# Patient Record
Sex: MALE | Race: WHITE | Employment: OTHER | URBAN - METROPOLITAN AREA
[De-identification: names, ages, dates, MRNs, and addresses within clinical notes are randomized per-mention and may not be internally consistent; named-entity substitution may affect disease eponyms.]

---

## 2017-03-07 ENCOUNTER — HOSPITAL ENCOUNTER (EMERGENCY)
Age: 72
Discharge: HOME | End: 2017-03-07
Attending: STUDENT IN AN ORGANIZED HEALTH CARE EDUCATION/TRAINING PROGRAM
Payer: MEDICARE

## 2017-03-07 VITALS
WEIGHT: 197 LBS | HEART RATE: 60 BPM | DIASTOLIC BLOOD PRESSURE: 75 MMHG | SYSTOLIC BLOOD PRESSURE: 134 MMHG | TEMPERATURE: 97.9 F | RESPIRATION RATE: 14 BRPM | OXYGEN SATURATION: 94 % | BODY MASS INDEX: 28.2 KG/M2 | HEIGHT: 70 IN

## 2017-03-07 DIAGNOSIS — K57.32 DIVERTICULITIS OF LARGE INTESTINE WITHOUT PERFORATION OR ABSCESS WITHOUT BLEEDING: Primary | ICD-10-CM

## 2017-03-07 LAB
ALBUMIN SERPL BCP-MCNC: 3.5 G/DL (ref 3.5–5)
ALBUMIN/GLOB SERPL: 0.9 {RATIO} (ref 1.1–2.2)
ALP SERPL-CCNC: 77 U/L (ref 45–117)
ALT SERPL-CCNC: 25 U/L (ref 12–78)
ANION GAP BLD CALC-SCNC: 10 MMOL/L (ref 5–15)
AST SERPL W P-5'-P-CCNC: 17 U/L (ref 15–37)
BASOPHILS # BLD AUTO: 0 K/UL (ref 0–0.1)
BASOPHILS # BLD: 0 % (ref 0–1)
BILIRUB SERPL-MCNC: 0.3 MG/DL (ref 0.2–1)
BUN SERPL-MCNC: 16 MG/DL (ref 6–20)
BUN/CREAT SERPL: 14 (ref 12–20)
CALCIUM SERPL-MCNC: 8.6 MG/DL (ref 8.5–10.1)
CHLORIDE SERPL-SCNC: 102 MMOL/L (ref 97–108)
CO2 SERPL-SCNC: 28 MMOL/L (ref 21–32)
CREAT SERPL-MCNC: 1.12 MG/DL (ref 0.7–1.3)
EOSINOPHIL # BLD: 0.2 K/UL (ref 0–0.4)
EOSINOPHIL NFR BLD: 2 % (ref 0–7)
ERYTHROCYTE [DISTWIDTH] IN BLOOD BY AUTOMATED COUNT: 13.5 % (ref 11.5–14.5)
GLOBULIN SER CALC-MCNC: 4.1 G/DL (ref 2–4)
GLUCOSE SERPL-MCNC: 99 MG/DL (ref 65–100)
HCT VFR BLD AUTO: 44.5 % (ref 36.6–50.3)
HGB BLD-MCNC: 15 G/DL (ref 12.1–17)
LYMPHOCYTES # BLD AUTO: 21 % (ref 12–49)
LYMPHOCYTES # BLD: 1.9 K/UL (ref 0.8–3.5)
MCH RBC QN AUTO: 29.9 PG (ref 26–34)
MCHC RBC AUTO-ENTMCNC: 33.7 G/DL (ref 30–36.5)
MCV RBC AUTO: 88.8 FL (ref 80–99)
MONOCYTES # BLD: 0.9 K/UL (ref 0–1)
MONOCYTES NFR BLD AUTO: 10 % (ref 5–13)
NEUTS SEG # BLD: 6 K/UL (ref 1.8–8)
NEUTS SEG NFR BLD AUTO: 67 % (ref 32–75)
PLATELET # BLD AUTO: 262 K/UL (ref 150–400)
POTASSIUM SERPL-SCNC: 4.4 MMOL/L (ref 3.5–5.1)
PROT SERPL-MCNC: 7.6 G/DL (ref 6.4–8.2)
RBC # BLD AUTO: 5.01 M/UL (ref 4.1–5.7)
SODIUM SERPL-SCNC: 140 MMOL/L (ref 136–145)
WBC # BLD AUTO: 9.1 K/UL (ref 4.1–11.1)

## 2017-03-07 PROCEDURE — 85025 COMPLETE CBC W/AUTO DIFF WBC: CPT | Performed by: STUDENT IN AN ORGANIZED HEALTH CARE EDUCATION/TRAINING PROGRAM

## 2017-03-07 PROCEDURE — 36415 COLL VENOUS BLD VENIPUNCTURE: CPT | Performed by: STUDENT IN AN ORGANIZED HEALTH CARE EDUCATION/TRAINING PROGRAM

## 2017-03-07 PROCEDURE — 99282 EMERGENCY DEPT VISIT SF MDM: CPT

## 2017-03-07 PROCEDURE — 80053 COMPREHEN METABOLIC PANEL: CPT | Performed by: STUDENT IN AN ORGANIZED HEALTH CARE EDUCATION/TRAINING PROGRAM

## 2017-03-07 RX ORDER — METRONIDAZOLE 500 MG/1
500 TABLET ORAL 3 TIMES DAILY
Qty: 21 TAB | Refills: 0 | Status: SHIPPED | OUTPATIENT
Start: 2017-03-07 | End: 2017-03-14

## 2017-03-07 RX ORDER — CIPROFLOXACIN 500 MG/1
500 TABLET ORAL 2 TIMES DAILY
Qty: 20 TAB | Refills: 0 | Status: SHIPPED | OUTPATIENT
Start: 2017-03-07 | End: 2017-03-17

## 2017-03-07 RX ORDER — GABAPENTIN 600 MG/1
600 TABLET ORAL 3 TIMES DAILY
COMMUNITY
End: 2017-08-05

## 2017-03-07 NOTE — ED TRIAGE NOTES
Pt states he started with RLQ and LLQ pain since nasima Friday. States last bowel movement was Saturday. States having nausea. Hx of diverticulitis.

## 2017-03-07 NOTE — DISCHARGE INSTRUCTIONS
We hope that we have addressed all of your medical concerns. The examination and treatment you received in the Emergency Department were for an emergent problem and were not intended as complete care. It is important that you follow up with your healthcare provider(s) for ongoing care. If your symptoms worsen or do not improve as expected, and you are unable to reach your usual health care provider(s), you should return to the Emergency Department. Today's healthcare is undergoing tremendous change, and patient satisfaction surveys are one of the many tools to assess the quality of medical care. You may receive a survey from the [facility] regarding your experience in the Emergency Department. I hope that your experience has been completely positive, particularly the medical care that I provided. As such, please participate in the survey; anything less than excellent does not meet my expectations or intentions. [facility] and 8 [facility] participate in nationally recognized quality of care measures. If your blood pressure is greater than 120/80, as reported below, we urge that you seek medical care to address the potential of high blood pressure, commonly known as hypertension. Hypertension can be hereditary or can be caused by certain medical conditions, pain, stress, or \"white coat syndrome. \"       Please make an appointment with your health care provider(s) for follow up of your Emergency Department visit. VITALS:   Patient Vitals for the past 8 hrs:   Temp Pulse Resp BP SpO2   03/07/17 1100 - - - 134/75 -   03/07/17 1059 - - - - 94 %   03/07/17 1031 - - - - 95 %   03/07/17 1030 - - - 126/63 -   03/07/17 1008 - - - - 96 %   03/07/17 1006 - - - 127/64 -   03/07/17 0900 97.9 °F (36.6 °C) 60 14 115/59 96 %          Thank you for allowing us to provide you with medical care today.   We realize that you have many choices for your emergency care needs. Please choose us in the future for any continued health care needs. [facility], [address] [place] [address] [address]   Office: [phone]            Recent Results (from the past 24 hour(s))   CBC WITH AUTOMATED DIFF    Collection Time: 03/07/17 10:21 AM   Result Value Ref Range    WBC 9.1 4.1 - 11.1 K/uL    RBC 5.01 4.10 - 5.70 M/uL    HGB 15.0 12.1 - 17.0 g/dL    HCT 44.5 36.6 - 50.3 %    MCV 88.8 80.0 - 99.0 FL    MCH 29.9 26.0 - 34.0 PG    MCHC 33.7 30.0 - 36.5 g/dL    RDW 13.5 11.5 - 14.5 %    PLATELET 480 402 - 665 K/uL    NEUTROPHILS 67 32 - 75 %    LYMPHOCYTES 21 12 - 49 %    MONOCYTES 10 5 - 13 %    EOSINOPHILS 2 0 - 7 %    BASOPHILS 0 0 - 1 %    ABS. NEUTROPHILS 6.0 1.8 - 8.0 K/UL    ABS. LYMPHOCYTES 1.9 0.8 - 3.5 K/UL    ABS. MONOCYTES 0.9 0.0 - 1.0 K/UL    ABS. EOSINOPHILS 0.2 0.0 - 0.4 K/UL    ABS. BASOPHILS 0.0 0.0 - 0.1 K/UL   METABOLIC PANEL, COMPREHENSIVE    Collection Time: 03/07/17 10:21 AM   Result Value Ref Range    Sodium 140 136 - 145 mmol/L    Potassium 4.4 3.5 - 5.1 mmol/L    Chloride 102 97 - 108 mmol/L    CO2 28 21 - 32 mmol/L    Anion gap 10 5 - 15 mmol/L    Glucose 99 65 - 100 mg/dL    BUN 16 6 - 20 MG/DL    Creatinine 1.12 0.70 - 1.30 MG/DL    BUN/Creatinine ratio 14 12 - 20      GFR est AA >60 >60 ml/min/1.73m2    GFR est non-AA >60 >60 ml/min/1.73m2    Calcium 8.6 8.5 - 10.1 MG/DL    Bilirubin, total 0.3 0.2 - 1.0 MG/DL    ALT (SGPT) 25 12 - 78 U/L    AST (SGOT) 17 15 - 37 U/L    Alk. phosphatase 77 45 - 117 U/L    Protein, total 7.6 6.4 - 8.2 g/dL    Albumin 3.5 3.5 - 5.0 g/dL    Globulin 4.1 (H) 2.0 - 4.0 g/dL    A-G Ratio 0.9 (L) 1.1 - 2.2         No results found. Diverticulitis: Care Instructions  Your Care Instructions    Diverticulitis occurs when pouches form in the wall of the colon and become inflamed or infected. It can be very painful. Doctors aren't sure what causes diverticulitis.  There is no proof that foods such as nuts, seeds, or berries cause it or make it worse. A low-fiber diet may cause the colon to work harder to push stool forward. Pouches may form because of this extra work. It may be hard to think about healthy eating while you're in pain. But as you recover, you might think about how you can use healthy eating for overall better health. Healthy eating may help you avoid future attacks. Follow-up care is a key part of your treatment and safety. Be sure to make and go to all appointments, and call your doctor if you are having problems. It's also a good idea to know your test results and keep a list of the medicines you take. How can you care for yourself at home? · Drink plenty of fluids, enough so that your urine is light yellow or clear like water. If you have kidney, heart, or liver disease and have to limit fluids, talk with your doctor before you increase the amount of fluids you drink. · Stick to liquids or a bland diet (plain rice, bananas, dry toast or crackers, applesauce) until you are feeling better. Then you can return to regular foods and gradually increase the amount of fiber in your diet. · Use a heating pad set on low on your belly to relieve mild cramps and pain. · Get extra rest until you are feeling better. · Be safe with medicines. Read and follow all instructions on the label. ¨ If the doctor gave you a prescription medicine for pain, take it as prescribed. ¨ If you are not taking a prescription pain medicine, ask your doctor if you can take an over-the-counter medicine. · If your doctor prescribed antibiotics, take them as directed. Do not stop taking them just because you feel better. You need to take the full course of antibiotics. To prevent future attacks of diverticulitis  · Avoid constipation:  ¨ Include fruits, vegetables, beans, and whole grains in your diet each day. These foods are high in fiber.   ¨ Drink plenty of fluids, enough so that your urine is light yellow or clear like water. If you have kidney, heart, or liver disease and have to limit fluids, talk with your doctor before you increase the amount of fluids you drink. ¨ Get some exercise every day. Build up slowly to 30 to 60 minutes a day on 5 or more days of the week. ¨ Take a fiber supplement, such as Citrucel or Metamucil, every day if needed. Read and follow all instructions on the label. ¨ Schedule time each day for a bowel movement. Having a daily routine may help. Take your time and do not strain when having a bowel movement. When should you call for help? Call 911 anytime you think you may need emergency care. For example, call if:  · You passed out (lost consciousness). · You vomit blood or what looks like coffee grounds. · You pass maroon or very bloody stools. Call your doctor now or seek immediate medical care if:  · You have severe pain or swelling in your belly. · You have a new or higher fever. · You cannot keep down fluids or medicines. · You have new pain that gets worse when you move or cough. Watch closely for changes in your health, and be sure to contact your doctor if:  · The symptoms you had when you first started feeling sick come back. · You do not get better as expected. Where can you learn more? Go to http://monica-barbie.info/. Enter H901 in the search box to learn more about \"Diverticulitis: Care Instructions. \"  Current as of: August 9, 2016  Content Version: 11.1  © 6103-6488 [employer]. Care instructions adapted under license by [employer] (which disclaims liability or warranty for this information). If you have questions about a medical condition or this instruction, always ask your healthcare professional. [place] [address] any warranty or liability for your use of this information.

## 2017-03-07 NOTE — ED PROVIDER NOTES
HPI Comments: 66 y.o. male with past medical history significant for diverticulitis and spinal stenosis who presents ambulatory from home with chief complaint of abdominal pain. Pt states lower abdominal pain onset 4 days ago. Pt states symptoms are the same as past diverticulitis flares. Pt has been taking laxatives with no relief. Pt has also been eating bland foods, such as toast, soup and eggs. Pt's last BM was \"half of the normal amount\" 3 days ago. Pt denies ever having a colonoscopy. Pt denies nausea, vomiting, fever, hematochezia and melena. There are no other acute medical concerns at this time. Social hx: current some day tobacco smoker; uses EtOH; denies illicit drug use  PCP: None    Note written by Bridget Ortega, as dictated by Ruth Ricci MD 11:06 AM      The history is provided by the patient. Past Medical History:   Diagnosis Date    Diverticulitis     Spinal stenosis        History reviewed. No pertinent surgical history. History reviewed. No pertinent family history. Social History     Social History    Marital status:      Spouse name: N/A    Number of children: N/A    Years of education: N/A     Occupational History    Not on file. Social History Main Topics    Smoking status: Current Some Day Smoker    Smokeless tobacco: Never Used    Alcohol use 0.5 oz/week     1 Cans of beer per week    Drug use: No    Sexual activity: Not on file     Other Topics Concern    Not on file     Social History Narrative         ALLERGIES: Review of patient's allergies indicates no known allergies. Review of Systems   Constitutional: Negative for chills, diaphoresis, fatigue and fever. HENT: Negative for congestion, rhinorrhea, sinus pressure, sore throat, trouble swallowing and voice change. Eyes: Negative for photophobia and visual disturbance. Respiratory: Negative for cough, chest tightness and shortness of breath.     Cardiovascular: Negative for chest pain, palpitations and leg swelling. Gastrointestinal: Positive for abdominal pain. Negative for blood in stool, constipation, diarrhea, nausea and vomiting. Musculoskeletal: Negative for arthralgias, myalgias and neck pain. Neurological: Negative for dizziness, weakness, light-headedness, numbness and headaches. All other systems reviewed and are negative. Vitals:    03/07/17 0900 03/07/17 1006 03/07/17 1008   BP: 115/59 127/64    Pulse: 60     Resp: 14     Temp: 97.9 °F (36.6 °C)     SpO2: 96%  96%   Weight: 89.4 kg (197 lb)     Height: 5' 10\" (1.778 m)              Physical Exam   Constitutional: He is oriented to person, place, and time. He appears well-developed and well-nourished. No distress. HENT:   Head: Normocephalic and atraumatic. Nose: Nose normal.   Mouth/Throat: Oropharynx is clear and moist. No oropharyngeal exudate. Eyes: Conjunctivae and EOM are normal. Right eye exhibits no discharge. Left eye exhibits no discharge. No scleral icterus. Neck: Normal range of motion. Neck supple. No JVD present. No tracheal deviation present. No thyromegaly present. Cardiovascular: Normal rate, regular rhythm, normal heart sounds and intact distal pulses. Exam reveals no gallop and no friction rub. No murmur heard. Pulmonary/Chest: Effort normal and breath sounds normal. No stridor. No respiratory distress. He has no wheezes. He has no rales. He exhibits no tenderness. Abdominal: Bowel sounds are normal. He exhibits no distension and no mass. There is tenderness (mild) in the periumbilical area. There is no rebound. Musculoskeletal: Normal range of motion. He exhibits no edema or tenderness. Lymphadenopathy:     He has no cervical adenopathy. Neurological: He is alert and oriented to person, place, and time. No cranial nerve deficit. Coordination normal.   Skin: Skin is warm and dry. No rash noted. He is not diaphoretic. No erythema. No pallor.    Psychiatric: He has a normal mood and affect. His behavior is normal. Judgment and thought content normal.   Nursing note and vitals reviewed. Note written by Bridget Thompson, as dictated by Gloria Guerra MD 11:06 AM    MDM  Number of Diagnoses or Management Options  Diverticulitis of large intestine without perforation or abscess without bleeding:   Diagnosis management comments: abd pain, diverticulitis, gastroentertitis. 67 y/o male with hx of diverticulitis. Pt. States his symptoms \"are exactly like when I have diverticulitis. \"  Discussed with pt. That we will treat symptoms and not obtain imaging now as pt is not having symptoms of GIB, severe pain, fevers. Pt agrees and understands plan. Will obtain cbc, cmp.        Amount and/or Complexity of Data Reviewed  Clinical lab tests: ordered and reviewed  Review and summarize past medical records: yes    Risk of Complications, Morbidity, and/or Mortality  Presenting problems: moderate  Diagnostic procedures: moderate  Management options: moderate    Patient Progress  Patient progress: stable    ED Course       Procedures

## 2017-07-03 ENCOUNTER — HOSPITAL ENCOUNTER (EMERGENCY)
Age: 72
Discharge: HOME | End: 2017-07-03
Attending: EMERGENCY MEDICINE
Payer: MEDICARE

## 2017-07-03 ENCOUNTER — APPOINTMENT (OUTPATIENT)
Dept: CT IMAGING | Age: 72
End: 2017-07-03
Attending: EMERGENCY MEDICINE
Payer: MEDICARE

## 2017-07-03 VITALS
RESPIRATION RATE: 18 BRPM | OXYGEN SATURATION: 95 % | TEMPERATURE: 98.4 F | HEIGHT: 69 IN | BODY MASS INDEX: 28.51 KG/M2 | WEIGHT: 192.46 LBS | SYSTOLIC BLOOD PRESSURE: 131 MMHG | DIASTOLIC BLOOD PRESSURE: 56 MMHG | HEART RATE: 69 BPM

## 2017-07-03 DIAGNOSIS — R10.32 ABDOMINAL PAIN, LLQ (LEFT LOWER QUADRANT): Primary | ICD-10-CM

## 2017-07-03 DIAGNOSIS — K52.9 NONINFECTIOUS GASTROENTERITIS, UNSPECIFIED TYPE: ICD-10-CM

## 2017-07-03 LAB
ALBUMIN SERPL BCP-MCNC: 3.5 G/DL (ref 3.5–5)
ALBUMIN/GLOB SERPL: 0.8 {RATIO} (ref 1.1–2.2)
ALP SERPL-CCNC: 69 U/L (ref 45–117)
ALT SERPL-CCNC: 21 U/L (ref 12–78)
ANION GAP BLD CALC-SCNC: 12 MMOL/L (ref 5–15)
APPEARANCE UR: CLEAR
AST SERPL W P-5'-P-CCNC: 18 U/L (ref 15–37)
BACTERIA URNS QL MICRO: NEGATIVE /HPF
BASOPHILS # BLD AUTO: 0 K/UL (ref 0–0.1)
BASOPHILS # BLD: 0 % (ref 0–1)
BILIRUB SERPL-MCNC: 0.4 MG/DL (ref 0.2–1)
BILIRUB UR QL: NEGATIVE
BUN SERPL-MCNC: 19 MG/DL (ref 6–20)
BUN/CREAT SERPL: 18 (ref 12–20)
CALCIUM SERPL-MCNC: 8.8 MG/DL (ref 8.5–10.1)
CHLORIDE SERPL-SCNC: 100 MMOL/L (ref 97–108)
CO2 SERPL-SCNC: 24 MMOL/L (ref 21–32)
COLOR UR: ABNORMAL
CREAT SERPL-MCNC: 1.08 MG/DL (ref 0.7–1.3)
EOSINOPHIL # BLD: 0.1 K/UL (ref 0–0.4)
EOSINOPHIL NFR BLD: 0 % (ref 0–7)
EPITH CASTS URNS QL MICRO: ABNORMAL /LPF
ERYTHROCYTE [DISTWIDTH] IN BLOOD BY AUTOMATED COUNT: 13.4 % (ref 11.5–14.5)
GLOBULIN SER CALC-MCNC: 4.2 G/DL (ref 2–4)
GLUCOSE SERPL-MCNC: 100 MG/DL (ref 65–100)
GLUCOSE UR STRIP.AUTO-MCNC: NEGATIVE MG/DL
HCT VFR BLD AUTO: 44.2 % (ref 36.6–50.3)
HGB BLD-MCNC: 15.6 G/DL (ref 12.1–17)
HGB UR QL STRIP: NEGATIVE
HYALINE CASTS URNS QL MICRO: ABNORMAL /LPF (ref 0–5)
KETONES UR QL STRIP.AUTO: 40 MG/DL
LACTATE SERPL-SCNC: 0.9 MMOL/L (ref 0.4–2)
LEUKOCYTE ESTERASE UR QL STRIP.AUTO: NEGATIVE
LIPASE SERPL-CCNC: 85 U/L (ref 73–393)
LYMPHOCYTES # BLD AUTO: 11 % (ref 12–49)
LYMPHOCYTES # BLD: 1.3 K/UL (ref 0.8–3.5)
MCH RBC QN AUTO: 31.2 PG (ref 26–34)
MCHC RBC AUTO-ENTMCNC: 35.3 G/DL (ref 30–36.5)
MCV RBC AUTO: 88.4 FL (ref 80–99)
MONOCYTES # BLD: 1.2 K/UL (ref 0–1)
MONOCYTES NFR BLD AUTO: 10 % (ref 5–13)
NEUTS SEG # BLD: 9.2 K/UL (ref 1.8–8)
NEUTS SEG NFR BLD AUTO: 79 % (ref 32–75)
NITRITE UR QL STRIP.AUTO: NEGATIVE
PH UR STRIP: 6 [PH] (ref 5–8)
PLATELET # BLD AUTO: 211 K/UL (ref 150–400)
POTASSIUM SERPL-SCNC: 3.9 MMOL/L (ref 3.5–5.1)
PROT SERPL-MCNC: 7.7 G/DL (ref 6.4–8.2)
PROT UR STRIP-MCNC: NEGATIVE MG/DL
RBC # BLD AUTO: 5 M/UL (ref 4.1–5.7)
RBC #/AREA URNS HPF: ABNORMAL /HPF (ref 0–5)
SODIUM SERPL-SCNC: 136 MMOL/L (ref 136–145)
SP GR UR REFRACTOMETRY: 1.01 (ref 1–1.03)
UROBILINOGEN UR QL STRIP.AUTO: 0.2 EU/DL (ref 0.2–1)
WBC # BLD AUTO: 11.7 K/UL (ref 4.1–11.1)
WBC URNS QL MICRO: ABNORMAL /HPF (ref 0–4)

## 2017-07-03 PROCEDURE — 74011250636 HC RX REV CODE- 250/636: Performed by: EMERGENCY MEDICINE

## 2017-07-03 PROCEDURE — 96375 TX/PRO/DX INJ NEW DRUG ADDON: CPT

## 2017-07-03 PROCEDURE — 83690 ASSAY OF LIPASE: CPT | Performed by: EMERGENCY MEDICINE

## 2017-07-03 PROCEDURE — 96374 THER/PROPH/DIAG INJ IV PUSH: CPT

## 2017-07-03 PROCEDURE — 96361 HYDRATE IV INFUSION ADD-ON: CPT

## 2017-07-03 PROCEDURE — 85025 COMPLETE CBC W/AUTO DIFF WBC: CPT | Performed by: EMERGENCY MEDICINE

## 2017-07-03 PROCEDURE — 80053 COMPREHEN METABOLIC PANEL: CPT | Performed by: EMERGENCY MEDICINE

## 2017-07-03 PROCEDURE — 74011636320 HC RX REV CODE- 636/320: Performed by: EMERGENCY MEDICINE

## 2017-07-03 PROCEDURE — 81001 URINALYSIS AUTO W/SCOPE: CPT | Performed by: EMERGENCY MEDICINE

## 2017-07-03 PROCEDURE — 99283 EMERGENCY DEPT VISIT LOW MDM: CPT

## 2017-07-03 PROCEDURE — 83605 ASSAY OF LACTIC ACID: CPT | Performed by: EMERGENCY MEDICINE

## 2017-07-03 PROCEDURE — 74177 CT ABD & PELVIS W/CONTRAST: CPT

## 2017-07-03 PROCEDURE — 36415 COLL VENOUS BLD VENIPUNCTURE: CPT | Performed by: EMERGENCY MEDICINE

## 2017-07-03 RX ORDER — METRONIDAZOLE 500 MG/1
500 TABLET ORAL 3 TIMES DAILY
COMMUNITY
End: 2017-08-04

## 2017-07-03 RX ORDER — KETOROLAC TROMETHAMINE 30 MG/ML
15 INJECTION, SOLUTION INTRAMUSCULAR; INTRAVENOUS
Status: COMPLETED | OUTPATIENT
Start: 2017-07-03 | End: 2017-07-03

## 2017-07-03 RX ORDER — CIPROFLOXACIN 500 MG/1
500 TABLET ORAL 2 TIMES DAILY
COMMUNITY
End: 2017-07-18

## 2017-07-03 RX ORDER — FENTANYL CITRATE 50 UG/ML
50 INJECTION, SOLUTION INTRAMUSCULAR; INTRAVENOUS
Status: COMPLETED | OUTPATIENT
Start: 2017-07-03 | End: 2017-07-03

## 2017-07-03 RX ORDER — HYDROCODONE BITARTRATE AND ACETAMINOPHEN 5; 325 MG/1; MG/1
1 TABLET ORAL
Qty: 7 TAB | Refills: 0 | Status: SHIPPED | OUTPATIENT
Start: 2017-07-03 | End: 2017-07-18

## 2017-07-03 RX ORDER — ONDANSETRON 2 MG/ML
4 INJECTION INTRAMUSCULAR; INTRAVENOUS
Status: COMPLETED | OUTPATIENT
Start: 2017-07-03 | End: 2017-07-03

## 2017-07-03 RX ADMIN — SODIUM CHLORIDE 1000 ML: 900 INJECTION, SOLUTION INTRAVENOUS at 11:11

## 2017-07-03 RX ADMIN — IOPAMIDOL 100 ML: 755 INJECTION, SOLUTION INTRAVENOUS at 12:08

## 2017-07-03 RX ADMIN — ONDANSETRON 4 MG: 2 INJECTION INTRAMUSCULAR; INTRAVENOUS at 11:10

## 2017-07-03 RX ADMIN — FENTANYL CITRATE 50 MCG: 50 INJECTION, SOLUTION INTRAMUSCULAR; INTRAVENOUS at 11:11

## 2017-07-03 RX ADMIN — KETOROLAC TROMETHAMINE 15 MG: 30 INJECTION, SOLUTION INTRAMUSCULAR at 11:10

## 2017-07-03 NOTE — DISCHARGE INSTRUCTIONS
Colitis: Care Instructions  Your Care Instructions  Colitis is the medical term for swelling (inflammation) of the intestine. It can be caused by different things, such as an infection or loss of blood flow in the intestine. Other causes are problems like Crohn's disease or ulcerative colitis. Symptoms may include fever, diarrhea that may be bloody, or belly pain. Sometimes symptoms go away without treatment. But you may need treatment or more tests, such as blood tests or a stool test. Or you may need imaging tests like a CT scan or a colonoscopy. In some cases, the doctor may want to test a sample of tissue from the intestine. This test is called a biopsy. The doctor has checked you carefully, but problems can develop later. If you notice any problems or new symptoms, get medical treatment right away. Follow-up care is a key part of your treatment and safety. Be sure to make and go to all appointments, and call your doctor if you are having problems. It's also a good idea to know your test results and keep a list of the medicines you take. How can you care for yourself at home? · Rest until you feel better. · Your doctor may recommend that you eat bland foods. These include rice, dry toast or crackers, bananas, and applesauce. · To prevent dehydration, drink plenty of fluids. Choose water and other caffeine-free clear liquids until you feel better. If you have kidney, heart, or liver disease and have to limit fluids, talk with your doctor before you increase the amount of fluids you drink. · Be safe with medicines. Take your medicines exactly as prescribed. Call your doctor if you think you are having a problem with your medicine. You will get more details on the specific medicines your doctor prescribes. When should you call for help? Call 911 anytime you think you may need emergency care. For example, call if:  · You passed out (lost consciousness).   · You vomit blood or what looks like coffee grounds. · Your stools are maroon or very bloody. Call your doctor now or seek immediate medical care if:  · You have new or worse pain. · You have a new or higher fever. · You have new or worse symptoms. · You cannot keep fluids or medicines down. Watch closely for changes in your health, and be sure to contact your doctor if:  · You do not get better as expected. Where can you learn more? Go to http://monica-barbie.info/. Marizol Jung in the search box to learn more about \"Colitis: Care Instructions. \"  Current as of: August 9, 2016  Content Version: 11.3  © 9743-0776 [employer]. Care instructions adapted under license by [employer] (which disclaims liability or warranty for this information). If you have questions about a medical condition or this instruction, always ask your healthcare professional. [place] [address] any warranty or liability for your use of this information. We hope that we have addressed all of your medical concerns. The examination and treatment you received in the Emergency Department were for an emergent problem and were not intended as complete care. It is important that you follow up with your healthcare provider(s) for ongoing care. If your symptoms worsen or do not improve as expected, and you are unable to reach your usual health care provider(s), you should return to the Emergency Department. Today's healthcare is undergoing tremendous change, and patient satisfaction surveys are one of the many tools to assess the quality of medical care. You may receive a survey from the [email or web address] organization regarding your experience in the Emergency Department. I hope that your experience has been completely positive, particularly the medical care that I provided. As such, please participate in the survey; anything less than excellent does not meet my expectations or intentions.         [facility] and [facility] participate in nationally recognized quality of care measures. If your blood pressure is greater than 120/80, as reported below, we urge that you seek medical care to address the potential of high blood pressure, commonly known as hypertension. Hypertension can be hereditary or can be caused by certain medical conditions, pain, stress, or \"white coat syndrome. \"       Please make an appointment with your health care provider(s) for follow up of your Emergency Department visit. VITALS:   Patient Vitals for the past 8 hrs:   Temp Pulse Resp BP SpO2   07/03/17 1039 98.4 °F (36.9 °C) 64 18 137/70 96 %          Thank you for allowing us to provide you with medical care today. We realize that you have many choices for your emergency care needs. Please choose us in the future for any continued health care needs. Em Houser, [address] [place] [address].   Office: [phone]            Recent Results (from the past 24 hour(s))   LIPASE    Collection Time: 07/03/17 11:09 AM   Result Value Ref Range    Lipase 85 73 - 393 U/L   LACTIC ACID    Collection Time: 07/03/17 11:09 AM   Result Value Ref Range    Lactic acid 0.9 0.4 - 2.0 MMOL/L   METABOLIC PANEL, COMPREHENSIVE    Collection Time: 07/03/17 11:09 AM   Result Value Ref Range    Sodium 136 136 - 145 mmol/L    Potassium 3.9 3.5 - 5.1 mmol/L    Chloride 100 97 - 108 mmol/L    CO2 24 21 - 32 mmol/L    Anion gap 12 5 - 15 mmol/L    Glucose 100 65 - 100 mg/dL    BUN 19 6 - 20 MG/DL    Creatinine 1.08 0.70 - 1.30 MG/DL    BUN/Creatinine ratio 18 12 - 20      GFR est AA >60 >60 ml/min/1.73m2    GFR est non-AA >60 >60 ml/min/1.73m2    Calcium 8.8 8.5 - 10.1 MG/DL    Bilirubin, total 0.4 0.2 - 1.0 MG/DL    ALT (SGPT) 21 12 - 78 U/L    AST (SGOT) 18 15 - 37 U/L    Alk.  phosphatase 69 45 - 117 U/L    Protein, total 7.7 6.4 - 8.2 g/dL    Albumin 3.5 3.5 - 5.0 g/dL    Globulin 4.2 (H) 2.0 - 4.0 g/dL    A-G Ratio 0.8 (L) 1.1 - 2.2     CBC WITH AUTOMATED DIFF    Collection Time: 07/03/17 11:09 AM   Result Value Ref Range    WBC 11.7 (H) 4.1 - 11.1 K/uL    RBC 5.00 4. 10 - 5.70 M/uL    HGB 15.6 12.1 - 17.0 g/dL    HCT 44.2 36.6 - 50.3 %    MCV 88.4 80.0 - 99.0 FL    MCH 31.2 26.0 - 34.0 PG    MCHC 35.3 30.0 - 36.5 g/dL    RDW 13.4 11.5 - 14.5 %    PLATELET 821 356 - 030 K/uL    NEUTROPHILS 79 (H) 32 - 75 %    LYMPHOCYTES 11 (L) 12 - 49 %    MONOCYTES 10 5 - 13 %    EOSINOPHILS 0 0 - 7 %    BASOPHILS 0 0 - 1 %    ABS. NEUTROPHILS 9.2 (H) 1.8 - 8.0 K/UL    ABS. LYMPHOCYTES 1.3 0.8 - 3.5 K/UL    ABS. MONOCYTES 1.2 (H) 0.0 - 1.0 K/UL    ABS. EOSINOPHILS 0.1 0.0 - 0.4 K/UL    ABS. BASOPHILS 0.0 0.0 - 0.1 K/UL       Ct Abd Pelv W Cont    Result Date: 7/3/2017  CT ABDOMEN AND PELVIS WITH CONTRAST. 7/3/2017 12:13 PM INDICATION: Left lower quadrant abdominal pain. COMPARISON: 8/29/2013, 11/20/2010. TECHNIQUE: CT of the abdomen and pelvis was performed after the administration of 100 cc IV Isovue-[id number]. CT dose reduction was achieved through use of a standardized protocol tailored for this examination and automatic exposure control for dose modulation. FINDINGS: Abdomen: Incidental note is made of renal cysts. The lung bases are clear. The heart size is normal. The distal esophagus, stomach, duodenum, liver, gallbladder, pancreas, spleen, and adrenals are normal. Pelvis: The wall of the sigmoid colon is thickened, and sigmoid mesocolonic edema is associated. The small bowel, ileocecal junction, appendix, and proximal colon are normal. Descending diverticulosis is mild. No free air and no abdominopelvic lymphadenopathy. 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 IMPRESSION: Sigmoid colitis.

## 2017-07-03 NOTE — ED NOTES
Pt discharged by provider. Pt ambulatory off the unit with a steady gait with his spouse and in NAD. Pt declined using a w/c.

## 2017-07-03 NOTE — ED TRIAGE NOTES
Pt reports going to [facility] last week for diverticulitis. Prescribed meds (meclizine, metronidazole, and Ciprofloxacin) and rocephin injections. States not any better. Had labs done twice, last checked yesterday. States scant improvement when compared. (+)nausea, but no vomiting. Last BM was last Wednesday or Thursday.

## 2017-07-03 NOTE — ED PROVIDER NOTES
HPI Comments: 74 y.o. male with past medical history significant for diverticulitis, spinal stenosis who presents accompanied by spouse with chief complaint of abdominal pain. Patient states he had onset of lower abdominal pain ~4-5 days ago that has been intermittent. He reports associated nausea and mild dysuria. He reports hx of similar pain associated with diverticulitis \"flare\" and decided to get \"checked out\". Patient was seen for this at [facility] ~4 days ago where he had blood work and UA done. He notes he was given dose of Rocephin IM as well as prescription for Rocephin and was discharged home. Patient reports taking this medication with no relief in abdominal pain and explains that \"I cannot sleep for more than 1 hour at a time because of the pain\". Patient explains that he returned to [facility] yesterday where he had additional dose of Rocephin IM with no relief and decided to visit ED for evaluation. Patient notes last BM was ~3-4 days ago and explains that this is unusual for him because \"I am usually regular\". Patient denies any hx of kidney stone. She denies any blood in stool, vomiting, fever, chills, cough, SOB, dizziness. There are no other acute medical concerns at this time. Social hx: +smoker. +ETOH use; 1 can of beer per week. PCP: None    Note written by Zoey Wooten. Varun Flood, as dictated by Kim Duran MD 10:55 AM      The history is provided by the patient and the spouse. No  was used. Past Medical History:   Diagnosis Date    Diverticulitis     Spinal stenosis        No past surgical history on file. No family history on file. Social History     Social History    Marital status:      Spouse name: N/A    Number of children: N/A    Years of education: N/A     Occupational History    Not on file.      Social History Main Topics    Smoking status: Current Some Day Smoker    Smokeless tobacco: Never Used    Alcohol use 0.5 oz/week     1 Cans of beer per week    Drug use: No    Sexual activity: Not on file     Other Topics Concern    Not on file     Social History Narrative         ALLERGIES: Review of patient's allergies indicates no known allergies. Review of Systems   Constitutional: Negative for chills, fatigue and fever. HENT: Negative for congestion, rhinorrhea and sore throat. Eyes: Negative for visual disturbance. Respiratory: Negative for cough and shortness of breath. Cardiovascular: Negative for chest pain. Gastrointestinal: Positive for abdominal pain (lower) and constipation. Negative for blood in stool, diarrhea, nausea and vomiting. Genitourinary: Positive for dysuria. Negative for difficulty urinating and hematuria. Musculoskeletal: Negative for back pain and neck pain. Skin: Negative for rash and wound. Neurological: Negative for dizziness and headaches. Psychiatric/Behavioral: Positive for sleep disturbance. All other systems reviewed and are negative. Vitals:    07/03/17 1039   BP: 137/70   Pulse: 64   Resp: 18   Temp: 98.4 °F (36.9 °C)   SpO2: 96%   Weight: 87.3 kg (192 lb 7.4 oz)   Height: 5' 9\" (1.753 m)            Physical Exam   Constitutional: He is oriented to person, place, and time. He appears well-developed and well-nourished. No distress. NAD, AxOx4, speaking in complete sentences     HENT:   Head: Normocephalic and atraumatic. Mouth/Throat: Oropharynx is clear and moist. No oropharyngeal exudate. Eyes: Conjunctivae and EOM are normal. Pupils are equal, round, and reactive to light. Right eye exhibits no discharge. Left eye exhibits no discharge. Neck: Normal range of motion. Neck supple. Cardiovascular: Normal rate, regular rhythm, normal heart sounds and intact distal pulses. Exam reveals no gallop and no friction rub. No murmur heard. Pulmonary/Chest: Effort normal and breath sounds normal. No respiratory distress. He has no wheezes. He has no rales.  He exhibits no tenderness. Abdominal: Soft. Bowel sounds are normal. He exhibits no distension and no mass. There is no tenderness. There is no rebound and no guarding. Min ttp    Neg peritoneal signs; Genitourinary:   Genitourinary Comments: Pt denies urinary/ Testicular/ scrotal or penile  complaints   Musculoskeletal: Normal range of motion. He exhibits no edema. Lymphadenopathy:     He has no cervical adenopathy. Neurological: He is alert and oriented to person, place, and time. He has normal reflexes. No cranial nerve deficit. Coordination normal.   Skin: Skin is warm and dry. No rash noted. No erythema. Psychiatric: He has a normal mood and affect. Nursing note and vitals reviewed. MDM  ED Course       Procedures    Chief Complaint   Patient presents with    Abdominal Pain       10:44 AM  The patients presenting problems have been discussed, and they are in agreement with the care plan formulated and outlined with them. I have encouraged them to ask questions as they arise throughout their visit. MEDICATIONS GIVEN:  Medications - No data to display    LABS REVIEWED:  Labs Reviewed - No data to display    RADIOLOGY RESULTS:  The following have been ordered and reviewed:  _____________________________________________________________________  _____________________________________________________________________        PROCEDURES:        CONSULTATIONS:       PROGRESS NOTES:      DIAGNOSIS:    1. Abdominal pain, LLQ (left lower quadrant)    2. Noninfectious gastroenteritis, unspecified type        PLAN:  1- LLQ abd pain/ colitis - small course norco/ OTC constipation meds/ GI follow-up/ Pt/ Wife agrees w/ plans;       ED COURSE: The patients hospital course has been uncomplicated. 12:51 PM  [place]  results have been reviewed with him. He has been counseled regarding his diagnosis.   He verbally conveys understanding and agreement of the signs, symptoms, diagnosis, treatment and prognosis and additionally agrees to Call/ Arrange follow up as recommended with Dr. Jay Waggoner MD in 24 - 48 hours. He also agrees with the care-plan and conveys that all of his questions have been answered. I have also put together some discharge instructions for him that include: 1) educational information regarding their diagnosis, 2) how to care for their diagnosis at home, as well a 3) list of reasons why they would want to return to the ED prior to their follow-up appointment, should their condition change or for concerns.

## 2017-07-17 ENCOUNTER — HOSPITAL ENCOUNTER (EMERGENCY)
Age: 72
Discharge: HOME | End: 2017-07-17
Attending: EMERGENCY MEDICINE
Payer: MEDICARE

## 2017-07-17 VITALS
OXYGEN SATURATION: 94 % | SYSTOLIC BLOOD PRESSURE: 145 MMHG | DIASTOLIC BLOOD PRESSURE: 69 MMHG | WEIGHT: 192 LBS | HEART RATE: 67 BPM | RESPIRATION RATE: 18 BRPM | TEMPERATURE: 98 F | HEIGHT: 70 IN | BODY MASS INDEX: 27.49 KG/M2

## 2017-07-17 DIAGNOSIS — K59.00 CONSTIPATION, UNSPECIFIED CONSTIPATION TYPE: Primary | ICD-10-CM

## 2017-07-17 PROCEDURE — 99281 EMR DPT VST MAYX REQ PHY/QHP: CPT

## 2017-07-17 RX ORDER — MINERAL OIL
30 OIL (ML) ORAL
Qty: 1 BOTTLE | Refills: 0 | Status: SHIPPED | OUTPATIENT
Start: 2017-07-17 | End: 2017-08-04

## 2017-07-17 RX ORDER — MAGNESIUM CITRATE
296 SOLUTION, ORAL ORAL
Qty: 1 BOTTLE | Refills: 0 | Status: SHIPPED | OUTPATIENT
Start: 2017-07-17 | End: 2017-07-17

## 2017-07-17 RX ORDER — POLYETHYLENE GLYCOL 3350 17 G/17G
17 POWDER, FOR SOLUTION ORAL 2 TIMES DAILY
Qty: 30 PACKET | Refills: 0 | Status: ON HOLD | OUTPATIENT
Start: 2017-07-17 | End: 2017-08-04

## 2017-07-17 NOTE — DISCHARGE INSTRUCTIONS
Constipation: Care Instructions  Your Care Instructions  Constipation means that you have a hard time passing stools (bowel movements). People pass stools from 3 times a day to once every 3 days. What is normal for you may be different. Constipation may occur with pain in the rectum and cramping. The pain may get worse when you try to pass stools. Sometimes there are small amounts of bright red blood on toilet paper or the surface of stools. This is because of enlarged veins near the rectum (hemorrhoids). A few changes in your diet and lifestyle may help you avoid ongoing constipation. Your doctor may also prescribe medicine to help loosen your stool. Some medicines can cause constipation. These include pain medicines and antidepressants. Tell your doctor about all the medicines you take. Your doctor may want to make a medicine change to ease your symptoms. Follow-up care is a key part of your treatment and safety. Be sure to make and go to all appointments, and call your doctor if you are having problems. It's also a good idea to know your test results and keep a list of the medicines you take. How can you care for yourself at home? · Drink plenty of fluids, enough so that your urine is light yellow or clear like water. If you have kidney, heart, or liver disease and have to limit fluids, talk with your doctor before you increase the amount of fluids you drink. · Include high-fiber foods in your diet each day. These include fruits, vegetables, beans, and whole grains. · Get at least 30 minutes of exercise on most days of the week. Walking is a good choice. You also may want to do other activities, such as running, swimming, cycling, or playing tennis or team sports. · Take a fiber supplement, such as Citrucel or Metamucil, every day. Read and follow all instructions on the label. · Schedule time each day for a bowel movement. A daily routine may help.  Take your time having your bowel movement. · Support your feet with a small step stool when you sit on the toilet. This helps flex your hips and places your pelvis in a squatting position. · Your doctor may recommend an over-the-counter laxative to relieve your constipation. Examples are Milk of Magnesia and MiraLax. Read and follow all instructions on the label. Do not use laxatives on a long-term basis. When should you call for help? Call your doctor now or seek immediate medical care if:  · You have new or worse belly pain. · You have new or worse nausea or vomiting. · You have blood in your stools. Watch closely for changes in your health, and be sure to contact your doctor if:  · Your constipation is getting worse. · You do not get better as expected. Where can you learn more? Go to http://monica-barbie.info/. Enter [phone] in the search box to learn more about \"Constipation: Care Instructions. \"  Current as of: March 20, 2017  Content Version: 11.3  © 5858-5895 [employer]. Care instructions adapted under license by [employer] (which disclaims liability or warranty for this information). If you have questions about a medical condition or this instruction, always ask your healthcare professional. [place] [address] any warranty or liability for your use of this information.

## 2017-07-17 NOTE — ED TRIAGE NOTES
Pt c/o 2 weeks of constipation. Having some small movements here and there. Abdominal pain bilateral in lower abd, sometimes umbilical. Pt eating and drinking but lightly.

## 2017-07-17 NOTE — ED PROVIDER NOTES
HPI Comments: 74 yr old male w/ PMH of Diverticulitis and spinal stenosis who presents the ED with a complaint of constipation. Pt. Reports 3 wks of constipation and decreased stools. He states  His last full bowel movement was about 2 weeks ago. Since then, he reports intermittent pellet-like feces. He endorses pain w/ defecation but no melena or hematochezia. Pt endorses an aching intermittent, non- radiating, 6/10 abdominal pain that is worsened w/ movement. Pt denies any fevers, chills, nausea, vomiting. Of note, he was recently diagnosed w/ diverticulitis on 07/03/17 aafter a bout of abdominal pain. He was discharged w/ abx and miralax but reports no significant improvement since them. Patient is a 74 y.o. male presenting with constipation. The history is provided by the patient. Constipation    This is a new problem. The current episode started more than 1 week ago. The stool is described as pellet like. Associated symptoms include abdominal pain and constipation. Pertinent negatives include no flatus, no dysuria, no abdominal distention, no chills, no fever, no nausea, no back pain, no vomiting and no diarrhea. He has tried oral laxatives for the symptoms. The treatment provided no relief. Past medical history comments: Diverticulitis. Past Medical History:   Diagnosis Date    Diverticulitis     Spinal stenosis        History reviewed. No pertinent surgical history. History reviewed. No pertinent family history. Social History     Social History    Marital status:      Spouse name: N/A    Number of children: N/A    Years of education: N/A     Occupational History    Not on file.      Social History Main Topics    Smoking status: Current Some Day Smoker    Smokeless tobacco: Never Used    Alcohol use 0.5 oz/week     1 Cans of beer per week    Drug use: No    Sexual activity: Not on file     Other Topics Concern    Not on file     Social History Narrative         ALLERGIES: Review of patient's allergies indicates no known allergies. Review of Systems   Constitutional: Negative for chills and fever. Cardiovascular: Negative for chest pain and palpitations. Gastrointestinal: Positive for abdominal pain and constipation. Negative for abdominal distention, diarrhea, flatus, nausea and vomiting. Genitourinary: Negative for dysuria and flank pain. Musculoskeletal: Negative for arthralgias, back pain and myalgias. Skin: Negative for color change. Vitals:    07/17/17 1625   BP: 145/69   Pulse: 67   Resp: 18   Temp: 98 °F (36.7 °C)   SpO2: 94%   Weight: 87.1 kg (192 lb)   Height: 5' 10\" (1.778 m)            Physical Exam   Constitutional: He appears well-developed. No distress. HENT:   Head: Normocephalic. Mouth/Throat: No oropharyngeal exudate. Eyes: Conjunctivae are normal. Pupils are equal, round, and reactive to light. No scleral icterus. Neck: Normal range of motion. Neck supple. No JVD present. No thyromegaly present. Cardiovascular: Normal rate, regular rhythm, normal heart sounds and intact distal pulses. Exam reveals no gallop and no friction rub. No murmur heard. Pulmonary/Chest: Effort normal and breath sounds normal. No respiratory distress. He has no wheezes. He has no rales. He exhibits no tenderness. Abdominal: Soft. Bowel sounds are normal. He exhibits no distension and no mass. There is tenderness. There is no rebound and no guarding. Ventral Hernia     Musculoskeletal: Normal range of motion. He exhibits no edema, tenderness or deformity. Lymphadenopathy:     He has no cervical adenopathy. Neurological: He is alert. No cranial nerve deficit. Skin: Skin is warm and dry. Psychiatric: He has a normal mood and affect.         MDM  Number of Diagnoses or Management Options  established and improving  Diagnosis management comments: - Most likely 2/2 diverticulitis  - No concern for an acute diverticulitis flare as pt recently completed a regimen of cipro and flagyl  - Pt declined enema in ED and opted for home treatment. - Pt discharged with prescription for 1 dose of magnesium citrate and Mineral oil  - Avised to increase Miralax use to TWICE daily  - Pt. Scheduled for GI f/u on June 28th    Dispo: Stable Condition. Plan discussed w/ Dr. Chan Judge who is in agreement.        Amount and/or Complexity of Data Reviewed  Clinical lab tests: ordered and reviewed  Tests in the medicine section of CPT®: ordered and reviewed    Risk of Complications, Morbidity, and/or Mortality  Presenting problems: moderate  Diagnostic procedures: moderate  Management options: moderate    Patient Progress  Patient progress: stable    ED Course       Procedures

## 2017-07-18 ENCOUNTER — HOSPITAL ENCOUNTER (EMERGENCY)
Age: 72
Discharge: HOME | DRG: 329 | End: 2017-07-18
Attending: EMERGENCY MEDICINE
Payer: MEDICARE

## 2017-07-18 ENCOUNTER — APPOINTMENT (OUTPATIENT)
Dept: CT IMAGING | Age: 72
DRG: 329 | End: 2017-07-18
Attending: NURSE PRACTITIONER
Payer: MEDICARE

## 2017-07-18 VITALS
TEMPERATURE: 98.1 F | WEIGHT: 192 LBS | SYSTOLIC BLOOD PRESSURE: 117 MMHG | OXYGEN SATURATION: 95 % | HEART RATE: 81 BPM | HEIGHT: 70 IN | BODY MASS INDEX: 27.49 KG/M2 | DIASTOLIC BLOOD PRESSURE: 73 MMHG | RESPIRATION RATE: 17 BRPM

## 2017-07-18 DIAGNOSIS — K52.9 NONINFECTIOUS GASTROENTERITIS, UNSPECIFIED TYPE: Primary | ICD-10-CM

## 2017-07-18 LAB
ALBUMIN SERPL BCP-MCNC: 3.3 G/DL (ref 3.5–5)
ALBUMIN/GLOB SERPL: 0.7 {RATIO} (ref 1.1–2.2)
ALP SERPL-CCNC: 71 U/L (ref 45–117)
ALT SERPL-CCNC: 26 U/L (ref 12–78)
ANION GAP BLD CALC-SCNC: 9 MMOL/L (ref 5–15)
APPEARANCE UR: CLEAR
AST SERPL W P-5'-P-CCNC: 20 U/L (ref 15–37)
BACTERIA URNS QL MICRO: NEGATIVE /HPF
BASOPHILS # BLD AUTO: 0 K/UL (ref 0–0.1)
BASOPHILS # BLD: 0 % (ref 0–1)
BILIRUB SERPL-MCNC: 0.5 MG/DL (ref 0.2–1)
BILIRUB UR QL: NEGATIVE
BUN SERPL-MCNC: 17 MG/DL (ref 6–20)
BUN/CREAT SERPL: 15 (ref 12–20)
CALCIUM SERPL-MCNC: 9 MG/DL (ref 8.5–10.1)
CHLORIDE SERPL-SCNC: 99 MMOL/L (ref 97–108)
CO2 SERPL-SCNC: 28 MMOL/L (ref 21–32)
COLOR UR: ABNORMAL
CREAT SERPL-MCNC: 1.11 MG/DL (ref 0.7–1.3)
EOSINOPHIL # BLD: 0.1 K/UL (ref 0–0.4)
EOSINOPHIL NFR BLD: 1 % (ref 0–7)
EPITH CASTS URNS QL MICRO: ABNORMAL /LPF
ERYTHROCYTE [DISTWIDTH] IN BLOOD BY AUTOMATED COUNT: 13 % (ref 11.5–14.5)
GLOBULIN SER CALC-MCNC: 4.5 G/DL (ref 2–4)
GLUCOSE SERPL-MCNC: 99 MG/DL (ref 65–100)
GLUCOSE UR STRIP.AUTO-MCNC: NEGATIVE MG/DL
HCT VFR BLD AUTO: 44.1 % (ref 36.6–50.3)
HGB BLD-MCNC: 15.3 G/DL (ref 12.1–17)
HGB UR QL STRIP: NEGATIVE
HYALINE CASTS URNS QL MICRO: ABNORMAL /LPF (ref 0–5)
KETONES UR QL STRIP.AUTO: ABNORMAL MG/DL
LEUKOCYTE ESTERASE UR QL STRIP.AUTO: NEGATIVE
LIPASE SERPL-CCNC: 97 U/L (ref 73–393)
LYMPHOCYTES # BLD AUTO: 14 % (ref 12–49)
LYMPHOCYTES # BLD: 1.3 K/UL (ref 0.8–3.5)
MCH RBC QN AUTO: 31 PG (ref 26–34)
MCHC RBC AUTO-ENTMCNC: 34.7 G/DL (ref 30–36.5)
MCV RBC AUTO: 89.3 FL (ref 80–99)
MONOCYTES # BLD: 1 K/UL (ref 0–1)
MONOCYTES NFR BLD AUTO: 10 % (ref 5–13)
NEUTS SEG # BLD: 7.2 K/UL (ref 1.8–8)
NEUTS SEG NFR BLD AUTO: 75 % (ref 32–75)
NITRITE UR QL STRIP.AUTO: NEGATIVE
PH UR STRIP: 6.5 [PH] (ref 5–8)
PLATELET # BLD AUTO: 299 K/UL (ref 150–400)
POTASSIUM SERPL-SCNC: 4.2 MMOL/L (ref 3.5–5.1)
PROT SERPL-MCNC: 7.8 G/DL (ref 6.4–8.2)
PROT UR STRIP-MCNC: NEGATIVE MG/DL
RBC # BLD AUTO: 4.94 M/UL (ref 4.1–5.7)
RBC #/AREA URNS HPF: ABNORMAL /HPF (ref 0–5)
SODIUM SERPL-SCNC: 136 MMOL/L (ref 136–145)
SP GR UR REFRACTOMETRY: 1 (ref 1–1.03)
UROBILINOGEN UR QL STRIP.AUTO: 0.2 EU/DL (ref 0.2–1)
WBC # BLD AUTO: 9.5 K/UL (ref 4.1–11.1)
WBC URNS QL MICRO: ABNORMAL /HPF (ref 0–4)

## 2017-07-18 RX ORDER — MOXIFLOXACIN HYDROCHLORIDE 400 MG/1
400 TABLET ORAL DAILY
Qty: 10 TAB | Refills: 0 | Status: SHIPPED | OUTPATIENT
Start: 2017-07-18 | End: 2017-08-04

## 2017-07-18 RX ORDER — HYDROCODONE BITARTRATE AND ACETAMINOPHEN 5; 325 MG/1; MG/1
1 TABLET ORAL
Status: COMPLETED | OUTPATIENT
Start: 2017-07-18 | End: 2017-07-18

## 2017-07-18 RX ORDER — SODIUM CHLORIDE 9 MG/ML
100 INJECTION, SOLUTION INTRAVENOUS CONTINUOUS
Status: DISCONTINUED | OUTPATIENT
Start: 2017-07-18 | End: 2017-07-18 | Stop reason: HOSPADM

## 2017-07-18 RX ORDER — HYDROCODONE BITARTRATE AND ACETAMINOPHEN 5; 325 MG/1; MG/1
1 TABLET ORAL
Qty: 20 TAB | Refills: 0 | Status: SHIPPED | OUTPATIENT
Start: 2017-07-18 | End: 2017-08-04

## 2017-07-18 RX ADMIN — SODIUM CHLORIDE 100 ML/HR: 900 INJECTION, SOLUTION INTRAVENOUS at 10:28

## 2017-07-18 RX ADMIN — HYDROCODONE BITARTRATE AND ACETAMINOPHEN 1 TABLET: 5; 325 TABLET ORAL at 13:14

## 2017-07-18 RX ADMIN — IOPAMIDOL 94 ML: 755 INJECTION, SOLUTION INTRAVENOUS at 11:13

## 2017-07-18 NOTE — DISCHARGE INSTRUCTIONS
Colitis: Care Instructions  Your Care Instructions  Colitis is the medical term for swelling (inflammation) of the intestine. It can be caused by different things, such as an infection or loss of blood flow in the intestine. Other causes are problems like Crohn's disease or ulcerative colitis. Symptoms may include fever, diarrhea that may be bloody, or belly pain. Sometimes symptoms go away without treatment. But you may need treatment or more tests, such as blood tests or a stool test. Or you may need imaging tests like a CT scan or a colonoscopy. In some cases, the doctor may want to test a sample of tissue from the intestine. This test is called a biopsy. The doctor has checked you carefully, but problems can develop later. If you notice any problems or new symptoms, get medical treatment right away. Follow-up care is a key part of your treatment and safety. Be sure to make and go to all appointments, and call your doctor if you are having problems. It's also a good idea to know your test results and keep a list of the medicines you take. How can you care for yourself at home? · Rest until you feel better. · Your doctor may recommend that you eat bland foods. These include rice, dry toast or crackers, bananas, and applesauce. · To prevent dehydration, drink plenty of fluids. Choose water and other caffeine-free clear liquids until you feel better. If you have kidney, heart, or liver disease and have to limit fluids, talk with your doctor before you increase the amount of fluids you drink. · Be safe with medicines. Take your medicines exactly as prescribed. Call your doctor if you think you are having a problem with your medicine. You will get more details on the specific medicines your doctor prescribes. When should you call for help? Call 911 anytime you think you may need emergency care. For example, call if:  · You passed out (lost consciousness).   · You vomit blood or what looks like coffee grounds. · Your stools are maroon or very bloody. Call your doctor now or seek immediate medical care if:  · You have new or worse pain. · You have a new or higher fever. · You have new or worse symptoms. · You cannot keep fluids or medicines down. Watch closely for changes in your health, and be sure to contact your doctor if:  · You do not get better as expected. Where can you learn more? Go to http://monica-barbie.info/. Kaela Mason in the search box to learn more about \"Colitis: Care Instructions. \"  Current as of: August 9, 2016  Content Version: 11.3  © 7420-3367 [employer]. Care instructions adapted under license by [employer] (which disclaims liability or warranty for this information). If you have questions about a medical condition or this instruction, always ask your healthcare professional. [place] [address] any warranty or liability for your use of this information. We hope that we have addressed all of your medical concerns. The examination and treatment you received in the Emergency Department were for an emergent problem and were not intended as complete care. It is important that you follow up with your healthcare provider(s) for ongoing care. If your symptoms worsen or do not improve as expected, and you are unable to reach your usual health care provider(s), you should return to the Emergency Department. Today's healthcare is undergoing tremendous change, and patient satisfaction surveys are one of the many tools to assess the quality of medical care. You may receive a survey from the [employer] organization regarding your experience in the Emergency Department. I hope that your experience has been completely positive, particularly the medical care that I provided. As such, please participate in the survey; anything less than excellent does not meet my expectations or intentions.         [facility] and [employer] participate in nationally recognized quality of care measures. If your blood pressure is greater than 120/80, as reported below, we urge that you seek medical care to address the potential of high blood pressure, commonly known as hypertension. Hypertension can be hereditary or can be caused by certain medical conditions, pain, stress, or \"white coat syndrome. \"       Please make an appointment with your health care provider(s) for follow up of your Emergency Department visit. VITALS:   Patient Vitals for the past 8 hrs:   Temp Pulse Resp BP SpO2   07/18/17 1030 - - - - 98 %   07/18/17 1028 - - - - (!) 9 %   07/18/17 0923 98.1 °F (36.7 °C) (!) 58 16 141/70 97 %          Thank you for allowing us to provide you with medical care today. We realize that you have many choices for your emergency care needs. Please choose us in the future for any continued health care needs. Waymond Severin Page, NP [facility]: [phone]            Recent Results (from the past 24 hour(s))   METABOLIC PANEL, COMPREHENSIVE    Collection Time: 07/18/17 10:14 AM   Result Value Ref Range    Sodium 136 136 - 145 mmol/L    Potassium 4.2 3.5 - 5.1 mmol/L    Chloride 99 97 - 108 mmol/L    CO2 28 21 - 32 mmol/L    Anion gap 9 5 - 15 mmol/L    Glucose 99 65 - 100 mg/dL    BUN 17 6 - 20 MG/DL    Creatinine 1.11 0.70 - 1.30 MG/DL    BUN/Creatinine ratio 15 12 - 20      GFR est AA >60 >60 ml/min/1.73m2    GFR est non-AA >60 >60 ml/min/1.73m2    Calcium 9.0 8.5 - 10.1 MG/DL    Bilirubin, total 0.5 0.2 - 1.0 MG/DL    ALT (SGPT) 26 12 - 78 U/L    AST (SGOT) 20 15 - 37 U/L    Alk.  phosphatase 71 45 - 117 U/L    Protein, total 7.8 6.4 - 8.2 g/dL    Albumin 3.3 (L) 3.5 - 5.0 g/dL    Globulin 4.5 (H) 2.0 - 4.0 g/dL    A-G Ratio 0.7 (L) 1.1 - 2.2     LIPASE    Collection Time: 07/18/17 10:14 AM   Result Value Ref Range    Lipase 97 73 - 393 U/L CBC WITH AUTOMATED DIFF    Collection Time: 07/18/17 10:14 AM   Result Value Ref Range    WBC 9.5 4.1 - 11.1 K/uL    RBC 4.94 4.10 - 5.70 M/uL    HGB 15.3 12.1 - 17.0 g/dL    HCT 44.1 36.6 - 50.3 %    MCV 89.3 80.0 - 99.0 FL    MCH 31.0 26.0 - 34.0 PG    MCHC 34.7 30.0 - 36.5 g/dL    RDW 13.0 11.5 - 14.5 %    PLATELET 075 698 - 933 K/uL    NEUTROPHILS 75 32 - 75 %    LYMPHOCYTES 14 12 - 49 %    MONOCYTES 10 5 - 13 %    EOSINOPHILS 1 0 - 7 %    BASOPHILS 0 0 - 1 %    ABS. NEUTROPHILS 7.2 1.8 - 8.0 K/UL    ABS. LYMPHOCYTES 1.3 0.8 - 3.5 K/UL    ABS. MONOCYTES 1.0 0.0 - 1.0 K/UL    ABS. EOSINOPHILS 0.1 0.0 - 0.4 K/UL    ABS. BASOPHILS 0.0 0.0 - 0.1 K/UL   URINALYSIS W/MICROSCOPIC    Collection Time: 07/18/17 11:44 AM   Result Value Ref Range    Color YELLOW/STRAW      Appearance CLEAR CLEAR      Specific gravity 1.005 1.003 - 1.030      pH (UA) 6.5 5.0 - 8.0      Protein NEGATIVE  NEG mg/dL    Glucose NEGATIVE  NEG mg/dL    Ketone TRACE (A) NEG mg/dL    Bilirubin NEGATIVE  NEG      Blood NEGATIVE  NEG      Urobilinogen 0.2 0.2 - 1.0 EU/dL    Nitrites NEGATIVE  NEG      Leukocyte Esterase NEGATIVE  NEG      WBC 0-4 0 - 4 /hpf    RBC 0-5 0 - 5 /hpf    Epithelial cells FEW FEW /lpf    Bacteria NEGATIVE  NEG /hpf    Hyaline cast 0-2 0 - 5 /lpf       Ct Abd Pelv W Cont    Result Date: 7/18/2017  EXAM:  CT ABD PELV W CONT INDICATION: Abdominal pain; Obstruction - intestinal COMPARISON: July 3, 2017 CONTRAST:  94 mL of Isovue-370. TECHNIQUE: Following the uneventful intravenous administration of contrast, thin axial images were obtained through the abdomen and pelvis. Coronal and sagittal reconstructions were generated. Oral contrast was not administered. CT dose reduction was achieved through use of a standardized protocol tailored for this examination and automatic exposure control for dose modulation. FINDINGS: LUNG BASES: Bilateral dependent atelectasis. INCIDENTALLY IMAGED HEART AND MEDIASTINUM: Unremarkable.  LIVER: No mass or biliary dilatation. GALLBLADDER: Unremarkable. SPLEEN: No mass. PANCREAS: No mass or ductal dilatation. ADRENALS: Unremarkable. KIDNEYS: No calculus or hydronephrosis. Small bilateral renal cysts are unchanged. STOMACH: Unremarkable. SMALL BOWEL: No dilatation or wall thickening. COLON: Diffuse sigmoid colon wall thickening and pericolonic inflammatory changes are again seen, similar to the prior study. There is no associated abscess. APPENDIX: Normal. PERITONEUM: No ascites or pneumoperitoneum. RETROPERITONEUM: No lymphadenopathy or aortic aneurysm. REPRODUCTIVE ORGANS: Normal sized prostate gland. URINARY BLADDER: No mass or calculus. BONES: No destructive bone lesion. Degenerative changes in the lumbar spine. ADDITIONAL COMMENTS: Small fat-containing left inguinal hernia is unchanged. IMPRESSION: Persistent diffuse sigmoid colitis, with no evidence of abscess or perforation.

## 2017-07-18 NOTE — ED NOTES
Verbal shift change report given to HANNAH Beckett (oncoming nurse) by Germaine Hoyt (offgoing nurse). Report included the following information SBAR, Kardex and ED Summary.

## 2017-07-18 NOTE — ED TRIAGE NOTES
Pt seen at [facility] one week ago Monday and dx with diverticulitis. Seen yesterday by Dr. Jazmine Ball due to inability to have BM in 2 weeks. Was given Mineral Oil and Mag Citrate without result. And has been doing Miralax as well.

## 2017-07-19 ENCOUNTER — HOSPITAL ENCOUNTER (EMERGENCY)
Age: 72
Discharge: HOME | DRG: 329 | End: 2017-07-19
Attending: EMERGENCY MEDICINE
Payer: MEDICARE

## 2017-07-19 VITALS
HEIGHT: 70 IN | BODY MASS INDEX: 27.49 KG/M2 | HEART RATE: 66 BPM | SYSTOLIC BLOOD PRESSURE: 128 MMHG | OXYGEN SATURATION: 96 % | DIASTOLIC BLOOD PRESSURE: 82 MMHG | RESPIRATION RATE: 18 BRPM | WEIGHT: 192 LBS | TEMPERATURE: 97.8 F

## 2017-07-19 DIAGNOSIS — K52.9 NONINFECTIOUS GASTROENTERITIS, UNSPECIFIED TYPE: ICD-10-CM

## 2017-07-19 DIAGNOSIS — K59.00 CONSTIPATION, UNSPECIFIED CONSTIPATION TYPE: Primary | ICD-10-CM

## 2017-07-19 RX ORDER — DICYCLOMINE HYDROCHLORIDE 20 MG/1
20 TABLET ORAL EVERY 6 HOURS
Qty: 20 TAB | Refills: 0 | Status: SHIPPED | OUTPATIENT
Start: 2017-07-19 | End: 2017-07-24

## 2017-07-19 RX ORDER — OXYCODONE AND ACETAMINOPHEN 5; 325 MG/1; MG/1
1 TABLET ORAL
Qty: 6 TAB | Refills: 0 | Status: SHIPPED | OUTPATIENT
Start: 2017-07-19 | End: 2017-08-04

## 2017-07-19 RX ORDER — ONDANSETRON 4 MG/1
8 TABLET, ORALLY DISINTEGRATING ORAL
Status: COMPLETED | OUTPATIENT
Start: 2017-07-19 | End: 2017-07-19

## 2017-07-19 RX ORDER — DICYCLOMINE HYDROCHLORIDE 10 MG/1
10 CAPSULE ORAL
Status: COMPLETED | OUTPATIENT
Start: 2017-07-19 | End: 2017-07-19

## 2017-07-19 RX ORDER — MAGNESIUM CITRATE
296 SOLUTION, ORAL ORAL
Status: COMPLETED | OUTPATIENT
Start: 2017-07-19 | End: 2017-07-19

## 2017-07-19 RX ADMIN — DICYCLOMINE HYDROCHLORIDE 10 MG: 10 CAPSULE ORAL at 16:08

## 2017-07-19 RX ADMIN — MAGESIUM CITRATE 296 ML: 1.75 LIQUID ORAL at 16:08

## 2017-07-19 RX ADMIN — ONDANSETRON 8 MG: 4 TABLET, ORALLY DISINTEGRATING ORAL at 16:13

## 2017-07-19 NOTE — DISCHARGE INSTRUCTIONS
Colitis: Care Instructions  Your Care Instructions  Colitis is the medical term for swelling (inflammation) of the intestine. It can be caused by different things, such as an infection or loss of blood flow in the intestine. Other causes are problems like Crohn's disease or ulcerative colitis. Symptoms may include fever, diarrhea that may be bloody, or belly pain. Sometimes symptoms go away without treatment. But you may need treatment or more tests, such as blood tests or a stool test. Or you may need imaging tests like a CT scan or a colonoscopy. In some cases, the doctor may want to test a sample of tissue from the intestine. This test is called a biopsy. The doctor has checked you carefully, but problems can develop later. If you notice any problems or new symptoms, get medical treatment right away. Follow-up care is a key part of your treatment and safety. Be sure to make and go to all appointments, and call your doctor if you are having problems. It's also a good idea to know your test results and keep a list of the medicines you take. How can you care for yourself at home? · Rest until you feel better. · Your doctor may recommend that you eat bland foods. These include rice, dry toast or crackers, bananas, and applesauce. · To prevent dehydration, drink plenty of fluids. Choose water and other caffeine-free clear liquids until you feel better. If you have kidney, heart, or liver disease and have to limit fluids, talk with your doctor before you increase the amount of fluids you drink. · Be safe with medicines. Take your medicines exactly as prescribed. Call your doctor if you think you are having a problem with your medicine. You will get more details on the specific medicines your doctor prescribes. When should you call for help? Call 911 anytime you think you may need emergency care. For example, call if:  · You passed out (lost consciousness).   · You vomit blood or what looks like coffee grounds. · Your stools are maroon or very bloody. Call your doctor now or seek immediate medical care if:  · You have new or worse pain. · You have a new or higher fever. · You have new or worse symptoms. · You cannot keep fluids or medicines down. Watch closely for changes in your health, and be sure to contact your doctor if:  · You do not get better as expected. Where can you learn more? Go to http://monica-barbie.info/. Martinez Jackybhumika in the search box to learn more about \"Colitis: Care Instructions. \"  Current as of: August 9, 2016  Content Version: 11.3  © 4133-8128 [employer]. Care instructions adapted under license by [employer] (which disclaims liability or warranty for this information). If you have questions about a medical condition or this instruction, always ask your healthcare professional. [place] [address] any warranty or liability for your use of this information. Constipation: Care Instructions  Your Care Instructions  Constipation means that you have a hard time passing stools (bowel movements). People pass stools from 3 times a day to once every 3 days. What is normal for you may be different. Constipation may occur with pain in the rectum and cramping. The pain may get worse when you try to pass stools. Sometimes there are small amounts of bright red blood on toilet paper or the surface of stools. This is because of enlarged veins near the rectum (hemorrhoids). A few changes in your diet and lifestyle may help you avoid ongoing constipation. Your doctor may also prescribe medicine to help loosen your stool. Some medicines can cause constipation. These include pain medicines and antidepressants. Tell your doctor about all the medicines you take. Your doctor may want to make a medicine change to ease your symptoms. Follow-up care is a key part of your treatment and safety.  Be sure to make and go to all appointments, and call your doctor if you are having problems. It's also a good idea to know your test results and keep a list of the medicines you take. How can you care for yourself at home? · Drink plenty of fluids, enough so that your urine is light yellow or clear like water. If you have kidney, heart, or liver disease and have to limit fluids, talk with your doctor before you increase the amount of fluids you drink. · Include high-fiber foods in your diet each day. These include fruits, vegetables, beans, and whole grains. · Get at least 30 minutes of exercise on most days of the week. Walking is a good choice. You also may want to do other activities, such as running, swimming, cycling, or playing tennis or team sports. · Take a fiber supplement, such as Citrucel or Metamucil, every day. Read and follow all instructions on the label. · Schedule time each day for a bowel movement. A daily routine may help. Take your time having your bowel movement. · Support your feet with a small step stool when you sit on the toilet. This helps flex your hips and places your pelvis in a squatting position. · Your doctor may recommend an over-the-counter laxative to relieve your constipation. Examples are Milk of Magnesia and MiraLax. Read and follow all instructions on the label. Do not use laxatives on a long-term basis. When should you call for help? Call your doctor now or seek immediate medical care if:  · You have new or worse belly pain. · You have new or worse nausea or vomiting. · You have blood in your stools. Watch closely for changes in your health, and be sure to contact your doctor if:  · Your constipation is getting worse. · You do not get better as expected. Where can you learn more? Go to http://monica-barbie.info/. Enter 21  in the search box to learn more about \"Constipation: Care Instructions. \"  Current as of: March 20, 2017  Content Version: 11.3  © 1664-4365 [employer]. Care instructions adapted under license by [employer] (which disclaims liability or warranty for this information). If you have questions about a medical condition or this instruction, always ask your healthcare professional. [address] any warranty or liability for your use of this information. We hope that we have addressed all of your medical concerns. The examination and treatment you received in the Emergency Department were for an emergent problem and were not intended as complete care. It is important that you follow up with your healthcare provider(s) for ongoing care. If your symptoms worsen or do not improve as expected, and you are unable to reach your usual health care provider(s), you should return to the Emergency Department. Today's healthcare is undergoing tremendous change, and patient satisfaction surveys are one of the many tools to assess the quality of medical care. You may receive a survey from the [employer] regarding your experience in the Emergency Department. I hope that your experience has been completely positive, particularly the medical care that I provided. As such, please participate in the survey; anything less than excellent does not meet my expectations or intentions. [address] [place] [address] and [employer] participate in nationally recognized quality of care measures. If your blood pressure is greater than 120/80, as reported below, we urge that you seek medical care to address the potential of high blood pressure, commonly known as hypertension. Hypertension can be hereditary or can be caused by certain medical conditions, pain, stress, or \"white coat syndrome. \"       Please make an appointment with your health care provider(s) for follow up of your Emergency Department visit.        VITALS:   Patient Vitals for the past 8 hrs:   Temp Pulse Resp BP SpO2   07/19/17 1538 97.8 °F (36.6 °C) 66 18 122/73 96 %          Thank you for allowing us to provide you with medical care today. We realize that you have many choices for your emergency care needs. Please choose us in the future for any continued health care needs. Liborio Kate, [address] [facility] [address]: [phone]            No results found for this or any previous visit (from the past 24 hour(s)). No results found.

## 2017-07-19 NOTE — ED NOTES
Patient \"i just really need a pain pill\". Patient took the pain medication, norco, that was given to him yesterday and took 2 of them this morning which did not help with the pain. Also c/o not eating x2 days.

## 2017-07-19 NOTE — ED PROVIDER NOTES
HPI Comments: 74 y.o. male with past medical history significant for diverticulitis and spinal stenosis who presents from home with chief complaint of abdominal pain. Pt states for the past three weeks he has had persistent sharp, lower abdominal pain with accompanying constipation. Pt has been seen at [facility] ED three times over the past three weeks, most recently yesterday where he was diagnosed with gastroenteritis and discharged with Moxifloxicin and Norco.  Pt states he took his first dose of the Moxifloxicin this morning. Pt describes his abdominal pain as intermittent \"sharp spasms\" and notes that it is slightly worse today. Pt denies having relief with the Norco and states he was only able to sleep 3 hours last night because of his pain. Pt states he has continued to be constipated and has had minimal relief with Miralax or the enema he received yesterday. Pt reports receiving Mag Citrate and mineral oil from his previous ED visit and this also provided no relief. Pt reports having an appointment scheduled with GI tomorrow. Pt denies having fever, chills, vomiting, or urinary symptoms. There are no other acute medical concerns at this time. 3:44 PM  Christin Lynch. Frederick Siddiqi MD reviewed electronic medical record system for any past medical records that were available that may contribute to the patients current condition. Pt was seen at [facility] ED yesterday for constipation and abdominal pain. Pt had a CT abdomen and pelvis which showed persistent diffuse sigmoid colitis, with no evidence of abscess or perforation. Pt had normal labs. Pt was discharged with a 10 day course of Moxifloxicin and Norco for pain. Pt was referred to GI for follow up. Social hx:     PCP: John Roth MD    Note written by Arabella Ewing, as dictated by Christin Lynch. Frederick Siddiqi MD 3:50 PM    The history is provided by the patient.         Past Medical History:   Diagnosis Date    Diverticulitis     Spinal stenosis        No past surgical history on file. No family history on file. Social History     Social History    Marital status:      Spouse name: N/A    Number of children: N/A    Years of education: N/A     Occupational History    Not on file. Social History Main Topics    Smoking status: Current Some Day Smoker    Smokeless tobacco: Never Used    Alcohol use 0.5 oz/week     1 Cans of beer per week    Drug use: No    Sexual activity: Not on file     Other Topics Concern    Not on file     Social History Narrative         ALLERGIES: Review of patient's allergies indicates no known allergies. Review of Systems   Constitutional: Negative for chills and fever. HENT: Negative for ear pain and sore throat. Eyes: Negative for pain. Respiratory: Negative for chest tightness and shortness of breath. Cardiovascular: Negative for chest pain and leg swelling. Gastrointestinal: Positive for abdominal pain and constipation. Negative for nausea and vomiting. Genitourinary: Negative for difficulty urinating, dysuria, flank pain and hematuria. Musculoskeletal: Negative for back pain. Skin: Negative for rash. Neurological: Negative for headaches. All other systems reviewed and are negative. Vitals:    07/19/17 1538   BP: 122/73   Pulse: 66   Resp: 18   Temp: 97.8 °F (36.6 °C)   SpO2: 96%   Weight: 87.1 kg (192 lb)   Height: 5' 10\" (1.778 m)            Physical Exam   Constitutional: He appears well-developed and well-nourished. No distress. HENT:   Head: Normocephalic and atraumatic. Eyes: Pupils are equal, round, and reactive to light. No scleral icterus. Neck: Normal range of motion. Neck supple. No tracheal deviation present. Cardiovascular: Normal rate, regular rhythm, normal heart sounds and intact distal pulses. Exam reveals no gallop and no friction rub. No murmur heard. Pulmonary/Chest: Effort normal and breath sounds normal. No respiratory distress. He has no wheezes. He has no rales. Abdominal: Soft. He exhibits no distension. There is tenderness (mild lower). There is no rebound and no guarding. Musculoskeletal: He exhibits no edema. Neurological: He is alert. Skin: Skin is warm and dry. Psychiatric: He has a normal mood and affect. Nursing note and vitals reviewed. Note written by Cristobal Salguero. Artice Soja, as dictated by Noman Caal. Robel White MD 3:50 PM       MDM  Number of Diagnoses or Management Options  Constipation, unspecified constipation type:   Noninfectious gastroenteritis, unspecified type:   Diagnosis management comments: 74year old male diagnosed with sigmoid colitis yesterday presents with abdominal pain that has not been relieved. Taking hydrocodone. Has not had normal Bowel movement in 3 weeks. Appears constipation on CT from yesterday per my interpretation. Will treat with magnesium citrate and Bentyl.  4:52 PM no relief from therapy  Patient wants pain medication so he can sleep. Refused morphine here. ED Course       Procedures      4:50 PM  The patient has been reevaluated. The patient is ready for discharge. The patient's signs, symptoms, diagnosis, and discharge instructions have been discussed and the patient/ family has conveyed their understanding. The patient is to follow up as recommended or return to the ED should their symptoms worsen. Plan has been discussed and the patient is in agreement. LABORATORY TESTS:  No results found for this or any previous visit (from the past 12 hour(s)). IMAGING RESULTS:  No orders to display     No results found. MEDICATIONS GIVEN:  Medications   dicyclomine (BENTYL) capsule 10 mg (10 mg Oral Given 7/19/17 1608)   magnesium citrate solution 296 mL (296 mL Oral Given 7/19/17 1608)   ondansetron (ZOFRAN ODT) tablet 8 mg (8 mg Oral Given 7/19/17 1613)       IMPRESSION:  1. Constipation, unspecified constipation type    2.  Noninfectious gastroenteritis, unspecified type        PLAN:  1. Current Discharge Medication List      START taking these medications    Details   dicyclomine (BENTYL) 20 mg tablet Take 1 Tab by mouth every six (6) hours for 20 doses. Qty: 20 Tab, Refills: 0      oxyCODONE-acetaminophen (PERCOCET) 5-325 mg per tablet Take 1 Tab by mouth every four (4) hours as needed for Pain. Max Daily Amount: 6 Tabs. Qty: 6 Tab, Refills: 0           2. Follow-up Information     Follow up With Details Comments Contact Info    Daniel Beltrán MD Go in 1 day  [address] [place] [address]  [phone]      [facility] EMERGENCY DEPT  If symptoms worsen [address] [facility]  [phone]            Return to ED for new or worsening symptoms       Kirsten Min.  Thomas Osorio MD

## 2017-07-19 NOTE — ED TRIAGE NOTES
Pt reports being seen here yesterday for mid abdominal pain x3 weeks. Pt has follow up scheduled with GI tomorrow. Pt states he needs something for pain.

## 2017-07-21 ENCOUNTER — APPOINTMENT (OUTPATIENT)
Dept: CT IMAGING | Age: 72
DRG: 329 | End: 2017-07-21
Attending: EMERGENCY MEDICINE
Payer: MEDICARE

## 2017-07-21 ENCOUNTER — APPOINTMENT (OUTPATIENT)
Dept: GENERAL RADIOLOGY | Age: 72
DRG: 329 | End: 2017-07-21
Attending: EMERGENCY MEDICINE
Payer: MEDICARE

## 2017-07-21 ENCOUNTER — HOSPITAL ENCOUNTER (INPATIENT)
Age: 72
LOS: 14 days | Discharge: HOME | DRG: 329 | End: 2017-08-04
Attending: EMERGENCY MEDICINE | Admitting: SURGERY
Payer: MEDICARE

## 2017-07-21 DIAGNOSIS — K56.60 INTESTINAL OBSTRUCTION, UNSPECIFIED TYPE: Primary | ICD-10-CM

## 2017-07-21 DIAGNOSIS — K57.32 DIVERTICULITIS OF LARGE INTESTINE WITHOUT PERFORATION OR ABSCESS WITHOUT BLEEDING: ICD-10-CM

## 2017-07-21 PROBLEM — K56.609 COLONIC OBSTRUCTION (HCC): Status: ACTIVE | Noted: 2017-07-21

## 2017-07-21 PROBLEM — K52.9 COLITIS: Status: ACTIVE | Noted: 2017-07-21

## 2017-07-21 LAB
ANION GAP BLD CALC-SCNC: 9 MMOL/L (ref 5–15)
BASOPHILS # BLD AUTO: 0 K/UL (ref 0–0.1)
BASOPHILS # BLD: 0 % (ref 0–1)
BUN SERPL-MCNC: 26 MG/DL (ref 6–20)
BUN/CREAT SERPL: 15 (ref 12–20)
CALCIUM SERPL-MCNC: 9.8 MG/DL (ref 8.5–10.1)
CHLORIDE SERPL-SCNC: 92 MMOL/L (ref 97–108)
CO2 SERPL-SCNC: 35 MMOL/L (ref 21–32)
CREAT SERPL-MCNC: 1.69 MG/DL (ref 0.7–1.3)
EOSINOPHIL # BLD: 0 K/UL (ref 0–0.4)
EOSINOPHIL NFR BLD: 0 % (ref 0–7)
ERYTHROCYTE [DISTWIDTH] IN BLOOD BY AUTOMATED COUNT: 13.1 % (ref 11.5–14.5)
GLUCOSE SERPL-MCNC: 142 MG/DL (ref 65–100)
HCT VFR BLD AUTO: 49.9 % (ref 36.6–50.3)
HGB BLD-MCNC: 17.4 G/DL (ref 12.1–17)
LYMPHOCYTES # BLD AUTO: 7 % (ref 12–49)
LYMPHOCYTES # BLD: 1.1 K/UL (ref 0.8–3.5)
MCH RBC QN AUTO: 31 PG (ref 26–34)
MCHC RBC AUTO-ENTMCNC: 34.9 G/DL (ref 30–36.5)
MCV RBC AUTO: 88.8 FL (ref 80–99)
MONOCYTES # BLD: 1.3 K/UL (ref 0–1)
MONOCYTES NFR BLD AUTO: 8 % (ref 5–13)
NEUTS SEG # BLD: 14.2 K/UL (ref 1.8–8)
NEUTS SEG NFR BLD AUTO: 85 % (ref 32–75)
PLATELET # BLD AUTO: 330 K/UL (ref 150–400)
POTASSIUM SERPL-SCNC: 4.1 MMOL/L (ref 3.5–5.1)
RBC # BLD AUTO: 5.62 M/UL (ref 4.1–5.7)
SODIUM SERPL-SCNC: 136 MMOL/L (ref 136–145)
WBC # BLD AUTO: 16.6 K/UL (ref 4.1–11.1)

## 2017-07-21 PROCEDURE — 96375 TX/PRO/DX INJ NEW DRUG ADDON: CPT

## 2017-07-21 PROCEDURE — 77030018836 HC SOL IRR NACL ICUM -A

## 2017-07-21 PROCEDURE — 96365 THER/PROPH/DIAG IV INF INIT: CPT

## 2017-07-21 PROCEDURE — 96361 HYDRATE IV INFUSION ADD-ON: CPT

## 2017-07-21 PROCEDURE — 74011000258 HC RX REV CODE- 258: Performed by: EMERGENCY MEDICINE

## 2017-07-21 PROCEDURE — 74011250636 HC RX REV CODE- 250/636: Performed by: PHYSICIAN ASSISTANT

## 2017-07-21 PROCEDURE — 74011000258 HC RX REV CODE- 258: Performed by: SURGERY

## 2017-07-21 PROCEDURE — 36415 COLL VENOUS BLD VENIPUNCTURE: CPT | Performed by: EMERGENCY MEDICINE

## 2017-07-21 PROCEDURE — 85025 COMPLETE CBC W/AUTO DIFF WBC: CPT | Performed by: EMERGENCY MEDICINE

## 2017-07-21 PROCEDURE — 80048 BASIC METABOLIC PNL TOTAL CA: CPT | Performed by: EMERGENCY MEDICINE

## 2017-07-21 PROCEDURE — 74020 XR ABD FLAT/ ERECT: CPT

## 2017-07-21 PROCEDURE — 77030019563 HC DEV ATTCH FEED HOLL -A

## 2017-07-21 PROCEDURE — 74011250636 HC RX REV CODE- 250/636: Performed by: SURGERY

## 2017-07-21 PROCEDURE — 65270000029 HC RM PRIVATE

## 2017-07-21 PROCEDURE — 99284 EMERGENCY DEPT VISIT MOD MDM: CPT

## 2017-07-21 PROCEDURE — 77030008771 HC TU NG SALEM SUMP -A

## 2017-07-21 PROCEDURE — 74011250636 HC RX REV CODE- 250/636: Performed by: EMERGENCY MEDICINE

## 2017-07-21 PROCEDURE — 74176 CT ABD & PELVIS W/O CONTRAST: CPT

## 2017-07-21 PROCEDURE — 96376 TX/PRO/DX INJ SAME DRUG ADON: CPT

## 2017-07-21 RX ORDER — MECLIZINE HYDROCHLORIDE 25 MG/1
25 TABLET ORAL
COMMUNITY
End: 2017-08-05

## 2017-07-21 RX ORDER — HEPARIN SODIUM 5000 [USP'U]/ML
5000 INJECTION, SOLUTION INTRAVENOUS; SUBCUTANEOUS EVERY 8 HOURS
Status: DISCONTINUED | OUTPATIENT
Start: 2017-07-21 | End: 2017-08-04 | Stop reason: HOSPADM

## 2017-07-21 RX ORDER — SODIUM CHLORIDE 0.9 % (FLUSH) 0.9 %
5-10 SYRINGE (ML) INJECTION AS NEEDED
Status: DISCONTINUED | OUTPATIENT
Start: 2017-07-21 | End: 2017-07-22

## 2017-07-21 RX ORDER — ONDANSETRON 2 MG/ML
4 INJECTION INTRAMUSCULAR; INTRAVENOUS
Status: DISCONTINUED | OUTPATIENT
Start: 2017-07-21 | End: 2017-08-04 | Stop reason: HOSPADM

## 2017-07-21 RX ORDER — ONDANSETRON 4 MG/1
4 TABLET, ORALLY DISINTEGRATING ORAL
Status: DISCONTINUED | OUTPATIENT
Start: 2017-07-21 | End: 2017-07-21

## 2017-07-21 RX ORDER — SODIUM CHLORIDE 0.9 % (FLUSH) 0.9 %
5-10 SYRINGE (ML) INJECTION AS NEEDED
Status: DISCONTINUED | OUTPATIENT
Start: 2017-07-21 | End: 2017-07-25

## 2017-07-21 RX ORDER — SODIUM CHLORIDE 0.9 % (FLUSH) 0.9 %
5-10 SYRINGE (ML) INJECTION EVERY 8 HOURS
Status: DISCONTINUED | OUTPATIENT
Start: 2017-07-21 | End: 2017-07-22

## 2017-07-21 RX ORDER — NALOXONE HYDROCHLORIDE 0.4 MG/ML
0.4 INJECTION, SOLUTION INTRAMUSCULAR; INTRAVENOUS; SUBCUTANEOUS AS NEEDED
Status: DISCONTINUED | OUTPATIENT
Start: 2017-07-21 | End: 2017-08-04 | Stop reason: HOSPADM

## 2017-07-21 RX ORDER — ONDANSETRON 2 MG/ML
4 INJECTION INTRAMUSCULAR; INTRAVENOUS
Status: COMPLETED | OUTPATIENT
Start: 2017-07-21 | End: 2017-07-21

## 2017-07-21 RX ORDER — HYDROMORPHONE HYDROCHLORIDE 1 MG/ML
1 INJECTION, SOLUTION INTRAMUSCULAR; INTRAVENOUS; SUBCUTANEOUS
Status: DISCONTINUED | OUTPATIENT
Start: 2017-07-21 | End: 2017-07-31

## 2017-07-21 RX ORDER — MORPHINE SULFATE 2 MG/ML
4 INJECTION, SOLUTION INTRAMUSCULAR; INTRAVENOUS
Status: COMPLETED | OUTPATIENT
Start: 2017-07-21 | End: 2017-07-21

## 2017-07-21 RX ORDER — DEXTROSE, SODIUM CHLORIDE, AND POTASSIUM CHLORIDE 5; .45; .15 G/100ML; G/100ML; G/100ML
125 INJECTION INTRAVENOUS CONTINUOUS
Status: DISCONTINUED | OUTPATIENT
Start: 2017-07-21 | End: 2017-07-22

## 2017-07-21 RX ORDER — SODIUM CHLORIDE 0.9 % (FLUSH) 0.9 %
5-10 SYRINGE (ML) INJECTION EVERY 8 HOURS
Status: DISCONTINUED | OUTPATIENT
Start: 2017-07-21 | End: 2017-07-25

## 2017-07-21 RX ORDER — MAGNESIUM CITRATE
296 SOLUTION, ORAL ORAL AS NEEDED
COMMUNITY
End: 2017-08-04

## 2017-07-21 RX ORDER — DIPHENHYDRAMINE HYDROCHLORIDE 50 MG/ML
12.5 INJECTION, SOLUTION INTRAMUSCULAR; INTRAVENOUS
Status: DISCONTINUED | OUTPATIENT
Start: 2017-07-21 | End: 2017-08-04 | Stop reason: HOSPADM

## 2017-07-21 RX ADMIN — Medication 10 ML: at 17:12

## 2017-07-21 RX ADMIN — ONDANSETRON 4 MG: 2 INJECTION INTRAMUSCULAR; INTRAVENOUS at 09:38

## 2017-07-21 RX ADMIN — PIPERACILLIN SODIUM,TAZOBACTAM SODIUM 3.38 G: 3; .375 INJECTION, POWDER, FOR SOLUTION INTRAVENOUS at 09:51

## 2017-07-21 RX ADMIN — Medication 10 ML: at 21:39

## 2017-07-21 RX ADMIN — ONDANSETRON 4 MG: 2 INJECTION INTRAMUSCULAR; INTRAVENOUS at 07:50

## 2017-07-21 RX ADMIN — DEXTROSE MONOHYDRATE, SODIUM CHLORIDE, AND POTASSIUM CHLORIDE 125 ML/HR: 50; 4.5; 1.49 INJECTION, SOLUTION INTRAVENOUS at 15:37

## 2017-07-21 RX ADMIN — HYDROMORPHONE HYDROCHLORIDE 1 MG: 1 INJECTION, SOLUTION INTRAMUSCULAR; INTRAVENOUS; SUBCUTANEOUS at 15:39

## 2017-07-21 RX ADMIN — Medication 4 MG: at 09:38

## 2017-07-21 RX ADMIN — ONDANSETRON 4 MG: 2 INJECTION INTRAMUSCULAR; INTRAVENOUS at 21:35

## 2017-07-21 RX ADMIN — SODIUM CHLORIDE 1000 ML: 900 INJECTION, SOLUTION INTRAVENOUS at 07:50

## 2017-07-21 RX ADMIN — HEPARIN SODIUM 5000 UNITS: 5000 INJECTION, SOLUTION INTRAVENOUS; SUBCUTANEOUS at 16:04

## 2017-07-21 RX ADMIN — HYDROMORPHONE HYDROCHLORIDE 1 MG: 1 INJECTION, SOLUTION INTRAMUSCULAR; INTRAVENOUS; SUBCUTANEOUS at 21:34

## 2017-07-21 RX ADMIN — ONDANSETRON 4 MG: 2 INJECTION INTRAMUSCULAR; INTRAVENOUS at 15:39

## 2017-07-21 RX ADMIN — PIPERACILLIN SODIUM,TAZOBACTAM SODIUM 3.38 G: 3; .375 INJECTION, POWDER, FOR SOLUTION INTRAVENOUS at 15:39

## 2017-07-21 RX ADMIN — PIPERACILLIN SODIUM,TAZOBACTAM SODIUM 3.38 G: 3; .375 INJECTION, POWDER, FOR SOLUTION INTRAVENOUS at 23:56

## 2017-07-21 RX ADMIN — DEXTROSE MONOHYDRATE, SODIUM CHLORIDE, AND POTASSIUM CHLORIDE 125 ML/HR: 50; 4.5; 1.49 INJECTION, SOLUTION INTRAVENOUS at 23:56

## 2017-07-21 RX ADMIN — HEPARIN SODIUM 5000 UNITS: 5000 INJECTION, SOLUTION INTRAVENOUS; SUBCUTANEOUS at 21:34

## 2017-07-21 RX ADMIN — Medication 10 ML: at 07:50

## 2017-07-21 RX ADMIN — Medication 10 ML: at 21:40

## 2017-07-21 NOTE — ED TRIAGE NOTES
Pt c/o recent dx of diverticulitis here and dc'd from er. Was instructed to see GI, has 930 appointment this am with Dr. Diogo Espinal ([facility]) GI. Pt c/o abd pain and limited BM for three weeks. Pain got worse overnight and couldn't take it anymore.

## 2017-07-21 NOTE — ED NOTES
Bedside and Verbal shift change report given to Clarice Lacy (oncoming nurse) by Sumner Klinefelter (offgoing nurse). Report included the following information SBAR and ED Summary.

## 2017-07-21 NOTE — PROGRESS NOTES
Piperacillin/tazobactam dosing changed to extended infusion protocol (3.375 gm q8h, 4 hour infusion) for enhanced bactericidal effect. This is a [facility] approved protocol    Thank you,    Anika Sal.  Moris Singh

## 2017-07-21 NOTE — IP AVS SNAPSHOT
[address] 
[phone] Patient: Peyman Milligan MRN: [id number] QCZ:[id number] You are allergic to the following No active allergies Recent Documentation Height Weight BMI Smoking Status 1.778 m 87.1 kg 27.55 kg/m2 Current Some Day Smoker Emergency Contacts Name Discharge Info Relation Home Work [facility] [address] DISCHARGE CAREGIVER [3] Spouse [3] [phone] About your hospitalization You were admitted on:  July 21, 2017 You last received care in the:  [facility] 4M POST SURG ORT 1 You were discharged on:  August 4, 2017 Unit phone number:  [phone] Why you were hospitalized Your primary diagnosis was:  Not on File Your diagnoses also included:  Colitis, Colonic Obstruction (Hcc) Providers Seen During Your Hospitalizations Provider Role Specialty Primary office phone Myranda Gutierrez MD Attending Provider Emergency Medicine [phone] Antoinette Zimmer MD Attending Provider General Surgery [phone] Your Primary Care Physician (PCP) Primary Care Physician Office Phone Office Fax Tolu Rodríguez [phone] Follow-up Information Follow up With Details Comments Contact Info Antoinette Zimmer MD In 2 weeks  [address] [facility] [address] 
[phone] Flynn Mccurdy MD   [address] [facility] [place] [address] 
[phone] Current Discharge Medication List  
  
START taking these medications Dose & Instructions Dispensing Information Comments Morning Noon Evening Bedtime  
 diazePAM 5 mg tablet Commonly known as:  VALIUM Your last dose was: Your next dose is:    
   
   
 Dose:  5 mg Take 1 Tab by mouth every six (6) hours as needed (abdominal spasms).  Max Daily Amount: 20 mg.  
 Quantity:  20 Tab Refills:  0  
     
   
   
   
  
 oxyCODONE IR 5 mg immediate release tablet Commonly known as:  Major Danni Your last dose was: Your next dose is:    
   
   
 Dose:  5-10 mg Take 1-2 Tabs by mouth every four (4) hours as needed. Max Daily Amount: 60 mg.  
 Quantity:  40 Tab Refills:  0 CONTINUE these medications which have CHANGED Dose & Instructions Dispensing Information Comments Morning Noon Evening Bedtime  
 polyethylene glycol 17 gram packet Commonly known as:  Beulah Armida What changed:   
- when to take this 
- reasons to take this Your last dose was: Your next dose is:    
   
   
 Dose:  17 g Take 1 Packet by mouth daily as needed. Quantity:  30 Packet Refills:  0 CONTINUE these medications which have NOT CHANGED Dose & Instructions Dispensing Information Comments Morning Noon Evening Bedtime  
 gabapentin 600 mg tablet Commonly known as:  NEURONTIN Your last dose was: Your next dose is:    
   
   
 Dose:  600 mg Take 600 mg by mouth three (3) times daily. Refills:  0  
     
   
   
   
  
 meclizine 25 mg tablet Commonly known as:  ANTIVERT Your last dose was: Your next dose is:    
   
   
 Dose:  25 mg Take 25 mg by mouth three (3) times daily as needed. Refills:  0 METAMUCIL PO Your last dose was: Your next dose is:    
   
   
 Dose:  15 mL Take 15 mL by mouth two (2) times daily as needed. Refills:  0 STOP taking these medications FLAGYL 500 mg tablet Generic drug:  metroNIDAZOLE HYDROcodone-acetaminophen 5-325 mg per tablet Commonly known as:  NORCO  
   
  
 magnesium citrate solution MINERAL OIL EXTRA HEAVY liquid Generic drug:  mineral oil  
   
  
 moxifloxacin 400 mg tablet Commonly known as:  AVELOX  
   
  
 oxyCODONE-acetaminophen 5-325 mg per tablet Commonly known as:  PERCOCET ASK your doctor about these medications Dose & Instructions Dispensing Information Comments Morning Noon Evening Bedtime  
 dicyclomine 20 mg tablet Commonly known as:  BENTYL Ask about: Should I take this medication? Your last dose was: Your next dose is:    
   
   
 Dose:  20 mg Take 1 Tab by mouth every six (6) hours for 20 doses. Quantity:  20 Tab Refills:  0 Where to Get Your Medications Information on where to get these meds will be given to you by the nurse or doctor. ! Ask your nurse or doctor about these medications  
  diazePAM 5 mg tablet  
 oxyCODONE IR 5 mg immediate release tablet  
 polyethylene glycol 17 gram packet Discharge Instructions Nutrition Recommendations for Discharge: 
 
Continue Oral Nutrition Supplements at discharge: Ensure Enlive or Ensure Plus 1-2 times per day 
for 30 days unless otherwise directed by your Primary Care Physician. This product can be purchased at your local grocery store or drug store and online. Amanda Sierra, RD 
 _ Patient Discharge Instructions Moranel File / [id number] : 1945 Admitted 2017 Discharged: 2017 Take Home Medications · It is important that you take the medication exactly as they are prescribed. · Keep your medication in the bottles provided by the pharmacist and keep a list of the medication names, dosages, and times to be taken in your wallet. · Do not take other medications without consulting your doctor. · Do not drive, drink alcohol, or operate machinery when taking sedating pain medication. · Take colace daily while on pain medication to help prevent constipation. You may take over the counter laxatives such as Dulcolax or Miralax as needed for constipation What to do at [facility] Recommended diet: Regular Diet Recommended activity: No heavy lifting or strenuous activity for 6 weeks. You may shower starting tomorrow. You may drive once pain has improved and you no longer require pain medication. Follow up with Dr. Mya Ram in 2 weeks. Call [facility] to make an appointment. Call Dr. Mya Ram or go to the ER if you develop worsening pain, fever, vomiting, or any other symptoms that concern you. Discharge Orders None [employer] Announcement We are excited to announce that we are making your provider's discharge notes available to you in [employer]. You will see these notes when they are completed and signed by the physician that discharged you from your recent hospital stay. If you have any questions or concerns about any information you see in [employer], please call the Health Information Department where you were seen or reach out to your Primary Care Provider for more information about your plan of care. Introducing [facility]! [facility] introduces [employer] Online patient portal. Now you can access parts of your medical record, email your doctor's office, and request medication refills online. 1. In your internet browser, go to [email or web address] 2. Click on the First Time User? Click Here link in the Sign In box. You will see the New Member Sign Up page. 3. Enter your [employer] Access Code exactly as it appears below. You will not need to use this code after youve completed the sign-up process. If you do not sign up before the expiration date, you must request a new code. · [employer] Access Code: [email or web address] Expires: 10/1/2017 10:56 AM 
 
4. Enter the last four digits of your Social Security Number (xxxx) and Date of Birth (mm/dd/yyyy) as indicated and click Submit. You will be taken to the next sign-up page. 5. Create a [employer] ID.  This will be your [employer] login ID and cannot be changed, so think of one that is secure and easy to remember. 6. Create a [employer] password. You can change your password at any time. 7. Enter your Password Reset Question and Answer. This can be used at a later time if you forget your password. 8. Enter your e-mail address. You will receive e-mail notification when new information is available in [address] 9. Click Sign Up. You can now view and download portions of your medical record. 10. Click the Download Summary menu link to download a portable copy of your medical information. If you have questions, please visit the Frequently Asked Questions section of the [employer] website. Remember, [employer] is NOT to be used for urgent needs. For medical emergencies, dial 911. Now available from your iPhone and Android! General Information Please provide this summary of care documentation to your next provider. Patient Signature:  ____________________________________________________________ Date:  ____________________________________________________________  
  
Nya Seth Provider Signature:  ____________________________________________________________ Date:  ____________________________________________________________

## 2017-07-21 NOTE — H&P
Surgery History and Physical    Subjective: Alvina Yu is a 74 y.o. male who presented to the ED with c/o abdominal pain. Patient reports developing lower abdominal pain the 1st of this month. Patient states he has had recurrent episodes of diverticulitis over the last decade. When pain began, he went to urgent care and was started on PO ABX. Pain persisted and he came here 7/3 and had a CT which showed sigmoid colitis. He returned 7/17 with ongoing pain and constipation and was started on Avalox. He returned the following day with the same complain and had repeat CT showing persistent sigmoid colitis and was treated with Bentyl and oxycodone. He was scheduled to see a gastroenterologist today but returned to the ED d/t severe pain and N/V. He has noted increased distention and nausea over the last few days. He has not had a BM in 3 weeks and is only passing scant amount of gas. CT today shows ongoing sigmoid colitis now with evidence of colonic obstruction and concomitate SBO. Pt states he has had numerous episodes of diverticulitis in the past, none of which requires hospitalization or invasive procedure. He has never had a colonoscopy. Past Medical History:   Diagnosis Date    Diverticulitis     Gastrointestinal disorder     Spinal stenosis      History reviewed. No pertinent surgical history. History reviewed. No pertinent family history.   Social History     Social History    Marital status:      Spouse name: N/A    Number of children: N/A    Years of education: N/A     Social History Main Topics    Smoking status: Current Some Day Smoker    Smokeless tobacco: Never Used    Alcohol use 0.5 oz/week     1 Cans of beer per week    Drug use: No    Sexual activity: Not Asked     Other Topics Concern    None     Social History Narrative      Current Facility-Administered Medications   Medication Dose Route Frequency    sodium chloride (NS) flush 5-10 mL  5-10 mL IntraVENous Q8H    sodium chloride (NS) flush 5-10 mL  5-10 mL IntraVENous PRN    sodium chloride (NS) flush 5-10 mL  5-10 mL IntraVENous Q8H    sodium chloride (NS) flush 5-10 mL  5-10 mL IntraVENous PRN    HYDROmorphone (PF) (DILAUDID) injection 1 mg  1 mg IntraVENous Q3H PRN    naloxone (NARCAN) injection 0.4 mg  0.4 mg IntraVENous PRN    ondansetron (ZOFRAN) injection 4 mg  4 mg IntraVENous Q4H PRN    diphenhydrAMINE (BENADRYL) injection 12.5 mg  12.5 mg IntraVENous Q4H PRN    heparin (porcine) injection 5,000 Units  5,000 Units SubCUTAneous Q8H    dextrose 5% - 0.45% NaCl with KCl 20 mEq/L infusion  125 mL/hr IntraVENous CONTINUOUS    piperacillin-tazobactam (ZOSYN) 3.375 g in 0.9% sodium chloride (MBP/ADV) 100 mL  3.375 g IntraVENous Q8H     Current Outpatient Prescriptions   Medication Sig    magnesium citrate solution Take 296 mL by mouth as needed.  dicyclomine (BENTYL) 20 mg tablet Take 1 Tab by mouth every six (6) hours for 20 doses.  oxyCODONE-acetaminophen (PERCOCET) 5-325 mg per tablet Take 1 Tab by mouth every four (4) hours as needed for Pain. Max Daily Amount: 6 Tabs.  moxifloxacin (AVELOX) 400 mg tablet Take 1 Tab by mouth daily for 10 days.  HYDROcodone-acetaminophen (NORCO) 5-325 mg per tablet Take 1 Tab by mouth every four (4) hours as needed for Pain. Max Daily Amount: 6 Tabs.  MINERAL OIL EXTRA HEAVY liquid Take 30 mL by mouth once as needed for Constipation for up to 1 dose. Indications: constipation    polyethylene glycol (MIRALAX) 17 gram packet Take 1 Packet by mouth two (2) times a day.  metroNIDAZOLE (FLAGYL) 500 mg tablet Take 500 mg by mouth three (3) times daily.  gabapentin (NEURONTIN) 600 mg tablet Take 600 mg by mouth three (3) times daily.  PSYLLIUM SEED, WITH SUGAR, (METAMUCIL PO) Take 15 mL by mouth two (2) times daily as needed.  meclizine (ANTIVERT) 25 mg tablet Take 25 mg by mouth three (3) times daily as needed.       No Known Allergies    Review of Systems:     []     Unable to obtain  ROS due to  []    mental status change  []    sedated   []    intubated   []    Total of 12 systems reviewed as follows:  Constitutional: neg for fevers, chills  Eyes: negative for blurry vision  Ears, nose, mouth, throat, and face: negative for sore throat  Respiratory: negative for SOB  Cardiovascular: negative for CP  Gastrointestinal: positive for nausea, vomiting and abdominal pain  Genitourinary: negative for dysuria  Integument/breast: neg for skin rash  Hematologic/lymphatic: neg for bruising  Musculoskeletal: negative for muscle aches  Neurological: no dizziness or h/a      Objective:      Patient Vitals for the past 8 hrs:   BP Temp Pulse Resp SpO2 Height Weight   17 1145 - - - - 93 % - -   17 1130 121/77 - - - - - -   17 0651 108/69 97.6 °F (36.4 °C) 94 23 94 % 5' 10\" (1.778 m) 86.3 kg (190 lb 4.1 oz)       Temp (24hrs), Av.6 °F (36.4 °C), Min:97.6 °F (36.4 °C), Max:97.6 °F (36.4 °C)      Physical Exam:  General:  Alert, cooperative, no distress, appears stated age. Eyes:  Conjunctivae/corneas clear. Nose: Nares normal. Septum midline. Mouth/Throat: Lips, mucosa, and tongue normal.    Neck: Supple, symmetrical, trachea midline   Lungs:   Clear to auscultation bilaterally. Heart:  Regular rate and rhythm   Abdomen:   Soft, distended, hypoactive bowel sounds, lower abdominal tenderness w/o peritonitis. Extremities: Extremities normal, atraumatic, no cyanosis or edema. Skin: Skin color, texture, turgor normal. No rashes or lesions   Neuro: Alert, oriented, speech clear     Labs: Recent Labs      17   0748   WBC  16.6*   HGB  17.4*   HCT  49.9   PLT  330     Recent Labs      17   0748   NA  136   K  4.1   CL  92*   CO2  35*   GLU  142*   BUN  26*   CREA  1.69*   CA  9.8     No results for input(s): INR in the last 72 hours.     No lab exists for component: INREXT    CT images reviewed    Assessment and Plan:     Sigmoid colitis/diverticulitis w/ bowel obstruction    Admit for bowel rest, IV ABX, NG decompression. CT today notes focal inflammatory changes of the dome of the bladder but no air within it, will check UA. If obstruction and pain improve with IV therapy, will attempt to treat through this episode, have him get colonoscopy in 6 weeks, and then discuss elective sigmoid resection. If condition worsens, fails to improve, or he proves to have significant stricture, then he will require surgery and likely ostomy this admission. Discussed with patient and he understands. Thank you for the consultation. Attending evaluation to follow.        Signed By: Bette Goldberg, PA     July 21, 2017

## 2017-07-21 NOTE — PROGRESS NOTES
[facility]: MED RECONCILIATION    Comments/Recommendations:  Patient's wife stated that patient has been evaluated a few times for the same complaint. He was supposed to have a regimen of mag citrate and mineral oil 7/20/17 prior to a GI procedure, but was only able to take half before becoming nauseous. Medications added:     · Mag citrate    Medications removed:    · None    Medications adjusted:    · None    Information obtained from: Patient's wife    Significant PMH/Disease States:   Past Medical History:   Diagnosis Date    Diverticulitis     Gastrointestinal disorder     Spinal stenosis      Chief Complaint for this Admission:   Chief Complaint   Patient presents with    Constipation    Diverticulitis     Allergies: Review of patient's allergies indicates no known allergies. Prior to Admission Medications:   Prior to Admission Medications   Prescriptions Last Dose Informant Patient Reported? Taking? HYDROcodone-acetaminophen (NORCO) 5-325 mg per tablet 7/20/2017 at AM Significant Other No Yes   Sig: Take 1 Tab by mouth every four (4) hours as needed for Pain. Max Daily Amount: 6 Tabs. MINERAL OIL EXTRA HEAVY liquid 7/20/2017 at 1430 Significant Other No Yes   Sig: Take 30 mL by mouth once as needed for Constipation for up to 1 dose. Indications: constipation   PSYLLIUM SEED, WITH SUGAR, (METAMUCIL PO) 7/20/2017 at Unknown time Significant Other Yes Yes   Sig: Take 15 mL by mouth two (2) times daily as needed. dicyclomine (BENTYL) 20 mg tablet 7/20/2017 at AM Significant Other No Yes   Sig: Take 1 Tab by mouth every six (6) hours for 20 doses. gabapentin (NEURONTIN) 600 mg tablet 7/20/2017 at AM Significant Other Yes Yes   Sig: Take 600 mg by mouth three (3) times daily. magnesium citrate solution 7/20/2017 at 1430 Significant Other Yes Yes   Sig: Take 296 mL by mouth as needed.    meclizine (ANTIVERT) 25 mg tablet Unknown at Unknown time Significant Other Yes No   Sig: Take 25 mg by mouth three (3) times daily as needed. metroNIDAZOLE (FLAGYL) 500 mg tablet 7/20/2017 at AM Significant Other Yes Yes   Sig: Take 500 mg by mouth three (3) times daily. moxifloxacin (AVELOX) 400 mg tablet 7/20/2017 at AM Significant Other No Yes   Sig: Take 1 Tab by mouth daily for 10 days. oxyCODONE-acetaminophen (PERCOCET) 5-325 mg per tablet 7/20/2017 at Unknown time Significant Other No Yes   Sig: Take 1 Tab by mouth every four (4) hours as needed for Pain. Max Daily Amount: 6 Tabs. polyethylene glycol (MIRALAX) 17 gram packet 7/19/2017 at Unknown time Significant Other No Yes   Sig: Take 1 Packet by mouth two (2) times a day.       Facility-Administered Medications: None     Ashwini Huff, PharmD, BCPS  Contact:

## 2017-07-21 NOTE — ED PROVIDER NOTES
HPI Comments: 74 y.o. male with past medical history significant for diverticulitis and spinal stenosis who presents from home with chief complaint of constipation. Pt reports he has been constipated for 3 weeks and was dx w/ diverticulitis 2 weeks ago accompanied by nausea, postprandial vomiting, and abdominal pain which worsened to 10/10 last night. He states he is scheduled to see GI for a \"test\" in 2-3 hours. There are no other acute medical concerns at this time. PCP: Casie Leija MD    Note written by Bridget Pepe, as dictated by Minna Alcantar MD 7:07 AM    The history is provided by the patient. Past Medical History:   Diagnosis Date    Diverticulitis     Gastrointestinal disorder     Spinal stenosis        History reviewed. No pertinent surgical history. History reviewed. No pertinent family history. Social History     Social History    Marital status:      Spouse name: N/A    Number of children: N/A    Years of education: N/A     Occupational History    Not on file. Social History Main Topics    Smoking status: Current Some Day Smoker    Smokeless tobacco: Never Used    Alcohol use 0.5 oz/week     1 Cans of beer per week    Drug use: No    Sexual activity: Not on file     Other Topics Concern    Not on file     Social History Narrative         ALLERGIES: Review of patient's allergies indicates no known allergies. Review of Systems   Constitutional: Negative for chills and fever. HENT: Negative for ear pain and sore throat. Eyes: Negative for photophobia and pain. Respiratory: Negative for chest tightness and shortness of breath. Cardiovascular: Negative for chest pain and leg swelling. Gastrointestinal: Positive for abdominal pain, constipation, nausea and vomiting. Genitourinary: Negative for dysuria and flank pain. Musculoskeletal: Negative for back pain and neck pain. Skin: Negative for rash and wound.    Neurological: Negative for dizziness, light-headedness and headaches. All other systems reviewed and are negative. Vitals:    07/21/17 0651   BP: 108/69   Pulse: 94   Resp: 23   Temp: 97.6 °F (36.4 °C)   SpO2: 94%   Weight: 86.3 kg (190 lb 4.1 oz)   Height: 5' 10\" (1.778 m)            Physical Exam   Constitutional: He is oriented to person, place, and time. He appears well-developed and well-nourished. He appears distressed. HENT:   Head: Normocephalic and atraumatic. Eyes: Conjunctivae and EOM are normal.   Neck: Normal range of motion. Cardiovascular: Normal rate, regular rhythm, normal heart sounds and intact distal pulses. No murmur heard. Pulmonary/Chest: Effort normal and breath sounds normal. No stridor. No respiratory distress. He has no wheezes. He has no rales. Abdominal: Soft. Bowel sounds are normal. He exhibits distension. There is tenderness. There is no rebound and no guarding. Musculoskeletal: Normal range of motion. He exhibits no edema, tenderness or deformity. Neurological: He is alert and oriented to person, place, and time. No cranial nerve deficit. Skin: Skin is warm and dry. He is not diaphoretic. Psychiatric: He has a normal mood and affect. Nursing note and vitals reviewed. MDM  Number of Diagnoses or Management Options  Diverticulitis of large intestine without perforation or abscess without bleeding:   Intestinal obstruction, unspecified type [facility]):   Diagnosis management comments: Patient with continued abdominal pain and now with some distention, +nausea  +constipation - will check x-ray and possibly give an enema. Patient due for Barium enema today 0949.    1268 - x-ray concerning for SBO, repeat CT scan with IV contrast only, check labs, consider NG tube if patient begins to vomit, admission is likely for continued care.     5460 - patient to be admitted by surgery, +large and small bowel obstruction  NG tube ordered, IV zosyn at request of surgery       Amount and/or Complexity of Data Reviewed  Clinical lab tests: ordered and reviewed  Tests in the radiology section of CPT®: ordered and reviewed  Discuss the patient with other providers: yes  Independent visualization of images, tracings, or specimens: yes    Risk of Complications, Morbidity, and/or Mortality  Presenting problems: high  Diagnostic procedures: high  Management options: high      ED Course       Procedures    CONSULT NOTE:  9:23 AM Yogi Pinon MD spoke with Dr. Sha Ramos, Consult for Surgery. Discussed available diagnostic tests and clinical findings. She is in agreement with care plans as outlined. Dr. Sha Ramos will send someone to evaluate Pt. EXAM:  CT ABD PELV WO CONT     INDICATION: Increasing acute on chronic abdominal pain. Chronic sigmoid  diverticulitis and colitis. Leukocytosis is new since 3 days ago. Estimated GFR  was greater than 60 earlier this month and earlier this year but is 50 today.     COMPARISON: CT abdomen/pelvis on 7/18/2017, 7/3/2017, and 8/29/2013.     CONTRAST:  None.     TECHNIQUE:   Thin axial images were obtained through the abdomen and pelvis. Coronal and  sagittal reconstructions were generated. Oral contrast was not administered. CT  dose reduction was achieved through use of a standardized protocol tailored for  this examination and automatic exposure control for dose modulation. No IV contrast secondary to acute renal insufficiency. The absence of intravenous contrast material reduces the sensitivity for  evaluation of the solid parenchymal organs of the abdomen.      FINDINGS:   LUNG BASES: Bibasilar dependent atelectasis. No pneumonia. INCIDENTALLY IMAGED HEART AND MEDIASTINUM: Normal cardiac size. Coronary artery  calcific atherosclerosis is partially imaged. No pericardial effusion. LIVER: No evidence of mass. Normal size. GALLBLADDER: Partially distended. Probable sludge. No cholecystitis. CBD is not  dilated. SPLEEN: Normal size.   PANCREAS: Unchanged atrophy. No inflammation. ADRENALS: Unremarkable. KIDNEYS/URETERS: No nephrolithiasis or hydronephrosis. No change given  difference in technique. STOMACH: Mildly distended with fluid. SMALL BOWEL: Diffuse small bowel distention with fluid. Maximum diameter is  approximately 3.7 cm. No mural thickening. COLON: Severe mural thickening of the sigmoid colon is unchanged since earlier  this month and increased since 2013. Edema around the sigmoid colon is decreased  since earlier this month. No drainable abscess. Subtle loss of fat plane between  the mural thickened sigmoid colon in the urinary bladder. Proximal sigmoid  colon, the entire colon is dilated and filled with fluid and gas. Maximum  colonic distention measures 8 cm. No pneumatosis. APPENDIX: Unremarkable. PERITONEUM: No ascites or pneumoperitoneum. RETROPERITONEUM: No lymphadenopathy or aortic aneurysm. REPRODUCTIVE ORGANS: Prostate gland is normal in size. URINARY BLADDER: Partial distention. Focal mural thickening of the dome of the  urinary bladder. Focal loss of fat plane between the abnormal sigmoid colon in  the dome of the urinary bladder. No gas in the urinary bladder. BONES: Grade 1 retrolisthesis of L2-3. Grade 1 anterolisthesis of L5-S1  secondary to bilateral spondylolysis. ADDITIONAL COMMENTS: Left inguinal hernia contains fat. No bowel containing  hernia.     IMPRESSION  IMPRESSION:     1. Colonic obstruction has transition point at the chronic sigmoid colitis. Decreased colitis since earlier this month. No abscess. Patient is at risk for  developing a fistula between the sigmoid colon and the dome of the urinary  bladder. 2. Diffuse small bowel obstruction is secondary to colonic obstruction. Consider  enteric tube placement. 3. No pneumoperitoneum.         EXAMINATION: Abdomen 2 views.     INDICATION: abdominal pain      Supine and upright views of the abdomen show dilated large and small bowel loops  with air-fluid levels which could represent ileus or distal colonic obstruction. There is no pneumoperitoneum.     IMPRESSION  IMPRESSION: Abnormal bowel gas pattern as discussed.

## 2017-07-21 NOTE — CONSULTS
General Surgery    Evaluated patient in ED. Three weeks of abdominal pain and evidence of sigmoid colitis on multiple CTs., now has colonic obstruction. Abdomen distended, no peritonitis. No indication for emergent surgery. Admit to Dr. Iris Sweet. Admission H&P to follow.

## 2017-07-21 NOTE — ROUTINE PROCESS
TRANSFER - OUT REPORT:    Verbal report given to Mabel Latif RN(name) on Celestino Butts  being transferred to 4th floor M/S(unit) for routine progression of care       Report consisted of patients Situation, Background, Assessment and   Recommendations(SBAR). Information from the following report(s) SBAR, MAR and Recent Results was reviewed with the receiving nurse. Lines:   Peripheral IV 07/21/17 Right Antecubital (Active)   Site Assessment Clean, dry, & intact 7/21/2017  7:49 AM   Phlebitis Assessment 0 7/21/2017  7:49 AM   Infiltration Assessment 0 7/21/2017  7:49 AM   Dressing Status Clean, dry, & intact 7/21/2017  7:49 AM   Dressing Type Transparent 7/21/2017  7:49 AM   Hub Color/Line Status Pink;Patent; Flushed 7/21/2017  7:49 AM        Opportunity for questions and clarification was provided.       Patient transported with:   O2 @ 3 liters  Tech

## 2017-07-22 LAB
ANION GAP BLD CALC-SCNC: 3 MMOL/L (ref 5–15)
BASOPHILS # BLD AUTO: 0 K/UL (ref 0–0.1)
BASOPHILS # BLD: 0 % (ref 0–1)
BUN SERPL-MCNC: 28 MG/DL (ref 6–20)
BUN/CREAT SERPL: 19 (ref 12–20)
CALCIUM SERPL-MCNC: 8.9 MG/DL (ref 8.5–10.1)
CHLORIDE SERPL-SCNC: 97 MMOL/L (ref 97–108)
CO2 SERPL-SCNC: 37 MMOL/L (ref 21–32)
CREAT SERPL-MCNC: 1.46 MG/DL (ref 0.7–1.3)
EOSINOPHIL # BLD: 0 K/UL (ref 0–0.4)
EOSINOPHIL NFR BLD: 0 % (ref 0–7)
ERYTHROCYTE [DISTWIDTH] IN BLOOD BY AUTOMATED COUNT: 13.3 % (ref 11.5–14.5)
GLUCOSE SERPL-MCNC: 138 MG/DL (ref 65–100)
HCT VFR BLD AUTO: 48.5 % (ref 36.6–50.3)
HGB BLD-MCNC: 15.6 G/DL (ref 12.1–17)
LYMPHOCYTES # BLD AUTO: 10 % (ref 12–49)
LYMPHOCYTES # BLD: 1.2 K/UL (ref 0.8–3.5)
MAGNESIUM SERPL-MCNC: 3.2 MG/DL (ref 1.6–2.4)
MCH RBC QN AUTO: 29.8 PG (ref 26–34)
MCHC RBC AUTO-ENTMCNC: 32.2 G/DL (ref 30–36.5)
MCV RBC AUTO: 92.6 FL (ref 80–99)
MONOCYTES # BLD: 1.5 K/UL (ref 0–1)
MONOCYTES NFR BLD AUTO: 13 % (ref 5–13)
NEUTS SEG # BLD: 8.8 K/UL (ref 1.8–8)
NEUTS SEG NFR BLD AUTO: 77 % (ref 32–75)
PLATELET # BLD AUTO: 312 K/UL (ref 150–400)
POTASSIUM SERPL-SCNC: 4.8 MMOL/L (ref 3.5–5.1)
RBC # BLD AUTO: 5.24 M/UL (ref 4.1–5.7)
SODIUM SERPL-SCNC: 137 MMOL/L (ref 136–145)
WBC # BLD AUTO: 11.5 K/UL (ref 4.1–11.1)

## 2017-07-22 PROCEDURE — 85025 COMPLETE CBC W/AUTO DIFF WBC: CPT | Performed by: PHYSICIAN ASSISTANT

## 2017-07-22 PROCEDURE — 83735 ASSAY OF MAGNESIUM: CPT | Performed by: PHYSICIAN ASSISTANT

## 2017-07-22 PROCEDURE — 36415 COLL VENOUS BLD VENIPUNCTURE: CPT | Performed by: PHYSICIAN ASSISTANT

## 2017-07-22 PROCEDURE — 80048 BASIC METABOLIC PNL TOTAL CA: CPT | Performed by: PHYSICIAN ASSISTANT

## 2017-07-22 PROCEDURE — 74011000258 HC RX REV CODE- 258: Performed by: SURGERY

## 2017-07-22 PROCEDURE — 74011250636 HC RX REV CODE- 250/636: Performed by: SURGERY

## 2017-07-22 PROCEDURE — 74011250636 HC RX REV CODE- 250/636: Performed by: PHYSICIAN ASSISTANT

## 2017-07-22 PROCEDURE — 77030032490 HC SLV COMPR SCD KNE COVD -B

## 2017-07-22 PROCEDURE — 65270000029 HC RM PRIVATE

## 2017-07-22 RX ORDER — DEXTROSE MONOHYDRATE AND SODIUM CHLORIDE 5; .45 G/100ML; G/100ML
50 INJECTION, SOLUTION INTRAVENOUS CONTINUOUS
Status: DISCONTINUED | OUTPATIENT
Start: 2017-07-22 | End: 2017-07-28

## 2017-07-22 RX ADMIN — PIPERACILLIN SODIUM,TAZOBACTAM SODIUM 3.38 G: 3; .375 INJECTION, POWDER, FOR SOLUTION INTRAVENOUS at 07:07

## 2017-07-22 RX ADMIN — Medication 10 ML: at 06:26

## 2017-07-22 RX ADMIN — HEPARIN SODIUM 5000 UNITS: 5000 INJECTION, SOLUTION INTRAVENOUS; SUBCUTANEOUS at 22:41

## 2017-07-22 RX ADMIN — PIPERACILLIN SODIUM,TAZOBACTAM SODIUM 3.38 G: 3; .375 INJECTION, POWDER, FOR SOLUTION INTRAVENOUS at 16:00

## 2017-07-22 RX ADMIN — PIPERACILLIN SODIUM,TAZOBACTAM SODIUM 3.38 G: 3; .375 INJECTION, POWDER, FOR SOLUTION INTRAVENOUS at 23:22

## 2017-07-22 RX ADMIN — HEPARIN SODIUM 5000 UNITS: 5000 INJECTION, SOLUTION INTRAVENOUS; SUBCUTANEOUS at 06:25

## 2017-07-22 RX ADMIN — DEXTROSE MONOHYDRATE AND SODIUM CHLORIDE 150 ML/HR: 5; .45 INJECTION, SOLUTION INTRAVENOUS at 13:28

## 2017-07-22 RX ADMIN — Medication 10 ML: at 13:28

## 2017-07-22 RX ADMIN — DEXTROSE MONOHYDRATE, SODIUM CHLORIDE, AND POTASSIUM CHLORIDE 125 ML/HR: 50; 4.5; 1.49 INJECTION, SOLUTION INTRAVENOUS at 07:08

## 2017-07-22 RX ADMIN — Medication 10 ML: at 22:43

## 2017-07-22 RX ADMIN — HYDROMORPHONE HYDROCHLORIDE 1 MG: 1 INJECTION, SOLUTION INTRAMUSCULAR; INTRAVENOUS; SUBCUTANEOUS at 19:14

## 2017-07-22 RX ADMIN — DEXTROSE MONOHYDRATE AND SODIUM CHLORIDE 150 ML/HR: 5; .45 INJECTION, SOLUTION INTRAVENOUS at 23:24

## 2017-07-22 RX ADMIN — HEPARIN SODIUM 5000 UNITS: 5000 INJECTION, SOLUTION INTRAVENOUS; SUBCUTANEOUS at 13:27

## 2017-07-22 RX ADMIN — HYDROMORPHONE HYDROCHLORIDE 1 MG: 1 INJECTION, SOLUTION INTRAMUSCULAR; INTRAVENOUS; SUBCUTANEOUS at 11:20

## 2017-07-22 NOTE — PROGRESS NOTES
Bedside shift change report given to Gypsy (oncoming nurse) by Nyla Gauthier (offgoing nurse). Report included the following information SBAR, Kardex, Procedure Summary, MAR and Recent Results.

## 2017-07-22 NOTE — PROGRESS NOTES
Bedside shift change report given to Gypsy (oncoming nurse) by María Kirby (offgoing nurse). Report included the following information SBAR, Kardex, Procedure Summary, MAR and Recent Results.

## 2017-07-22 NOTE — PROGRESS NOTES
Primary Nurse Aolnso Garcia RN and HANNAH Betancur performed a dual skin assessment on this patient No impairment noted  Adrien score is 20

## 2017-07-22 NOTE — PROGRESS NOTES
Pain controlled   Avss  Ng draining large amount  Urine output not recorded  Alert no distress  Breathing unlabored  Cor: rrr  abd soft but distended, mild lower abd tenderness no guarding or rebound    Wbc  Down from 16 to 11  Creat down from 1. 7 to 1.5, lytes ok    Acute diverticulitis with sbo  Cont. Antibiotics, npo, ngt, iv fluids  Follow clinical course. Does not need urgent operation at this point.

## 2017-07-23 ENCOUNTER — APPOINTMENT (OUTPATIENT)
Dept: GENERAL RADIOLOGY | Age: 72
DRG: 329 | End: 2017-07-23
Attending: SURGERY
Payer: MEDICARE

## 2017-07-23 LAB
ANION GAP BLD CALC-SCNC: 3 MMOL/L (ref 5–15)
BASOPHILS # BLD AUTO: 0 K/UL (ref 0–0.1)
BASOPHILS # BLD: 0 % (ref 0–1)
BUN SERPL-MCNC: 28 MG/DL (ref 6–20)
BUN/CREAT SERPL: 21 (ref 12–20)
CALCIUM SERPL-MCNC: 8 MG/DL (ref 8.5–10.1)
CHLORIDE SERPL-SCNC: 99 MMOL/L (ref 97–108)
CO2 SERPL-SCNC: 36 MMOL/L (ref 21–32)
CREAT SERPL-MCNC: 1.33 MG/DL (ref 0.7–1.3)
EOSINOPHIL # BLD: 0.1 K/UL (ref 0–0.4)
EOSINOPHIL NFR BLD: 1 % (ref 0–7)
ERYTHROCYTE [DISTWIDTH] IN BLOOD BY AUTOMATED COUNT: 13.2 % (ref 11.5–14.5)
GLUCOSE SERPL-MCNC: 111 MG/DL (ref 65–100)
HCT VFR BLD AUTO: 42.9 % (ref 36.6–50.3)
HGB BLD-MCNC: 14.2 G/DL (ref 12.1–17)
LYMPHOCYTES # BLD AUTO: 14 % (ref 12–49)
LYMPHOCYTES # BLD: 1.3 K/UL (ref 0.8–3.5)
MAGNESIUM SERPL-MCNC: 2.7 MG/DL (ref 1.6–2.4)
MCH RBC QN AUTO: 30.2 PG (ref 26–34)
MCHC RBC AUTO-ENTMCNC: 33.1 G/DL (ref 30–36.5)
MCV RBC AUTO: 91.3 FL (ref 80–99)
MONOCYTES # BLD: 1.2 K/UL (ref 0–1)
MONOCYTES NFR BLD AUTO: 13 % (ref 5–13)
NEUTS SEG # BLD: 6.7 K/UL (ref 1.8–8)
NEUTS SEG NFR BLD AUTO: 72 % (ref 32–75)
PLATELET # BLD AUTO: 292 K/UL (ref 150–400)
POTASSIUM SERPL-SCNC: 4.5 MMOL/L (ref 3.5–5.1)
RBC # BLD AUTO: 4.7 M/UL (ref 4.1–5.7)
SODIUM SERPL-SCNC: 138 MMOL/L (ref 136–145)
WBC # BLD AUTO: 9.3 K/UL (ref 4.1–11.1)

## 2017-07-23 PROCEDURE — 36415 COLL VENOUS BLD VENIPUNCTURE: CPT | Performed by: PHYSICIAN ASSISTANT

## 2017-07-23 PROCEDURE — 85025 COMPLETE CBC W/AUTO DIFF WBC: CPT | Performed by: PHYSICIAN ASSISTANT

## 2017-07-23 PROCEDURE — 74011000258 HC RX REV CODE- 258: Performed by: SURGERY

## 2017-07-23 PROCEDURE — 83735 ASSAY OF MAGNESIUM: CPT | Performed by: PHYSICIAN ASSISTANT

## 2017-07-23 PROCEDURE — 80048 BASIC METABOLIC PNL TOTAL CA: CPT | Performed by: PHYSICIAN ASSISTANT

## 2017-07-23 PROCEDURE — 74000 XR ABD PORT  1 V: CPT

## 2017-07-23 PROCEDURE — 77010033678 HC OXYGEN DAILY

## 2017-07-23 PROCEDURE — 74011250636 HC RX REV CODE- 250/636: Performed by: SURGERY

## 2017-07-23 PROCEDURE — 65270000029 HC RM PRIVATE

## 2017-07-23 PROCEDURE — 74011250637 HC RX REV CODE- 250/637: Performed by: SURGERY

## 2017-07-23 PROCEDURE — 74011250636 HC RX REV CODE- 250/636: Performed by: PHYSICIAN ASSISTANT

## 2017-07-23 RX ORDER — IBUPROFEN 200 MG
1 TABLET ORAL DAILY
Status: DISCONTINUED | OUTPATIENT
Start: 2017-07-23 | End: 2017-08-04 | Stop reason: HOSPADM

## 2017-07-23 RX ORDER — FACIAL-BODY WIPES
10 EACH TOPICAL DAILY PRN
Status: DISCONTINUED | OUTPATIENT
Start: 2017-07-23 | End: 2017-07-31

## 2017-07-23 RX ADMIN — Medication 10 ML: at 22:29

## 2017-07-23 RX ADMIN — HEPARIN SODIUM 5000 UNITS: 5000 INJECTION, SOLUTION INTRAVENOUS; SUBCUTANEOUS at 22:29

## 2017-07-23 RX ADMIN — ONDANSETRON 4 MG: 2 INJECTION INTRAMUSCULAR; INTRAVENOUS at 09:15

## 2017-07-23 RX ADMIN — Medication 10 ML: at 06:41

## 2017-07-23 RX ADMIN — HYDROMORPHONE HYDROCHLORIDE 1 MG: 1 INJECTION, SOLUTION INTRAMUSCULAR; INTRAVENOUS; SUBCUTANEOUS at 08:55

## 2017-07-23 RX ADMIN — HEPARIN SODIUM 5000 UNITS: 5000 INJECTION, SOLUTION INTRAVENOUS; SUBCUTANEOUS at 15:41

## 2017-07-23 RX ADMIN — PIPERACILLIN SODIUM,TAZOBACTAM SODIUM 3.38 G: 3; .375 INJECTION, POWDER, FOR SOLUTION INTRAVENOUS at 15:42

## 2017-07-23 RX ADMIN — HEPARIN SODIUM 5000 UNITS: 5000 INJECTION, SOLUTION INTRAVENOUS; SUBCUTANEOUS at 06:40

## 2017-07-23 RX ADMIN — DEXTROSE MONOHYDRATE AND SODIUM CHLORIDE 150 ML/HR: 5; .45 INJECTION, SOLUTION INTRAVENOUS at 22:32

## 2017-07-23 RX ADMIN — Medication 10 ML: at 15:41

## 2017-07-23 RX ADMIN — PIPERACILLIN SODIUM,TAZOBACTAM SODIUM 3.38 G: 3; .375 INJECTION, POWDER, FOR SOLUTION INTRAVENOUS at 08:55

## 2017-07-23 NOTE — PROGRESS NOTES
Bedside shift change report given to Gypsy (oncoming nurse) by Lelia Mina (offgoing nurse). Report included the following information SBAR, Kardex, Procedure Summary, MAR and Recent Results.

## 2017-07-23 NOTE — PROGRESS NOTES
PCT went into room and patient handed her his NG tube that he removed himself and told her that he didn't want it in anymore. When PCT explained that the nurse would have to put another in, patient said he did not want one. I told Dr. Iris Sweet of the patient's decision.

## 2017-07-23 NOTE — PROGRESS NOTES
Surgery    Pt reports initially that his belly pain is a little less and that the distension has improved. Denies any stool passage. NG in place when I saw him this am but he pulled it out shortly thereafter.     AF, VSS  Abdomen soft, LLQ with tenderness to deep palpation  WBC 9.3  Bowel sounds present  Plan: continue abx  Would reinsert NG to suction if nauseated  Trying to avoid urgent surgery since ostomy would be necessary  Up and walking

## 2017-07-24 ENCOUNTER — APPOINTMENT (OUTPATIENT)
Dept: GENERAL RADIOLOGY | Age: 72
DRG: 329 | End: 2017-07-24
Attending: SURGERY
Payer: MEDICARE

## 2017-07-24 LAB
ANION GAP BLD CALC-SCNC: 8 MMOL/L (ref 5–15)
BASOPHILS # BLD AUTO: 0 K/UL (ref 0–0.1)
BASOPHILS # BLD: 0 % (ref 0–1)
BUN SERPL-MCNC: 22 MG/DL (ref 6–20)
BUN/CREAT SERPL: 20 (ref 12–20)
CALCIUM SERPL-MCNC: 8.1 MG/DL (ref 8.5–10.1)
CHLORIDE SERPL-SCNC: 98 MMOL/L (ref 97–108)
CO2 SERPL-SCNC: 32 MMOL/L (ref 21–32)
CREAT SERPL-MCNC: 1.12 MG/DL (ref 0.7–1.3)
EOSINOPHIL # BLD: 0 K/UL (ref 0–0.4)
EOSINOPHIL NFR BLD: 1 % (ref 0–7)
ERYTHROCYTE [DISTWIDTH] IN BLOOD BY AUTOMATED COUNT: 13.1 % (ref 11.5–14.5)
GLUCOSE SERPL-MCNC: 109 MG/DL (ref 65–100)
HCT VFR BLD AUTO: 43.2 % (ref 36.6–50.3)
HGB BLD-MCNC: 14.4 G/DL (ref 12.1–17)
LYMPHOCYTES # BLD AUTO: 13 % (ref 12–49)
LYMPHOCYTES # BLD: 0.9 K/UL (ref 0.8–3.5)
MAGNESIUM SERPL-MCNC: 2.4 MG/DL (ref 1.6–2.4)
MCH RBC QN AUTO: 29.9 PG (ref 26–34)
MCHC RBC AUTO-ENTMCNC: 33.3 G/DL (ref 30–36.5)
MCV RBC AUTO: 89.8 FL (ref 80–99)
MONOCYTES # BLD: 1.2 K/UL (ref 0–1)
MONOCYTES NFR BLD AUTO: 17 % (ref 5–13)
NEUTS SEG # BLD: 5.2 K/UL (ref 1.8–8)
NEUTS SEG NFR BLD AUTO: 69 % (ref 32–75)
PLATELET # BLD AUTO: 276 K/UL (ref 150–400)
POTASSIUM SERPL-SCNC: 4 MMOL/L (ref 3.5–5.1)
RBC # BLD AUTO: 4.81 M/UL (ref 4.1–5.7)
SODIUM SERPL-SCNC: 138 MMOL/L (ref 136–145)
WBC # BLD AUTO: 7.4 K/UL (ref 4.1–11.1)

## 2017-07-24 PROCEDURE — 74011000258 HC RX REV CODE- 258: Performed by: SURGERY

## 2017-07-24 PROCEDURE — 36569 INSJ PICC 5 YR+ W/O IMAGING: CPT | Performed by: SURGERY

## 2017-07-24 PROCEDURE — 77030015696

## 2017-07-24 PROCEDURE — 74011250636 HC RX REV CODE- 250/636: Performed by: PHYSICIAN ASSISTANT

## 2017-07-24 PROCEDURE — 36415 COLL VENOUS BLD VENIPUNCTURE: CPT | Performed by: PHYSICIAN ASSISTANT

## 2017-07-24 PROCEDURE — 85025 COMPLETE CBC W/AUTO DIFF WBC: CPT | Performed by: PHYSICIAN ASSISTANT

## 2017-07-24 PROCEDURE — 77030019563 HC DEV ATTCH FEED HOLL -A

## 2017-07-24 PROCEDURE — 77010033678 HC OXYGEN DAILY

## 2017-07-24 PROCEDURE — 02HV33Z INSERTION OF INFUSION DEVICE INTO SUPERIOR VENA CAVA, PERCUTANEOUS APPROACH: ICD-10-PCS | Performed by: SURGERY

## 2017-07-24 PROCEDURE — 80048 BASIC METABOLIC PNL TOTAL CA: CPT | Performed by: PHYSICIAN ASSISTANT

## 2017-07-24 PROCEDURE — 74011000250 HC RX REV CODE- 250: Performed by: SURGERY

## 2017-07-24 PROCEDURE — 74011250637 HC RX REV CODE- 250/637: Performed by: SURGERY

## 2017-07-24 PROCEDURE — 3E0436Z INTRODUCTION OF NUTRITIONAL SUBSTANCE INTO CENTRAL VEIN, PERCUTANEOUS APPROACH: ICD-10-PCS | Performed by: SURGERY

## 2017-07-24 PROCEDURE — C1751 CATH, INF, PER/CENT/MIDLINE: HCPCS

## 2017-07-24 PROCEDURE — 71010 XR CHEST PORT: CPT

## 2017-07-24 PROCEDURE — 65270000029 HC RM PRIVATE

## 2017-07-24 PROCEDURE — 77030027138 HC INCENT SPIROMETER -A

## 2017-07-24 PROCEDURE — 83735 ASSAY OF MAGNESIUM: CPT | Performed by: PHYSICIAN ASSISTANT

## 2017-07-24 PROCEDURE — 77030018719 HC DRSG PTCH ANTIMIC J&J -A

## 2017-07-24 PROCEDURE — 77030018786 HC NDL GD F/USND BARD -B

## 2017-07-24 PROCEDURE — 76937 US GUIDE VASCULAR ACCESS: CPT

## 2017-07-24 PROCEDURE — 0D9670Z DRAINAGE OF STOMACH WITH DRAINAGE DEVICE, VIA NATURAL OR ARTIFICIAL OPENING: ICD-10-PCS | Performed by: SURGERY

## 2017-07-24 PROCEDURE — 74011250636 HC RX REV CODE- 250/636: Performed by: SURGERY

## 2017-07-24 RX ORDER — SODIUM CHLORIDE 0.9 % (FLUSH) 0.9 %
10-40 SYRINGE (ML) INJECTION EVERY 8 HOURS
Status: DISCONTINUED | OUTPATIENT
Start: 2017-07-24 | End: 2017-08-04 | Stop reason: HOSPADM

## 2017-07-24 RX ORDER — SODIUM CHLORIDE 0.9 % (FLUSH) 0.9 %
10-30 SYRINGE (ML) INJECTION AS NEEDED
Status: DISCONTINUED | OUTPATIENT
Start: 2017-07-24 | End: 2017-08-04 | Stop reason: HOSPADM

## 2017-07-24 RX ORDER — SODIUM CHLORIDE 0.9 % (FLUSH) 0.9 %
10 SYRINGE (ML) INJECTION AS NEEDED
Status: DISCONTINUED | OUTPATIENT
Start: 2017-07-24 | End: 2017-07-25

## 2017-07-24 RX ORDER — SODIUM CHLORIDE 0.9 % (FLUSH) 0.9 %
20 SYRINGE (ML) INJECTION EVERY 24 HOURS
Status: DISCONTINUED | OUTPATIENT
Start: 2017-07-24 | End: 2017-07-25

## 2017-07-24 RX ORDER — SODIUM CHLORIDE 0.9 % (FLUSH) 0.9 %
10 SYRINGE (ML) INJECTION EVERY 24 HOURS
Status: DISCONTINUED | OUTPATIENT
Start: 2017-07-24 | End: 2017-08-04 | Stop reason: HOSPADM

## 2017-07-24 RX ADMIN — Medication 10 ML: at 12:49

## 2017-07-24 RX ADMIN — ONDANSETRON 4 MG: 2 INJECTION INTRAMUSCULAR; INTRAVENOUS at 15:30

## 2017-07-24 RX ADMIN — Medication 20 ML: at 12:48

## 2017-07-24 RX ADMIN — PIPERACILLIN SODIUM,TAZOBACTAM SODIUM 3.38 G: 3; .375 INJECTION, POWDER, FOR SOLUTION INTRAVENOUS at 00:34

## 2017-07-24 RX ADMIN — HYDROMORPHONE HYDROCHLORIDE 1 MG: 1 INJECTION, SOLUTION INTRAMUSCULAR; INTRAVENOUS; SUBCUTANEOUS at 13:21

## 2017-07-24 RX ADMIN — RETINOL, ERGOCALCIFEROL, .ALPHA.-TOCOPHEROL ACETATE, DL-, PHYTONADIONE, ASCORBIC ACID, NIACINAMIDE, RIBOFLAVIN 5-PHOSPHATE SODIUM, THIAMINE HYDROCHLORIDE, PYRIDOXINE HYDROCHLORIDE, DEXPANTHENOL, BIOTIN, FOLIC ACID, AND CYANOCOBALAMIN: KIT at 17:55

## 2017-07-24 RX ADMIN — DEXTROSE MONOHYDRATE AND SODIUM CHLORIDE 150 ML/HR: 5; .45 INJECTION, SOLUTION INTRAVENOUS at 12:44

## 2017-07-24 RX ADMIN — DEXTROSE MONOHYDRATE AND SODIUM CHLORIDE 150 ML/HR: 5; .45 INJECTION, SOLUTION INTRAVENOUS at 18:24

## 2017-07-24 RX ADMIN — HYDROMORPHONE HYDROCHLORIDE 1 MG: 1 INJECTION, SOLUTION INTRAMUSCULAR; INTRAVENOUS; SUBCUTANEOUS at 21:16

## 2017-07-24 RX ADMIN — PIPERACILLIN SODIUM,TAZOBACTAM SODIUM 3.38 G: 3; .375 INJECTION, POWDER, FOR SOLUTION INTRAVENOUS at 15:56

## 2017-07-24 RX ADMIN — PIPERACILLIN SODIUM,TAZOBACTAM SODIUM 3.38 G: 3; .375 INJECTION, POWDER, FOR SOLUTION INTRAVENOUS at 08:30

## 2017-07-24 RX ADMIN — HYDROMORPHONE HYDROCHLORIDE 1 MG: 1 INJECTION, SOLUTION INTRAMUSCULAR; INTRAVENOUS; SUBCUTANEOUS at 01:35

## 2017-07-24 RX ADMIN — HEPARIN SODIUM 5000 UNITS: 5000 INJECTION, SOLUTION INTRAVENOUS; SUBCUTANEOUS at 13:21

## 2017-07-24 RX ADMIN — ONDANSETRON 4 MG: 2 INJECTION INTRAMUSCULAR; INTRAVENOUS at 21:16

## 2017-07-24 RX ADMIN — DEXTROSE MONOHYDRATE AND SODIUM CHLORIDE 150 ML/HR: 5; .45 INJECTION, SOLUTION INTRAVENOUS at 05:42

## 2017-07-24 RX ADMIN — Medication 10 ML: at 05:28

## 2017-07-24 RX ADMIN — HEPARIN SODIUM 5000 UNITS: 5000 INJECTION, SOLUTION INTRAVENOUS; SUBCUTANEOUS at 05:27

## 2017-07-24 RX ADMIN — HEPARIN SODIUM 5000 UNITS: 5000 INJECTION, SOLUTION INTRAVENOUS; SUBCUTANEOUS at 21:16

## 2017-07-24 RX ADMIN — HYDROMORPHONE HYDROCHLORIDE 1 MG: 1 INJECTION, SOLUTION INTRAMUSCULAR; INTRAVENOUS; SUBCUTANEOUS at 16:52

## 2017-07-24 NOTE — PROGRESS NOTES
Bedside and Verbal shift change report given to Irina Martinez, [facility] (oncoming nurse) by Cirilo Dan RN (offgoing nurse). Report included the following information SBAR, Kardex, Intake/Output and Recent Results.

## 2017-07-24 NOTE — CDMP QUERY
Please clarify if this patient is being treated/managed for:    =>SIRS due to SBO/Diverticulitis treated with IVF, IV Zosyn  =>Other Explanation of clinical findings  =>Unable to Determine (no explanation of clinical findings)    The medical record reflects the following clinical findings, treatment, and risk factors:    Risk Factors: 74 yr old with hx. of significant diverticulitis  Clinical Indicators: CT + sigmoid colitis with evidence of colonic obstruction & SBO---WBC 16--HR @ 96, Resp. 23--  Treatment: NPO, NGT for decompression, IVF NS x1 1000cc bolus then D5%-45% NS @ 150cc/hr, IV Zosyn    Please clarify and document your clinical opinion in the progress notes and discharge summary including the definitive and/or presumptive diagnosis, (suspected or probable), related to the above clinical findings. Please include clinical findings supporting your diagnosis.     Carlos Kevin [facility], [facility]  [email or web address]

## 2017-07-24 NOTE — PROGRESS NOTES
7/24/2017 11:34 AM Met with pt. Charted address and phone numbers confirmed. Pt lives with his wife and son in [place]. Pt has no history of home health and does not own any DME. Pt was independent with adls and driving prior to admission. Pt has rx coverage and fills his scripts at "[email or web address]. Pt's wife will transport pt home. No discharge needs identified.  will follow. MINDA Cota   Care Management Interventions  PCP Verified by KIRK:  Yes Brionna Singh )  Current Support Network: Lives with Spouse, Own Home

## 2017-07-24 NOTE — PROGRESS NOTES
Nutrition Assessment:    RECOMMENDATIONS/INTERVENTION(S):   While unable to advance PO, initiate TPN:    Day 1: D20/5%AA @ 42 ml/hr  Day 2: D20/5%AA @ the goal rate of 75 ml/hr  Add Lipids 20% (500 ml) @ 42 ml/hr x 12 hr 3x/wk  (TPN @ goal + Lipids provides 2016 kcals, 90 g protein, 1800 ml fluid)    Adjust IVF accordingly    Monitor plan of care, BG, TG (check weekly while on TPN) , lytes  ASSESSMENT:   7/24:  Pt seen for LOS & CLD/NPO x 5.  74 yom admitted for colonic obstruction. Pmhx includes diverticulitis. S/p CT showing sigmoid colitis w/ colonic obstruction and concomitant SBO. Visited pt this afternoon. Daughter in room, pt in restroom. Reports eating well and maintaining wt PTA. Avoids certain foods while w/ diverticulitis flare ups - dx 10 yrs ago. Normal BMI per age. Labs/meds reviewed. Lytes WDL. BUN elevated but trending down, Cr WDL. . No PI or edema noted. D5 @ 150 ml/hr infusing. TPN started by Surgery today - D15/5%AA @ 42 ml/hr; no Lipids ordered. TPN recs above. SUBJECTIVE/OBJECTIVE:     Diet Order: NPO  % Eaten:  No data found. Pertinent Medications: [x] Reviewed - zofran    Chemistries:  Lab Results   Component Value Date/Time    Sodium 138 07/24/2017 01:53 AM    Potassium 4.0 07/24/2017 01:53 AM    Chloride 98 07/24/2017 01:53 AM    CO2 32 07/24/2017 01:53 AM    Anion gap 8 07/24/2017 01:53 AM    Glucose 109 07/24/2017 01:53 AM    BUN 22 07/24/2017 01:53 AM    Creatinine 1.12 07/24/2017 01:53 AM    BUN/Creatinine ratio 20 07/24/2017 01:53 AM    GFR est AA >60 07/24/2017 01:53 AM    GFR est non-AA >60 07/24/2017 01:53 AM    Calcium 8.1 07/24/2017 01:53 AM    AST (SGOT) 20 07/18/2017 10:14 AM    Alk.  phosphatase 71 07/18/2017 10:14 AM    Protein, total 7.8 07/18/2017 10:14 AM    Albumin 3.3 07/18/2017 10:14 AM    Globulin 4.5 07/18/2017 10:14 AM    A-G Ratio 0.7 07/18/2017 10:14 AM    ALT (SGPT) 26 07/18/2017 10:14 AM      Anthropometrics: Height: 5' 10\" (177.8 cm) Weight: 86.3 kg (190 lb 4.1 oz)    IBW (%IBW): 83 kg (182 lb 15.7 oz) (103.98 %) UBW (%UBW):   (  %)    BMI: Body mass index is 27.3 kg/(m^2). This BMI is indicative of:   [] Underweight    [x] Normal - per advanced age   [] Overweight    []  Obesity    []  Extreme Obesity (BMI>40)  Estimated Nutrition Needs (Based on): 2019 Kcals/day (RMR (1609) x 1.3 AF) , 69 g (-86 (0.8-1.0 g/kg x actual body wt)) Protein  Carbohydrate: At Least 130 g/day  Fluids: 2000 mL/day    Last BM: 7/24, distended abd   []Active     []Hyperactive  [x]Hypoactive       [] Absent   BS  Skin:    [x] Intact   [] Incision  [] Breakdown   [] DTI   [] Tears/Excoriation/Abrasion  []Edema [] Other:    Wt Readings from Last 30 Encounters:   07/24/17 86.3 kg (190 lb 4.1 oz)   07/19/17 87.1 kg (192 lb)   07/18/17 87.1 kg (192 lb)   07/17/17 87.1 kg (192 lb)   07/03/17 87.3 kg (192 lb 7.4 oz)   03/07/17 89.4 kg (197 lb)   02/23/16 87.5 kg (193 lb)   07/05/15 88.9 kg (196 lb)   01/12/15 81.6 kg (180 lb)   08/29/13 81.6 kg (180 lb)      NUTRITION DIAGNOSES:   Problem:  Inadequate oral food/beverage intake     Etiology: related to alteration in GI tract structure and/or function     Signs/Symptoms: as evidenced by diverticulitis, NPO x 5 days      NUTRITION INTERVENTIONS:  Meals/Snacks: General/healthful diet- once PO initiated Enteral/Parenteral Nutrition: Initiate parenteral nutrition               GOAL:   Initiate TPN w/ stable BG & lytes in the next 1-2 days    Cultural, Episcopal, or Ethnic Dietary Needs: None     LEARNING NEEDS (Diet, Food/Nutrient-Drug Interaction):    [x] None Identified   [] Identified and Education Provided/Documented   [] Identified and Pt declined/was not appropriate      [x] Interdisciplinary Care Plan Reviewed/Documented    [x] Discharge Needs:    TBD   [] No Nutrition Related Discharge Needs    NUTRITION RISK:   Pt Is At Nutrition Risk  [x]     No Nutrition Risk Identified  []       PT SEEN FOR:    []  MD Consult: []Calorie Count      []Diabetic Diet Education        []Diet Education     []Electrolyte Management     []General Nutrition Management and Supplements     []Management of Tube Feeding     []TPN Recommendations    []  RN Referral:  []MST score >=2     []Enteral/Parenteral Nutrition PTA     []Pregnant: Gestational DM or Multigestation                 [] Pressure Ulcer    []  Low BMI      [x]  Length of Stay       [] Dysphagia Diet         [] Ventilator  []  Follow-up     Previous Recommendations:   [] Implemented          [] Not Implemented          [x] Not Applicable    Previous Goal:   [] Met              [] Progressing Towards Goal              [] Not Progressing Towards Goal   [x] Not Applicable            Georgie Posadas, [address] [place] [address]   Pager [phone]

## 2017-07-24 NOTE — PROGRESS NOTES
Notified Dr. Santo Deshpande of bradycardia and patient 83% on room air. Orders for EKG and chest xray given and placed. Patient placed on 3L-NC and is at 95%.

## 2017-07-24 NOTE — PROGRESS NOTES
Bedside and Verbal shift change report given to HANNAH Palacio (oncoming nurse) by Cresencio Woods (offgoing nurse). Report included the following information SBAR, Kardex, Intake/Output, MAR and Recent Results.

## 2017-07-24 NOTE — PROGRESS NOTES
PICC Placement Note    PRE-PROCEDURE VERIFICATION  Correct Procedure: yes  Correct Site:  yes  Temperature: Temp: 97.6 °F (36.4 °C), Temperature Source: Temp Source: Oral  Recent Labs      07/24/17   0153   BUN  22*   CREA  1.12   PLT  276   WBC  7.4     Allergies: Review of patient's allergies indicates no known allergies. Education materials for PICC Care given: yes. See Patient Education activity for further details. PICC Booklet placed at bedside: yes    Closed Ended PICC Catheters:  Flush Lumens as Follows:  Intermittent Medication:   Flush before and after each medication with 10 ml NS. Unused Ports:  Flush every 8 hours with 10 ml NS.  TPN Ports:  Flush every 24 hours with 20 ml NS prior to hanging new bag. Blood Draws: Stop infusion, draw off and waste 10 ml of blood. Draw sample with 10cc syringe or greater. DO NOT USE VACUTAINER . Transfer with appropriate device to lab  tubes. Flush with 20 ml NS. Dressing Change:  Every 7 days, and PRN using sterile technique if integrity of dressing is compromised. Initial dressing change for central line 24-48 hours post insertion if gauze is used. Apply new dressing per policy. PROCEDURE DETAIL  Consent was obtained and all questions were answered related to risks and benefits. A triple lumen PICC line was inserted, as a sterile procedure using ultrasound and modified Seldinger technique for TPN. The following documentation is in addition to the PICC properties in the lines/airways flowsheet :  Lot #: [id number]  Lidocaine 1% administered intradermally :yes  Internal Catheter Total Length: 39 (cm)  Vein Selection for PICC:right basilic  Central Line Bundle followed yes  Complication Related to Insertion:no    The placement was verified by EKG, MAX P WAVE @ 39 (cm). PER EKG PICC TIP @ C/A junction.        Line is okay to use: yes    Lola Rapp RN

## 2017-07-25 LAB
ANION GAP BLD CALC-SCNC: 4 MMOL/L (ref 5–15)
BASOPHILS # BLD AUTO: 0 K/UL (ref 0–0.1)
BASOPHILS # BLD: 0 % (ref 0–1)
BUN SERPL-MCNC: 10 MG/DL (ref 6–20)
BUN/CREAT SERPL: 10 (ref 12–20)
CALCIUM SERPL-MCNC: 8 MG/DL (ref 8.5–10.1)
CHLORIDE SERPL-SCNC: 102 MMOL/L (ref 97–108)
CO2 SERPL-SCNC: 33 MMOL/L (ref 21–32)
CREAT SERPL-MCNC: 0.96 MG/DL (ref 0.7–1.3)
EOSINOPHIL # BLD: 0.1 K/UL (ref 0–0.4)
EOSINOPHIL NFR BLD: 1 % (ref 0–7)
ERYTHROCYTE [DISTWIDTH] IN BLOOD BY AUTOMATED COUNT: 13.1 % (ref 11.5–14.5)
GLUCOSE BLD STRIP.AUTO-MCNC: 117 MG/DL (ref 65–100)
GLUCOSE BLD STRIP.AUTO-MCNC: 135 MG/DL (ref 65–100)
GLUCOSE BLD STRIP.AUTO-MCNC: 137 MG/DL (ref 65–100)
GLUCOSE BLD STRIP.AUTO-MCNC: 141 MG/DL (ref 65–100)
GLUCOSE BLD STRIP.AUTO-MCNC: 165 MG/DL (ref 65–100)
GLUCOSE SERPL-MCNC: 122 MG/DL (ref 65–100)
HCT VFR BLD AUTO: 41 % (ref 36.6–50.3)
HGB BLD-MCNC: 13.4 G/DL (ref 12.1–17)
LYMPHOCYTES # BLD AUTO: 13 % (ref 12–49)
LYMPHOCYTES # BLD: 1 K/UL (ref 0.8–3.5)
MAGNESIUM SERPL-MCNC: 2.3 MG/DL (ref 1.6–2.4)
MCH RBC QN AUTO: 29.9 PG (ref 26–34)
MCHC RBC AUTO-ENTMCNC: 32.7 G/DL (ref 30–36.5)
MCV RBC AUTO: 91.5 FL (ref 80–99)
MONOCYTES # BLD: 1.2 K/UL (ref 0–1)
MONOCYTES NFR BLD AUTO: 16 % (ref 5–13)
NEUTS SEG # BLD: 5.3 K/UL (ref 1.8–8)
NEUTS SEG NFR BLD AUTO: 70 % (ref 32–75)
PHOSPHATE SERPL-MCNC: 2.8 MG/DL (ref 2.6–4.7)
PLATELET # BLD AUTO: 251 K/UL (ref 150–400)
POTASSIUM SERPL-SCNC: 3.8 MMOL/L (ref 3.5–5.1)
RBC # BLD AUTO: 4.48 M/UL (ref 4.1–5.7)
SERVICE CMNT-IMP: ABNORMAL
SODIUM SERPL-SCNC: 139 MMOL/L (ref 136–145)
WBC # BLD AUTO: 7.5 K/UL (ref 4.1–11.1)

## 2017-07-25 PROCEDURE — 36415 COLL VENOUS BLD VENIPUNCTURE: CPT | Performed by: SURGERY

## 2017-07-25 PROCEDURE — 74011000258 HC RX REV CODE- 258: Performed by: SURGERY

## 2017-07-25 PROCEDURE — 74011250636 HC RX REV CODE- 250/636: Performed by: PHYSICIAN ASSISTANT

## 2017-07-25 PROCEDURE — 85025 COMPLETE CBC W/AUTO DIFF WBC: CPT | Performed by: SURGERY

## 2017-07-25 PROCEDURE — 83735 ASSAY OF MAGNESIUM: CPT | Performed by: SURGERY

## 2017-07-25 PROCEDURE — 74011250637 HC RX REV CODE- 250/637: Performed by: SURGERY

## 2017-07-25 PROCEDURE — 65270000029 HC RM PRIVATE

## 2017-07-25 PROCEDURE — 84100 ASSAY OF PHOSPHORUS: CPT | Performed by: SURGERY

## 2017-07-25 PROCEDURE — 77010033678 HC OXYGEN DAILY

## 2017-07-25 PROCEDURE — 82962 GLUCOSE BLOOD TEST: CPT

## 2017-07-25 PROCEDURE — 80048 BASIC METABOLIC PNL TOTAL CA: CPT | Performed by: SURGERY

## 2017-07-25 PROCEDURE — 74011000250 HC RX REV CODE- 250: Performed by: SURGERY

## 2017-07-25 PROCEDURE — 74011250636 HC RX REV CODE- 250/636: Performed by: SURGERY

## 2017-07-25 RX ORDER — DEXTROSE 50 % IN WATER (D50W) INTRAVENOUS SYRINGE
12.5-25 AS NEEDED
Status: DISCONTINUED | OUTPATIENT
Start: 2017-07-25 | End: 2017-08-04 | Stop reason: HOSPADM

## 2017-07-25 RX ORDER — MAGNESIUM SULFATE 100 %
4 CRYSTALS MISCELLANEOUS AS NEEDED
Status: DISCONTINUED | OUTPATIENT
Start: 2017-07-25 | End: 2017-08-04 | Stop reason: HOSPADM

## 2017-07-25 RX ORDER — INSULIN LISPRO 100 [IU]/ML
INJECTION, SOLUTION INTRAVENOUS; SUBCUTANEOUS EVERY 6 HOURS
Status: DISCONTINUED | OUTPATIENT
Start: 2017-07-25 | End: 2017-08-04 | Stop reason: HOSPADM

## 2017-07-25 RX ADMIN — ONDANSETRON 4 MG: 2 INJECTION INTRAMUSCULAR; INTRAVENOUS at 04:57

## 2017-07-25 RX ADMIN — RETINOL, ERGOCALCIFEROL, .ALPHA.-TOCOPHEROL ACETATE, DL-, PHYTONADIONE, ASCORBIC ACID, NIACINAMIDE, RIBOFLAVIN 5-PHOSPHATE SODIUM, THIAMINE HYDROCHLORIDE, PYRIDOXINE HYDROCHLORIDE, DEXPANTHENOL, BIOTIN, FOLIC ACID, AND CYANOCOBALAMIN: KIT at 17:53

## 2017-07-25 RX ADMIN — Medication 10 ML: at 14:13

## 2017-07-25 RX ADMIN — DEXTROSE MONOHYDRATE AND SODIUM CHLORIDE 150 ML/HR: 5; .45 INJECTION, SOLUTION INTRAVENOUS at 18:29

## 2017-07-25 RX ADMIN — ONDANSETRON 4 MG: 2 INJECTION INTRAMUSCULAR; INTRAVENOUS at 19:49

## 2017-07-25 RX ADMIN — HYDROMORPHONE HYDROCHLORIDE 1 MG: 1 INJECTION, SOLUTION INTRAMUSCULAR; INTRAVENOUS; SUBCUTANEOUS at 10:51

## 2017-07-25 RX ADMIN — HYDROMORPHONE HYDROCHLORIDE 1 MG: 1 INJECTION, SOLUTION INTRAMUSCULAR; INTRAVENOUS; SUBCUTANEOUS at 04:56

## 2017-07-25 RX ADMIN — PIPERACILLIN SODIUM,TAZOBACTAM SODIUM 3.38 G: 3; .375 INJECTION, POWDER, FOR SOLUTION INTRAVENOUS at 23:58

## 2017-07-25 RX ADMIN — PIPERACILLIN SODIUM,TAZOBACTAM SODIUM 3.38 G: 3; .375 INJECTION, POWDER, FOR SOLUTION INTRAVENOUS at 09:26

## 2017-07-25 RX ADMIN — HEPARIN SODIUM 5000 UNITS: 5000 INJECTION, SOLUTION INTRAVENOUS; SUBCUTANEOUS at 14:12

## 2017-07-25 RX ADMIN — I.V. FAT EMULSION 500 ML: 20 EMULSION INTRAVENOUS at 17:53

## 2017-07-25 RX ADMIN — PIPERACILLIN SODIUM,TAZOBACTAM SODIUM 3.38 G: 3; .375 INJECTION, POWDER, FOR SOLUTION INTRAVENOUS at 00:31

## 2017-07-25 RX ADMIN — HEPARIN SODIUM 5000 UNITS: 5000 INJECTION, SOLUTION INTRAVENOUS; SUBCUTANEOUS at 21:37

## 2017-07-25 RX ADMIN — HEPARIN SODIUM 5000 UNITS: 5000 INJECTION, SOLUTION INTRAVENOUS; SUBCUTANEOUS at 05:00

## 2017-07-25 RX ADMIN — HYDROMORPHONE HYDROCHLORIDE 1 MG: 1 INJECTION, SOLUTION INTRAMUSCULAR; INTRAVENOUS; SUBCUTANEOUS at 15:08

## 2017-07-25 RX ADMIN — HYDROMORPHONE HYDROCHLORIDE 1 MG: 1 INJECTION, SOLUTION INTRAMUSCULAR; INTRAVENOUS; SUBCUTANEOUS at 19:48

## 2017-07-25 RX ADMIN — DEXTROSE MONOHYDRATE AND SODIUM CHLORIDE 150 ML/HR: 5; .45 INJECTION, SOLUTION INTRAVENOUS at 05:17

## 2017-07-25 RX ADMIN — PIPERACILLIN SODIUM,TAZOBACTAM SODIUM 3.38 G: 3; .375 INJECTION, POWDER, FOR SOLUTION INTRAVENOUS at 15:39

## 2017-07-25 RX ADMIN — ONDANSETRON 4 MG: 2 INJECTION INTRAMUSCULAR; INTRAVENOUS at 15:15

## 2017-07-25 RX ADMIN — Medication 10 ML: at 21:37

## 2017-07-25 NOTE — PROGRESS NOTES
Surgery    Pt still having BM/flatus, but continues to drain a lot from his NG.  1900cc yesterday, brown  AF, VSS  Abdomen soft, minimal ttp lower abdomen    Plan: pt has never had cscope. ALthough I think this is diverticular disease, I will ask GI to see and render opinion on whether he could be scoped prior to surgery. Pt is very reluctant to be seen as outpatient and has had several episodes, although not as severe as this one. THerefore, ideally I would like to perform sigmoidectomy at this admission.

## 2017-07-25 NOTE — PROGRESS NOTES
Bedside and Verbal shift change report given to Darin Fisher RN (oncoming nurse) by Kayla Ramirez RN (offgoing nurse). Report included the following information SBAR and Kardex.

## 2017-07-25 NOTE — PROGRESS NOTES
Pt has had NG replaced and it is draining a lot of brown liquid. States that he has been passing liquid watery stools. AF, VSS  Abdomen softer, tender to deep palpation lower abdomen  Plan: continue abx  PICC and TPN   Anticipate sigmoidectomy sometime this week.

## 2017-07-25 NOTE — CONSULTS
[facility] [place]. Ela Franco M.D.  [phone]                    GASTROENTEROLOGY CONSULTATION NOTE              NAME:  Daniele Cedeno   :   1945   MRN:   [id number]       Referring Physician:    Dr. Ernst Meeyr Date:   2017 5:48 PM    Chief Complaint:    Intestinal obstruction     History of Present Illness:    Patient is a 74 y. o. who was readmitted on  for LLQ pain with evidence of sigmoid colitis/diverticulitis causing obstruction. He has been on IV antibiotics and TPN since then and planned to undergo sigmoid resection however he has never had a colonoscopy in the past. Request today is being made to evaluate sigmoid area prior to resection. He reports he continues to have abdominal distension, denies any flatus, however reports he had 3 bowel movements this afternoon of liquid stools and clear fluid. PMH:  Past Medical History:   Diagnosis Date    Diverticulitis     Gastrointestinal disorder     Spinal stenosis        PSH:  History reviewed. No pertinent surgical history. Allergies:  No Known Allergies    Home Medications:  Prior to Admission Medications   Prescriptions Last Dose Informant Patient Reported? Taking? HYDROcodone-acetaminophen (NORCO) 5-325 mg per tablet 2017 at AM Significant Other No Yes   Sig: Take 1 Tab by mouth every four (4) hours as needed for Pain. Max Daily Amount: 6 Tabs. MINERAL OIL EXTRA HEAVY liquid 2017 at 1430 Significant Other No Yes   Sig: Take 30 mL by mouth once as needed for Constipation for up to 1 dose. Indications: constipation   PSYLLIUM SEED, WITH SUGAR, (METAMUCIL PO) 2017 at Unknown time Significant Other Yes Yes   Sig: Take 15 mL by mouth two (2) times daily as needed. dicyclomine (BENTYL) 20 mg tablet 2017 at AM Significant Other No Yes   Sig: Take 1 Tab by mouth every six (6) hours for 20 doses.    gabapentin (NEURONTIN) 600 mg tablet 2017 at AM Significant Other Yes Yes   Sig: Take 600 mg by mouth three (3) times daily. magnesium citrate solution 7/20/2017 at 1430 Significant Other Yes Yes   Sig: Take 296 mL by mouth as needed. meclizine (ANTIVERT) 25 mg tablet Unknown at Unknown time Significant Other Yes No   Sig: Take 25 mg by mouth three (3) times daily as needed. metroNIDAZOLE (FLAGYL) 500 mg tablet 7/20/2017 at AM Significant Other Yes Yes   Sig: Take 500 mg by mouth three (3) times daily. moxifloxacin (AVELOX) 400 mg tablet 7/20/2017 at AM Significant Other No Yes   Sig: Take 1 Tab by mouth daily for 10 days. oxyCODONE-acetaminophen (PERCOCET) 5-325 mg per tablet 7/20/2017 at Unknown time Significant Other No Yes   Sig: Take 1 Tab by mouth every four (4) hours as needed for Pain. Max Daily Amount: 6 Tabs. polyethylene glycol (MIRALAX) 17 gram packet 7/19/2017 at Unknown time Significant Other No Yes   Sig: Take 1 Packet by mouth two (2) times a day.       Facility-Administered Medications: None       Hospital Medications:  Current Facility-Administered Medications   Medication Dose Route Frequency    TPN ADULT - CENTRAL AA 5% D20% W/ ELECTROLYTES AND CA   IntraVENous CONTINUOUS    fat emulsion 20% (LIPOSYN, INTRALIPID) infusion 500 mL  500 mL IntraVENous Q TUE, THU & SAT    insulin lispro (HUMALOG) injection   SubCUTAneous Q6H    glucose chewable tablet 16 g  4 Tab Oral PRN    dextrose (D50W) injection syrg 12.5-25 g  12.5-25 g IntraVENous PRN    glucagon (GLUCAGEN) injection 1 mg  1 mg IntraMUSCular PRN    sodium chloride (NS) flush 10-30 mL  10-30 mL InterCATHeter PRN    sodium chloride (NS) flush 10 mL  10 mL InterCATHeter Q24H    sodium chloride (NS) flush 10-40 mL  10-40 mL InterCATHeter Q8H    alteplase (CATHFLO) 1 mg in sterile water (preservative free) 1 mL injection  1 mg InterCATHeter PRN    TPN ADULT - CENTRAL AA 5% D15% W/ ELECTROLYTES AND CA   IntraVENous CONTINUOUS    bisacodyl (DULCOLAX) suppository 10 mg  10 mg Rectal DAILY PRN    nicotine (NICODERM CQ) 14 mg/24 hr patch 1 Patch  1 Patch TransDERmal DAILY    dextrose 5 % - 0.45% NaCl infusion  150 mL/hr IntraVENous CONTINUOUS    HYDROmorphone (PF) (DILAUDID) injection 1 mg  1 mg IntraVENous Q3H PRN    naloxone (NARCAN) injection 0.4 mg  0.4 mg IntraVENous PRN    ondansetron (ZOFRAN) injection 4 mg  4 mg IntraVENous Q4H PRN    diphenhydrAMINE (BENADRYL) injection 12.5 mg  12.5 mg IntraVENous Q4H PRN    heparin (porcine) injection 5,000 Units  5,000 Units SubCUTAneous Q8H    piperacillin-tazobactam (ZOSYN) 3.375 g in 0.9% sodium chloride (MBP/ADV) 100 mL  3.375 g IntraVENous Q8H       Social History:  Social History   Substance Use Topics    Smoking status: Current Some Day Smoker    Smokeless tobacco: Never Used    Alcohol use 0.5 oz/week     1 Cans of beer per week       Family History:  History reviewed. No pertinent family history.     Review of Systems:  Constitutional: negative fever, negative chills, negative weight loss  Eyes:   negative visual changes  ENT:   negative sore throat, tongue or lip swelling  Respiratory:  negative cough, negative dyspnea  Cards:  negative for chest pain, palpitations, lower extremity edema  GI:   See HPI  :  negative for frequency, dysuria  Integument:  negative for rash and pruritus  Heme:  negative for easy bruising and gum/nose bleeding  Musculoskel: negative for myalgias,  (+) back pain, no muscle weakness  Neuro: negative for headaches, dizziness, vertigo  Psych:  negative for feelings of anxiety, depression     Objective:   Patient Vitals for the past 8 hrs:   BP Temp Pulse Resp SpO2   07/25/17 1540 158/72 97.9 °F (36.6 °C) (!) 47 16 90 %   07/25/17 1152 140/63 98.3 °F (36.8 °C) (!) 52 16 90 %     07/25 0701 - 07/25 1900  In: -   Out: 800 [Urine:300]  07/23 1901 - 07/25 0700  In: 5745.5 [I.V.:5745.5]  Out: 3026 [Urine:650]    EXAM:     NEURO-alert, oriented x3, affect appropriate, no focal neurological deficits, moves all extremities well   HEENT-Head: Normocephalic, no lesions, without obvious abnormality. NG tube in place with dark bilious material drainage   LUNGS-clear to auscultation bilaterally    COR-regular rate and rhythym     ABD- soft, distended and tympanitic, non tender, bowel sounds present     EXT-no edema    Skin - No rash     Data Review     Recent Labs      07/25/17 0523 07/24/17   0153   WBC  7.5  7.4   HGB  13.4  14.4   HCT  41.0  43.2   PLT  251  276     Recent Labs      07/25/17 0523 07/24/17   0153   NA  139  138   K  3.8  4.0   CL  102  98   CO2  33*  32   BUN  10  22*   CREA  0.96  1.12   GLU  122*  109*   PHOS  2.8   --    CA  8.0*  8.1*     No results for input(s): SGOT, GPT, AP, TBIL, TP, ALB, GLOB, GGT, AML, LPSE in the last 72 hours. No lab exists for component: AMYP, HLPSE  No results for input(s): INR, PTP, APTT in the last 72 hours. No lab exists for component: INREXT    Patient Active Problem List   Diagnosis Code    Colitis K52.9    Colonic obstruction ([facility] [address] K56.60       Assessment and Plan:  Distal colonic obstruction at the level of distal sigmoid colon likely related to acute on chronic diverticulitis. Since he has never had a colonoscopy in the past, an attempt at a sigmoidoscopy can be done to further evaluate the mucosa and rule out malignancy, however we discussed that this carries higher risk of perforation. No opening available in endo tomorrow morning, will plan for Thursday morning with MAC. Will give tap water enema early am on Thursday. Thanks for allowing me to participate in the care of this patient.   Signed By: Tomas Cota MD     7/25/2017  5:48 PM

## 2017-07-26 ENCOUNTER — APPOINTMENT (OUTPATIENT)
Dept: GENERAL RADIOLOGY | Age: 72
DRG: 329 | End: 2017-07-26
Attending: SURGERY
Payer: MEDICARE

## 2017-07-26 ENCOUNTER — APPOINTMENT (OUTPATIENT)
Dept: CT IMAGING | Age: 72
DRG: 329 | End: 2017-07-26
Attending: SURGERY
Payer: MEDICARE

## 2017-07-26 LAB
ANION GAP BLD CALC-SCNC: 7 MMOL/L (ref 5–15)
BASOPHILS # BLD AUTO: 0 K/UL (ref 0–0.1)
BASOPHILS # BLD: 0 % (ref 0–1)
BUN SERPL-MCNC: 10 MG/DL (ref 6–20)
BUN/CREAT SERPL: 11 (ref 12–20)
CALCIUM SERPL-MCNC: 8.2 MG/DL (ref 8.5–10.1)
CHLORIDE SERPL-SCNC: 104 MMOL/L (ref 97–108)
CO2 SERPL-SCNC: 30 MMOL/L (ref 21–32)
CREAT SERPL-MCNC: 0.89 MG/DL (ref 0.7–1.3)
EOSINOPHIL # BLD: 0.1 K/UL (ref 0–0.4)
EOSINOPHIL NFR BLD: 1 % (ref 0–7)
ERYTHROCYTE [DISTWIDTH] IN BLOOD BY AUTOMATED COUNT: 13.1 % (ref 11.5–14.5)
GLUCOSE BLD STRIP.AUTO-MCNC: 113 MG/DL (ref 65–100)
GLUCOSE BLD STRIP.AUTO-MCNC: 123 MG/DL (ref 65–100)
GLUCOSE BLD STRIP.AUTO-MCNC: 152 MG/DL (ref 65–100)
GLUCOSE SERPL-MCNC: 139 MG/DL (ref 65–100)
HCT VFR BLD AUTO: 40.5 % (ref 36.6–50.3)
HGB BLD-MCNC: 13.4 G/DL (ref 12.1–17)
LYMPHOCYTES # BLD AUTO: 15 % (ref 12–49)
LYMPHOCYTES # BLD: 1.4 K/UL (ref 0.8–3.5)
MAGNESIUM SERPL-MCNC: 2.2 MG/DL (ref 1.6–2.4)
MCH RBC QN AUTO: 29.8 PG (ref 26–34)
MCHC RBC AUTO-ENTMCNC: 33.1 G/DL (ref 30–36.5)
MCV RBC AUTO: 90.2 FL (ref 80–99)
MONOCYTES # BLD: 1.1 K/UL (ref 0–1)
MONOCYTES NFR BLD AUTO: 12 % (ref 5–13)
NEUTS SEG # BLD: 6.6 K/UL (ref 1.8–8)
NEUTS SEG NFR BLD AUTO: 72 % (ref 32–75)
PHOSPHATE SERPL-MCNC: 3 MG/DL (ref 2.6–4.7)
PLATELET # BLD AUTO: 258 K/UL (ref 150–400)
POTASSIUM SERPL-SCNC: 3.6 MMOL/L (ref 3.5–5.1)
RBC # BLD AUTO: 4.49 M/UL (ref 4.1–5.7)
SERVICE CMNT-IMP: ABNORMAL
SODIUM SERPL-SCNC: 141 MMOL/L (ref 136–145)
TRIGL SERPL-MCNC: 119 MG/DL (ref ?–150)
WBC # BLD AUTO: 9.2 K/UL (ref 4.1–11.1)

## 2017-07-26 PROCEDURE — 74011000258 HC RX REV CODE- 258: Performed by: SURGERY

## 2017-07-26 PROCEDURE — C9113 INJ PANTOPRAZOLE SODIUM, VIA: HCPCS | Performed by: SURGERY

## 2017-07-26 PROCEDURE — 74000 XR ABD PORT  1 V: CPT

## 2017-07-26 PROCEDURE — 74011250637 HC RX REV CODE- 250/637: Performed by: SURGERY

## 2017-07-26 PROCEDURE — 65270000029 HC RM PRIVATE

## 2017-07-26 PROCEDURE — 74011250636 HC RX REV CODE- 250/636: Performed by: SURGERY

## 2017-07-26 PROCEDURE — 84478 ASSAY OF TRIGLYCERIDES: CPT | Performed by: SURGERY

## 2017-07-26 PROCEDURE — 36415 COLL VENOUS BLD VENIPUNCTURE: CPT | Performed by: SURGERY

## 2017-07-26 PROCEDURE — 74011250636 HC RX REV CODE- 250/636: Performed by: PHYSICIAN ASSISTANT

## 2017-07-26 PROCEDURE — 74011636637 HC RX REV CODE- 636/637: Performed by: SURGERY

## 2017-07-26 PROCEDURE — 84100 ASSAY OF PHOSPHORUS: CPT | Performed by: SURGERY

## 2017-07-26 PROCEDURE — 83735 ASSAY OF MAGNESIUM: CPT | Performed by: SURGERY

## 2017-07-26 PROCEDURE — 74011000250 HC RX REV CODE- 250: Performed by: SURGERY

## 2017-07-26 PROCEDURE — 85025 COMPLETE CBC W/AUTO DIFF WBC: CPT | Performed by: SURGERY

## 2017-07-26 PROCEDURE — 80048 BASIC METABOLIC PNL TOTAL CA: CPT | Performed by: SURGERY

## 2017-07-26 PROCEDURE — 70450 CT HEAD/BRAIN W/O DYE: CPT

## 2017-07-26 PROCEDURE — 77010033678 HC OXYGEN DAILY

## 2017-07-26 PROCEDURE — 82962 GLUCOSE BLOOD TEST: CPT

## 2017-07-26 RX ORDER — LORAZEPAM 2 MG/ML
1 INJECTION INTRAMUSCULAR
Status: DISCONTINUED | OUTPATIENT
Start: 2017-07-26 | End: 2017-08-01

## 2017-07-26 RX ADMIN — PIPERACILLIN SODIUM,TAZOBACTAM SODIUM 3.38 G: 3; .375 INJECTION, POWDER, FOR SOLUTION INTRAVENOUS at 08:25

## 2017-07-26 RX ADMIN — HEPARIN SODIUM 5000 UNITS: 5000 INJECTION, SOLUTION INTRAVENOUS; SUBCUTANEOUS at 05:39

## 2017-07-26 RX ADMIN — Medication 10 ML: at 05:39

## 2017-07-26 RX ADMIN — DEXTROSE MONOHYDRATE AND SODIUM CHLORIDE 50 ML/HR: 5; .45 INJECTION, SOLUTION INTRAVENOUS at 19:42

## 2017-07-26 RX ADMIN — HYDROMORPHONE HYDROCHLORIDE 1 MG: 1 INJECTION, SOLUTION INTRAMUSCULAR; INTRAVENOUS; SUBCUTANEOUS at 22:14

## 2017-07-26 RX ADMIN — INSULIN LISPRO 2 UNITS: 100 INJECTION, SOLUTION INTRAVENOUS; SUBCUTANEOUS at 06:35

## 2017-07-26 RX ADMIN — ONDANSETRON 4 MG: 2 INJECTION INTRAMUSCULAR; INTRAVENOUS at 22:18

## 2017-07-26 RX ADMIN — HYDROMORPHONE HYDROCHLORIDE 1 MG: 1 INJECTION, SOLUTION INTRAMUSCULAR; INTRAVENOUS; SUBCUTANEOUS at 08:25

## 2017-07-26 RX ADMIN — SODIUM CHLORIDE 40 MG: 9 INJECTION INTRAMUSCULAR; INTRAVENOUS; SUBCUTANEOUS at 13:40

## 2017-07-26 RX ADMIN — HYDROMORPHONE HYDROCHLORIDE 1 MG: 1 INJECTION, SOLUTION INTRAMUSCULAR; INTRAVENOUS; SUBCUTANEOUS at 13:40

## 2017-07-26 RX ADMIN — Medication 10 ML: at 22:02

## 2017-07-26 RX ADMIN — DEXTROSE MONOHYDRATE AND SODIUM CHLORIDE 150 ML/HR: 5; .45 INJECTION, SOLUTION INTRAVENOUS at 00:10

## 2017-07-26 RX ADMIN — Medication 10 ML: at 13:42

## 2017-07-26 RX ADMIN — ONDANSETRON 4 MG: 2 INJECTION INTRAMUSCULAR; INTRAVENOUS at 00:06

## 2017-07-26 RX ADMIN — HYDROMORPHONE HYDROCHLORIDE 1 MG: 1 INJECTION, SOLUTION INTRAMUSCULAR; INTRAVENOUS; SUBCUTANEOUS at 00:05

## 2017-07-26 RX ADMIN — PIPERACILLIN SODIUM,TAZOBACTAM SODIUM 3.38 G: 3; .375 INJECTION, POWDER, FOR SOLUTION INTRAVENOUS at 18:20

## 2017-07-26 RX ADMIN — INSULIN LISPRO 2 UNITS: 100 INJECTION, SOLUTION INTRAVENOUS; SUBCUTANEOUS at 00:05

## 2017-07-26 RX ADMIN — RETINOL, ERGOCALCIFEROL, .ALPHA.-TOCOPHEROL ACETATE, DL-, PHYTONADIONE, ASCORBIC ACID, NIACINAMIDE, RIBOFLAVIN 5-PHOSPHATE SODIUM, THIAMINE HYDROCHLORIDE, PYRIDOXINE HYDROCHLORIDE, DEXPANTHENOL, BIOTIN, FOLIC ACID, AND CYANOCOBALAMIN: KIT at 19:00

## 2017-07-26 RX ADMIN — ONDANSETRON 4 MG: 2 INJECTION INTRAMUSCULAR; INTRAVENOUS at 04:24

## 2017-07-26 RX ADMIN — DEXTROSE MONOHYDRATE AND SODIUM CHLORIDE 150 ML/HR: 5; .45 INJECTION, SOLUTION INTRAVENOUS at 06:43

## 2017-07-26 RX ADMIN — HEPARIN SODIUM 5000 UNITS: 5000 INJECTION, SOLUTION INTRAVENOUS; SUBCUTANEOUS at 13:40

## 2017-07-26 RX ADMIN — HYDROMORPHONE HYDROCHLORIDE 1 MG: 1 INJECTION, SOLUTION INTRAMUSCULAR; INTRAVENOUS; SUBCUTANEOUS at 04:24

## 2017-07-26 NOTE — PROGRESS NOTES
[address] [place] [address]. [address] [place] [address] Drive  [phone]              GI PROGRESS NOTE    NAME: Tyler Barnes   :  1945   MRN:  [id number]     CC:follow up Flex Sig scheduling    Subjective:   Patient states \" I am miserable\". Still with NGT draining large amounts brown drainage. Reports small amount of liquid from rectum. Objective:     VITALS:   Last 24hrs VS reviewed since prior progress note. Most recent are:  Visit Vitals    /80 (BP 1 Location: Left arm, BP Patient Position: At rest)    Pulse (!) 57    Temp 97.8 °F (36.6 °C)    Resp 18    Ht 5' 10\" (1.778 m)    Wt 86.3 kg (190 lb 4.1 oz)    SpO2 94%    BMI 27.3 kg/m2       Intake & Output  701 - 1900  In: -   Out: 1250 [Urine:450]  1901 -  0700  In: 6827 [I.V.:6827]  Out: 3701 [Urine:1150]    ROS: Neg for fever, chest pain, SOB. PHYSICAL EXAM:  General: Cooperative, no acute distress    Neurologic:  Alert and oriented X 3. HEENT: PERRLA, EOMI. Lungs:  CTA Bilaterally. No Wheezing  Heart:  s1 s2, Regular  rhythm,  No murmur   Abdomen: distended, Non tender. Bowel sounds hypoactive. No guarding or rebound. Extremities: No edema  Psych:   Good insight. Not anxious nor agitated. Lab Data Reviewed:   Recent Labs      17   0416  17   0523  17   0153   WBC  9.2  7.5  7.4   HGB  13.4  13.4  14.4   MCV  90.2  91.5  89.8   PLT  258  251  276   NA  141  139  138   K  3.6  3.8  4.0   CREA  0.89  0.96  1.12   BUN  10  10  22*         ________________________________________________________________________       Assessment/Plan:   Acute on chronic diverticulitis with transition point in the rectosigmoid area. Continues decompression with NGT with high output.  Plan tap water enema to rinse area early am with flex sig with MAC with Dr Inez Da Silva to evaluate that area and exclude any underlying malignancy pre operatively as he has not had prior colonoscopy evaluation. Case discussed with Dr Sierra Mims who is planning resection with ostomy.        Signed By: Anuel Mckeon NP     7/26/2017  3:37 PM

## 2017-07-26 NOTE — PROGRESS NOTES
Nutrition Assessment:    RECOMMENDATIONS/INTERVENTION(S):        1. Continue TPN a below:    D20/5%AA @ rate of 75 ml/hr   Lipids 20% (500 ml) @ 42 ml/hr x 12 hr 3x/wk  (TPN @ goal + Lipids provides 2016 kcals, 90 g protein, 1800 ml fluid)    Adjust IVF accordingly    Monitor plan of care, BG, TG (check weekly while on TPN) , lytes  ASSESSMENT:   : F/u, pt remains on TPN. Noted Triglyceride level 119 mg.dl. Chart reviewed, for sigmoidoscopy tomorrow, high NG output and liquid stools. Noted B, 117, 141, 152, 123 mg/dl. RD to follow, monitor pt progress. :  Pt seen for LOS & CLD/NPO x 5.  74 yom admitted for colonic obstruction. Pmhx includes diverticulitis. S/p CT showing sigmoid colitis w/ colonic obstruction and concomitant SBO. Visited pt this afternoon. Daughter in room, pt in restroom. Reports eating well and maintaining wt PTA. Avoids certain foods while w/ diverticulitis flare ups - dx 10 yrs ago. Normal BMI per age. Labs/meds reviewed. Lytes WDL. BUN elevated but trending down, Cr WDL. . No PI or edema noted. D5 @ 150 ml/hr infusing. TPN started by Surgery today - D15/5%AA @ 42 ml/hr; no Lipids ordered. TPN recs above. SUBJECTIVE/OBJECTIVE:     Diet Order:  NPO  TPN: D20 5% AA @ 75 ml/hr  Lipids: 20% @ 42 ml/hr x 12 hours    Pertinent Medications: [x] Reviewed - D5 0.45% NaCl @ 50 ml/hr, SSI, Zofran, Protonix    Chemistries:  Lab Results   Component Value Date/Time    Sodium 141 2017 04:16 AM    Potassium 3.6 2017 04:16 AM    Chloride 104 2017 04:16 AM    CO2 30 2017 04:16 AM    Anion gap 7 2017 04:16 AM    Glucose 139 2017 04:16 AM    BUN 10 2017 04:16 AM    Creatinine 0.89 2017 04:16 AM    BUN/Creatinine ratio 11 2017 04:16 AM    GFR est AA >60 2017 04:16 AM    GFR est non-AA >60 2017 04:16 AM    Calcium 8.2 2017 04:16 AM    AST (SGOT) 20 2017 10:14 AM    Alk.  phosphatase 71 07/18/2017 10:14 AM    Protein, total 7.8 07/18/2017 10:14 AM    Albumin 3.3 07/18/2017 10:14 AM    Globulin 4.5 07/18/2017 10:14 AM    A-G Ratio 0.7 07/18/2017 10:14 AM    ALT (SGPT) 26 07/18/2017 10:14 AM      Anthropometrics: Height: 5' 10\" (177.8 cm) Weight: 86.3 kg (190 lb 4.1 oz)    IBW (%IBW): 83 kg (182 lb 15.7 oz) (103.98 %) UBW (%UBW):   (  %)    BMI: Body mass index is 27.3 kg/(m^2). This BMI is indicative of:   [] Underweight    [x] Normal - per advanced age   [] Overweight    []  Obesity    []  Extreme Obesity (BMI>40)  Estimated Nutrition Needs (Based on): 2019 Kcals/day (RMR (1609) x 1.3 AF) , 69 g (-86 (0.8-1.0 g/kg x actual body wt)) Protein  Carbohydrate: At Least 130 g/day  Fluids: 2000 mL/day    Last BM: 7/26, distended abd   []Active     []Hyperactive  [x]Hypoactive       [] Absent   BS  Skin:    [x] Intact   [] Incision  [] Breakdown   [] DTI   [] Tears/Excoriation/Abrasion  []Edema [] Other:    Wt Readings from Last 30 Encounters:   07/24/17 86.3 kg (190 lb 4.1 oz)   07/19/17 87.1 kg (192 lb)   07/18/17 87.1 kg (192 lb)   07/17/17 87.1 kg (192 lb)   07/03/17 87.3 kg (192 lb 7.4 oz)   03/07/17 89.4 kg (197 lb)   02/23/16 87.5 kg (193 lb)   07/05/15 88.9 kg (196 lb)   01/12/15 81.6 kg (180 lb)   08/29/13 81.6 kg (180 lb)      NUTRITION DIAGNOSES:   Problem:  Inadequate oral food/beverage intake     Etiology: related to alteration in GI tract structure and/or function     Signs/Symptoms: as evidenced by diverticulitis, NPO x 5 days      NUTRITION INTERVENTIONS:  Meals/Snacks: General/healthful diet- once PO initiated Enteral/Parenteral Nutrition: Initiate parenteral nutrition               GOAL:   Patient will continue to receive TPN to meet nutrient needs while NPO    Cultural, Adventist, or Ethnic Dietary Needs: None     LEARNING NEEDS (Diet, Food/Nutrient-Drug Interaction):    [x] None Identified   [] Identified and Education Provided/Documented   [] Identified and Pt declined/was not appropriate      [x] Interdisciplinary Care Plan Reviewed/Documented    [x] Discharge Needs:    TBD   [] No Nutrition Related Discharge Needs    NUTRITION RISK:   Pt Is At Nutrition Risk  [x]     No Nutrition Risk Identified  []       PT SEEN FOR:    []  MD Consult: []Calorie Count      []Diabetic Diet Education        []Diet Education     []Electrolyte Management     []General Nutrition Management and Supplements     []Management of Tube Feeding     []TPN Recommendations    []  RN Referral:  []MST score >=2     []Enteral/Parenteral Nutrition PTA     []Pregnant: Gestational DM or Multigestation                 [] Pressure Ulcer    []  Low BMI      []  Length of Stay       [] Dysphagia Diet         [] Ventilator  [x]  Follow-up     Previous Recommendations:   [x] Implemented          [] Not Implemented          [] Not Applicable    Previous Goal:   [x] Met              [] Progressing Towards Goal              [] Not Progressing Towards Goal   [] Not Applicable            Daja Candelaria RD

## 2017-07-26 NOTE — PROGRESS NOTES
Bedside and Verbal shift change report given to [address] (oncoming nurse) by Janusz Zapien (offgoing nurse). Report included the following information SBAR, Kardex, Procedure Summary, Intake/Output, MAR, Accordion and Recent Results.

## 2017-07-26 NOTE — PROGRESS NOTES
Bedside shift change report given to [facility] (oncoming nurse) by Mabel Latif (offgoing nurse). Report included the following information SBAR, Kardex, Procedure Summary, MAR and Recent Results.

## 2017-07-26 NOTE — PROGRESS NOTES
Surgery    Per RN, pt has moments of confusion where he does not seem to know the day or time. He asks many questions about the treatment plan.   Still with high NG output  Still having liquid stools  Dr. Ezequiel Victor saw last pm, scheduled for sigmoidoscopy tomorrow    Abdomen soft, nttp  AF, VSS    Plan: will await sigmoid results  Have spoken with both pt and his wife about my concerns that he needs an ostomy  Continue TPN, NG suction

## 2017-07-27 ENCOUNTER — ANESTHESIA (OUTPATIENT)
Dept: ENDOSCOPY | Age: 72
DRG: 329 | End: 2017-07-27
Payer: MEDICARE

## 2017-07-27 ENCOUNTER — ANESTHESIA EVENT (OUTPATIENT)
Dept: ENDOSCOPY | Age: 72
DRG: 329 | End: 2017-07-27
Payer: MEDICARE

## 2017-07-27 LAB
ANION GAP BLD CALC-SCNC: 5 MMOL/L (ref 5–15)
BASOPHILS # BLD AUTO: 0 K/UL (ref 0–0.1)
BASOPHILS # BLD: 0 % (ref 0–1)
BUN SERPL-MCNC: 15 MG/DL (ref 6–20)
BUN/CREAT SERPL: 16 (ref 12–20)
CALCIUM SERPL-MCNC: 8 MG/DL (ref 8.5–10.1)
CHLORIDE SERPL-SCNC: 107 MMOL/L (ref 97–108)
CO2 SERPL-SCNC: 29 MMOL/L (ref 21–32)
CREAT SERPL-MCNC: 0.93 MG/DL (ref 0.7–1.3)
EOSINOPHIL # BLD: 0.1 K/UL (ref 0–0.4)
EOSINOPHIL NFR BLD: 2 % (ref 0–7)
ERYTHROCYTE [DISTWIDTH] IN BLOOD BY AUTOMATED COUNT: 13.4 % (ref 11.5–14.5)
GLUCOSE BLD STRIP.AUTO-MCNC: 104 MG/DL (ref 65–100)
GLUCOSE BLD STRIP.AUTO-MCNC: 107 MG/DL (ref 65–100)
GLUCOSE BLD STRIP.AUTO-MCNC: 112 MG/DL (ref 65–100)
GLUCOSE BLD STRIP.AUTO-MCNC: 115 MG/DL (ref 65–100)
GLUCOSE BLD STRIP.AUTO-MCNC: 123 MG/DL (ref 65–100)
GLUCOSE BLD STRIP.AUTO-MCNC: 152 MG/DL (ref 65–100)
GLUCOSE SERPL-MCNC: 119 MG/DL (ref 65–100)
HCT VFR BLD AUTO: 40 % (ref 36.6–50.3)
HGB BLD-MCNC: 13.1 G/DL (ref 12.1–17)
LYMPHOCYTES # BLD AUTO: 15 % (ref 12–49)
LYMPHOCYTES # BLD: 1.4 K/UL (ref 0.8–3.5)
MAGNESIUM SERPL-MCNC: 2.1 MG/DL (ref 1.6–2.4)
MCH RBC QN AUTO: 29.8 PG (ref 26–34)
MCHC RBC AUTO-ENTMCNC: 32.8 G/DL (ref 30–36.5)
MCV RBC AUTO: 91.1 FL (ref 80–99)
MONOCYTES # BLD: 1.4 K/UL (ref 0–1)
MONOCYTES NFR BLD AUTO: 15 % (ref 5–13)
NEUTS SEG # BLD: 6.4 K/UL (ref 1.8–8)
NEUTS SEG NFR BLD AUTO: 68 % (ref 32–75)
PHOSPHATE SERPL-MCNC: 3.2 MG/DL (ref 2.6–4.7)
PLATELET # BLD AUTO: 226 K/UL (ref 150–400)
POTASSIUM SERPL-SCNC: 3.9 MMOL/L (ref 3.5–5.1)
RBC # BLD AUTO: 4.39 M/UL (ref 4.1–5.7)
SERVICE CMNT-IMP: ABNORMAL
SODIUM SERPL-SCNC: 141 MMOL/L (ref 136–145)
WBC # BLD AUTO: 9.2 K/UL (ref 4.1–11.1)

## 2017-07-27 PROCEDURE — 74011000250 HC RX REV CODE- 250

## 2017-07-27 PROCEDURE — 74011250636 HC RX REV CODE- 250/636: Performed by: INTERNAL MEDICINE

## 2017-07-27 PROCEDURE — 36415 COLL VENOUS BLD VENIPUNCTURE: CPT | Performed by: SURGERY

## 2017-07-27 PROCEDURE — 74011000250 HC RX REV CODE- 250: Performed by: SURGERY

## 2017-07-27 PROCEDURE — 80048 BASIC METABOLIC PNL TOTAL CA: CPT | Performed by: SURGERY

## 2017-07-27 PROCEDURE — 76060000031 HC ANESTHESIA FIRST 0.5 HR: Performed by: INTERNAL MEDICINE

## 2017-07-27 PROCEDURE — 74011250636 HC RX REV CODE- 250/636: Performed by: SURGERY

## 2017-07-27 PROCEDURE — 77010033678 HC OXYGEN DAILY

## 2017-07-27 PROCEDURE — 83735 ASSAY OF MAGNESIUM: CPT | Performed by: SURGERY

## 2017-07-27 PROCEDURE — 84100 ASSAY OF PHOSPHORUS: CPT | Performed by: SURGERY

## 2017-07-27 PROCEDURE — C9113 INJ PANTOPRAZOLE SODIUM, VIA: HCPCS | Performed by: SURGERY

## 2017-07-27 PROCEDURE — 74011250636 HC RX REV CODE- 250/636: Performed by: PHYSICIAN ASSISTANT

## 2017-07-27 PROCEDURE — 65270000029 HC RM PRIVATE

## 2017-07-27 PROCEDURE — 74011000258 HC RX REV CODE- 258: Performed by: SURGERY

## 2017-07-27 PROCEDURE — 74011636637 HC RX REV CODE- 636/637: Performed by: SURGERY

## 2017-07-27 PROCEDURE — 82962 GLUCOSE BLOOD TEST: CPT

## 2017-07-27 PROCEDURE — 74011250636 HC RX REV CODE- 250/636

## 2017-07-27 PROCEDURE — 76040000019: Performed by: INTERNAL MEDICINE

## 2017-07-27 PROCEDURE — 74011250637 HC RX REV CODE- 250/637: Performed by: SURGERY

## 2017-07-27 PROCEDURE — 85025 COMPLETE CBC W/AUTO DIFF WBC: CPT | Performed by: SURGERY

## 2017-07-27 PROCEDURE — 77030027138 HC INCENT SPIROMETER -A

## 2017-07-27 RX ORDER — LIDOCAINE HYDROCHLORIDE 20 MG/ML
INJECTION, SOLUTION EPIDURAL; INFILTRATION; INTRACAUDAL; PERINEURAL AS NEEDED
Status: DISCONTINUED | OUTPATIENT
Start: 2017-07-27 | End: 2017-07-27 | Stop reason: HOSPADM

## 2017-07-27 RX ORDER — SODIUM CHLORIDE 9 MG/ML
INJECTION, SOLUTION INTRAVENOUS
Status: DISCONTINUED | OUTPATIENT
Start: 2017-07-27 | End: 2017-07-27 | Stop reason: HOSPADM

## 2017-07-27 RX ORDER — ATROPINE SULFATE 0.1 MG/ML
0.4 INJECTION INTRAVENOUS
Status: DISCONTINUED | OUTPATIENT
Start: 2017-07-27 | End: 2017-07-27 | Stop reason: HOSPADM

## 2017-07-27 RX ORDER — SODIUM CHLORIDE 9 MG/ML
50 INJECTION, SOLUTION INTRAVENOUS CONTINUOUS
Status: DISPENSED | OUTPATIENT
Start: 2017-07-27 | End: 2017-07-27

## 2017-07-27 RX ORDER — FLUMAZENIL 0.1 MG/ML
0.2 INJECTION INTRAVENOUS
Status: DISCONTINUED | OUTPATIENT
Start: 2017-07-27 | End: 2017-07-27 | Stop reason: HOSPADM

## 2017-07-27 RX ORDER — DEXTROMETHORPHAN/PSEUDOEPHED 2.5-7.5/.8
1.2 DROPS ORAL
Status: DISCONTINUED | OUTPATIENT
Start: 2017-07-27 | End: 2017-07-27 | Stop reason: HOSPADM

## 2017-07-27 RX ORDER — EPINEPHRINE 0.1 MG/ML
1 INJECTION INTRACARDIAC; INTRAVENOUS
Status: DISCONTINUED | OUTPATIENT
Start: 2017-07-27 | End: 2017-07-27 | Stop reason: HOSPADM

## 2017-07-27 RX ORDER — NALOXONE HYDROCHLORIDE 0.4 MG/ML
0.4 INJECTION, SOLUTION INTRAMUSCULAR; INTRAVENOUS; SUBCUTANEOUS
Status: DISCONTINUED | OUTPATIENT
Start: 2017-07-27 | End: 2017-07-27 | Stop reason: HOSPADM

## 2017-07-27 RX ORDER — MIDAZOLAM HYDROCHLORIDE 1 MG/ML
.25-5 INJECTION, SOLUTION INTRAMUSCULAR; INTRAVENOUS
Status: DISCONTINUED | OUTPATIENT
Start: 2017-07-27 | End: 2017-07-27 | Stop reason: HOSPADM

## 2017-07-27 RX ORDER — PROPOFOL 10 MG/ML
INJECTION, EMULSION INTRAVENOUS
Status: DISCONTINUED | OUTPATIENT
Start: 2017-07-27 | End: 2017-07-27 | Stop reason: HOSPADM

## 2017-07-27 RX ORDER — PROPOFOL 10 MG/ML
INJECTION, EMULSION INTRAVENOUS AS NEEDED
Status: DISCONTINUED | OUTPATIENT
Start: 2017-07-27 | End: 2017-07-27 | Stop reason: HOSPADM

## 2017-07-27 RX ADMIN — LIDOCAINE HYDROCHLORIDE 40 MG: 20 INJECTION, SOLUTION EPIDURAL; INFILTRATION; INTRACAUDAL; PERINEURAL at 09:09

## 2017-07-27 RX ADMIN — LORAZEPAM 1 MG: 2 INJECTION INTRAMUSCULAR; INTRAVENOUS at 03:46

## 2017-07-27 RX ADMIN — SODIUM CHLORIDE 40 MG: 9 INJECTION INTRAMUSCULAR; INTRAVENOUS; SUBCUTANEOUS at 11:44

## 2017-07-27 RX ADMIN — Medication 10 ML: at 11:46

## 2017-07-27 RX ADMIN — POLYETHYLENE GLYCOL-3350 AND ELECTROLYTES 1000 ML: 236; 6.74; 5.86; 2.97; 22.74 POWDER, FOR SOLUTION ORAL at 15:26

## 2017-07-27 RX ADMIN — SODIUM CHLORIDE 50 ML/HR: 900 INJECTION, SOLUTION INTRAVENOUS at 08:44

## 2017-07-27 RX ADMIN — Medication 10 ML: at 22:49

## 2017-07-27 RX ADMIN — INSULIN LISPRO 2 UNITS: 100 INJECTION, SOLUTION INTRAVENOUS; SUBCUTANEOUS at 12:28

## 2017-07-27 RX ADMIN — HEPARIN SODIUM 5000 UNITS: 5000 INJECTION, SOLUTION INTRAVENOUS; SUBCUTANEOUS at 15:01

## 2017-07-27 RX ADMIN — PROPOFOL 30 MG: 10 INJECTION, EMULSION INTRAVENOUS at 09:09

## 2017-07-27 RX ADMIN — HYDROMORPHONE HYDROCHLORIDE 1 MG: 1 INJECTION, SOLUTION INTRAMUSCULAR; INTRAVENOUS; SUBCUTANEOUS at 12:29

## 2017-07-27 RX ADMIN — RETINOL, ERGOCALCIFEROL, .ALPHA.-TOCOPHEROL ACETATE, DL-, PHYTONADIONE, ASCORBIC ACID, NIACINAMIDE, RIBOFLAVIN 5-PHOSPHATE SODIUM, THIAMINE HYDROCHLORIDE, PYRIDOXINE HYDROCHLORIDE, DEXPANTHENOL, BIOTIN, FOLIC ACID, AND CYANOCOBALAMIN: KIT at 17:50

## 2017-07-27 RX ADMIN — PROPOFOL 140 MCG/KG/MIN: 10 INJECTION, EMULSION INTRAVENOUS at 09:09

## 2017-07-27 RX ADMIN — PIPERACILLIN SODIUM,TAZOBACTAM SODIUM 3.38 G: 3; .375 INJECTION, POWDER, FOR SOLUTION INTRAVENOUS at 03:46

## 2017-07-27 RX ADMIN — PIPERACILLIN SODIUM,TAZOBACTAM SODIUM 3.38 G: 3; .375 INJECTION, POWDER, FOR SOLUTION INTRAVENOUS at 11:45

## 2017-07-27 RX ADMIN — Medication 10 ML: at 20:18

## 2017-07-27 RX ADMIN — SODIUM CHLORIDE: 9 INJECTION, SOLUTION INTRAVENOUS at 08:20

## 2017-07-27 RX ADMIN — HEPARIN SODIUM 5000 UNITS: 5000 INJECTION, SOLUTION INTRAVENOUS; SUBCUTANEOUS at 22:45

## 2017-07-27 RX ADMIN — I.V. FAT EMULSION 500 ML: 20 EMULSION INTRAVENOUS at 18:28

## 2017-07-27 RX ADMIN — PIPERACILLIN SODIUM,TAZOBACTAM SODIUM 3.38 G: 3; .375 INJECTION, POWDER, FOR SOLUTION INTRAVENOUS at 20:18

## 2017-07-27 RX ADMIN — Medication 10 ML: at 15:02

## 2017-07-27 NOTE — PROCEDURES
Sukhdev Roldan M.D.  [phone]            2017          Sigmoidoscopy Operative Report  Arnaldo Laguerre  :  1945  [facility] Medical Record Number:  [id number]      Indications:    Colonic obstruction     :  Velvet Hoff MD    Referring Provider: Vannessa Garza MD    Sedation:  MAC anesthesia    Pre-Procedural Exam:      Airway: clear,  No airway problems anticipated  Heart: RRR, without gallops or rubs  Lungs: clear bilaterally without wheezes, crackles, or rhonchi  Abdomen: soft, nontender, nondistended, bowel sounds present  Mental Status: awake, alert and oriented to person, place and time     Procedure Details:  After informed consent was obtained with all risks and benefits of procedure explained and preoperative exam completed, the patient was taken to the endoscopy suite and placed in the left lateral decubitus position. Upon sequential sedation as per above, a digital rectal exam was performed. The Olympus videocolonoscope  was inserted in the rectum and carefully advanced to the descending colon. The quality of preparation was inadequate. The colonoscope was slowly withdrawn with careful inspection and evaluation between folds. Retroflexion in the rectum was performed. Findings: The upper endoscope was advanced from the rectum to the recto-sigmoid junction were luminal narrowing and mucosa congestion was noted. The scope was able to pass through an area of mild to moderate inflammation, around 20 cm from the anus. No malignancy was seen. It appeared consistent with diverticulitis. The mucosa above the narrowed area was partially visualized due to a significant amount of stools. Diverticulosis was noted. The scope reached distal descending colon. Flushing was water was performed and scope was retrieved. No severe tightness was noted. Interventions:  none    Specimen Removed:  none    Complications: None. EBL:  None.     Impression: Diverticulitis with rather easy passage of the upper endoscope through a narrowed area at the recto-sigmoid junction about 20 cm from the anal margin. Recommendations:  - Discussed with Dr. Sangeetha Chang, attempt to prep for sigmoid resection.     Juan Butts MD  7/27/2017  9:40 AM

## 2017-07-27 NOTE — PROGRESS NOTES
There was a question about NG placement when patient was moved to a different room because output color/consistency had changed. It was noted that the tube placement was at 47 which conflicted with earlier verification of 61. Dr. Asad Hollingsworth was contacted and she ordered a KUB to verify tube placement. Current placement before KUB is 56.

## 2017-07-27 NOTE — PERIOP NOTES
Patient received Propofol and Lidocaine, per NOA Amezcua. Patient transported via stretcher to Endoscopy Recovery area.

## 2017-07-27 NOTE — PROGRESS NOTES
Bedside and Verbal shift change report given to April (oncoming nurse) by Rashaun Oseguera (offgoing nurse). Report included the following information SBAR, Kardex, Intake/Output, MAR and Recent Results.

## 2017-07-27 NOTE — PROGRESS NOTES
Spoke with Dr. Josué Elise office. Received an order for Golytely 1 liter daily starting today. Diet order sips of clears. More in depth orders to follow. Sigmoidectomy scheduled for 7:30 am Monday 7/31/17.

## 2017-07-27 NOTE — PROGRESS NOTES
Surgery    Pt agitated overnight so given ativan. Then he pulled his NG out. Sleepy this am.  Not able to give tap water enema due to his sleepiness.   AF, VSS  Still with high NG output  Abdomen soft  Plan: continue TPN  Dr. Ezequiel Victor to replace NG and perform sigmoidoscope this am  Plan for definitive surgery either tomorrow or Monday

## 2017-07-27 NOTE — PROGRESS NOTES
7/27/2017 3:00 PM EMR reviewed. No discharge needs identified at this time. CM will follow.  MINDA Posada

## 2017-07-27 NOTE — PERIOP NOTES
TRANSFER - OUT REPORT:    Verbal report given to April RN (name) on Teodora Koyanagi  being transferred to formerly [facility] (unit) for routine progression of care       Report consisted of patients Situation, Background, Assessment and   Recommendations(SBAR). Information from the following report(s) SBAR, Procedure Summary and Recent Results was reviewed with the receiving nurse. Lines:   PICC Triple Lumen 29/87/59 Right;Basilic (Active)   Central Line Being Utilized Yes 7/27/2017  8:44 AM   Criteria for Appropriate Use Total parenteral nutrition 7/27/2017  8:44 AM   Site Assessment Clean, dry, & intact 7/27/2017  8:44 AM   Phlebitis Assessment 0 7/27/2017  8:44 AM   Infiltration Assessment 0 7/27/2017  8:44 AM   Arm Circumference (cm) 34 cm 7/24/2017 11:53 AM   Date of Last Dressing Change 07/24/17 7/27/2017  8:44 AM   Dressing Status Clean, dry, & intact 7/26/2017  8:45 PM   External Catheter Length (cm) 0 centimeters 7/24/2017 11:53 AM   Dressing Type Transparent 7/27/2017  8:44 AM   Action Taken Open ports on tubing capped 7/27/2017  8:44 AM   Hub Color/Line Status Red;Capped;Flushed;Patent; Infusing 7/27/2017  8:44 AM   Positive Blood Return (Site #1) Yes 7/27/2017  8:44 AM   Hub Color/Line Status Purple 7/26/2017  8:45 PM   Positive Blood Return (Site #2) Yes 7/26/2017  8:45 PM   Hub Color/Line Status Emogene Richer 7/26/2017  8:45 PM   Positive Blood Return (Site #3) Yes 7/26/2017  8:45 PM   Alcohol Cap Used Yes 7/26/2017  8:45 PM        Opportunity for questions and clarification was provided.       Patient chart  PICC Triple Lumen right arm capped x 3  Family x 2

## 2017-07-27 NOTE — PROGRESS NOTES
Bedside shift change report given to Maritza Heayl (oncoming nurse) by Filiberto Pisano (offgoing nurse). Report included the following information SBAR, Kardex, Procedure Summary, MAR and Recent Results.

## 2017-07-27 NOTE — PERIOP NOTES
Chelle Arenas  1945  [id number]    Situation:    Scheduled Procedure: Procedure(s):  SIGMOIDOSCOPY FLEXIBLE  Verbal report received from: Nicol Bagley RN  Preoperative diagnosis: diverticulitis    Background:    Procedure: Procedure(s):  V[address] Irena Acuna  Physician performing procedure; Dr. Hiram Rubin RN    NPO Status/Last PO Intake: midnight    Pregnancy Test:Not applicable If yes, result: none    Is the patient taking Blood Thinners: YES If yes, list: Heparin and last taken 7/26/2017  Is the patient diabetic:no           Does the patient have a Pacemaker/Defibrillator in place?: no   Does the patient need antibiotics before/during/after procedure: no   If the patient is having a colon, How much prep was drank? N/A. Patient to receive tap water enema this morning   What were the Colon prep results? n/a   Does the patient have SCD in place:no   Is patient on CONTACT precautions:no        Assessment:  Are the vital signs stable prior to patient coming to ENDO?  yes  Is the patient alert/oriented and able to sign consent for the procedures:yes  How does the patient's abdomen feel prior to coming to ENDO? To be assessed in Endoscopy  Does the patient have a patient IV in place?  Yes (PICC)     Recommendation:  Family or Friend present no     Permission to share finding with Family or Friend n/a

## 2017-07-27 NOTE — PROGRESS NOTES
0600   Patient pulled out NG tube ; sleeping comfortably    0630  Notified Endo that patient was given PRN ativan for agitation due to the NG tube and could not sleep; patient pulled out his NG tube and was disoriented this AM; patient refused the tap water enema, walked to the bathroom and attempted to have a bowel movement but only produced gas    0700  Family at the bedside    0800 Endoscopy notified that patient did not have his enema;   still would like to proceed

## 2017-07-27 NOTE — ANESTHESIA PREPROCEDURE EVALUATION
Anesthetic History               Review of Systems / Medical History  Patient summary reviewed    Pulmonary                   Neuro/Psych              Cardiovascular  Within defined limits                Exercise tolerance: >4 METS     GI/Hepatic/Renal               Comments: Constipation/obstruction : refused enemas  Diverticulosis Endo/Other  Within defined limits           Other Findings   Comments: Recently given lorazepam           Physical Exam    Airway            Comments: Uncooperative Cardiovascular      Rate: normal         Dental  No notable dental hx       Pulmonary  Breath sounds clear to auscultation               Abdominal         Other Findings            Anesthetic Plan    ASA: 2  Anesthesia type: MAC            Anesthetic plan and risks discussed with: Spouse      Informed consent obtained.

## 2017-07-27 NOTE — ANESTHESIA POSTPROCEDURE EVALUATION
Post-Anesthesia Evaluation and Assessment    Patient: Blaine Hardin MRN: [id number]  SSN: [id number]    YOB: 1945  Age: 74 y.o. Sex: male       Cardiovascular Function/Vital Signs  Visit Vitals    BP 95/51    Pulse (!) 56    Temp 36.9 °C (98.4 °F)    Resp 17    Ht 5' 10\" (1.778 m)    Wt 86.2 kg (190 lb)    SpO2 93%    BMI 27.26 kg/m2       Patient is status post MAC anesthesia for Procedure(s):  SIGMOIDOSCOPY FLEXIBLE. Nausea/Vomiting: None    Postoperative hydration reviewed and adequate. Pain:  Pain Scale 1: Numeric (0 - 10) (07/27/17 0840)  Pain Intensity 1: 0 (07/27/17 0840)   Managed    Neurological Status:   Neuro  Neurologic State: Alert;Eyes open spontaneously (07/27/17 0453)  Orientation Level: Appropriate for age (07/27/17 0453)  Cognition: Appropriate decision making; Appropriate for age attention/concentration; Appropriate safety awareness; Follows commands (07/27/17 0453)  Speech: Clear (07/27/17 0453)  LUE Motor Response: Purposeful (07/27/17 0453)  LLE Motor Response: Purposeful (07/27/17 0453)  RUE Motor Response: Purposeful (07/27/17 0453)  RLE Motor Response: Purposeful (07/27/17 0453)   At baseline    Mental Status and Level of Consciousness: Arousable    Pulmonary Status:   O2 Device: Room air (07/27/17 0840)   Adequate oxygenation and airway patent    Complications related to anesthesia: None    Post-anesthesia assessment completed.  No concerns    Signed By: Roel Rocha DO     July 27, 2017

## 2017-07-27 NOTE — ROUTINE PROCESS
Preet Older  1945  [id number]    Situation:  Verbal report received from: Teresa Nam RN  Procedure: Procedure(s):  SIGMOIDOSCOPY FLEXIBLE    Background:    Preoperative diagnosis: diverticulitis  Postoperative diagnosis: Diverticulitis    :  Dr. Elpidio Montenegro  Assistant(s): Endoscopy Technician-1: Daniel Rubin  Endoscopy RN-1: Neri Loomis RN    Specimens: * No specimens in log *  H. Pylori  no    Assessment:  Intra-procedure medications     Anesthesia gave intra-procedure sedation and medications, see anesthesia flow sheet yes    Intravenous fluids: NS@ KVO     Vital signs stable     Abdominal assessment: round and soft     Recommendation:  Discharge patient per MD order. Return to floor  Permission to share finding with family or friend yes.

## 2017-07-28 ENCOUNTER — ANESTHESIA EVENT (OUTPATIENT)
Dept: SURGERY | Age: 72
DRG: 329 | End: 2017-07-28
Payer: MEDICARE

## 2017-07-28 LAB
ANION GAP BLD CALC-SCNC: 7 MMOL/L (ref 5–15)
BUN SERPL-MCNC: 15 MG/DL (ref 6–20)
BUN/CREAT SERPL: 17 (ref 12–20)
CALCIUM SERPL-MCNC: 8 MG/DL (ref 8.5–10.1)
CHLORIDE SERPL-SCNC: 107 MMOL/L (ref 97–108)
CO2 SERPL-SCNC: 27 MMOL/L (ref 21–32)
CREAT SERPL-MCNC: 0.89 MG/DL (ref 0.7–1.3)
GLUCOSE BLD STRIP.AUTO-MCNC: 104 MG/DL (ref 65–100)
GLUCOSE BLD STRIP.AUTO-MCNC: 105 MG/DL (ref 65–100)
GLUCOSE BLD STRIP.AUTO-MCNC: 114 MG/DL (ref 65–100)
GLUCOSE BLD STRIP.AUTO-MCNC: 124 MG/DL (ref 65–100)
GLUCOSE SERPL-MCNC: 139 MG/DL (ref 65–100)
MAGNESIUM SERPL-MCNC: 2 MG/DL (ref 1.6–2.4)
PHOSPHATE SERPL-MCNC: 3.2 MG/DL (ref 2.6–4.7)
POTASSIUM SERPL-SCNC: 3.6 MMOL/L (ref 3.5–5.1)
SERVICE CMNT-IMP: ABNORMAL
SODIUM SERPL-SCNC: 141 MMOL/L (ref 136–145)

## 2017-07-28 PROCEDURE — 65270000029 HC RM PRIVATE

## 2017-07-28 PROCEDURE — 74011000250 HC RX REV CODE- 250: Performed by: SURGERY

## 2017-07-28 PROCEDURE — C9113 INJ PANTOPRAZOLE SODIUM, VIA: HCPCS | Performed by: SURGERY

## 2017-07-28 PROCEDURE — 84100 ASSAY OF PHOSPHORUS: CPT | Performed by: SURGERY

## 2017-07-28 PROCEDURE — 80048 BASIC METABOLIC PNL TOTAL CA: CPT | Performed by: SURGERY

## 2017-07-28 PROCEDURE — 74011250637 HC RX REV CODE- 250/637: Performed by: SURGERY

## 2017-07-28 PROCEDURE — 74011250636 HC RX REV CODE- 250/636: Performed by: SURGERY

## 2017-07-28 PROCEDURE — 74011250636 HC RX REV CODE- 250/636: Performed by: PHYSICIAN ASSISTANT

## 2017-07-28 PROCEDURE — 74011000258 HC RX REV CODE- 258: Performed by: SURGERY

## 2017-07-28 PROCEDURE — 83735 ASSAY OF MAGNESIUM: CPT | Performed by: SURGERY

## 2017-07-28 PROCEDURE — 36415 COLL VENOUS BLD VENIPUNCTURE: CPT | Performed by: SURGERY

## 2017-07-28 PROCEDURE — 82962 GLUCOSE BLOOD TEST: CPT

## 2017-07-28 RX ADMIN — SODIUM CHLORIDE 40 MG: 9 INJECTION INTRAMUSCULAR; INTRAVENOUS; SUBCUTANEOUS at 08:35

## 2017-07-28 RX ADMIN — HYDROMORPHONE HYDROCHLORIDE 1 MG: 1 INJECTION, SOLUTION INTRAMUSCULAR; INTRAVENOUS; SUBCUTANEOUS at 07:40

## 2017-07-28 RX ADMIN — PIPERACILLIN SODIUM,TAZOBACTAM SODIUM 3.38 G: 3; .375 INJECTION, POWDER, FOR SOLUTION INTRAVENOUS at 21:00

## 2017-07-28 RX ADMIN — POLYETHYLENE GLYCOL-3350 AND ELECTROLYTES 1000 ML: 236; 6.74; 5.86; 2.97; 22.74 POWDER, FOR SOLUTION ORAL at 08:35

## 2017-07-28 RX ADMIN — HYDROMORPHONE HYDROCHLORIDE 1 MG: 1 INJECTION, SOLUTION INTRAMUSCULAR; INTRAVENOUS; SUBCUTANEOUS at 21:33

## 2017-07-28 RX ADMIN — HEPARIN SODIUM 5000 UNITS: 5000 INJECTION, SOLUTION INTRAVENOUS; SUBCUTANEOUS at 05:47

## 2017-07-28 RX ADMIN — ONDANSETRON 4 MG: 2 INJECTION INTRAMUSCULAR; INTRAVENOUS at 13:04

## 2017-07-28 RX ADMIN — Medication 10 ML: at 04:24

## 2017-07-28 RX ADMIN — Medication 10 ML: at 12:00

## 2017-07-28 RX ADMIN — HYDROMORPHONE HYDROCHLORIDE 1 MG: 1 INJECTION, SOLUTION INTRAMUSCULAR; INTRAVENOUS; SUBCUTANEOUS at 15:15

## 2017-07-28 RX ADMIN — HEPARIN SODIUM 5000 UNITS: 5000 INJECTION, SOLUTION INTRAVENOUS; SUBCUTANEOUS at 21:33

## 2017-07-28 RX ADMIN — HEPARIN SODIUM 5000 UNITS: 5000 INJECTION, SOLUTION INTRAVENOUS; SUBCUTANEOUS at 13:04

## 2017-07-28 RX ADMIN — Medication 10 ML: at 15:15

## 2017-07-28 RX ADMIN — PIPERACILLIN SODIUM,TAZOBACTAM SODIUM 3.38 G: 3; .375 INJECTION, POWDER, FOR SOLUTION INTRAVENOUS at 04:24

## 2017-07-28 RX ADMIN — DIPHENHYDRAMINE HYDROCHLORIDE 12.5 MG: 50 INJECTION, SOLUTION INTRAMUSCULAR; INTRAVENOUS at 21:33

## 2017-07-28 RX ADMIN — PIPERACILLIN SODIUM,TAZOBACTAM SODIUM 3.38 G: 3; .375 INJECTION, POWDER, FOR SOLUTION INTRAVENOUS at 12:08

## 2017-07-28 RX ADMIN — HYDROMORPHONE HYDROCHLORIDE 1 MG: 1 INJECTION, SOLUTION INTRAMUSCULAR; INTRAVENOUS; SUBCUTANEOUS at 00:25

## 2017-07-28 RX ADMIN — Medication 10 ML: at 21:00

## 2017-07-28 RX ADMIN — RETINOL, ERGOCALCIFEROL, .ALPHA.-TOCOPHEROL ACETATE, DL-, PHYTONADIONE, ASCORBIC ACID, NIACINAMIDE, RIBOFLAVIN 5-PHOSPHATE SODIUM, THIAMINE HYDROCHLORIDE, PYRIDOXINE HYDROCHLORIDE, DEXPANTHENOL, BIOTIN, FOLIC ACID, AND CYANOCOBALAMIN: KIT at 17:53

## 2017-07-28 NOTE — PROGRESS NOTES
Surgery    Pt doing well, tolerating clears. AF, VSS  Abdomen soft, minimal ttp    Plan: slow prep over weekend.    1L golytely a day and 2 L on Sunday  Surgery scheduled for 0730 Monday

## 2017-07-28 NOTE — PROGRESS NOTES
Nutrition Assessment:    RECOMMENDATIONS/INTERVENTION(S):      1. Continue TPN a below:    D20/5%AA @ rate of 75 ml/hr   Lipids 20% (500 ml) @ 42 ml/hr x 12 hr 3x/wk  (TPN @ goal + Lipids provides 2016 kcals, 90 g protein, 1800 ml fluid)    2. Adjust IVF accordingly - consider eliminating order for D5% - 0.45%NS IVF @50mL   as ordered: adds ~200cals & 1200mL of fluid/d (appears the IV have not been running consistently - DW RN & PharmD)     Monitor plan of care, BG, TG (check weekly while on TPN) , lytes  ASSESSMENT:   : f/u, pt wasn't adequately prepped for surgery yesterday, rescheduled for , Diet advanced to Clears and will be getting slow-prep all weekend. TPN remains the same. Pt with some nausea & abd pain at times - controlled with medications. Encouraged frequent sips while awake to maintain GI stimulation throughout prep. : F/u, pt remains on TPN. Noted Triglyceride level 119 mg.dl. Chart reviewed, for sigmoidoscopy tomorrow, high NG output and liquid stools. Noted B, 117, 141, 152, 123 mg/dl. RD to follow, monitor pt progress. :  Pt seen for LOS & CLD/NPO x 5.  74 yom admitted for colonic obstruction. Pmhx includes diverticulitis. S/p CT showing sigmoid colitis w/ colonic obstruction and concomitant SBO. Visited pt this afternoon. Daughter in room, pt in restroom. Reports eating well and maintaining wt PTA. Avoids certain foods while w/ diverticulitis flare ups - dx 10 yrs ago. Normal BMI per age. Labs/meds reviewed. Lytes WDL. BUN elevated but trending down, Cr WDL. . No PI or edema noted. D5 @ 150 ml/hr infusing. TPN started by Surgery today - D15/5%AA @ 42 ml/hr; no Lipids ordered. TPN recs above.       SUBJECTIVE/OBJECTIVE:     Diet Order:  Clear Liquids  TPN: D20 5% AA @ 75 ml/hr  Lipids: 20% @ 42 ml/hr x 12 hours    Pertinent Medications: [x] Reviewed - D5 0.45% NaCl @ 50 ml/hr, SSI, Zofran, Protonix    Chemistries:  Lab Results   Component Value Date/Time    Sodium 141 07/28/2017 06:29 AM    Potassium 3.6 07/28/2017 06:29 AM    Chloride 107 07/28/2017 06:29 AM    CO2 27 07/28/2017 06:29 AM    Anion gap 7 07/28/2017 06:29 AM    Glucose 139 07/28/2017 06:29 AM    BUN 15 07/28/2017 06:29 AM    Creatinine 0.89 07/28/2017 06:29 AM    BUN/Creatinine ratio 17 07/28/2017 06:29 AM    GFR est AA >60 07/28/2017 06:29 AM    GFR est non-AA >60 07/28/2017 06:29 AM    Calcium 8.0 07/28/2017 06:29 AM    AST (SGOT) 20 07/18/2017 10:14 AM    Alk. phosphatase 71 07/18/2017 10:14 AM    Protein, total 7.8 07/18/2017 10:14 AM    Albumin 3.3 07/18/2017 10:14 AM    Globulin 4.5 07/18/2017 10:14 AM    A-G Ratio 0.7 07/18/2017 10:14 AM    ALT (SGPT) 26 07/18/2017 10:14 AM      Anthropometrics: Height: 5' 10\" (177.8 cm) Weight: 86.2 kg (190 lb)    IBW (%IBW): 83 kg (182 lb 15.7 oz) (103.98 %) UBW (%UBW):   (  %)    BMI: Body mass index is 27.26 kg/(m^2). This BMI is indicative of:   [] Underweight    [x] Normal - per advanced age   [] Overweight    []  Obesity    []  Extreme Obesity (BMI>40)  Estimated Nutrition Needs (Based on): 2019 Kcals/day (RMR (1609) x 1.3 AF) , 69 g (-86 (0.8-1.0 g/kg x actual body wt)) Protein  Carbohydrate: At Least 130 g/day  Fluids: 2000 mL/day    Last BM: 7/26, distended abd   [x]Active     []Hyperactive  []Hypoactive       [] Absent   BS  Skin:    [x] Intact   [] Incision  [] Breakdown   [] DTI   [] Tears/Excoriation/Abrasion  []Edema [] Other:    Wt Readings from Last 30 Encounters:   07/27/17 86.2 kg (190 lb)   07/19/17 87.1 kg (192 lb)   07/18/17 87.1 kg (192 lb)   07/17/17 87.1 kg (192 lb)   07/03/17 87.3 kg (192 lb 7.4 oz)   03/07/17 89.4 kg (197 lb)   02/23/16 87.5 kg (193 lb)   07/05/15 88.9 kg (196 lb)   01/12/15 81.6 kg (180 lb)   08/29/13 81.6 kg (180 lb)      NUTRITION DIAGNOSES:   Problem:  Inadequate oral food/beverage intake     Etiology: related to alteration in GI tract structure and/or function     Signs/Symptoms: as evidenced by diverticulitis, NPO x 5 days      NUTRITION INTERVENTIONS:  Meals/Snacks: General/healthful diet- once PO initiated Enteral/Parenteral Nutrition: Initiate parenteral nutrition               GOAL:   Patient will continue to receive TPN to meet nutrient needs during slow-prep for surgery    Cultural, Bahai, or Ethnic Dietary Needs: None     LEARNING NEEDS (Diet, Food/Nutrient-Drug Interaction):    [x] None Identified   [] Identified and Education Provided/Documented   [] Identified and Pt declined/was not appropriate      [x] Interdisciplinary Care Plan Reviewed/Documented    [x] Discharge Needs:    TBD   [] No Nutrition Related Discharge Needs    NUTRITION RISK:   Pt Is At Nutrition Risk  [x]     No Nutrition Risk Identified  []       PT SEEN FOR:    []  MD Consult: []Calorie Count      []Diabetic Diet Education        []Diet Education     []Electrolyte Management     []General Nutrition Management and Supplements     []Management of Tube Feeding     []TPN Recommendations    []  RN Referral:  []MST score >=2     []Enteral/Parenteral Nutrition PTA     []Pregnant: Gestational DM or Multigestation                 [] Pressure Ulcer    []  Low BMI      []  Length of Stay       [] Dysphagia Diet         [] Ventilator  [x]  Follow-up     Previous Recommendations:   [x] Implemented          [] Not Implemented          [] Not Applicable    Previous Goal:   [x] Met              [] Progressing Towards Goal              [] Not Progressing Towards Goal   [] Not Applicable            Aleah Ambriz, RD, MS, CDE

## 2017-07-28 NOTE — PROGRESS NOTES
Spiritual Care Partner Volunteer visited patient in Rm 413 on 7/27/17.   Documented by:  Chaplain Rueda MDiv, MS, [facility]  [phone] PRAY [phone])

## 2017-07-28 NOTE — PROGRESS NOTES
[facility]. [facility] [address]  [phone]                GASTROENTEROLOGY PROGRESS NOTE        NAME:  Nick Gorman   :   1945   MRN:   [id number]         PCP: Brittney Devlin MD    Consult Date: 2017 10:26 AM    Chief Complaint:  Abdominal pain    Subjective:  No overnight events. Tolerated 1 meal of clear liquid diet thus far. Receiving golytely which is causing diarrhea. No abdominal pain. Denies N/V.     PMH:  Past Medical History:   Diagnosis Date    Diverticulitis     Gastrointestinal disorder     Spinal stenosis        PSH:  Past Surgical History:   Procedure Laterality Date    FLEXIBLE SIGMOIDOSCOPY N/A 2017    SIGMOIDOSCOPY FLEXIBLE performed by Katiuska Encarnacion MD at [facility] ENDOSCOPY       Allergies:  No Known Allergies    Home Medications:  Prior to Admission Medications   Prescriptions Last Dose Informant Patient Reported? Taking? HYDROcodone-acetaminophen (NORCO) 5-325 mg per tablet 2017 at AM Significant Other No Yes   Sig: Take 1 Tab by mouth every four (4) hours as needed for Pain. Max Daily Amount: 6 Tabs. MINERAL OIL EXTRA HEAVY liquid 2017 at 1430 Significant Other No Yes   Sig: Take 30 mL by mouth once as needed for Constipation for up to 1 dose. Indications: constipation   PSYLLIUM SEED, WITH SUGAR, (METAMUCIL PO) 2017 at Unknown time Significant Other Yes Yes   Sig: Take 15 mL by mouth two (2) times daily as needed. dicyclomine (BENTYL) 20 mg tablet 2017 at AM Significant Other No Yes   Sig: Take 1 Tab by mouth every six (6) hours for 20 doses. gabapentin (NEURONTIN) 600 mg tablet 2017 at AM Significant Other Yes Yes   Sig: Take 600 mg by mouth three (3) times daily. magnesium citrate solution 2017 at 1430 Significant Other Yes Yes   Sig: Take 296 mL by mouth as needed.    meclizine (ANTIVERT) 25 mg tablet Unknown at Unknown time Significant Other Yes No   Sig: Take 25 mg by mouth three (3) times daily as needed. metroNIDAZOLE (FLAGYL) 500 mg tablet 7/20/2017 at AM Significant Other Yes Yes   Sig: Take 500 mg by mouth three (3) times daily. moxifloxacin (AVELOX) 400 mg tablet 7/20/2017 at AM Significant Other No Yes   Sig: Take 1 Tab by mouth daily for 10 days. oxyCODONE-acetaminophen (PERCOCET) 5-325 mg per tablet 7/20/2017 at Unknown time Significant Other No Yes   Sig: Take 1 Tab by mouth every four (4) hours as needed for Pain. Max Daily Amount: 6 Tabs. polyethylene glycol (MIRALAX) 17 gram packet 7/19/2017 at Unknown time Significant Other No Yes   Sig: Take 1 Packet by mouth two (2) times a day.       Facility-Administered Medications: None       Hospital Medications:  Current Facility-Administered Medications   Medication Dose Route Frequency    [START ON 7/30/2017] peg 3350-electrolytes (COLYTE) 4000 mL  1,000 mL Oral DAILY    TPN ADULT - CENTRAL AA 5% D20% W/ ELECTROLYTES AND CA   IntraVENous CONTINUOUS    TPN ADULT - CENTRAL AA 5% D20% W/ ELECTROLYTES AND CA   IntraVENous CONTINUOUS    peg 3350-electrolytes (COLYTE) 4000 mL  1,000 mL Oral DAILY    LORazepam (ATIVAN) injection 1 mg  1 mg IntraVENous Q4H PRN    pantoprazole (PROTONIX) 40 mg in sodium chloride 0.9 % 10 mL injection  40 mg IntraVENous DAILY    fat emulsion 20% (LIPOSYN, INTRALIPID) infusion 500 mL  500 mL IntraVENous Q TUE, THU & SAT    insulin lispro (HUMALOG) injection   SubCUTAneous Q6H    glucose chewable tablet 16 g  4 Tab Oral PRN    dextrose (D50W) injection syrg 12.5-25 g  12.5-25 g IntraVENous PRN    glucagon (GLUCAGEN) injection 1 mg  1 mg IntraMUSCular PRN    sodium chloride (NS) flush 10-30 mL  10-30 mL InterCATHeter PRN    sodium chloride (NS) flush 10 mL  10 mL InterCATHeter Q24H    sodium chloride (NS) flush 10-40 mL  10-40 mL InterCATHeter Q8H    alteplase (CATHFLO) 1 mg in sterile water (preservative free) 1 mL injection  1 mg InterCATHeter PRN    bisacodyl (DULCOLAX) suppository 10 mg  10 mg Rectal DAILY PRN    nicotine (NICODERM CQ) 14 mg/24 hr patch 1 Patch  1 Patch TransDERmal DAILY    dextrose 5 % - 0.45% NaCl infusion  50 mL/hr IntraVENous CONTINUOUS    HYDROmorphone (PF) (DILAUDID) injection 1 mg  1 mg IntraVENous Q3H PRN    naloxone (NARCAN) injection 0.4 mg  0.4 mg IntraVENous PRN    ondansetron (ZOFRAN) injection 4 mg  4 mg IntraVENous Q4H PRN    diphenhydrAMINE (BENADRYL) injection 12.5 mg  12.5 mg IntraVENous Q4H PRN    heparin (porcine) injection 5,000 Units  5,000 Units SubCUTAneous Q8H    piperacillin-tazobactam (ZOSYN) 3.375 g in 0.9% sodium chloride (MBP/ADV) 100 mL  3.375 g IntraVENous Q8H       Social History:  Pt denies any history of IV drug use, blood transfusions, tattoos. Social History   Substance Use Topics    Smoking status: Current Some Day Smoker     Packs/day: 1.00     Years: 50.00    Smokeless tobacco: Never Used    Alcohol use 0.5 oz/week     1 Cans of beer per week       Family History:  History reviewed. No pertinent family history. Objective:   Patient Vitals for the past 8 hrs:   BP Temp Pulse Resp SpO2   07/28/17 0750 145/67 98.6 °F (37 °C) 67 17 98 %   07/28/17 0421 141/63 98.7 °F (37.1 °C) 68 16 95 %        07/26 1901 - 07/28 0700  In: 520 [I.V.:500]  Out: 300     EXAM:   GEN-NAD. Much improved compared to yesterday. NEURO-Alert and oriented x 3   HEENT-MARYSOL, EOMI, nonichteric   LUNGS-Clear to ausculation bilaterally. CARD-Rate regular and rhythym, S1 S2. No murmur. ABD-Soft , non-tender, non-distended. Bowel sounds normoactive. EXT-No edema, warm to touch. PSYCH - Pleasant and cooperative to care.      Data Review     Recent Labs      07/27/17   0628  07/26/17   0416   WBC  9.2  9.2   HGB  13.1  13.4   HCT  40.0  40.5   PLT  226  258     Recent Labs      07/28/17   0629  07/27/17   0628   NA  141  141   K  3.6  3.9   CL  107  107   CO2  27  29   BUN  15  15   CREA  0.89  0.93   GLU  139*  119*   PHOS  3.2  3.2 CA  8.0*  8.0*     No results for input(s): SGOT, GPT, AP, TBIL, TP, ALB, GLOB, GGT, AML, LPSE in the last 72 hours. No lab exists for component: AMYP, HLPSE  No results for input(s): INR, PTP, APTT in the last 72 hours. No lab exists for component: INREXT       Problem List:     Patient Active Problem List   Diagnosis Code    Colitis K52.9    [facility] K56.60     Assessment/Plan:   Acute on chronic diverticulitis with transition point in the rectosigmoid area: Improving. Flex sig 7/27 showed diverticulitis. Tolerating CLD. Plan for slow prep over weekend with golytely. Surgery scheduled for 7/31.    Patient seen in collaboration with Dr Olivia Lion By: Gilson Fried DO     7/28/2017  10:26 AM

## 2017-07-28 NOTE — ROUTINE PROCESS
Orthopedic & Surgical Nursing Communication Tool      7:49 AM  7/28/2017     Bedside and Verbal shift change report given to tSaci Casarez RN (incoming nurse) by Barak Chapman RN (outgoing nurse) on Peyman Wadeim a 74 y.o. male who was admitted on 7/21/2017  6:55 AM. Report included the following information SBAR, Procedure Summary, Intake/Output and MAR. Opportunity for questions and clarifications were given to the incoming nurse. Patient's bed is in low position, side rails x3, family at bedside, call bell within reach and patient not in distress. Hourly rounding performed throughout shift.     Barak Chapman RN

## 2017-07-28 NOTE — PROGRESS NOTES
Bedside shift change report given to HANNAH Betancur (oncoming nurse) by Staci Casarez RN (offgoing nurse). Report included the following information SBAR, Kardex, MAR and Recent Results.

## 2017-07-29 LAB
ANION GAP BLD CALC-SCNC: 8 MMOL/L (ref 5–15)
BUN SERPL-MCNC: 17 MG/DL (ref 6–20)
BUN/CREAT SERPL: 17 (ref 12–20)
CALCIUM SERPL-MCNC: 7.8 MG/DL (ref 8.5–10.1)
CHLORIDE SERPL-SCNC: 107 MMOL/L (ref 97–108)
CO2 SERPL-SCNC: 28 MMOL/L (ref 21–32)
CREAT SERPL-MCNC: 1.01 MG/DL (ref 0.7–1.3)
GLUCOSE BLD STRIP.AUTO-MCNC: 103 MG/DL (ref 65–100)
GLUCOSE BLD STRIP.AUTO-MCNC: 105 MG/DL (ref 65–100)
GLUCOSE BLD STRIP.AUTO-MCNC: 123 MG/DL (ref 65–100)
GLUCOSE BLD STRIP.AUTO-MCNC: 96 MG/DL (ref 65–100)
GLUCOSE SERPL-MCNC: 113 MG/DL (ref 65–100)
MAGNESIUM SERPL-MCNC: 2 MG/DL (ref 1.6–2.4)
PHOSPHATE SERPL-MCNC: 3.2 MG/DL (ref 2.6–4.7)
POTASSIUM SERPL-SCNC: 4.1 MMOL/L (ref 3.5–5.1)
SERVICE CMNT-IMP: ABNORMAL
SERVICE CMNT-IMP: NORMAL
SODIUM SERPL-SCNC: 143 MMOL/L (ref 136–145)

## 2017-07-29 PROCEDURE — 82962 GLUCOSE BLOOD TEST: CPT

## 2017-07-29 PROCEDURE — 74011250637 HC RX REV CODE- 250/637: Performed by: SURGERY

## 2017-07-29 PROCEDURE — 74011250636 HC RX REV CODE- 250/636: Performed by: SURGERY

## 2017-07-29 PROCEDURE — 74011250636 HC RX REV CODE- 250/636: Performed by: PHYSICIAN ASSISTANT

## 2017-07-29 PROCEDURE — 65270000029 HC RM PRIVATE

## 2017-07-29 PROCEDURE — C9113 INJ PANTOPRAZOLE SODIUM, VIA: HCPCS | Performed by: SURGERY

## 2017-07-29 PROCEDURE — 84100 ASSAY OF PHOSPHORUS: CPT | Performed by: SURGERY

## 2017-07-29 PROCEDURE — 36415 COLL VENOUS BLD VENIPUNCTURE: CPT | Performed by: SURGERY

## 2017-07-29 PROCEDURE — 74011000250 HC RX REV CODE- 250: Performed by: SURGERY

## 2017-07-29 PROCEDURE — 74011000258 HC RX REV CODE- 258: Performed by: SURGERY

## 2017-07-29 PROCEDURE — 80048 BASIC METABOLIC PNL TOTAL CA: CPT | Performed by: SURGERY

## 2017-07-29 PROCEDURE — 83735 ASSAY OF MAGNESIUM: CPT | Performed by: SURGERY

## 2017-07-29 RX ADMIN — Medication 10 ML: at 21:34

## 2017-07-29 RX ADMIN — HYDROMORPHONE HYDROCHLORIDE 1 MG: 1 INJECTION, SOLUTION INTRAMUSCULAR; INTRAVENOUS; SUBCUTANEOUS at 08:04

## 2017-07-29 RX ADMIN — PIPERACILLIN SODIUM,TAZOBACTAM SODIUM 3.38 G: 3; .375 INJECTION, POWDER, FOR SOLUTION INTRAVENOUS at 21:33

## 2017-07-29 RX ADMIN — PIPERACILLIN SODIUM,TAZOBACTAM SODIUM 3.38 G: 3; .375 INJECTION, POWDER, FOR SOLUTION INTRAVENOUS at 12:41

## 2017-07-29 RX ADMIN — Medication 10 ML: at 06:39

## 2017-07-29 RX ADMIN — ONDANSETRON 4 MG: 2 INJECTION INTRAMUSCULAR; INTRAVENOUS at 15:46

## 2017-07-29 RX ADMIN — RETINOL, ERGOCALCIFEROL, .ALPHA.-TOCOPHEROL ACETATE, DL-, PHYTONADIONE, ASCORBIC ACID, NIACINAMIDE, RIBOFLAVIN 5-PHOSPHATE SODIUM, THIAMINE HYDROCHLORIDE, PYRIDOXINE HYDROCHLORIDE, DEXPANTHENOL, BIOTIN, FOLIC ACID, AND CYANOCOBALAMIN: KIT at 18:06

## 2017-07-29 RX ADMIN — POLYETHYLENE GLYCOL-3350 AND ELECTROLYTES 1000 ML: 236; 6.74; 5.86; 2.97; 22.74 POWDER, FOR SOLUTION ORAL at 08:02

## 2017-07-29 RX ADMIN — SODIUM CHLORIDE 40 MG: 9 INJECTION INTRAMUSCULAR; INTRAVENOUS; SUBCUTANEOUS at 08:02

## 2017-07-29 RX ADMIN — Medication 10 ML: at 13:22

## 2017-07-29 RX ADMIN — Medication: at 13:22

## 2017-07-29 RX ADMIN — HEPARIN SODIUM 5000 UNITS: 5000 INJECTION, SOLUTION INTRAVENOUS; SUBCUTANEOUS at 06:39

## 2017-07-29 RX ADMIN — PIPERACILLIN SODIUM,TAZOBACTAM SODIUM 3.38 G: 3; .375 INJECTION, POWDER, FOR SOLUTION INTRAVENOUS at 03:44

## 2017-07-29 RX ADMIN — HYDROMORPHONE HYDROCHLORIDE 1 MG: 1 INJECTION, SOLUTION INTRAMUSCULAR; INTRAVENOUS; SUBCUTANEOUS at 15:46

## 2017-07-29 RX ADMIN — HEPARIN SODIUM 5000 UNITS: 5000 INJECTION, SOLUTION INTRAVENOUS; SUBCUTANEOUS at 13:22

## 2017-07-29 RX ADMIN — HYDROMORPHONE HYDROCHLORIDE 1 MG: 1 INJECTION, SOLUTION INTRAMUSCULAR; INTRAVENOUS; SUBCUTANEOUS at 22:24

## 2017-07-29 RX ADMIN — ONDANSETRON 4 MG: 2 INJECTION INTRAMUSCULAR; INTRAVENOUS at 08:14

## 2017-07-29 RX ADMIN — I.V. FAT EMULSION 500 ML: 20 EMULSION INTRAVENOUS at 18:07

## 2017-07-29 RX ADMIN — HEPARIN SODIUM 5000 UNITS: 5000 INJECTION, SOLUTION INTRAVENOUS; SUBCUTANEOUS at 22:24

## 2017-07-29 NOTE — PROGRESS NOTES
1130.  Informed Dr. Dipika Alfaro of patient's bradycardia. New orders to follow. Bedside and Verbal shift change report given to [address], RN (oncoming nurse) by Cheyenne Middleton RN (offgoing nurse). Report included the following information SBAR, Kardex, MAR and Recent Results.

## 2017-07-29 NOTE — PROGRESS NOTES
Assessment / Plan:     Sigmoid stricture     Plan:   Add Ensure clear TID for enteral nutrition  Slow prep over weekend. 1L golytely a day and 2 L on Sunday  Surgery scheduled for 0730 Monday    Lottie Toribio MD  [facility]  Office:  [phone]        General Surgery Daily Progress Note      Patient: Blaine Hardin MRN: [id number]  SSN: [id number]    YOB: 1945  Age: 74 y.o. Sex: male      Admit Date: 7/21/2017 for sigmoid stricture    Subjective:   Patient having bowel function at visit.     Current Facility-Administered Medications   Medication Dose Route Frequency    [START ON 7/30/2017] peg 3350-electrolytes (COLYTE) 4000 mL  1,000 mL Oral DAILY    TPN ADULT - CENTRAL AA 5% D20% W/ ELECTROLYTES AND CA   IntraVENous CONTINUOUS    diphenhydrAMINE-zinc acetate 1%-0.1% (BENADRYL) cream   Topical Q4H PRN    peg 3350-electrolytes (COLYTE) 4000 mL  1,000 mL Oral DAILY    LORazepam (ATIVAN) injection 1 mg  1 mg IntraVENous Q4H PRN    pantoprazole (PROTONIX) 40 mg in sodium chloride 0.9 % 10 mL injection  40 mg IntraVENous DAILY    fat emulsion 20% (LIPOSYN, INTRALIPID) infusion 500 mL  500 mL IntraVENous Q TUE, THU & SAT    insulin lispro (HUMALOG) injection   SubCUTAneous Q6H    glucose chewable tablet 16 g  4 Tab Oral PRN    dextrose (D50W) injection syrg 12.5-25 g  12.5-25 g IntraVENous PRN    glucagon (GLUCAGEN) injection 1 mg  1 mg IntraMUSCular PRN    sodium chloride (NS) flush 10-30 mL  10-30 mL InterCATHeter PRN    sodium chloride (NS) flush 10 mL  10 mL InterCATHeter Q24H    sodium chloride (NS) flush 10-40 mL  10-40 mL InterCATHeter Q8H    alteplase (CATHFLO) 1 mg in sterile water (preservative free) 1 mL injection  1 mg InterCATHeter PRN    bisacodyl (DULCOLAX) suppository 10 mg  10 mg Rectal DAILY PRN    nicotine (NICODERM CQ) 14 mg/24 hr patch 1 Patch  1 Patch TransDERmal DAILY    HYDROmorphone (PF) (DILAUDID) injection 1 mg  1 mg IntraVENous Q3H PRN    naloxone (NARCAN) injection 0.4 mg  0.4 mg IntraVENous PRN    ondansetron (ZOFRAN) injection 4 mg  4 mg IntraVENous Q4H PRN    diphenhydrAMINE (BENADRYL) injection 12.5 mg  12.5 mg IntraVENous Q4H PRN    heparin (porcine) injection 5,000 Units  5,000 Units SubCUTAneous Q8H    piperacillin-tazobactam (ZOSYN) 3.375 g in 0.9% sodium chloride (MBP/ADV) 100 mL  3.375 g IntraVENous Q8H        Objective:   07/29 0701 - 07/29 1900  In: -   Out: 350 [Urine:350]     Patient Vitals for the past 8 hrs:   BP Temp Pulse Resp SpO2 Weight   07/29/17 1121 138/63 98.6 °F (37 °C) (!) 55 16 95 % -   07/29/17 1015 - - - - - 84.9 kg (187 lb 1.6 oz)   07/29/17 0905 - - (!) 53 - - -   07/29/17 0803 - - 61 - - -   07/29/17 0726 152/68 98.8 °F (37.1 °C) (!) 50 18 95 % -       Physical Exam:  General: Alert, cooperative, no distress, appears stated age. Neck:  Supple, symmetrical, trachea midline, no adenopathy, thyroid: no                           enlargement/tenderness/nodules, no carotid bruit and no JVD. Lungs: Clear to auscultation bilaterally. Heart:  Regular rate and rhythm, S1, S2 normal, no murmur, click, rub or gallop. Abdomen: Soft, tender. Bowel sounds normal. No masses,  No organomegaly. Extremities: Extremities normal, atraumatic, no cyanosis or edema.   Skin:  Skin color, texture, turgor normal. No rashes or lesions    Labs: Recent Labs      07/27/17   0628   WBC  9.2   HGB  13.1   HCT  40.0   PLT  226     Recent Labs      07/29/17   0514   NA  143   K  4.1   CL  107   CO2  28   GLU  113*   BUN  17   CREA  1.01   CA  7.8*   MG  2.0   PHOS  3.2         Active Problems:    Colitis (7/21/2017)      Colonic obstruction (HCC) (7/21/2017)

## 2017-07-29 NOTE — CONSULTS
[place]   [facility]  [address] [phone]  Patti Lim  [id number]      f [phone]               consult        Patient: Chrystal Wagner MRN: [id number]  SSN: [id number]    YOB: 1945  Age: 74 y.o. Sex: male          Date of Consultation:  July 29, 2017  Requesting Physician: Dr. Clary Carrillo  Reason for Consultation: ureteral stents pre-op partial colectomy         History of Present Illness:  Patient is a 74 y.o. male admitted 7/21/2017 to the hospital for Colonic obstruction ([facility] [address]  Colitis  SIGMOID DIVERTICULITIS  BOWEL OBSTRUCTION   diverticulitis  SIGMOID DIVERTICULITIS  BOWEL OBSTRUCTION   SIGMOID DIVERTICULITIS  BOWEL OBSTRUCTION . He has a hx of diverticulitis and presented 7/21/17 w/ severe abd pain. CT and colonoscopy has confirmed diverticulitis. He is to undergo resection on Monday. Dr. Iris Sweet has consulted us for placement of b/l ureteral stents. The pt has no previous  hx.      Past Medical History:  No Known Allergies   Prior to Admission medications    Medication Sig Start Date End Date Taking? Authorizing Provider   magnesium citrate solution Take 296 mL by mouth as needed. Yes Historical Provider   oxyCODONE-acetaminophen (PERCOCET) 5-325 mg per tablet Take 1 Tab by mouth every four (4) hours as needed for Pain. Max Daily Amount: 6 Tabs. 7/19/17  Yes Juan Antonio Pham MD   moxifloxacin (AVELOX) 400 mg tablet Take 1 Tab by mouth daily for 10 days. 7/18/17 7/28/17 Yes Hilario Barlow NP   HYDROcodone-acetaminophen [facility]) 5-325 mg per tablet Take 1 Tab by mouth every four (4) hours as needed for Pain. Max Daily Amount: 6 Tabs. 7/18/17  Yes Hilario Barlow NP   MINERAL OIL EXTRA HEAVY liquid Take 30 mL by mouth once as needed for Constipation for up to 1 dose. Indications: constipation 7/17/17  Yes George Tam MD   polyethylene glycol (MIRALAX) 17 gram packet Take 1 Packet by mouth two (2) times a day.  7/17/17  Yes George Tam MD   metroNIDAZOLE (FLAGYL) 500 mg tablet Take 500 mg by mouth three (3) times daily. Yes Ashley Choi MD   gabapentin (NEURONTIN) 600 mg tablet Take 600 mg by mouth three (3) times daily. Yes Ashley Choi MD   PSYLLIUM SEED, WITH SUGAR, (METAMUCIL PO) Take 15 mL by mouth two (2) times daily as needed. Yes Ashley Choi MD   meclizine (ANTIVERT) 25 mg tablet Take 25 mg by mouth three (3) times daily as needed. Historical Provider     PMHx:  has a past medical history of Diverticulitis; Gastrointestinal disorder; History of vascular access device (07/24/2017); and Spinal stenosis. He also has no past medical history of Arsenic suspected exposure; Family history of skin cancer; Radiation exposure; Skin cancer; Sun-damaged skin; Sunburn, blistering; or Tanning bed exposure. PSurgHx:  has a past surgical history that includes flexible sigmoidoscopy (N/A, 7/27/2017). .  PSocHx:  reports that he has been smoking. He has a 50.00 pack-year smoking history. He has never used smokeless tobacco. He reports that he drinks about 0.5 oz of alcohol per week  He reports that he does not use illicit drugs. ROS:  Admission ROS by Joo Knight MD from 7/21/2017 were reviewed with the patient and changes (other than per HPI) include: none.       Physical Exam:  General: WDWN  NAD   Skin: No Rash or Lesions   HEENT: Benign   Chest: No Exertion / Stridor / Audible Wheeze   Abdomen: S/NT/ND  No CVAT   Genitalia: Nl male   Testes B/L descended without mass / tenderness  Nl cords  No hernia   Rectal: Prostate 2+ / Benign  Nl sphincter tone  No rectal masses appreciated   Extremities: FROM / No edema   Neurologic: Non-focal   A&O x 3   Other:        Labs:  Lab Results   Component Value Date/Time    WBC 9.2 07/27/2017 06:28 AM    HCT 40.0 07/27/2017 06:28 AM    PLATELET 334 69/97/7404 06:28 AM    Sodium 143 07/29/2017 05:14 AM    Potassium 4.1 07/29/2017 05:14 AM    Chloride 107 07/29/2017 05:14 AM    CO2 28 07/29/2017 05:14 AM    BUN 17 07/29/2017 05:14 AM    Creatinine 1.01 07/29/2017 05:14 AM    Glucose 113 07/29/2017 05:14 AM    Calcium 7.8 07/29/2017 05:14 AM    Magnesium 2.0 07/29/2017 05:14 AM       UA:   Lab Results   Component Value Date/Time    Color YELLOW/STRAW 07/18/2017 11:44 AM    Appearance CLEAR 07/18/2017 11:44 AM    Specific gravity 1.005 07/18/2017 11:44 AM    Specific gravity 1.025 02/23/2016 05:05 PM    pH (UA) 6.5 07/18/2017 11:44 AM    Protein NEGATIVE  07/18/2017 11:44 AM    Glucose NEGATIVE  07/18/2017 11:44 AM    Ketone TRACE 07/18/2017 11:44 AM    Bilirubin NEGATIVE  07/18/2017 11:44 AM    Urobilinogen 0.2 07/18/2017 11:44 AM    Nitrites NEGATIVE  07/18/2017 11:44 AM    Leukocyte Esterase NEGATIVE  07/18/2017 11:44 AM    Epithelial cells FEW 07/18/2017 11:44 AM    Bacteria NEGATIVE  07/18/2017 11:44 AM    WBC 0-4 07/18/2017 11:44 AM    RBC 0-5 07/18/2017 11:44 AM       Cultures: n/a  Xrays: CT    Assessment/Plan:     Diverticulitis  - d/w pt and wife P/P/R/B regarding b/l ureteral stents and they consent to proceed          Signed By: Sussy Galo MD  - July 29, 2017

## 2017-07-29 NOTE — PROGRESS NOTES
Spiritual Care Assessment/Progress Notes    Sp Newsome [id number]  [id number]    1945  74 y.o.  male    Patient Telephone Number: [phone] (home)   Cheondoism Affiliation: Noel Ruiz   Language: English   Extended Emergency Contact Information  Primary Emergency Contact: [facility] [address]  Address: [address], [address] [place] [address] Street [address] [place] [address] Phone: [phone]  Mobile Phone: [phone]  Relation: Spouse   Patient Active Problem List    Diagnosis Date Noted    Colitis 07/21/2017    Colonic obstruction ([address] 07/21/2017        Date: 7/29/2017       Level of Cheondoism/Spiritual Activity:  [x]         Involved in shaila tradition/spiritual practice    []         Not involved in shaila tradition/spiritual practice  []         Spiritually oriented    []         Claims no spiritual orientation    []         seeking spiritual identity  []         Feels alienated from Voodoo practice/tradition  []         Feels angry about Voodoo practice/tradition  [x]         Spirituality/Voodoo tradition is a resource for coping at this time.   []         Not able to assess due to medical condition    Services Provided Today:  []         crisis intervention    []         reading Scriptures  [x]         spiritual assessment    []         prayer  [x]         empathic listening/emotional support  []         rites and rituals (cite in comments)  []         life review     []         Voodoo support  []         theological development   []         advocacy  []         ethical dialog     []         blessing  []         bereavement support    [x]         support to family  []         anticipatory grief support   []         help with AMD  []         spiritual guidance    []         meditation      Spiritual Care Needs  [x]         Emotional Support  []         Spiritual/Cheondoism Care  []         Loss/Adjustment  []         Advocacy/Referral                /Ethics  []         No needs expressed at               this time  []         Other: (note in               comments)  Spiritual Care Plan  []         Follow up visits with               pt/family  []         Provide materials  []         Schedule sacraments  []         Contact Community               Clergy  [x]         Follow up as needed  []         Other: (note in               comments)     Visited with pt who was in bed and quite drowsy. His wife, Tom Parent at bedside. She sleeps on a cot in the room with her  and seems to be very supportive of him. Both are persons of [employer] and active members of [address] [place] [address]. [employer] are important sources of support for both. Tom Parent indicated that he has two adult children from a previous marriage and she has one adult child from a previous marriage. The couple have no children together. Pt is facing a procedure Monday morning and the waiting is difficult for both. Chaplains will follow as needed.     Chaplain Nilson, MDiv, MS, [facility]  [phone])

## 2017-07-30 LAB
ANION GAP BLD CALC-SCNC: 10 MMOL/L (ref 5–15)
BUN SERPL-MCNC: 16 MG/DL (ref 6–20)
BUN/CREAT SERPL: 17 (ref 12–20)
CALCIUM SERPL-MCNC: 8.4 MG/DL (ref 8.5–10.1)
CHLORIDE SERPL-SCNC: 106 MMOL/L (ref 97–108)
CO2 SERPL-SCNC: 25 MMOL/L (ref 21–32)
CREAT SERPL-MCNC: 0.94 MG/DL (ref 0.7–1.3)
GLUCOSE BLD STRIP.AUTO-MCNC: 104 MG/DL (ref 65–100)
GLUCOSE BLD STRIP.AUTO-MCNC: 112 MG/DL (ref 65–100)
GLUCOSE BLD STRIP.AUTO-MCNC: 115 MG/DL (ref 65–100)
GLUCOSE SERPL-MCNC: 128 MG/DL (ref 65–100)
MAGNESIUM SERPL-MCNC: 1.8 MG/DL (ref 1.6–2.4)
PHOSPHATE SERPL-MCNC: 3.3 MG/DL (ref 2.6–4.7)
POTASSIUM SERPL-SCNC: 3.9 MMOL/L (ref 3.5–5.1)
SERVICE CMNT-IMP: ABNORMAL
SODIUM SERPL-SCNC: 141 MMOL/L (ref 136–145)

## 2017-07-30 PROCEDURE — 74011000250 HC RX REV CODE- 250: Performed by: SURGERY

## 2017-07-30 PROCEDURE — 80048 BASIC METABOLIC PNL TOTAL CA: CPT | Performed by: SURGERY

## 2017-07-30 PROCEDURE — 74011000258 HC RX REV CODE- 258: Performed by: SURGERY

## 2017-07-30 PROCEDURE — 84100 ASSAY OF PHOSPHORUS: CPT | Performed by: SURGERY

## 2017-07-30 PROCEDURE — 74011250636 HC RX REV CODE- 250/636: Performed by: SURGERY

## 2017-07-30 PROCEDURE — 36415 COLL VENOUS BLD VENIPUNCTURE: CPT | Performed by: SURGERY

## 2017-07-30 PROCEDURE — 83735 ASSAY OF MAGNESIUM: CPT | Performed by: SURGERY

## 2017-07-30 PROCEDURE — 74011250636 HC RX REV CODE- 250/636: Performed by: PHYSICIAN ASSISTANT

## 2017-07-30 PROCEDURE — 65270000029 HC RM PRIVATE

## 2017-07-30 PROCEDURE — 74011250637 HC RX REV CODE- 250/637: Performed by: SURGERY

## 2017-07-30 PROCEDURE — 82962 GLUCOSE BLOOD TEST: CPT

## 2017-07-30 PROCEDURE — C9113 INJ PANTOPRAZOLE SODIUM, VIA: HCPCS | Performed by: SURGERY

## 2017-07-30 RX ORDER — BISACODYL 5 MG
10 TABLET, DELAYED RELEASE (ENTERIC COATED) ORAL
Status: COMPLETED | OUTPATIENT
Start: 2017-07-30 | End: 2017-07-30

## 2017-07-30 RX ORDER — POLYETHYLENE GLYCOL 3350 17 G/17G
17 POWDER, FOR SOLUTION ORAL DAILY
Status: DISCONTINUED | OUTPATIENT
Start: 2017-07-30 | End: 2017-07-31

## 2017-07-30 RX ORDER — POLYETHYLENE GLYCOL 3350 17 G/17G
17 POWDER, FOR SOLUTION ORAL ONCE
Status: COMPLETED | OUTPATIENT
Start: 2017-07-30 | End: 2017-07-30

## 2017-07-30 RX ADMIN — HYDROMORPHONE HYDROCHLORIDE 1 MG: 1 INJECTION, SOLUTION INTRAMUSCULAR; INTRAVENOUS; SUBCUTANEOUS at 08:11

## 2017-07-30 RX ADMIN — HEPARIN SODIUM 5000 UNITS: 5000 INJECTION, SOLUTION INTRAVENOUS; SUBCUTANEOUS at 07:03

## 2017-07-30 RX ADMIN — HEPARIN SODIUM 5000 UNITS: 5000 INJECTION, SOLUTION INTRAVENOUS; SUBCUTANEOUS at 21:33

## 2017-07-30 RX ADMIN — WATER 1 MG: 1 INJECTION INTRAMUSCULAR; INTRAVENOUS; SUBCUTANEOUS at 12:44

## 2017-07-30 RX ADMIN — HYDROMORPHONE HYDROCHLORIDE 1 MG: 1 INJECTION, SOLUTION INTRAMUSCULAR; INTRAVENOUS; SUBCUTANEOUS at 17:24

## 2017-07-30 RX ADMIN — Medication 10 ML: at 07:03

## 2017-07-30 RX ADMIN — Medication 10 ML: at 12:24

## 2017-07-30 RX ADMIN — PIPERACILLIN SODIUM,TAZOBACTAM SODIUM 3.38 G: 3; .375 INJECTION, POWDER, FOR SOLUTION INTRAVENOUS at 03:11

## 2017-07-30 RX ADMIN — SODIUM CHLORIDE 40 MG: 9 INJECTION INTRAMUSCULAR; INTRAVENOUS; SUBCUTANEOUS at 08:11

## 2017-07-30 RX ADMIN — POLYETHYLENE GLYCOL 3350 17 G: 17 POWDER, FOR SOLUTION ORAL at 12:44

## 2017-07-30 RX ADMIN — POLYETHYLENE GLYCOL-3350 AND ELECTROLYTES 1000 ML: 236; 6.74; 5.86; 2.97; 22.74 POWDER, FOR SOLUTION ORAL at 08:12

## 2017-07-30 RX ADMIN — PIPERACILLIN SODIUM,TAZOBACTAM SODIUM 3.38 G: 3; .375 INJECTION, POWDER, FOR SOLUTION INTRAVENOUS at 12:23

## 2017-07-30 RX ADMIN — ONDANSETRON 4 MG: 2 INJECTION INTRAMUSCULAR; INTRAVENOUS at 12:23

## 2017-07-30 RX ADMIN — Medication 10 ML: at 15:10

## 2017-07-30 RX ADMIN — HEPARIN SODIUM 5000 UNITS: 5000 INJECTION, SOLUTION INTRAVENOUS; SUBCUTANEOUS at 15:09

## 2017-07-30 RX ADMIN — BISACODYL 10 MG: 5 TABLET, COATED ORAL at 12:44

## 2017-07-30 RX ADMIN — PIPERACILLIN SODIUM,TAZOBACTAM SODIUM 3.38 G: 3; .375 INJECTION, POWDER, FOR SOLUTION INTRAVENOUS at 20:24

## 2017-07-30 RX ADMIN — POLYETHYLENE GLYCOL 3350 17 G: 17 POWDER, FOR SOLUTION ORAL at 17:17

## 2017-07-30 RX ADMIN — ONDANSETRON 4 MG: 2 INJECTION INTRAMUSCULAR; INTRAVENOUS at 08:21

## 2017-07-30 RX ADMIN — Medication 10 ML: at 21:33

## 2017-07-30 RX ADMIN — ONDANSETRON 4 MG: 2 INJECTION INTRAMUSCULAR; INTRAVENOUS at 17:24

## 2017-07-30 RX ADMIN — RETINOL, ERGOCALCIFEROL, .ALPHA.-TOCOPHEROL ACETATE, DL-, PHYTONADIONE, ASCORBIC ACID, NIACINAMIDE, RIBOFLAVIN 5-PHOSPHATE SODIUM, THIAMINE HYDROCHLORIDE, PYRIDOXINE HYDROCHLORIDE, DEXPANTHENOL, BIOTIN, FOLIC ACID, AND CYANOCOBALAMIN: KIT at 17:29

## 2017-07-30 NOTE — PROGRESS NOTES
Bedside and Verbal shift change report given to HANNAH Betancur (oncoming nurse) by Cheyenne Middleton RN (offgoing nurse). Report included the following information SBAR, Kardex, Intake/Output and Recent Results.

## 2017-07-30 NOTE — PROGRESS NOTES
Assessment / Plan:     Sigmoid stricture     Plan:   Add Ensure clear TID for enteral nutrition  Change bowel prep to miralax/dulcolax 12p and 4p. Surgery scheduled for 0730 Monday    Discussed bradycardia with Dr. Jaswant Castillo. Has had sequential EKG's from 2013, 2015 with similar marked heart rate. Given his baseline activity level and lack of exertional symptoms, I don't think he will need further workup. Johnny Sutton MD  [facility]  Office:  [phone]        General Surgery Daily Progress Note      Patient: Loyd Phoenix MRN: [id number]  SSN: [id number]    YOB: 1945  Age: 74 y.o. Sex: male      Admit Date: 7/21/2017 for sigmoid stricture    Subjective:   Patient having bowel function at visit. Stool is pure brown liquid with no sediment. Has been like this since yesterday at 1800. Unable to tolerate all of yesterday's go-lytely, and still has a full gallon to drink today.      Current Facility-Administered Medications   Medication Dose Route Frequency    polyethylene glycol (MIRALAX) packet 17 g  17 g Oral DAILY    polyethylene glycol (MIRALAX) packet 17 g  17 g Oral ONCE    TPN ADULT - CENTRAL AA 5% D20% W/ ELECTROLYTES AND CA   IntraVENous CONTINUOUS    peg 3350-electrolytes (COLYTE) 4000 mL  1,000 mL Oral DAILY    diphenhydrAMINE-zinc acetate 1%-0.1% (BENADRYL) cream   Topical Q4H PRN    LORazepam (ATIVAN) injection 1 mg  1 mg IntraVENous Q4H PRN    pantoprazole (PROTONIX) 40 mg in sodium chloride 0.9 % 10 mL injection  40 mg IntraVENous DAILY    fat emulsion 20% (LIPOSYN, INTRALIPID) infusion 500 mL  500 mL IntraVENous Q TUE, THU & SAT    insulin lispro (HUMALOG) injection   SubCUTAneous Q6H    glucose chewable tablet 16 g  4 Tab Oral PRN    dextrose (D50W) injection syrg 12.5-25 g  12.5-25 g IntraVENous PRN    glucagon (GLUCAGEN) injection 1 mg  1 mg IntraMUSCular PRN    sodium chloride (NS) flush 10-30 mL  10-30 mL InterCATHeter PRN    sodium chloride (NS) flush 10 mL  10 mL InterCATHeter Q24H    sodium chloride (NS) flush 10-40 mL  10-40 mL InterCATHeter Q8H    alteplase (CATHFLO) 1 mg in sterile water (preservative free) 1 mL injection  1 mg InterCATHeter PRN    bisacodyl (DULCOLAX) suppository 10 mg  10 mg Rectal DAILY PRN    nicotine (NICODERM CQ) 14 mg/24 hr patch 1 Patch  1 Patch TransDERmal DAILY    HYDROmorphone (PF) (DILAUDID) injection 1 mg  1 mg IntraVENous Q3H PRN    naloxone (NARCAN) injection 0.4 mg  0.4 mg IntraVENous PRN    ondansetron (ZOFRAN) injection 4 mg  4 mg IntraVENous Q4H PRN    diphenhydrAMINE (BENADRYL) injection 12.5 mg  12.5 mg IntraVENous Q4H PRN    heparin (porcine) injection 5,000 Units  5,000 Units SubCUTAneous Q8H    piperacillin-tazobactam (ZOSYN) 3.375 g in 0.9% sodium chloride (MBP/ADV) 100 mL  3.375 g IntraVENous Q8H        Objective:      07/28 1901 - 07/30 0700  In: -   Out: 550 [Urine:550]  Patient Vitals for the past 8 hrs:   BP Temp Pulse Resp SpO2   07/30/17 1132 136/67 98.4 °F (36.9 °C) (!) 56 18 97 %   07/30/17 0800 135/57 98.8 °F (37.1 °C) (!) 57 18 96 %       Physical Exam:  General: Alert, cooperative, no distress, appears stated age. Neck:  Supple, symmetrical, trachea midline, no adenopathy, thyroid: no                           enlargement/tenderness/nodules, no carotid bruit and no JVD. Lungs: Clear to auscultation bilaterally. Heart:  Regular rate and rhythm, S1, S2 normal, no murmur, click, rub or gallop. Abdomen: Soft, tender. Bowel sounds normal. No masses,  No organomegaly. Extremities: Extremities normal, atraumatic, no cyanosis or edema. Skin:  Skin color, texture, turgor normal. No rashes or lesions    Labs: No results for input(s): WBC, HGB, HCT, PLT, HGBEXT, HCTEXT, PLTEXT, HGBEXT, HCTEXT, PLTEXT in the last 72 hours.   Recent Labs      07/30/17   0438   NA  141   K  3.9   CL  106   CO2  25   GLU  128*   BUN  16   CREA  0.94   CA  8.4*   MG  1.8   PHOS  3.3 Active Problems:    Colitis (7/21/2017)      [facility] ([facility] (7/21/2017)

## 2017-07-31 ENCOUNTER — ANESTHESIA (OUTPATIENT)
Dept: SURGERY | Age: 72
DRG: 329 | End: 2017-07-31
Payer: MEDICARE

## 2017-07-31 LAB
ANION GAP BLD CALC-SCNC: 4 MMOL/L (ref 5–15)
BUN SERPL-MCNC: 14 MG/DL (ref 6–20)
BUN/CREAT SERPL: 14 (ref 12–20)
CALCIUM SERPL-MCNC: 8.2 MG/DL (ref 8.5–10.1)
CHLORIDE SERPL-SCNC: 107 MMOL/L (ref 97–108)
CO2 SERPL-SCNC: 27 MMOL/L (ref 21–32)
CREAT SERPL-MCNC: 1.02 MG/DL (ref 0.7–1.3)
ERYTHROCYTE [DISTWIDTH] IN BLOOD BY AUTOMATED COUNT: 13.8 % (ref 11.5–14.5)
GLUCOSE BLD STRIP.AUTO-MCNC: 104 MG/DL (ref 65–100)
GLUCOSE BLD STRIP.AUTO-MCNC: 108 MG/DL (ref 65–100)
GLUCOSE BLD STRIP.AUTO-MCNC: 148 MG/DL (ref 65–100)
GLUCOSE BLD STRIP.AUTO-MCNC: 170 MG/DL (ref 65–100)
GLUCOSE SERPL-MCNC: 111 MG/DL (ref 65–100)
HCT VFR BLD AUTO: 39 % (ref 36.6–50.3)
HGB BLD-MCNC: 12.8 G/DL (ref 12.1–17)
MAGNESIUM SERPL-MCNC: 1.9 MG/DL (ref 1.6–2.4)
MCH RBC QN AUTO: 29.8 PG (ref 26–34)
MCHC RBC AUTO-ENTMCNC: 32.8 G/DL (ref 30–36.5)
MCV RBC AUTO: 90.7 FL (ref 80–99)
PHOSPHATE SERPL-MCNC: 3.3 MG/DL (ref 2.6–4.7)
PLATELET # BLD AUTO: 201 K/UL (ref 150–400)
POTASSIUM SERPL-SCNC: 4.2 MMOL/L (ref 3.5–5.1)
RBC # BLD AUTO: 4.3 M/UL (ref 4.1–5.7)
SERVICE CMNT-IMP: ABNORMAL
SODIUM SERPL-SCNC: 138 MMOL/L (ref 136–145)
WBC # BLD AUTO: 9.4 K/UL (ref 4.1–11.1)

## 2017-07-31 PROCEDURE — 74011250636 HC RX REV CODE- 250/636: Performed by: ANESTHESIOLOGY

## 2017-07-31 PROCEDURE — 74011250636 HC RX REV CODE- 250/636

## 2017-07-31 PROCEDURE — 77030031139 HC SUT VCRL2 J&J -A: Performed by: SURGERY

## 2017-07-31 PROCEDURE — 80048 BASIC METABOLIC PNL TOTAL CA: CPT | Performed by: SURGERY

## 2017-07-31 PROCEDURE — 74011636637 HC RX REV CODE- 636/637: Performed by: SURGERY

## 2017-07-31 PROCEDURE — C1765 ADHESION BARRIER: HCPCS | Performed by: SURGERY

## 2017-07-31 PROCEDURE — 77030013567 HC DRN WND RESERV BARD -A: Performed by: SURGERY

## 2017-07-31 PROCEDURE — 76060000039 HC ANESTHESIA 4 TO 4.5 HR: Performed by: SURGERY

## 2017-07-31 PROCEDURE — 77030002966 HC SUT PDS J&J -A: Performed by: SURGERY

## 2017-07-31 PROCEDURE — 77030002996 HC SUT SLK J&J -A: Performed by: SURGERY

## 2017-07-31 PROCEDURE — 77030016151 HC PROTCTR LNS DFOG COVD -B: Performed by: SURGERY

## 2017-07-31 PROCEDURE — 77030012406 HC DRN WND PENRS BARD -A: Performed by: SURGERY

## 2017-07-31 PROCEDURE — 77030034850: Performed by: SURGERY

## 2017-07-31 PROCEDURE — 77030011264 HC ELECTRD BLD EXT COVD -A: Performed by: SURGERY

## 2017-07-31 PROCEDURE — 77030002986 HC SUT PROL J&J -A: Performed by: SURGERY

## 2017-07-31 PROCEDURE — 77030012890

## 2017-07-31 PROCEDURE — 77030011640 HC PAD GRND REM COVD -A: Performed by: SURGERY

## 2017-07-31 PROCEDURE — 77030022473 HC HNDL ENDO GIA UNIV USDA -C: Performed by: SURGERY

## 2017-07-31 PROCEDURE — 74011250636 HC RX REV CODE- 250/636: Performed by: SURGERY

## 2017-07-31 PROCEDURE — 77030022952 HC TRCR ENDOSC COVD -C: Performed by: SURGERY

## 2017-07-31 PROCEDURE — 77030002916 HC SUT ETHLN J&J -A: Performed by: SURGERY

## 2017-07-31 PROCEDURE — 77030019927 HC TBNG IRR CYSTO BAXT -A: Performed by: SURGERY

## 2017-07-31 PROCEDURE — 76210000017 HC OR PH I REC 1.5 TO 2 HR: Performed by: SURGERY

## 2017-07-31 PROCEDURE — 74011250636 HC RX REV CODE- 250/636: Performed by: PHYSICIAN ASSISTANT

## 2017-07-31 PROCEDURE — 77030032490 HC SLV COMPR SCD KNE COVD -B: Performed by: SURGERY

## 2017-07-31 PROCEDURE — 65270000029 HC RM PRIVATE

## 2017-07-31 PROCEDURE — 77030035048 HC TRCR ENDOSC OPTCL COVD -B: Performed by: SURGERY

## 2017-07-31 PROCEDURE — 77030002933 HC SUT MCRYL J&J -A: Performed by: SURGERY

## 2017-07-31 PROCEDURE — 77030010507 HC ADH SKN DERMBND J&J -B: Performed by: SURGERY

## 2017-07-31 PROCEDURE — 77030018832 HC SOL IRR H20 ICUM -A: Performed by: SURGERY

## 2017-07-31 PROCEDURE — 77030020747 HC TU INSUF ENDOSC TELE -A: Performed by: SURGERY

## 2017-07-31 PROCEDURE — 88307 TISSUE EXAM BY PATHOLOGIST: CPT | Performed by: SURGERY

## 2017-07-31 PROCEDURE — 88304 TISSUE EXAM BY PATHOLOGIST: CPT | Performed by: SURGERY

## 2017-07-31 PROCEDURE — 85027 COMPLETE CBC AUTOMATED: CPT | Performed by: SURGERY

## 2017-07-31 PROCEDURE — 77030022703 HC LIGASURE  BLNT LAPSCP SEAL COVD -E: Performed by: SURGERY

## 2017-07-31 PROCEDURE — 77030009965 HC RELD STPLR ENDOS COVD -D: Performed by: SURGERY

## 2017-07-31 PROCEDURE — 77030035051: Performed by: SURGERY

## 2017-07-31 PROCEDURE — 77030020782 HC GWN BAIR PAWS FLX 3M -B

## 2017-07-31 PROCEDURE — 0DTN0ZZ RESECTION OF SIGMOID COLON, OPEN APPROACH: ICD-10-PCS | Performed by: SURGERY

## 2017-07-31 PROCEDURE — 77030018673: Performed by: SURGERY

## 2017-07-31 PROCEDURE — 77030013079 HC BLNKT BAIR HGGR 3M -A: Performed by: ANESTHESIOLOGY

## 2017-07-31 PROCEDURE — 77030026438 HC STYL ET INTUB CARD -A: Performed by: ANESTHESIOLOGY

## 2017-07-31 PROCEDURE — 76010000175 HC OR TIME 4 TO 4.5 HR INTENSV-TIER 1: Performed by: SURGERY

## 2017-07-31 PROCEDURE — 77030025303 HC STPLR ENDOSC J&J -G: Performed by: SURGERY

## 2017-07-31 PROCEDURE — 77030008684 HC TU ET CUF COVD -B: Performed by: ANESTHESIOLOGY

## 2017-07-31 PROCEDURE — 84100 ASSAY OF PHOSPHORUS: CPT | Performed by: SURGERY

## 2017-07-31 PROCEDURE — 0T788DZ DILATION OF BILATERAL URETERS WITH INTRALUMINAL DEVICE, VIA NATURAL OR ARTIFICIAL OPENING ENDOSCOPIC: ICD-10-PCS | Performed by: UROLOGY

## 2017-07-31 PROCEDURE — 74011000250 HC RX REV CODE- 250: Performed by: PHYSICIAN ASSISTANT

## 2017-07-31 PROCEDURE — 74011000258 HC RX REV CODE- 258: Performed by: PHYSICIAN ASSISTANT

## 2017-07-31 PROCEDURE — 83735 ASSAY OF MAGNESIUM: CPT | Performed by: SURGERY

## 2017-07-31 PROCEDURE — 74011000250 HC RX REV CODE- 250

## 2017-07-31 PROCEDURE — 77030008756 HC TU IRR SUC STRY -B: Performed by: SURGERY

## 2017-07-31 PROCEDURE — 82962 GLUCOSE BLOOD TEST: CPT

## 2017-07-31 PROCEDURE — 77010033678 HC OXYGEN DAILY

## 2017-07-31 PROCEDURE — 77030036554: Performed by: SURGERY

## 2017-07-31 PROCEDURE — C1769 GUIDE WIRE: HCPCS | Performed by: SURGERY

## 2017-07-31 PROCEDURE — 77030009527 HC GEL PRT SYS AMR -E: Performed by: SURGERY

## 2017-07-31 PROCEDURE — 74011000250 HC RX REV CODE- 250: Performed by: SURGERY

## 2017-07-31 PROCEDURE — 77030037032 HC INSRT SCIS CLICKLLINE DISP STOR -B: Performed by: SURGERY

## 2017-07-31 PROCEDURE — 77030018836 HC SOL IRR NACL ICUM -A: Performed by: SURGERY

## 2017-07-31 PROCEDURE — 36415 COLL VENOUS BLD VENIPUNCTURE: CPT | Performed by: SURGERY

## 2017-07-31 PROCEDURE — 74011000258 HC RX REV CODE- 258: Performed by: SURGERY

## 2017-07-31 RX ORDER — HYDROMORPHONE HCL IN 0.9% NACL 15 MG/30ML
PATIENT CONTROLLED ANALGESIA VIAL INTRAVENOUS CONTINUOUS
Status: DISCONTINUED | OUTPATIENT
Start: 2017-07-31 | End: 2017-08-01

## 2017-07-31 RX ORDER — ALBUTEROL SULFATE 0.83 MG/ML
2.5 SOLUTION RESPIRATORY (INHALATION) AS NEEDED
Status: DISCONTINUED | OUTPATIENT
Start: 2017-07-31 | End: 2017-07-31 | Stop reason: HOSPADM

## 2017-07-31 RX ORDER — BUPIVACAINE HYDROCHLORIDE AND EPINEPHRINE 2.5; 5 MG/ML; UG/ML
INJECTION, SOLUTION EPIDURAL; INFILTRATION; INTRACAUDAL; PERINEURAL AS NEEDED
Status: DISCONTINUED | OUTPATIENT
Start: 2017-07-31 | End: 2017-07-31 | Stop reason: HOSPADM

## 2017-07-31 RX ORDER — KETOROLAC TROMETHAMINE 30 MG/ML
15 INJECTION, SOLUTION INTRAMUSCULAR; INTRAVENOUS ONCE
Status: ACTIVE | OUTPATIENT
Start: 2017-07-31 | End: 2017-08-01

## 2017-07-31 RX ORDER — PROPOFOL 10 MG/ML
INJECTION, EMULSION INTRAVENOUS AS NEEDED
Status: DISCONTINUED | OUTPATIENT
Start: 2017-07-31 | End: 2017-07-31 | Stop reason: HOSPADM

## 2017-07-31 RX ORDER — DEXAMETHASONE SODIUM PHOSPHATE 4 MG/ML
INJECTION, SOLUTION INTRA-ARTICULAR; INTRALESIONAL; INTRAMUSCULAR; INTRAVENOUS; SOFT TISSUE AS NEEDED
Status: DISCONTINUED | OUTPATIENT
Start: 2017-07-31 | End: 2017-07-31 | Stop reason: HOSPADM

## 2017-07-31 RX ORDER — SODIUM CHLORIDE, SODIUM LACTATE, POTASSIUM CHLORIDE, CALCIUM CHLORIDE 600; 310; 30; 20 MG/100ML; MG/100ML; MG/100ML; MG/100ML
150 INJECTION, SOLUTION INTRAVENOUS CONTINUOUS
Status: DISCONTINUED | OUTPATIENT
Start: 2017-07-31 | End: 2017-08-01

## 2017-07-31 RX ORDER — LIDOCAINE HYDROCHLORIDE 20 MG/ML
INJECTION, SOLUTION EPIDURAL; INFILTRATION; INTRACAUDAL; PERINEURAL AS NEEDED
Status: DISCONTINUED | OUTPATIENT
Start: 2017-07-31 | End: 2017-07-31 | Stop reason: HOSPADM

## 2017-07-31 RX ORDER — NEOSTIGMINE METHYLSULFATE 1 MG/ML
INJECTION INTRAVENOUS AS NEEDED
Status: DISCONTINUED | OUTPATIENT
Start: 2017-07-31 | End: 2017-07-31 | Stop reason: HOSPADM

## 2017-07-31 RX ORDER — GLYCOPYRROLATE 0.2 MG/ML
INJECTION INTRAMUSCULAR; INTRAVENOUS AS NEEDED
Status: DISCONTINUED | OUTPATIENT
Start: 2017-07-31 | End: 2017-07-31 | Stop reason: HOSPADM

## 2017-07-31 RX ORDER — MIDAZOLAM HYDROCHLORIDE 1 MG/ML
INJECTION, SOLUTION INTRAMUSCULAR; INTRAVENOUS AS NEEDED
Status: DISCONTINUED | OUTPATIENT
Start: 2017-07-31 | End: 2017-07-31 | Stop reason: HOSPADM

## 2017-07-31 RX ORDER — FENTANYL CITRATE 50 UG/ML
INJECTION, SOLUTION INTRAMUSCULAR; INTRAVENOUS AS NEEDED
Status: DISCONTINUED | OUTPATIENT
Start: 2017-07-31 | End: 2017-07-31 | Stop reason: HOSPADM

## 2017-07-31 RX ORDER — DIPHENHYDRAMINE HYDROCHLORIDE 50 MG/ML
12.5 INJECTION, SOLUTION INTRAMUSCULAR; INTRAVENOUS AS NEEDED
Status: DISCONTINUED | OUTPATIENT
Start: 2017-07-31 | End: 2017-07-31 | Stop reason: HOSPADM

## 2017-07-31 RX ORDER — ONDANSETRON 2 MG/ML
INJECTION INTRAMUSCULAR; INTRAVENOUS AS NEEDED
Status: DISCONTINUED | OUTPATIENT
Start: 2017-07-31 | End: 2017-07-31 | Stop reason: HOSPADM

## 2017-07-31 RX ORDER — HYDROMORPHONE HYDROCHLORIDE 2 MG/ML
INJECTION, SOLUTION INTRAMUSCULAR; INTRAVENOUS; SUBCUTANEOUS AS NEEDED
Status: DISCONTINUED | OUTPATIENT
Start: 2017-07-31 | End: 2017-07-31 | Stop reason: HOSPADM

## 2017-07-31 RX ORDER — LIDOCAINE HYDROCHLORIDE 10 MG/ML
0.1 INJECTION, SOLUTION EPIDURAL; INFILTRATION; INTRACAUDAL; PERINEURAL AS NEEDED
Status: DISCONTINUED | OUTPATIENT
Start: 2017-07-31 | End: 2017-08-04 | Stop reason: HOSPADM

## 2017-07-31 RX ORDER — ROCURONIUM BROMIDE 10 MG/ML
INJECTION, SOLUTION INTRAVENOUS AS NEEDED
Status: DISCONTINUED | OUTPATIENT
Start: 2017-07-31 | End: 2017-07-31 | Stop reason: HOSPADM

## 2017-07-31 RX ORDER — ONDANSETRON 2 MG/ML
4 INJECTION INTRAMUSCULAR; INTRAVENOUS AS NEEDED
Status: DISCONTINUED | OUTPATIENT
Start: 2017-07-31 | End: 2017-07-31 | Stop reason: HOSPADM

## 2017-07-31 RX ORDER — SODIUM CHLORIDE, SODIUM LACTATE, POTASSIUM CHLORIDE, CALCIUM CHLORIDE 600; 310; 30; 20 MG/100ML; MG/100ML; MG/100ML; MG/100ML
INJECTION, SOLUTION INTRAVENOUS
Status: DISCONTINUED | OUTPATIENT
Start: 2017-07-31 | End: 2017-07-31 | Stop reason: HOSPADM

## 2017-07-31 RX ORDER — HYDROMORPHONE HYDROCHLORIDE 1 MG/ML
0.5 INJECTION, SOLUTION INTRAMUSCULAR; INTRAVENOUS; SUBCUTANEOUS
Status: DISCONTINUED | OUTPATIENT
Start: 2017-07-31 | End: 2017-07-31 | Stop reason: HOSPADM

## 2017-07-31 RX ORDER — SODIUM CHLORIDE, SODIUM LACTATE, POTASSIUM CHLORIDE, CALCIUM CHLORIDE 600; 310; 30; 20 MG/100ML; MG/100ML; MG/100ML; MG/100ML
125 INJECTION, SOLUTION INTRAVENOUS CONTINUOUS
Status: DISCONTINUED | OUTPATIENT
Start: 2017-07-31 | End: 2017-07-31 | Stop reason: HOSPADM

## 2017-07-31 RX ADMIN — PIPERACILLIN SODIUM,TAZOBACTAM SODIUM 3.38 G: 3; .375 INJECTION, POWDER, FOR SOLUTION INTRAVENOUS at 03:25

## 2017-07-31 RX ADMIN — FENTANYL CITRATE 50 MCG: 50 INJECTION, SOLUTION INTRAMUSCULAR; INTRAVENOUS at 09:04

## 2017-07-31 RX ADMIN — FENTANYL CITRATE 50 MCG: 50 INJECTION, SOLUTION INTRAMUSCULAR; INTRAVENOUS at 07:46

## 2017-07-31 RX ADMIN — Medication 10 ML: at 21:39

## 2017-07-31 RX ADMIN — SODIUM CHLORIDE, SODIUM LACTATE, POTASSIUM CHLORIDE, AND CALCIUM CHLORIDE 150 ML/HR: 600; 310; 30; 20 INJECTION, SOLUTION INTRAVENOUS at 18:33

## 2017-07-31 RX ADMIN — NEOSTIGMINE METHYLSULFATE 2 MG: 1 INJECTION INTRAVENOUS at 11:22

## 2017-07-31 RX ADMIN — INSULIN LISPRO 2 UNITS: 100 INJECTION, SOLUTION INTRAVENOUS; SUBCUTANEOUS at 12:51

## 2017-07-31 RX ADMIN — PIPERACILLIN SODIUM,TAZOBACTAM SODIUM 3.38 G: 3; .375 INJECTION, POWDER, FOR SOLUTION INTRAVENOUS at 19:32

## 2017-07-31 RX ADMIN — FENTANYL CITRATE 50 MCG: 50 INJECTION, SOLUTION INTRAMUSCULAR; INTRAVENOUS at 07:39

## 2017-07-31 RX ADMIN — SODIUM CHLORIDE, SODIUM LACTATE, POTASSIUM CHLORIDE, AND CALCIUM CHLORIDE 150 ML/HR: 600; 310; 30; 20 INJECTION, SOLUTION INTRAVENOUS at 12:42

## 2017-07-31 RX ADMIN — FENTANYL CITRATE 50 MCG: 50 INJECTION, SOLUTION INTRAMUSCULAR; INTRAVENOUS at 09:01

## 2017-07-31 RX ADMIN — FENTANYL CITRATE 100 MCG: 50 INJECTION, SOLUTION INTRAMUSCULAR; INTRAVENOUS at 08:55

## 2017-07-31 RX ADMIN — HYDROMORPHONE HYDROCHLORIDE 0.5 MG: 1 INJECTION, SOLUTION INTRAMUSCULAR; INTRAVENOUS; SUBCUTANEOUS at 13:12

## 2017-07-31 RX ADMIN — ROCURONIUM BROMIDE 50 MG: 10 INJECTION, SOLUTION INTRAVENOUS at 07:47

## 2017-07-31 RX ADMIN — SODIUM CHLORIDE, SODIUM LACTATE, POTASSIUM CHLORIDE, CALCIUM CHLORIDE: 600; 310; 30; 20 INJECTION, SOLUTION INTRAVENOUS at 07:55

## 2017-07-31 RX ADMIN — PIPERACILLIN SODIUM,TAZOBACTAM SODIUM 3.38 G: 3; .375 INJECTION, POWDER, FOR SOLUTION INTRAVENOUS at 12:38

## 2017-07-31 RX ADMIN — FENTANYL CITRATE 50 MCG: 50 INJECTION, SOLUTION INTRAMUSCULAR; INTRAVENOUS at 08:12

## 2017-07-31 RX ADMIN — GLYCOPYRROLATE 0.3 MG: 0.2 INJECTION INTRAMUSCULAR; INTRAVENOUS at 11:22

## 2017-07-31 RX ADMIN — FENTANYL CITRATE 50 MCG: 50 INJECTION, SOLUTION INTRAMUSCULAR; INTRAVENOUS at 08:13

## 2017-07-31 RX ADMIN — HYDROMORPHONE HYDROCHLORIDE 0.5 MG: 2 INJECTION, SOLUTION INTRAMUSCULAR; INTRAVENOUS; SUBCUTANEOUS at 11:32

## 2017-07-31 RX ADMIN — DEXAMETHASONE SODIUM PHOSPHATE 4 MG: 4 INJECTION, SOLUTION INTRA-ARTICULAR; INTRALESIONAL; INTRAMUSCULAR; INTRAVENOUS; SOFT TISSUE at 07:57

## 2017-07-31 RX ADMIN — HEPARIN SODIUM 5000 UNITS: 5000 INJECTION, SOLUTION INTRAVENOUS; SUBCUTANEOUS at 21:37

## 2017-07-31 RX ADMIN — MIDAZOLAM HYDROCHLORIDE 2 MG: 1 INJECTION, SOLUTION INTRAMUSCULAR; INTRAVENOUS at 07:39

## 2017-07-31 RX ADMIN — FENTANYL CITRATE 50 MCG: 50 INJECTION, SOLUTION INTRAMUSCULAR; INTRAVENOUS at 08:59

## 2017-07-31 RX ADMIN — LORAZEPAM 1 MG: 2 INJECTION INTRAMUSCULAR; INTRAVENOUS at 19:32

## 2017-07-31 RX ADMIN — HYDROMORPHONE HYDROCHLORIDE 0.5 MG: 2 INJECTION, SOLUTION INTRAMUSCULAR; INTRAVENOUS; SUBCUTANEOUS at 11:38

## 2017-07-31 RX ADMIN — LIDOCAINE HYDROCHLORIDE 60 MG: 20 INJECTION, SOLUTION EPIDURAL; INFILTRATION; INTRACAUDAL; PERINEURAL at 07:46

## 2017-07-31 RX ADMIN — PROPOFOL 130 MG: 10 INJECTION, EMULSION INTRAVENOUS at 07:46

## 2017-07-31 RX ADMIN — RETINOL, ERGOCALCIFEROL, .ALPHA.-TOCOPHEROL ACETATE, DL-, PHYTONADIONE, ASCORBIC ACID, NIACINAMIDE, RIBOFLAVIN 5-PHOSPHATE SODIUM, THIAMINE HYDROCHLORIDE, PYRIDOXINE HYDROCHLORIDE, DEXPANTHENOL, BIOTIN, FOLIC ACID, AND CYANOCOBALAMIN: KIT at 17:19

## 2017-07-31 RX ADMIN — Medication: at 13:16

## 2017-07-31 RX ADMIN — ONDANSETRON 4 MG: 2 INJECTION INTRAMUSCULAR; INTRAVENOUS at 07:55

## 2017-07-31 RX ADMIN — FENTANYL CITRATE 50 MCG: 50 INJECTION, SOLUTION INTRAMUSCULAR; INTRAVENOUS at 08:10

## 2017-07-31 RX ADMIN — INSULIN LISPRO 2 UNITS: 100 INJECTION, SOLUTION INTRAVENOUS; SUBCUTANEOUS at 17:18

## 2017-07-31 RX ADMIN — SODIUM CHLORIDE, SODIUM LACTATE, POTASSIUM CHLORIDE, CALCIUM CHLORIDE: 600; 310; 30; 20 INJECTION, SOLUTION INTRAVENOUS at 10:46

## 2017-07-31 RX ADMIN — SODIUM CHLORIDE, SODIUM LACTATE, POTASSIUM CHLORIDE, AND CALCIUM CHLORIDE 150 ML/HR: 600; 310; 30; 20 INJECTION, SOLUTION INTRAVENOUS at 07:23

## 2017-07-31 RX ADMIN — SODIUM CHLORIDE, SODIUM LACTATE, POTASSIUM CHLORIDE, AND CALCIUM CHLORIDE: 600; 310; 30; 20 INJECTION, SOLUTION INTRAVENOUS at 08:59

## 2017-07-31 RX ADMIN — ROCURONIUM BROMIDE 30 MG: 10 INJECTION, SOLUTION INTRAVENOUS at 08:37

## 2017-07-31 NOTE — PROGRESS NOTES
Bedside and Verbal shift change report given to Gómez Zavala (oncoming nurse) by Luis Oliveira (offgoing nurse). Report included the following information SBAR, Kardex, Intake/Output, MAR and Recent Results.

## 2017-07-31 NOTE — ANESTHESIA POSTPROCEDURE EVALUATION
Post-Anesthesia Evaluation and Assessment    Patient: Peyman Milligan MRN: [id number]  SSN: [id number]    YOB: 1945  Age: 74 y.o. Sex: male       Cardiovascular Function/Vital Signs  Visit Vitals    /47    Pulse (!) 55    Temp 36.8 °C (98.3 °F)    Resp 13    Ht 5' 10\" (1.778 m)    Wt 84.8 kg (186 lb 15.2 oz)    SpO2 99%    BMI 26.82 kg/m2       Patient is status post general anesthesia for Procedure(s):  LAPAROSCOPIC HAND ASSISTED SIGMOIDECTOMY/ /CYSTOSCOPY/BILATERAL URETHRAL STENT INSERTION  CYSTOSCOPY INSERTION URETERAL CATHETERS. Nausea/Vomiting: None    Postoperative hydration reviewed and adequate. Pain:  Pain Scale 1: Numeric (0 - 10) (07/31/17 1323)  Pain Intensity 1: 3 (07/31/17 1323)   Managed    Neurological Status:   Neuro (WDL): Exceptions to WDL (07/31/17 1323)  Neuro  Neurologic State: Confused; Eyes open spontaneously (07/31/17 1323)  Orientation Level: Oriented to person;Oriented to place;Oriented to situation (07/31/17 1323)  Cognition: Follows commands (07/31/17 1323)  Speech: Clear (07/31/17 1323)  LUE Motor Response: Purposeful (07/31/17 1323)  LLE Motor Response: Purposeful (07/31/17 1323)  RUE Motor Response: Purposeful (07/31/17 1323)  RLE Motor Response: Purposeful (07/31/17 1323)   At baseline    Mental Status and Level of Consciousness: Arousable    Pulmonary Status:   O2 Device: Nasal cannula (07/31/17 1323)   Adequate oxygenation and airway patent    Complications related to anesthesia: None    Post-anesthesia assessment completed.  No concerns    Signed By: Emelina Lau MD     July 31, 2017

## 2017-07-31 NOTE — PERIOP NOTES
Bilateral stents placed by Dr. Joseph Dacosta. Right stent cut by him. Procedure complete at 0815. Dr. Joseph Dacosta left O.R. At 4590.

## 2017-07-31 NOTE — ANESTHESIA PREPROCEDURE EVALUATION
Anesthetic History   No history of anesthetic complications            Review of Systems / Medical History  Patient summary reviewed    Pulmonary  Within defined limits                 Neuro/Psych   Within defined limits           Cardiovascular  Within defined limits                Exercise tolerance: >4 METS     GI/Hepatic/Renal               Comments: Diverticulosis Endo/Other  Within defined limits           Other Findings              Physical Exam    Airway  Mallampati: I  TM Distance: > 6 cm  Neck ROM: normal range of motion   Mouth opening: Normal    Comments: Uncooperative Cardiovascular      Rate: normal         Dental    Dentition: Upper dentition intact and Lower dentition intact     Pulmonary  Breath sounds clear to auscultation               Abdominal         Other Findings            Anesthetic Plan    ASA: 2  Anesthesia type: general            Anesthetic plan and risks discussed with: Patient

## 2017-07-31 NOTE — PROGRESS NOTES
Primary Nurse Mello Cash RN and China Barreto RN performed a dual skin assessment on this patient Impairment noted- see wound doc flow sheet  Adrien score is 20    Other than abdominal surgical wounds, skin intact.

## 2017-07-31 NOTE — BRIEF OP NOTE
BRIEF OPERATIVE NOTE    Date of Procedure: 7/31/2017   Preoperative Diagnosis: ACUTE ON CHRONIC SIGMOID DIVERTICULITIS     Postoperative Diagnosis: ACUTE ON CHRONIC SIGMOID DIVERTICULITIS      Procedure(s):  1. LAPAROSCOPIC HAND ASSISTED SIGMOIDECTOMY  2. MOBILIZATION OF THE SPLENIC FLEXURE  / /CYSTOSCOPY/BILATERAL URETHRAL STENT INSERTION BY DR. Leopold Samples) and Role:  Panel 1:     * Lucio Pan MD - Primary    Panel 2:     * Laurence Birmingham MD - Primary         Assistant Staff:       Surgical Staff:  Circ-1: Kristy Adam RN  Circ-Relief: Abimael Everett RN  Scrub Tech-1: Soha Zapata  Scrub Tech-Relief: Tunde Rodriguez  Surg Asst-1: Wen Nicholas  Surg Asst-2: [facility]  Float Staff: Abimael Everett RN  Event Time In   Incision Start 4852   Incision Close      Anesthesia: General   Estimated Blood Loss: 25cc  Specimens:   ID Type Source Tests Collected by Time Destination   1 : Sigmoid Colon Preservative Sigmoid  Lucio Pan MD 7/31/2017 1001 Pathology      Findings: sigmoid adhesed to sidewall and anterior abdominal wall. Entire sigmoid resected. Donuts intact.  No leaks   Complications: none  Implants: * No implants in log *   Drains/Tubes: JAMEY moreno 10mm in pelvis

## 2017-07-31 NOTE — PERIOP NOTES
TRANSFER - OUT REPORT:    Verbal report given to iris ramos on Elton Lund  being transferred to [facility] for routine post - op       Report consisted of patients Situation, Background, Assessment and   Recommendations(SBAR). Information from the following report(s) SBAR, OR Summary, Procedure Summary, Intake/Output and MAR was reviewed with the receiving nurse. Lines:   PICC Triple Lumen 56/94/10 Right;Basilic (Active)   Central Line Being Utilized Yes 7/31/2017  1:36 PM   Criteria for Appropriate Use Total parenteral nutrition 7/31/2017  1:36 PM   Site Assessment Clean, dry, & intact 7/31/2017  1:36 PM   Phlebitis Assessment 0 7/31/2017  1:36 PM   Infiltration Assessment 0 7/31/2017  1:36 PM   Arm Circumference (cm) 34 cm 7/24/2017 11:53 AM   Date of Last Dressing Change 07/24/17 7/31/2017  1:36 PM   Dressing Status Clean, dry, & intact 7/31/2017  1:36 PM   External Catheter Length (cm) 12 centimeters 7/31/2017  1:36 PM   Dressing Type Tape;Transparent 7/31/2017  1:36 PM   Action Taken Other (comment) 7/31/2017  1:36 PM   Hub Color/Line Status Gray 7/31/2017  1:36 PM   Positive Blood Return (Site #1) Yes 7/31/2017  1:36 PM   Hub Color/Line Status White 7/31/2017  1:36 PM   Positive Blood Return (Site #2) Yes 7/31/2017  1:36 PM   Hub Color/Line Status Red 7/31/2017  1:36 PM   Positive Blood Return (Site #3) Yes 7/31/2017  1:36 PM   Alcohol Cap Used Yes 7/31/2017  1:36 PM       Peripheral IV 07/31/17 Left Hand (Active)   Site Assessment Clean, dry, & intact 7/31/2017  1:36 PM   Phlebitis Assessment 0 7/31/2017  1:36 PM   Infiltration Assessment 0 7/31/2017  1:36 PM   Dressing Status Clean, dry, & intact; Occlusive 7/31/2017  1:36 PM   Dressing Type Tape;Transparent 7/31/2017  1:36 PM   Hub Color/Line Status Pink; Infusing 7/31/2017  1:36 PM        Opportunity for questions and clarification was provided.       Patient transported with:   O2 @ 3 liters  Registered Nurse

## 2017-08-01 LAB
ANION GAP BLD CALC-SCNC: 4 MMOL/L (ref 5–15)
BASOPHILS # BLD AUTO: 0 K/UL (ref 0–0.1)
BASOPHILS # BLD: 0 % (ref 0–1)
BUN SERPL-MCNC: 19 MG/DL (ref 6–20)
BUN/CREAT SERPL: 22 (ref 12–20)
CALCIUM SERPL-MCNC: 7.8 MG/DL (ref 8.5–10.1)
CHLORIDE SERPL-SCNC: 108 MMOL/L (ref 97–108)
CO2 SERPL-SCNC: 28 MMOL/L (ref 21–32)
CREAT SERPL-MCNC: 0.86 MG/DL (ref 0.7–1.3)
EOSINOPHIL # BLD: 0.2 K/UL (ref 0–0.4)
EOSINOPHIL NFR BLD: 1 % (ref 0–7)
ERYTHROCYTE [DISTWIDTH] IN BLOOD BY AUTOMATED COUNT: 13.7 % (ref 11.5–14.5)
GLUCOSE BLD STRIP.AUTO-MCNC: 105 MG/DL (ref 65–100)
GLUCOSE BLD STRIP.AUTO-MCNC: 109 MG/DL (ref 65–100)
GLUCOSE BLD STRIP.AUTO-MCNC: 109 MG/DL (ref 65–100)
GLUCOSE BLD STRIP.AUTO-MCNC: 114 MG/DL (ref 65–100)
GLUCOSE SERPL-MCNC: 86 MG/DL (ref 65–100)
HCT VFR BLD AUTO: 37.1 % (ref 36.6–50.3)
HGB BLD-MCNC: 12.5 G/DL (ref 12.1–17)
LYMPHOCYTES # BLD AUTO: 13 % (ref 12–49)
LYMPHOCYTES # BLD: 2 K/UL (ref 0.8–3.5)
MAGNESIUM SERPL-MCNC: 1.6 MG/DL (ref 1.6–2.4)
MCH RBC QN AUTO: 30.3 PG (ref 26–34)
MCHC RBC AUTO-ENTMCNC: 33.7 G/DL (ref 30–36.5)
MCV RBC AUTO: 89.8 FL (ref 80–99)
MONOCYTES # BLD: 1.6 K/UL (ref 0–1)
MONOCYTES NFR BLD AUTO: 10 % (ref 5–13)
NEUTS SEG # BLD: 11.8 K/UL (ref 1.8–8)
NEUTS SEG NFR BLD AUTO: 76 % (ref 32–75)
PHOSPHATE SERPL-MCNC: 3.3 MG/DL (ref 2.6–4.7)
PLATELET # BLD AUTO: 188 K/UL (ref 150–400)
PLATELET COMMENTS,PCOM: ABNORMAL
POTASSIUM SERPL-SCNC: 4.6 MMOL/L (ref 3.5–5.1)
RBC # BLD AUTO: 4.13 M/UL (ref 4.1–5.7)
RBC MORPH BLD: ABNORMAL
SERVICE CMNT-IMP: ABNORMAL
SODIUM SERPL-SCNC: 140 MMOL/L (ref 136–145)
WBC # BLD AUTO: 15.6 K/UL (ref 4.1–11.1)

## 2017-08-01 PROCEDURE — 74011000250 HC RX REV CODE- 250: Performed by: PHYSICIAN ASSISTANT

## 2017-08-01 PROCEDURE — 85025 COMPLETE CBC W/AUTO DIFF WBC: CPT | Performed by: SURGERY

## 2017-08-01 PROCEDURE — 74011000258 HC RX REV CODE- 258: Performed by: SURGERY

## 2017-08-01 PROCEDURE — 74011250637 HC RX REV CODE- 250/637: Performed by: SURGERY

## 2017-08-01 PROCEDURE — 74011250637 HC RX REV CODE- 250/637: Performed by: PHYSICIAN ASSISTANT

## 2017-08-01 PROCEDURE — 80048 BASIC METABOLIC PNL TOTAL CA: CPT | Performed by: SURGERY

## 2017-08-01 PROCEDURE — 84100 ASSAY OF PHOSPHORUS: CPT | Performed by: SURGERY

## 2017-08-01 PROCEDURE — 74011000258 HC RX REV CODE- 258: Performed by: PHYSICIAN ASSISTANT

## 2017-08-01 PROCEDURE — 36415 COLL VENOUS BLD VENIPUNCTURE: CPT | Performed by: SURGERY

## 2017-08-01 PROCEDURE — 65270000029 HC RM PRIVATE

## 2017-08-01 PROCEDURE — 83735 ASSAY OF MAGNESIUM: CPT | Performed by: SURGERY

## 2017-08-01 PROCEDURE — C9113 INJ PANTOPRAZOLE SODIUM, VIA: HCPCS | Performed by: SURGERY

## 2017-08-01 PROCEDURE — 74011250636 HC RX REV CODE- 250/636: Performed by: PHYSICIAN ASSISTANT

## 2017-08-01 PROCEDURE — 74011000250 HC RX REV CODE- 250: Performed by: SURGERY

## 2017-08-01 PROCEDURE — 77010033678 HC OXYGEN DAILY

## 2017-08-01 PROCEDURE — 74011250636 HC RX REV CODE- 250/636: Performed by: SURGERY

## 2017-08-01 PROCEDURE — 74011250636 HC RX REV CODE- 250/636: Performed by: ANESTHESIOLOGY

## 2017-08-01 PROCEDURE — 82962 GLUCOSE BLOOD TEST: CPT

## 2017-08-01 RX ORDER — DEXTROSE MONOHYDRATE AND SODIUM CHLORIDE 5; .9 G/100ML; G/100ML
25 INJECTION, SOLUTION INTRAVENOUS CONTINUOUS
Status: DISCONTINUED | OUTPATIENT
Start: 2017-08-01 | End: 2017-08-02

## 2017-08-01 RX ORDER — HYDROMORPHONE HYDROCHLORIDE 1 MG/ML
0.5 INJECTION, SOLUTION INTRAMUSCULAR; INTRAVENOUS; SUBCUTANEOUS ONCE
Status: COMPLETED | OUTPATIENT
Start: 2017-08-01 | End: 2017-08-01

## 2017-08-01 RX ORDER — OXYCODONE HYDROCHLORIDE 5 MG/1
5 TABLET ORAL
Status: DISCONTINUED | OUTPATIENT
Start: 2017-08-01 | End: 2017-08-04 | Stop reason: HOSPADM

## 2017-08-01 RX ORDER — OXYCODONE HYDROCHLORIDE 5 MG/1
10 TABLET ORAL
Status: DISCONTINUED | OUTPATIENT
Start: 2017-08-01 | End: 2017-08-04 | Stop reason: HOSPADM

## 2017-08-01 RX ORDER — HYDROMORPHONE HYDROCHLORIDE 1 MG/ML
1 INJECTION, SOLUTION INTRAMUSCULAR; INTRAVENOUS; SUBCUTANEOUS
Status: DISCONTINUED | OUTPATIENT
Start: 2017-08-01 | End: 2017-08-04 | Stop reason: HOSPADM

## 2017-08-01 RX ORDER — LIDOCAINE 50 MG/G
1 PATCH TOPICAL EVERY 24 HOURS
Status: DISCONTINUED | OUTPATIENT
Start: 2017-08-01 | End: 2017-08-04 | Stop reason: HOSPADM

## 2017-08-01 RX ORDER — KETOROLAC TROMETHAMINE 30 MG/ML
15 INJECTION, SOLUTION INTRAMUSCULAR; INTRAVENOUS EVERY 6 HOURS
Status: COMPLETED | OUTPATIENT
Start: 2017-08-01 | End: 2017-08-04

## 2017-08-01 RX ORDER — LORAZEPAM 2 MG/ML
1 INJECTION INTRAMUSCULAR
Status: DISCONTINUED | OUTPATIENT
Start: 2017-08-01 | End: 2017-08-02

## 2017-08-01 RX ORDER — ACETAMINOPHEN 325 MG/1
650 TABLET ORAL EVERY 6 HOURS
Status: DISCONTINUED | OUTPATIENT
Start: 2017-08-01 | End: 2017-08-04 | Stop reason: HOSPADM

## 2017-08-01 RX ADMIN — PIPERACILLIN SODIUM,TAZOBACTAM SODIUM 3.38 G: 3; .375 INJECTION, POWDER, FOR SOLUTION INTRAVENOUS at 11:13

## 2017-08-01 RX ADMIN — SODIUM CHLORIDE, SODIUM LACTATE, POTASSIUM CHLORIDE, AND CALCIUM CHLORIDE 150 ML/HR: 600; 310; 30; 20 INJECTION, SOLUTION INTRAVENOUS at 00:12

## 2017-08-01 RX ADMIN — ACETAMINOPHEN 650 MG: 325 TABLET ORAL at 17:28

## 2017-08-01 RX ADMIN — RETINOL, ERGOCALCIFEROL, .ALPHA.-TOCOPHEROL ACETATE, DL-, PHYTONADIONE, ASCORBIC ACID, NIACINAMIDE, RIBOFLAVIN 5-PHOSPHATE SODIUM, THIAMINE HYDROCHLORIDE, PYRIDOXINE HYDROCHLORIDE, DEXPANTHENOL, BIOTIN, FOLIC ACID, AND CYANOCOBALAMIN: KIT at 17:41

## 2017-08-01 RX ADMIN — HYDROMORPHONE HYDROCHLORIDE 1 MG: 1 INJECTION, SOLUTION INTRAMUSCULAR; INTRAVENOUS; SUBCUTANEOUS at 20:09

## 2017-08-01 RX ADMIN — I.V. FAT EMULSION 500 ML: 20 EMULSION INTRAVENOUS at 17:47

## 2017-08-01 RX ADMIN — ACETAMINOPHEN 650 MG: 325 TABLET ORAL at 11:13

## 2017-08-01 RX ADMIN — Medication 10 ML: at 22:21

## 2017-08-01 RX ADMIN — PIPERACILLIN SODIUM,TAZOBACTAM SODIUM 3.38 G: 3; .375 INJECTION, POWDER, FOR SOLUTION INTRAVENOUS at 04:04

## 2017-08-01 RX ADMIN — OXYCODONE HYDROCHLORIDE 10 MG: 5 TABLET ORAL at 22:38

## 2017-08-01 RX ADMIN — OXYCODONE HYDROCHLORIDE 10 MG: 5 TABLET ORAL at 17:28

## 2017-08-01 RX ADMIN — HEPARIN SODIUM 5000 UNITS: 5000 INJECTION, SOLUTION INTRAVENOUS; SUBCUTANEOUS at 22:21

## 2017-08-01 RX ADMIN — Medication 10 ML: at 12:14

## 2017-08-01 RX ADMIN — HEPARIN SODIUM 5000 UNITS: 5000 INJECTION, SOLUTION INTRAVENOUS; SUBCUTANEOUS at 06:12

## 2017-08-01 RX ADMIN — Medication 10 ML: at 14:26

## 2017-08-01 RX ADMIN — HYDROMORPHONE HYDROCHLORIDE 0.5 MG: 1 INJECTION, SOLUTION INTRAMUSCULAR; INTRAVENOUS; SUBCUTANEOUS at 09:30

## 2017-08-01 RX ADMIN — PIPERACILLIN SODIUM,TAZOBACTAM SODIUM 3.38 G: 3; .375 INJECTION, POWDER, FOR SOLUTION INTRAVENOUS at 20:05

## 2017-08-01 RX ADMIN — KETOROLAC TROMETHAMINE 15 MG: 30 INJECTION, SOLUTION INTRAMUSCULAR at 17:28

## 2017-08-01 RX ADMIN — SODIUM CHLORIDE 40 MG: 9 INJECTION INTRAMUSCULAR; INTRAVENOUS; SUBCUTANEOUS at 09:31

## 2017-08-01 RX ADMIN — HEPARIN SODIUM 5000 UNITS: 5000 INJECTION, SOLUTION INTRAVENOUS; SUBCUTANEOUS at 14:25

## 2017-08-01 RX ADMIN — KETOROLAC TROMETHAMINE 15 MG: 30 INJECTION, SOLUTION INTRAMUSCULAR at 11:13

## 2017-08-01 RX ADMIN — OXYCODONE HYDROCHLORIDE 5 MG: 5 TABLET ORAL at 11:14

## 2017-08-01 RX ADMIN — SODIUM CHLORIDE, SODIUM LACTATE, POTASSIUM CHLORIDE, AND CALCIUM CHLORIDE 150 ML/HR: 600; 310; 30; 20 INJECTION, SOLUTION INTRAVENOUS at 06:12

## 2017-08-01 RX ADMIN — DEXTROSE MONOHYDRATE AND SODIUM CHLORIDE 25 ML/HR: 5; .9 INJECTION, SOLUTION INTRAVENOUS at 09:31

## 2017-08-01 RX ADMIN — HYDROMORPHONE HYDROCHLORIDE 1 MG: 1 INJECTION, SOLUTION INTRAMUSCULAR; INTRAVENOUS; SUBCUTANEOUS at 14:46

## 2017-08-01 RX ADMIN — Medication 10 ML: at 06:12

## 2017-08-01 NOTE — PROGRESS NOTES
Bedside and Verbal shift change report given to [address] [facility] (oncoming nurse) by Jessica Mac (offgoing nurse). Report included the following information SBAR, Kardex, Intake/Output, MAR and Recent Results.

## 2017-08-01 NOTE — CDMP QUERY
Please clarify if this patient is being treated/managed for:    => Toxic Encephalopathy in the setting of pain management for severe abd. pain requiring HCT & replacement of NGT when pulled out by pt. =>Other Explanation of clinical findings    =>Unable to Determine (no explanation of clinical findings)    The medical record reflects the following clinical findings, treatment, and risk factors:    Risk Factors: 1mg IV Dilaudid q 3 hrs prn Severe pain    Clinical Indicators: Confusion. delirum, unexplained altered LOC 07/26--General surgery documents \"per RN has moments of confusion where he does not seem to know the day or time. He asks many questions about the treatment plan.  07/27---Rn notes @ 0630 \"Notified Endo that pt was given PRN ativan for agitation due to the NG tube and could not sleep; Pt pulled out his NG tube and was disoriented this am.   07/27--Anesthesia \"Uncooperative\"    Treatment: 07/26/2017---Replacement of NGT when pulled out by patient. Please clarify and document your clinical opinion in the progress notes and discharge summary including the definitive and/or presumptive diagnosis, (suspected or probable), related to the above clinical findings. Please include clinical findings supporting your diagnosis.   PARISH Gabriel, [facility]  [phone]

## 2017-08-01 NOTE — PROGRESS NOTES
Nutrition Assessment:    RECOMMENDATIONS/INTERVENTION(S):   Continue TPN until diet advanced & tolerated      D20/5%AA @ 75 ml/hr   Lipids 20% (500 ml) @ 42 ml/hr x 12 hr 3x/wk  (TPN @ goal + Lipids provides 2016 kcals, 90 g protein, 1800 ml fluid)    Diet advancement per Surgery     Continue to monitor plan of care & provide nutrition recs/interventions prn  ASSESSMENT:   :  POD #1 lap, hand-assist sigmoidectomy, mobilization splenic flexure. Diet advanced to CLD (+ Ensure Clear TID) & Surgery notes to continue TPN (re-ordered at goal rate). Labs/meds reviewed. BG controlled, [phone]. Surgical incision to abd.      : f/u, pt wasn't adequately prepped for surgery yesterday, rescheduled for , Diet advanced to Clears and will be getting slow-prep all weekend. TPN remains the same. Pt with some nausea & abd pain at times - controlled with medications. Encouraged frequent sips while awake to maintain GI stimulation throughout prep. : F/u, pt remains on TPN. Noted Triglyceride level 119 mg.dl. Chart reviewed, for sigmoidoscopy tomorrow, high NG output and liquid stools. Noted B, 117, 141, 152, 123 mg/dl. RD to follow, monitor pt progress. :  Pt seen for LOS & CLD/NPO x 5.  74 yom admitted for colonic obstruction. Pmhx includes diverticulitis. S/p CT showing sigmoid colitis w/ colonic obstruction and concomitant SBO. Visited pt this afternoon. Daughter in room, pt in restroom. Reports eating well and maintaining wt PTA. Avoids certain foods while w/ diverticulitis flare ups - dx 10 yrs ago. Normal BMI per age. Labs/meds reviewed. Lytes WDL. BUN elevated but trending down, Cr WDL. . No PI or edema noted. D5 @ 150 ml/hr infusing. TPN started by Surgery today - D15/5%AA @ 42 ml/hr; no Lipids ordered. TPN recs above.       SUBJECTIVE/OBJECTIVE:     Diet Order:  Clear Liquids, Ensure Clear TID  TPN: D20 5% AA @ 75 ml/hr  Lipids: 20% @ 42 ml/hr x 12 hours    Pertinent Medications: [x] Reviewed - D5 0.45% NaCl @ 25 ml/hr, SSI, Zofran, Protonix    Chemistries:  Lab Results   Component Value Date/Time    Sodium 140 08/01/2017 04:00 AM    Potassium 4.6 08/01/2017 04:00 AM    Chloride 108 08/01/2017 04:00 AM    CO2 28 08/01/2017 04:00 AM    Anion gap 4 08/01/2017 04:00 AM    Glucose 86 08/01/2017 04:00 AM    BUN 19 08/01/2017 04:00 AM    Creatinine 0.86 08/01/2017 04:00 AM    BUN/Creatinine ratio 22 08/01/2017 04:00 AM    GFR est AA >60 08/01/2017 04:00 AM    GFR est non-AA >60 08/01/2017 04:00 AM    Calcium 7.8 08/01/2017 04:00 AM    AST (SGOT) 20 07/18/2017 10:14 AM    Alk. phosphatase 71 07/18/2017 10:14 AM    Protein, total 7.8 07/18/2017 10:14 AM    Albumin 3.3 07/18/2017 10:14 AM    Globulin 4.5 07/18/2017 10:14 AM    A-G Ratio 0.7 07/18/2017 10:14 AM    ALT (SGPT) 26 07/18/2017 10:14 AM      Anthropometrics: Height: 5' 10\" (177.8 cm) Weight: 84.8 kg (186 lb 15.2 oz)    IBW (%IBW): 83 kg (182 lb 15.7 oz) (103.98 %) UBW (%UBW):   (  %)    BMI: Body mass index is 26.82 kg/(m^2). This BMI is indicative of:   [] Underweight    [x] Normal - per advanced age   [] Overweight    []  Obesity    []  Extreme Obesity (BMI>40)  Estimated Nutrition Needs (Based on): 2019 Kcals/day (RMR (1609) x 1.3 AF) , 69 g (-86 (0.8-1.0 g/kg x actual body wt)) Protein  Carbohydrate: At Least 130 g/day  Fluids: 2000 mL/day    Last BM: 7/31, abd soft, tender  []Active     []Hyperactive  [x]Hypoactive       [] Absent   BS  Skin:    [] Intact   [x] Incision - abd  [] Breakdown   [] DTI   [] Tears/Excoriation/Abrasion  []Edema [] Other:    Wt Readings from Last 30 Encounters:   07/31/17 84.8 kg (186 lb 15.2 oz)   07/19/17 87.1 kg (192 lb)   07/18/17 87.1 kg (192 lb)   07/17/17 87.1 kg (192 lb)   07/03/17 87.3 kg (192 lb 7.4 oz)   03/07/17 89.4 kg (197 lb)   02/23/16 87.5 kg (193 lb)   07/05/15 88.9 kg (196 lb)   01/12/15 81.6 kg (180 lb)   08/29/13 81.6 kg (180 lb)      NUTRITION DIAGNOSES: Problem:  Inadequate oral food/beverage intake     Etiology: related to alteration in GI tract structure and/or function     Signs/Symptoms: as evidenced by diverticulitis, NPO x 5 days      NUTRITION INTERVENTIONS:  Meals/Snacks: General/healthful diet Enteral/Parenteral Nutrition: Initiate parenteral nutrition               GOAL:   Continue TPN until advanced diet initiated & tolerated in the next 1-2 days    Cultural, Shinto, or Ethnic Dietary Needs: None     LEARNING NEEDS (Diet, Food/Nutrient-Drug Interaction):    [x] None Identified   [] Identified and Education Provided/Documented   [] Identified and Pt declined/was not appropriate      [x] Interdisciplinary Care Plan Reviewed/Documented    [x] Discharge Needs:    TBD   [] No Nutrition Related Discharge Needs    NUTRITION RISK:   Pt Is At Nutrition Risk  [x]     No Nutrition Risk Identified  []       PT SEEN FOR:    []  MD Consult: []Calorie Count      []Diabetic Diet Education        []Diet Education     []Electrolyte Management     []General Nutrition Management and Supplements     []Management of Tube Feeding     []TPN Recommendations    []  RN Referral:  []MST score >=2     []Enteral/Parenteral Nutrition PTA     []Pregnant: Gestational DM or Multigestation                 [] Pressure Ulcer    []  Low BMI      []  Length of Stay       [] Dysphagia Diet         [] Ventilator  [x]  Follow-up - TPN     Previous Recommendations:   [x] Implemented          [] Not Implemented          [] Not Applicable    Previous Goal:   [x] Met              [] Progressing Towards Goal              [] Not Progressing Towards Goal   [] Not Applicable            Keisha Narrow, RD

## 2017-08-01 NOTE — PROGRESS NOTES
General Surgery Daily Progress Note    Patient: Alvina Yu MRN: [id number]  SSN: [id number]    YOB: 1945  Age: 74 y.o. Sex: male      Admit Date: 7/21/2017    Subjective:   Pain not well controlled on PCA. Per RN, patient does not remember to use it and he states he does not feel like it helps. No BM or flatus. Denies N/V. Tolerating sips.      Current Facility-Administered Medications   Medication Dose Route Frequency    TPN ADULT - CENTRAL AA 5% D20% W/ ELECTROLYTES AND CA   IntraVENous CONTINUOUS    dextrose 5% and 0.9% NaCl infusion  25 mL/hr IntraVENous CONTINUOUS    ketorolac (TORADOL) injection 15 mg  15 mg IntraVENous Q6H    oxyCODONE IR (ROXICODONE) tablet 5 mg  5 mg Oral Q4H PRN    oxyCODONE IR (ROXICODONE) tablet 10 mg  10 mg Oral Q4H PRN    acetaminophen (TYLENOL) tablet 650 mg  650 mg Oral Q6H    LORazepam (ATIVAN) injection 1 mg  1 mg IntraVENous Q4H PRN    lidocaine (LIDODERM) 5 % patch 1 Patch  1 Patch TransDERmal Q24H    HYDROmorphone (PF) (DILAUDID) injection 1 mg  1 mg IntraVENous Q3H PRN    lidocaine (PF) (XYLOCAINE) 10 mg/mL (1 %) injection 0.1 mL  0.1 mL SubCUTAneous PRN    TPN ADULT - CENTRAL AA 5% D20% W/ ELECTROLYTES AND CA   IntraVENous CONTINUOUS    diphenhydrAMINE-zinc acetate 1%-0.1% (BENADRYL) cream   Topical Q4H PRN    pantoprazole (PROTONIX) 40 mg in sodium chloride 0.9 % 10 mL injection  40 mg IntraVENous DAILY    fat emulsion 20% (LIPOSYN, INTRALIPID) infusion 500 mL  500 mL IntraVENous Q TUE, THU & SAT    insulin lispro (HUMALOG) injection   SubCUTAneous Q6H    glucose chewable tablet 16 g  4 Tab Oral PRN    dextrose (D50W) injection syrg 12.5-25 g  12.5-25 g IntraVENous PRN    glucagon (GLUCAGEN) injection 1 mg  1 mg IntraMUSCular PRN    sodium chloride (NS) flush 10-30 mL  10-30 mL InterCATHeter PRN    sodium chloride (NS) flush 10 mL  10 mL InterCATHeter Q24H    sodium chloride (NS) flush 10-40 mL  10-40 mL InterCATHeter Q8H    alteplase (CATHFLO) 1 mg in sterile water (preservative free) 1 mL injection  1 mg InterCATHeter PRN    nicotine (NICODERM CQ) 14 mg/24 hr patch 1 Patch  1 Patch TransDERmal DAILY    naloxone (NARCAN) injection 0.4 mg  0.4 mg IntraVENous PRN    ondansetron (ZOFRAN) injection 4 mg  4 mg IntraVENous Q4H PRN    diphenhydrAMINE (BENADRYL) injection 12.5 mg  12.5 mg IntraVENous Q4H PRN    heparin (porcine) injection 5,000 Units  5,000 Units SubCUTAneous Q8H    piperacillin-tazobactam (ZOSYN) 3.375 g in 0.9% sodium chloride (MBP/ADV) 100 mL  3.375 g IntraVENous Q8H        Objective:   08/01 0701 - 08/01 1900  In: -   Out: 500 [Urine:500]  07/30 1901 - 08/01 0700  In: 8467 [I.V.:4667]  Out: 9985 [GUZXH:5607; Drains:166]  Patient Vitals for the past 8 hrs:   BP Temp Pulse Resp SpO2   08/01/17 0743 159/75 98.2 °F (36.8 °C) 73 18 100 %   08/01/17 0255 141/69 98.2 °F (36.8 °C) 70 18 95 %       Physical Exam:  General: Alert, cooperative, NAD  Lungs: Clear to auscultation   Heart:  Regular rate and rhythm  Abdomen: Soft, ATTP, mildly distended. + bowel sounds. Incisions c/d/i. JAMEY drain SS. Extremities: Warm, moves all, no edema  Skin:  Warm and dry, no rash    Labs: Recent Labs      08/01/17   0400   WBC  15.6*   HGB  12.5   HCT  37.1   PLT  188     Recent Labs      08/01/17   0400   NA  140   K  4.6   CL  108   CO2  28   GLU  86   BUN  19   CREA  0.86   CA  7.8*   MG  1.6   PHOS  3.3       Assessment / Plan:   · POD#1 lap, hand-assist sigmoidectomy, mobilization splenic flexure  · Abdominal exam appropriate, excellent UOP, VSS  · Pain poorly controlled with PCA. D/c PCA. Start PO arvind and IV Dilaudid PRN, scheduled Tylenol and Toradol, lidoderm patch to lower abdomen, PRN Valium for abdominal spasms. · OOB once pain better managed  · Encourage IS  · Renew TPN  · Continue moreno d/t type of surgical procedure.

## 2017-08-01 NOTE — PROGRESS NOTES
Bedside and Verbal shift change report given to Susan Perez RN (oncoming nurse) by Xenia Martinez RN (offgoing nurse).  Report included the following information SBAR, Kardex, Intake/Output and MAR. '

## 2017-08-01 NOTE — PROGRESS NOTES
Bedside and Verbal shift change report given to Soha Carreon, [facility] (oncoming nurse) by Heather Duff RN (offgoing nurse).  Report included the following information SBAR, Kardex, Intake/Output and MAR. '

## 2017-08-01 NOTE — OP NOTES
[address] [place] [address], [address]   OP NOTE       Name:  Oly Gutierrez   MR#:  [id number]   :  1945   Account #:  [de-identified]    Surgery Date:  2017   Date of Adm:  2017       PREOPERATIVE DIAGNOSIS: Diverticulitis. POSTOPERATIVE DIAGNOSIS: Diverticulitis. PROCEDURES PERFORMED: Cystoscopy and placement of bilateral   ureteral stents. SURGEON: Alfonso Boateng MD    ANESTHESIA: General endotracheal.    SPECIMENS: None. COMPLICATIONS: None. ESTIMATED BLOOD LOSS: None. DRAINS: Bilateral 5-Slovak ureteral stents with the right stent cut at an   angle and 16-Slovak 10 mL Paz catheter. INDICATION FOR PROCEDURE: The patient is a very pleasant 74  year-old white male who has a history of diverticulitis and was admitted   with exacerbation of this. He is scheduled to undergo partial colectomy   with Dr. Luis Serrano. Preoperatively, she is concerned that his ureters   be easier to identify and preserve. For this reason, it is requested   placement of bilateral ureteral stents. The procedure's purposes and   benefits were described in detail to the patient and his wife, and they   have consented to proceed. DESCRIPTION OF PROCEDURE: The patient was taken to the room   and positively identified as the correct patient by Dr. Julián Hay and myself. He was placed on the operating room table in the supine position and   general endotracheal anesthesia was obtained. He was then placed in   lithotomy position and his genitalia was sterilely prepped and draped in   the usual fashion. IV antibiotics had been administered in the   outpatient surgery area according to protocol. A 22. 5-Slovak cystoscope sheath with a 30-degree lens was then used   to perform cystourethroscopy. The urethra was normal in caliber. The   prostate had mild bilobar hypertrophy, but no substantial obstruction   and there was no significant median lobe.  On entering the bladder,   cystoscopy was performed carefully with both the 30- and 70-degree   lenses. This showed no evidence of any fistula or irritation of the   bladder mucosa or other mucosal abnormalities. There were no calculi   seen within the bladder. The ureteral orifices were normal and   orthotopic in position with clear effluent bilaterally. Using the 30-degree lens, attention was turned initially towards the   patient's left ureteral orifice, where a 5-Tunisian open ended catheter   was able to be advanced under cystoscopic guidance without difficulty. Attention was turned towards the right ureteral orifice where a 5-  [place] open ended catheter was again able to be advanced without   difficulty. Of note, the end of the ureteral stent was cut to allow   identification of right versus left at the end of the case. With this, the   patient's bladder was left full and the cystoscope was carefully   withdrawn to avoid inadvertently pulling on the stent. A 16-Tunisian 10   mL Paz catheter was then placed and left to gravity drainage. Next,   10 mL of sterile water was filled in the balloon. The ureteral stents   were then attached to the Paz to prevent their inadvertent removal.    At this point, the patient was repositioned for Dr. Castillo's portion of the   case and her portion will be dictated separately.         MD RUSSELL Denney / MELISSA   D:  08/01/2017   11:50   T:  08/01/2017   12:19   Job #:  [id number]     Dr. Nevin Jimenez

## 2017-08-01 NOTE — PROGRESS NOTES
8/1/2017 3:40 PM EMR reviewed, no discharge needs identified at this time. CM will follow for discharge recommendations.  MINDA Lin

## 2017-08-02 LAB
ANION GAP BLD CALC-SCNC: 3 MMOL/L (ref 5–15)
BASOPHILS # BLD AUTO: 0 K/UL (ref 0–0.1)
BASOPHILS # BLD: 0 % (ref 0–1)
BUN SERPL-MCNC: 15 MG/DL (ref 6–20)
BUN/CREAT SERPL: 18 (ref 12–20)
CALCIUM SERPL-MCNC: 8 MG/DL (ref 8.5–10.1)
CHLORIDE SERPL-SCNC: 107 MMOL/L (ref 97–108)
CO2 SERPL-SCNC: 29 MMOL/L (ref 21–32)
CREAT SERPL-MCNC: 0.85 MG/DL (ref 0.7–1.3)
EOSINOPHIL # BLD: 0.3 K/UL (ref 0–0.4)
EOSINOPHIL NFR BLD: 2 % (ref 0–7)
ERYTHROCYTE [DISTWIDTH] IN BLOOD BY AUTOMATED COUNT: 13.9 % (ref 11.5–14.5)
GLUCOSE BLD STRIP.AUTO-MCNC: 103 MG/DL (ref 65–100)
GLUCOSE BLD STRIP.AUTO-MCNC: 106 MG/DL (ref 65–100)
GLUCOSE BLD STRIP.AUTO-MCNC: 113 MG/DL (ref 65–100)
GLUCOSE SERPL-MCNC: 91 MG/DL (ref 65–100)
HCT VFR BLD AUTO: 37.1 % (ref 36.6–50.3)
HGB BLD-MCNC: 12.2 G/DL (ref 12.1–17)
LYMPHOCYTES # BLD AUTO: 10 % (ref 12–49)
LYMPHOCYTES # BLD: 1.4 K/UL (ref 0.8–3.5)
MAGNESIUM SERPL-MCNC: 1.7 MG/DL (ref 1.6–2.4)
MCH RBC QN AUTO: 30 PG (ref 26–34)
MCHC RBC AUTO-ENTMCNC: 32.9 G/DL (ref 30–36.5)
MCV RBC AUTO: 91.2 FL (ref 80–99)
MONOCYTES # BLD: 1.6 K/UL (ref 0–1)
MONOCYTES NFR BLD AUTO: 11 % (ref 5–13)
NEUTS SEG # BLD: 10.4 K/UL (ref 1.8–8)
NEUTS SEG NFR BLD AUTO: 77 % (ref 32–75)
PHOSPHATE SERPL-MCNC: 3 MG/DL (ref 2.6–4.7)
PLATELET # BLD AUTO: 183 K/UL (ref 150–400)
POTASSIUM SERPL-SCNC: 4.3 MMOL/L (ref 3.5–5.1)
RBC # BLD AUTO: 4.07 M/UL (ref 4.1–5.7)
SERVICE CMNT-IMP: ABNORMAL
SODIUM SERPL-SCNC: 139 MMOL/L (ref 136–145)
TRIGL SERPL-MCNC: 64 MG/DL (ref ?–150)
WBC # BLD AUTO: 13.7 K/UL (ref 4.1–11.1)

## 2017-08-02 PROCEDURE — 83735 ASSAY OF MAGNESIUM: CPT | Performed by: SURGERY

## 2017-08-02 PROCEDURE — 74011250636 HC RX REV CODE- 250/636: Performed by: SURGERY

## 2017-08-02 PROCEDURE — 74011000258 HC RX REV CODE- 258: Performed by: PHYSICIAN ASSISTANT

## 2017-08-02 PROCEDURE — 65270000029 HC RM PRIVATE

## 2017-08-02 PROCEDURE — 74011000250 HC RX REV CODE- 250: Performed by: SURGERY

## 2017-08-02 PROCEDURE — 97530 THERAPEUTIC ACTIVITIES: CPT

## 2017-08-02 PROCEDURE — 97161 PT EVAL LOW COMPLEX 20 MIN: CPT

## 2017-08-02 PROCEDURE — 74011250636 HC RX REV CODE- 250/636: Performed by: PHYSICIAN ASSISTANT

## 2017-08-02 PROCEDURE — C9113 INJ PANTOPRAZOLE SODIUM, VIA: HCPCS | Performed by: SURGERY

## 2017-08-02 PROCEDURE — 77010033678 HC OXYGEN DAILY

## 2017-08-02 PROCEDURE — 84478 ASSAY OF TRIGLYCERIDES: CPT | Performed by: SURGERY

## 2017-08-02 PROCEDURE — 74011250637 HC RX REV CODE- 250/637: Performed by: PHYSICIAN ASSISTANT

## 2017-08-02 PROCEDURE — 84100 ASSAY OF PHOSPHORUS: CPT | Performed by: SURGERY

## 2017-08-02 PROCEDURE — 74011250637 HC RX REV CODE- 250/637: Performed by: SURGERY

## 2017-08-02 PROCEDURE — 74011000250 HC RX REV CODE- 250: Performed by: PHYSICIAN ASSISTANT

## 2017-08-02 PROCEDURE — 36415 COLL VENOUS BLD VENIPUNCTURE: CPT | Performed by: SURGERY

## 2017-08-02 PROCEDURE — 36592 COLLECT BLOOD FROM PICC: CPT

## 2017-08-02 PROCEDURE — 85025 COMPLETE CBC W/AUTO DIFF WBC: CPT | Performed by: PHYSICIAN ASSISTANT

## 2017-08-02 PROCEDURE — 82962 GLUCOSE BLOOD TEST: CPT

## 2017-08-02 PROCEDURE — 74011000258 HC RX REV CODE- 258: Performed by: SURGERY

## 2017-08-02 PROCEDURE — 80048 BASIC METABOLIC PNL TOTAL CA: CPT | Performed by: SURGERY

## 2017-08-02 RX ORDER — DIAZEPAM 10 MG/2ML
5 INJECTION INTRAMUSCULAR
Status: DISCONTINUED | OUTPATIENT
Start: 2017-08-02 | End: 2017-08-04 | Stop reason: HOSPADM

## 2017-08-02 RX ADMIN — ACETAMINOPHEN 650 MG: 325 TABLET ORAL at 23:41

## 2017-08-02 RX ADMIN — KETOROLAC TROMETHAMINE 15 MG: 30 INJECTION, SOLUTION INTRAMUSCULAR at 23:41

## 2017-08-02 RX ADMIN — HEPARIN SODIUM 5000 UNITS: 5000 INJECTION, SOLUTION INTRAVENOUS; SUBCUTANEOUS at 21:33

## 2017-08-02 RX ADMIN — HYDROMORPHONE HYDROCHLORIDE 1 MG: 1 INJECTION, SOLUTION INTRAMUSCULAR; INTRAVENOUS; SUBCUTANEOUS at 13:30

## 2017-08-02 RX ADMIN — PIPERACILLIN SODIUM,TAZOBACTAM SODIUM 3.38 G: 3; .375 INJECTION, POWDER, FOR SOLUTION INTRAVENOUS at 13:30

## 2017-08-02 RX ADMIN — KETOROLAC TROMETHAMINE 15 MG: 30 INJECTION, SOLUTION INTRAMUSCULAR at 17:05

## 2017-08-02 RX ADMIN — KETOROLAC TROMETHAMINE 15 MG: 30 INJECTION, SOLUTION INTRAMUSCULAR at 00:28

## 2017-08-02 RX ADMIN — Medication 10 ML: at 06:49

## 2017-08-02 RX ADMIN — Medication 10 ML: at 14:00

## 2017-08-02 RX ADMIN — DIAZEPAM 5 MG: 5 INJECTION, SOLUTION INTRAMUSCULAR; INTRAVENOUS at 21:45

## 2017-08-02 RX ADMIN — KETOROLAC TROMETHAMINE 15 MG: 30 INJECTION, SOLUTION INTRAMUSCULAR at 13:29

## 2017-08-02 RX ADMIN — OXYCODONE HYDROCHLORIDE 10 MG: 5 TABLET ORAL at 06:38

## 2017-08-02 RX ADMIN — HYDROMORPHONE HYDROCHLORIDE 1 MG: 1 INJECTION, SOLUTION INTRAMUSCULAR; INTRAVENOUS; SUBCUTANEOUS at 17:05

## 2017-08-02 RX ADMIN — HEPARIN SODIUM 5000 UNITS: 5000 INJECTION, SOLUTION INTRAVENOUS; SUBCUTANEOUS at 06:38

## 2017-08-02 RX ADMIN — PIPERACILLIN SODIUM,TAZOBACTAM SODIUM 3.38 G: 3; .375 INJECTION, POWDER, FOR SOLUTION INTRAVENOUS at 19:23

## 2017-08-02 RX ADMIN — KETOROLAC TROMETHAMINE 15 MG: 30 INJECTION, SOLUTION INTRAMUSCULAR at 06:38

## 2017-08-02 RX ADMIN — Medication 10 ML: at 13:50

## 2017-08-02 RX ADMIN — HYDROMORPHONE HYDROCHLORIDE 1 MG: 1 INJECTION, SOLUTION INTRAMUSCULAR; INTRAVENOUS; SUBCUTANEOUS at 01:38

## 2017-08-02 RX ADMIN — ACETAMINOPHEN 650 MG: 325 TABLET ORAL at 06:48

## 2017-08-02 RX ADMIN — ACETAMINOPHEN 650 MG: 325 TABLET ORAL at 17:05

## 2017-08-02 RX ADMIN — ACETAMINOPHEN 650 MG: 325 TABLET ORAL at 13:28

## 2017-08-02 RX ADMIN — PIPERACILLIN SODIUM,TAZOBACTAM SODIUM 3.38 G: 3; .375 INJECTION, POWDER, FOR SOLUTION INTRAVENOUS at 04:14

## 2017-08-02 RX ADMIN — SODIUM CHLORIDE 40 MG: 9 INJECTION INTRAMUSCULAR; INTRAVENOUS; SUBCUTANEOUS at 09:31

## 2017-08-02 RX ADMIN — RETINOL, ERGOCALCIFEROL, .ALPHA.-TOCOPHEROL ACETATE, DL-, PHYTONADIONE, ASCORBIC ACID, NIACINAMIDE, RIBOFLAVIN 5-PHOSPHATE SODIUM, THIAMINE HYDROCHLORIDE, PYRIDOXINE HYDROCHLORIDE, DEXPANTHENOL, BIOTIN, FOLIC ACID, AND CYANOCOBALAMIN: KIT at 17:09

## 2017-08-02 RX ADMIN — HEPARIN SODIUM 5000 UNITS: 5000 INJECTION, SOLUTION INTRAVENOUS; SUBCUTANEOUS at 13:29

## 2017-08-02 NOTE — PROGRESS NOTES
Problem: Mobility Impaired (Adult and Pediatric)  Goal: *Therapy Goal (Edit Goal, Insert Text)  PHYSICAL THERAPY EVALUATION/DISCHARGE  Patient: Preet Kowalski (1 y.o. male)  Date: 8/2/2017  Primary Diagnosis: Colonic obstruction (HCC)  Colitis  SIGMOID DIVERTICULITIS  BOWEL OBSTRUCTION   diverticulitis  SIGMOID DIVERTICULITIS  BOWEL OBSTRUCTION   SIGMOID DIVERTICULITIS  BOWEL OBSTRUCTION   Procedure(s) (LRB):  LAPAROSCOPIC HAND ASSISTED SIGMOIDECTOMY/ /CYSTOSCOPY/BILATERAL URETHRAL STENT INSERTION (N/A)  CYSTOSCOPY INSERTION URETERAL CATHETERS (Bilateral) 2 Days Post-Op   Precautions:      ASSESSMENT :  Based on the objective data described below, the patient presents s/p sigmoidectomy 2/2 c/o abdominal pain. Pt reports 0/10 pain at rest lying semi-reclined in bed. Pt lives with wife serena 2-story home with 5-steps to enter and bilateral handrails. Bed & bath are upstairs, 14-steps with bilateral handrails. Pt is independent with all bed mobility and distant supervision/SBA for all transfers to/from bed, toilet, and chair. Pt is functioning at his baseline level and does not require skilled PT at this time. Skilled physical therapy is not indicated at this time. PLAN :  Discharge Recommendations: None  Further Equipment Recommendations for Discharge: none       SUBJECTIVE:   Patient stated I am not feeling that bad.       OBJECTIVE DATA SUMMARY:   HISTORY:    Past Medical History:   Diagnosis Date    Diverticulitis      Gastrointestinal disorder      History of vascular access device 07/24/2017     [facility] VAT -  5FR triple Lumen Power PICC R Basilic  39 cm    Spinal stenosis       Past Surgical History:   Procedure Laterality Date    FLEXIBLE SIGMOIDOSCOPY N/A 7/27/2017     SIGMOIDOSCOPY FLEXIBLE performed by Tomy Baxter MD at [facility] [address]     Prior Level of Function/Home Situation: independent with all functional mobility  Personal factors and/or comorbidities impacting plan of care:      Home Situation  Home Environment: Private residence  # Steps to Enter: 5  Rails to Enter: Yes  Hand Rails : Bilateral  Wheelchair Ramp: No  One/Two Story Residence: Two story  # of Interior Steps: 14  Height of Each Step (in): 0 inches  Interior Rails: Both  Lift Chair Available: No  Living Alone: No  Support Systems: Spouse/Significant Other/Partner  Patient Expects to be Discharged to[de-identified] Private residence  Current DME Used/Available at Home: None  Tub or Shower Type: Tub/Shower combination     EXAMINATION/PRESENTATION/DECISION MAKING:   Critical Behavior:  Neurologic State: Alert  Orientation Level: Oriented X4  Cognition: Appropriate decision making  Safety/Judgement: Awareness of environment  Hearing: Auditory  Auditory Impairment: None  Skin:  Incision clean, dry & intact  Edema: no edema noted  Range Of Motion:  AROM: Within functional limits           PROM: Within functional limits           Strength:    Strength: Within functional limits                    Tone & Sensation:   Tone: Normal              Sensation: Intact               Coordination:  Coordination: Within functional limits  Vision:      Functional Mobility:  Bed Mobility:  Rolling: Independent  Supine to Sit: Independent  Sit to Supine: Independent  Scooting: Independent  Transfers:  Sit to Stand: Supervision  To/from bed, toilet and chair  Stand to Sit: Supervision  To/from bed, toilet and chair  Stand Pivot Transfers: Supervision  Stand Pivot Transfers: Stand-by asssistance  Bed to Chair: Stand-by asssistance              Balance:   Sitting: Intact; Without support  Standing: Intact; Without support  Ambulation/Gait Training:      ambulated to bathroom, approx.  20ft with supervision and no AD        Gait Description (WDL): Within defined limits                                                                               Stairs:      Not assessed during this initial functional evaluation                               Therapeutic Exercises:   Not done during this Rx session     Functional Measure:  Barthel Index:      Bathin  Bladder: 5  Bowels: 10  Groomin  Dressing: 10  Feeding: 10  Mobility: 10  Stairs:  10    Toilet use:  10     Transfer (Bed to Chair and Back): 10  TOTAL:  85         Barthel and G-code impairment scale:  Percentage of impairment CH  0% CI  1-19% CJ  20-39% CK  40-59% CL  60-79% CM  80-99% CN  100%   Barthel Score 0-100 100 99-80 79-60 59-40 20-39 1-19    0   Barthel Score 0-20 20 17-19 13-16 9-12 5-8 1-4 0      The Barthel ADL Index: Guidelines  1. The index should be used as a record of what a patient does, not as a record of what a patient could do. 2. The main aim is to establish degree of independence from any help, physical or verbal, however minor and for whatever reason. 3. The need for supervision renders the patient not independent. 4. A patient's performance should be established using the best available evidence. Asking the patient, friends/relatives and nurses are the usual sources, but direct observation and common sense are also important. However direct testing is not needed. 5. Usually the patient's performance over the preceding 24-48 hours is important, but occasionally longer periods will be relevant. 6. Middle categories imply that the patient supplies over 50 per cent of the effort. 7. Use of aids to be independent is allowed. Sharmila Mccracken., Barthel, DBaltaW. (5071). Functional evaluation: the Barthel Index. [facility] (14)2. LORENZO Ha, Jade Cast., Bette Carlin., Benito Navin, [address] [place] [address] (). Measuring the change indisability after inpatient rehabilitation; comparison of the responsiveness of the Barthel Index and Functional [place] Measure. Journal of Neurology, Neurosurgery, and Psychiatry, 66(4), 025-221. Nedra Parra, N.J.A, JESE Hendricks, & Johnathon Lowery MBaltaA. (2004.) Assessment of post-stroke quality of life in cost-effectiveness studies:  The usefulness of the Barthel Index and the EuroQoL-5D. Quality of Life Research, 13, 363-40         G codes: In compliance with CMSs Claims Based Outcome Reporting, the following G-code set was chosen for this patient based on their primary functional limitation being treated: The outcome measure chosen to determine the severity of the functional limitation was the Barthel Index with a score of 85/100 which was correlated with the impairment scale. · Mobility - Walking and Moving Around:               - CURRENT STATUS:    CI - 1%-19% impaired, limited or restricted               - GOAL STATUS:           CI - 1%-19% impaired, limited or restricted               - D/C STATUS:                       CI - 1%-19% impaired, limited or restricted         Physical Therapy Evaluation Charge Determination   History Examination Presentation Decision-Making   LOW Complexity : Zero comorbidities / personal factors that will impact the outcome / POC LOW Complexity : 1-2 Standardized tests and measures addressing body structure, function, activity limitation and / or participation in recreation  LOW Complexity : Stable, uncomplicated  LOW Complexity : FOTO score of       Based on the above components, the patient evaluation is determined to be of the following complexity level: LOW      Pain:  Pain Scale 1: Numeric (0 - 10)  Pain Intensity 1: 8  Pain Location 1: Abdomen  Activity Tolerance:   Pt participated with functional activities with no reports of pain. Please refer to the flowsheet for vital signs taken during this treatment.   After treatment:   [X]   Patient left in no apparent distress sitting up in chair  [ ]   Patient left in no apparent distress in bed  [X]   Call bell left within reach  [X]   Nursing notified  [ ]   Caregiver present  [ ]   Bed alarm activated      COMMUNICATION/EDUCATION:   Communication/Collaboration:  [X]   Fall prevention education was provided and the patient/caregiver indicated understanding. [X]   Patient/family have participated as able and agree with findings and recommendations. [ ]   Patient is unable to participate in plan of care at this time.   Findings and recommendations were discussed with: Registered Nurse     Thank you for this referral.  Gilson Hogan, PT   Time Calculation: 27 mins

## 2017-08-02 NOTE — PROGRESS NOTES
General Surgery Daily Progress Note    Patient: Daniel Najera MRN: [id number]  SSN: [id number]    YOB: 1945  Age: 74 y.o. Sex: male      Admit Date: 7/21/2017    Subjective:   Pain better controlled on current regimen. Continues to have spasm-like abdominal pain that is intermittent. Passed scant flatus and had a BM this morning. Reports no appetite. Denies N/V.      Current Facility-Administered Medications   Medication Dose Route Frequency    diazePAM (VALIUM) injection 5 mg  5 mg IntraVENous Q6H PRN    TPN ADULT - CENTRAL AA 5% D20% W/ ELECTROLYTES AND CA   IntraVENous CONTINUOUS    dextrose 5% and 0.9% NaCl infusion  25 mL/hr IntraVENous CONTINUOUS    ketorolac (TORADOL) injection 15 mg  15 mg IntraVENous Q6H    oxyCODONE IR (ROXICODONE) tablet 5 mg  5 mg Oral Q4H PRN    oxyCODONE IR (ROXICODONE) tablet 10 mg  10 mg Oral Q4H PRN    acetaminophen (TYLENOL) tablet 650 mg  650 mg Oral Q6H    lidocaine (LIDODERM) 5 % patch 1 Patch  1 Patch TransDERmal Q24H    HYDROmorphone (PF) (DILAUDID) injection 1 mg  1 mg IntraVENous Q3H PRN    lidocaine (PF) (XYLOCAINE) 10 mg/mL (1 %) injection 0.1 mL  0.1 mL SubCUTAneous PRN    diphenhydrAMINE-zinc acetate 1%-0.1% (BENADRYL) cream   Topical Q4H PRN    pantoprazole (PROTONIX) 40 mg in sodium chloride 0.9 % 10 mL injection  40 mg IntraVENous DAILY    fat emulsion 20% (LIPOSYN, INTRALIPID) infusion 500 mL  500 mL IntraVENous Q TUE, THU & SAT    insulin lispro (HUMALOG) injection   SubCUTAneous Q6H    glucose chewable tablet 16 g  4 Tab Oral PRN    dextrose (D50W) injection syrg 12.5-25 g  12.5-25 g IntraVENous PRN    glucagon (GLUCAGEN) injection 1 mg  1 mg IntraMUSCular PRN    sodium chloride (NS) flush 10-30 mL  10-30 mL InterCATHeter PRN    sodium chloride (NS) flush 10 mL  10 mL InterCATHeter Q24H    sodium chloride (NS) flush 10-40 mL  10-40 mL InterCATHeter Q8H    alteplase (CATHFLO) 1 mg in sterile water (preservative free) 1 mL injection  1 mg InterCATHeter PRN    nicotine (NICODERM CQ) 14 mg/24 hr patch 1 Patch  1 Patch TransDERmal DAILY    naloxone (NARCAN) injection 0.4 mg  0.4 mg IntraVENous PRN    ondansetron (ZOFRAN) injection 4 mg  4 mg IntraVENous Q4H PRN    diphenhydrAMINE (BENADRYL) injection 12.5 mg  12.5 mg IntraVENous Q4H PRN    heparin (porcine) injection 5,000 Units  5,000 Units SubCUTAneous Q8H    piperacillin-tazobactam (ZOSYN) 3.375 g in 0.9% sodium chloride (MBP/ADV) 100 mL  3.375 g IntraVENous Q8H        Objective:   08/02 0701 - 08/02 1900  In: -   Out: 550 [Urine:550]  07/31 1901 - 08/02 0700  In: 2297.4 [I.V.:2297.4]  Out: 0862 [Urine:4025; Drains:200]  Patient Vitals for the past 8 hrs:   BP Temp Pulse Resp SpO2 Weight   08/02/17 0740 159/68 98.6 °F (37 °C) 64 16 98 % -   08/02/17 0637 - - - - - 89.1 kg (196 lb 6.4 oz)   08/02/17 0300 152/72 98.5 °F (36.9 °C) 66 16 94 % -       Physical Exam:  General: Alert, cooperative, NAD  Lungs: Clear to auscultation   Heart:  Regular rate and rhythm  Abdomen: Soft, ATTP, mildly distended. + bowel sounds. Incisions c/d/i. JAMEY drain SS. Extremities: Warm, moves all, no edema  Skin:  Warm and dry, no rash    Labs:   Recent Labs      08/02/17   0312   WBC  13.7*   HGB  12.2   HCT  37.1   PLT  183     Recent Labs      08/02/17   0312   NA  139   K  4.3   CL  107   CO2  29   GLU  91   BUN  15   CREA  0.85   CA  8.0*   MG  1.7   PHOS  3.0       Assessment / Plan:   · POD#2 lap, hand-assist sigmoidectomy, mobilization splenic flexure  · Abdominal exam appropriate, excellent UOP, VSS  · Continue Tylenol, Toradol, Jackie. Change Ativan to low-dose Valium PRN for abdominal spasms. · Patient prefers clear liquids for now. Advance to full when bowel function and appetite improved.    · Mobilize, PT/OT consult  · Encourage IS  · Renew TPN  · Remove moreno

## 2017-08-02 NOTE — PROGRESS NOTES
Bedside shift change report given to Beba Aguilar RN (oncoming nurse) by Umm Philippe RN (offgoing nurse). Report included the following information SBAR, Kardex, Procedure Summary, Intake/Output, MAR and Recent Results.

## 2017-08-03 LAB
ANION GAP BLD CALC-SCNC: 4 MMOL/L (ref 5–15)
BASOPHILS # BLD AUTO: 0 K/UL (ref 0–0.1)
BASOPHILS # BLD: 0 % (ref 0–1)
BUN SERPL-MCNC: 17 MG/DL (ref 6–20)
BUN/CREAT SERPL: 21 (ref 12–20)
CALCIUM SERPL-MCNC: 8.2 MG/DL (ref 8.5–10.1)
CHLORIDE SERPL-SCNC: 110 MMOL/L (ref 97–108)
CO2 SERPL-SCNC: 29 MMOL/L (ref 21–32)
CREAT SERPL-MCNC: 0.82 MG/DL (ref 0.7–1.3)
EOSINOPHIL # BLD: 0.3 K/UL (ref 0–0.4)
EOSINOPHIL NFR BLD: 3 % (ref 0–7)
ERYTHROCYTE [DISTWIDTH] IN BLOOD BY AUTOMATED COUNT: 14.1 % (ref 11.5–14.5)
GLUCOSE BLD STRIP.AUTO-MCNC: 100 MG/DL (ref 65–100)
GLUCOSE BLD STRIP.AUTO-MCNC: 101 MG/DL (ref 65–100)
GLUCOSE BLD STRIP.AUTO-MCNC: 105 MG/DL (ref 65–100)
GLUCOSE BLD STRIP.AUTO-MCNC: 108 MG/DL (ref 65–100)
GLUCOSE BLD STRIP.AUTO-MCNC: 115 MG/DL (ref 65–100)
GLUCOSE SERPL-MCNC: 87 MG/DL (ref 65–100)
HCT VFR BLD AUTO: 33.9 % (ref 36.6–50.3)
HGB BLD-MCNC: 11 G/DL (ref 12.1–17)
LYMPHOCYTES # BLD AUTO: 13 % (ref 12–49)
LYMPHOCYTES # BLD: 1.3 K/UL (ref 0.8–3.5)
MAGNESIUM SERPL-MCNC: 1.9 MG/DL (ref 1.6–2.4)
MCH RBC QN AUTO: 29.9 PG (ref 26–34)
MCHC RBC AUTO-ENTMCNC: 32.4 G/DL (ref 30–36.5)
MCV RBC AUTO: 92.1 FL (ref 80–99)
MONOCYTES # BLD: 1.3 K/UL (ref 0–1)
MONOCYTES NFR BLD AUTO: 13 % (ref 5–13)
NEUTS SEG # BLD: 6.8 K/UL (ref 1.8–8)
NEUTS SEG NFR BLD AUTO: 71 % (ref 32–75)
PHOSPHATE SERPL-MCNC: 3.3 MG/DL (ref 2.6–4.7)
PLATELET # BLD AUTO: 180 K/UL (ref 150–400)
POTASSIUM SERPL-SCNC: 4.5 MMOL/L (ref 3.5–5.1)
RBC # BLD AUTO: 3.68 M/UL (ref 4.1–5.7)
SERVICE CMNT-IMP: ABNORMAL
SERVICE CMNT-IMP: NORMAL
SODIUM SERPL-SCNC: 143 MMOL/L (ref 136–145)
WBC # BLD AUTO: 9.6 K/UL (ref 4.1–11.1)

## 2017-08-03 PROCEDURE — 85025 COMPLETE CBC W/AUTO DIFF WBC: CPT | Performed by: PHYSICIAN ASSISTANT

## 2017-08-03 PROCEDURE — 84100 ASSAY OF PHOSPHORUS: CPT | Performed by: SURGERY

## 2017-08-03 PROCEDURE — 74011250637 HC RX REV CODE- 250/637: Performed by: SURGERY

## 2017-08-03 PROCEDURE — 74011250637 HC RX REV CODE- 250/637: Performed by: PHYSICIAN ASSISTANT

## 2017-08-03 PROCEDURE — 74011000250 HC RX REV CODE- 250: Performed by: SURGERY

## 2017-08-03 PROCEDURE — 74011250636 HC RX REV CODE- 250/636: Performed by: SURGERY

## 2017-08-03 PROCEDURE — 74011000258 HC RX REV CODE- 258: Performed by: SURGERY

## 2017-08-03 PROCEDURE — 82962 GLUCOSE BLOOD TEST: CPT

## 2017-08-03 PROCEDURE — C9113 INJ PANTOPRAZOLE SODIUM, VIA: HCPCS | Performed by: SURGERY

## 2017-08-03 PROCEDURE — 83735 ASSAY OF MAGNESIUM: CPT | Performed by: SURGERY

## 2017-08-03 PROCEDURE — 74011000250 HC RX REV CODE- 250: Performed by: PHYSICIAN ASSISTANT

## 2017-08-03 PROCEDURE — 80048 BASIC METABOLIC PNL TOTAL CA: CPT | Performed by: SURGERY

## 2017-08-03 PROCEDURE — 74011000258 HC RX REV CODE- 258: Performed by: PHYSICIAN ASSISTANT

## 2017-08-03 PROCEDURE — 65270000029 HC RM PRIVATE

## 2017-08-03 PROCEDURE — 74011250636 HC RX REV CODE- 250/636: Performed by: PHYSICIAN ASSISTANT

## 2017-08-03 PROCEDURE — 36415 COLL VENOUS BLD VENIPUNCTURE: CPT | Performed by: SURGERY

## 2017-08-03 RX ADMIN — HEPARIN SODIUM 5000 UNITS: 5000 INJECTION, SOLUTION INTRAVENOUS; SUBCUTANEOUS at 14:00

## 2017-08-03 RX ADMIN — PIPERACILLIN SODIUM,TAZOBACTAM SODIUM 3.38 G: 3; .375 INJECTION, POWDER, FOR SOLUTION INTRAVENOUS at 12:47

## 2017-08-03 RX ADMIN — ACETAMINOPHEN 650 MG: 325 TABLET ORAL at 12:47

## 2017-08-03 RX ADMIN — PIPERACILLIN SODIUM,TAZOBACTAM SODIUM 3.38 G: 3; .375 INJECTION, POWDER, FOR SOLUTION INTRAVENOUS at 20:01

## 2017-08-03 RX ADMIN — HYDROMORPHONE HYDROCHLORIDE 1 MG: 1 INJECTION, SOLUTION INTRAMUSCULAR; INTRAVENOUS; SUBCUTANEOUS at 03:37

## 2017-08-03 RX ADMIN — I.V. FAT EMULSION 500 ML: 20 EMULSION INTRAVENOUS at 18:00

## 2017-08-03 RX ADMIN — HEPARIN SODIUM 5000 UNITS: 5000 INJECTION, SOLUTION INTRAVENOUS; SUBCUTANEOUS at 22:27

## 2017-08-03 RX ADMIN — ACETAMINOPHEN 650 MG: 325 TABLET ORAL at 06:40

## 2017-08-03 RX ADMIN — HYDROMORPHONE HYDROCHLORIDE 1 MG: 1 INJECTION, SOLUTION INTRAMUSCULAR; INTRAVENOUS; SUBCUTANEOUS at 00:41

## 2017-08-03 RX ADMIN — RETINOL, ERGOCALCIFEROL, .ALPHA.-TOCOPHEROL ACETATE, DL-, PHYTONADIONE, ASCORBIC ACID, NIACINAMIDE, RIBOFLAVIN 5-PHOSPHATE SODIUM, THIAMINE HYDROCHLORIDE, PYRIDOXINE HYDROCHLORIDE, DEXPANTHENOL, BIOTIN, FOLIC ACID, AND CYANOCOBALAMIN: KIT at 18:00

## 2017-08-03 RX ADMIN — HEPARIN SODIUM 5000 UNITS: 5000 INJECTION, SOLUTION INTRAVENOUS; SUBCUTANEOUS at 06:41

## 2017-08-03 RX ADMIN — Medication 10 ML: at 12:00

## 2017-08-03 RX ADMIN — PIPERACILLIN SODIUM,TAZOBACTAM SODIUM 3.38 G: 3; .375 INJECTION, POWDER, FOR SOLUTION INTRAVENOUS at 03:57

## 2017-08-03 RX ADMIN — OXYCODONE HYDROCHLORIDE 10 MG: 5 TABLET ORAL at 12:47

## 2017-08-03 RX ADMIN — OXYCODONE HYDROCHLORIDE 10 MG: 5 TABLET ORAL at 22:27

## 2017-08-03 RX ADMIN — KETOROLAC TROMETHAMINE 15 MG: 30 INJECTION, SOLUTION INTRAMUSCULAR at 18:00

## 2017-08-03 RX ADMIN — HYDROMORPHONE HYDROCHLORIDE 1 MG: 1 INJECTION, SOLUTION INTRAMUSCULAR; INTRAVENOUS; SUBCUTANEOUS at 11:00

## 2017-08-03 RX ADMIN — ACETAMINOPHEN 650 MG: 325 TABLET ORAL at 18:00

## 2017-08-03 RX ADMIN — KETOROLAC TROMETHAMINE 15 MG: 30 INJECTION, SOLUTION INTRAMUSCULAR at 12:48

## 2017-08-03 RX ADMIN — Medication 10 ML: at 06:00

## 2017-08-03 RX ADMIN — Medication 10 ML: at 22:00

## 2017-08-03 RX ADMIN — KETOROLAC TROMETHAMINE 15 MG: 30 INJECTION, SOLUTION INTRAMUSCULAR at 06:40

## 2017-08-03 RX ADMIN — SODIUM CHLORIDE 40 MG: 9 INJECTION INTRAMUSCULAR; INTRAVENOUS; SUBCUTANEOUS at 09:21

## 2017-08-03 RX ADMIN — HYDROMORPHONE HYDROCHLORIDE 1 MG: 1 INJECTION, SOLUTION INTRAMUSCULAR; INTRAVENOUS; SUBCUTANEOUS at 16:30

## 2017-08-03 RX ADMIN — Medication 10 ML: at 14:00

## 2017-08-03 NOTE — PROGRESS NOTES
Occupational Therapy    Acknowledge OT orders, completed chart review and discussed patient with nursing. Nursing reports patient with orders for up ad rebecca, no concerns regarding ADL tasks. PT eval and dc yesterday. Received patient sitting EOB. Screened patient. Educated on role of OT services. Patient reports PLOF indep with all ADLs, driving and IADLs. Patient reports getting up to bathroom with wife/nursing, he demonstrates ability to cross leg dressing techniques, and reports wife will be home with him at home. Left sitting EOB and discussed with nursing. Wife in hallway. Educated on role of OT services, wife with no concerns at this time. Encouraged ambulation and participation in ADL tasks with nursing. Patient's only complaint was uncomfortable mattress. Encouraged time up in chair. Communicate patient's concern with nursing. Will completed orders for OT. If patient with change in function, please re-consult OT.      Thank you,    Abhay Klein OTR/L

## 2017-08-03 NOTE — PROGRESS NOTES
General Surgery Daily Progress Note    Patient: Arsalan Carney MRN: [id number]  SSN: [id number]    YOB: 1945  Age: 74 y.o. Sex: male      Admit Date: 7/21/2017    Subjective:   Pain controlled, tolerating clears, + BM/flatus. Denies nausea, has more of an appetite today.      Current Facility-Administered Medications   Medication Dose Route Frequency    diazePAM (VALIUM) injection 5 mg  5 mg IntraVENous Q6H PRN    TPN ADULT - CENTRAL AA 5% D20% W/ ELECTROLYTES AND CA   IntraVENous CONTINUOUS    ketorolac (TORADOL) injection 15 mg  15 mg IntraVENous Q6H    oxyCODONE IR (ROXICODONE) tablet 5 mg  5 mg Oral Q4H PRN    oxyCODONE IR (ROXICODONE) tablet 10 mg  10 mg Oral Q4H PRN    acetaminophen (TYLENOL) tablet 650 mg  650 mg Oral Q6H    lidocaine (LIDODERM) 5 % patch 1 Patch  1 Patch TransDERmal Q24H    HYDROmorphone (PF) (DILAUDID) injection 1 mg  1 mg IntraVENous Q3H PRN    lidocaine (PF) (XYLOCAINE) 10 mg/mL (1 %) injection 0.1 mL  0.1 mL SubCUTAneous PRN    diphenhydrAMINE-zinc acetate 1%-0.1% (BENADRYL) cream   Topical Q4H PRN    pantoprazole (PROTONIX) 40 mg in sodium chloride 0.9 % 10 mL injection  40 mg IntraVENous DAILY    fat emulsion 20% (LIPOSYN, INTRALIPID) infusion 500 mL  500 mL IntraVENous Q TUE, THU & SAT    insulin lispro (HUMALOG) injection   SubCUTAneous Q6H    glucose chewable tablet 16 g  4 Tab Oral PRN    dextrose (D50W) injection syrg 12.5-25 g  12.5-25 g IntraVENous PRN    glucagon (GLUCAGEN) injection 1 mg  1 mg IntraMUSCular PRN    sodium chloride (NS) flush 10-30 mL  10-30 mL InterCATHeter PRN    sodium chloride (NS) flush 10 mL  10 mL InterCATHeter Q24H    sodium chloride (NS) flush 10-40 mL  10-40 mL InterCATHeter Q8H    alteplase (CATHFLO) 1 mg in sterile water (preservative free) 1 mL injection  1 mg InterCATHeter PRN    nicotine (NICODERM CQ) 14 mg/24 hr patch 1 Patch  1 Patch TransDERmal DAILY    naloxone (NARCAN) injection 0.4 mg  0.4 mg IntraVENous PRN    ondansetron (ZOFRAN) injection 4 mg  4 mg IntraVENous Q4H PRN    diphenhydrAMINE (BENADRYL) injection 12.5 mg  12.5 mg IntraVENous Q4H PRN    heparin (porcine) injection 5,000 Units  5,000 Units SubCUTAneous Q8H    piperacillin-tazobactam (ZOSYN) 3.375 g in 0.9% sodium chloride (MBP/ADV) 100 mL  3.375 g IntraVENous Q8H        Objective:      08/01 1901 - 08/03 0700  In: 746.3 [I.V.:746.3]  Out: 2540 [Urine:2325; Drains:215]  Patient Vitals for the past 8 hrs:   BP Temp Pulse Resp SpO2   08/03/17 0752 143/56 98.7 °F (37.1 °C) 60 18 96 %   08/03/17 0251 154/71 98.4 °F (36.9 °C) 61 19 94 %       Physical Exam:  General: Alert, cooperative, NAD  Lungs: Clear to auscultation   Heart:  Regular rate and rhythm  Abdomen: Soft, ATTP, mildly distended. + bowel sounds. Incisions c/d/i. JAMEY drain SS.    Extremities: Warm, moves all, no edema  Skin:  Warm and dry, no rash    Labs:   Recent Labs      08/03/17   0405   WBC  9.6   HGB  11.0*   HCT  33.9*   PLT  180     Recent Labs      08/03/17   0405   NA  143   K  4.5   CL  110*   CO2  29   GLU  87   BUN  17   CREA  0.82   CA  8.2*   MG  1.9   PHOS  3.3       Assessment / Plan:   · POD#3 lap, hand-assist sigmoidectomy, mobilization splenic flexure  · Abdominal exam appropriate, voiding after removal of moreno yesterday, VSS  · Continue Tylenol, Toradol, Jackie, Valium  · Advance to full liquids  · Encourage IS, OOB  · Wean TPN

## 2017-08-03 NOTE — PROGRESS NOTES
Response to CDMP query:    Pt was being treated for acute complicated diverticulitis with associated SBO resulting in SIRS response with IVF, zosyn.

## 2017-08-03 NOTE — PROGRESS NOTES
Bedside and Verbal shift change report given to Symone Lopez (oncoming nurse) by Malorie Talbot RN (offgoing nurse). Report included the following information SBAR, Kardex, MAR and Recent Results.

## 2017-08-03 NOTE — PROGRESS NOTES
Nutrition Assessment:    RECOMMENDATIONS/INTERVENTION(S):   Wean TPN as PO diet advanced & tolerated    RD to adjust ONS per pt's preferences & to promote PO  · Ensure Enlive BID  · Ensure Pudding once daily    Diet advancement per Surgery - recommend GI Lite once able  5-6 small, frequent meals (ONS included)    Monitor diet advancement, PO tolerance, BG, wt  ASSESSMENT:   8/3:  Diet advanced to Full Liquids (for lunch meal) & ONS changed to Ensure Enlive TID per Surgery. Noted plans to wean TPN - orders starting this evening. Labs/meds reviewed. Visited pt this afternoon. Tolerating CLD, dislike & intolerance of Ensure Clear. Appetite improving somewhat. Loose stools (pt in restroom for 10+ min during visit). Discussed current diet & GI Lite - foods/drinks recommended/not recommended & encouraged 5-6 small meals per day (Ensure Enlive, pudding included). Pt states dislike of Ensure but willing to try (chocolate) - likes pudding. RD to update ONS preferences. :  POD #1 lap, hand-assist sigmoidectomy, mobilization splenic flexure. Diet advanced to CLD (+ Ensure Clear TID) & Surgery notes to continue TPN (re-ordered at goal rate). Labs/meds reviewed. BG controlled, [phone]. Surgical incision to abd.      : f/u, pt wasn't adequately prepped for surgery yesterday, rescheduled for , Diet advanced to Clears and will be getting slow-prep all weekend. TPN remains the same. Pt with some nausea & abd pain at times - controlled with medications. Encouraged frequent sips while awake to maintain GI stimulation throughout prep. : F/u, pt remains on TPN. Noted Triglyceride level 119 mg.dl. Chart reviewed, for sigmoidoscopy tomorrow, high NG output and liquid stools. Noted B, 117, 141, 152, 123 mg/dl. RD to follow, monitor pt progress. :  Pt seen for LOS & CLD/NPO x 5.  74 yom admitted for colonic obstruction. Pmhx includes diverticulitis.   S/p CT showing sigmoid colitis w/ colonic obstruction and concomitant SBO. Visited pt this afternoon. Daughter in room, pt in restroom. Reports eating well and maintaining wt PTA. Avoids certain foods while w/ diverticulitis flare ups - dx 10 yrs ago. Normal BMI per age. Labs/meds reviewed. Lytes WDL. BUN elevated but trending down, Cr WDL. . No PI or edema noted. D5 @ 150 ml/hr infusing. TPN started by Surgery today - D15/5%AA @ 42 ml/hr; no Lipids ordered. TPN recs above. SUBJECTIVE/OBJECTIVE:     Diet Order:  Full Liquids, Ensure Enlive TID  TPN: D20 5% AA @ 75 ml/hr  Lipids: 20% @ 42 ml/hr x 12 hours    Pertinent Medications: [x] Reviewed - SSI, Zofran, Protonix    Chemistries:  Lab Results   Component Value Date/Time    Sodium 143 08/03/2017 04:05 AM    Potassium 4.5 08/03/2017 04:05 AM    Chloride 110 08/03/2017 04:05 AM    CO2 29 08/03/2017 04:05 AM    Anion gap 4 08/03/2017 04:05 AM    Glucose 87 08/03/2017 04:05 AM    BUN 17 08/03/2017 04:05 AM    Creatinine 0.82 08/03/2017 04:05 AM    BUN/Creatinine ratio 21 08/03/2017 04:05 AM    GFR est AA >60 08/03/2017 04:05 AM    GFR est non-AA >60 08/03/2017 04:05 AM    Calcium 8.2 08/03/2017 04:05 AM    AST (SGOT) 20 07/18/2017 10:14 AM    Alk. phosphatase 71 07/18/2017 10:14 AM    Protein, total 7.8 07/18/2017 10:14 AM    Albumin 3.3 07/18/2017 10:14 AM    Globulin 4.5 07/18/2017 10:14 AM    A-G Ratio 0.7 07/18/2017 10:14 AM    ALT (SGPT) 26 07/18/2017 10:14 AM      Anthropometrics: Height: 5' 10\" (177.8 cm) Weight: 89.1 kg (196 lb 6.4 oz)    IBW (%IBW): 83 kg (182 lb 15.7 oz) (103.98 %) UBW (%UBW):   (  %)    BMI: Body mass index is 28.18 kg/(m^2). This BMI is indicative of:   [] Underweight    [x] Normal - per advanced age   [] Overweight    []  Obesity    []  Extreme Obesity (BMI>40)  Estimated Nutrition Needs (Based on): 2019 Kcals/day (RMR (1609) x 1.3 AF) , 69 g (-86 (0.8-1.0 g/kg x actual body wt)) Protein  Carbohydrate:  At Least 130 g/day  Fluids: 2000 mL/day    Last BM: 8/3, abd semi-soft, tender  [x]Active     []Hyperactive  []Hypoactive       [] Absent   BS  Skin:    [] Intact   [x] Incision - abd  [] Breakdown   [] DTI   [] Tears/Excoriation/Abrasion  []Edema [] Other: JAMEY drain     Wt Readings from Last 30 Encounters:   08/02/17 89.1 kg (196 lb 6.4 oz)   07/19/17 87.1 kg (192 lb)   07/18/17 87.1 kg (192 lb)   07/17/17 87.1 kg (192 lb)   07/03/17 87.3 kg (192 lb 7.4 oz)   03/07/17 89.4 kg (197 lb)   02/23/16 87.5 kg (193 lb)   07/05/15 88.9 kg (196 lb)   01/12/15 81.6 kg (180 lb)   08/29/13 81.6 kg (180 lb)      NUTRITION DIAGNOSES:   Problem:  Inadequate oral food/beverage intake     Etiology: related to alteration in GI tract structure and/or function     Signs/Symptoms: as evidenced by diverticulitis, NPO x 5 days    Previous acute nutrition diagnosis is resolving - PO diet initiated & tolerated     NUTRITION INTERVENTIONS:  Meals/Snacks: General/healthful diet; Commercial supplement;  Nutrition Education - GI Lite, 5-6 small meals, ONS              GOAL:   Wean TPN as PO diet/small, frequent meals advanced & tolerated in the next 2-4 days    Cultural, Anglican, or Ethnic Dietary Needs: None     LEARNING NEEDS (Diet, Food/Nutrient-Drug Interaction):    [x] None Identified   [x] Identified and Education Provided/Documented   [] Identified and Pt declined/was not appropriate      [x] Interdisciplinary Care Plan Reviewed/Documented    [x] Discharge Needs:   See D/C note   [] No Nutrition Related Discharge Needs    NUTRITION RISK:   Pt Is At Nutrition Risk  [x]     No Nutrition Risk Identified  []       PT SEEN FOR:    []  MD Consult: []Calorie Count      []Diabetic Diet Education        []Diet Education     []Electrolyte Management     []General Nutrition Management and Supplements     []Management of Tube Feeding     []TPN Recommendations    []  RN Referral:  []MST score >=2     []Enteral/Parenteral Nutrition PTA     []Pregnant: Gestational DM or Multigestation                 [] Pressure Ulcer    []  Low BMI      []  Length of Stay       [] Dysphagia Diet         [] Ventilator  [x]  Follow-up - TPN     Previous Recommendations:   [x] Implemented          [] Not Implemented          [] Not Applicable    Previous Goal:   [x] Met              [] Progressing Towards Goal              [] Not Progressing Towards Goal   [] Not Applicable            Marcelino Howard RD

## 2017-08-03 NOTE — PROGRESS NOTES
Bedside and Verbal shift change report given to Shahbaz Garcia RN (oncoming nurse) by Camille Alvarez RN (offgoing nurse). Report included the following information SBAR, Kardex, Procedure Summary, Intake/Output, MAR, Accordion and Recent Results.

## 2017-08-04 VITALS
TEMPERATURE: 98.3 F | BODY MASS INDEX: 27.49 KG/M2 | DIASTOLIC BLOOD PRESSURE: 59 MMHG | HEIGHT: 70 IN | HEART RATE: 60 BPM | RESPIRATION RATE: 18 BRPM | OXYGEN SATURATION: 96 % | WEIGHT: 192.02 LBS | SYSTOLIC BLOOD PRESSURE: 142 MMHG

## 2017-08-04 LAB
ANION GAP BLD CALC-SCNC: 8 MMOL/L (ref 5–15)
BASOPHILS # BLD AUTO: 0 K/UL (ref 0–0.1)
BASOPHILS # BLD: 0 % (ref 0–1)
BUN SERPL-MCNC: 15 MG/DL (ref 6–20)
BUN/CREAT SERPL: 17 (ref 12–20)
CALCIUM SERPL-MCNC: 8.2 MG/DL (ref 8.5–10.1)
CHLORIDE SERPL-SCNC: 108 MMOL/L (ref 97–108)
CO2 SERPL-SCNC: 26 MMOL/L (ref 21–32)
CREAT SERPL-MCNC: 0.86 MG/DL (ref 0.7–1.3)
EOSINOPHIL # BLD: 0.3 K/UL (ref 0–0.4)
EOSINOPHIL NFR BLD: 4 % (ref 0–7)
ERYTHROCYTE [DISTWIDTH] IN BLOOD BY AUTOMATED COUNT: 13.9 % (ref 11.5–14.5)
ERYTHROCYTE [DISTWIDTH] IN BLOOD BY AUTOMATED COUNT: 14 % (ref 11.5–14.5)
GLUCOSE BLD STRIP.AUTO-MCNC: 110 MG/DL (ref 65–100)
GLUCOSE SERPL-MCNC: 92 MG/DL (ref 65–100)
HCT VFR BLD AUTO: 33.9 % (ref 36.6–50.3)
HCT VFR BLD AUTO: 34 % (ref 36.6–50.3)
HGB BLD-MCNC: 11.2 G/DL (ref 12.1–17)
HGB BLD-MCNC: 11.3 G/DL (ref 12.1–17)
LYMPHOCYTES # BLD AUTO: 16 % (ref 12–49)
LYMPHOCYTES # BLD: 1.1 K/UL (ref 0.8–3.5)
MCH RBC QN AUTO: 29.7 PG (ref 26–34)
MCH RBC QN AUTO: 30.8 PG (ref 26–34)
MCHC RBC AUTO-ENTMCNC: 32.9 G/DL (ref 30–36.5)
MCHC RBC AUTO-ENTMCNC: 33.3 G/DL (ref 30–36.5)
MCV RBC AUTO: 90.2 FL (ref 80–99)
MCV RBC AUTO: 92.4 FL (ref 80–99)
MONOCYTES # BLD: 0.7 K/UL (ref 0–1)
MONOCYTES NFR BLD AUTO: 11 % (ref 5–13)
NEUTS SEG # BLD: 4.5 K/UL (ref 1.8–8)
NEUTS SEG NFR BLD AUTO: 69 % (ref 32–75)
PLATELET # BLD AUTO: 166 K/UL (ref 150–400)
PLATELET # BLD AUTO: 235 K/UL (ref 150–400)
POTASSIUM SERPL-SCNC: 4 MMOL/L (ref 3.5–5.1)
RBC # BLD AUTO: 3.67 M/UL (ref 4.1–5.7)
RBC # BLD AUTO: 3.77 M/UL (ref 4.1–5.7)
SERVICE CMNT-IMP: ABNORMAL
SODIUM SERPL-SCNC: 142 MMOL/L (ref 136–145)
WBC # BLD AUTO: 6.5 K/UL (ref 4.1–11.1)
WBC # BLD AUTO: 8.2 K/UL (ref 4.1–11.1)

## 2017-08-04 PROCEDURE — 74011250636 HC RX REV CODE- 250/636: Performed by: SURGERY

## 2017-08-04 PROCEDURE — 74011250636 HC RX REV CODE- 250/636: Performed by: PHYSICIAN ASSISTANT

## 2017-08-04 PROCEDURE — 85027 COMPLETE CBC AUTOMATED: CPT | Performed by: SURGERY

## 2017-08-04 PROCEDURE — 36415 COLL VENOUS BLD VENIPUNCTURE: CPT | Performed by: PHYSICIAN ASSISTANT

## 2017-08-04 PROCEDURE — 82962 GLUCOSE BLOOD TEST: CPT

## 2017-08-04 PROCEDURE — 74011250637 HC RX REV CODE- 250/637: Performed by: PHYSICIAN ASSISTANT

## 2017-08-04 PROCEDURE — 74011000258 HC RX REV CODE- 258: Performed by: SURGERY

## 2017-08-04 PROCEDURE — 85025 COMPLETE CBC W/AUTO DIFF WBC: CPT | Performed by: PHYSICIAN ASSISTANT

## 2017-08-04 PROCEDURE — 80048 BASIC METABOLIC PNL TOTAL CA: CPT | Performed by: SURGERY

## 2017-08-04 PROCEDURE — C9113 INJ PANTOPRAZOLE SODIUM, VIA: HCPCS | Performed by: SURGERY

## 2017-08-04 PROCEDURE — 74011250637 HC RX REV CODE- 250/637: Performed by: SURGERY

## 2017-08-04 PROCEDURE — 74011000250 HC RX REV CODE- 250: Performed by: SURGERY

## 2017-08-04 RX ORDER — POLYETHYLENE GLYCOL 3350 17 G/17G
17 POWDER, FOR SOLUTION ORAL
Qty: 30 PACKET | Refills: 0 | Status: SHIPPED
Start: 2017-08-04 | End: 2017-08-05

## 2017-08-04 RX ORDER — OXYCODONE HYDROCHLORIDE 5 MG/1
5-10 TABLET ORAL
Qty: 40 TAB | Refills: 0 | Status: ON HOLD | OUTPATIENT
Start: 2017-08-04 | End: 2017-08-08

## 2017-08-04 RX ORDER — DIAZEPAM 5 MG/1
5 TABLET ORAL
Qty: 20 TAB | Refills: 0 | Status: SHIPPED | OUTPATIENT
Start: 2017-08-04 | End: 2017-08-09

## 2017-08-04 RX ADMIN — ACETAMINOPHEN 650 MG: 325 TABLET ORAL at 06:45

## 2017-08-04 RX ADMIN — Medication 10 ML: at 06:46

## 2017-08-04 RX ADMIN — OXYCODONE HYDROCHLORIDE 10 MG: 5 TABLET ORAL at 06:45

## 2017-08-04 RX ADMIN — HEPARIN SODIUM 5000 UNITS: 5000 INJECTION, SOLUTION INTRAVENOUS; SUBCUTANEOUS at 06:01

## 2017-08-04 RX ADMIN — KETOROLAC TROMETHAMINE 15 MG: 30 INJECTION, SOLUTION INTRAMUSCULAR at 00:31

## 2017-08-04 RX ADMIN — SODIUM CHLORIDE 40 MG: 9 INJECTION INTRAMUSCULAR; INTRAVENOUS; SUBCUTANEOUS at 09:56

## 2017-08-04 RX ADMIN — KETOROLAC TROMETHAMINE 15 MG: 30 INJECTION, SOLUTION INTRAMUSCULAR at 06:01

## 2017-08-04 RX ADMIN — PIPERACILLIN SODIUM,TAZOBACTAM SODIUM 3.38 G: 3; .375 INJECTION, POWDER, FOR SOLUTION INTRAVENOUS at 04:55

## 2017-08-04 RX ADMIN — OXYCODONE HYDROCHLORIDE 10 MG: 5 TABLET ORAL at 02:46

## 2017-08-04 NOTE — PROGRESS NOTES
kasi drain pulled by Belvie Harada, PA. PICC line pulled, pressure held for 5 min, pt laying flat for 30 min.

## 2017-08-04 NOTE — DISCHARGE INSTRUCTIONS
Nutrition Recommendations for Discharge:    Continue Oral Nutrition Supplements at discharge:   Ensure Enlive or Ensure Plus 1-2 times per day  for 30 days unless otherwise directed by your Primary Care Physician. This product can be purchased at your local grocery store or drug store and online. Zander Christianson RD   _          Patient Discharge Instructions    Roxy Mckeon / [id number] : 1945    Admitted 2017 Discharged: 2017     Take Home Medications       · It is important that you take the medication exactly as they are prescribed. · Keep your medication in the bottles provided by the pharmacist and keep a list of the medication names, dosages, and times to be taken in your wallet. · Do not take other medications without consulting your doctor. · Do not drive, drink alcohol, or operate machinery when taking sedating pain medication. · Take colace daily while on pain medication to help prevent constipation. You may take over the counter laxatives such as Dulcolax or Miralax as needed for constipation      What to do at Home    Recommended diet: Regular Diet    Recommended activity: No heavy lifting or strenuous activity for 6 weeks. You may shower starting tomorrow. You may drive once pain has improved and you no longer require pain medication. Follow up with Dr. Genia Ordaz in 2 weeks. Call [facility] to make an appointment. Call Dr. Genia Ordaz or go to the ER if you develop worsening pain, fever, vomiting, or any other symptoms that concern you.

## 2017-08-04 NOTE — PROGRESS NOTES
Bedside shift change report given to [facility], RN (oncoming nurse) by Corinna Medley RN (offgoing nurse). Report included the following information SBAR, Kardex, Procedure Summary, Intake/Output, MAR and Recent Results.

## 2017-08-04 NOTE — PROGRESS NOTES
General Surgery Daily Progress Note    Patient: Randon Opitz MRN: [id number]  SSN: [id number]    YOB: 1945  Age: 74 y.o. Sex: male      Admit Date: 7/21/2017    Subjective:   Pain controlled, tolerating full liquids. + flatus, BM yesterday.  Denies N/V    Current Facility-Administered Medications   Medication Dose Route Frequency    TPN ADULT - CENTRAL AA 5% D20% W/ ELECTROLYTES AND CA   IntraVENous CONTINUOUS    diazePAM (VALIUM) injection 5 mg  5 mg IntraVENous Q6H PRN    oxyCODONE IR (ROXICODONE) tablet 5 mg  5 mg Oral Q4H PRN    oxyCODONE IR (ROXICODONE) tablet 10 mg  10 mg Oral Q4H PRN    acetaminophen (TYLENOL) tablet 650 mg  650 mg Oral Q6H    lidocaine (LIDODERM) 5 % patch 1 Patch  1 Patch TransDERmal Q24H    HYDROmorphone (PF) (DILAUDID) injection 1 mg  1 mg IntraVENous Q3H PRN    lidocaine (PF) (XYLOCAINE) 10 mg/mL (1 %) injection 0.1 mL  0.1 mL SubCUTAneous PRN    diphenhydrAMINE-zinc acetate 1%-0.1% (BENADRYL) cream   Topical Q4H PRN    pantoprazole (PROTONIX) 40 mg in sodium chloride 0.9 % 10 mL injection  40 mg IntraVENous DAILY    fat emulsion 20% (LIPOSYN, INTRALIPID) infusion 500 mL  500 mL IntraVENous Q TUE, THU & SAT    insulin lispro (HUMALOG) injection   SubCUTAneous Q6H    glucose chewable tablet 16 g  4 Tab Oral PRN    dextrose (D50W) injection syrg 12.5-25 g  12.5-25 g IntraVENous PRN    glucagon (GLUCAGEN) injection 1 mg  1 mg IntraMUSCular PRN    sodium chloride (NS) flush 10-30 mL  10-30 mL InterCATHeter PRN    sodium chloride (NS) flush 10 mL  10 mL InterCATHeter Q24H    sodium chloride (NS) flush 10-40 mL  10-40 mL InterCATHeter Q8H    alteplase (CATHFLO) 1 mg in sterile water (preservative free) 1 mL injection  1 mg InterCATHeter PRN    nicotine (NICODERM CQ) 14 mg/24 hr patch 1 Patch  1 Patch TransDERmal DAILY    naloxone (NARCAN) injection 0.4 mg  0.4 mg IntraVENous PRN    ondansetron (ZOFRAN) injection 4 mg  4 mg IntraVENous Q4H PRN    diphenhydrAMINE (BENADRYL) injection 12.5 mg  12.5 mg IntraVENous Q4H PRN    heparin (porcine) injection 5,000 Units  5,000 Units SubCUTAneous Q8H        Objective:      08/02 1901 - 08/04 0700  In: 542.5 [I.V.:542.5]  Out: 830 [Urine:725; Drains:105]  Patient Vitals for the past 8 hrs:   BP Temp Pulse Resp SpO2   08/04/17 0829 159/72 98.2 °F (36.8 °C) (!) 53 18 95 %   08/04/17 0249 167/72 97.7 °F (36.5 °C) (!) 59 18 96 %       Physical Exam:  General: Alert, cooperative, NAD  Lungs: Clear to auscultation   Heart:  Regular rate and rhythm  Abdomen: Soft, ATTP,non-distended. + bowel sounds. Incisions c/d/i. JAMEY drain SS. Extremities: Warm, moves all, no edema  Skin:  Warm and dry, no rash    Labs:   Recent Labs      08/04/17   0643   WBC  8.2   HGB  11.2*   HCT  34.0*   PLT  235     Recent Labs      08/04/17   0643  08/03/17   0405   NA  142  143   K  4.0  4.5   CL  108  110*   CO2  26  29   GLU  92  87   BUN  15  17   CREA  0.86  0.82   CA  8.2*  8.2*   MG   --   1.9   PHOS   --   3.3       Assessment / Plan:   · POD#4 lap, hand-assist sigmoidectomy, mobilization splenic flexure  · Abdominal exam appropriate, voiding after removal of moreno yesterday, VSS  · Continue Tylenol, Toradol, Jackie, Valium  · Advance to GI lite  · Encourage IS, OOB  · D/c TPN  · JAMEY removed  · Pathology reviewed- benign   · D/c home, f/u in office in 2 weeks.

## 2017-08-04 NOTE — PROGRESS NOTES
8/4/2017 9:36 AM EMR reviewed, discharge order noted. No discharge needs identified. Thanks.  ROWDY ArriagaW

## 2017-08-04 NOTE — DISCHARGE SUMMARY
Surgery Discharge Summary     Patient ID:  Preet Kowalski  [id number]  18 y.o.  1945    Admit date: 7/21/2017    Discharge date and time: No discharge date for patient encounter. Admission Diagnoses:    Patient Active Problem List   Diagnosis Code    Colitis K52.9    Colonic obstruction ([facility] [address] K56.60       Discharge Diagnoses: There are no discharge diagnoses documented for the most recent discharge. Patient Active Problem List   Diagnosis Code    Colitis K52.9    Colonic obstruction ([address] K56.60       Procedures for this admission: Procedure(s):  LAPAROSCOPIC HAND ASSISTED SIGMOIDECTOMY/ /CYSTOSCOPY/BILATERAL URETHRAL [address] Course: Mr. Nilay Malone is a 64yom w/ past history of multiple sigmoid diverticular episodes who presented to the ED on DOA w/ c/o abdominal pain. Imaging showed evidence of sigmoid colitis w/ colonic obstruction. He was admitted and treated with NG decompression, IVF, and ABX. He did have bowel function but NG output remained high, suggesting ongoing partial obstruction. TPN was started and GI was consulted. He underwent sigmoidoscopy on 7/27 and he then underwent lap sigmoidectomy w/ primary anastomosis by Dr. Asad Hollingsworth on 7/31. He had an uneventful post-operative course. Bowel function resumed and diet was advanced without issue. He was discharged home in good condition on POD#4. Pathology was reviewed and showed no underlying malignancy. Disposition: home    Discharged Condition : good    Instructions: Follow-up in office  in 2 weeks.               Signed:  SIENNA Parker  8/4/2017  10:50 AM

## 2017-08-05 ENCOUNTER — APPOINTMENT (OUTPATIENT)
Dept: CT IMAGING | Age: 72
DRG: 373 | End: 2017-08-05
Attending: EMERGENCY MEDICINE
Payer: MEDICARE

## 2017-08-05 ENCOUNTER — APPOINTMENT (OUTPATIENT)
Dept: GENERAL RADIOLOGY | Age: 72
DRG: 373 | End: 2017-08-05
Attending: EMERGENCY MEDICINE
Payer: MEDICARE

## 2017-08-05 ENCOUNTER — HOSPITAL ENCOUNTER (INPATIENT)
Age: 72
LOS: 2 days | Discharge: HOME | DRG: 373 | End: 2017-08-09
Attending: EMERGENCY MEDICINE | Admitting: SURGERY
Payer: MEDICARE

## 2017-08-05 DIAGNOSIS — K52.9 COLITIS: Primary | ICD-10-CM

## 2017-08-05 LAB
ALBUMIN SERPL BCP-MCNC: 2.7 G/DL (ref 3.5–5)
ALBUMIN/GLOB SERPL: 0.7 {RATIO} (ref 1.1–2.2)
ALP SERPL-CCNC: 68 U/L (ref 45–117)
ALT SERPL-CCNC: 45 U/L (ref 12–78)
ANION GAP BLD CALC-SCNC: 10 MMOL/L (ref 5–15)
APPEARANCE UR: ABNORMAL
AST SERPL W P-5'-P-CCNC: 29 U/L (ref 15–37)
BACTERIA URNS QL MICRO: NEGATIVE /HPF
BASOPHILS # BLD AUTO: 0 K/UL (ref 0–0.1)
BASOPHILS # BLD: 0 % (ref 0–1)
BILIRUB SERPL-MCNC: 0.4 MG/DL (ref 0.2–1)
BILIRUB UR QL: NEGATIVE
BUN SERPL-MCNC: 17 MG/DL (ref 6–20)
BUN/CREAT SERPL: 16 (ref 12–20)
CALCIUM SERPL-MCNC: 8.7 MG/DL (ref 8.5–10.1)
CHLORIDE SERPL-SCNC: 104 MMOL/L (ref 97–108)
CO2 SERPL-SCNC: 24 MMOL/L (ref 21–32)
COLOR UR: ABNORMAL
CREAT SERPL-MCNC: 1.05 MG/DL (ref 0.7–1.3)
EOSINOPHIL # BLD: 0.1 K/UL (ref 0–0.4)
EOSINOPHIL NFR BLD: 1 % (ref 0–7)
EPITH CASTS URNS QL MICRO: ABNORMAL /LPF
ERYTHROCYTE [DISTWIDTH] IN BLOOD BY AUTOMATED COUNT: 13.8 % (ref 11.5–14.5)
GLOBULIN SER CALC-MCNC: 3.9 G/DL (ref 2–4)
GLUCOSE SERPL-MCNC: 92 MG/DL (ref 65–100)
GLUCOSE UR STRIP.AUTO-MCNC: NEGATIVE MG/DL
HCT VFR BLD AUTO: 38 % (ref 36.6–50.3)
HGB BLD-MCNC: 12.9 G/DL (ref 12.1–17)
HGB UR QL STRIP: NEGATIVE
HYALINE CASTS URNS QL MICRO: ABNORMAL /LPF (ref 0–5)
KETONES UR QL STRIP.AUTO: 40 MG/DL
LACTATE SERPL-SCNC: 1.8 MMOL/L (ref 0.4–2)
LEUKOCYTE ESTERASE UR QL STRIP.AUTO: NEGATIVE
LYMPHOCYTES # BLD AUTO: 13 % (ref 12–49)
LYMPHOCYTES # BLD: 1.3 K/UL (ref 0.8–3.5)
MCH RBC QN AUTO: 30.4 PG (ref 26–34)
MCHC RBC AUTO-ENTMCNC: 33.9 G/DL (ref 30–36.5)
MCV RBC AUTO: 89.6 FL (ref 80–99)
MONOCYTES # BLD: 0.6 K/UL (ref 0–1)
MONOCYTES NFR BLD AUTO: 6 % (ref 5–13)
NEUTS SEG # BLD: 8.2 K/UL (ref 1.8–8)
NEUTS SEG NFR BLD AUTO: 80 % (ref 32–75)
NITRITE UR QL STRIP.AUTO: NEGATIVE
PH UR STRIP: 7.5 [PH] (ref 5–8)
PLATELET # BLD AUTO: 257 K/UL (ref 150–400)
POTASSIUM SERPL-SCNC: 4.1 MMOL/L (ref 3.5–5.1)
PROT SERPL-MCNC: 6.6 G/DL (ref 6.4–8.2)
PROT UR STRIP-MCNC: NEGATIVE MG/DL
RBC # BLD AUTO: 4.24 M/UL (ref 4.1–5.7)
RBC #/AREA URNS HPF: ABNORMAL /HPF (ref 0–5)
SODIUM SERPL-SCNC: 138 MMOL/L (ref 136–145)
SP GR UR REFRACTOMETRY: 1.02 (ref 1–1.03)
TROPONIN I SERPL-MCNC: <0.04 NG/ML
UA: UC IF INDICATED,UAUC: ABNORMAL
UROBILINOGEN UR QL STRIP.AUTO: 0.2 EU/DL (ref 0.2–1)
WBC # BLD AUTO: 10.2 K/UL (ref 4.1–11.1)
WBC URNS QL MICRO: ABNORMAL /HPF (ref 0–4)

## 2017-08-05 PROCEDURE — 99218 HC RM OBSERVATION: CPT

## 2017-08-05 PROCEDURE — 80053 COMPREHEN METABOLIC PANEL: CPT | Performed by: EMERGENCY MEDICINE

## 2017-08-05 PROCEDURE — 70450 CT HEAD/BRAIN W/O DYE: CPT

## 2017-08-05 PROCEDURE — 51701 INSERT BLADDER CATHETER: CPT

## 2017-08-05 PROCEDURE — 96365 THER/PROPH/DIAG IV INF INIT: CPT

## 2017-08-05 PROCEDURE — 85025 COMPLETE CBC W/AUTO DIFF WBC: CPT | Performed by: EMERGENCY MEDICINE

## 2017-08-05 PROCEDURE — 74011250636 HC RX REV CODE- 250/636: Performed by: SURGERY

## 2017-08-05 PROCEDURE — 74011636320 HC RX REV CODE- 636/320: Performed by: EMERGENCY MEDICINE

## 2017-08-05 PROCEDURE — 87040 BLOOD CULTURE FOR BACTERIA: CPT | Performed by: EMERGENCY MEDICINE

## 2017-08-05 PROCEDURE — 71010 XR CHEST PORT: CPT

## 2017-08-05 PROCEDURE — 99285 EMERGENCY DEPT VISIT HI MDM: CPT

## 2017-08-05 PROCEDURE — 74011250636 HC RX REV CODE- 250/636: Performed by: EMERGENCY MEDICINE

## 2017-08-05 PROCEDURE — 81001 URINALYSIS AUTO W/SCOPE: CPT | Performed by: EMERGENCY MEDICINE

## 2017-08-05 PROCEDURE — 77030032490 HC SLV COMPR SCD KNE COVD -B

## 2017-08-05 PROCEDURE — 77030005563 HC CATH URETH INT MMGH -A

## 2017-08-05 PROCEDURE — 93005 ELECTROCARDIOGRAM TRACING: CPT

## 2017-08-05 PROCEDURE — 84484 ASSAY OF TROPONIN QUANT: CPT | Performed by: EMERGENCY MEDICINE

## 2017-08-05 PROCEDURE — 74011000258 HC RX REV CODE- 258: Performed by: EMERGENCY MEDICINE

## 2017-08-05 PROCEDURE — 74177 CT ABD & PELVIS W/CONTRAST: CPT

## 2017-08-05 PROCEDURE — 83605 ASSAY OF LACTIC ACID: CPT | Performed by: EMERGENCY MEDICINE

## 2017-08-05 PROCEDURE — 36415 COLL VENOUS BLD VENIPUNCTURE: CPT | Performed by: EMERGENCY MEDICINE

## 2017-08-05 RX ORDER — DEXTROSE, SODIUM CHLORIDE, AND POTASSIUM CHLORIDE 5; .45; .15 G/100ML; G/100ML; G/100ML
100 INJECTION INTRAVENOUS CONTINUOUS
Status: DISCONTINUED | OUTPATIENT
Start: 2017-08-05 | End: 2017-08-08

## 2017-08-05 RX ORDER — ONDANSETRON 2 MG/ML
4 INJECTION INTRAMUSCULAR; INTRAVENOUS
Status: DISCONTINUED | OUTPATIENT
Start: 2017-08-05 | End: 2017-08-09 | Stop reason: HOSPADM

## 2017-08-05 RX ORDER — MORPHINE SULFATE 2 MG/ML
2 INJECTION, SOLUTION INTRAMUSCULAR; INTRAVENOUS
Status: DISCONTINUED | OUTPATIENT
Start: 2017-08-05 | End: 2017-08-08

## 2017-08-05 RX ORDER — NALOXONE HYDROCHLORIDE 0.4 MG/ML
0.4 INJECTION, SOLUTION INTRAMUSCULAR; INTRAVENOUS; SUBCUTANEOUS AS NEEDED
Status: DISCONTINUED | OUTPATIENT
Start: 2017-08-05 | End: 2017-08-09 | Stop reason: HOSPADM

## 2017-08-05 RX ORDER — SODIUM CHLORIDE 0.9 % (FLUSH) 0.9 %
5-10 SYRINGE (ML) INJECTION EVERY 8 HOURS
Status: DISCONTINUED | OUTPATIENT
Start: 2017-08-05 | End: 2017-08-09 | Stop reason: HOSPADM

## 2017-08-05 RX ORDER — SODIUM CHLORIDE 0.9 % (FLUSH) 0.9 %
5-10 SYRINGE (ML) INJECTION AS NEEDED
Status: DISCONTINUED | OUTPATIENT
Start: 2017-08-05 | End: 2017-08-09 | Stop reason: HOSPADM

## 2017-08-05 RX ADMIN — DEXTROSE MONOHYDRATE, SODIUM CHLORIDE, AND POTASSIUM CHLORIDE 100 ML/HR: 50; 4.5; 1.49 INJECTION, SOLUTION INTRAVENOUS at 22:56

## 2017-08-05 RX ADMIN — Medication 10 ML: at 23:02

## 2017-08-05 RX ADMIN — IOPAMIDOL 100 ML: 755 INJECTION, SOLUTION INTRAVENOUS at 19:35

## 2017-08-05 RX ADMIN — PIPERACILLIN SODIUM,TAZOBACTAM SODIUM 3.38 G: 3; .375 INJECTION, POWDER, FOR SOLUTION INTRAVENOUS at 21:52

## 2017-08-05 NOTE — ED NOTES
Bedside shift change report given to [address] (oncoming nurse) by KRISSY RN (offgoing nurse). Report included the following information SBAR.

## 2017-08-05 NOTE — IP AVS SNAPSHOT
Angela Calle 
 
 
 [address] [facility] 
[phone] Patient: Teodora Koyanagi MRN: [id number] OEB:4/65/5632 You are allergic to the following No active allergies Recent Documentation Height Weight BMI Smoking Status 1.778 m 87.1 kg 27.55 kg/m2 Current Some Day Smoker Unresulted Labs Order Current Status SAMPLE TO BLOOD BANK In process CULTURE, BLOOD Preliminary result CULTURE, BLOOD Preliminary result Emergency Contacts Name Discharge Info Relation Home Work [facility] [address] DISCHARGE CAREGIVER [3] Spouse [3] [phone] About your hospitalization You were admitted on:  August 5, 2017 You last received care in the:  [facility] 4M POST SURG ORT 2 You were discharged on:  August 9, 2017 Unit phone number:  [phone] Why you were hospitalized Your primary diagnosis was:  Not on File Your diagnoses also included:  Colitis, Acute, C. Difficile Colitis Providers Seen During Your Hospitalizations Provider Role Specialty Primary office phone Saurav Otero. Aury Santana MD Attending Provider Emergency Medicine [phone] Farheen Bueno MD Attending Provider General Surgery [phone] Your Primary Care Physician (PCP) Primary Care Physician Office Phone Office Fax Jemal Castelan [phone] Follow-up Information Follow up With Details Comments Contact Info Farheen Bueno MD Schedule an appointment as soon as possible for a visit in 1 week  [address] [place] [address] [facility] 1007 [facility] 
[phone] Hayden Barraza MD   [address] [address] 
[phone] Current Discharge Medication List  
  
START taking these medications Dose & Instructions Dispensing Information Comments Morning Noon Evening Bedtime metroNIDAZOLE 500 mg tablet Commonly known as:  FLAGYL Your last dose was: Your next dose is:    
   
   
 Dose:  500 mg Take 1 Tab by mouth three (3) times daily. Quantity:  42 Tab Refills:  0 STOP taking these medications   
 diazePAM 5 mg tablet Commonly known as:  VALIUM  
   
  
 oxyCODONE IR 5 mg immediate release tablet Commonly known as:  Arpit Mcgrath Where to Get Your Medications Information on where to get these meds will be given to you by the nurse or doctor. ! Ask your nurse or doctor about these medications  
  metroNIDAZOLE 500 mg tablet Discharge Instructions Patient Discharge Instructions Emily Fitch / [id number] : 1945 Admitted 2017 Discharged: 2017 Take Home Medications · It is important that you take the medication exactly as they are prescribed. · Keep your medication in the bottles provided by the pharmacist and keep a list of the medication names, dosages, and times to be taken in your wallet. · Do not take other medications without consulting your doctor. · Stop taking Valium. Only take oxycodone as needed for severe pain. You may take Tylenol and Motrin for more mild pain. · Do not drink any alcohol while taking Flagyl. · Do not drive, drink alcohol, or operate machinery when taking sedating pain medication. What to do at [facility] Recommended diet: Regular Diet Recommended activity: No heavy lifting or strenuous activity for 4 weeks. You may shower. You may drive once pain has improved and you no longer require pain medication. Follow up with Dr. Mya Ram in 1 week. Call [facility] to make an appointment. Call Dr. Mya Ram or go to the ER if you develop worsening pain, fever, vomiting, or any other symptoms that concern you.   
 
 
Information obtained by : 
 I understand that if any problems occur once I am at home I am to contact my physician. I understand and acknowledge receipt of the instructions indicated above. Physician's or R.N.'s Signature                                                                  Date/Time Patient or Representative Signature                                                          Date/Time Discharge Orders None Introducing \[facility] [place] [facility]\"" & HEALTH SERVICES! [facility] introduces [employer] patient portal. Now you can access parts of your medical record, email your doctor's office, and request medication refills online. 1. In your internet browser, go to [email or web address] 2. Click on the First Time User? Click Here link in the Sign In box. You will see the New Member Sign Up page. 3. Enter your [employer] Access Code exactly as it appears below. You will not need to use this code after youve completed the sign-up process. If you do not sign up before the expiration date, you must request a new code. · [employer] Access Code: [email or web address] Expires: 10/1/2017 10:56 AM 
 
4. Enter the last four digits of your Social Security Number (xxxx) and Date of Birth (mm/dd/yyyy) as indicated and click Submit. You will be taken to the next sign-up page. 5. Create a [employer] ID. This will be your [employer] login ID and cannot be changed, so think of one that is secure and easy to remember. 6. Create a [employer] password. You can change your password at any time. 7. Enter your Password Reset Question and Answer. This can be used at a later time if you forget your password. 8. Enter your e-mail address.  You will receive e-mail notification when new information is available in [employer]. 9. Click Sign Up. You can now view and download portions of your medical record. 10. Click the Download Summary menu link to download a portable copy of your medical information. If you have questions, please visit the Frequently Asked Questions section of the [employer] website. Remember, [employer] is NOT to be used for urgent needs. For medical emergencies, dial 911. Now available from your iPhone and Android! General Information Please provide this summary of care documentation to your next provider. Patient Signature:  ____________________________________________________________ Date:  ____________________________________________________________  
  
Hans Bell Provider Signature:  ____________________________________________________________ Date:  ____________________________________________________________

## 2017-08-05 NOTE — ED PROVIDER NOTES
HPI Comments: 74 y.o. male with past medical history significant for spinal stenosis and diverticulitis who presents via EMS with chief complaint of altered mental status. Per wife, pt woke up from a 4 hour nap today ~3 house PTA with an altered mental status. Per wife, pt was confused, shaking and thought that his living room was the bathroom. Per EMS, upon their arrival pt was confused and en route he was febrile at 101 and had a blood glucose level of 86. Per wife, pt had a FERNANDES today ~1230. Wife reports she called Dr. Genia Ordaz and pt took 2 tylenol for HA. Per wife, pt had a sigmoidectomy 7/31/17 and was discharged yesterday ~1500. Per wife, pt was \"put under\" but she does not know what type of anesthesia was used. Per wife pt was behaving at his baseline upon being discharged. Per wife, this AM pt seemed a little confused and had a decreased appetite. Pt reports worsening abd pain. Per wife, pt has been taking prescribed doses of diazepam and oxycodone since surgery. Per wife, pt was not febrile yesterday or this morning. Per wife, pt did not have any drainage from his surgical incision-- \"it looked nice\". Per wife, pt has been having nl BMs. Per wifes, pt's only daily medicine is neurontin. Per wife, pt has not been vomiting or having urinary sx. There are no other acute medical concerns at this time. Social hx: +, +tobacco smoker, +EtOH use   PCP: Gary Rehman MD  General Surgeon: Dr. Hardy Fry Chart Review:     7/31/17: Lap sigmoidectomy w/ primary anastomosis with Dr. Genia Ordaz to treat diverticulitis. Pt was discharged 7/4/17, yesterday. 7/27/17: Flexible sigmoidoscopy. 7/21/17-7/25/17: Admitted to hospital, dx with bowel obstruction. Note written by Bridget Hester, as dictated by Judge Sweeney. Eugene Stewart MD 6:33 PM      The history is provided by the patient, the spouse and the EMS personnel. The history is limited by a language barrier.         Past Medical History:   Diagnosis Date  Diverticulitis     Gastrointestinal disorder     History of vascular access device 07/24/2017    [facility] VAT -  5FR triple Lumen Power PICC R Basilic  39 cm    Spinal stenosis        Past Surgical History:   Procedure Laterality Date    FLEXIBLE SIGMOIDOSCOPY N/A 7/27/2017    SIGMOIDOSCOPY FLEXIBLE performed by Dennise Leyden, MD at [facility] ENDOSCOPY         No family history on file. Social History     Social History    Marital status:      Spouse name: N/A    Number of children: N/A    Years of education: N/A     Occupational History    Not on file. Social History Main Topics    Smoking status: Current Some Day Smoker     Packs/day: 1.00     Years: 50.00    Smokeless tobacco: Current User    Alcohol use 0.5 oz/week     1 Cans of beer per week    Drug use: No    Sexual activity: Not on file     Other Topics Concern    Not on file     Social History Narrative         ALLERGIES: Review of patient's allergies indicates no known allergies. Review of Systems   Constitutional: Positive for appetite change (decrease) and fever. Negative for chills. HENT: Negative for ear pain and sore throat. Eyes: Negative for pain. Respiratory: Negative for chest tightness and shortness of breath. Cardiovascular: Negative for chest pain and leg swelling. Gastrointestinal: Positive for abdominal pain. Negative for nausea and vomiting. Genitourinary: Negative for dysuria, flank pain, frequency and hematuria. Musculoskeletal: Negative for back pain. Skin: Negative for rash. Neurological: Positive for headaches. Psychiatric/Behavioral: Positive for confusion. All other systems reviewed and are negative. Vitals:    08/05/17 1836   BP: (!) 120/110   Pulse: 84   Resp: 16   Temp: 97.5 °F (36.4 °C)   SpO2: 94%   Weight: 87.1 kg (192 lb)   Height: 5' 10\" (1.778 m)            Physical Exam   Constitutional: He appears well-developed and well-nourished.    HENT:   Head: Normocephalic and atraumatic. Eyes: Pupils are equal, round, and reactive to light. No scleral icterus. Neck: Normal range of motion. Neck supple. No tracheal deviation present. Cardiovascular: Normal rate, regular rhythm, normal heart sounds and intact distal pulses. Exam reveals no gallop and no friction rub. No murmur heard. Pulmonary/Chest: Effort normal and breath sounds normal. No respiratory distress. He has no wheezes. He has no rales. Abdominal: Soft. He exhibits no distension. There is tenderness (LLQ). There is no rebound and no guarding. Musculoskeletal: Normal range of motion. He exhibits no edema. Neurological: He is alert. Confused   Skin: Skin is warm and dry. Surgical incisions clean, dry and intact   Psychiatric:   Anxious   Nursing note and vitals reviewed. Note written by Bridget Majano, as dictated by Ingrid Raymond. Fady Valdes MD 6:33 PM    MDM  Number of Diagnoses or Management Options  Colitis:   Diagnosis management comments: 75 yo male with history of recent diverticulitis, sigmoidectomy presents with AMS  Diff dx: UTI, electrolyte abnormality, ACS, abdominal abscess, post-op infection  Afebrile here. Doubt meningitis. A/P:   1. AMS  2. Colitis  - admit  - IV antibiotics      ED Course       Procedures    CONSULT NOTE:  8:36 PM Juan Antonio Valdes MD spoke with Dr. Daria Bradford, Consult for Surgery. Discussed available diagnostic tests and clinical findings. She is in agreement with care plans as outlined. Dr. Jordan Emerson said she will watch pt overnight. She recommends IV abx.

## 2017-08-05 NOTE — IP AVS SNAPSHOT
[address] [facility] 
[phone] Patient: Concha Cowan MRN: [id number] [facility]:5/70/3674 Current Discharge Medication List  
  
START taking these medications Dose & Instructions Dispensing Information Comments Morning Noon Evening Bedtime  
 metroNIDAZOLE 500 mg tablet Commonly known as:  FLAGYL Your last dose was: Your next dose is:    
   
   
 Dose:  500 mg Take 1 Tab by mouth three (3) times daily. Quantity:  42 Tab Refills:  0 STOP taking these medications   
 diazePAM 5 mg tablet Commonly known as:  VALIUM  
   
  
 oxyCODONE IR 5 mg immediate release tablet Commonly known as:  Eddie Klein Where to Get Your Medications Information on where to get these meds will be given to you by the nurse or doctor. ! Ask your nurse or doctor about these medications  
  metroNIDAZOLE 500 mg tablet

## 2017-08-06 LAB
ATRIAL RATE: 69 BPM
C DIFF TOX GENS STL QL NAA+PROBE: POSITIVE
CALCULATED P AXIS, ECG09: 37 DEGREES
CALCULATED R AXIS, ECG10: 4 DEGREES
CALCULATED T AXIS, ECG11: 65 DEGREES
DIAGNOSIS, 93000: NORMAL
P-R INTERVAL, ECG05: 164 MS
Q-T INTERVAL, ECG07: 396 MS
QRS DURATION, ECG06: 80 MS
QTC CALCULATION (BEZET), ECG08: 424 MS
VENTRICULAR RATE, ECG03: 69 BPM

## 2017-08-06 PROCEDURE — 74011000250 HC RX REV CODE- 250: Performed by: SURGERY

## 2017-08-06 PROCEDURE — 99218 HC RM OBSERVATION: CPT

## 2017-08-06 PROCEDURE — 74011000258 HC RX REV CODE- 258: Performed by: SURGERY

## 2017-08-06 PROCEDURE — 74011250636 HC RX REV CODE- 250/636: Performed by: SURGERY

## 2017-08-06 PROCEDURE — 87493 C DIFF AMPLIFIED PROBE: CPT | Performed by: SURGERY

## 2017-08-06 RX ORDER — METRONIDAZOLE 500 MG/100ML
500 INJECTION, SOLUTION INTRAVENOUS EVERY 8 HOURS
Status: DISCONTINUED | OUTPATIENT
Start: 2017-08-06 | End: 2017-08-08

## 2017-08-06 RX ADMIN — METRONIDAZOLE 500 MG: 500 INJECTION, SOLUTION INTRAVENOUS at 17:54

## 2017-08-06 RX ADMIN — PIPERACILLIN SODIUM,TAZOBACTAM SODIUM 3.38 G: 3; .375 INJECTION, POWDER, FOR SOLUTION INTRAVENOUS at 06:56

## 2017-08-06 RX ADMIN — DEXTROSE MONOHYDRATE, SODIUM CHLORIDE, AND POTASSIUM CHLORIDE 100 ML/HR: 50; 4.5; 1.49 INJECTION, SOLUTION INTRAVENOUS at 07:17

## 2017-08-06 RX ADMIN — PIPERACILLIN SODIUM,TAZOBACTAM SODIUM 3.38 G: 3; .375 INJECTION, POWDER, FOR SOLUTION INTRAVENOUS at 14:43

## 2017-08-06 RX ADMIN — Medication 10 ML: at 14:44

## 2017-08-06 RX ADMIN — DEXTROSE MONOHYDRATE, SODIUM CHLORIDE, AND POTASSIUM CHLORIDE 100 ML/HR: 50; 4.5; 1.49 INJECTION, SOLUTION INTRAVENOUS at 23:17

## 2017-08-06 RX ADMIN — Medication 10 ML: at 06:56

## 2017-08-06 NOTE — PROGRESS NOTES
Primary Nurse Aniceto Mckeon RN and Wendy Martin RN performed a dual skin assessment on this patient Impairment noted- see wound doc flow sheet  Adrien score is 18

## 2017-08-06 NOTE — ED NOTES
Patient assisted to bedside commode by family and staff. Patient unsteady on his feet. patient had large watery, non-sticky BM. Per family patient's stool has been watery since surgery.

## 2017-08-06 NOTE — PROGRESS NOTES
[facility]: MED RECONCILIATION    Comments/Recommendations:   Interview conducted with spouse at bedside  Verifies no known allergies allergies    Medications added:     · None    Medications removed:    · Gabapentin 600mg tid  · Meclizine 25mg tid prn nausea  · Polyethylene glycol 17 gram daily prn  · Psyllium seed 15ml bid prn    Medications adjusted:    · None    Allergies: Review of patient's allergies indicates no known allergies. Prior to Admission Medications:     Medication Documentation Review Audit       Reviewed by Tyler Coleman PHARMD (Pharmacist) on 08/05/17 at 2213         Medication Sig Documenting Provider Last Dose Status Taking?      diazePAM (VALIUM) 5 mg tablet Take 1 Tab by mouth every six (6) hours as needed (abdominal spasms). Max Daily Amount: 20 mg. SIENNA Alberto 8/5/2017 1330 Active Yes    oxyCODONE IR (ROXICODONE) 5 mg immediate release tablet Take 1-2 Tabs by mouth every four (4) hours as needed. Max Daily Amount: 60 mg.  Jeff Antunez, [address]on 8/5/2017 1330 Active Yes                  Thank you,    Rachel Ambriz, YiselD, BCPS

## 2017-08-06 NOTE — H&P
Surgery History and Phsyical    Subjective: Yoana Molina is a 74 y.o. male who is well known to me. Underwent lap sigmoid for acute on chronic diverticulitis last week. He is POD 6. He was discharged home 2 days ago after tolerating a diet and having a BM. At the time of discharge, he was doing well. However, at home, he had altered mental status and was weak having multiple bowel movements. He came to the ED with these complaints. W[place]kup showed elevated WBC with normal head CT. Stool sample had been sent for c diff. CT abd showed possible thickening of the descending colon consistent with possible colitis. Pt was admitted for antibiotics and observation. Past Medical History:   Diagnosis Date    Diverticulitis     Gastrointestinal disorder     History of vascular access device 07/24/2017    [facility] VAT -  5FR triple Lumen Power PICC R Basilic  39 cm    Spinal stenosis      Past Surgical History:   Procedure Laterality Date    FLEXIBLE SIGMOIDOSCOPY N/A 7/27/2017    SIGMOIDOSCOPY FLEXIBLE performed by Gayatri Sherman MD at [facility] [address]      History reviewed. No pertinent family history.   Social History     Social History    Marital status:      Spouse name: N/A    Number of children: N/A    Years of education: N/A     Social History Main Topics    Smoking status: Current Some Day Smoker     Packs/day: 1.00     Years: 50.00    Smokeless tobacco: Current User    Alcohol use 0.5 oz/week     1 Cans of beer per week    Drug use: No    Sexual activity: Not Asked     Other Topics Concern    None     Social History Narrative      Current Facility-Administered Medications   Medication Dose Route Frequency    sodium chloride (NS) flush 5-10 mL  5-10 mL IntraVENous Q8H    sodium chloride (NS) flush 5-10 mL  5-10 mL IntraVENous PRN    dextrose 5% - 0.45% NaCl with KCl 20 mEq/L infusion  100 mL/hr IntraVENous CONTINUOUS    morphine injection 2 mg  2 mg IntraVENous Q4H PRN    naloxone [facility]) injection 0.4 mg  0.4 mg IntraVENous PRN    ondansetron (ZOFRAN) injection 4 mg  4 mg IntraVENous Q4H PRN    piperacillin-tazobactam (ZOSYN) 3.375 g in 0.9% sodium chloride (MBP/ADV) 100 mL  3.375 g IntraVENous Q8H      No Known Allergies    Review of Systems:  A comprehensive review of systems was negative except for:  Constitutional: positive for none  Eyes: positive for none  Ears, nose, mouth, throat, and face: positive for none  Respiratory: positive for negative  Cardiovascular: positive for none  Gastrointestinal: positive for diarrhea  Genitourinary: positive for none  Integument/breast: positive for none  Hematologic/lymphatic: positive for none  Musculoskeletal: positive for none  Neurological: positive for headaches, weakness and altered mental status  Behvioral/Psych: positive for none  Endocrine: positive for none  Allergic/Immunologic: positive for none    Objective:      No data found. Temp (24hrs), Av.9 °F (37.2 °C), Min:97.5 °F (36.4 °C), Max:101.3 °F (38.5 °C)      Physical Exam:  General:  Sleeping, no distress, appears stated age. Eyes:  closed   HENT: Normocephalic, atraumatic       Neck: Supple, no JVD. Back:   Symmetric, no curvature. ROM normal. No CVA tenderness. Lungs:   Clear to auscultation bilaterally. Heart:  Regular rate and rhythm, S1, S2 normal, no murmur, click, rub or gallop. Abdomen:   Soft, non-tender. Bowel sounds normal. No masses,  No organomegaly. Incisions clean   Musculoskeletal: Extremities normal, atraumatic, no cyanosis or edema. Pulses: 2+ and symmetric all extremities.    Skin: Skin color, texture, turgor normal. No rashes or lesions   Neuro: Grossly intact     Labs:   Recent Labs      17   1850   WBC  10.2   HGB  12.9   HCT  38.0   PLT  257     Recent Labs      17   1850   NA  138   K  4.1   CL  104   CO2  24   GLU  92   BUN  17   CREA  1.05   CA  8.7   ALB  2.7*   TBILI  0.4   SGOT  29   ALT  45     No results for input(s): INR in the last 72 hours. No lab exists for component: INREXT  CT reviewed  Assessment:     Altered mental status of unknown etiology  ? colitis    Plan:     Symptoms could be attributed to pain meds plus antianxiety medications. \  Diarrhea normal for this kind of post op course.   Await c diff  Clear liquids for now  Just pain meds as needed  Will continue to watch closely    Signed By: Christian Cortez MD     August 6, 2017

## 2017-08-06 NOTE — ED NOTES
Patient now alert and oriented to person. Patient also able to recognize his family, where as when he arrived, he was not able to. Provider aware. Family at bedside. Will continue to monitor.

## 2017-08-06 NOTE — ROUTINE PROCESS
TRANSFER - OUT REPORT:    Verbal report given to Melissa Villarreal RN (name) on Loyd Phoenix  being transferred to  (unit) for routine progression of care       Report consisted of patients Situation, Background, Assessment and   Recommendations(SBAR). Information from the following report(s) SBAR, ED Summary, Intake/Output, MAR, Recent Results and Cardiac Rhythm SR was reviewed with the receiving nurse. Lines:   Peripheral IV 08/05/17 Right Hand (Active)   Site Assessment Clean, dry, & intact 8/5/2017  6:46 PM   Phlebitis Assessment 0 8/5/2017  6:46 PM   Infiltration Assessment 0 8/5/2017  6:46 PM   Dressing Status Clean, dry, & intact 8/5/2017  6:46 PM       Peripheral IV 08/05/17 Left Wrist (Active)   Site Assessment Clean, dry, & intact 8/5/2017  7:09 PM   Phlebitis Assessment 0 8/5/2017  7:09 PM   Infiltration Assessment 0 8/5/2017  7:09 PM   Dressing Status Clean, dry, & intact 8/5/2017  7:09 PM        Opportunity for questions and clarification was provided.       Patient transported with:   Registered Nurse

## 2017-08-06 NOTE — PROGRESS NOTES
Bedside and Verbal shift change report given to Leandro alaniz RN (oncoming nurse) by Maryann Chatterjee RN (offgoing nurse). Report included the following information SBAR, Kardex, ED Summary, Intake/Output, MAR and Recent Results.

## 2017-08-07 PROBLEM — A04.72 C. DIFFICILE COLITIS: Status: ACTIVE | Noted: 2017-08-07

## 2017-08-07 PROCEDURE — 65270000029 HC RM PRIVATE

## 2017-08-07 PROCEDURE — 74011250636 HC RX REV CODE- 250/636: Performed by: SURGERY

## 2017-08-07 PROCEDURE — 99218 HC RM OBSERVATION: CPT

## 2017-08-07 PROCEDURE — 74011000250 HC RX REV CODE- 250: Performed by: SURGERY

## 2017-08-07 RX ADMIN — Medication 10 ML: at 06:00

## 2017-08-07 RX ADMIN — METRONIDAZOLE 500 MG: 500 INJECTION, SOLUTION INTRAVENOUS at 02:32

## 2017-08-07 RX ADMIN — METRONIDAZOLE 500 MG: 500 INJECTION, SOLUTION INTRAVENOUS at 17:46

## 2017-08-07 RX ADMIN — DEXTROSE MONOHYDRATE, SODIUM CHLORIDE, AND POTASSIUM CHLORIDE 100 ML/HR: 50; 4.5; 1.49 INJECTION, SOLUTION INTRAVENOUS at 09:51

## 2017-08-07 RX ADMIN — DEXTROSE MONOHYDRATE, SODIUM CHLORIDE, AND POTASSIUM CHLORIDE 100 ML/HR: 50; 4.5; 1.49 INJECTION, SOLUTION INTRAVENOUS at 20:06

## 2017-08-07 RX ADMIN — METRONIDAZOLE 500 MG: 500 INJECTION, SOLUTION INTRAVENOUS at 09:52

## 2017-08-07 NOTE — PHYSICIAN ADVISORY
Letter of Status Determination:   Recommend hospitalization status upgraded from   OBSERVATION  to Inpatient  Status     Pt Name:  Balbina Claudio   MR#  [id number]   [facility]#   [id number]   Room and Hospital  420/01  @ [address] [place] [address]   Hospitalization date  8/5/2017  6:31 PM   Current Attending Physician  Tonja Talbot MD   Principal diagnosis  <principal problem not specified>   Acute colitis    Clinicals  74 y.o. y.o  male hospitalized with above diagnosis   The pt has had recent surgical intervention   He returns with infectious colitis and he is at high risk of deterioration from this  infectious process. [place] criteria   Does  NOT apply    STATUS DETERMINATION  This patient is at above high risk of deterioration based on documented presenting clinical data, comorbid conditions, high risk of adverse events and current acute care course. Mr. Balbina Claudio now meets Inpatient Admission status criteria in accordance with CMS regulation Section 43 .3. Specifically, due to medical necessity the patient's stay now exceeds Two Midnights. It is our recommendation that this patient's hospitalization status should be upgraded from  OBSERVATION to INPATIENT status. The final decision of the patient's hospitalization status depends on the attending physician's judgment             Additional comments     Payor: Tiffanie Cm / Plan: [address] / Product Type: Medicare /         Edelmira Alvarez MD MPH FACP     Physician [address]   President Medical Staff, [address] [place] [address]    Cell  [phone]            .

## 2017-08-07 NOTE — PROGRESS NOTES
Bedside and Verbal shift change report [facility] to [facility] (on[facility] nurse) by Kat Giraldo RN (offgoing nurse). Report included the following information SBAR, Kardex, Procedure Summary, Intake/Output, MAR, Accordion and Recent Results.

## 2017-08-07 NOTE — PROGRESS NOTES
8/7/2017   12:48 PM  State OBS and MOON letters given, explained, copies to hard chart. Juhi Osuna    9:28 AM  EMR reviewed, CM met w/ Pt for needs assessment and D/C planning. Charted demographics verified, Pt lives w/ wife and son in his home in [place]. Pt had previous hospital admission to [address] [place] [address] 7/21-7/25/17 was D/C'd to home. PCP is Yesisca Melchor MD; Pt has Rx coverage and fills at [facility]. Pt has no had HH; uses no DME. Family will transport home and to all F/U. State OBS and MOON letters explained given to Pt, copies to hard chart. Care Management Interventions  PCP Verified by CM: Yes (PCP is Yessica Melchor MD; last visit \"about 4 months\")  Palliative Care Consult (Criteria: CHF and RRAT>21): No  Reason for No Palliative Care Consult: Other (see comment) (Pt RRAt is 18)  Discharge Durable Medical Equipment: No  Physical Therapy Consult: No  Occupational Therapy Consult: No  Speech Therapy Consult: No  Current Support Network: Lives with Spouse (Pt lives w/ wife Maray H) [phone])  Confirm Follow Up Transport: Family  Discharge Location  Discharge Placement: Home  Plan is to D/C to home when stable. CM will follow for D/C needs.   Juhi Osuna

## 2017-08-07 NOTE — PROGRESS NOTES
Surgery    C diff returned as positive. This am, pt much more alert and states abdomen feels much better.   AF, VSS  Abdomen soft, appropriately ttp  Plan: continue flagyl  Advance diet

## 2017-08-08 PROCEDURE — 65270000029 HC RM PRIVATE

## 2017-08-08 PROCEDURE — 74011250637 HC RX REV CODE- 250/637: Performed by: PHYSICIAN ASSISTANT

## 2017-08-08 PROCEDURE — 74011000250 HC RX REV CODE- 250: Performed by: SURGERY

## 2017-08-08 PROCEDURE — 74011250636 HC RX REV CODE- 250/636: Performed by: SURGERY

## 2017-08-08 RX ORDER — OXYCODONE HYDROCHLORIDE 5 MG/1
5 TABLET ORAL
Qty: 40 TAB | Refills: 0 | Status: SHIPPED | OUTPATIENT
Start: 2017-08-08 | End: 2017-08-09

## 2017-08-08 RX ORDER — METRONIDAZOLE 500 MG/1
500 TABLET ORAL 3 TIMES DAILY
Qty: 42 TAB | Refills: 0 | Status: SHIPPED | OUTPATIENT
Start: 2017-08-08 | End: 2021-01-11

## 2017-08-08 RX ORDER — MORPHINE SULFATE 4 MG/ML
2 INJECTION, SOLUTION INTRAMUSCULAR; INTRAVENOUS
Status: DISCONTINUED | OUTPATIENT
Start: 2017-08-08 | End: 2017-08-09 | Stop reason: HOSPADM

## 2017-08-08 RX ORDER — METRONIDAZOLE 250 MG/1
500 TABLET ORAL 3 TIMES DAILY
Status: DISCONTINUED | OUTPATIENT
Start: 2017-08-08 | End: 2017-08-09 | Stop reason: HOSPADM

## 2017-08-08 RX ADMIN — DEXTROSE MONOHYDRATE, SODIUM CHLORIDE, AND POTASSIUM CHLORIDE 100 ML/HR: 50; 4.5; 1.49 INJECTION, SOLUTION INTRAVENOUS at 10:24

## 2017-08-08 RX ADMIN — METRONIDAZOLE 500 MG: 250 TABLET, FILM COATED ORAL at 21:00

## 2017-08-08 RX ADMIN — METRONIDAZOLE 500 MG: 250 TABLET, FILM COATED ORAL at 15:34

## 2017-08-08 RX ADMIN — Medication 10 ML: at 15:35

## 2017-08-08 RX ADMIN — METRONIDAZOLE 500 MG: 500 INJECTION, SOLUTION INTRAVENOUS at 02:00

## 2017-08-08 RX ADMIN — Medication 10 ML: at 21:00

## 2017-08-08 RX ADMIN — Medication 5 ML: at 19:55

## 2017-08-08 RX ADMIN — METRONIDAZOLE 500 MG: 250 TABLET, FILM COATED ORAL at 10:24

## 2017-08-08 NOTE — PROGRESS NOTES
In response to cdmp query, pt was being managed for encephalopathy in setting of treating severe abdominal pain.

## 2017-08-09 VITALS
TEMPERATURE: 97.4 F | HEART RATE: 65 BPM | SYSTOLIC BLOOD PRESSURE: 127 MMHG | OXYGEN SATURATION: 99 % | RESPIRATION RATE: 18 BRPM | WEIGHT: 192 LBS | BODY MASS INDEX: 27.49 KG/M2 | HEIGHT: 70 IN | DIASTOLIC BLOOD PRESSURE: 74 MMHG

## 2017-08-09 PROBLEM — K56.609 COLONIC OBSTRUCTION (HCC): Status: RESOLVED | Noted: 2017-07-21 | Resolved: 2017-08-09

## 2017-08-09 PROCEDURE — 74011250637 HC RX REV CODE- 250/637: Performed by: PHYSICIAN ASSISTANT

## 2017-08-09 RX ADMIN — METRONIDAZOLE 500 MG: 250 TABLET, FILM COATED ORAL at 09:44

## 2017-08-09 NOTE — DISCHARGE INSTRUCTIONS
Patient Discharge Instructions    Janae Martínez / [id number] : 1945    Admitted 2017 Discharged: 2017     Take Home Medications       · It is important that you take the medication exactly as they are prescribed. · Keep your medication in the bottles provided by the pharmacist and keep a list of the medication names, dosages, and times to be taken in your wallet. · Do not take other medications without consulting your doctor. · Stop taking Valium. Only take oxycodone as needed for severe pain. You may take Tylenol and Motrin for more mild pain. · Do not drink any alcohol while taking Flagyl. · Do not drive, drink alcohol, or operate machinery when taking sedating pain medication. What to do at Home    Recommended diet: Regular Diet    Recommended activity: No heavy lifting or strenuous activity for 4 weeks. You may shower. You may drive once pain has improved and you no longer require pain medication. Follow up with Dr. Carol Colindres in 1 week. Call [facility] to make an appointment. Call Dr. Carol Colindres or go to the ER if you develop worsening pain, fever, vomiting, or any other symptoms that concern you. Information obtained by :  I understand that if any problems occur once I am at home I am to contact my physician. I understand and acknowledge receipt of the instructions indicated above.                                                                                                                                            Physician's or R.N.'s Signature                                                                  Date/Time                                                                                                                                              Patient or Representative Signature                                                          Date/Time

## 2017-08-09 NOTE — ROUTINE PROCESS
Orthopedic & Surgical Nursing Communication Tool      6:59 AM  8/9/2017     Bedside and Verbal shift change report given to Nini Patel RN (incoming nurse) by Jesika Rendon RN (outgoing nurse) on Nick Gorman a 74 y.o. male who was admitted on 8/5/2017  6:31 PM. Report included the following information SBAR, Procedure Summary and Intake/Output. Patient states that he is ready to go home today and hopes for discharge. Opportunity for questions and clarifications were given to the incoming nurse. Patient's bed is in low position, side rails x2, call bell within reach and patient not in distress. Hourly rounding performed throughout shift.     Jesika Rendon RN

## 2017-08-09 NOTE — DISCHARGE SUMMARY
Surgery Discharge Summary     Patient ID:  Arnaldo Laguerre  [id number]  24 y.o.  1945    Admit date: 8/5/2017    Discharge date and time: 8/9/2017 10:50 AM     Admission Diagnoses:    Patient Active Problem List   Diagnosis Code    Colitis K52.9    Colitis, acute K52.9    C. difficile colitis A04.7       Discharge Diagnoses: There are no discharge diagnoses documented for the most recent discharge. Patient Active Problem List   Diagnosis Code    Colitis K52.9    Colitis, acute K52.9    C. difficile colitis A04.7       Procedures for this admission: [facility] Course: Mr. Charo Nolasco is a 64yom who had been admitted last month for colonic obstruction 2/2 to sigmoid diverticulitis and had undergone sigmoidectomy during the admission. He returned to the ED on 8/5 d/t acute AMS and diarrhea. CT showed evidence of colitis and he was admitted for further care. C diff testing was positive and he was continued on IV Flagyl. Mental status changes resolved with reduction of sedating medication. Diet was advanced without issue and patient was transitioned to PO Flagyl. At time of discharge diarrhea had decreased, patient was having minimal pain, and his mentation was normal. He was discharged home on 2 weeks PO Flagyl with plan for follow up in the office in 1-2 weeks. Disposition: home    Discharged Condition : stable    Instructions: Follow-up in office  in 1-2 weeks.               Signed:  SIENNA Hannah  8/9/2017  3:38 PM

## 2017-08-09 NOTE — PROGRESS NOTES
General Surgery Daily Progress Note    Patient: Selina Martin MRN: [id number]  SSN: [id number]    YOB: 1945  Age: 74 y.o. Sex: male      Admit Date: 8/5/2017    Subjective:   Diarrhea less frequent, tolerating diet. Abdominal pain minimal.     Current Facility-Administered Medications   Medication Dose Route Frequency    morphine injection 2 mg  2 mg IntraVENous Q4H PRN    metroNIDAZOLE (FLAGYL) tablet 500 mg  500 mg Oral TID    sodium chloride (NS) flush 5-10 mL  5-10 mL IntraVENous Q8H    sodium chloride (NS) flush 5-10 mL  5-10 mL IntraVENous PRN    naloxone (NARCAN) injection 0.4 mg  0.4 mg IntraVENous PRN    ondansetron (ZOFRAN) injection 4 mg  4 mg IntraVENous Q4H PRN        Objective:      08/07 1901 - 08/09 0700  In: 2605 [I.V.:2605]  Out: -   Patient Vitals for the past 8 hrs:   BP Temp Pulse Resp SpO2   08/09/17 0305 145/74 98.2 °F (36.8 °C) 64 16 96 %       Physical Exam:  General: Alert, cooperative, NAD  Lungs: Unlabored  Heart:  Regular rate and rhythm  Abdomen: Soft, non-distended, incisions healing   Extremities: Warm, moves all, no edema  Skin:  Warm and dry, no rash    Labs: No results for input(s): WBC, HGB, HCT, PLT, HGBEXT, HCTEXT, PLTEXT, HGBEXT, HCTEXT, PLTEXT in the last 72 hours. No results for input(s): NA, K, CL, CO2, GLU, BUN, CREA, CA, MG, PHOS, ALB, TBIL, TBILI, SGOT, ALT in the last 72 hours. Assessment / Plan:   · C diff colitis, recent lap sigmoidectomy  · Discharge home on PO flagyl. Use arvind sparingly for pain, d/c Valium. Tylenol and Motrin for more mild pain.

## 2017-08-09 NOTE — PROGRESS NOTES
Problem: Falls - Risk of  Goal: *Absence of falls  Outcome: Progressing Towards Goal  No falls during admission  Goal: *Knowledge of fall prevention  Outcome: Progressing Towards Goal  Ambulates safely, calls for assistance when needed    Problem: Pressure Injury - Risk of  Goal: *Prevention of pressure ulcer  Outcome: Progressing Towards Goal  No signs/symptoms of pressure injury  Turns/repositions self in bed, up out of bed frequently    Problem: General Infection Care Plan (Adult and Pediatric)  Goal: *Absence of infection signs and symptoms  Outcome: Progressing Towards Goal  No signs/symptoms of infection  Goal: *Optimize nutritional status  Outcome: Progressing Towards Goal  Tolerating increased diet

## 2017-08-11 LAB
BACTERIA SPEC CULT: NORMAL
BACTERIA SPEC CULT: NORMAL
SERVICE CMNT-IMP: NORMAL
SERVICE CMNT-IMP: NORMAL

## 2017-08-29 NOTE — OP NOTES
[facility] [address]   [facility], [address]   OP NOTE       Name:  Theo Webber   MR#:  [id number]   :  1945   Account #:  [de-identified]    Surgery Date:  2017   Date of Adm:  2017       PREOPERATIVE DIAGNOSIS: Acute on chronic diverticulitis of the   sigmoid colon. POSTOPERATIVE DIAGNOSIS: Acute on chronic diverticulitis of the   sigmoid colon. PROCEDURES PERFORMED   1. Laparoscopic hand-assisted sigmoidectomy. 2. Mobilization of the splenic flexure. ATTENDING SURGEON: Tomeka Brito MD    ASSISTANT: Rachel Dudley    ANESTHESIA: General endotracheal anesthesia. ESTIMATED BLOOD LOSS: 25 mL. SPECIMENS REMOVED: Sigmoid colon. COMPLICATIONS: None. DRAINS AND TUBES: Paz and a 10 mm JAMEY in the pelvis. INDICATIONS FOR PROCEDURE: This is a 49-year-old man who had   several bouts of acute diverticulitis in the past. He was admitted with   an obstructive bout of the sigmoid colon. Antibiotics had not fully   opened him up, but he was having bowel movements. We suggested   that since he had such an extensive history that this should be   resected at this sitting. The patient had agreed and was brought to   surgery. DESCRIPTION OF PROCEDURE: Informed consent was obtained. On the day of surgery, the patient had undergone a bowel prep and   was brought to the operating room. Paz catheter was placed and   cystoscopy was done by Dr. Raghu Dukes with stent placement. Once he   was done with his portion, I assumed care of the patient and took over   the case. The abdomen and perineum were prepped and draped in   sterile fashion and a time-out was performed. A vertical incision was   made just above the umbilicus and taken down through subcutaneous   tissue. The fascia was incised and the abdomen was entered under   direct vision. A Daxa trocar was placed and pneumoperitoneum was   achieved. The laparoscope was inserted.  Upon first look, there   appeared to be the sigmoid colon adhesed to the sidewall and the   anterior abdominal wall. A 5 mm trocar was placed in the right lower   quadrant and using this, some of the adhesions were sharply lysed. Some blunt dissection was done as well. Once enough of it had been   taken down from the anterior abdominal wall, a lower vertical incision   was made for a hand port 2 cm above the pubic symphysis. Dissection   was taken down through subcutaneous tissue, and the fascia was   incised. The abdomen was entered under direct vision with Bovie   cautery. The hand port was placed and I inserted my right hand. Further blunt dissection was done using my finger fracture. I was also   using my hand to retract a little bit on the tissue so some sharp   dissection could be done. Once all the adhesions had been lysed of   the sigmoid colon, a point proximally was chosen where there was no   disease. A window was made in the mesentery and using a ABHISHEK   stapler, the colon was stapled. LigaSure was further used to incise the   mesentery down to the peritoneal reflection. At this point, the top of the   hand port was removed in an open manner, the rest of the dissection   took place. The mesentery was fully incised. A laparoscopic ABHISHEK   stapler was then used to come across the rectal stump distal to all the   diseased portion. The sigmoid colon was then passed off the table. The top on the hand port was replaced and the splenic flexure was   mobilized along the white line of Toldt using LigaSure and blunt   dissection. An additional 5 mm trocar had to be placed in the left lower   abdomen in order to assist with this portion of the case. The patient   was tilted in order to provide better visualization. Once enough length   had been mobilized, the top again was popped off the hand port. The   proximal colon staple line was removed and was dilated and incised. A   33 EEA stapler was brought to the table.  The anvil was placed within   the proximal colon and sutured in place with a 2-0 Prolene suture. Once this was done, the sizers were used to dilate the rectum. The   EEA stapler was placed within the rectal stump and the spike was   deployed. The anvil and the spike were  and the stapler was   fired after reapproximation of the 2 pieces of colon. There were very   thick donuts intact, and a leak test showed no bubbles. At this point, I   was satisfied with the dissection. A 10 mm JAMEY was placed in the pelvis   through one of the trocar sites. It was sutured at the skin level. The   hand port was removed and Seprafilm was placed. 0 Vicryl was used   to close the peritoneum and a #1 PDS was used to close the fascia. The trocars were removed and an 0 Vicryl was used to close the   periumbilical incision. The skin was closed on all incisions with a 4-0   Monocryl subcuticular stitch, and Dermabond was applied. The patient   tolerated the procedure well. There were no complications during the   case. Sponge, needle and instrument counts were correct at the end of   the case.         SATNAM GUSMAN MD DC / TERESA   D:  08/29/2017   10:29   T:  08/29/2017   11:02   Job #:  [id number]

## 2019-09-09 NOTE — ED PROVIDER NOTES
HPI Comments: Janae Martínez is a 73 yo WM presenting to ED via car with c/o seen at [facility] on 7/3/2017 and dx with diverticulitis. Seen on 7/17 by Dr. Blayne Dorado due to inability to have BM in 2 weeks. Was given Mineral Oil and Mag Citrate without result. And has been doing Miralax as well. Pt states that he has several pellet like stool over the past week. Pt states that his pain is increasing and he is not getting any better. PCP: Kanchan Westbrook MD    There were no other complaints, changes, physical findings at this time. Patient is a 74 y.o. male presenting with constipation. The history is provided by the patient. Constipation    Associated symptoms include nausea and constipation. Pertinent negatives include no abdominal pain, no abdominal distention, no chills, no fever and no vomiting. Risk factors include a recent illness and diet changes. He has tried enemas, oral laxatives, stimulants and mineral oil for the symptoms. The treatment provided no relief. Past medical history comments: colitis. Past Medical History:   Diagnosis Date    Diverticulitis     Spinal stenosis        No past surgical history on file. No family history on file. Social History     Social History    Marital status:      Spouse name: N/A    Number of children: N/A    Years of education: N/A     Occupational History    Not on file. Social History Main Topics    Smoking status: Current Some Day Smoker    Smokeless tobacco: Never Used    Alcohol use 0.5 oz/week     1 Cans of beer per week    Drug use: No    Sexual activity: Not on file     Other Topics Concern    Not on file     Social History Narrative         ALLERGIES: Review of patient's allergies indicates no known allergies. Review of Systems   Constitutional: Negative. Negative for chills, diaphoresis and fever. HENT: Negative. Negative for congestion, rhinorrhea and trouble swallowing. Eyes: Negative. Respiratory: Negative. Negative for shortness of breath. Cardiovascular: Negative. Gastrointestinal: Positive for constipation and nausea. Negative for abdominal distention, abdominal pain, anal bleeding, blood in stool and vomiting. Endocrine: Negative. Genitourinary: Negative for flank pain. Musculoskeletal: Negative for arthralgias, myalgias, neck pain and neck stiffness. Skin: Negative. Negative for rash. Allergic/Immunologic: Negative. Neurological: Negative. Negative for dizziness, syncope, weakness and headaches. Hematological: Negative. Psychiatric/Behavioral: Negative. Vitals:    07/18/17 0923 07/18/17 1028 07/18/17 1030   BP: 141/70     Pulse: (!) 58     Resp: 16     Temp: 98.1 °F (36.7 °C)     SpO2: 97% (!) 9% 98%   Weight: 87.1 kg (192 lb)     Height: 5' 10\" (1.778 m)              Physical Exam   Constitutional: He is oriented to person, place, and time. Vital signs are normal. He appears well-developed and well-nourished. Non-toxic appearance. He does not have a sickly appearance. He does not appear ill. HENT:   Head: Normocephalic and atraumatic. Eyes: Conjunctivae, EOM and lids are normal. Pupils are equal, round, and reactive to light. Neck: Trachea normal, normal range of motion and full passive range of motion without pain. Neck supple. Cardiovascular: Normal rate, regular rhythm, normal heart sounds and normal pulses. Pulmonary/Chest: Effort normal and breath sounds normal.   Abdominal: Soft. Normal appearance. There is generalized tenderness. There is no rebound and no CVA tenderness. Musculoskeletal: Normal range of motion. Neurological: He is alert and oriented to person, place, and time. He has normal strength. GCS eye subscore is 4. GCS verbal subscore is 5. GCS motor subscore is 6. Skin: Skin is warm, dry and intact. Psychiatric: He has a normal mood and affect.  His speech is normal and behavior is normal. Judgment and thought content normal. Cognition and memory are normal.   Nursing note and vitals reviewed. MDM  Number of Diagnoses or Management Options  Noninfectious gastroenteritis, unspecified type: established and improving     Amount and/or Complexity of Data Reviewed  Clinical lab tests: ordered  Tests in the radiology section of CPT®: ordered  Discuss the patient with other providers: yes Shannan Monae)    Risk of Complications, Morbidity, and/or Mortality  Presenting problems: moderate  Diagnostic procedures: moderate  Management options: low    Patient Progress  Patient progress: stable    ED Course       Procedures    10:09 AM  I have just reevaluated the patient. I have reviewed His vital signs and determined there is currently no worsening in their condition or physical exam.  Results have been reviewed with them and their questions have been answered. We will continue to review further results as they come available. Discussed with the patient that I would order a CT to r/o SBO in light of his constipation. Pt agrees to this and if CT neg for SBO, will do in house enema. Niya Lopez FNP-BC    11:10 AM  I have just reevaluated the patient. I have reviewed His vital signs and determined there is currently no worsening in their condition or physical exam.  Results have been reviewed with them and their questions have been answered. We will continue to review further results as they come available. Pt to CT now    Lizzy Oar FNP-BC    12:26 PM  I have just reevaluated the patient. I have reviewed His vital signs and determined there is currently no worsening in their condition or physical exam.  Results have been reviewed with them and their questions have been answered. We will continue to review further results as they come available. Based on the fact that the CT shows no SBO, I will give the patient the enema. Nursing is aware.       Niya Lopez FNP-BC    LABORATORY TESTS:  Recent Results (from the past 12 hour(s)) METABOLIC PANEL, COMPREHENSIVE    Collection Time: 07/18/17 10:14 AM   Result Value Ref Range    Sodium 136 136 - 145 mmol/L    Potassium 4.2 3.5 - 5.1 mmol/L    Chloride 99 97 - 108 mmol/L    CO2 28 21 - 32 mmol/L    Anion gap 9 5 - 15 mmol/L    Glucose 99 65 - 100 mg/dL    BUN 17 6 - 20 MG/DL    Creatinine 1.11 0.70 - 1.30 MG/DL    BUN/Creatinine ratio 15 12 - 20      GFR est AA >60 >60 ml/min/1.73m2    GFR est non-AA >60 >60 ml/min/1.73m2    Calcium 9.0 8.5 - 10.1 MG/DL    Bilirubin, total 0.5 0.2 - 1.0 MG/DL    ALT (SGPT) 26 12 - 78 U/L    AST (SGOT) 20 15 - 37 U/L    Alk. phosphatase 71 45 - 117 U/L    Protein, total 7.8 6.4 - 8.2 g/dL    Albumin 3.3 (L) 3.5 - 5.0 g/dL    Globulin 4.5 (H) 2.0 - 4.0 g/dL    A-G Ratio 0.7 (L) 1.1 - 2.2     LIPASE    Collection Time: 07/18/17 10:14 AM   Result Value Ref Range    Lipase 97 73 - 393 U/L   CBC WITH AUTOMATED DIFF    Collection Time: 07/18/17 10:14 AM   Result Value Ref Range    WBC 9.5 4.1 - 11.1 K/uL    RBC 4.94 4.10 - 5.70 M/uL    HGB 15.3 12.1 - 17.0 g/dL    HCT 44.1 36.6 - 50.3 %    MCV 89.3 80.0 - 99.0 FL    MCH 31.0 26.0 - 34.0 PG    MCHC 34.7 30.0 - 36.5 g/dL    RDW 13.0 11.5 - 14.5 %    PLATELET 462 346 - 759 K/uL    NEUTROPHILS 75 32 - 75 %    LYMPHOCYTES 14 12 - 49 %    MONOCYTES 10 5 - 13 %    EOSINOPHILS 1 0 - 7 %    BASOPHILS 0 0 - 1 %    ABS. NEUTROPHILS 7.2 1.8 - 8.0 K/UL    ABS. LYMPHOCYTES 1.3 0.8 - 3.5 K/UL    ABS. MONOCYTES 1.0 0.0 - 1.0 K/UL    ABS. EOSINOPHILS 0.1 0.0 - 0.4 K/UL    ABS.  BASOPHILS 0.0 0.0 - 0.1 K/UL   URINALYSIS W/MICROSCOPIC    Collection Time: 07/18/17 11:44 AM   Result Value Ref Range    Color YELLOW/STRAW      Appearance CLEAR CLEAR      Specific gravity 1.005 1.003 - 1.030      pH (UA) 6.5 5.0 - 8.0      Protein NEGATIVE  NEG mg/dL    Glucose NEGATIVE  NEG mg/dL    Ketone TRACE (A) NEG mg/dL    Bilirubin NEGATIVE  NEG      Blood NEGATIVE  NEG      Urobilinogen 0.2 0.2 - 1.0 EU/dL    Nitrites NEGATIVE  NEG      Leukocyte Esterase NEGATIVE  NEG      WBC 0-4 0 - 4 /hpf    RBC 0-5 0 - 5 /hpf    Epithelial cells FEW FEW /lpf    Bacteria NEGATIVE  NEG /hpf    Hyaline cast 0-2 0 - 5 /lpf       IMAGING RESULTS:    CT Results  (Last 48 hours)               07/18/17 1123  CT ABD PELV W CONT Final result    Impression:  IMPRESSION:   Persistent diffuse sigmoid colitis, with no evidence of abscess or perforation. Narrative:  EXAM:  CT ABD PELV W CONT       INDICATION: Abdominal pain; Obstruction - intestinal        COMPARISON: July 3, 2017       CONTRAST:  94 mL of Isovue-370. TECHNIQUE:    Following the uneventful intravenous administration of contrast, thin axial   images were obtained through the abdomen and pelvis. Coronal and sagittal   reconstructions were generated. Oral contrast was not administered. CT dose   reduction was achieved through use of a standardized protocol tailored for this   examination and automatic exposure control for dose modulation. FINDINGS:    LUNG BASES: Bilateral dependent atelectasis. INCIDENTALLY IMAGED HEART AND MEDIASTINUM: Unremarkable. LIVER: No mass or biliary dilatation. GALLBLADDER: Unremarkable. SPLEEN: No mass. PANCREAS: No mass or ductal dilatation. ADRENALS: Unremarkable. KIDNEYS: No calculus or hydronephrosis. Small bilateral renal cysts are   unchanged. STOMACH: Unremarkable. SMALL BOWEL: No dilatation or wall thickening. COLON: Diffuse sigmoid colon wall thickening and pericolonic inflammatory   changes are again seen, similar to the prior study. There is no associated   abscess. APPENDIX: Normal.   PERITONEUM: No ascites or pneumoperitoneum. RETROPERITONEUM: No lymphadenopathy or aortic aneurysm. REPRODUCTIVE ORGANS: Normal sized prostate gland. URINARY BLADDER: No mass or calculus. BONES: No destructive bone lesion. Degenerative changes in the lumbar spine. ADDITIONAL COMMENTS: Small fat-containing left inguinal hernia is unchanged. PFT Results  (Last 48 hours)    None        Echo Results  (Last 48 hours)    None        CXR Results  (Last 48 hours)    None        VENOUS DOPPLER results  (Last 48 hours)    None          MEDICATIONS GIVEN:  Medications   0.9% sodium chloride infusion (0 mL/hr IntraVENous IV Completed 7/18/17 1315)   iopamidol (ISOVUE-370) 76 % injection 100 mL (94 mL IntraVENous Given 7/18/17 1113)   HYDROcodone-acetaminophen (NORCO) 5-325 mg per tablet 1 Tab (1 Tab Oral Given 7/18/17 1314)       IMPRESSION:  1. Noninfectious gastroenteritis, unspecified type        PLAN:  1. Moxifloxicin every day x 10 days  2. Norco for pain  3. F/U with Dr Emma Arambula (call office today)  4. F/U with Dr Cheron Boeck (PCP)  Return to ED if worse    Discharge Note  1:15 PM  The patient is ready for discharge. The patient's signs, symptoms, diagnosis, and discharge instructions have been discussed and the patient has conveyed their understanding. The patient is to follow up as recommended or return to the ER should their symptoms worsen. Plan has been discussed and the patient is in agreement. La Valenzuela Pagé FNP-BC. Detail Level: Zone

## 2019-10-04 ENCOUNTER — APPOINTMENT (OUTPATIENT)
Dept: CT IMAGING | Age: 74
End: 2019-10-04
Attending: EMERGENCY MEDICINE
Payer: MEDICARE

## 2019-10-04 ENCOUNTER — HOSPITAL ENCOUNTER (EMERGENCY)
Age: 74
Discharge: HOME | End: 2019-10-04
Attending: EMERGENCY MEDICINE | Admitting: EMERGENCY MEDICINE
Payer: MEDICARE

## 2019-10-04 ENCOUNTER — APPOINTMENT (OUTPATIENT)
Dept: GENERAL RADIOLOGY | Age: 74
End: 2019-10-04
Attending: PHYSICIAN ASSISTANT
Payer: MEDICARE

## 2019-10-04 VITALS
OXYGEN SATURATION: 92 % | DIASTOLIC BLOOD PRESSURE: 70 MMHG | TEMPERATURE: 97.6 F | HEIGHT: 70 IN | BODY MASS INDEX: 30.78 KG/M2 | RESPIRATION RATE: 17 BRPM | SYSTOLIC BLOOD PRESSURE: 163 MMHG | HEART RATE: 56 BPM | WEIGHT: 215 LBS

## 2019-10-04 DIAGNOSIS — R07.89 ATYPICAL CHEST PAIN: ICD-10-CM

## 2019-10-04 DIAGNOSIS — R10.13 ABDOMINAL PAIN, EPIGASTRIC: Primary | ICD-10-CM

## 2019-10-04 LAB
ALBUMIN SERPL-MCNC: 3.8 G/DL (ref 3.5–5)
ALBUMIN/GLOB SERPL: 1 {RATIO} (ref 1.1–2.2)
ALP SERPL-CCNC: 71 U/L (ref 45–117)
ALT SERPL-CCNC: 29 U/L (ref 12–78)
ANION GAP SERPL CALC-SCNC: 9 MMOL/L (ref 5–15)
AST SERPL-CCNC: 20 U/L (ref 15–37)
BASOPHILS # BLD: 0 K/UL (ref 0–0.1)
BASOPHILS NFR BLD: 0 % (ref 0–1)
BILIRUB SERPL-MCNC: 0.4 MG/DL (ref 0.2–1)
BUN SERPL-MCNC: 18 MG/DL (ref 6–20)
BUN/CREAT SERPL: 15 (ref 12–20)
CALCIUM SERPL-MCNC: 8.8 MG/DL (ref 8.5–10.1)
CHLORIDE SERPL-SCNC: 106 MMOL/L (ref 97–108)
CO2 SERPL-SCNC: 24 MMOL/L (ref 21–32)
COMMENT, HOLDF: NORMAL
CREAT SERPL-MCNC: 1.22 MG/DL (ref 0.7–1.3)
DIFFERENTIAL METHOD BLD: NORMAL
EOSINOPHIL # BLD: 0.2 K/UL (ref 0–0.4)
EOSINOPHIL NFR BLD: 2 % (ref 0–7)
ERYTHROCYTE [DISTWIDTH] IN BLOOD BY AUTOMATED COUNT: 12.4 % (ref 11.5–14.5)
GLOBULIN SER CALC-MCNC: 3.9 G/DL (ref 2–4)
GLUCOSE SERPL-MCNC: 99 MG/DL (ref 65–100)
HCT VFR BLD AUTO: 45 % (ref 36.6–50.3)
HGB BLD-MCNC: 15.3 G/DL (ref 12.1–17)
IMM GRANULOCYTES # BLD AUTO: 0 K/UL (ref 0–0.04)
IMM GRANULOCYTES NFR BLD AUTO: 0 % (ref 0–0.5)
LIPASE SERPL-CCNC: 80 U/L (ref 73–393)
LYMPHOCYTES # BLD: 1.6 K/UL (ref 0.8–3.5)
LYMPHOCYTES NFR BLD: 18 % (ref 12–49)
MAGNESIUM SERPL-MCNC: 2.2 MG/DL (ref 1.6–2.4)
MCH RBC QN AUTO: 30.6 PG (ref 26–34)
MCHC RBC AUTO-ENTMCNC: 34 G/DL (ref 30–36.5)
MCV RBC AUTO: 90 FL (ref 80–99)
MONOCYTES # BLD: 0.9 K/UL (ref 0–1)
MONOCYTES NFR BLD: 10 % (ref 5–13)
NEUTS SEG # BLD: 6.3 K/UL (ref 1.8–8)
NEUTS SEG NFR BLD: 70 % (ref 32–75)
NRBC # BLD: 0 K/UL (ref 0–0.01)
NRBC BLD-RTO: 0 PER 100 WBC
PLATELET # BLD AUTO: 190 K/UL (ref 150–400)
PMV BLD AUTO: 10.1 FL (ref 8.9–12.9)
POTASSIUM SERPL-SCNC: 3.9 MMOL/L (ref 3.5–5.1)
PROT SERPL-MCNC: 7.7 G/DL (ref 6.4–8.2)
RBC # BLD AUTO: 5 M/UL (ref 4.1–5.7)
SAMPLES BEING HELD,HOLD: NORMAL
SODIUM SERPL-SCNC: 139 MMOL/L (ref 136–145)
TROPONIN I SERPL-MCNC: <0.05 NG/ML
TROPONIN I SERPL-MCNC: <0.05 NG/ML
WBC # BLD AUTO: 9.1 K/UL (ref 4.1–11.1)

## 2019-10-04 PROCEDURE — 84484 ASSAY OF TROPONIN QUANT: CPT

## 2019-10-04 PROCEDURE — 74022 RADEX COMPL AQT ABD SERIES: CPT

## 2019-10-04 PROCEDURE — 83690 ASSAY OF LIPASE: CPT

## 2019-10-04 PROCEDURE — 83735 ASSAY OF MAGNESIUM: CPT

## 2019-10-04 PROCEDURE — 93005 ELECTROCARDIOGRAM TRACING: CPT

## 2019-10-04 PROCEDURE — 36415 COLL VENOUS BLD VENIPUNCTURE: CPT

## 2019-10-04 PROCEDURE — 74011636320 HC RX REV CODE- 636/320: Performed by: RADIOLOGY

## 2019-10-04 PROCEDURE — 85025 COMPLETE CBC W/AUTO DIFF WBC: CPT

## 2019-10-04 PROCEDURE — 80053 COMPREHEN METABOLIC PANEL: CPT

## 2019-10-04 PROCEDURE — 74177 CT ABD & PELVIS W/CONTRAST: CPT

## 2019-10-04 PROCEDURE — 99285 EMERGENCY DEPT VISIT HI MDM: CPT

## 2019-10-04 RX ORDER — FAMOTIDINE 20 MG/1
20 TABLET, FILM COATED ORAL 2 TIMES DAILY
Qty: 60 TAB | Refills: 0 | Status: SHIPPED | OUTPATIENT
Start: 2019-10-04 | End: 2019-11-03

## 2019-10-04 RX ADMIN — IOPAMIDOL 100 ML: 755 INJECTION, SOLUTION INTRAVENOUS at 16:06

## 2019-10-04 NOTE — ED NOTES
I have reviewed discharge instructions with the patient. The patient verbalized understanding. Pt was able to ambulate to the exit with a steady gait and did not appear to be in any distress.

## 2019-10-04 NOTE — ED TRIAGE NOTES
Patient arrives to ED for complaints of chest and abdominal pain     Last took 3 ibuprofen this morning     History of colon surgery with Dr. Yarely Ross

## 2019-10-04 NOTE — ED PROVIDER NOTES
I have evaluated the patient as the Provider in Triage. I have reviewed His vital signs and the triage nurse assessment. I have talked with the patient and any available family and advised that I am the provider in triage and have ordered the appropriate study to initiate their work up based on the clinical presentation during my assessment. I have advised that the patient will be accommodated in the Main ED as soon as possible. I have also requested to contact the triage nurse or myself immediately if the patient experiences any changes in their condition during this brief waiting period. Patient arrives to ED c/o chest pain and associating abdominal pain throughout. Patient states onset of chest pain began 2 nights ago at 1:00 AM.Patient describes chest pain as \"sharp\" and localizes to the center of his chest. Patient's chest pain has been on and off. Patient's pain does not worsen with movement or respiration. Patient's spouse notes the patient has had difficulty sleeping secondary to the pain. Patient reports he has been constipated over the past 24 hours and is not sure if he has passed gas. He denies sob,  f/c, n/v/d or urinary sx. Patient notes he had a partial colectomy from diverticulitis. Patient notes he take Gabapentin for his spinal stenosis, and took 3 ibuprofen before arrival.     Note written by Bridget Slaughter, as dictated by SIENNA Tanner 1:56 PM               Past Medical History:   Diagnosis Date    Diverticulitis     Gastrointestinal disorder     History of vascular access device 07/24/2017    [facility] VAT -  5FR triple Lumen Power PICC R Basilic  39 cm    Spinal stenosis        Past Surgical History:   Procedure Laterality Date    FLEXIBLE SIGMOIDOSCOPY N/A 7/27/2017    SIGMOIDOSCOPY FLEXIBLE performed by Rahul Kowalski MD at [facility]         No family history on file.     Social History     Socioeconomic History    Marital status:      Spouse name: Not on file    Number of children: Not on file    Years of education: Not on file    Highest education level: Not on file   Occupational History    Not on file   Social Needs    Financial resource strain: Not on file    Food insecurity:     Worry: Not on file     Inability: Not on file    Transportation needs:     Medical: Not on file     Non-medical: Not on file   Tobacco Use    Smoking status: Current Some Day Smoker     Packs/day: 1.00     Years: 50.00     Pack years: 50.00    Smokeless tobacco: Current User   Substance and Sexual Activity    Alcohol use: Yes     Alcohol/week: 0.8 standard drinks     Types: 1 Cans of beer per week    Drug use: No    Sexual activity: Not on file   Lifestyle    Physical activity:     Days per week: Not on file     Minutes per session: Not on file    Stress: Not on file   Relationships    Social connections:     Talks on phone: Not on file     Gets together: Not on file     Attends Baptism service: Not on file     Active member of club or organization: Not on file     Attends meetings of clubs or organizations: Not on file     Relationship status: Not on file    Intimate partner violence:     Fear of current or ex partner: Not on file     Emotionally abused: Not on file     Physically abused: Not on file     Forced sexual activity: Not on file   Other Topics Concern    Not on file   Social History Narrative    Not on file         ALLERGIES: Patient has no known allergies. Review of Systems   All other systems reviewed and are negative. Vitals:    10/04/19 1357   BP: 155/71   Pulse: (!) 56   Resp: 17   Temp: 97.6 °F (36.4 °C)   SpO2: 95%   Weight: 97.5 kg (215 lb)   Height: 5' 10\" (1.778 m)            Physical Exam   Constitutional: He is oriented to person, place, and time. He appears well-developed and well-nourished. No distress. HENT:   Head: Normocephalic and atraumatic. Eyes: Pupils are equal, round, and reactive to light.  Conjunctivae are normal.   Neck: Normal range of motion. Cardiovascular: Normal rate and regular rhythm. Pulmonary/Chest: Effort normal and breath sounds normal.   Abdominal: Soft. He exhibits no distension. There is no tenderness. Musculoskeletal: Normal range of motion. Neurological: He is alert and oriented to person, place, and time. Skin: Skin is dry. Capillary refill takes less than 2 seconds. Nursing note and vitals reviewed.        MDM  Number of Diagnoses or Management Options  Abdominal pain, epigastric:   Atypical chest pain:          Procedures

## 2019-10-06 LAB
ATRIAL RATE: 50 BPM
CALCULATED P AXIS, ECG09: -2 DEGREES
CALCULATED R AXIS, ECG10: -45 DEGREES
CALCULATED T AXIS, ECG11: 82 DEGREES
DIAGNOSIS, 93000: NORMAL
P-R INTERVAL, ECG05: 184 MS
Q-T INTERVAL, ECG07: 420 MS
QRS DURATION, ECG06: 70 MS
QTC CALCULATION (BEZET), ECG08: 382 MS
VENTRICULAR RATE, ECG03: 50 BPM

## 2021-01-10 ENCOUNTER — HOSPITAL ENCOUNTER (INPATIENT)
Age: 76
LOS: 18 days | Discharge: NURSING, REHAB OR LONG-TERM CARE | DRG: 177 | End: 2021-01-29
Attending: EMERGENCY MEDICINE | Admitting: HOSPITALIST
Payer: MEDICARE

## 2021-01-10 ENCOUNTER — APPOINTMENT (OUTPATIENT)
Dept: GENERAL RADIOLOGY | Age: 76
DRG: 177 | End: 2021-01-10
Attending: EMERGENCY MEDICINE
Payer: MEDICARE

## 2021-01-10 DIAGNOSIS — F41.8 ANXIETY ABOUT HEALTH: ICD-10-CM

## 2021-01-10 DIAGNOSIS — U07.1 COVID-19: ICD-10-CM

## 2021-01-10 DIAGNOSIS — R09.02 HYPOXIA: ICD-10-CM

## 2021-01-10 DIAGNOSIS — J96.01 ACUTE HYPOXEMIC RESPIRATORY FAILURE DUE TO COVID-19 (HCC): ICD-10-CM

## 2021-01-10 DIAGNOSIS — R06.02 SOB (SHORTNESS OF BREATH): Primary | ICD-10-CM

## 2021-01-10 DIAGNOSIS — U07.1 ACUTE HYPOXEMIC RESPIRATORY FAILURE DUE TO COVID-19 (HCC): ICD-10-CM

## 2021-01-10 DIAGNOSIS — R00.1 SINUS BRADYCARDIA: ICD-10-CM

## 2021-01-10 DIAGNOSIS — Z71.89 DNR (DO NOT RESUSCITATE) DISCUSSION: ICD-10-CM

## 2021-01-10 DIAGNOSIS — Z71.89 GOALS OF CARE, COUNSELING/DISCUSSION: ICD-10-CM

## 2021-01-10 DIAGNOSIS — Z72.0 TOBACCO USE: ICD-10-CM

## 2021-01-10 DIAGNOSIS — R19.7 DIARRHEA, UNSPECIFIED TYPE: ICD-10-CM

## 2021-01-10 LAB
ALBUMIN SERPL-MCNC: 3.5 G/DL (ref 3.5–5)
ALBUMIN/GLOB SERPL: 0.8 {RATIO} (ref 1.1–2.2)
ALP SERPL-CCNC: 68 U/L (ref 45–117)
ALT SERPL-CCNC: 79 U/L (ref 12–78)
ANION GAP SERPL CALC-SCNC: 6 MMOL/L (ref 5–15)
APTT PPP: 32.2 SEC (ref 22.1–31)
AST SERPL-CCNC: 89 U/L (ref 15–37)
BASOPHILS # BLD: 0 K/UL (ref 0–0.1)
BASOPHILS # BLD: 0 K/UL (ref 0–0.1)
BASOPHILS NFR BLD: 0 % (ref 0–1)
BASOPHILS NFR BLD: 0 % (ref 0–1)
BILIRUB SERPL-MCNC: 0.6 MG/DL (ref 0.2–1)
BUN SERPL-MCNC: 37 MG/DL (ref 6–20)
BUN/CREAT SERPL: 29 (ref 12–20)
CALCIUM SERPL-MCNC: 8.9 MG/DL (ref 8.5–10.1)
CHLORIDE SERPL-SCNC: 103 MMOL/L (ref 97–108)
CO2 SERPL-SCNC: 26 MMOL/L (ref 21–32)
COMMENT, HOLDF: NORMAL
CREAT SERPL-MCNC: 1.29 MG/DL (ref 0.7–1.3)
D DIMER PPP FEU-MCNC: 1 MG/L FEU (ref 0–0.65)
DIFFERENTIAL METHOD BLD: ABNORMAL
DIFFERENTIAL METHOD BLD: ABNORMAL
EOSINOPHIL # BLD: 0 K/UL (ref 0–0.4)
EOSINOPHIL # BLD: 0 K/UL (ref 0–0.4)
EOSINOPHIL NFR BLD: 0 % (ref 0–7)
EOSINOPHIL NFR BLD: 0 % (ref 0–7)
ERYTHROCYTE [DISTWIDTH] IN BLOOD BY AUTOMATED COUNT: 13.2 % (ref 11.5–14.5)
ERYTHROCYTE [DISTWIDTH] IN BLOOD BY AUTOMATED COUNT: 13.4 % (ref 11.5–14.5)
GLOBULIN SER CALC-MCNC: 4.6 G/DL (ref 2–4)
GLUCOSE SERPL-MCNC: 121 MG/DL (ref 65–100)
HCT VFR BLD AUTO: 48.3 % (ref 36.6–50.3)
HCT VFR BLD AUTO: 49.8 % (ref 36.6–50.3)
HGB BLD-MCNC: 16.5 G/DL (ref 12.1–17)
HGB BLD-MCNC: 16.9 G/DL (ref 12.1–17)
IMM GRANULOCYTES # BLD AUTO: 0.1 K/UL (ref 0–0.04)
IMM GRANULOCYTES # BLD AUTO: 0.1 K/UL (ref 0–0.04)
IMM GRANULOCYTES NFR BLD AUTO: 1 % (ref 0–0.5)
IMM GRANULOCYTES NFR BLD AUTO: 1 % (ref 0–0.5)
INR PPP: 1.1 (ref 0.9–1.1)
LACTATE SERPL-SCNC: 1.3 MMOL/L (ref 0.4–2)
LYMPHOCYTES # BLD: 0.8 K/UL (ref 0.8–3.5)
LYMPHOCYTES # BLD: 0.8 K/UL (ref 0.8–3.5)
LYMPHOCYTES NFR BLD: 10 % (ref 12–49)
LYMPHOCYTES NFR BLD: 9 % (ref 12–49)
MCH RBC QN AUTO: 29.8 PG (ref 26–34)
MCH RBC QN AUTO: 29.9 PG (ref 26–34)
MCHC RBC AUTO-ENTMCNC: 33.9 G/DL (ref 30–36.5)
MCHC RBC AUTO-ENTMCNC: 34.2 G/DL (ref 30–36.5)
MCV RBC AUTO: 87.3 FL (ref 80–99)
MCV RBC AUTO: 88.1 FL (ref 80–99)
MONOCYTES # BLD: 0.8 K/UL (ref 0–1)
MONOCYTES # BLD: 0.9 K/UL (ref 0–1)
MONOCYTES NFR BLD: 10 % (ref 5–13)
MONOCYTES NFR BLD: 10 % (ref 5–13)
NEUTS SEG # BLD: 6.7 K/UL (ref 1.8–8)
NEUTS SEG # BLD: 6.8 K/UL (ref 1.8–8)
NEUTS SEG NFR BLD: 79 % (ref 32–75)
NEUTS SEG NFR BLD: 80 % (ref 32–75)
NRBC # BLD: 0 K/UL (ref 0–0.01)
NRBC # BLD: 0 K/UL (ref 0–0.01)
NRBC BLD-RTO: 0 PER 100 WBC
NRBC BLD-RTO: 0 PER 100 WBC
PLATELET # BLD AUTO: 179 K/UL (ref 150–400)
PLATELET # BLD AUTO: 181 K/UL (ref 150–400)
PMV BLD AUTO: 10.3 FL (ref 8.9–12.9)
PMV BLD AUTO: 9.9 FL (ref 8.9–12.9)
POTASSIUM SERPL-SCNC: 3.8 MMOL/L (ref 3.5–5.1)
PROCALCITONIN SERPL-MCNC: <0.05 NG/ML
PROT SERPL-MCNC: 8.1 G/DL (ref 6.4–8.2)
PROTHROMBIN TIME: 11.3 SEC (ref 9–11.1)
RBC # BLD AUTO: 5.53 M/UL (ref 4.1–5.7)
RBC # BLD AUTO: 5.65 M/UL (ref 4.1–5.7)
RBC MORPH BLD: ABNORMAL
SAMPLES BEING HELD,HOLD: NORMAL
SODIUM SERPL-SCNC: 135 MMOL/L (ref 136–145)
THERAPEUTIC RANGE,PTTT: ABNORMAL SECS (ref 58–77)
WBC # BLD AUTO: 8.4 K/UL (ref 4.1–11.1)
WBC # BLD AUTO: 8.6 K/UL (ref 4.1–11.1)

## 2021-01-10 PROCEDURE — 94762 N-INVAS EAR/PLS OXIMTRY CONT: CPT

## 2021-01-10 PROCEDURE — 83605 ASSAY OF LACTIC ACID: CPT

## 2021-01-10 PROCEDURE — 93005 ELECTROCARDIOGRAM TRACING: CPT

## 2021-01-10 PROCEDURE — 80053 COMPREHEN METABOLIC PANEL: CPT

## 2021-01-10 PROCEDURE — 83735 ASSAY OF MAGNESIUM: CPT

## 2021-01-10 PROCEDURE — 83615 LACTATE (LD) (LDH) ENZYME: CPT

## 2021-01-10 PROCEDURE — 36415 COLL VENOUS BLD VENIPUNCTURE: CPT

## 2021-01-10 PROCEDURE — 87635 SARS-COV-2 COVID-19 AMP PRB: CPT

## 2021-01-10 PROCEDURE — 71045 X-RAY EXAM CHEST 1 VIEW: CPT

## 2021-01-10 PROCEDURE — 85730 THROMBOPLASTIN TIME PARTIAL: CPT

## 2021-01-10 PROCEDURE — XW033E5 INTRODUCTION OF REMDESIVIR ANTI-INFECTIVE INTO PERIPHERAL VEIN, PERCUTANEOUS APPROACH, NEW TECHNOLOGY GROUP 5: ICD-10-PCS | Performed by: INTERNAL MEDICINE

## 2021-01-10 PROCEDURE — 85610 PROTHROMBIN TIME: CPT

## 2021-01-10 PROCEDURE — 86900 BLOOD TYPING SEROLOGIC ABO: CPT

## 2021-01-10 PROCEDURE — 85379 FIBRIN DEGRADATION QUANT: CPT

## 2021-01-10 PROCEDURE — 85025 COMPLETE CBC W/AUTO DIFF WBC: CPT

## 2021-01-10 PROCEDURE — 84484 ASSAY OF TROPONIN QUANT: CPT

## 2021-01-10 PROCEDURE — 99285 EMERGENCY DEPT VISIT HI MDM: CPT

## 2021-01-10 PROCEDURE — 96374 THER/PROPH/DIAG INJ IV PUSH: CPT

## 2021-01-10 PROCEDURE — 74011250636 HC RX REV CODE- 250/636: Performed by: EMERGENCY MEDICINE

## 2021-01-10 PROCEDURE — 82728 ASSAY OF FERRITIN: CPT

## 2021-01-10 PROCEDURE — 84145 PROCALCITONIN (PCT): CPT

## 2021-01-10 RX ORDER — DEXAMETHASONE SODIUM PHOSPHATE 10 MG/ML
10 INJECTION INTRAMUSCULAR; INTRAVENOUS
Status: COMPLETED | OUTPATIENT
Start: 2021-01-10 | End: 2021-01-10

## 2021-01-10 RX ORDER — ACETAMINOPHEN 500 MG
1000 TABLET ORAL
Status: DISPENSED | OUTPATIENT
Start: 2021-01-10 | End: 2021-01-11

## 2021-01-10 RX ADMIN — DEXAMETHASONE SODIUM PHOSPHATE 10 MG: 10 INJECTION, SOLUTION INTRAMUSCULAR; INTRAVENOUS at 23:21

## 2021-01-10 RX ADMIN — SODIUM CHLORIDE 1000 ML: 9 INJECTION, SOLUTION INTRAVENOUS at 23:21

## 2021-01-10 NOTE — Clinical Note
Consent obtained from patient's wife via phone call by Bernadette Martins RN and Dr Gunnar Calvo. Upon arrival to patients room, patient seemed agitated and confused. Not oriented to date and time.

## 2021-01-11 PROBLEM — U07.1 COVID-19 VIRUS INFECTION: Status: ACTIVE | Noted: 2021-01-11

## 2021-01-11 PROBLEM — R79.89 PRERENAL AZOTEMIA: Status: ACTIVE | Noted: 2021-01-11

## 2021-01-11 PROBLEM — G93.40 ACUTE ENCEPHALOPATHY: Status: ACTIVE | Noted: 2021-01-11

## 2021-01-11 PROBLEM — R19.7 DIARRHEA: Status: ACTIVE | Noted: 2021-01-11

## 2021-01-11 PROBLEM — R09.02 HYPOXIA: Status: ACTIVE | Noted: 2021-01-11

## 2021-01-11 PROBLEM — G93.41 ACUTE METABOLIC ENCEPHALOPATHY: Status: ACTIVE | Noted: 2021-01-11

## 2021-01-11 PROBLEM — R06.89 ACUTE RESPIRATORY INSUFFICIENCY: Status: ACTIVE | Noted: 2021-01-11

## 2021-01-11 LAB
ABO + RH BLD: NORMAL
ARTERIAL PATENCY WRIST A: YES
ATRIAL RATE: 82 BPM
BASE DEFICIT BLDA-SCNC: 2.3 MMOL/L
BDY SITE: ABNORMAL
BLOOD GROUP ANTIBODIES SERPL: NORMAL
CA-I BLD-SCNC: 1.11 MMOL/L (ref 1.13–1.32)
CALCULATED P AXIS, ECG09: -11 DEGREES
CALCULATED R AXIS, ECG10: -57 DEGREES
CALCULATED T AXIS, ECG11: 53 DEGREES
COVID-19 RAPID TEST, COVR: DETECTED
DIAGNOSIS, 93000: NORMAL
FERRITIN SERPL-MCNC: 1431 NG/ML (ref 26–388)
GAS FLOW.O2 O2 DELIVERY SYS: 15 L/MIN
HCO3 BLDA-SCNC: 21 MMOL/L (ref 22–26)
HEALTH STATUS, XMCV2T: ABNORMAL
LDH SERPL L TO P-CCNC: 455 U/L (ref 85–241)
MAGNESIUM SERPL-MCNC: 2.5 MG/DL (ref 1.6–2.4)
P-R INTERVAL, ECG05: 150 MS
PCO2 BLDA: 31 MMHG (ref 35–45)
PH BLDA: 7.44 [PH] (ref 7.35–7.45)
PO2 BLDA: 69 MMHG (ref 80–100)
Q-T INTERVAL, ECG07: 358 MS
QRS DURATION, ECG06: 74 MS
QTC CALCULATION (BEZET), ECG08: 418 MS
SAO2 % BLD: 95 % (ref 92–97)
SAO2% DEVICE SAO2% SENSOR NAME: ABNORMAL
SOURCE, COVRS: ABNORMAL
SPECIMEN EXP DATE BLD: NORMAL
SPECIMEN SITE: ABNORMAL
SPECIMEN SOURCE, FCOV2M: ABNORMAL
SPECIMEN TYPE, XMCV1T: ABNORMAL
TROPONIN I SERPL-MCNC: <0.05 NG/ML
VENTRICULAR RATE, ECG03: 82 BPM

## 2021-01-11 PROCEDURE — 74011000250 HC RX REV CODE- 250: Performed by: INTERNAL MEDICINE

## 2021-01-11 PROCEDURE — 65660000000 HC RM CCU STEPDOWN

## 2021-01-11 PROCEDURE — 74011000258 HC RX REV CODE- 258: Performed by: INTERNAL MEDICINE

## 2021-01-11 PROCEDURE — 82803 BLOOD GASES ANY COMBINATION: CPT

## 2021-01-11 PROCEDURE — 77010033711 HC HIGH FLOW OXYGEN

## 2021-01-11 PROCEDURE — 74011250637 HC RX REV CODE- 250/637: Performed by: INTERNAL MEDICINE

## 2021-01-11 PROCEDURE — 74011250636 HC RX REV CODE- 250/636: Performed by: HOSPITALIST

## 2021-01-11 PROCEDURE — 36600 WITHDRAWAL OF ARTERIAL BLOOD: CPT

## 2021-01-11 PROCEDURE — 74011250637 HC RX REV CODE- 250/637: Performed by: HOSPITALIST

## 2021-01-11 RX ORDER — OXYCODONE HYDROCHLORIDE 5 MG/1
5 TABLET ORAL
Status: DISCONTINUED | OUTPATIENT
Start: 2021-01-11 | End: 2021-01-29 | Stop reason: HOSPADM

## 2021-01-11 RX ORDER — SODIUM CHLORIDE 0.9 % (FLUSH) 0.9 %
5-40 SYRINGE (ML) INJECTION AS NEEDED
Status: DISCONTINUED | OUTPATIENT
Start: 2021-01-11 | End: 2021-01-29 | Stop reason: HOSPADM

## 2021-01-11 RX ORDER — MELATONIN
2000 DAILY
Status: DISCONTINUED | OUTPATIENT
Start: 2021-01-18 | End: 2021-01-29 | Stop reason: HOSPADM

## 2021-01-11 RX ORDER — LABETALOL HCL 20 MG/4 ML
20 SYRINGE (ML) INTRAVENOUS
Status: DISCONTINUED | OUTPATIENT
Start: 2021-01-11 | End: 2021-01-29 | Stop reason: HOSPADM

## 2021-01-11 RX ORDER — FACIAL-BODY WIPES
10 EACH TOPICAL DAILY PRN
Status: DISCONTINUED | OUTPATIENT
Start: 2021-01-11 | End: 2021-01-29 | Stop reason: HOSPADM

## 2021-01-11 RX ORDER — MELATONIN
6000 DAILY
Status: DISCONTINUED | OUTPATIENT
Start: 2021-01-11 | End: 2021-01-11 | Stop reason: CLARIF

## 2021-01-11 RX ORDER — MORPHINE SULFATE 2 MG/ML
2 INJECTION, SOLUTION INTRAMUSCULAR; INTRAVENOUS
Status: DISCONTINUED | OUTPATIENT
Start: 2021-01-11 | End: 2021-01-29 | Stop reason: HOSPADM

## 2021-01-11 RX ORDER — POLYETHYLENE GLYCOL 3350 17 G/17G
17 POWDER, FOR SOLUTION ORAL DAILY PRN
Status: DISCONTINUED | OUTPATIENT
Start: 2021-01-11 | End: 2021-01-29 | Stop reason: HOSPADM

## 2021-01-11 RX ORDER — GUAIFENESIN/DEXTROMETHORPHAN 100-10MG/5
5 SYRUP ORAL
Status: DISCONTINUED | OUTPATIENT
Start: 2021-01-11 | End: 2021-01-29 | Stop reason: HOSPADM

## 2021-01-11 RX ORDER — IBUPROFEN 200 MG
600 TABLET ORAL DAILY
COMMUNITY
End: 2021-01-29

## 2021-01-11 RX ORDER — LANOLIN ALCOHOL/MO/W.PET/CERES
3 CREAM (GRAM) TOPICAL
Status: DISCONTINUED | OUTPATIENT
Start: 2021-01-11 | End: 2021-01-14 | Stop reason: CLARIF

## 2021-01-11 RX ORDER — MELATONIN
2000 DAILY
Status: DISCONTINUED | OUTPATIENT
Start: 2021-01-18 | End: 2021-01-11 | Stop reason: CLARIF

## 2021-01-11 RX ORDER — SODIUM CHLORIDE 0.9 % (FLUSH) 0.9 %
5-40 SYRINGE (ML) INJECTION EVERY 8 HOURS
Status: DISCONTINUED | OUTPATIENT
Start: 2021-01-11 | End: 2021-01-29 | Stop reason: HOSPADM

## 2021-01-11 RX ORDER — LORAZEPAM 2 MG/ML
0.5 INJECTION INTRAMUSCULAR
Status: DISCONTINUED | OUTPATIENT
Start: 2021-01-11 | End: 2021-01-29 | Stop reason: HOSPADM

## 2021-01-11 RX ORDER — ONDANSETRON 2 MG/ML
4 INJECTION INTRAMUSCULAR; INTRAVENOUS
Status: DISCONTINUED | OUTPATIENT
Start: 2021-01-11 | End: 2021-01-29 | Stop reason: HOSPADM

## 2021-01-11 RX ORDER — SODIUM CHLORIDE 9 MG/ML
100 INJECTION, SOLUTION INTRAVENOUS CONTINUOUS
Status: DISCONTINUED | OUTPATIENT
Start: 2021-01-11 | End: 2021-01-14

## 2021-01-11 RX ORDER — MELATONIN
1000 DAILY
Status: COMPLETED | OUTPATIENT
Start: 2021-01-11 | End: 2021-01-17

## 2021-01-11 RX ORDER — FAMOTIDINE 20 MG/1
20 TABLET, FILM COATED ORAL 2 TIMES DAILY
Status: DISCONTINUED | OUTPATIENT
Start: 2021-01-11 | End: 2021-01-29 | Stop reason: HOSPADM

## 2021-01-11 RX ORDER — DIPHENHYDRAMINE HYDROCHLORIDE 50 MG/ML
25 INJECTION, SOLUTION INTRAMUSCULAR; INTRAVENOUS
Status: DISCONTINUED | OUTPATIENT
Start: 2021-01-11 | End: 2021-01-29 | Stop reason: HOSPADM

## 2021-01-11 RX ORDER — ALBUTEROL SULFATE 90 UG/1
4 AEROSOL, METERED RESPIRATORY (INHALATION)
Status: DISCONTINUED | OUTPATIENT
Start: 2021-01-11 | End: 2021-01-19

## 2021-01-11 RX ORDER — ENOXAPARIN SODIUM 100 MG/ML
40 INJECTION SUBCUTANEOUS DAILY
Status: DISCONTINUED | OUTPATIENT
Start: 2021-01-11 | End: 2021-01-29 | Stop reason: HOSPADM

## 2021-01-11 RX ORDER — ACETAMINOPHEN 325 MG/1
650 TABLET ORAL
Status: DISCONTINUED | OUTPATIENT
Start: 2021-01-11 | End: 2021-01-29 | Stop reason: HOSPADM

## 2021-01-11 RX ORDER — ASCORBIC ACID 500 MG
500 TABLET ORAL 2 TIMES DAILY
Status: DISCONTINUED | OUTPATIENT
Start: 2021-01-11 | End: 2021-01-29 | Stop reason: HOSPADM

## 2021-01-11 RX ORDER — CHOLECALCIFEROL TAB 125 MCG (5000 UNIT) 125 MCG
5000 TAB ORAL DAILY
Status: COMPLETED | OUTPATIENT
Start: 2021-01-11 | End: 2021-01-17

## 2021-01-11 RX ORDER — ACETAMINOPHEN 500 MG
1000 TABLET ORAL AS NEEDED
COMMUNITY
End: 2021-01-29

## 2021-01-11 RX ORDER — DEXAMETHASONE 6 MG/1
6 TABLET ORAL DAILY
Status: COMPLETED | OUTPATIENT
Start: 2021-01-11 | End: 2021-01-20

## 2021-01-11 RX ORDER — MAG HYDROX/ALUMINUM HYD/SIMETH 200-200-20
30 SUSPENSION, ORAL (FINAL DOSE FORM) ORAL
Status: DISCONTINUED | OUTPATIENT
Start: 2021-01-11 | End: 2021-01-29 | Stop reason: HOSPADM

## 2021-01-11 RX ORDER — DICYCLOMINE HYDROCHLORIDE 20 MG/1
20 TABLET ORAL EVERY 6 HOURS
COMMUNITY

## 2021-01-11 RX ORDER — AZITHROMYCIN 250 MG/1
500 TABLET, FILM COATED ORAL DAILY
Status: DISCONTINUED | OUTPATIENT
Start: 2021-01-11 | End: 2021-01-16

## 2021-01-11 RX ORDER — GABAPENTIN 600 MG/1
300 TABLET ORAL 3 TIMES DAILY
COMMUNITY

## 2021-01-11 RX ORDER — ZINC SULFATE 50(220)MG
1 CAPSULE ORAL DAILY
Status: DISCONTINUED | OUTPATIENT
Start: 2021-01-12 | End: 2021-01-19

## 2021-01-11 RX ORDER — HYDRALAZINE HYDROCHLORIDE 20 MG/ML
20 INJECTION INTRAMUSCULAR; INTRAVENOUS
Status: DISCONTINUED | OUTPATIENT
Start: 2021-01-11 | End: 2021-01-29 | Stop reason: HOSPADM

## 2021-01-11 RX ORDER — ACETAMINOPHEN 650 MG/1
650 SUPPOSITORY RECTAL
Status: DISCONTINUED | OUTPATIENT
Start: 2021-01-11 | End: 2021-01-29 | Stop reason: HOSPADM

## 2021-01-11 RX ORDER — SIMETHICONE 80 MG
80 TABLET,CHEWABLE ORAL
Status: DISCONTINUED | OUTPATIENT
Start: 2021-01-11 | End: 2021-01-29 | Stop reason: HOSPADM

## 2021-01-11 RX ORDER — OXYCODONE HYDROCHLORIDE 5 MG/1
10 TABLET ORAL
Status: DISCONTINUED | OUTPATIENT
Start: 2021-01-11 | End: 2021-01-29 | Stop reason: HOSPADM

## 2021-01-11 RX ORDER — DIPHENHYDRAMINE HCL 25 MG
25 CAPSULE ORAL
Status: DISCONTINUED | OUTPATIENT
Start: 2021-01-11 | End: 2021-01-29 | Stop reason: HOSPADM

## 2021-01-11 RX ADMIN — Medication 10 ML: at 03:09

## 2021-01-11 RX ADMIN — SODIUM CHLORIDE 100 ML/HR: 9 INJECTION, SOLUTION INTRAVENOUS at 17:00

## 2021-01-11 RX ADMIN — DEXAMETHASONE 6 MG: 6 TABLET ORAL at 09:14

## 2021-01-11 RX ADMIN — Medication 10 ML: at 14:00

## 2021-01-11 RX ADMIN — Medication 10 ML: at 19:19

## 2021-01-11 RX ADMIN — REMDESIVIR 200 MG: 100 INJECTION, POWDER, LYOPHILIZED, FOR SOLUTION INTRAVENOUS at 09:31

## 2021-01-11 RX ADMIN — Medication 1 TABLET: at 09:15

## 2021-01-11 RX ADMIN — CHOLECALCIFEROL TAB 125 MCG (5000 UNIT) 5000 UNITS: 125 TAB at 09:15

## 2021-01-11 RX ADMIN — FAMOTIDINE 20 MG: 20 TABLET, FILM COATED ORAL at 17:29

## 2021-01-11 RX ADMIN — FAMOTIDINE 20 MG: 20 TABLET, FILM COATED ORAL at 09:14

## 2021-01-11 RX ADMIN — ENOXAPARIN SODIUM 40 MG: 40 INJECTION SUBCUTANEOUS at 09:14

## 2021-01-11 RX ADMIN — AZITHROMYCIN MONOHYDRATE 500 MG: 250 TABLET ORAL at 03:07

## 2021-01-11 RX ADMIN — OXYCODONE HYDROCHLORIDE AND ACETAMINOPHEN 500 MG: 500 TABLET ORAL at 17:29

## 2021-01-11 RX ADMIN — SODIUM CHLORIDE 100 ML/HR: 9 INJECTION, SOLUTION INTRAVENOUS at 03:07

## 2021-01-11 NOTE — PROGRESS NOTES
[facility]: MED RECONCILIATION    Comments/Recommendations:  Spoke to patients wife. She was a good historian of the patients medications. She informed me that the patient takes Ibuprofen almost everyday, however recently has only been taking tylenol for fever and body aches. Medications added:     Dicyclomine 20 mg   Gabapentin 600 mg   Ibuprofen 200 mg   Tylenol 500 mg   Metamucil     Medications removed:    Metronidazole 500 mg     Medications adjusted:    N/A    Information obtained from: Spouse     Allergies: Patient has no known allergies. Prior to Admission Medications:     Medication Documentation Review Audit       Reviewed by Minh García (Pharmacy Student) on 01/11/21 at 0901      Medication Sig Documenting Provider Last Dose Status Taking?   acetaminophen (TYLENOL) 500 mg tablet Take 1,000 mg by mouth as needed for Pain. Indications: fever, muscle pain Provider, Historical 1/10/2021 Unknown time Active Yes   dicyclomine (BENTYL) 20 mg tablet Take 20 mg by mouth every six (6) hours. Indications: irritable colon Provider, Historical 1/10/2021 Unknown time Active Yes   gabapentin (NEURONTIN) 600 mg tablet Take 600 mg by mouth three (3) times daily. Provider, Historical 1/10/2021 Unknown time Active Yes   ibuprofen (MOTRIN) 200 mg tablet Take 600 mg by mouth daily. Provider, Historical  Active Yes   psyllium (METAMUCIL) packet Take 1 Packet by mouth two (2) times a day. Provider, Historical 12/11/2020 Unknown time Active Yes                      Car Lamar PharmD.  Candidate 2021  Thank you,  Carlos Madden, PharmD

## 2021-01-11 NOTE — ED PROVIDER NOTES
This a 66year-old male who comes to the ED with the chief complaint of fatigue. Pt reports diarrhea, cough, SOB, and generalized weakness onset about a week ago. Pt was seen at [facility] yesterday and had a covid test done that has not yet resulted. O2 sats 88% on RA in triage. Pt immediately placed on O2 @ 6L NC. Fatigue  This is a new problem. The current episode started more than 1 week ago. The problem has been gradually worsening. There was no focality noted. Pertinent negatives include no focal weakness, no loss of sensation, no loss of balance, no slurred speech, no speech difficulty, no memory loss, no movement disorder, no agitation, no visual change, no auditory change, no mental status change, no unresponsiveness and no disorientation. There has been a fever of 101 - 101.9 F. The fever has been present for 3 - 4 days. Associated symptoms include shortness of breath. Pertinent negatives include no chest pain, no vomiting and no confusion. There were no medications administered prior to arrival.   Diarrhea   Associated symptoms include a fever, diarrhea and myalgias. Pertinent negatives include no vomiting, no dysuria, no hematuria, no chest pain and no back pain. Past Medical History:   Diagnosis Date    Diverticulitis     Gastrointestinal disorder     History of vascular access device 07/24/2017    [facility] VAT -  5FR triple Lumen Power PICC R Basilic  39 cm    Spinal stenosis        Past Surgical History:   Procedure Laterality Date    FLEXIBLE SIGMOIDOSCOPY N/A 7/27/2017    SIGMOIDOSCOPY FLEXIBLE performed by Nirali Garrett MD at [facility]         No family history on file.     Social History     Socioeconomic History    Marital status:      Spouse name: Not on file    Number of children: Not on file    Years of education: Not on file    Highest education level: Not on file   Occupational History    Not on file   Social Needs    Financial resource strain: Not on file   Amber Quispe Food insecurity     Worry: Not on file     Inability: Not on file    Transportation needs     Medical: Not on file     Non-medical: Not on file   Tobacco Use    Smoking status: Current Some Day Smoker     Packs/day: 1.00     Years: 50.00     Pack years: 50.00    Smokeless tobacco: Current User   Substance and Sexual Activity    Alcohol use: Yes     Alcohol/week: 0.8 standard drinks     Types: 1 Cans of beer per week    Drug use: No    Sexual activity: Not on file   Lifestyle    Physical activity     Days per week: Not on file     Minutes per session: Not on file    Stress: Not on file   Relationships    Social connections     Talks on phone: Not on file     Gets together: Not on file     Attends Nondenominational service: Not on file     Active member of club or organization: Not on file     Attends meetings of clubs or organizations: Not on file     Relationship status: Not on file    Intimate partner violence     Fear of current or ex partner: Not on file     Emotionally abused: Not on file     Physically abused: Not on file     Forced sexual activity: Not on file   Other Topics Concern    Not on file   Social History Narrative    Not on file     ALLERGIES: Patient has no known allergies. Review of Systems   Constitutional: Positive for chills, fatigue and fever. Negative for appetite change and unexpected weight change. HENT: Negative for ear pain, hearing loss, rhinorrhea and trouble swallowing. Eyes: Negative for pain and visual disturbance. Respiratory: Positive for cough and shortness of breath. Negative for chest tightness. Cardiovascular: Negative for chest pain and palpitations. Gastrointestinal: Positive for diarrhea. Negative for abdominal distention, abdominal pain, blood in stool and vomiting. Genitourinary: Negative for dysuria, hematuria and urgency. Musculoskeletal: Positive for myalgias. Negative for back pain. Skin: Negative for rash.    Neurological: Negative for dizziness, focal weakness, syncope, speech difficulty, weakness, numbness and loss of balance. Psychiatric/Behavioral: Negative for agitation, confusion, memory loss and suicidal ideas. All other systems reviewed and are negative. Vitals:    01/10/21 2144 01/10/21 2145 01/10/21 2313 01/10/21 2325   BP: 114/69      Pulse: 88      Resp: 20      Temp: 97.5 °F (36.4 °C)   99.4 °F (37.4 °C)   SpO2: (!) 88% 94% 93%    Weight: 97.5 kg (215 lb)      Height: 5' 10\" (1.778 m)               Physical Exam  Vitals signs and nursing note reviewed. Constitutional:       General: He is not in acute distress. Appearance: Normal appearance. He is well-developed. He is not diaphoretic. HENT:      Head: Normocephalic and atraumatic. Right Ear: External ear normal.      Left Ear: External ear normal.   Eyes:      General: No scleral icterus. Right eye: No discharge. Left eye: No discharge. Extraocular Movements: Extraocular movements intact. Conjunctiva/sclera: Conjunctivae normal.      Pupils: Pupils are equal, round, and reactive to light. Neck:      Musculoskeletal: Normal range of motion and neck supple. Vascular: No JVD. Trachea: No tracheal deviation. Cardiovascular:      Rate and Rhythm: Normal rate and regular rhythm. Heart sounds: Normal heart sounds. No murmur. No friction rub. No gallop. Pulmonary:      Effort: Pulmonary effort is normal. No respiratory distress. Breath sounds: No stridor. Examination of the right-lower field reveals rhonchi. Examination of the left-lower field reveals rhonchi. Rhonchi present. No decreased breath sounds, wheezing or rales. Chest:      Chest wall: No tenderness. Abdominal:      General: Bowel sounds are normal. There is no distension. Palpations: Abdomen is soft. Tenderness: There is no abdominal tenderness. There is no guarding or rebound. Musculoskeletal: Normal range of motion.          General: No tenderness. Right lower leg: No edema. Left lower leg: No edema. Skin:     General: Skin is warm and dry. Capillary Refill: Capillary refill takes less than 2 seconds. Coloration: Skin is not pale. Findings: No erythema or rash. Neurological:      General: No focal deficit present. Mental Status: He is alert and oriented to person, place, and time. GCS: GCS eye subscore is 4. GCS verbal subscore is 5. GCS motor subscore is 6. Cranial Nerves: No cranial nerve deficit. Sensory: No sensory deficit. Motor: No weakness or abnormal muscle tone. Coordination: Coordination normal.      Deep Tendon Reflexes: Reflexes are normal and symmetric. Reflexes normal.   Psychiatric:         Mood and Affect: Mood normal.         Behavior: Behavior normal.         Thought Content: Thought content normal.         Judgment: Judgment normal.          MDM  Number of Diagnoses or Management Options     Amount and/or Complexity of Data Reviewed  Clinical lab tests: ordered and reviewed  Tests in the radiology section of CPT®: ordered and reviewed  Tests in the medicine section of CPT®: ordered and reviewed  Discuss the patient with other providers: yes (Hospitalist)  Independent visualization of images, tracings, or specimens: yes (ekg)    Risk of Complications, Morbidity, and/or Mortality  Presenting problems: high  Diagnostic procedures: moderate  Management options: high  General comments: Total critical care time spent exclusive of procedures:  40 minutes    Patient Progress  Patient progress: stable       Procedures    Chief Complaint   Patient presents with    Concern For COVID-19 (Coronavirus)    Fatigue    Diarrhea       The patient's presenting problems have been discussed, and they are in agreement with the care plan formulated and outlined with them. I have encouraged them to ask questions as they arise throughout their visit.     MEDICATIONS GIVEN:  Medications acetaminophen (TYLENOL) tablet 1,000 mg (has no administration in time range)   sodium chloride 0.9 % bolus infusion 1,000 mL (1,000 mL IntraVENous New Bag 1/10/21 2321)   dexamethasone (PF) (DECADRON) 10 mg/mL injection 10 mg (10 mg IntraVENous Given 1/10/21 2321)       LABS REVIEWED:  Recent Results (from the past 24 hour(s))   CBC WITH AUTOMATED DIFF    Collection Time: 01/10/21 10:35 PM   Result Value Ref Range    WBC 8.4 4.1 - 11.1 K/uL    RBC 5.65 4.10 - 5.70 M/uL    HGB 16.9 12.1 - 17.0 g/dL    HCT 49.8 36.6 - 50.3 %    MCV 88.1 80.0 - 99.0 FL    MCH 29.9 26.0 - 34.0 PG    MCHC 33.9 30.0 - 36.5 g/dL    RDW 13.4 11.5 - 14.5 %    PLATELET 389 035 - 292 K/uL    MPV 10.3 8.9 - 12.9 FL    NRBC 0.0 0  WBC    ABSOLUTE NRBC 0.00 0.00 - 0.01 K/uL    NEUTROPHILS 79 (H) 32 - 75 %    LYMPHOCYTES 10 (L) 12 - 49 %    MONOCYTES 10 5 - 13 %    EOSINOPHILS 0 0 - 7 %    BASOPHILS 0 0 - 1 %    IMMATURE GRANULOCYTES 1 (H) 0.0 - 0.5 %    ABS. NEUTROPHILS 6.7 1.8 - 8.0 K/UL    ABS. LYMPHOCYTES 0.8 0.8 - 3.5 K/UL    ABS. MONOCYTES 0.8 0.0 - 1.0 K/UL    ABS. EOSINOPHILS 0.0 0.0 - 0.4 K/UL    ABS. BASOPHILS 0.0 0.0 - 0.1 K/UL    ABS. IMM.  GRANS. 0.1 (H) 0.00 - 0.04 K/UL    DF AUTOMATED     PROCALCITONIN    Collection Time: 01/10/21 10:35 PM   Result Value Ref Range    Procalcitonin <0.05 ng/mL   SARS-COV-2    Collection Time: 01/10/21 11:16 PM   Result Value Ref Range    Specimen source Nasopharyngeal      Specimen source Nasopharyngeal      COVID-19 rapid test PENDING     Specimen type NP Swab      Health status NP Swab     LACTIC ACID    Collection Time: 01/10/21 11:17 PM   Result Value Ref Range    Lactic acid 1.3 0.4 - 2.0 MMOL/L   D DIMER    Collection Time: 01/10/21 11:17 PM   Result Value Ref Range    D-dimer 1.00 (H) 0.00 - 0.65 mg/L FEU   PTT    Collection Time: 01/10/21 11:17 PM   Result Value Ref Range    aPTT 32.2 (H) 22.1 - 31.0 sec    aPTT, therapeutic range     58.0 - 77.0 SECS   PROTHROMBIN TIME + INR Collection Time: 01/10/21 11:17 PM   Result Value Ref Range    INR 1.1 0.9 - 1.1      Prothrombin time 11.3 (H) 9.0 - 11.1 sec   CBC WITH AUTOMATED DIFF    Collection Time: 01/10/21 11:18 PM   Result Value Ref Range    WBC 8.6 4.1 - 11.1 K/uL    RBC 5.53 4.10 - 5.70 M/uL    HGB 16.5 12.1 - 17.0 g/dL    HCT 48.3 36.6 - 50.3 %    MCV 87.3 80.0 - 99.0 FL    MCH 29.8 26.0 - 34.0 PG    MCHC 34.2 30.0 - 36.5 g/dL    RDW 13.2 11.5 - 14.5 %    PLATELET 994 778 - 561 K/uL    MPV 9.9 8.9 - 12.9 FL    NRBC 0.0 0  WBC    ABSOLUTE NRBC 0.00 0.00 - 0.01 K/uL    NEUTROPHILS 80 (H) 32 - 75 %    LYMPHOCYTES 9 (L) 12 - 49 %    MONOCYTES 10 5 - 13 %    EOSINOPHILS 0 0 - 7 %    BASOPHILS 0 0 - 1 %    IMMATURE GRANULOCYTES 1 (H) 0.0 - 0.5 %    ABS. NEUTROPHILS 6.8 1.8 - 8.0 K/UL    ABS. LYMPHOCYTES 0.8 0.8 - 3.5 K/UL    ABS. MONOCYTES 0.9 0.0 - 1.0 K/UL    ABS. EOSINOPHILS 0.0 0.0 - 0.4 K/UL    ABS. BASOPHILS 0.0 0.0 - 0.1 K/UL    ABS. IMM. GRANS. 0.1 (H) 0.00 - 0.04 K/UL    DF SMEAR SCANNED      RBC COMMENTS NORMOCYTIC, NORMOCHROMIC     METABOLIC PANEL, COMPREHENSIVE    Collection Time: 01/10/21 11:18 PM   Result Value Ref Range    Sodium 135 (L) 136 - 145 mmol/L    Potassium 3.8 3.5 - 5.1 mmol/L    Chloride 103 97 - 108 mmol/L    CO2 26 21 - 32 mmol/L    Anion gap 6 5 - 15 mmol/L    Glucose 121 (H) 65 - 100 mg/dL    BUN 37 (H) 6 - 20 MG/DL    Creatinine 1.29 0.70 - 1.30 MG/DL    BUN/Creatinine ratio 29 (H) 12 - 20      GFR est AA >60 >60 ml/min/1.73m2    GFR est non-AA 54 (L) >60 ml/min/1.73m2    Calcium 8.9 8.5 - 10.1 MG/DL    Bilirubin, total 0.6 0.2 - 1.0 MG/DL    ALT (SGPT) 79 (H) 12 - 78 U/L    AST (SGOT) 89 (H) 15 - 37 U/L    Alk.  phosphatase 68 45 - 117 U/L    Protein, total 8.1 6.4 - 8.2 g/dL    Albumin 3.5 3.5 - 5.0 g/dL    Globulin 4.6 (H) 2.0 - 4.0 g/dL    A-G Ratio 0.8 (L) 1.1 - 2.2     SAMPLES BEING HELD    Collection Time: 01/10/21 11:18 PM   Result Value Ref Range    SAMPLES BEING HELD 1SST,1RED,4 BC BOTTLES COMMENT        Add-on orders for these samples will be processed based on acceptable specimen integrity and analyte stability, which may vary by analyte. LD    Collection Time: 01/10/21 11:18 PM   Result Value Ref Range     (H) 85 - 241 U/L   MAGNESIUM    Collection Time: 01/10/21 11:18 PM   Result Value Ref Range    Magnesium 2.5 (H) 1.6 - 2.4 mg/dL   TROPONIN I    Collection Time: 01/10/21 11:18 PM   Result Value Ref Range    Troponin-I, Qt. <0.05 <0.05 ng/mL       VITAL SIGNS:  Patient Vitals for the past 24 hrs:   Temp Pulse Resp BP SpO2   01/10/21 2325 99.4 °F (37.4 °C)       01/10/21 2313     93 %   01/10/21 2145     94 %   01/10/21 2144 97.5 °F (36.4 °C) 88 20 114/69 (!) 88 %       RADIOLOGY RESULTS:  The following have been ordered and reviewed:  Xr Chest Port    Result Date: 1/10/2021  EXAM: XR CHEST PORT INDICATION: Hypoxia, fatigue, COVID19 rule out. COMPARISON: Chest views on 10/4/2019 and 8/5/2017. TECHNIQUE: Upright portable chest AP view FINDINGS: The cardiomediastinal and hilar contours are within normal limits. The pulmonary vasculature is within normal limits. The lungs and pleural spaces are clear. The visualized bones and upper abdomen are age-appropriate. IMPRESSION: No acute process on portable chest. No change. ED EKG interpretation:  Rhythm: normal sinus rhythm; and regular . Rate (approx.): 82; Axis: left axis deviation; P wave: normal; QRS interval: normal ; ST/T wave: T wave flattening aVL; Other findings: abnormal ekg. This EKG was interpreted by Gavin Aguilar DO, ED Provider. CONSULTATIONS:   CONSULT NOTE:  12:37 AM Yee Da Silva DO spoke with Dr. Yaya Fish, Consult for Hospitalist.  Discussed available diagnostic tests and clinical findings. He is in agreement with care plans as outlined. Dr. Yaya Fish will see and admit pt for further evaluation and treatment.     [employer] Consult for Admission  12:37 AM    ED Room Number: ER11/11  Patient Name and age: Jostin Madrigal 66 y.o.  male  Working Diagnosis:   1. SOB (shortness of breath)    2. Hypoxia    3. Diarrhea, unspecified type    4. COVID-19        COVID-19 Suspicion:  yes  Sepsis present:  no  Reassessment needed: no  Code Status:  Full Code  Readmission: no  Isolation Requirements:  yes  Recommended Level of Care:  telemetry  Department:[facility] ED - [phone]  Other:  Pt on NRB, was still hypoxic on 6L NC    PROGRESS NOTES:  Discussed results and plan with patient. Patient will be admitted/observed for further evaluation and treatment. DIAGNOSIS:    1. SOB (shortness of breath)    2. Hypoxia    3. Diarrhea, unspecified type    4. COVID-19        PLAN:    Admit    ED COURSE: The patient's hospital course has been uncomplicated. Please note that this dictation was completed with [employer], the computer voice recognition software. Quite often unanticipated grammatical, syntax, homophones, and other interpretive errors are inadvertently transcribed by the computer software. Please disregard these errors. Please excuse any errors that have escaped final proofreading.

## 2021-01-11 NOTE — ED NOTES
Verbal shift change report given to Reina Pederson RN (oncoming nurse) by Kellee Gan RN (offgoing nurse). Report included the following information SBAR, ED Summary and MAR.

## 2021-01-11 NOTE — ED NOTES
Verbal/Bedside report was given to Darlene RN who will assume care of patient at this time. SBAR report consisted of ED summary, MAR, recent results, and additional pertinent information. Receiving nurse given opportunity to ask questions and seek clarifications. Receiving nurse aware of pending lab results and orders that are to be completed.

## 2021-01-11 NOTE — PROGRESS NOTES
1/11/2021  Reason for Admission:   covid                   RUR Score:          12% low           Plan for utilizing home health:      None, patient has never had and is not homebound    PCP: First and Last name:  Trish Blackburn, however patient's wife believes he might have retired and she would like him to be set up with the medical group here. Name of Practice:    Are you a current patient: Yes/No:    Approximate date of last visit:    Can you participate in a virtual visit with your PCP:                     Current Advanced Directive/Advance Care Plan:  None on file, however patient's wife believes that he does have one. Transition of Care Plan:                    I completed the assessment with patient's wife on the phone due to some suggestion that patient may be somewhat confused. Patient lives with his wife in a 2 story home with approximately 4 steps to enter. Prior to admission he is independent with ADLs and drives. He has never had any home health and does not use any DME. His wife Marya is his primary emergency contact and can be reached at [phone]. Patient had been seeing Trish Blackburn MD at [facility], but wife believes he may have retired and she would now like him to be set up with the medical group here. Will send an email to [address] specialist to get an appointment. Patient uses [facility] for prescriptions and they are typically covered by insurance. Transition of Care Plan  1. Patient admitted  2. Medical management/treatment  3. Discharge needs not yet clear, likely home with family  4. PCP follow up  5. Cm will continue to follow    Care Management Interventions  PCP Verified by CM: Yes(Lyndon Quezada)  Mode of Transport at Discharge:  Other (see comment)(family)  Transition of Care Consult (CM Consult): Discharge Planning  Current Support Network: Lives with Spouse, Family Lives Nearby  Confirm Follow Up Transport: Family  Discharge Location  Discharge Placement: Home with family assistance    ROWDY Gonsalez

## 2021-01-11 NOTE — ED TRIAGE NOTES
Pt reports diarrhea, cough, SOB, and generalized weakness onset about a week ago. Seen at [facility] yesterday and had a covid test done that has not yet resulted. O2 sats 88% on RA in triage.

## 2021-01-11 NOTE — PROGRESS NOTES
TRANSFER - IN REPORT:    Verbal report received from [facility] (name) on Jostin Madrigal  being received from ED (unit) for routine progression of care      Report consisted of patients Situation, Background, Assessment and   Recommendations(SBAR). Information from the following report(s) SBAR, ED Summary, [facility] and Recent Results was reviewed with the receiving nurse. Opportunity for questions and clarification was provided. Assessment completed upon patients arrival to unit and care assumed.

## 2021-01-11 NOTE — PROGRESS NOTES
Physician Progress Note      PATIENT:               Adrien Hester  CSN #:                  [id number]  :                       1945  ADMIT DATE:       1/10/2021 9:55 PM  [address] [place] DATE:  RESPONDING  PROVIDER #:        Car Mckeon MD          QUERY TEXT:    Pt admitted with Covid 19 infection  and has encephalopathy documented. If possible, please document in progress notes and discharge summary further specificity regarding the type of encephalopathy:    The medical record reflects the following:  Risk Factors: 67 yo M admitted with COVID-19 virus infection / Hypoxia / Acute respiratory insufficiency  Clinical Indicators: H&P states \" Acute encephalopathy (2021): Most likely due to hypoxia and/or the infection itself. At this point he is pretty calm just off on date and situation. He is going to be started on high flow which may help. Check ABGs and continue treating above. \"  Treatment: O2 @ 15 on high flow NC, IV 1L NS bolus and IV Zithromycin  Options provided:  -- Metabolic encephalopathy  -- Septic encephalopathy  -- Toxic encephalopathy  -- Encephalopathy due to ***  -- Other - I will add my own diagnosis  -- Disagree - Not applicable / Not valid  -- Disagree - Clinically unable to determine / Unknown  -- Refer to Clinical Documentation Reviewer    PROVIDER RESPONSE TEXT:    This patient has metabolic encephalopathy.     Query created by: Ana Jackson on 2021 2:03 PM      Electronically signed by:  Car Mckeon MD 2021 6:11 PM

## 2021-01-11 NOTE — H&P
[address] 19  [phone]     Hospitalist Admission Note      NAME: Ysiel Bledsoe   :  1945   MRN:  [id number]     Date/Time:  2021 1:14 AM    Patient PCP: Leeann Maciel MD    Emergency Contact:    Extended Emergency Contact Information  Primary Emergency Contact: [facility] [address]  Address: [address] [place] [address] Phone: [phone]  Mobile Phone: [phone]  Relation: Spouse   needed? No      Code: Full Code    Isolation Precautions: Droplet Plus        Subjective:     CHIEF COMPLAINT: SOB and diarrhea     HISTORY OF PRESENT ILLNESS:     Mr. Savana Fishman is a 66 y.o. male with history of diverticulitis and C diff presents with worsening shortness of breath for the past week. SOB has been constant and moderate-severe accompanied by non-productive cough. Fevers and chills associated with this. He also has watery diarrhea associated which may be also associated with this. Patient appears slightly confused so difficult to assess but he thinks that diarrhea has been ongoing for about a month. .  Has not been on ABXs. In ER he was found to be hypoxic and positive of COVID-19. Also found to have a low grade temp along with an elevated BUN. During my exam patient appears slightly confused which the ER physician thought that he was alert and oriented x4 when he saw him and so did the nurse. At this point patient appears to be off on his situation and time/date. No Known Allergies    Prior to Admission medications    Medication Sig Start Date End Date Taking? Authorizing Provider   metroNIDAZOLE (FLAGYL) 500 mg tablet Take 1 Tab by mouth three (3) times daily.  17   SIENNA Merlos       Past Medical History:   Diagnosis Date    Diverticulitis     Gastrointestinal disorder     History of vascular access device 2017    [facility] VAT -  5FR triple Lumen Power PICC R Basilic  39 cm    Spinal stenosis         Past Surgical History:   Procedure Laterality Date    FLEXIBLE SIGMOIDOSCOPY N/A 7/27/2017    SIGMOIDOSCOPY FLEXIBLE performed by Francia Hopson MD at [facility] ENDOSCOPY       Social History     Tobacco Use    Smoking status: Current Some Day Smoker     Packs/day: 1.00     Years: 50.00     Pack years: 50.00    Smokeless tobacco: Current User   Substance Use Topics    Alcohol use: Yes     Alcohol/week: 0.8 standard drinks     Types: 1 Cans of beer per week        FH: He said there was nothing wrong with his family but at this point do not know how reliable that is. PMSFH was reviewed. Review of Systems (14 point ROS):  (bold if positive, if negative)    Gen:   , , fever, chills, fatigue, Eyes:  , , ENT:   , , , , CVS:   , , , , , , WINSTON, Pulm:  Cough, shortness of breath, , , , GI:       , , , diarrhea, , , , :     , , , , MS:     , , , Skin:   , , , , Psy:    , , , , , , Endo: , , , Hem:  , , , Zachariah:   , , , , Hai:   , , , , , , ,         Objective:      Visit Vitals  /69 (BP 1 Location: Right arm, BP Patient Position: Sitting)   Pulse 88   Temp 99.4 °F (37.4 °C)   Resp 20   Ht 5' 10\" (1.778 m)   Wt 97.5 kg (215 lb)   SpO2 93%   BMI 30.85 kg/m²       Exam:     Physical Exam:    General: Alert, cooperative, mild respiratory distress   Head: Normocephalic, atraumatic  Eyes: PERRL and EOMI sclera clear  ENT: Lips, mucosa, and tongue normal.   Neck: supple, no tenderness  Lungs: Fine crackles throughout both lung fields with increase WOB. Heart: S1-S2, RRR   Abd: SNTBS(+)  Ext: no cyanosis, no edema    Pulses: 2+ and symmetric  Skin: Skin color, texture, turgor normal. No rashes or lesions  Neuro: Alert and oriented x self location and to a certain degree situation but not date.   Psych: Not anxious, cooperative, normal affect    Labs:    Recent Labs     01/10/21  2318   WBC 8.6   HGB 16.5   HCT 48.3        Recent Labs     01/10/21  9749 *   K 3.8      CO2 26   *   BUN 37*   CREA 1.29   CA 8.9   MG 2.5*   ALB 3.5   TBILI 0.6   ALT 79*     Lab Results   Component Value Date/Time    Glucose (POC) 110 (H) 08/04/2017 05:38 AM    Glucose (POC) 101 (H) 08/03/2017 10:58 PM     No results for input(s): PH, PCO2, PO2, HCO3, FIO2 in the last 72 hours. Recent Labs     01/10/21  2317   INR 1.1       Radiology and EKG reviewed:     Xr Chest Port    Result Date: 1/10/2021  IMPRESSION: No acute process on portable chest. No change. I personally reviewed and interpreted the imaging studies and agree with official reading. **Chart reviewed in [facility]**       Assessment/Plan:      COVID-19 virus infection (1/11/2021) / Hypoxia / Acute respiratory insufficiency (1/11/2021): Will admit for further workup and treatment due to high risk of decompensation due to COV/Hypoxia. COVID-19 order set with DROPLET PLUS precautions. IV antibiotics Azithromycin. Give Zinc, Vit. C, Vit. D, pepcid. I.  Bronchodilator(s), systemic steroidsfor hypoxia. Maintain O2 sats at least 92-94% oxygen per protocol and BiPAP if needed. IVFs if needed, supportive care, incentive spirometry. Follow inflammatory markers and renal function. Consider pulmonary consult. Acute encephalopathy (1/11/2021): Most likely due to hypoxia and/or the infection itself. At this point he is pretty calm just off on date and situation. He is going to be started on high flow which may help. Check ABGs and continue treating above. Prerenal azotemia (1/11/2021):  Likely related to poor oral intake and diarrhea. Will give IVFs and follow labs. Avoid nephrotoxic agents. Diarrhea (1/11/2021):  Likely related to COVID-19 but given his history will check stool studies and C-diff.     Body mass index is 30.85 kg/m².: 30.0 - 39.9 Obese        Risk of deterioration: high      Total time spent with patient: 79 Minutes **I personally saw and examined the patient during this time period**                 Care Plan discussed with:  [x]Patient  []Family  []Care Giver  [x]ED Doc  [x]RN  []Specialist  []Care Manager     Discussed:  Code Status and Care Plan    Prophylaxis:  Lovenox and H2B/PPI    Probable Disposition:  Home w/Family    Patient was seen:  After Midnight 1/11/2021                ___________________________________________________    Admitting Physician: Kd Garrido MD

## 2021-01-11 NOTE — PROGRESS NOTES
[facility]  [address] [facility], [place], [address] [place] [address]  [phone]      Medical Progress Note      NAME:         Tom Brewster   :        1945  MRM:        [id number]    Date of service: 2021      Subjective: Patient has been seen and examined as a follow up for multiple medical issues. Chart, labs, diagnostics reviewed. Objective:    Vital Signs:    Visit Vitals  /63 (BP 1 Location: Right arm, BP Patient Position: At rest)   Pulse (!) 57   Temp 97.9 °F (36.6 °C)   Resp 22   Ht 5' 10\" (1.778 m)   Wt 97.5 kg (215 lb)   SpO2 90%   BMI 30.85 kg/m²          Intake/Output Summary (Last 24 hours) at 2021 1506  Last data filed at 2021 1131  Gross per 24 hour   Intake 240 ml   Output 700 ml   Net -460 ml        Physical Examination:    General:   Well looking patient in no acute distress  Eyes:   pink conjunctivae, PERRLA with no discharge. ENT:   no ottorrhea or rhinorrhea with dry mucous membranes  Neck: no masses, thyroid non-tender and trachea central.  Pulm:  no accessory muscle use, clear breath sounds without crackles or wheezes  Card:  no JVD or murmurs, has regular and normal S1, S2 without thrills, bruits or peripheral edema  Abd:  Soft, non-tender, non-distended, normoactive bowel sounds with no palpable organomegaly  Musc:  No cyanosis, clubbing, atrophy or deformities. Skin:  No rashes, bruising or ulcers. Neuro: Awake and alert.  Generally a non focal exam. Follows commands appropriately  Psych:  Has a good insight and is oriented x 3    Current Facility-Administered Medications   Medication Dose Route Frequency    0.9% sodium chloride infusion  100 mL/hr IntraVENous CONTINUOUS    sodium chloride (NS) flush 5-40 mL  5-40 mL IntraVENous Q8H    sodium chloride (NS) flush 5-40 mL  5-40 mL IntraVENous PRN    polyethylene glycol (MIRALAX) packet 17 g  17 g Oral DAILY PRN    bisacodyL (DULCOLAX) suppository 10 mg  10 mg Rectal DAILY PRN    ondansetron (ZOFRAN) injection 4 mg  4 mg IntraVENous Q6H PRN    famotidine (PEPCID) tablet 20 mg  20 mg Oral BID    enoxaparin (LOVENOX) injection 40 mg  40 mg SubCUTAneous DAILY    acetaminophen (TYLENOL) tablet 650 mg  650 mg Oral Q6H PRN    Or    acetaminophen (TYLENOL) suppository 650 mg  650 mg Rectal Q6H PRN    guaiFENesin-dextromethorphan (ROBITUSSIN DM) 100-10 mg/5 mL syrup 5 mL  5 mL Oral Q4H PRN    dexAMETHasone (DECADRON) tablet 6 mg  6 mg Oral DAILY    azithromycin (ZITHROMAX) tablet 500 mg  500 mg Oral DAILY    labetaloL (NORMODYNE;TRANDATE) 20 mg/4 mL (5 mg/mL) injection 20 mg  20 mg IntraVENous Q4H PRN    hydrALAZINE (APRESOLINE) 20 mg/mL injection 20 mg  20 mg IntraVENous Q4H PRN    melatonin tablet 3 mg  3 mg Oral QHS PRN    alum-mag hydroxide-simeth (MYLANTA) oral suspension 30 mL  30 mL Oral Q4H PRN    LORazepam (ATIVAN) injection 0.5 mg  0.5 mg IntraVENous Q6H PRN    oxyCODONE IR (ROXICODONE) tablet 5 mg  5 mg Oral Q4H PRN    oxyCODONE IR (ROXICODONE) tablet 10 mg  10 mg Oral Q4H PRN    morphine injection 2 mg  2 mg IntraVENous Q4H PRN    diphenhydrAMINE (BENADRYL) injection 25 mg  25 mg IntraVENous Q6H PRN    diphenhydrAMINE (BENADRYL) capsule 25 mg  25 mg Oral Q6H PRN    albuterol (PROVENTIL HFA, VENTOLIN HFA, PROAIR HFA) inhaler 4 Puff  4 Puff Inhalation Q4H PRN    simethicone (MYLICON) tablet 80 mg  80 mg Oral QID PRN    [START ON 1/18/2021] cholecalciferol (VITAMIN D3) (1000 Units /25 mcg) tablet 2 Tab  2,000 Units Oral DAILY    cholecalciferol (VITAMIN D3) tablet 5,000 Units  5,000 Units Oral DAILY    And    cholecalciferol (VITAMIN D3) (1000 Units /25 mcg) tablet 1 Tab  1,000 Units Oral DAILY    [START ON 1/12/2021] remdesivir 100 mg in 0.9% sodium chloride 250 mL IVPB  100 mg IntraVENous Q24H     Current Outpatient Medications   Medication Sig    dicyclomine (BENTYL) 20 mg tablet Take 20 mg by mouth every six (6) hours. Indications: irritable colon    gabapentin (NEURONTIN) 600 mg tablet Take 600 mg by mouth three (3) times daily.  ibuprofen (MOTRIN) 200 mg tablet Take 600 mg by mouth daily.  acetaminophen (TYLENOL) 500 mg tablet Take 1,000 mg by mouth as needed for Pain. Indications: fever, muscle pain    psyllium (METAMUCIL) packet Take 1 Packet by mouth two (2) times a day. Laboratory data and review:    Recent Labs     01/10/21  2318 01/10/21  2235   WBC 8.6 8.4   HGB 16.5 16.9   HCT 48.3 49.8    179     Recent Labs     01/10/21  2318 01/10/21  2317   *  --    K 3.8  --      --    CO2 26  --    *  --    BUN 37*  --    CREA 1.29  --    CA 8.9  --    MG 2.5*  --    ALB 3.5  --    ALT 79*  --    INR  --  1.1     No components found for: [place]    Diagnostics: Imaging studies have been reviewed    Telemetry reviewed by me:   normal sinus rhythm    Assessment and Plan:    COVID-19 virus infection (1/11/2021) / Acute respiratory insufficiency (1/11/2021) / Hypoxia (1/11/2021) POA: pro calcitonin normal. Continue IV Dexamethasone, Remdesivir, Azithromycin, Vitamin C and Zinc. Oxygen and wean as tolerated    Diarrhea (1/11/2021) POA: likely due to [place]. Hydrate and follow clinical progress    Acute metabolic encephalopathy (4/02/3252) POA: better. Due to COVID.  Monitor    Total time spent for the patient's care: [facility] discussed with: Patient, Care Manager and Nursing Staff    Discussed:  Care Plan and D/C Planning    Prophylaxis:  Lovenox    Anticipated Disposition:  Home w/Family           ___________________________________________________    Attending Physician:   Karine Coon MD

## 2021-01-12 LAB
25(OH)D3 SERPL-MCNC: 24 NG/ML (ref 30–100)
ALBUMIN SERPL-MCNC: 3 G/DL (ref 3.5–5)
ALBUMIN/GLOB SERPL: 0.8 {RATIO} (ref 1.1–2.2)
ALP SERPL-CCNC: 53 U/L (ref 45–117)
ALT SERPL-CCNC: 62 U/L (ref 12–78)
ANION GAP SERPL CALC-SCNC: 5 MMOL/L (ref 5–15)
APTT PPP: 28.4 SEC (ref 22.1–31)
AST SERPL-CCNC: 54 U/L (ref 15–37)
BASOPHILS # BLD: 0 K/UL (ref 0–0.1)
BASOPHILS NFR BLD: 0 % (ref 0–1)
BILIRUB SERPL-MCNC: 0.4 MG/DL (ref 0.2–1)
BUN SERPL-MCNC: 35 MG/DL (ref 6–20)
BUN/CREAT SERPL: 37 (ref 12–20)
CALCIUM SERPL-MCNC: 8.1 MG/DL (ref 8.5–10.1)
CHLORIDE SERPL-SCNC: 113 MMOL/L (ref 97–108)
CO2 SERPL-SCNC: 25 MMOL/L (ref 21–32)
COMMENT, HOLDF: NORMAL
CREAT SERPL-MCNC: 0.94 MG/DL (ref 0.7–1.3)
D DIMER PPP FEU-MCNC: 0.91 MG/L FEU (ref 0–0.65)
DIFFERENTIAL METHOD BLD: ABNORMAL
EOSINOPHIL # BLD: 0 K/UL (ref 0–0.4)
EOSINOPHIL NFR BLD: 0 % (ref 0–7)
ERYTHROCYTE [DISTWIDTH] IN BLOOD BY AUTOMATED COUNT: 13.2 % (ref 11.5–14.5)
FIBRINOGEN PPP-MCNC: 444 MG/DL (ref 200–475)
GLOBULIN SER CALC-MCNC: 4 G/DL (ref 2–4)
GLUCOSE SERPL-MCNC: 123 MG/DL (ref 65–100)
HCT VFR BLD AUTO: 43.8 % (ref 36.6–50.3)
HGB BLD-MCNC: 14.9 G/DL (ref 12.1–17)
IMM GRANULOCYTES # BLD AUTO: 0.1 K/UL (ref 0–0.04)
IMM GRANULOCYTES NFR BLD AUTO: 1 % (ref 0–0.5)
INR PPP: 1.1 (ref 0.9–1.1)
LYMPHOCYTES # BLD: 0.8 K/UL (ref 0.8–3.5)
LYMPHOCYTES NFR BLD: 9 % (ref 12–49)
MCH RBC QN AUTO: 30 PG (ref 26–34)
MCHC RBC AUTO-ENTMCNC: 34 G/DL (ref 30–36.5)
MCV RBC AUTO: 88.1 FL (ref 80–99)
MONOCYTES # BLD: 0.9 K/UL (ref 0–1)
MONOCYTES NFR BLD: 11 % (ref 5–13)
NEUTS SEG # BLD: 6.8 K/UL (ref 1.8–8)
NEUTS SEG NFR BLD: 79 % (ref 32–75)
NRBC # BLD: 0 K/UL (ref 0–0.01)
NRBC BLD-RTO: 0 PER 100 WBC
PLATELET # BLD AUTO: 201 K/UL (ref 150–400)
PMV BLD AUTO: 9.8 FL (ref 8.9–12.9)
POTASSIUM SERPL-SCNC: 3.9 MMOL/L (ref 3.5–5.1)
PROT SERPL-MCNC: 7 G/DL (ref 6.4–8.2)
PROTHROMBIN TIME: 11.4 SEC (ref 9–11.1)
RBC # BLD AUTO: 4.97 M/UL (ref 4.1–5.7)
SAMPLES BEING HELD,HOLD: NORMAL
SODIUM SERPL-SCNC: 143 MMOL/L (ref 136–145)
THERAPEUTIC RANGE,PTTT: NORMAL SECS (ref 58–77)
WBC # BLD AUTO: 8.5 K/UL (ref 4.1–11.1)

## 2021-01-12 PROCEDURE — 65660000000 HC RM CCU STEPDOWN

## 2021-01-12 PROCEDURE — 94760 N-INVAS EAR/PLS OXIMETRY 1: CPT

## 2021-01-12 PROCEDURE — 36415 COLL VENOUS BLD VENIPUNCTURE: CPT

## 2021-01-12 PROCEDURE — 74011250636 HC RX REV CODE- 250/636: Performed by: HOSPITALIST

## 2021-01-12 PROCEDURE — 85730 THROMBOPLASTIN TIME PARTIAL: CPT

## 2021-01-12 PROCEDURE — 74011250637 HC RX REV CODE- 250/637: Performed by: HOSPITALIST

## 2021-01-12 PROCEDURE — 85025 COMPLETE CBC W/AUTO DIFF WBC: CPT

## 2021-01-12 PROCEDURE — 85610 PROTHROMBIN TIME: CPT

## 2021-01-12 PROCEDURE — 82306 VITAMIN D 25 HYDROXY: CPT

## 2021-01-12 PROCEDURE — 74011000258 HC RX REV CODE- 258: Performed by: INTERNAL MEDICINE

## 2021-01-12 PROCEDURE — 85384 FIBRINOGEN ACTIVITY: CPT

## 2021-01-12 PROCEDURE — 77010033711 HC HIGH FLOW OXYGEN

## 2021-01-12 PROCEDURE — 74011000250 HC RX REV CODE- 250: Performed by: INTERNAL MEDICINE

## 2021-01-12 PROCEDURE — 85379 FIBRIN DEGRADATION QUANT: CPT

## 2021-01-12 PROCEDURE — 74011250637 HC RX REV CODE- 250/637: Performed by: INTERNAL MEDICINE

## 2021-01-12 PROCEDURE — 80053 COMPREHEN METABOLIC PANEL: CPT

## 2021-01-12 RX ADMIN — FAMOTIDINE 20 MG: 20 TABLET, FILM COATED ORAL at 08:16

## 2021-01-12 RX ADMIN — FAMOTIDINE 20 MG: 20 TABLET, FILM COATED ORAL at 16:21

## 2021-01-12 RX ADMIN — Medication 10 ML: at 14:34

## 2021-01-12 RX ADMIN — Medication 1 CAPSULE: at 08:16

## 2021-01-12 RX ADMIN — OXYCODONE HYDROCHLORIDE AND ACETAMINOPHEN 500 MG: 500 TABLET ORAL at 16:21

## 2021-01-12 RX ADMIN — Medication 10 ML: at 06:19

## 2021-01-12 RX ADMIN — OXYCODONE HYDROCHLORIDE AND ACETAMINOPHEN 500 MG: 500 TABLET ORAL at 08:16

## 2021-01-12 RX ADMIN — AZITHROMYCIN MONOHYDRATE 500 MG: 250 TABLET ORAL at 08:16

## 2021-01-12 RX ADMIN — Medication 1 TABLET: at 08:16

## 2021-01-12 RX ADMIN — DEXAMETHASONE 6 MG: 6 TABLET ORAL at 08:16

## 2021-01-12 RX ADMIN — Medication 10 ML: at 23:03

## 2021-01-12 RX ADMIN — SODIUM CHLORIDE 100 ML/HR: 9 INJECTION, SOLUTION INTRAVENOUS at 08:23

## 2021-01-12 RX ADMIN — REMDESIVIR 100 MG: 100 INJECTION, POWDER, LYOPHILIZED, FOR SOLUTION INTRAVENOUS at 19:08

## 2021-01-12 RX ADMIN — CHOLECALCIFEROL TAB 125 MCG (5000 UNIT) 5000 UNITS: 125 TAB at 08:16

## 2021-01-12 NOTE — PROGRESS NOTES
Transition Plan of Care  RUR 11%      Plan:  1. Monitor patient's response to treatment. Covid +  2. Current needs unsure, likely no needs and home with family. 3. Wife to transport at discharge. 4. Case Management to follow.     ROWDY Ralph

## 2021-01-12 NOTE — PROGRESS NOTES
[facility]  [address] [facility], [address] [place] [address]  [phone]      Medical Progress Note      NAME:         Cherelle Monroe   :        1945  MRM:        [id number]    Date of service: 2021      Subjective: Patient has been seen and examined as a follow up for multiple medical issues. Chart, labs, diagnostics reviewed. He remains dyspneic. Still with cough. No fever or chills. Having episodes of hypoxia    Objective:    Vital Signs:    Visit Vitals  /70 (BP 1 Location: Right arm, BP Patient Position: At rest)   Pulse (!) 53   Temp 97.2 °F (36.2 °C)   Resp 22   Ht 5' 10\" (1.778 m)   Wt 77.4 kg (170 lb 9 oz)   SpO2 92%   BMI 24.47 kg/m²          Intake/Output Summary (Last 24 hours) at 2021 1017  Last data filed at 2021 0659  Gross per 24 hour   Intake 938.33 ml   Output 375 ml   Net 563.33 ml        Physical Examination:    General:   Well looking patient in no acute distress  Eyes:   pink conjunctivae, PERRLA with no discharge. ENT:   no ottorrhea or rhinorrhea with dry mucous membranes  Neck: no masses, thyroid non-tender and trachea central.  Pulm:  decreased breath sounds with scattered crackles. No wheezes  Card:  no JVD or murmurs, has regular and normal S1, S2 without thrills, bruits or peripheral edema  Abd:  Soft, non-tender, non-distended, normoactive bowel sounds   Musc:  No cyanosis, clubbing, atrophy or deformities. Skin:  No rashes, bruising or ulcers. Neuro: Awake and alert.  Generally a non focal exam. Follows commands appropriately  Psych:  Has a good insight and is oriented x 3    Current Facility-Administered Medications   Medication Dose Route Frequency    0.9% sodium chloride infusion  100 mL/hr IntraVENous CONTINUOUS    sodium chloride (NS) flush 5-40 mL  5-40 mL IntraVENous Q8H    sodium chloride (NS) flush 5-40 mL  5-40 mL IntraVENous PRN    polyethylene glycol (MIRALAX) packet 17 g  17 g Oral DAILY PRN   • bisacodyL (DULCOLAX) suppository 10 mg  10 mg Rectal DAILY PRN   • ondansetron (ZOFRAN) injection 4 mg  4 mg IntraVENous Q6H PRN   • famotidine (PEPCID) tablet 20 mg  20 mg Oral BID   • enoxaparin (LOVENOX) injection 40 mg  40 mg SubCUTAneous DAILY   • acetaminophen (TYLENOL) tablet 650 mg  650 mg Oral Q6H PRN    Or   • acetaminophen (TYLENOL) suppository 650 mg  650 mg Rectal Q6H PRN   • guaiFENesin-dextromethorphan (ROBITUSSIN DM) 100-10 mg/5 mL syrup 5 mL  5 mL Oral Q4H PRN   • dexAMETHasone (DECADRON) tablet 6 mg  6 mg Oral DAILY   • azithromycin (ZITHROMAX) tablet 500 mg  500 mg Oral DAILY   • labetaloL (NORMODYNE;TRANDATE) 20 mg/4 mL (5 mg/mL) injection 20 mg  20 mg IntraVENous Q4H PRN   • hydrALAZINE (APRESOLINE) 20 mg/mL injection 20 mg  20 mg IntraVENous Q4H PRN   • melatonin tablet 3 mg  3 mg Oral QHS PRN   • alum-mag hydroxide-simeth (MYLANTA) oral suspension 30 mL  30 mL Oral Q4H PRN   • LORazepam (ATIVAN) injection 0.5 mg  0.5 mg IntraVENous Q6H PRN   • oxyCODONE IR (ROXICODONE) tablet 5 mg  5 mg Oral Q4H PRN   • oxyCODONE IR (ROXICODONE) tablet 10 mg  10 mg Oral Q4H PRN   • morphine injection 2 mg  2 mg IntraVENous Q4H PRN   • diphenhydrAMINE (BENADRYL) injection 25 mg  25 mg IntraVENous Q6H PRN   • diphenhydrAMINE (BENADRYL) capsule 25 mg  25 mg Oral Q6H PRN   • albuterol (PROVENTIL HFA, VENTOLIN HFA, PROAIR HFA) inhaler 4 Puff  4 Puff Inhalation Q4H PRN   • simethicone (MYLICON) tablet 80 mg  80 mg Oral QID PRN   • [START ON 1/18/2021] cholecalciferol (VITAMIN D3) (1000 Units /25 mcg) tablet 2 Tab  2,000 Units Oral DAILY   • cholecalciferol (VITAMIN D3) tablet 5,000 Units  5,000 Units Oral DAILY    And   • cholecalciferol (VITAMIN D3) (1000 Units /25 mcg) tablet 1 Tab  1,000 Units Oral DAILY   • remdesivir 100 mg in 0.9% sodium chloride 250 mL IVPB  100 mg IntraVENous Q24H   • zinc sulfate (ZINCATE) 220 (50) mg capsule 1 Cap  1 Cap Oral DAILY   •  ascorbic acid (vitamin C) (VITAMIN C) tablet 500 mg  500 mg Oral BID        Laboratory data and review:    Recent Labs     01/12/21  0626 01/10/21  2318 01/10/21  2235   WBC 8.5 8.6 8.4   HGB 14.9 16.5 16.9   HCT 43.8 48.3 49.8    181 179     Recent Labs     01/12/21  0626 01/10/21  2318 01/10/21  2317    135*  --    K 3.9 3.8  --    * 103  --    CO2 25 26  --    * 121*  --    BUN 35* 37*  --    CREA 0.94 1.29  --    CA 8.1* 8.9  --    MG  --  2.5*  --    ALB 3.0* 3.5  --    ALT 62 79*  --    INR 1.1  --  1.1     No components found for: [place]    Diagnostics: Imaging studies have been reviewed    Telemetry reviewed by me:   normal sinus rhythm    Assessment and Plan:    COVID-19 virus infection (1/11/2021) / Acute respiratory insufficiency (1/11/2021) / Hypoxia (1/11/2021) POA: pro calcitonin normal. Continue IV Dexamethasone, Remdesivir, Azithromycin, Vitamin C and Zinc. Oxygen and wean as tolerated    Diarrhea (1/11/2021) POA: likely due to COVID. Better. Hydrate. Diet as tolerated     Acute metabolic encephalopathy (9/62/8276) POA: better. Due to COVID.  Monitor    Total time spent for the patient's care: [facility] discussed with: Patient, Care Manager and Nursing Staff    Discussed:  Care Plan and D/C Planning    Prophylaxis:  Lovenox    Anticipated Disposition:  Home w/Family           ___________________________________________________    Attending Physician:   Renée Alvarado MD

## 2021-01-12 NOTE — PROGRESS NOTES
Shift Change:      Bedside and Verbal shift change report given to [address] (oncoming nurse) by Mckinley Bolaños (offgoing nurse). Report included the following information SBAR, Kardex and Recent Results. Shift Summary:    1030: oxygen sats 95%, o2 titrated to 11L, oxygen sats 95%     Shift Change:      Bedside and Verbal shift change report given to [address] (oncoming nurse) by [address] (offgoing nurse). Report included the following information SBAR, Kardex and Recent Results.

## 2021-01-12 NOTE — PROGRESS NOTES
Hospital follow-up new Virtual PCP transitional care appointment has been scheduled with Dr. Jovanna Castellanos for Wednesday, 1/20/21 at 9:00 a.m. Pending patient discharge.   Alexsander Hayes, Care Management Specialist.

## 2021-01-13 ENCOUNTER — APPOINTMENT (OUTPATIENT)
Dept: INTERVENTIONAL RADIOLOGY/VASCULAR | Age: 76
DRG: 177 | End: 2021-01-13
Attending: INTERNAL MEDICINE
Payer: MEDICARE

## 2021-01-13 ENCOUNTER — APPOINTMENT (OUTPATIENT)
Dept: GENERAL RADIOLOGY | Age: 76
DRG: 177 | End: 2021-01-13
Attending: RADIOLOGY
Payer: MEDICARE

## 2021-01-13 DIAGNOSIS — R00.1 BRADYCARDIA: Primary | ICD-10-CM

## 2021-01-13 LAB
ALBUMIN SERPL-MCNC: 2.6 G/DL (ref 3.5–5)
ALBUMIN/GLOB SERPL: 0.8 {RATIO} (ref 1.1–2.2)
ALP SERPL-CCNC: 51 U/L (ref 45–117)
ALT SERPL-CCNC: 58 U/L (ref 12–78)
ANION GAP SERPL CALC-SCNC: 5 MMOL/L (ref 5–15)
APTT PPP: 27.3 SEC (ref 22.1–31)
AST SERPL-CCNC: 50 U/L (ref 15–37)
BASOPHILS # BLD: 0 K/UL (ref 0–0.1)
BASOPHILS NFR BLD: 0 % (ref 0–1)
BILIRUB SERPL-MCNC: 0.4 MG/DL (ref 0.2–1)
BUN SERPL-MCNC: 36 MG/DL (ref 6–20)
BUN/CREAT SERPL: 42 (ref 12–20)
CALCIUM SERPL-MCNC: 7.4 MG/DL (ref 8.5–10.1)
CHLORIDE SERPL-SCNC: 115 MMOL/L (ref 97–108)
CO2 SERPL-SCNC: 23 MMOL/L (ref 21–32)
COMMENT, HOLDF: NORMAL
CREAT SERPL-MCNC: 0.86 MG/DL (ref 0.7–1.3)
D DIMER PPP FEU-MCNC: 1.37 MG/L FEU (ref 0–0.65)
DIFFERENTIAL METHOD BLD: ABNORMAL
EOSINOPHIL # BLD: 0 K/UL (ref 0–0.4)
EOSINOPHIL NFR BLD: 0 % (ref 0–7)
ERYTHROCYTE [DISTWIDTH] IN BLOOD BY AUTOMATED COUNT: 13.2 % (ref 11.5–14.5)
FIBRINOGEN PPP-MCNC: 386 MG/DL (ref 200–475)
GLOBULIN SER CALC-MCNC: 3.3 G/DL (ref 2–4)
GLUCOSE BLD STRIP.AUTO-MCNC: 105 MG/DL (ref 65–100)
GLUCOSE SERPL-MCNC: 112 MG/DL (ref 65–100)
HCT VFR BLD AUTO: 37.9 % (ref 36.6–50.3)
HGB BLD-MCNC: 13 G/DL (ref 12.1–17)
IMM GRANULOCYTES # BLD AUTO: 0.1 K/UL (ref 0–0.04)
IMM GRANULOCYTES NFR BLD AUTO: 1 % (ref 0–0.5)
INR PPP: 1.1 (ref 0.9–1.1)
LYMPHOCYTES # BLD: 0.6 K/UL (ref 0.8–3.5)
LYMPHOCYTES NFR BLD: 5 % (ref 12–49)
MAGNESIUM SERPL-MCNC: 2.8 MG/DL (ref 1.6–2.4)
MCH RBC QN AUTO: 30.2 PG (ref 26–34)
MCHC RBC AUTO-ENTMCNC: 34.3 G/DL (ref 30–36.5)
MCV RBC AUTO: 87.9 FL (ref 80–99)
MONOCYTES # BLD: 0.7 K/UL (ref 0–1)
MONOCYTES NFR BLD: 7 % (ref 5–13)
NEUTS SEG # BLD: 9.1 K/UL (ref 1.8–8)
NEUTS SEG NFR BLD: 87 % (ref 32–75)
NRBC # BLD: 0 K/UL (ref 0–0.01)
NRBC BLD-RTO: 0 PER 100 WBC
PHOSPHATE SERPL-MCNC: 2.6 MG/DL (ref 2.6–4.7)
PLATELET # BLD AUTO: 210 K/UL (ref 150–400)
PMV BLD AUTO: 10.1 FL (ref 8.9–12.9)
POTASSIUM SERPL-SCNC: 4 MMOL/L (ref 3.5–5.1)
PROT SERPL-MCNC: 5.9 G/DL (ref 6.4–8.2)
PROTHROMBIN TIME: 11.7 SEC (ref 9–11.1)
RBC # BLD AUTO: 4.31 M/UL (ref 4.1–5.7)
SAMPLES BEING HELD,HOLD: NORMAL
SERVICE CMNT-IMP: ABNORMAL
SODIUM SERPL-SCNC: 143 MMOL/L (ref 136–145)
THERAPEUTIC RANGE,PTTT: NORMAL SECS (ref 58–77)
TSH SERPL DL<=0.05 MIU/L-ACNC: 0.95 UIU/ML (ref 0.36–3.74)
WBC # BLD AUTO: 10.5 K/UL (ref 4.1–11.1)

## 2021-01-13 PROCEDURE — 80053 COMPREHEN METABOLIC PANEL: CPT

## 2021-01-13 PROCEDURE — 85610 PROTHROMBIN TIME: CPT

## 2021-01-13 PROCEDURE — 74011250636 HC RX REV CODE- 250/636: Performed by: HOSPITALIST

## 2021-01-13 PROCEDURE — 99221 1ST HOSP IP/OBS SF/LOW 40: CPT | Performed by: INTERNAL MEDICINE

## 2021-01-13 PROCEDURE — 85025 COMPLETE CBC W/AUTO DIFF WBC: CPT

## 2021-01-13 PROCEDURE — 77030020847 HC STATLOK BARD -A

## 2021-01-13 PROCEDURE — 74011250637 HC RX REV CODE- 250/637: Performed by: HOSPITALIST

## 2021-01-13 PROCEDURE — 74011250637 HC RX REV CODE- 250/637: Performed by: INTERNAL MEDICINE

## 2021-01-13 PROCEDURE — 85379 FIBRIN DEGRADATION QUANT: CPT

## 2021-01-13 PROCEDURE — 74011000250 HC RX REV CODE- 250: Performed by: INTERNAL MEDICINE

## 2021-01-13 PROCEDURE — 82962 GLUCOSE BLOOD TEST: CPT

## 2021-01-13 PROCEDURE — 65660000000 HC RM CCU STEPDOWN

## 2021-01-13 PROCEDURE — 83735 ASSAY OF MAGNESIUM: CPT

## 2021-01-13 PROCEDURE — 77010033678 HC OXYGEN DAILY

## 2021-01-13 PROCEDURE — 02HV33Z INSERTION OF INFUSION DEVICE INTO SUPERIOR VENA CAVA, PERCUTANEOUS APPROACH: ICD-10-PCS | Performed by: RADIOLOGY

## 2021-01-13 PROCEDURE — 74011000258 HC RX REV CODE- 258: Performed by: INTERNAL MEDICINE

## 2021-01-13 PROCEDURE — C1751 CATH, INF, PER/CENT/MIDLINE: HCPCS

## 2021-01-13 PROCEDURE — 76937 US GUIDE VASCULAR ACCESS: CPT

## 2021-01-13 PROCEDURE — 36415 COLL VENOUS BLD VENIPUNCTURE: CPT

## 2021-01-13 PROCEDURE — 85730 THROMBOPLASTIN TIME PARTIAL: CPT

## 2021-01-13 PROCEDURE — 84100 ASSAY OF PHOSPHORUS: CPT

## 2021-01-13 PROCEDURE — 84443 ASSAY THYROID STIM HORMONE: CPT

## 2021-01-13 PROCEDURE — C1894 INTRO/SHEATH, NON-LASER: HCPCS

## 2021-01-13 PROCEDURE — 85384 FIBRINOGEN ACTIVITY: CPT

## 2021-01-13 PROCEDURE — 94760 N-INVAS EAR/PLS OXIMETRY 1: CPT

## 2021-01-13 PROCEDURE — 71045 X-RAY EXAM CHEST 1 VIEW: CPT

## 2021-01-13 PROCEDURE — 93005 ELECTROCARDIOGRAM TRACING: CPT

## 2021-01-13 PROCEDURE — 36556 INSERT NON-TUNNEL CV CATH: CPT

## 2021-01-13 RX ADMIN — CHOLECALCIFEROL TAB 125 MCG (5000 UNIT) 5000 UNITS: 125 TAB at 08:35

## 2021-01-13 RX ADMIN — FAMOTIDINE 20 MG: 20 TABLET, FILM COATED ORAL at 17:46

## 2021-01-13 RX ADMIN — SODIUM CHLORIDE 100 ML/HR: 9 INJECTION, SOLUTION INTRAVENOUS at 19:50

## 2021-01-13 RX ADMIN — DEXAMETHASONE 6 MG: 6 TABLET ORAL at 08:35

## 2021-01-13 RX ADMIN — MELATONIN TAB 3 MG 3 MG: 3 TAB at 21:38

## 2021-01-13 RX ADMIN — Medication 10 ML: at 06:00

## 2021-01-13 RX ADMIN — Medication 1 CAPSULE: at 08:34

## 2021-01-13 RX ADMIN — OXYCODONE HYDROCHLORIDE AND ACETAMINOPHEN 500 MG: 500 TABLET ORAL at 17:46

## 2021-01-13 RX ADMIN — Medication 1 TABLET: at 08:34

## 2021-01-13 RX ADMIN — OXYCODONE HYDROCHLORIDE AND ACETAMINOPHEN 500 MG: 500 TABLET ORAL at 08:35

## 2021-01-13 RX ADMIN — Medication 10 ML: at 17:46

## 2021-01-13 RX ADMIN — SODIUM CHLORIDE 100 ML/HR: 9 INJECTION, SOLUTION INTRAVENOUS at 04:33

## 2021-01-13 RX ADMIN — FAMOTIDINE 20 MG: 20 TABLET, FILM COATED ORAL at 08:35

## 2021-01-13 RX ADMIN — Medication 10 ML: at 21:39

## 2021-01-13 RX ADMIN — ENOXAPARIN SODIUM 40 MG: 40 INJECTION SUBCUTANEOUS at 08:34

## 2021-01-13 RX ADMIN — REMDESIVIR 100 MG: 100 INJECTION, POWDER, LYOPHILIZED, FOR SOLUTION INTRAVENOUS at 17:46

## 2021-01-13 RX ADMIN — AZITHROMYCIN MONOHYDRATE 500 MG: 250 TABLET ORAL at 08:35

## 2021-01-13 NOTE — PROGRESS NOTES
PER VO OF Roxy Sanchez NP:  Can you please refer for sleep study for  nocturnal bradycardia   Thank you   Luis M Li   Future Appointments   1/20/2021  9:00 AM   Jen Multani MD      [facility]           BS AMB   2/15/2021  2:40 PM   Omari Mathew MD         [facility]          BS AMB     ORDERS IN AND REFERRAL MAILED TO PT

## 2021-01-13 NOTE — PROGRESS NOTES
1250 Bedside and Verbal shift change report given to Coty Yun (oncoming nurse) by Liudmila Cohen (offgoing nurse). Report included the following information SBAR, Kardex, Intake/Output, MAR, Recent Results and Cardiac Rhythm sinus leeanna.      1440 notified Dr. Crain April that PICC team was unsuccessful in starting a line per Mica Thomason RN

## 2021-01-13 NOTE — CONSULTS
[facility] [address]  Office [phone]  Mobile [phone]    Cardiology Consult Note    Date of  Admission: 1/10/2021  9:55 PM  PCP- MD Gunnar Ivan MD  :  1945   MRN:  [id number]     CC: \"I still feel tired\"  Chief Complaint   Patient presents with    Concern For COVID-19 (Coronavirus)    Fatigue    Diarrhea     Reason for consult: Bradycardia  Admission Diagnosis: COVID-19 virus infection [U07.1]  Hypoxia [R09.02]  Acute respiratory insufficiency [R06.89]  Prerenal azotemia [R79.89]  Diarrhea [R19.7]    Vivienne Kelsey is a 66 y.o. male admitted for COVID-19 virus infection [U07.1]  Hypoxia [R09.02]  Acute respiratory insufficiency [R06.89]  Prerenal azotemia [R79.89]  Diarrhea [R19.7]. Consult requested by Александр Meyer MD       Subjective:   COVID-19 virus infection [U07.1]  Hypoxia [R09.02]  Acute respiratory insufficiency [R06.89]  Prerenal azotemia [R79.89]  Diarrhea [R19.7]        Impression Plan/Recommendation   1. COVID-19 infection/AHRF  2. Bradycardia  3. H/o C.diff                 · Patient is asymptomatic- agree with pacer at bedside just in case. Suspect some component of sleep apnea. · Check Mag and Phos, TSH  · ECHO to r/o infiltrating causes of bradycardia  · Needs 24hr Holter Monitor OP  · Needs sleep study OP                   Subjective: Vivienne Kelsey is a 66 y.o. male I am seeing for Bradycardia. Patient was admitted on 1/10/2021 for COVID-19 infection and AHRF. He is consulted for Cardiology because of Bradycardia noted on Telemetry overnight on 2021. His HR fluctuates but he mentions that he usually runs in the 46s and 60s since he was young. He denies any cardiac history, no CM, HTN, or heart attack. He currently has no chest pain, shortness of breath at rest, dizziness, or headache.  He still has a slight cough, but he feels much better today than yesterday. He does not feel that his heart is slow.              Past Medical History:   Diagnosis Date    Diverticulitis     Gastrointestinal disorder     History of vascular access device 07/24/2017    [facility] VAT -  5FR triple Lumen Power PICC R Basilic  39 cm    Spinal stenosis       Past Surgical History:   Procedure Laterality Date    FLEXIBLE SIGMOIDOSCOPY N/A 7/27/2017    SIGMOIDOSCOPY FLEXIBLE performed by Nirali Garrett MD at [facility] Medications:  Current Facility-Administered Medications   Medication Dose Route Frequency    0.9% sodium chloride infusion  100 mL/hr IntraVENous CONTINUOUS    sodium chloride (NS) flush 5-40 mL  5-40 mL IntraVENous Q8H    sodium chloride (NS) flush 5-40 mL  5-40 mL IntraVENous PRN    polyethylene glycol (MIRALAX) packet 17 g  17 g Oral DAILY PRN    bisacodyL (DULCOLAX) suppository 10 mg  10 mg Rectal DAILY PRN    ondansetron (ZOFRAN) injection 4 mg  4 mg IntraVENous Q6H PRN    famotidine (PEPCID) tablet 20 mg  20 mg Oral BID    enoxaparin (LOVENOX) injection 40 mg  40 mg SubCUTAneous DAILY    acetaminophen (TYLENOL) tablet 650 mg  650 mg Oral Q6H PRN    Or    acetaminophen (TYLENOL) suppository 650 mg  650 mg Rectal Q6H PRN    guaiFENesin-dextromethorphan (ROBITUSSIN DM) 100-10 mg/5 mL syrup 5 mL  5 mL Oral Q4H PRN    dexAMETHasone (DECADRON) tablet 6 mg  6 mg Oral DAILY    azithromycin (ZITHROMAX) tablet 500 mg  500 mg Oral DAILY    labetaloL (NORMODYNE;TRANDATE) 20 mg/4 mL (5 mg/mL) injection 20 mg  20 mg IntraVENous Q4H PRN    hydrALAZINE (APRESOLINE) 20 mg/mL injection 20 mg  20 mg IntraVENous Q4H PRN    melatonin tablet 3 mg  3 mg Oral QHS PRN    alum-mag hydroxide-simeth (MYLANTA) oral suspension 30 mL  30 mL Oral Q4H PRN    LORazepam (ATIVAN) injection 0.5 mg  0.5 mg IntraVENous Q6H PRN    oxyCODONE IR (ROXICODONE) tablet 5 mg  5 mg Oral Q4H PRN    oxyCODONE IR (ROXICODONE) tablet 10 mg  10 mg Oral Q4H PRN    morphine injection 2 mg  2 mg IntraVENous Q4H PRN    diphenhydrAMINE (BENADRYL) injection 25 mg  25 mg IntraVENous Q6H PRN    diphenhydrAMINE (BENADRYL) capsule 25 mg  25 mg Oral Q6H PRN    albuterol (PROVENTIL HFA, VENTOLIN HFA, PROAIR HFA) inhaler 4 Puff  4 Puff Inhalation Q4H PRN    simethicone (MYLICON) tablet 80 mg  80 mg Oral QID PRN    [START ON 1/18/2021] cholecalciferol (VITAMIN D3) (1000 Units /25 mcg) tablet 2 Tab  2,000 Units Oral DAILY    cholecalciferol (VITAMIN D3) tablet 5,000 Units  5,000 Units Oral DAILY    And    cholecalciferol (VITAMIN D3) (1000 Units /25 mcg) tablet 1 Tab  1,000 Units Oral DAILY    remdesivir 100 mg in 0.9% sodium chloride 250 mL IVPB  100 mg IntraVENous Q24H    zinc sulfate (ZINCATE) 220 (50) mg capsule 1 Cap  1 Cap Oral DAILY    ascorbic acid (vitamin C) (VITAMIN C) tablet 500 mg  500 mg Oral BID     No current facility-administered medications on file prior to encounter. Current Outpatient Medications on File Prior to Encounter   Medication Sig Dispense Refill    dicyclomine (BENTYL) 20 mg tablet Take 20 mg by mouth every six (6) hours. Indications: irritable colon      gabapentin (NEURONTIN) 600 mg tablet Take 600 mg by mouth three (3) times daily.  ibuprofen (MOTRIN) 200 mg tablet Take 600 mg by mouth daily.  acetaminophen (TYLENOL) 500 mg tablet Take 1,000 mg by mouth as needed for Pain. Indications: fever, muscle pain      psyllium (METAMUCIL) packet Take 1 Packet by mouth two (2) times a day. No Known Allergies          Review of Symptoms:  Other systems reviewed and negative except as above. Physical Exam    Visit Vitals  /65 (BP 1 Location: Right arm, BP Patient Position: At rest)   Pulse 97   Temp 97.7 °F (36.5 °C)   Resp 20   Ht 5' 10\" (1.778 m)   Wt 170 lb 9 oz (77.4 kg)   SpO2 92%   BMI 24.47 kg/m²     General Appearance:  Well developed, well nourished,alert and oriented x 3, and individual in no acute distress. Ears/Nose/Mouth/Throat:   Hearing grossly normal.Normal oral mucosa,no scleral icterus     Neck: Supple no JVD or bruits,no cervical lymphadenopathy   Chest:   Lungs clear to auscultation bilaterally,no rales rhonchi or wheezing   Cardiovascular:  Bradycardia, HR 50s. S1, S2 normal. No murmur. Abdomen:   Soft, non-tender, bowel sounds are active. No abdominal bruits   Extremities: No edema bilaterally. Pulses detected, no varicosities   Skin: Warm and dry. No bruising  Neuro  Moves all extermities and neurologically intact                                                       Cardiographics    Telemetry: Sinus bradycardia HR 30s-50s, NSR 60s  ECG: normal sinus rhythm, left axis deviation    Cardiac testing      Recent Labs     01/10/21  2318   TROIQ <0.05       Recent Labs     01/13/21  0351 01/12/21  0626 01/10/21  2318    143 135*   K 4.0 3.9 3.8   CO2 23 25 26   BUN 36* 35* 37*   CREA 0.86 0.94 1.29   * 123* 121*   MG  --   --  2.5*   WBC 10.5 8.5 8.6   HGB 13.0 14.9 16.5   HCT 37.9 43.8 48.3    201 181       I have discussed the diagnosis with the patient and the intended plan as seen in the above orders. Questions were answered concerning future plans. I have discussed medication side effects and warnings with the patient as well. Herber Davis is in agreement to the plan listed above and wishes to proceed. he  was instructed not to smoke, eat heart healthy diet  and to exercise. Thank you for this consult.     Medina Martinez MD

## 2021-01-13 NOTE — PROGRESS NOTES
2145: tele notified RN that pt HR sustaining in mid 35s. Pt is asymptomatic and vitals are stable. Of note received in report that pt runs in 40s-50s normally. Notified Dr. Luther Bautista who wishes to just watch pt at this time. Notified tele to let me know if HR goes any lower- pacer pads placed on pt. Asked if pt needed cardio consult and received OK. 0145: Dr. Luther Bautista on the floor. Discussed pt HR sustaining in 30s. Per Dr. Luther Bautista pt is OK if not symptomatic. Discussed cardio consult placed. 3220: Bedside shift change report given to Tori Pike RN (oncoming nurse) by Yanet Woodruff RN (offgoing nurse).  Report included the following information SBAR, Kardex, Intake/Output, Recent Results and Cardiac Rhythm SB.

## 2021-01-13 NOTE — PROGRESS NOTES
Transition Plan of Care  RUR 11%    Plan:  1. Monitor patient's response to treatment. 2. Covid +  3. Current needs unsure, may need a 6 min walk. Patient on mid flow 15 L O2.  4. Family to transport. 5. Patient will need an outpatient sleep study. 5. Case Management to follow.       ROWDY Miguel

## 2021-01-13 NOTE — PROGRESS NOTES
Ms Maliha Butt lost IV access and needs a triple lumen catheter for IV medications. He has been confused this afternoon and I have discussed with his wife, Ms Jovanna Dumas, who has given consent for the procedure.

## 2021-01-13 NOTE — PROGRESS NOTES
[facility]  [address], [place] [address]  [phone]      Medical Progress Note      NAME:         Riki Ellison   :        1945  MRM:        [id number]    Date of service: 2021      Subjective: Patient has been seen and examined as a follow up for multiple medical issues. Chart, labs, diagnostics reviewed. He remains dyspneic but seems clinically better. Intermittent cough. No fever. Having episodes of bradycardia.     Objective:    Vital Signs:    Visit Vitals  /65 (BP 1 Location: Right arm, BP Patient Position: At rest)   Pulse 97   Temp 97.7 °F (36.5 °C)   Resp 20   Ht 5' 10\" (1.778 m)   Wt 77.4 kg (170 lb 9 oz)   SpO2 92%   BMI 24.47 kg/m²          Intake/Output Summary (Last 24 hours) at 2021 0801  Last data filed at 2021 0435  Gross per 24 hour   Intake 240 ml   Output 350 ml   Net -110 ml        Physical Examination:    General:   Well looking patient in no acute distress  Eyes:   pink conjunctivae, PERRLA with no discharge.  ENT:   no ottorrhea or rhinorrhea with dry mucous membranes  Neck: no masses, thyroid non-tender and trachea central.  Pulm:  decreased breath sounds with scattered crackles. No wheezes  Card:  no JVD or murmurs, has normal rhythm, without thrills, bruits   Abd:  Soft, non-tender, non-distended, normoactive bowel sounds   Musc:  No cyanosis, clubbing, atrophy or deformities.   Neuro: Awake and alert. Generally a non focal exam.   Psych:  Has a good insight and is oriented x 3    Current Facility-Administered Medications   Medication Dose Route Frequency   • 0.9% sodium chloride infusion  100 mL/hr IntraVENous CONTINUOUS   • sodium chloride (NS) flush 5-40 mL  5-40 mL IntraVENous Q8H   • sodium chloride (NS) flush 5-40 mL  5-40 mL IntraVENous PRN   • polyethylene glycol (MIRALAX) packet 17 g  17 g Oral DAILY PRN   • bisacodyL (DULCOLAX) suppository  10 mg  10 mg Rectal DAILY PRN    ondansetron (ZOFRAN) injection 4 mg  4 mg IntraVENous Q6H PRN    famotidine (PEPCID) tablet 20 mg  20 mg Oral BID    enoxaparin (LOVENOX) injection 40 mg  40 mg SubCUTAneous DAILY    acetaminophen (TYLENOL) tablet 650 mg  650 mg Oral Q6H PRN    Or    acetaminophen (TYLENOL) suppository 650 mg  650 mg Rectal Q6H PRN    guaiFENesin-dextromethorphan (ROBITUSSIN DM) 100-10 mg/5 mL syrup 5 mL  5 mL Oral Q4H PRN    dexAMETHasone (DECADRON) tablet 6 mg  6 mg Oral DAILY    azithromycin (ZITHROMAX) tablet 500 mg  500 mg Oral DAILY    labetaloL (NORMODYNE;TRANDATE) 20 mg/4 mL (5 mg/mL) injection 20 mg  20 mg IntraVENous Q4H PRN    hydrALAZINE (APRESOLINE) 20 mg/mL injection 20 mg  20 mg IntraVENous Q4H PRN    melatonin tablet 3 mg  3 mg Oral QHS PRN    alum-mag hydroxide-simeth (MYLANTA) oral suspension 30 mL  30 mL Oral Q4H PRN    LORazepam (ATIVAN) injection 0.5 mg  0.5 mg IntraVENous Q6H PRN    oxyCODONE IR (ROXICODONE) tablet 5 mg  5 mg Oral Q4H PRN    oxyCODONE IR (ROXICODONE) tablet 10 mg  10 mg Oral Q4H PRN    morphine injection 2 mg  2 mg IntraVENous Q4H PRN    diphenhydrAMINE (BENADRYL) injection 25 mg  25 mg IntraVENous Q6H PRN    diphenhydrAMINE (BENADRYL) capsule 25 mg  25 mg Oral Q6H PRN    albuterol (PROVENTIL HFA, VENTOLIN HFA, PROAIR HFA) inhaler 4 Puff  4 Puff Inhalation Q4H PRN    simethicone (MYLICON) tablet 80 mg  80 mg Oral QID PRN    [START ON 1/18/2021] cholecalciferol (VITAMIN D3) (1000 Units /25 mcg) tablet 2 Tab  2,000 Units Oral DAILY    cholecalciferol (VITAMIN D3) tablet 5,000 Units  5,000 Units Oral DAILY    And    cholecalciferol (VITAMIN D3) (1000 Units /25 mcg) tablet 1 Tab  1,000 Units Oral DAILY    remdesivir 100 mg in 0.9% sodium chloride 250 mL IVPB  100 mg IntraVENous Q24H    zinc sulfate (ZINCATE) 220 (50) mg capsule 1 Cap  1 Cap Oral DAILY    ascorbic acid (vitamin C) (VITAMIN C) tablet 500 mg  500 mg Oral BID        Laboratory data and review:    Recent Labs     01/13/21  0351 01/12/21  0626 01/10/21  2318   WBC 10.5 8.5 8.6   HGB 13.0 14.9 16.5   HCT 37.9 43.8 48.3    201 181     Recent Labs     01/13/21  0351 01/12/21  0626 01/10/21  2318 01/10/21  2317    143 135*  --    K 4.0 3.9 3.8  --    * 113* 103  --    CO2 23 25 26  --    * 123* 121*  --    BUN 36* 35* 37*  --    CREA 0.86 0.94 1.29  --    CA 7.4* 8.1* 8.9  --    MG  --   --  2.5*  --    ALB 2.6* 3.0* 3.5  --    ALT 58 62 79*  --    INR 1.1 1.1  --  1.1     No components found for: [place]    Diagnostics: Imaging studies have been reviewed    Telemetry reviewed by me:   normal sinus rhythm    Assessment and Plan:    COVID-19 virus infection (1/11/2021) / Acute respiratory insufficiency (1/11/2021) / Hypoxia (1/11/2021) POA: pro calcitonin normal. Continue IV Dexamethasone, Remdesivir, Azithromycin, Vitamin C and Zinc. Continue high flow oxygen and wean as tolerated    Acute metabolic encephalopathy (5/22/0623) POA: better. Due to COVID. Monitor    Bradycardia: likely has underlying sleep apnea. Check TSH. Keep on telemetry. Outpatient sleep study. Cardiology consulted    Diarrhea (1/11/2021) POA: likely due to [place]. Better. Hydrate.  Diet as tolerated     Total time spent for the patient's care: [address] discussed with: Patient, Care Manager and Nursing Staff    Discussed:  Care Plan and D/C Planning    Prophylaxis:  Lovenox    Anticipated Disposition:  Home w/Family           ___________________________________________________    Attending Physician:   Waqas Calles MD

## 2021-01-13 NOTE — PROGRESS NOTES
0730- Bedside and Verbal shift change report given to Mica Kraus RN (oncoming nurse) by Cristóbal Bhagat RN (offgoing nurse). Report included the following information SBAR, Kardex, ED Summary, OR Summary, Procedure Summary, Intake/Output, MAR, Accordion, and Recent Results. This patient was assisted with Intentional Toileting every 2 hours during this shift. Documentation of ambulation and output reflected on Flowsheet.      Report given to Cecilia Wyatt

## 2021-01-13 NOTE — ROUTINE PROCESS
Received order for TLC placement. Dr. Lali Villanueva is unable to place at present time because involved in other procedure in CT. If not available by 5pm, anesthesia will need to place line. Notified Luis Estrella RN, of all the above.

## 2021-01-14 LAB
ALBUMIN SERPL-MCNC: 2.8 G/DL (ref 3.5–5)
ALBUMIN SERPL-MCNC: NORMAL G/DL (ref 3.5–5)
ALBUMIN/GLOB SERPL: 0.9 {RATIO} (ref 1.1–2.2)
ALBUMIN/GLOB SERPL: NORMAL {RATIO} (ref 1.1–2.2)
ALP SERPL-CCNC: 54 U/L (ref 45–117)
ALP SERPL-CCNC: NORMAL U/L (ref 45–117)
ALT SERPL-CCNC: 76 U/L (ref 12–78)
ALT SERPL-CCNC: NORMAL U/L (ref 12–78)
ANION GAP SERPL CALC-SCNC: 6 MMOL/L (ref 5–15)
ANION GAP SERPL CALC-SCNC: NORMAL MMOL/L (ref 5–15)
APTT PPP: 27.9 SEC (ref 22.1–31)
AST SERPL-CCNC: 60 U/L (ref 15–37)
AST SERPL-CCNC: NORMAL U/L (ref 15–37)
ATRIAL RATE: 36 BPM
BASOPHILS # BLD: 0 K/UL (ref 0–0.1)
BASOPHILS NFR BLD: 0 % (ref 0–1)
BILIRUB SERPL-MCNC: 0.6 MG/DL (ref 0.2–1)
BILIRUB SERPL-MCNC: NORMAL MG/DL (ref 0.2–1)
BUN SERPL-MCNC: 37 MG/DL (ref 6–20)
BUN SERPL-MCNC: NORMAL MG/DL (ref 6–20)
BUN/CREAT SERPL: 40 (ref 12–20)
BUN/CREAT SERPL: NORMAL (ref 12–20)
CALCIUM SERPL-MCNC: 7.4 MG/DL (ref 8.5–10.1)
CALCIUM SERPL-MCNC: NORMAL MG/DL (ref 8.5–10.1)
CALCULATED P AXIS, ECG09: -3 DEGREES
CALCULATED R AXIS, ECG10: -11 DEGREES
CALCULATED T AXIS, ECG11: 38 DEGREES
CHLORIDE SERPL-SCNC: 114 MMOL/L (ref 97–108)
CHLORIDE SERPL-SCNC: NORMAL MMOL/L (ref 97–108)
CO2 SERPL-SCNC: 25 MMOL/L (ref 21–32)
CO2 SERPL-SCNC: NORMAL MMOL/L (ref 21–32)
CREAT SERPL-MCNC: 0.92 MG/DL (ref 0.7–1.3)
CREAT SERPL-MCNC: NORMAL MG/DL (ref 0.7–1.3)
D DIMER PPP FEU-MCNC: 1.62 MG/L FEU (ref 0–0.65)
DIAGNOSIS, 93000: NORMAL
DIFFERENTIAL METHOD BLD: ABNORMAL
EOSINOPHIL # BLD: 0 K/UL (ref 0–0.4)
EOSINOPHIL NFR BLD: 0 % (ref 0–7)
ERYTHROCYTE [DISTWIDTH] IN BLOOD BY AUTOMATED COUNT: 13.2 % (ref 11.5–14.5)
FIBRINOGEN PPP-MCNC: 377 MG/DL (ref 200–475)
GLOBULIN SER CALC-MCNC: 3.1 G/DL (ref 2–4)
GLOBULIN SER CALC-MCNC: NORMAL G/DL (ref 2–4)
GLUCOSE SERPL-MCNC: 115 MG/DL (ref 65–100)
GLUCOSE SERPL-MCNC: NORMAL MG/DL (ref 65–100)
HCT VFR BLD AUTO: 40.1 % (ref 36.6–50.3)
HGB BLD-MCNC: 13.1 G/DL (ref 12.1–17)
IMM GRANULOCYTES # BLD AUTO: 0.1 K/UL (ref 0–0.04)
IMM GRANULOCYTES NFR BLD AUTO: 1 % (ref 0–0.5)
INR PPP: 1.2 (ref 0.9–1.1)
LYMPHOCYTES # BLD: 0.5 K/UL (ref 0.8–3.5)
LYMPHOCYTES NFR BLD: 7 % (ref 12–49)
MCH RBC QN AUTO: 29.7 PG (ref 26–34)
MCHC RBC AUTO-ENTMCNC: 32.7 G/DL (ref 30–36.5)
MCV RBC AUTO: 90.9 FL (ref 80–99)
MONOCYTES # BLD: 0.6 K/UL (ref 0–1)
MONOCYTES NFR BLD: 8 % (ref 5–13)
NEUTS SEG # BLD: 5.9 K/UL (ref 1.8–8)
NEUTS SEG NFR BLD: 84 % (ref 32–75)
NRBC # BLD: 0 K/UL (ref 0–0.01)
NRBC BLD-RTO: 0 PER 100 WBC
P-R INTERVAL, ECG05: 182 MS
PLATELET # BLD AUTO: 209 K/UL (ref 150–400)
PMV BLD AUTO: 10.5 FL (ref 8.9–12.9)
POTASSIUM SERPL-SCNC: 4.2 MMOL/L (ref 3.5–5.1)
POTASSIUM SERPL-SCNC: NORMAL MMOL/L (ref 3.5–5.1)
PROT SERPL-MCNC: 5.9 G/DL (ref 6.4–8.2)
PROT SERPL-MCNC: NORMAL G/DL (ref 6.4–8.2)
PROTHROMBIN TIME: 12.4 SEC (ref 9–11.1)
Q-T INTERVAL, ECG07: 528 MS
QRS DURATION, ECG06: 82 MS
QTC CALCULATION (BEZET), ECG08: 408 MS
RBC # BLD AUTO: 4.41 M/UL (ref 4.1–5.7)
SODIUM SERPL-SCNC: 145 MMOL/L (ref 136–145)
SODIUM SERPL-SCNC: NORMAL MMOL/L (ref 136–145)
THERAPEUTIC RANGE,PTTT: NORMAL SECS (ref 58–77)
VENTRICULAR RATE, ECG03: 36 BPM
WBC # BLD AUTO: 7 K/UL (ref 4.1–11.1)

## 2021-01-14 PROCEDURE — 94760 N-INVAS EAR/PLS OXIMETRY 1: CPT

## 2021-01-14 PROCEDURE — 85384 FIBRINOGEN ACTIVITY: CPT

## 2021-01-14 PROCEDURE — 74011000250 HC RX REV CODE- 250: Performed by: INTERNAL MEDICINE

## 2021-01-14 PROCEDURE — 74011250636 HC RX REV CODE- 250/636: Performed by: INTERNAL MEDICINE

## 2021-01-14 PROCEDURE — 77010033711 HC HIGH FLOW OXYGEN

## 2021-01-14 PROCEDURE — 36415 COLL VENOUS BLD VENIPUNCTURE: CPT

## 2021-01-14 PROCEDURE — 74011250636 HC RX REV CODE- 250/636: Performed by: HOSPITALIST

## 2021-01-14 PROCEDURE — 85025 COMPLETE CBC W/AUTO DIFF WBC: CPT

## 2021-01-14 PROCEDURE — 85730 THROMBOPLASTIN TIME PARTIAL: CPT

## 2021-01-14 PROCEDURE — 80053 COMPREHEN METABOLIC PANEL: CPT

## 2021-01-14 PROCEDURE — 74011000258 HC RX REV CODE- 258: Performed by: INTERNAL MEDICINE

## 2021-01-14 PROCEDURE — 85610 PROTHROMBIN TIME: CPT

## 2021-01-14 PROCEDURE — 85379 FIBRIN DEGRADATION QUANT: CPT

## 2021-01-14 PROCEDURE — 74011250637 HC RX REV CODE- 250/637: Performed by: INTERNAL MEDICINE

## 2021-01-14 PROCEDURE — 65660000000 HC RM CCU STEPDOWN

## 2021-01-14 PROCEDURE — 74011250637 HC RX REV CODE- 250/637: Performed by: HOSPITALIST

## 2021-01-14 RX ORDER — FUROSEMIDE 10 MG/ML
40 INJECTION INTRAMUSCULAR; INTRAVENOUS ONCE
Status: COMPLETED | OUTPATIENT
Start: 2021-01-14 | End: 2021-01-14

## 2021-01-14 RX ORDER — CALCIUM CARB/MAGNESIUM CARB 311-232MG
5 TABLET ORAL
Status: DISCONTINUED | OUTPATIENT
Start: 2021-01-15 | End: 2021-01-29 | Stop reason: HOSPADM

## 2021-01-14 RX ADMIN — MELATONIN TAB 3 MG 3 MG: 3 TAB at 22:12

## 2021-01-14 RX ADMIN — FAMOTIDINE 20 MG: 20 TABLET, FILM COATED ORAL at 17:24

## 2021-01-14 RX ADMIN — Medication 1 CAPSULE: at 08:10

## 2021-01-14 RX ADMIN — REMDESIVIR 100 MG: 100 INJECTION, POWDER, LYOPHILIZED, FOR SOLUTION INTRAVENOUS at 17:24

## 2021-01-14 RX ADMIN — FUROSEMIDE 40 MG: 10 INJECTION, SOLUTION INTRAMUSCULAR; INTRAVENOUS at 13:37

## 2021-01-14 RX ADMIN — OXYCODONE HYDROCHLORIDE AND ACETAMINOPHEN 500 MG: 500 TABLET ORAL at 17:24

## 2021-01-14 RX ADMIN — Medication 10 ML: at 22:16

## 2021-01-14 RX ADMIN — Medication 10 ML: at 13:38

## 2021-01-14 RX ADMIN — FAMOTIDINE 20 MG: 20 TABLET, FILM COATED ORAL at 08:10

## 2021-01-14 RX ADMIN — CHOLECALCIFEROL TAB 125 MCG (5000 UNIT) 5000 UNITS: 125 TAB at 08:10

## 2021-01-14 RX ADMIN — ENOXAPARIN SODIUM 40 MG: 40 INJECTION SUBCUTANEOUS at 08:11

## 2021-01-14 RX ADMIN — AZITHROMYCIN MONOHYDRATE 500 MG: 250 TABLET ORAL at 08:10

## 2021-01-14 RX ADMIN — Medication 10 ML: at 03:39

## 2021-01-14 RX ADMIN — Medication 1 TABLET: at 08:11

## 2021-01-14 RX ADMIN — DEXAMETHASONE 6 MG: 6 TABLET ORAL at 08:10

## 2021-01-14 RX ADMIN — OXYCODONE HYDROCHLORIDE AND ACETAMINOPHEN 500 MG: 500 TABLET ORAL at 08:10

## 2021-01-14 NOTE — PROGRESS NOTES
Bedside and Verbal shift change report given to Negrito Oconnell (oncoming nurse) by Tamanna Rincon (offgoing nurse). Report included the following information SBAR, Kardex, Intake/Output, MAR and Recent Results. Central line Type: triple lumen  Central Line Insert Date: 1/14/21  Reason Central Line Placed: limited access  [employer] Dressing Date:  Biopatch in place? Yes No: yes  Tubing labeled and appropriate? Yes No: yes  Alcohol caps on all open ports? Yes No: yes  Last CHG bath (time&date): 1/14/21 @ [id number]  Reviewed with provider and central line must stay in for the following reasons: N/A    1151 updated provided to aida Malhotra via phone    Bedside and Verbal shift change report given to Dee (oncoming nurse) by Negrito Oconnell (offgoing nurse). Report included the following information SBAR, Kardex, Intake/Output, MAR and Recent Results.

## 2021-01-14 NOTE — PROGRESS NOTES
[facility]  [address] [facility] [address], [place], [address] [place] [address]  [phone]      Medical Progress Note      NAME:         Carlos Watt   :        1945  MRM:        [id number]    Date of service: 2021      Subjective: Patient has been seen and examined as a follow up for multiple medical issues. Chart, labs, diagnostics reviewed. He seems less SOB but still needing significant oxygen supplementation. No fever or chills. Not confused. Objective:    Vital Signs:    Visit Vitals  /65 (BP 1 Location: Right arm, BP Patient Position: At rest)   Pulse (!) 46   Temp 97.1 °F (36.2 °C)   Resp 16   Ht 5' 10\" (1.778 m)   Wt 80.6 kg (177 lb 9.6 oz)   SpO2 93%   BMI 25.48 kg/m²          Intake/Output Summary (Last 24 hours) at 2021 1219  Last data filed at 2021 0720  Gross per 24 hour   Intake 1271.67 ml   Output 700 ml   Net 571.67 ml        Physical Examination:    General:   Well looking patient in no acute distress  Eyes:   pink conjunctivae, PERRLA with no discharge. ENT:   no ottorrhea or rhinorrhea with dry mucous membranes  Neck: no masses, thyroid non-tender and trachea central.  Pulm:  decreased breath sounds with scattered crackles. No wheezes  Card:  no JVD or murmurs, has sinus bradycardia, without thrills, bruits   Abd:  Soft, non-tender, non-distended, normoactive bowel sounds   Musc:  No cyanosis, clubbing, atrophy or deformities. Neuro: Awake and alert.  Generally a non focal exam.   Psych:  Has a good insight and is oriented x 3    Current Facility-Administered Medications   Medication Dose Route Frequency    sodium chloride (NS) flush 5-40 mL  5-40 mL IntraVENous Q8H    sodium chloride (NS) flush 5-40 mL  5-40 mL IntraVENous PRN    polyethylene glycol (MIRALAX) packet 17 g  17 g Oral DAILY PRN    bisacodyL (DULCOLAX) suppository 10 mg  10 mg Rectal DAILY PRN    ondansetron (ZOFRAN) injection 4 mg  4 mg IntraVENous Q6H PRN    famotidine (PEPCID) tablet 20 mg  20 mg Oral BID    enoxaparin (LOVENOX) injection 40 mg  40 mg SubCUTAneous DAILY    acetaminophen (TYLENOL) tablet 650 mg  650 mg Oral Q6H PRN    Or    acetaminophen (TYLENOL) suppository 650 mg  650 mg Rectal Q6H PRN    guaiFENesin-dextromethorphan (ROBITUSSIN DM) 100-10 mg/5 mL syrup 5 mL  5 mL Oral Q4H PRN    dexAMETHasone (DECADRON) tablet 6 mg  6 mg Oral DAILY    azithromycin (ZITHROMAX) tablet 500 mg  500 mg Oral DAILY    labetaloL (NORMODYNE;TRANDATE) 20 mg/4 mL (5 mg/mL) injection 20 mg  20 mg IntraVENous Q4H PRN    hydrALAZINE (APRESOLINE) 20 mg/mL injection 20 mg  20 mg IntraVENous Q4H PRN    melatonin tablet 3 mg  3 mg Oral QHS PRN    alum-mag hydroxide-simeth (MYLANTA) oral suspension 30 mL  30 mL Oral Q4H PRN    LORazepam (ATIVAN) injection 0.5 mg  0.5 mg IntraVENous Q6H PRN    oxyCODONE IR (ROXICODONE) tablet 5 mg  5 mg Oral Q4H PRN    oxyCODONE IR (ROXICODONE) tablet 10 mg  10 mg Oral Q4H PRN    morphine injection 2 mg  2 mg IntraVENous Q4H PRN    diphenhydrAMINE (BENADRYL) injection 25 mg  25 mg IntraVENous Q6H PRN    diphenhydrAMINE (BENADRYL) capsule 25 mg  25 mg Oral Q6H PRN    albuterol (PROVENTIL HFA, VENTOLIN HFA, PROAIR HFA) inhaler 4 Puff  4 Puff Inhalation Q4H PRN    simethicone (MYLICON) tablet 80 mg  80 mg Oral QID PRN    [START ON 1/18/2021] cholecalciferol (VITAMIN D3) (1000 Units /25 mcg) tablet 2 Tab  2,000 Units Oral DAILY    cholecalciferol (VITAMIN D3) tablet 5,000 Units  5,000 Units Oral DAILY    And    cholecalciferol (VITAMIN D3) (1000 Units /25 mcg) tablet 1 Tab  1,000 Units Oral DAILY    remdesivir 100 mg in 0.9% sodium chloride 250 mL IVPB  100 mg IntraVENous Q24H    zinc sulfate (ZINCATE) 220 (50) mg capsule 1 Cap  1 Cap Oral DAILY    ascorbic acid (vitamin C) (VITAMIN C) tablet 500 mg  500 mg Oral BID        Laboratory data and review:    Recent Labs     01/14/21  7875 01/13/21  0351 01/12/21  0626   WBC 7.0 10.5 8.5   HGB 13.1 13.0 14.9   HCT 40.1 37.9 43.8    210 201     Recent Labs     01/14/21  0333 01/13/21  0351 01/12/21  0626     PLEASE DISREGARD RESULTS 143 143   K 4.2  PLEASE DISREGARD RESULTS 4.0 3.9   *  PLEASE DISREGARD RESULTS 115* 113*   CO2 25  PLEASE DISREGARD RESULTS 23 25   *  PLEASE DISREGARD RESULTS 112* 123*   BUN 37*  PLEASE DISREGARD RESULTS 36* 35*   CREA 0.92  PLEASE DISREGARD RESULTS 0.86 0.94   CA 7.4*  PLEASE DISREGARD RESULTS 7.4* 8.1*   MG  --  2.8*  --    PHOS  --  2.6  --    ALB 2.8*  PLEASE DISREGARD RESULTS 2.6* 3.0*   ALT 76  PLEASE DISREGARD RESULTS 58 62   INR 1.2* 1.1 1.1     No components found for: [place]    Diagnostics: Imaging studies have been reviewed    Telemetry reviewed by me:   normal sinus rhythm    Assessment and Plan:    COVID-19 virus infection (1/11/2021) / Acute respiratory failure with hypoxia POA: remains symptomatic. Pro calcitonin normal. Continue IV Dexamethasone, Remdesivir, Azithromycin, Vitamin C and Zinc. Continue high flow oxygen and wean as tolerated    Acute metabolic encephalopathy (5/07/1518) POA: better. Not as confused. No focal weakness. Due to COVID. Monitor    Bradycardia: likely has underlying sleep apnea. TSH normal.  Keep on telemetry. Outpatient sleep study. Seen by cardiology. Outpatient follow up     Diarrhea (1/11/2021) POA: likely due to COVID. Resolved.  Diet as tolerated     Total time spent for the patient's care: [address] discussed with: Patient, Care Manager and Nursing Staff    Discussed:  Care Plan and D/C Planning    Prophylaxis:  Lovenox    Anticipated Disposition:  Home w/Family           ___________________________________________________    Attending Physician:   Roxie Hernandez MD

## 2021-01-14 NOTE — PROGRESS NOTES
1/14/2021  Case Management Progress Note    9:50 AM  Chart reviewed--no patient contact at this time due to Covid+ status. Patient's RUR is 13% making him a low risk for readmission. Patient has PCP appointment scheduled for 1/20 at 9am   Will need walking oximetry if there are home O2 needs  Otherwise no other needs noted at this time, family will transport at discharge. Transition of Care Plan  1. Continue medical management/treatment  2. Walking oximetry if home O2 is needed  3. No other needs noted   4. Family will transport  5. PCP, cards follow up scheduled  6.  CM will continue to follow    ROWDY Ryaa

## 2021-01-14 NOTE — PROGRESS NOTES
Shift Change:     Bedside and Verbal shift change report given to Candis Grove (oncoming nurse) by Debra De Jesus (offgoing nurse). Report included the following information SBAR, Kardex and Recent Results. Shift Summary:    1945: Pnt standing up, not wearing O2. O2 replaced, patient returned to bed. Bed alarm on prior to leaving room. 2130:Pnt sitting on edge of bed not wearing O2. O2 replaced. Pnt c/o central line dressing itching. New dressing applied using sterile technique. Pnt asking about going home tomorrow, discussed high O2 requirements and that it's very unlikely he will be going home in the morning. Explained he needs to continue to wear his oxygen and that we're doing what we can do to get him healthy and send him home. Pnt unhappy but verbalized understanding. Bed alarm on prior to leaving room    Shift Change:     Bedside and Verbal shift change report given to Debra De Jesus (oncoming nurse) by Candis Grove (offgoing nurse). Report included the following information SBAR, Kardex and Recent Results.

## 2021-01-14 NOTE — PROGRESS NOTES
Telemetry reviewed - SB -NSR 40-70's  1 brief episode HR down to 31 bpm at 0139  Patient able to appropriately increase HR w activity  Asymptomatic   Sleep referral done  Avoid av clemencia agents   TSH WNL  Follow up with Dr Bjorn Davison in a few weeks   Will order 24 hour holter as OP    Will see prn, please call if any further cardiac issues need to be addressed

## 2021-01-15 ENCOUNTER — APPOINTMENT (OUTPATIENT)
Dept: CT IMAGING | Age: 76
DRG: 177 | End: 2021-01-15
Attending: INTERNAL MEDICINE
Payer: MEDICARE

## 2021-01-15 LAB
APTT PPP: 27.1 SEC (ref 22.1–31)
COMMENT, HOLDF: NORMAL
D DIMER PPP FEU-MCNC: 1.81 MG/L FEU (ref 0–0.65)
FIBRINOGEN PPP-MCNC: 386 MG/DL (ref 200–475)
INR PPP: 1.2 (ref 0.9–1.1)
PROTHROMBIN TIME: 12.7 SEC (ref 9–11.1)
SAMPLES BEING HELD,HOLD: NORMAL
THERAPEUTIC RANGE,PTTT: NORMAL SECS (ref 58–77)

## 2021-01-15 PROCEDURE — 97162 PT EVAL MOD COMPLEX 30 MIN: CPT

## 2021-01-15 PROCEDURE — 74011000250 HC RX REV CODE- 250: Performed by: INTERNAL MEDICINE

## 2021-01-15 PROCEDURE — 85384 FIBRINOGEN ACTIVITY: CPT

## 2021-01-15 PROCEDURE — 97535 SELF CARE MNGMENT TRAINING: CPT

## 2021-01-15 PROCEDURE — 94618 PULMONARY STRESS TESTING: CPT

## 2021-01-15 PROCEDURE — 74011250637 HC RX REV CODE- 250/637: Performed by: INTERNAL MEDICINE

## 2021-01-15 PROCEDURE — 65660000000 HC RM CCU STEPDOWN

## 2021-01-15 PROCEDURE — 74011250637 HC RX REV CODE- 250/637: Performed by: HOSPITALIST

## 2021-01-15 PROCEDURE — 97116 GAIT TRAINING THERAPY: CPT

## 2021-01-15 PROCEDURE — 85610 PROTHROMBIN TIME: CPT

## 2021-01-15 PROCEDURE — 85730 THROMBOPLASTIN TIME PARTIAL: CPT

## 2021-01-15 PROCEDURE — 85379 FIBRIN DEGRADATION QUANT: CPT

## 2021-01-15 PROCEDURE — 74011000636 HC RX REV CODE- 636: Performed by: INTERNAL MEDICINE

## 2021-01-15 PROCEDURE — 97161 PT EVAL LOW COMPLEX 20 MIN: CPT

## 2021-01-15 PROCEDURE — 77010033711 HC HIGH FLOW OXYGEN

## 2021-01-15 PROCEDURE — 97165 OT EVAL LOW COMPLEX 30 MIN: CPT

## 2021-01-15 PROCEDURE — 94760 N-INVAS EAR/PLS OXIMETRY 1: CPT

## 2021-01-15 PROCEDURE — 71275 CT ANGIOGRAPHY CHEST: CPT

## 2021-01-15 PROCEDURE — 74011000258 HC RX REV CODE- 258: Performed by: INTERNAL MEDICINE

## 2021-01-15 PROCEDURE — 74011250636 HC RX REV CODE- 250/636: Performed by: HOSPITALIST

## 2021-01-15 RX ORDER — BUMETANIDE 0.25 MG/ML
1 INJECTION INTRAMUSCULAR; INTRAVENOUS ONCE
Status: COMPLETED | OUTPATIENT
Start: 2021-01-15 | End: 2021-01-15

## 2021-01-15 RX ADMIN — Medication 10 ML: at 22:16

## 2021-01-15 RX ADMIN — FAMOTIDINE 20 MG: 20 TABLET, FILM COATED ORAL at 18:13

## 2021-01-15 RX ADMIN — DEXAMETHASONE 6 MG: 6 TABLET ORAL at 08:00

## 2021-01-15 RX ADMIN — OXYCODONE HYDROCHLORIDE AND ACETAMINOPHEN 500 MG: 500 TABLET ORAL at 18:13

## 2021-01-15 RX ADMIN — BUMETANIDE 1 MG: 0.25 INJECTION, SOLUTION INTRAMUSCULAR; INTRAVENOUS at 22:13

## 2021-01-15 RX ADMIN — Medication 5 MG: at 22:58

## 2021-01-15 RX ADMIN — IOPAMIDOL 100 ML: 755 INJECTION, SOLUTION INTRAVENOUS at 18:00

## 2021-01-15 RX ADMIN — CHOLECALCIFEROL TAB 125 MCG (5000 UNIT) 5000 UNITS: 125 TAB at 08:00

## 2021-01-15 RX ADMIN — REMDESIVIR 100 MG: 100 INJECTION, POWDER, LYOPHILIZED, FOR SOLUTION INTRAVENOUS at 12:24

## 2021-01-15 RX ADMIN — Medication 1 CAPSULE: at 08:00

## 2021-01-15 RX ADMIN — Medication 10 ML: at 08:01

## 2021-01-15 RX ADMIN — Medication 10 ML: at 12:27

## 2021-01-15 RX ADMIN — OXYCODONE HYDROCHLORIDE AND ACETAMINOPHEN 500 MG: 500 TABLET ORAL at 08:00

## 2021-01-15 RX ADMIN — AZITHROMYCIN MONOHYDRATE 500 MG: 250 TABLET ORAL at 08:00

## 2021-01-15 RX ADMIN — FAMOTIDINE 20 MG: 20 TABLET, FILM COATED ORAL at 08:00

## 2021-01-15 RX ADMIN — Medication 1 TABLET: at 08:00

## 2021-01-15 RX ADMIN — ENOXAPARIN SODIUM 40 MG: 40 INJECTION SUBCUTANEOUS at 08:00

## 2021-01-15 NOTE — PROGRESS NOTES
01/15/21 1350 01/15/21 1351 01/15/21 1353   RT Walking Oximetry   Stage Resting (Room Air) Resting (on O2) During Walk (on O2)   SpO2 (!) 84 % (!) 88 % (!) 82 %   HR 50 bpm 52 bpm 53 bpm   Rate of Dyspnea 1 1 2   Symptoms Shortness of Breath  --   --    O2 Device None (Room air) Nasal cannula Nasal cannula   O2 Flow Rate (L/min)  --  4 l/min 4 l/min   Comments placed on 4L NC  --  increased to 6L      01/15/21 1355 01/15/21 1402   RT Walking Oximetry   Stage During Walk (on O2) After Walk   SpO2 89 % 92 %   HR 55 bpm 50 bpm   Rate of Dyspnea 2  --    Symptoms  --   --    O2 Device Nasal cannula Nasal cannula   O2 Flow Rate (L/min) 6 l/min 6 l/min   Comments  --   --

## 2021-01-15 NOTE — PROGRESS NOTES
Problem: Self Care Deficits Care Plan (Adult)  Goal: *Acute Goals and Plan of Care (Insert Text)  Description:   FUNCTIONAL STATUS PRIOR TO ADMISSION: Patient was independent and active without use of DME.     HOME SUPPORT: The patient lived with wife and college age son but did not require assist.    Occupational Therapy Goals  Initiated 1/15/2021  1.  Patient will perform lower body dressing with supervision/set-up within 7 day(s).  2.  Patient will perform grooming standing at sink with no LOB with supervision/set-up within 7 day(s).  3.  Patient will perform ADL item retrieval with reaching high and low with supervision/set-up within 7 day(s).  4.  Patient will perform toilet transfers with supervision/set-up within 7 day(s).  5.  Patient will perform all aspects of toileting with supervision/set-up within 7 day(s).  6.  Patient will utilize energy conservation techniques during functional activities with verbal cues within 7 day(s).       Outcome: Not Met   OCCUPATIONAL THERAPY EVALUATION  Patient: Riki Ellison (75 y.o. male)  Date: 1/15/2021  Primary Diagnosis: COVID-19 virus infection [U07.1]  Hypoxia [R09.02]  Acute respiratory insufficiency [R06.89]  Prerenal azotemia [R79.89]  Diarrhea [R19.7]        Precautions: Fall           Based on the objective data described below, the patient presents impaired activity tolerance, higher level balance and insight to current level of function impacting to complete ADL tasks with admission for COVID.  He received on 4 L O2 and increased to 6L for activity secondary to de saturation with light activity.  He completed seated aspect of LB dressing with supervision and functional ambulation and self care transfers with CGA.  Two mild LOB with ambulation that he was able to self correct.  Provided extensive education on PLB, ECT and CLOF in regards to need for supplemental O2 with activity.        Recommend home with support from wife and HH vs none.     SpO2:   4L  Rest: 90%  4L EOB: 87%  5L activity: 87%  6L activity: 90%  6L rest: 93%       Current Level of Function Impacting Discharge (ADLs/self-care): UB care indep, LB care CGA, toileting CGA    Functional Outcome Measure: The patient scored 60/100 on the Barthel Index outcome measure which is indicative of mild impairment with ADL tasks. Other factors to consider for discharge: Patient requiring 6L O2 with activity     Patient will benefit from skilled therapy intervention to address the above noted impairments. PLAN :  Recommendations and Planned Interventions: self care training, functional mobility training, therapeutic exercise, balance training, therapeutic activities, endurance activities, patient education, home safety training and family training/education    Frequency/Duration: Patient will be followed by occupational therapy 5 times a week to address goals. Recommendation for discharge: (in order for the patient to meet his/her long term goals)  Therapy up to 5 days/week in SNF setting    This discharge recommendation:  Has been made in collaboration with the attending provider and/or case management    IF patient discharges home will need the following DME: shower chair       SUBJECTIVE:   Patient stated I want to go home.     OBJECTIVE DATA SUMMARY:   HISTORY:   Past Medical History:   Diagnosis Date    Diverticulitis     Gastrointestinal disorder     History of vascular access device 07/24/2017    [facility] VAT -  5FR triple Lumen Power PICC R Basilic  39 cm    Spinal stenosis      Past Surgical History:   Procedure Laterality Date    FLEXIBLE SIGMOIDOSCOPY N/A 7/27/2017    SIGMOIDOSCOPY FLEXIBLE performed by Ernesto Arredondo [facility] at [facility]    IR INSERT NON TUNL CVC OVER 5 YRS  1/13/2021       Expanded or extensive additional review of patient history:     Home Situation  Home Environment: Private residence  # Steps to Enter: 0  One/Two Story Residence: Two story  Living Alone: No  Support Systems: Child(ivon), Spouse/Significant Other/Partner  Patient Expects to be Discharged to[de-identified] Private residence  Current DME Used/Available at Home: None  Tub or Shower Type: Shower    Hand dominance: Right    EXAMINATION OF PERFORMANCE DEFICITS:  Cognitive/Behavioral Status:  Neurologic State: Alert  Orientation Level: Oriented X4  Cognition: Appropriate decision making; Appropriate safety awareness        Safety/Judgement: Decreased awareness of need for assistance;Decreased insight into deficits  Hearing: Auditory  Auditory Impairment: None       Range of Motion:  AROM: Within functional limits  PROM: Within functional limits                      Strength:  Strength: Generally decreased, functional                Coordination:  Coordination: Within functional limits  Fine Motor Skills-Upper: Left Intact; Right Intact    Gross Motor Skills-Upper: Left Intact; Right Intact    Tone & Sensation:  Tone: Normal  Sensation: Intact          Balance:  Sitting: Intact  Standing: Intact; With support    Functional Mobility and Transfers for ADLs:  Bed Mobility:  Rolling: Supervision  Supine to Sit: Supervision  Sit to Supine: Supervision  Scooting: Supervision    Transfers:  Sit to Stand: Stand-by assistance  Stand to Sit: Stand-by assistance  Bed to Chair: Stand-by assistance    ADL Assessment:  Feeding: Independent    Oral Facial Hygiene/Grooming: Contact guard assistance    Bathing: Contact guard assistance;Minimum assistance    Upper Body Dressing: Setup    Lower Body Dressing: Contact guard assistance    Toileting: Contact guard assistance                ADL Intervention and task modifications:            Cognitive Retraining  Safety/Judgement: Decreased awareness of need for assistance;Decreased insight into deficits    Functional Measure:  Barthel Index:    Bathin  Bladder: 10  Bowels: 10  Groomin  Dressin  Feeding: 10  Mobility: 0  Stairs: 0  Toilet Use: 10  Transfer (Bed to Chair and Back): 10  Total: 60/100        The Barthel ADL Index: Guidelines  1. The index should be used as a record of what a patient does, not as a record of what a patient could do. 2. The main aim is to establish degree of independence from any help, physical or verbal, however minor and for whatever reason. 3. The need for supervision renders the patient not independent. 4. A patient's performance should be established using the best available evidence. Asking the patient, friends/relatives and nurses are the usual sources, but direct observation and common sense are also important. However direct testing is not needed. 5. Usually the patient's performance over the preceding 24-48 hours is important, but occasionally longer periods will be relevant. 6. Middle categories imply that the patient supplies over 50 per cent of the effort. 7. Use of aids to be independent is allowed. Stella Soto., Barthel, DBaltaW. ([id number]). Functional evaluation: the Barthel Index. [facility] (14)2. LORENZO Vázquez, Radha Crowley, Efren Solitario., [place], [address] [place] [address] (1999). Measuring the change indisability after inpatient rehabilitation; comparison of the responsiveness of the Barthel Index and Functional [place] Measure. Journal of Neurology, Neurosurgery, and Psychiatry, 66(4), 070-632. Laura Fowler, N.J.A, JESE Hendricks, & Rizwana Quarles MSUDHA. (2004.) Assessment of post-stroke quality of life in cost-effectiveness studies: The usefulness of the Barthel Index and the EuroQoL-5D.  Quality of Life Research, 15, 995-83         Occupational Therapy Evaluation Charge Determination   History Examination Decision-Making   LOW Complexity : Brief history review  LOW Complexity : 1-3 performance deficits relating to physical, cognitive , or psychosocial skils that result in activity limitations and / or participation restrictions  LOW Complexity : No comorbidities that affect functional and no verbal or physical assistance needed to complete eval tasks       Based on the above components, the patient evaluation is determined to be of the following complexity level: LOW   Pain Rating:  No c/o pain    Activity Tolerance:   Fair    After treatment patient left in no apparent distress:    Call bell within reach and bed in chair position    COMMUNICATION/EDUCATION:   The patients plan of care was discussed with: Physical therapist and Registered nurse. Home safety education was provided and the patient/caregiver indicated understanding. and Patient/family agree to work toward stated goals and plan of care. This patients plan of care is appropriate for delegation to SELENE.     Thank you for this referral.  Veronica Walker OT  Time Calculation: 27 mins

## 2021-01-15 NOTE — PROGRESS NOTES
1/15/2021  Case Management Progress Note    12:16 PM  Chart reviewed--no patient contact due to Covid+ status  Patient's RUR is 14% making him a low risk for readmission  Noted per Dr. Annie Granda that patient wants to go home today. We discussed having him evaluated by PT/OT and for home O2 by respiratory therapy. Patient will very likely need home O2 with a high requirement. Unsure if he will need HH, will await PT/OT recs. Transition of Care Plan  1. Patient wants to go home today  2. PT/OT evals  3. Home O2 evaluation   4. Family will transport  5. PCP, cards follow up scheduled  6.  CM will continue to follow    ROWDY Hays

## 2021-01-15 NOTE — PROGRESS NOTES
Pt ordered for home O2 assessment. Pt on Midflow NC at 8L currently weanded to 6L.  Will continue to wean O2 before home O2 assesment

## 2021-01-15 NOTE — PROGRESS NOTES
Problem: Mobility Impaired (Adult and Pediatric)  Goal: *Acute Goals and Plan of Care (Insert Text)  Description: FUNCTIONAL STATUS PRIOR TO ADMISSION: Patient was independent and active without use of DME.    HOME SUPPORT PRIOR TO ADMISSION: The patient lived with his wife and son but did not require assist.    Physical Therapy Goals  Initiated 1/15/2021  1. Patient will move from supine to sit and sit to supine , scoot up and down, and roll side to side in bed with independence within 7 day(s). 2.  Patient will transfer from bed to chair and chair to bed with modified independence using the least restrictive device within 7 day(s). 3.  Patient will perform sit to stand with modified independence within 7 day(s). 4.  Patient will ambulate with modified independence for 120 feet with the least restrictive device within 7 day(s). Outcome: Progressing Towards Goal   PHYSICAL THERAPY EVALUATION  Patient: Jennifer Nathan (1 y.o. male)  Date: 1/15/2021  Primary Diagnosis: COVID-19 virus infection [U07.1]  Hypoxia [R09.02]  Acute respiratory insufficiency [R06.89]  Prerenal azotemia [R79.89]  Diarrhea [R19.7]        Precautions:        ASSESSMENT  Based on the objective data described below, the patient presents with generalized weakness, limited activity tolerance and poor pulmonary tolerance to minimal activity s/p admission for COVID-19. Patient recently seen by RT, who weaned him down to 4L from 6L, sats at 90% on 4L upon arrival.  Patient exhibits generally steady dynamic balance and gait around his room with only SBA and line management. However, he quickly desat'd on 4L just sitting up at EOB. With 5L during gait, sats dropped to 87%. He required 6L during 2nd gait trial to maintain at 90%. Patient reporting no SOB and eager to d/c home today if appropriate. At baseline, patient is fully indep and active, lives with wife and college son in a 2 level home.   At this time, patient can only tolerate very short distance activity on 6L and would likely not tolerate self-care tasks and gait within his home. Will follow three times a week to work on increased endurance. However, patient would benefit from getting up to chair three times daily with nursing staff as well as gait into bathroom with staff present for line management and supervision of O2 response. Anticipate d/c home with HHPT versus none pending progress. Pulse Oximetry Assessment    90% at rest on 4L, supine  87% seated EOB on 4L  87% walking in room on 5L (<10 feet)  90% walking in room on 6L (<10 feet)  93% back to supine on 6L    Current Level of Function Impacting Discharge (mobility/balance): SBA for gait and transfers (plus assist for line management)    Functional Outcome Measure: The patient scored 65 on the Barthel outcome measure which is indicative of 35% reliance on others for mobility and self-care assist.      Other factors to consider for discharge: level of O2 required for basic tasks, self care, bedroom is upstairs     Patient will benefit from skilled therapy intervention to address the above noted impairments. PLAN :  Recommendations and Planned Interventions: bed mobility training, transfer training, gait training, therapeutic exercises, neuromuscular re-education, patient and family training/education, and therapeutic activities      Frequency/Duration: Patient will be followed by physical therapy:  3 times a week to address goals. Recommendation for discharge: (in order for the patient to meet his/her long term goals)  Physical therapy at least 2 days/week in the home vs none    This discharge recommendation:  A follow-up discussion with the attending provider and/or case management is planned    IF patient discharges home will need the following DME: none         SUBJECTIVE:   Patient stated Come on, let me go home please.     OBJECTIVE DATA SUMMARY:   HISTORY:    Past Medical History:   Diagnosis Date    Diverticulitis     Gastrointestinal disorder     History of vascular access device 2017    [facility] VAT -  5FR triple Lumen Power PICC R Basilic  39 cm    Spinal stenosis      Past Surgical History:   Procedure Laterality Date    FLEXIBLE SIGMOIDOSCOPY N/A 2017    SIGMOIDOSCOPY FLEXIBLE performed by Gwendolyn Dominguez MD at [facility] ENDOSCOPY    IR INSERT NON TUNL CVC OVER 5 YRS  2021       Personal factors and/or comorbidities impacting plan of care:     Home Situation  Home Environment: Private residence  # Steps to Enter: 0  One/Two Story Residence: Two story  Living Alone: No  Support Systems: Child(ivon), Spouse/Significant Other/Partner  Patient Expects to be Discharged to[de-identified] Private residence  Current DME Used/Available at Home: None    EXAMINATION/PRESENTATION/DECISION MAKING:   Critical Behavior:  Neurologic State: Alert  Orientation Level: Oriented X4  Cognition: Appropriate decision making, Appropriate safety awareness     Hearing: Auditory  Auditory Impairment: None    Range Of Motion:  AROM: Within functional limits           PROM: Within functional limits           Strength:    Strength: Generally decreased, functional                    Tone & Sensation:   Tone: Normal              Sensation: Intact               Coordination:  Coordination: Within functional limits  Vision:      Functional Mobility:  Bed Mobility:  Rolling: Supervision  Supine to Sit: Supervision  Sit to Supine: Supervision  Scooting: Supervision  Transfers:  Sit to Stand: Stand-by assistance  Stand to Sit: Stand-by assistance        Bed to Chair: Stand-by assistance              Balance:   Sitting: Intact  Standing: Intact; With support  Ambulation/Gait Training:  Distance (ft): 20 Feet (ft)  Assistive Device: Gait belt  Ambulation - Level of Assistance: Stand-by assistance                                           Functional Measure:  Barthel Index:    Bathin  Bladder: 10  Bowels: 10  Groomin  Dressing: 10  Feeding: 10  Mobility: 0  Stairs: 0  Toilet Use: 10  Transfer (Bed to Chair and Back): 10  Total: 65/100       The Barthel ADL Index: Guidelines  1. The index should be used as a record of what a patient does, not as a record of what a patient could do. 2. The main aim is to establish degree of independence from any help, physical or verbal, however minor and for whatever reason. 3. The need for supervision renders the patient not independent. 4. A patient's performance should be established using the best available evidence. Asking the patient, friends/relatives and nurses are the usual sources, but direct observation and common sense are also important. However direct testing is not needed. 5. Usually the patient's performance over the preceding 24-48 hours is important, but occasionally longer periods will be relevant. 6. Middle categories imply that the patient supplies over 50 per cent of the effort. 7. Use of aids to be independent is allowed. Jolene Chopra., Barthel, DBaltaW. (0030). Functional evaluation: the Barthel Index. [facility] (14)2. Jordin Lee iraj LORENZO Dahl, Johnny Stein., Kat Card., [place], [address] [place] [address] (1999). Measuring the change indisability after inpatient rehabilitation; comparison of the responsiveness of the Barthel Index and Functional [place] Measure. Journal of Neurology, Neurosurgery, and Psychiatry, 66(4), 115-677. Shanti Rdz, NMARIA ALEJANDRA.A, JESE Hendricks, & PAVITHRA SchwartzA. (2004.) Assessment of post-stroke quality of life in cost-effectiveness studies: The usefulness of the Barthel Index and the EuroQoL-5D.  Quality of Life Research, 15, 429-64            Physical Therapy Evaluation Charge Determination   History Examination Presentation Decision-Making   HIGH Complexity :3+ comorbidities / personal factors will impact the outcome/ POC  MEDIUM Complexity : 3 Standardized tests and measures addressing body structure, function, activity limitation and / or participation in recreation  HIGH Complexity : Unstable and unpredictable characteristics  Other outcome measures Bathel 65  MEDIUM      Based on the above components, the patient evaluation is determined to be of the following complexity level: MEDIUM    Pain Rating:  None reported    Activity Tolerance:   Poor, desaturates with exertion and requires oxygen, and requires rest breaks    After treatment patient left in no apparent distress:   Supine in bed and Call bell within reach    COMMUNICATION/EDUCATION:   The patients plan of care was discussed with: Occupational therapist and Registered nurse. Fall prevention education was provided and the patient/caregiver indicated understanding. and Patient/family agree to work toward stated goals and plan of care.     Thank you for this referral.  Edda Barger, PT   Time Calculation: 26 mins

## 2021-01-15 NOTE — PROGRESS NOTES
Eugene Kelly [facility] 79  380 [facility], [address] [place] [address]  [phone]      Medical Progress Note      NAME:         Tono Segovia   :        1945  MRM:        [id number]    Date of service: 1/15/2021      Subjective: Patient has been seen and examined as a follow up for multiple medical issues. Chart, labs, diagnostics reviewed. He says he feels much better needing less oxygen. No cough. No fever. Not confused today     Objective:    Vital Signs:    Visit Vitals  BP (!) 109/56   Pulse (!) 57   Temp 97.9 °F (36.6 °C)   Resp 22   Ht 5' 10\" (1.778 m)   Wt 80.6 kg (177 lb 9.6 oz)   SpO2 94%   BMI 25.48 kg/m²          Intake/Output Summary (Last 24 hours) at 1/15/2021 1029  Last data filed at 1/15/2021 0948  Gross per 24 hour   Intake 125 ml   Output 2325 ml   Net -2200 ml        Physical Examination:    General:   Weak but not in any acute distress   Eyes:   pink conjunctivae, PERRLA with no discharge. ENT:   no ottorrhea or rhinorrhea with dry mucous membranes  Neck: no masses, thyroid non-tender and trachea central.  Pulm:  decreased breath sounds with scattered crackles. No wheezes  Card:  no JVD or murmurs, has sinus bradycardia, without thrills, bruits   Abd:  Soft, non-tender, non-distended, normoactive bowel sounds   Neuro: Awake and alert.  Generally a non focal exam.   Psych:  Has a good insight and is oriented x 3    Current Facility-Administered Medications   Medication Dose Route Frequency    melatonin (rapid dissolve) tablet 5 mg  5 mg Oral QHS PRN    sodium chloride (NS) flush 5-40 mL  5-40 mL IntraVENous Q8H    sodium chloride (NS) flush 5-40 mL  5-40 mL IntraVENous PRN    polyethylene glycol (MIRALAX) packet 17 g  17 g Oral DAILY PRN    bisacodyL (DULCOLAX) suppository 10 mg  10 mg Rectal DAILY PRN    ondansetron (ZOFRAN) injection 4 mg  4 mg IntraVENous Q6H PRN    famotidine (PEPCID) tablet 20 mg  20 mg Oral BID    enoxaparin (LOVENOX) injection 40 mg  40 mg SubCUTAneous DAILY    acetaminophen (TYLENOL) tablet 650 mg  650 mg Oral Q6H PRN    Or    acetaminophen (TYLENOL) suppository 650 mg  650 mg Rectal Q6H PRN    guaiFENesin-dextromethorphan (ROBITUSSIN DM) 100-10 mg/5 mL syrup 5 mL  5 mL Oral Q4H PRN    dexAMETHasone (DECADRON) tablet 6 mg  6 mg Oral DAILY    azithromycin (ZITHROMAX) tablet 500 mg  500 mg Oral DAILY    labetaloL (NORMODYNE;TRANDATE) 20 mg/4 mL (5 mg/mL) injection 20 mg  20 mg IntraVENous Q4H PRN    hydrALAZINE (APRESOLINE) 20 mg/mL injection 20 mg  20 mg IntraVENous Q4H PRN    alum-mag hydroxide-simeth (MYLANTA) oral suspension 30 mL  30 mL Oral Q4H PRN    LORazepam (ATIVAN) injection 0.5 mg  0.5 mg IntraVENous Q6H PRN    oxyCODONE IR (ROXICODONE) tablet 5 mg  5 mg Oral Q4H PRN    oxyCODONE IR (ROXICODONE) tablet 10 mg  10 mg Oral Q4H PRN    morphine injection 2 mg  2 mg IntraVENous Q4H PRN    diphenhydrAMINE (BENADRYL) injection 25 mg  25 mg IntraVENous Q6H PRN    diphenhydrAMINE (BENADRYL) capsule 25 mg  25 mg Oral Q6H PRN    albuterol (PROVENTIL HFA, VENTOLIN HFA, PROAIR HFA) inhaler 4 Puff  4 Puff Inhalation Q4H PRN    simethicone (MYLICON) tablet 80 mg  80 mg Oral QID PRN    [START ON 1/18/2021] cholecalciferol (VITAMIN D3) (1000 Units /25 mcg) tablet 2 Tab  2,000 Units Oral DAILY    cholecalciferol (VITAMIN D3) tablet 5,000 Units  5,000 Units Oral DAILY    And    cholecalciferol (VITAMIN D3) (1000 Units /25 mcg) tablet 1 Tab  1,000 Units Oral DAILY    remdesivir 100 mg in 0.9% sodium chloride 250 mL IVPB  100 mg IntraVENous Q24H    zinc sulfate (ZINCATE) 220 (50) mg capsule 1 Cap  1 Cap Oral DAILY    ascorbic acid (vitamin C) (VITAMIN C) tablet 500 mg  500 mg Oral BID        Laboratory data and review:    Recent Labs     01/14/21  0333 01/13/21  0351   WBC 7.0 10.5   HGB 13.1 13.0   HCT 40.1 37.9    210     Recent Labs     01/15/21  0437 01/14/21  0333 01/13/21  0351   NA  --  145  PLEASE DISREGARD RESULTS 143   K  --  4.2  PLEASE DISREGARD RESULTS 4.0   CL  --  114*  PLEASE DISREGARD RESULTS 115*   CO2  --  25  PLEASE DISREGARD RESULTS 23   GLU  --  115*  PLEASE DISREGARD RESULTS 112*   BUN  --  37*  PLEASE DISREGARD RESULTS 36*   CREA  --  0.92  PLEASE DISREGARD RESULTS 0.86   CA  --  7.4*  PLEASE DISREGARD RESULTS 7.4*   MG  --   --  2.8*   PHOS  --   --  2.6   ALB  --  2.8*  PLEASE DISREGARD RESULTS 2.6*   ALT  --  76  PLEASE DISREGARD RESULTS 58   INR 1.2* 1.2* 1.1     No components found for: [place]    Diagnostics: Imaging studies have been reviewed    Telemetry reviewed by me:   normal sinus rhythm    Assessment and Plan:    COVID-19 virus infection (1/11/2021) / Acute respiratory failure with hypoxia POA: remains symptomatic. Pro calcitonin normal. Continue IV Dexamethasone, Remdesivir, Azithromycin, Vitamin C and Zinc. Still needing substantial oxygen supplementation. Continue to wean down oxygen as tolerated      Acute metabolic encephalopathy (3/55/5306) POA: better. Not as confused. No focal weakness. Due to COVID. Has remained stable. Bradycardia: likely has underlying sleep apnea. TSH normal.  Keep on telemetry. Outpatient sleep study. Seen by cardiology. Outpatient follow up     Diarrhea (1/11/2021) POA: likely due to COVID. Resolved.  Diet as tolerated     Total time spent for the patient's care: [address] discussed with: Patient, Care Manager and Nursing Staff    Discussed:  Care Plan and D/C Planning    Prophylaxis:  Lovenox    Anticipated Disposition:  Home w/Family           ___________________________________________________    Attending Physician:   Ash Reyna MD

## 2021-01-15 NOTE — PROGRESS NOTES
Shift Summary    0330- Bedside and Verbal shift change report given to MOISÉS Solorio RN (oncoming nurse) by NOELLE Estrada RN (offgoing nurse). Report included the following information SBAR, Kardex, Intake/Output, MAR, Recent Results and Cardiac Rhythm  . Patient in bed resting quietly. Assessment completed. No issues/concerns at this time. Blood sent to lab. Patient oxygen went down with respiratory. Patient remains on 6 L. Wife updated on patient plan of care.

## 2021-01-15 NOTE — PROGRESS NOTES
1515- Verbal report received from HANNAH Patel.  1550- Shift assessment completed- see flowsheets. Pt frustrated and wants to go home. Education provided. O2 decreased to 5L NC, sats 91%  1736-  RN spoke with Dr Modi. CT chest ordered to rule out PE before discharge.  1845- Pt off floor for CT.  1900- .Bedside and Verbal shift change report given to HANNAH Eng (oncoming nurse) by HANNAH Chu (offgoing nurse). Report included the following information SBAR, Kardex, Procedure Summary, MAR, Accordion, Med Rec Status, Cardiac Rhythm SB and Alarm Parameters .      Blood pressure recorded on form.

## 2021-01-15 NOTE — PROGRESS NOTES
Comprehensive Nutrition Assessment    Type and Reason for Visit: Initial, RD nutrition re-screen/LOS    Nutrition Recommendations/Plan:   1. Continue with Regular diet order. Nutrition Assessment:     1/15: 67 yo male admitted for COVID, hypoxia. PMhx: diverticulitis, cdiff. Overweight per BMI of 25. No recent weights available in EMR hx. Last weight from 15 months ago and pt is down 16.9kg since then (this is 17% loss which is not significant for time frame). Pt is COVID -19 +. Called into room and spoke with pt via phone. Pt denies that much weight loss, stating he has gone from 210lbs down to 205lbs. Current bed weight is charted as 177 lbs. Will continue to monitor for trends. Regular diet ordered. Pt states he has been eating about 50% all meals. Reports much better PO intakes at home with very good appetite. Pt was not interested in ONS at this time, very much wanting to go home today. On 6 L NC O2.  Labs: reviewed. Meds: Vit C and D3, zinc, decadron. Malnutrition Assessment:  Does not meet criteria. Estimated Daily Nutrient Needs:  Energy (kcal): 1996(REE 1536 x AF 1.3); Weight Used for Energy Requirements: Current  Protein (g): 64-80(0.8-1gm/kg/d); Weight Used for Protein Requirements: Current  Fluid (ml/day): 1996; Method Used for Fluid Requirements: 1 ml/kcal      Nutrition Related Findings:  LBM 1/13; No edema noted      Wounds:    None       Current Nutrition Therapies:  DIET REGULAR    Anthropometric Measures:  · Height:  5' 10\" (177.8 cm)  · Current Body Wt:  80.5 kg (177 lb 7.5 oz)   · Admission Body Wt:       · Usual Body Wt:        · Ideal Body Wt:   :      · Adjusted Body Weight:   ; Weight Adjustment for: No adjustment   · Adjusted BMI:       · BMI Category: Overweight (BMI 25.0-29. 9)       Nutrition Diagnosis:   · No nutrition diagnosis at this time related to   as evidenced by        Nutrition Interventions:   Food and/or Nutrient Delivery: Continue current diet  Nutrition Education and Counseling: No recommendations at this time  Coordination of Nutrition Care: Continue to monitor while inpatient    Goals:  Consume > 50% all meals within next 5-7 days       Nutrition Monitoring and Evaluation:   Behavioral-Environmental Outcomes:    Food/Nutrient Intake Outcomes: Food and nutrient intake  Physical Signs/Symptoms Outcomes: Biochemical data, GI status, Weight    Discharge Planning:    Continue current diet     Electronically signed by Rosenda Drummond RD on 1/15/2021     Contact: [phone]

## 2021-01-16 LAB
APTT PPP: 24.8 SEC (ref 22.1–31)
COMMENT, HOLDF: NORMAL
D DIMER PPP FEU-MCNC: 2.24 MG/L FEU (ref 0–0.65)
FIBRINOGEN PPP-MCNC: 414 MG/DL (ref 200–475)
INR PPP: 1.2 (ref 0.9–1.1)
PROTHROMBIN TIME: 12.4 SEC (ref 9–11.1)
SAMPLES BEING HELD,HOLD: NORMAL
THERAPEUTIC RANGE,PTTT: NORMAL SECS (ref 58–77)

## 2021-01-16 PROCEDURE — 65660000000 HC RM CCU STEPDOWN

## 2021-01-16 PROCEDURE — 74011000250 HC RX REV CODE- 250: Performed by: INTERNAL MEDICINE

## 2021-01-16 PROCEDURE — 85384 FIBRINOGEN ACTIVITY: CPT

## 2021-01-16 PROCEDURE — 94760 N-INVAS EAR/PLS OXIMETRY 1: CPT

## 2021-01-16 PROCEDURE — 85610 PROTHROMBIN TIME: CPT

## 2021-01-16 PROCEDURE — 74011250636 HC RX REV CODE- 250/636: Performed by: HOSPITALIST

## 2021-01-16 PROCEDURE — 74011250637 HC RX REV CODE- 250/637: Performed by: HOSPITALIST

## 2021-01-16 PROCEDURE — 74011250637 HC RX REV CODE- 250/637: Performed by: INTERNAL MEDICINE

## 2021-01-16 PROCEDURE — 85379 FIBRIN DEGRADATION QUANT: CPT

## 2021-01-16 PROCEDURE — 85730 THROMBOPLASTIN TIME PARTIAL: CPT

## 2021-01-16 PROCEDURE — 77010033678 HC OXYGEN DAILY

## 2021-01-16 RX ORDER — BUMETANIDE 0.25 MG/ML
1 INJECTION INTRAMUSCULAR; INTRAVENOUS 2 TIMES DAILY
Status: DISCONTINUED | OUTPATIENT
Start: 2021-01-16 | End: 2021-01-22

## 2021-01-16 RX ADMIN — BUMETANIDE 1 MG: 0.25 INJECTION, SOLUTION INTRAMUSCULAR; INTRAVENOUS at 18:55

## 2021-01-16 RX ADMIN — Medication 1 CAPSULE: at 10:33

## 2021-01-16 RX ADMIN — ENOXAPARIN SODIUM 40 MG: 40 INJECTION SUBCUTANEOUS at 10:28

## 2021-01-16 RX ADMIN — LORAZEPAM 0.5 MG: 2 INJECTION INTRAMUSCULAR; INTRAVENOUS at 04:00

## 2021-01-16 RX ADMIN — AZITHROMYCIN MONOHYDRATE 500 MG: 250 TABLET ORAL at 10:28

## 2021-01-16 RX ADMIN — FAMOTIDINE 20 MG: 20 TABLET, FILM COATED ORAL at 10:28

## 2021-01-16 RX ADMIN — OXYCODONE HYDROCHLORIDE AND ACETAMINOPHEN 500 MG: 500 TABLET ORAL at 18:55

## 2021-01-16 RX ADMIN — OXYCODONE HYDROCHLORIDE AND ACETAMINOPHEN 500 MG: 500 TABLET ORAL at 10:28

## 2021-01-16 RX ADMIN — CHOLECALCIFEROL TAB 125 MCG (5000 UNIT) 5000 UNITS: 125 TAB at 10:28

## 2021-01-16 RX ADMIN — Medication 1 TABLET: at 10:36

## 2021-01-16 RX ADMIN — BUMETANIDE 1 MG: 0.25 INJECTION, SOLUTION INTRAMUSCULAR; INTRAVENOUS at 10:29

## 2021-01-16 RX ADMIN — Medication 10 ML: at 18:55

## 2021-01-16 RX ADMIN — DEXAMETHASONE 6 MG: 6 TABLET ORAL at 10:28

## 2021-01-16 RX ADMIN — FAMOTIDINE 20 MG: 20 TABLET, FILM COATED ORAL at 18:55

## 2021-01-16 NOTE — PROGRESS NOTES
Bedside and Verbal shift change report given to Brandon Merrill (oncoming nurse) by Mychal Fitzpatrick (offgoing nurse). Report included the following information SBAR, Kardex and Cardiac Rhythm Sinus Amy Miguel. Bedside and Written shift change report given to Israel Chin PorterGabriella (oncoming nurse) by Brandon Merrill (offgoing nurse). Report included the following information SBAR, Kardex and Cardiac Rhythm NSR-SB. This patient was assisted with Intentional Toileting every 2 hours during this shift. Documentation of ambulation and output reflected on Flowsheet.

## 2021-01-16 NOTE — PROGRESS NOTES
1/16/2021  Case Management Note    1:32 PM  Spoke with patient's son and brother-in-law over the phone. Confirmed that patient's wife has been admitted to [facility] following attempted suicide. They are not sure how long she will stay there, but it means that the patient does not really have any support at home besides his son, who works etc. We discussed that the patient would very likely not want to go to a SNF. They asked if we could send a referral to [address] [place] [address]; I said that we could, but that might be too intense for the patient. I suggested home with home health and mentioned the possibility of having a personal care aid come in to help with some of the daily activities and be an extra support. The patient's brother in law said that they would consider this and it may be a good way to go. I will send a referral to [address] [place] [address] tentatively upon the direct request of the family, but home with home health is going to be the most likely discharge plan. ROWDY Javier    12:48 PM  Patient's son called while I was at lunch--returned his call and set up a conference call with son and uncle at 1:15pm. Patient's wife was admitted to [facility] per son (likely [facility] as he referenced emotional issues). Will discuss disposition.  Patient is not to be told that his wife has been admitted to the hospital.     ROWDY Javier

## 2021-01-16 NOTE — PROGRESS NOTES
Eugene Kelly [facility] 79  [address] [facility] [place], [address] [place] [address]  [phone]      Medical Progress Note      NAME:         Autumn Samayoa   :        1945  MRM:        [id number]    Date of service: 2021      Subjective: Patient has been seen and examined as a follow up for multiple medical issues. Chart, labs, diagnostics reviewed. He says he remains weak but not in distress. No cough. No fever. Objective:    Vital Signs:    Visit Vitals  /71 (BP 1 Location: Left arm, BP Patient Position: At rest)   Pulse 63   Temp 97.5 °F (36.4 °C)   Resp 18   Ht 5' 10\" (1.778 m)   Wt 80.6 kg (177 lb 9.6 oz)   SpO2 91%   BMI 25.48 kg/m²          Intake/Output Summary (Last 24 hours) at 2021 1051  Last data filed at 2021 1045  Gross per 24 hour   Intake 480 ml   Output 2275 ml   Net -1795 ml        Physical Examination:    General:   Weak but not in any acute distress   Eyes:   pink conjunctivae, PERRLA with no discharge. ENT:   no ottorrhea or rhinorrhea with dry mucous membranes  Neck: no masses, thyroid non-tender and trachea central.  Pulm:  Decreased but clear breath sounds with scattered crackles. No wheezes  Card:  no JVD or murmurs, has sinus bradycardia, without thrills, bruits   Abd:  Soft, non-tender, non-distended, normoactive bowel sounds   Neuro: Awake and alert.  Generally a non focal exam.   Psych:  Has a good insight and is oriented x 3    Current Facility-Administered Medications   Medication Dose Route Frequency    bumetanide (BUMEX) injection 1 mg  1 mg IntraVENous BID    melatonin (rapid dissolve) tablet 5 mg  5 mg Oral QHS PRN    sodium chloride (NS) flush 5-40 mL  5-40 mL IntraVENous Q8H    sodium chloride (NS) flush 5-40 mL  5-40 mL IntraVENous PRN    polyethylene glycol (MIRALAX) packet 17 g  17 g Oral DAILY PRN    bisacodyL (DULCOLAX) suppository 10 mg  10 mg Rectal DAILY PRN    ondansetron (ZOFRAN) injection 4 mg  4 mg IntraVENous Q6H PRN    famotidine (PEPCID) tablet 20 mg  20 mg Oral BID    enoxaparin (LOVENOX) injection 40 mg  40 mg SubCUTAneous DAILY    acetaminophen (TYLENOL) tablet 650 mg  650 mg Oral Q6H PRN    Or    acetaminophen (TYLENOL) suppository 650 mg  650 mg Rectal Q6H PRN    guaiFENesin-dextromethorphan (ROBITUSSIN DM) 100-10 mg/5 mL syrup 5 mL  5 mL Oral Q4H PRN    dexAMETHasone (DECADRON) tablet 6 mg  6 mg Oral DAILY    azithromycin (ZITHROMAX) tablet 500 mg  500 mg Oral DAILY    labetaloL (NORMODYNE;TRANDATE) 20 mg/4 mL (5 mg/mL) injection 20 mg  20 mg IntraVENous Q4H PRN    hydrALAZINE (APRESOLINE) 20 mg/mL injection 20 mg  20 mg IntraVENous Q4H PRN    alum-mag hydroxide-simeth (MYLANTA) oral suspension 30 mL  30 mL Oral Q4H PRN    LORazepam (ATIVAN) injection 0.5 mg  0.5 mg IntraVENous Q6H PRN    oxyCODONE IR (ROXICODONE) tablet 5 mg  5 mg Oral Q4H PRN    oxyCODONE IR (ROXICODONE) tablet 10 mg  10 mg Oral Q4H PRN    morphine injection 2 mg  2 mg IntraVENous Q4H PRN    diphenhydrAMINE (BENADRYL) injection 25 mg  25 mg IntraVENous Q6H PRN    diphenhydrAMINE (BENADRYL) capsule 25 mg  25 mg Oral Q6H PRN    albuterol (PROVENTIL HFA, VENTOLIN HFA, PROAIR HFA) inhaler 4 Puff  4 Puff Inhalation Q4H PRN    simethicone (MYLICON) tablet 80 mg  80 mg Oral QID PRN    [START ON 1/18/2021] cholecalciferol (VITAMIN D3) (1000 Units /25 mcg) tablet 2 Tab  2,000 Units Oral DAILY    cholecalciferol (VITAMIN D3) tablet 5,000 Units  5,000 Units Oral DAILY    And    cholecalciferol (VITAMIN D3) (1000 Units /25 mcg) tablet 1 Tab  1,000 Units Oral DAILY    zinc sulfate (ZINCATE) 220 (50) mg capsule 1 Cap  1 Cap Oral DAILY    ascorbic acid (vitamin C) (VITAMIN C) tablet 500 mg  500 mg Oral BID        Laboratory data and review:    Recent Labs     01/14/21  0333   WBC 7.0   HGB 13.1   HCT 40.1        Recent Labs     01/16/21  0400 01/15/21  0435 01/14/21  0333   NA --   --  145  PLEASE DISREGARD RESULTS   K  --   --  4.2  PLEASE DISREGARD RESULTS   CL  --   --  114*  PLEASE DISREGARD RESULTS   CO2  --   --  25  PLEASE DISREGARD RESULTS   GLU  --   --  115*  PLEASE DISREGARD RESULTS   BUN  --   --  37*  PLEASE DISREGARD RESULTS   CREA  --   --  0.92  PLEASE DISREGARD RESULTS   CA  --   --  7.4*  PLEASE DISREGARD RESULTS   ALB  --   --  2.8*  PLEASE DISREGARD RESULTS   ALT  --   --  76  PLEASE DISREGARD RESULTS   INR 1.2* 1.2* 1.2*     No components found for: [place]    Diagnostics: Imaging studies have been reviewed    Telemetry reviewed by me:   normal sinus rhythm    Assessment and Plan:    COVID-19 virus infection (1/11/2021) / Acute respiratory failure with hypoxia POA: remains symptomatic. Pro calcitonin normal. Completed  Remdesivir, Azithromycin. Continue Dexamethasone, Vitamin C and Zinc. Better today but still needing significant oxygen with de-saturation upon ambulation. Acute metabolic encephalopathy (0/91/2630) POA: better. Not as confused. No focal weakness. Due to COVID. Has remained stable. Bradycardia: likely has underlying sleep apnea. TSH normal.  Keep on telemetry. Outpatient sleep study. Seen by cardiology. Outpatient follow up     Diarrhea (1/11/2021) POA: likely due to COVID. Resolved.  Diet as tolerated     Total time spent for the patient's care: [facility] discussed with: Patient, Care Manager and Nursing Staff    Discussed:  Care Plan and D/C Planning    Prophylaxis:  Lovenox    Anticipated Disposition:  Home w/Family           ___________________________________________________    Attending Physician:   Roxie Hernandez MD

## 2021-01-16 NOTE — PROGRESS NOTES
Spiritual Care Assessment/Progress Note  [address]      NAME: Mandy Palacios      MRN: [id number]  AGE: 66 y.o. SEX: male  Buddhist Affiliation: Restoration   Language: English     1/16/2021     Total Time (in minutes): 13     Spiritual Assessment begun in [facility] PROG CARE TELE 2 through conversation with:         []Patient        [] Family    [] Friend(s)        Reason for Consult: Rapid response team     Spiritual beliefs: (Please include comment if needed)     [] Identifies with a shaila tradition:         [] Supported by a shaila community:            [] Claims no spiritual orientation:           [] Seeking spiritual identity:                [] Adheres to an individual form of spirituality:           [x] Not able to assess:                           Identified resources for coping:      [] Prayer                               [] Music                  [] Guided Imagery     [] Family/friends                 [] Pet visits     [] Devotional reading                         [x] Unknown     [] Other:                                          Interventions offered during this visit: (See comments for more details)    Patient Interventions: Initial visit(Attempted)           Plan of Care:     [] Support spiritual and/or cultural needs    [] Support AMD and/or advance care planning process      [] Support grieving process   [] Coordinate Rites and/or Rituals    [] Coordination with community clergy   [] No spiritual needs identified at this time   [] Detailed Plan of Care below (See Comments)  [] Make referral to Music Therapy  [] Make referral to Pet Therapy     [] Make referral to Addiction services  [] Make referral to [facility]  [] Make referral to Spiritual Care Partner  [] No future visits requested        [x] Follow up upon further referrals     Comments: Responded to RRT on Progressive Care Unit. No family present, staff indicated no need for a  at this time.   Advised nurse to contact Spiritual Care for any further referrals.   Visited by: Sergey Taveras., MS., [address] [facility]ha ([id number])

## 2021-01-16 NOTE — PROGRESS NOTES
Bedside and Verbal shift change report given to [address] [place] [address] (oncoming nurse) by Corie Viveros (offgoing nurse). Report included the following information SBAR, Kardex and MAR. 1030 Weaned oxygen from 6 liters to 4 liters mid flow nasal cannula per MD order. Pulse ox saturations are 91-92%. Tammyie 33 to the patient's daughter Heather Houser at phone number [phone] to give update about events and plan of care today. 200 Spoke to patient's son PHILL about patient's plan of care, his left his phone number to talk to . 1250 Pulse ox sats 94%. Oxygen nasal cannula is set at 4 liters. Offered patient lunch. He declined meal, stated he doesn't like to eat the food here because it doesn't taste good. 1600 Weaned oxygen down to 2 liters nasal cannula. Pulse ox saturations 94-97%. Patient was given ice cream to eat. Patient still expresses he wants to go home today.

## 2021-01-17 LAB
APTT PPP: 29 SEC (ref 22.1–31)
COMMENT, HOLDF: NORMAL
D DIMER PPP FEU-MCNC: 0.98 MG/L FEU (ref 0–0.65)
FIBRINOGEN PPP-MCNC: 491 MG/DL (ref 200–475)
INR PPP: 1.3 (ref 0.9–1.1)
PROTHROMBIN TIME: 12.9 SEC (ref 9–11.1)
SAMPLES BEING HELD,HOLD: NORMAL
THERAPEUTIC RANGE,PTTT: NORMAL SECS (ref 58–77)

## 2021-01-17 PROCEDURE — 85610 PROTHROMBIN TIME: CPT

## 2021-01-17 PROCEDURE — 85379 FIBRIN DEGRADATION QUANT: CPT

## 2021-01-17 PROCEDURE — 36415 COLL VENOUS BLD VENIPUNCTURE: CPT

## 2021-01-17 PROCEDURE — 94760 N-INVAS EAR/PLS OXIMETRY 1: CPT

## 2021-01-17 PROCEDURE — 77010033711 HC HIGH FLOW OXYGEN

## 2021-01-17 PROCEDURE — 74011000250 HC RX REV CODE- 250: Performed by: INTERNAL MEDICINE

## 2021-01-17 PROCEDURE — 85730 THROMBOPLASTIN TIME PARTIAL: CPT

## 2021-01-17 PROCEDURE — 74011250636 HC RX REV CODE- 250/636: Performed by: HOSPITALIST

## 2021-01-17 PROCEDURE — 85384 FIBRINOGEN ACTIVITY: CPT

## 2021-01-17 PROCEDURE — 65660000000 HC RM CCU STEPDOWN

## 2021-01-17 PROCEDURE — 74011250637 HC RX REV CODE- 250/637: Performed by: HOSPITALIST

## 2021-01-17 PROCEDURE — 74011250637 HC RX REV CODE- 250/637: Performed by: INTERNAL MEDICINE

## 2021-01-17 RX ADMIN — Medication 1 TABLET: at 09:39

## 2021-01-17 RX ADMIN — DEXAMETHASONE 6 MG: 6 TABLET ORAL at 09:38

## 2021-01-17 RX ADMIN — OXYCODONE HYDROCHLORIDE AND ACETAMINOPHEN 500 MG: 500 TABLET ORAL at 18:48

## 2021-01-17 RX ADMIN — FAMOTIDINE 20 MG: 20 TABLET, FILM COATED ORAL at 09:38

## 2021-01-17 RX ADMIN — BUMETANIDE 1 MG: 0.25 INJECTION, SOLUTION INTRAMUSCULAR; INTRAVENOUS at 18:48

## 2021-01-17 RX ADMIN — FAMOTIDINE 20 MG: 20 TABLET, FILM COATED ORAL at 18:48

## 2021-01-17 RX ADMIN — Medication 10 ML: at 14:58

## 2021-01-17 RX ADMIN — BUMETANIDE 1 MG: 0.25 INJECTION, SOLUTION INTRAMUSCULAR; INTRAVENOUS at 09:39

## 2021-01-17 RX ADMIN — CHOLECALCIFEROL TAB 125 MCG (5000 UNIT) 5000 UNITS: 125 TAB at 09:39

## 2021-01-17 RX ADMIN — OXYCODONE HYDROCHLORIDE AND ACETAMINOPHEN 500 MG: 500 TABLET ORAL at 09:39

## 2021-01-17 RX ADMIN — Medication 10 ML: at 09:40

## 2021-01-17 RX ADMIN — ENOXAPARIN SODIUM 40 MG: 40 INJECTION SUBCUTANEOUS at 09:39

## 2021-01-17 RX ADMIN — Medication 5 MG: at 01:20

## 2021-01-17 RX ADMIN — Medication 1 CAPSULE: at 09:00

## 2021-01-17 RX ADMIN — Medication 10 ML: at 01:00

## 2021-01-17 NOTE — PROGRESS NOTES
Problem: Falls - Risk of  Goal: *Absence of Falls  Description: Document Samira Conner Fall Risk and appropriate interventions in the flowsheet.   Outcome: Progressing Towards Goal  Note: Fall Risk Interventions:  Mobility Interventions: Bed/chair exit alarm, PT Consult for mobility concerns, OT consult for ADLs    Mentation Interventions: Bed/chair exit alarm, Reorient patient, More frequent rounding, Update white board    Medication Interventions: Patient to call before getting OOB, Teach patient to arise slowly    Elimination Interventions: Bed/chair exit alarm, Call light in reach, Urinal in reach

## 2021-01-17 NOTE — PROGRESS NOTES
Eugene Kelly [facility] [address]  [address] [facility], [place], [address] [place] [address]  [phone]      Medical Progress Note      NAME:         Juliocesar Shepard   :        1945  MRM:        [id number]    Date of service: 2021      Subjective: Patient has been seen and examined as a follow up for multiple medical issues. Chart, labs, diagnostics reviewed. He remains weak. \" I want to go home\". No cough or fever. Needing oxygen titration last night. Objective:    Vital Signs:    Visit Vitals  /70 (BP 1 Location: Left arm, BP Patient Position: At rest)   Pulse 63   Temp 97.9 °F (36.6 °C)   Resp 17   Ht 5' 10\" (1.778 m)   Wt 80.6 kg (177 lb 9.6 oz)   SpO2 93% Comment: Simultaneous filing. User may not have seen previous data. BMI 25.48 kg/m²          Intake/Output Summary (Last 24 hours) at 2021 1028  Last data filed at 2021 2133  Gross per 24 hour   Intake 360 ml   Output 1700 ml   Net -1340 ml        Physical Examination:    General:   Weak but not in any acute distress   Eyes:   pink conjunctivae, PERRLA with no discharge. ENT:   no ottorrhea or rhinorrhea with dry mucous membranes  Neck: no masses, thyroid non-tender and trachea central.  Pulm: decreased breath sounds without much crackles. No wheezes  Card:  no JVD or murmurs, has sinus bradycardia, without thrills, bruits   Abd:  Soft, non-tender, non-distended, normoactive bowel sounds   Neuro: Awake and alert.  Generally a non focal exam.   Psych:  Has a good insight and is oriented x 3    Current Facility-Administered Medications   Medication Dose Route Frequency    bumetanide (BUMEX) injection 1 mg  1 mg IntraVENous BID    melatonin (rapid dissolve) tablet 5 mg  5 mg Oral QHS PRN    sodium chloride (NS) flush 5-40 mL  5-40 mL IntraVENous Q8H    sodium chloride (NS) flush 5-40 mL  5-40 mL IntraVENous PRN    polyethylene glycol (MIRALAX) packet 17 g  17 g Oral DAILY PRN    bisacodyL (DULCOLAX) suppository 10 mg  10 mg Rectal DAILY PRN    ondansetron (ZOFRAN) injection 4 mg  4 mg IntraVENous Q6H PRN    famotidine (PEPCID) tablet 20 mg  20 mg Oral BID    enoxaparin (LOVENOX) injection 40 mg  40 mg SubCUTAneous DAILY    acetaminophen (TYLENOL) tablet 650 mg  650 mg Oral Q6H PRN    Or    acetaminophen (TYLENOL) suppository 650 mg  650 mg Rectal Q6H PRN    guaiFENesin-dextromethorphan (ROBITUSSIN DM) 100-10 mg/5 mL syrup 5 mL  5 mL Oral Q4H PRN    dexAMETHasone (DECADRON) tablet 6 mg  6 mg Oral DAILY    labetaloL (NORMODYNE;TRANDATE) 20 mg/4 mL (5 mg/mL) injection 20 mg  20 mg IntraVENous Q4H PRN    hydrALAZINE (APRESOLINE) 20 mg/mL injection 20 mg  20 mg IntraVENous Q4H PRN    alum-mag hydroxide-simeth (MYLANTA) oral suspension 30 mL  30 mL Oral Q4H PRN    LORazepam (ATIVAN) injection 0.5 mg  0.5 mg IntraVENous Q6H PRN    oxyCODONE IR (ROXICODONE) tablet 5 mg  5 mg Oral Q4H PRN    oxyCODONE IR (ROXICODONE) tablet 10 mg  10 mg Oral Q4H PRN    morphine injection 2 mg  2 mg IntraVENous Q4H PRN    diphenhydrAMINE (BENADRYL) injection 25 mg  25 mg IntraVENous Q6H PRN    diphenhydrAMINE (BENADRYL) capsule 25 mg  25 mg Oral Q6H PRN    albuterol (PROVENTIL HFA, VENTOLIN HFA, PROAIR HFA) inhaler 4 Puff  4 Puff Inhalation Q4H PRN    simethicone (MYLICON) tablet 80 mg  80 mg Oral QID PRN    [START ON 1/18/2021] cholecalciferol (VITAMIN D3) (1000 Units /25 mcg) tablet 2 Tab  2,000 Units Oral DAILY    zinc sulfate (ZINCATE) 220 (50) mg capsule 1 Cap  1 Cap Oral DAILY    ascorbic acid (vitamin C) (VITAMIN C) tablet 500 mg  500 mg Oral BID        Laboratory data and review:    No results for input(s): WBC, HGB, HCT, PLT, HGBEXT, HCTEXT, PLTEXT, HGBEXT, HCTEXT, PLTEXT in the last 72 hours.   Recent Labs     01/17/21  0258 01/16/21  0400 01/15/21  0435   INR 1.3* 1.2* 1.2*     No components found for: [place]    Diagnostics: Imaging studies have been reviewed    Telemetry reviewed by me:   normal sinus rhythm    Assessment and Plan:    COVID-19 virus infection (1/11/2021) / Acute respiratory failure with hypoxia POA: remains symptomatic. Pro calcitonin normal. Completed  Remdesivir, Azithromycin. Continue Dexamethasone, Vitamin C and Zinc. He still requires significant amount of oxygen. Discussed with his son Nahun Mccain) and brother in law ( mr Leodan Morrison - [phone]) Patient's wife currently hospitalized and in light of the patient's frail nature, he may need rehab prior to returning home. Continue to wean down oxygen needs, CM for discharge planning. Acute metabolic encephalopathy (7/31/5196) POA: this has resolved. No focal weakness. Due to COVID. Has remained stable. Physical debility: due to COVID. Continue PT, OT    Bradycardia: likely has underlying sleep apnea. TSH normal.  Keep on telemetry. Outpatient sleep study. Seen by cardiology. Outpatient follow up     Diarrhea (1/11/2021) POA: likely due to COVID. Resolved.  Diet as tolerated     Total time spent for the patient's care: [address] discussed with: Patient, Care Manager and Nursing Staff    Discussed:  Care Plan and D/C Planning    Prophylaxis:  Lovenox    Anticipated Disposition:  Home w/Family vs rehab           ___________________________________________________    Attending Physician:   Elayne Young MD

## 2021-01-17 NOTE — PROGRESS NOTES
Shift change    Bedside and Verbal shift change report given to Rojelio Baltazar RN(oncoming nurse) by Gautam Joseph RN (offgoing nurse). Report included the following information SBAR, Kardex and Recent Results. Disaster charting started   Shift Summary  0120: Pt began to desat, increased O2 up 13LPM and pt. O2 was 92%    Shift Change    Bedside and verbal shift change report given to Renea Cline RN (oncoming nurse) by Rojelio Baltazar RN (offgoing nurse). Report included the following information SBAR, Kardex and Recent Results.

## 2021-01-17 NOTE — PROGRESS NOTES
Bedside shift change report given to [facility] (oncoming nurse) by Nehemias Encinas (offgoing nurse). Report included the following information SBAR, Kardex, ED Summary, Intake/Output, MAR and Cardiac Rhythm NSR. Central line Type: 3 l  Central Line Insert Date: 1.10.21  Reason Central Line Placed: No iv access  [employer] Dressing Date: 1.13.21  Biopatch in place? Yes No: yes  Tubing labeled and appropriate? Yes No: yes  Alcohol caps on all open ports? Yes No: yes  Last CHG bath (time&date):1.17.21  Reviewed with provider and central line must stay in for the following reasons: No    Bedside shift change report given Ciara(oncoming nurse) by Rocael(offgoing nurse). Report included the following information SBAR, Kardex, ED Summary, Intake/Output, MAR and Cardiac Rhythm nsr.

## 2021-01-18 LAB
APTT PPP: 30.3 SEC (ref 22.1–31)
D DIMER PPP FEU-MCNC: 0.93 MG/L FEU (ref 0–0.65)
FIBRINOGEN PPP-MCNC: 523 MG/DL (ref 200–475)
INR PPP: 1.2 (ref 0.9–1.1)
PROTHROMBIN TIME: 12.4 SEC (ref 9–11.1)
THERAPEUTIC RANGE,PTTT: NORMAL SECS (ref 58–77)

## 2021-01-18 PROCEDURE — 85730 THROMBOPLASTIN TIME PARTIAL: CPT

## 2021-01-18 PROCEDURE — 99223 1ST HOSP IP/OBS HIGH 75: CPT | Performed by: INTERNAL MEDICINE

## 2021-01-18 PROCEDURE — 36415 COLL VENOUS BLD VENIPUNCTURE: CPT

## 2021-01-18 PROCEDURE — 65660000000 HC RM CCU STEPDOWN

## 2021-01-18 PROCEDURE — 74011250636 HC RX REV CODE- 250/636: Performed by: HOSPITALIST

## 2021-01-18 PROCEDURE — 74011250637 HC RX REV CODE- 250/637: Performed by: INTERNAL MEDICINE

## 2021-01-18 PROCEDURE — 74011000250 HC RX REV CODE- 250: Performed by: INTERNAL MEDICINE

## 2021-01-18 PROCEDURE — 2709999900 HC NON-CHARGEABLE SUPPLY

## 2021-01-18 PROCEDURE — 94640 AIRWAY INHALATION TREATMENT: CPT

## 2021-01-18 PROCEDURE — 85610 PROTHROMBIN TIME: CPT

## 2021-01-18 PROCEDURE — 94760 N-INVAS EAR/PLS OXIMETRY 1: CPT

## 2021-01-18 PROCEDURE — 74011250637 HC RX REV CODE- 250/637: Performed by: HOSPITALIST

## 2021-01-18 PROCEDURE — 85384 FIBRINOGEN ACTIVITY: CPT

## 2021-01-18 PROCEDURE — 85379 FIBRIN DEGRADATION QUANT: CPT

## 2021-01-18 PROCEDURE — 99356 PR PROLONGED SVC I/P OR OBS SETTING 1ST HOUR: CPT | Performed by: INTERNAL MEDICINE

## 2021-01-18 PROCEDURE — 77010033711 HC HIGH FLOW OXYGEN

## 2021-01-18 RX ADMIN — BUMETANIDE 1 MG: 0.25 INJECTION, SOLUTION INTRAMUSCULAR; INTRAVENOUS at 17:55

## 2021-01-18 RX ADMIN — OXYCODONE HYDROCHLORIDE AND ACETAMINOPHEN 500 MG: 500 TABLET ORAL at 17:55

## 2021-01-18 RX ADMIN — DEXAMETHASONE 6 MG: 6 TABLET ORAL at 14:13

## 2021-01-18 RX ADMIN — BUMETANIDE 1 MG: 0.25 INJECTION, SOLUTION INTRAMUSCULAR; INTRAVENOUS at 08:27

## 2021-01-18 RX ADMIN — Medication 1 CAPSULE: at 14:13

## 2021-01-18 RX ADMIN — FAMOTIDINE 20 MG: 20 TABLET, FILM COATED ORAL at 14:16

## 2021-01-18 RX ADMIN — Medication 10 ML: at 03:38

## 2021-01-18 RX ADMIN — Medication 10 ML: at 14:13

## 2021-01-18 RX ADMIN — Medication 2 TABLET: at 14:13

## 2021-01-18 RX ADMIN — OXYCODONE HYDROCHLORIDE AND ACETAMINOPHEN 500 MG: 500 TABLET ORAL at 14:16

## 2021-01-18 RX ADMIN — ENOXAPARIN SODIUM 40 MG: 40 INJECTION SUBCUTANEOUS at 08:27

## 2021-01-18 RX ADMIN — Medication 10 ML: at 22:00

## 2021-01-18 RX ADMIN — ALBUTEROL SULFATE 4 PUFF: 90 AEROSOL, METERED RESPIRATORY (INHALATION) at 03:56

## 2021-01-18 RX ADMIN — FAMOTIDINE 20 MG: 20 TABLET, FILM COATED ORAL at 17:54

## 2021-01-18 NOTE — PROGRESS NOTES
0602 Pt arrived to ICU as a stepdown patient and report was given at bedside by Monica Ibarra RN. from [facility] using SBAR format. Pt. Vitals collected and pt placed on the monitor. Was placed on High flow at 50L 95%. Pt is oriented to self and place not situation and time. Pt. [place] out but at present is keeping high flow on. Will continue to monitor.

## 2021-01-18 NOTE — CONSULTS
Palliative Medicine Consult  [place]: [phone])    Patient Name: Carlos Watt  YOB: 1945    Date of Initial Consult: 1/18/21  Reason for Consult: Care decisions   Requesting Provider: Dr. Rhoda Dale   Primary Care Physician: Loren Oconnor MD     SUMMARY:   Carlos Watt is a 66 y.o. with a past history of spinal stenosis and tobacco use. He presented with one week of symptoms consistent with covid-19 and tested positive on admission on 1/10/2021. He was admitted with a diagnosis of acute hypoxic respiratory failure and had been requiring HFNC on [facility]. He completed a course of Remdesevir and azithromycin this admission, remains on Decadron, Vit C and Zinc.  He required transfer to a stepdown bed in the unit early the morning of 1/18. His  Current medical issues leading to Palliative Medicine involvement include:  High risk decline, psychosocial support. Pscyhosocial Hx-  > 20 yrs to Marya. He has three children, Shayy Goodman and PHILL. PALLIATIVE DIAGNOSES:   1. Goals of care counseling/discussion  2. DNR discussion   3. Acute hypoxic respiratory failure due to COVID-19 viral pna  4. Emphysematous changes on CTA- 1/15  5. Tobacco use       PLAN:   1. Discussed case with bedside RN Sera. Pt has been irritable all morning making multiple attempts to get out of bed and is adamant to leave the hospital.    2. On exam he appears comfortable, no respiratory distress while on heated HFNC of 50L. He is repeatedly frustrated at inability to have ice and water. 3. He expresses directly that he is leaving the hospital, no discussion. I provided redirection and education that an attempt to remove O2 and leave could lead to his demise and death. Despite being cam neg on RN assessment and answering appropriately all orientation and questions about his family and life, he clearly is not displaying sound rational judgment and is lacking in insight.     4. Explored concerns- understanding his wife is currently hospitalized and he is worried about his youngest son. Emphasized a need for him to be well to help his family. 5. He shared openly that he is not worried about dying, he feels he has lived a full long life. 6. Code status- reviewed and he stated very clearly that he would not want to go on a ventilator nor receive CPR/shock. 7. After visit held meeting by phone with his three children and his brother in law. Provided a medical update to all and discussed the uncertainty of his recent decline. They shared that he is anxious and hates hospitals. Reviewed ways of redirecting him while here. Family requested anxiolytics, but I reviewed balance of preserving respiratory drive in this critical stage. 8. Thoroughly discussed patient's decision about DNR status. Family worried this was driven by anxiety and wondering if discussion was premature. Followed up phone meeting with a bedside meeting with all four family members by [address] [place] [address] Avenue and myself. Patient was more relaxed at this time and certainly comforted by family's presence and encouragement. Together with all present we revisited code status and patient clearly reiterated his desire not to go on a ventilator and die peacefully if he worsens. Family provided opportunity to ask questions. 9. Goals of care- continue all current restorative efforts with hopes of full recovery. Only current limit is intubation/ resuscitation. 10. Code status- DNR as confirmed above twice today by patient, 2nd in presence of family by Zoom. 11. Spiritual needs- pt identifies as Roman Catholic. Family reports he would appreciate  support periodically. 15. Family- well supported by children and brother in law. Wife is currently a pscyhiatric inpatient. 13. Symptoms- anxiety and irritability. Frequent reeducation, redirection. Consider initiation of Precedex if this worsens.     14. Patient will strongly benefit from regular Zoom family calls if possible. 15. Thank you for the opportunity to participate in the care of Juliocesar Shepard  16. Communicated plan of care with: Palliative [facility] Team including bedside RN. GOALS OF CARE / TREATMENT PREFERENCES:     GOALS OF CARE:  Patient/Health Care Proxy Stated Goals: Cure    TREATMENT PREFERENCES:   Code Status: DNR    Advance Care Planning:  [x] [facility] Interdisciplinary Team has updated the ACP Navigator with Health Care Decision Maker and Patient Capacity      Primary Decision Maker: Asad Corey - Spouse - [phone]    Secondary Decision Maker: Josh Guerra - Child - [phone]    Secondary Decision Maker: Keven Tatum - Son - [phone]    Secondary Decision Maker: Coni Marques - Daughter - [phone]     Advance Care Planning 1/18/2021   Patient's Healthcare Decision Maker is: Legal Next of Kin   Confirm Advance Directive -   Patient Would Like to Complete Advance Directive -       Medical Interventions: Limited additional interventions(No CPR or intubation)       Other:    As far as possible, the palliative care team has discussed with patient / health care proxy about goals of care / treatment preferences for patient. HISTORY:     History obtained from:  Patient, chart review, family    CHIEF COMPLAINT: \"I'm leaving here! \"    HPI/SUBJECTIVE:    The patient is:   [x] Verbal and participatory  [] Non-participatory due to:     67 yo admitted with several days of sob, cough, weakness. Symptoms have persisted throughout hospitalization. Oxygenation worsened overnight. On day of initial consult he expressed frustration at already having been here a week, wants to leave ASAP. He is worried about his wife, children.        Clinical Pain Assessment (nonverbal scale for severity on nonverbal patients):   Clinical Pain Assessment  Severity: 0          Duration: for how long has pt been experiencing pain (e.g., 2 days, 1 month, years)  Frequency: how often pain is an issue (e.g., several times per day, once every few days, constant)     FUNCTIONAL ASSESSMENT:     Palliative Performance Scale (PPS):  PPS: 70       PSYCHOSOCIAL/SPIRITUAL SCREENING:     Palliative IDT has assessed this patient for cultural preferences / practices and a referral made as appropriate to needs (Cultural Services, Patient Advocacy, Ethics, etc.)    Any spiritual / Episcopal concerns:  [] Yes /  [x] No    Caregiver Burnout:  [] Yes /  [x] No /  [x] No Caregiver Present      Anticipatory grief assessment:   [x] Normal  / [] Maladaptive       ESAS Anxiety: Anxiety: 5    ESAS Depression:          REVIEW OF SYSTEMS:     Positive and pertinent negative findings in ROS are noted above in HPI. The following systems were [x] reviewed / [] unable to be reviewed as noted in HPI  Other findings are noted below. Systems: constitutional, ears/nose/mouth/throat, respiratory, gastrointestinal, genitourinary, musculoskeletal, integumentary, neurologic, psychiatric, endocrine. Positive findings noted below. Modified ESAS Completed by: provider           Pain: 0   Anxiety: 5       Dyspnea: 0           Stool Occurrence(s): 1        PHYSICAL EXAM:     From RN flowsheet:  Wt Readings from Last 3 Encounters:   01/18/21 182 lb 6.4 oz (82.7 kg)   10/04/19 215 lb (97.5 kg)   08/05/17 192 lb (87.1 kg)     Blood pressure 133/86, pulse 66, temperature 97.5 °F (36.4 °C), resp. rate 13, height 5' 10\" (1.778 m), weight 182 lb 6.4 oz (82.7 kg), SpO2 94 %. Pain Scale 1: Numeric (0 - 10)  Pain Intensity 1: 0                 Last bowel movement, if known:     Constitutional: age appropriate  male; lying in bed.     Eyes: pupils equal, anicteric  ENMT: no nasal discharge, moist mucous membranes  Cardiovascular: regular rhythm, distal pulses intact  Respiratory: breathing not labored on HFNC  Gastrointestinal: soft non-tender, +bowel sounds  Musculoskeletal: no deformity, no tenderness to palpation  Skin: warm, dry  Neurologic: A&Ox3, following commands, moving all extremities  Psychiatric: Anxious, irritable on initial assessment, improved in afternoon       HISTORY:     Principal Problem:    COVID-19 virus infection (1/11/2021)    Active Problems:    Diarrhea (1/11/2021)      Acute respiratory insufficiency (1/11/2021)      Hypoxia (1/11/2021)      Acute metabolic encephalopathy (6/02/0307)      Past Medical History:   Diagnosis Date    Diverticulitis     Gastrointestinal disorder     History of vascular access device 07/24/2017    [facility] VAT -  5FR triple Lumen Power PICC R Basilic  39 cm    Spinal stenosis       Past Surgical History:   Procedure Laterality Date    FLEXIBLE SIGMOIDOSCOPY N/A 7/27/2017    SIGMOIDOSCOPY FLEXIBLE performed by Ferny Grijalva MD at [facility] ENDOSCOPY    IR INSERT NON TUNL CVC OVER 5 YRS  1/13/2021      No family history on file. History reviewed, no pertinent family history. Social History     Tobacco Use    Smoking status: Current Some Day Smoker     Packs/day: 1.00     Years: 50.00     Pack years: 50.00    Smokeless tobacco: Current User   Substance Use Topics    Alcohol use:  Yes     Alcohol/week: 0.8 standard drinks     Types: 1 Cans of beer per week     No Known Allergies   Current Facility-Administered Medications   Medication Dose Route Frequency    bumetanide (BUMEX) injection 1 mg  1 mg IntraVENous BID    melatonin (rapid dissolve) tablet 5 mg  5 mg Oral QHS PRN    sodium chloride (NS) flush 5-40 mL  5-40 mL IntraVENous Q8H    sodium chloride (NS) flush 5-40 mL  5-40 mL IntraVENous PRN    polyethylene glycol (MIRALAX) packet 17 g  17 g Oral DAILY PRN    bisacodyL (DULCOLAX) suppository 10 mg  10 mg Rectal DAILY PRN    ondansetron (ZOFRAN) injection 4 mg  4 mg IntraVENous Q6H PRN    famotidine (PEPCID) tablet 20 mg  20 mg Oral BID    enoxaparin (LOVENOX) injection 40 mg  40 mg SubCUTAneous DAILY    acetaminophen (TYLENOL) tablet 650 mg  650 mg Oral Q6H PRN    Or    acetaminophen (TYLENOL) suppository 650 mg  650 mg Rectal Q6H PRN    guaiFENesin-dextromethorphan (ROBITUSSIN DM) 100-10 mg/5 mL syrup 5 mL  5 mL Oral Q4H PRN    dexAMETHasone (DECADRON) tablet 6 mg  6 mg Oral DAILY    labetaloL (NORMODYNE;TRANDATE) 20 mg/4 mL (5 mg/mL) injection 20 mg  20 mg IntraVENous Q4H PRN    hydrALAZINE (APRESOLINE) 20 mg/mL injection 20 mg  20 mg IntraVENous Q4H PRN    alum-mag hydroxide-simeth (MYLANTA) oral suspension 30 mL  30 mL Oral Q4H PRN    LORazepam (ATIVAN) injection 0.5 mg  0.5 mg IntraVENous Q6H PRN    oxyCODONE IR (ROXICODONE) tablet 5 mg  5 mg Oral Q4H PRN    oxyCODONE IR (ROXICODONE) tablet 10 mg  10 mg Oral Q4H PRN    morphine injection 2 mg  2 mg IntraVENous Q4H PRN    diphenhydrAMINE (BENADRYL) injection 25 mg  25 mg IntraVENous Q6H PRN    diphenhydrAMINE (BENADRYL) capsule 25 mg  25 mg Oral Q6H PRN    albuterol (PROVENTIL HFA, VENTOLIN HFA, PROAIR HFA) inhaler 4 Puff  4 Puff Inhalation Q4H PRN    simethicone (MYLICON) tablet 80 mg  80 mg Oral QID PRN    cholecalciferol (VITAMIN D3) (1000 Units /25 mcg) tablet 2 Tab  2,000 Units Oral DAILY    zinc sulfate (ZINCATE) 220 (50) mg capsule 1 Cap  1 Cap Oral DAILY    ascorbic acid (vitamin C) (VITAMIN C) tablet 500 mg  500 mg Oral BID          LAB AND IMAGING FINDINGS:     Lab Results   Component Value Date/Time    WBC 7.0 01/14/2021 03:33 AM    HGB 13.1 01/14/2021 03:33 AM    PLATELET 767 19/05/0947 03:33 AM     Lab Results   Component Value Date/Time    Sodium PLEASE DISREGARD RESULTS 01/14/2021 03:33 AM    Sodium 145 01/14/2021 03:33 AM    Potassium PLEASE DISREGARD RESULTS 01/14/2021 03:33 AM    Potassium 4.2 01/14/2021 03:33 AM    Chloride PLEASE DISREGARD RESULTS 01/14/2021 03:33 AM    Chloride 114 (H) 01/14/2021 03:33 AM    CO2 PLEASE DISREGARD RESULTS 01/14/2021 03:33 AM    CO2 25 01/14/2021 03:33 AM    BUN PLEASE DISREGARD RESULTS 01/14/2021 03:33 AM    BUN 37 (H) 01/14/2021 03:33 AM    Creatinine PLEASE DISREGARD RESULTS 01/14/2021 03:33 AM    Creatinine 0.92 01/14/2021 03:33 AM    Calcium PLEASE DISREGARD RESULTS 01/14/2021 03:33 AM    Calcium 7.4 (L) 01/14/2021 03:33 AM    Magnesium 2.8 (H) 01/13/2021 03:51 AM    Phosphorus 2.6 01/13/2021 03:51 AM      Lab Results   Component Value Date/Time    Alk. phosphatase PLEASE DISREGARD RESULTS 01/14/2021 03:33 AM    Alk. phosphatase 54 01/14/2021 03:33 AM    Protein, total PLEASE DISREGARD RESULTS 01/14/2021 03:33 AM    Protein, total 5.9 (L) 01/14/2021 03:33 AM    Albumin PLEASE DISREGARD RESULTS 01/14/2021 03:33 AM    Albumin 2.8 (L) 01/14/2021 03:33 AM    Globulin Cannot be calculated 01/14/2021 03:33 AM    Globulin 3.1 01/14/2021 03:33 AM     Lab Results   Component Value Date/Time    INR 1.2 (H) 01/18/2021 03:39 AM    Prothrombin time 12.4 (H) 01/18/2021 03:39 AM    aPTT 30.3 01/18/2021 03:39 AM      Lab Results   Component Value Date/Time    Ferritin 1,431 (H) 01/10/2021 10:35 PM      Lab Results   Component Value Date/Time    pH 7.44 01/11/2021 05:09 AM    PCO2 31 (L) 01/11/2021 05:09 AM    PO2 69 (L) 01/11/2021 05:09 AM     No components found for: GLPOC   No results found for: CPK, CKMB             Total time:   105m  Counseling / coordination time, spent as noted above: 95m  > 50% counseling / coordination?:     Prolonged service was provided for  []30 min   []75 min in face to face time in the presence of the patient, spent as noted above. Time Start: 10:15  Time End: 10:45  Time Start:  13:00  Time End:  13:45  Time Start:  15:00  Time End:  15:20  Note: this can only be billed with  (initial) or 65055 (follow up). If multiple start / stop times, list each separately.

## 2021-01-18 NOTE — PROGRESS NOTES
Eugene Kelly lexie [place] 77  [address] [facility], [address] [place] [address]  [phone]      Medical Progress Note      NAME:         Zaina Iqbal   :        1945  MRM:        [id number]    Date of service: 2021      Subjective: Patient has been seen and examined as a follow up for multiple medical issues. Chart, labs, diagnostics reviewed. He had a change in condition last night leading to transfer and increase on oxygen requirements. Has been saying he wants to leave. Remains very frail and SOB. Objective:    Vital Signs:    Visit Vitals  /80   Pulse 64   Temp 97.8 °F (36.6 °C)   Resp 10   Ht 5' 10\" (1.778 m)   Wt 82.7 kg (182 lb 6.4 oz)   SpO2 94%   BMI 26.17 kg/m²          Intake/Output Summary (Last 24 hours) at 2021 1301  Last data filed at 2021 0700  Gross per 24 hour   Intake 100 ml   Output 1150 ml   Net -1050 ml        Physical Examination:    General: he remains very weak, frail but not in distress    Eyes:   pink conjunctivae, PERRLA with no discharge. ENT:   no ottorrhea or rhinorrhea with dry mucous membranes  Pulm: decreased breath sounds without much crackles. No wheezes  Card:  no JVD or murmurs, has normal heart rate,  without thrills, bruits   Abd:  Soft, non-tender, non-distended, normoactive bowel sounds   Neuro: Awake and alert but intermittently confused. Generally a non focal exam.   Psych:  Has a poor insight to his illness.      Current Facility-Administered Medications   Medication Dose Route Frequency    bumetanide (BUMEX) injection 1 mg  1 mg IntraVENous BID    melatonin (rapid dissolve) tablet 5 mg  5 mg Oral QHS PRN    sodium chloride (NS) flush 5-40 mL  5-40 mL IntraVENous Q8H    sodium chloride (NS) flush 5-40 mL  5-40 mL IntraVENous PRN    polyethylene glycol (MIRALAX) packet 17 g  17 g Oral DAILY PRN    bisacodyL (DULCOLAX) suppository 10 mg  10 mg Rectal DAILY PRN    ondansetron (ZOFRAN) injection 4 mg  4 mg IntraVENous Q6H PRN    famotidine (PEPCID) tablet 20 mg  20 mg Oral BID    enoxaparin (LOVENOX) injection 40 mg  40 mg SubCUTAneous DAILY    acetaminophen (TYLENOL) tablet 650 mg  650 mg Oral Q6H PRN    Or    acetaminophen (TYLENOL) suppository 650 mg  650 mg Rectal Q6H PRN    guaiFENesin-dextromethorphan (ROBITUSSIN DM) 100-10 mg/5 mL syrup 5 mL  5 mL Oral Q4H PRN    dexAMETHasone (DECADRON) tablet 6 mg  6 mg Oral DAILY    labetaloL (NORMODYNE;TRANDATE) 20 mg/4 mL (5 mg/mL) injection 20 mg  20 mg IntraVENous Q4H PRN    hydrALAZINE (APRESOLINE) 20 mg/mL injection 20 mg  20 mg IntraVENous Q4H PRN    alum-mag hydroxide-simeth (MYLANTA) oral suspension 30 mL  30 mL Oral Q4H PRN    LORazepam (ATIVAN) injection 0.5 mg  0.5 mg IntraVENous Q6H PRN    oxyCODONE IR (ROXICODONE) tablet 5 mg  5 mg Oral Q4H PRN    oxyCODONE IR (ROXICODONE) tablet 10 mg  10 mg Oral Q4H PRN    morphine injection 2 mg  2 mg IntraVENous Q4H PRN    diphenhydrAMINE (BENADRYL) injection 25 mg  25 mg IntraVENous Q6H PRN    diphenhydrAMINE (BENADRYL) capsule 25 mg  25 mg Oral Q6H PRN    albuterol (PROVENTIL HFA, VENTOLIN HFA, PROAIR HFA) inhaler 4 Puff  4 Puff Inhalation Q4H PRN    simethicone (MYLICON) tablet 80 mg  80 mg Oral QID PRN    cholecalciferol (VITAMIN D3) (1000 Units /25 mcg) tablet 2 Tab  2,000 Units Oral DAILY    zinc sulfate (ZINCATE) 220 (50) mg capsule 1 Cap  1 Cap Oral DAILY    ascorbic acid (vitamin C) (VITAMIN C) tablet 500 mg  500 mg Oral BID        Laboratory data and review:    No results for input(s): WBC, HGB, HCT, PLT, HGBEXT, HCTEXT, PLTEXT, HGBEXT, HCTEXT, PLTEXT in the last 72 hours.   Recent Labs     01/18/21  9452 01/17/21  0258 01/16/21  0400   INR 1.2* 1.3* 1.2*     No components found for: [place]    Diagnostics: Imaging studies have been reviewed    Telemetry reviewed by me:   normal sinus rhythm    Assessment and Plan:    COVID-19 virus infection (1/11/2021) POA: remains symptomatic. Pro calcitonin normal. Completed  Remdesivir, Azithromycin. Continue Dexamethasone, Vitamin C and Zinc. He still requires significant amount of oxygen. Have been discussing with his son Kyrie Salas) and brother in law ( Mr Hansen aver - [phone]). Patient's wife currently hospitalized and in light of the patient's frail nature, he will need rehab prior to returning home. With the changes last night, he remains at risk of deterioration. Continue weaning oxygen as tolerated. CM for discharge planning. Palliative care following        Acute respiratory failure with hypoxia POA: due to COVID infection. Continue to wean down as tolerated    Acute metabolic encephalopathy (8/71/4198) POA: this is intermittent. Likely from COVID, hypoxia. Continue supportive care      Physical debility: due to COVID. Continue PT, OT    Bradycardia: likely has underlying sleep apnea. TSH normal.  Keep on telemetry. Outpatient sleep study. Seen by cardiology.  Outpatient follow up     Total time spent for the patient's care: [address] discussed with: Patient, Care Manager and Nursing Staff    Discussed:  Care Plan and D/C Planning    Prophylaxis:  Lovenox    Anticipated Disposition:  Rehab           ___________________________________________________    Attending Physician:   Samson Kumar MD

## 2021-01-18 NOTE — PROGRESS NOTES
Spiritual Care Assessment/Progress Note  [address]      NAME: Ankit Briceno      MRN: [id number]  AGE: 66 y.o. SEX: male  Adventist Affiliation: Confucianism   Language: English     1/18/2021     Total Time (in minutes): 7     Spiritual Assessment begun in [facility] through conversation with:         []Patient        [] Family    [] Friend(s)        Reason for Consult: Palliative Care, Initial/Spiritual Assessment     Spiritual beliefs: (Please include comment if needed)     [] Identifies with a shaila tradition:         [] Supported by a shaila community:            [] Claims no spiritual orientation:           [] Seeking spiritual identity:                [] Adheres to an individual form of spirituality:           [x] Not able to assess:                           Identified resources for coping:      [] Prayer                               [] Music                  [] Guided Imagery     [] Family/friends                 [] Pet visits     [] Devotional reading                         [x] Unknown     [] Other:                                               Interventions offered during this visit: (See comments for more details)    Patient Interventions: Other (comment)(Unable to assess)           Plan of Care:     [] Support spiritual and/or cultural needs    [] Support AMD and/or advance care planning process      [] Support grieving process   [] Coordinate Rites and/or Rituals    [] Coordination with community clergy   [] No spiritual needs identified at this time   [] Detailed Plan of Care below (See Comments)  [] Make referral to Music Therapy  [] Make referral to Pet Therapy     [] Make referral to Addiction services  [] Make referral to [facility]  [] Make referral to Spiritual Care Partner  [] No future visits requested        [x] Follow up upon further referrals     Comments:  visited Mr. Brandon Lagos for a palliative care initial spiritual assessment in the ICU.   Brayan Patterson is on contact precautions and appeared to be resting comfortably. Consulted with his nurse who shared that he would be able to have a conversation with the  via telephone, but now would not be a good time. 's are available for further support upon referral  Ximena Carey. Ernst [place].      Paging Service: [phone])

## 2021-01-18 NOTE — CONSULTS
[facility]     Name: Seda Donnelly MRN: [id number]   : 1945 Hospital: [facility]   Date: 2021        Impression Plan   1. COVID-19 PNA  2. Acute respiratory failure with hypoxia  3. Suspected LEATHA               · Goal sats 88% or higher, wean as tolerated  · Decadron  · Zinc, vitamin c  · Completed remdesvir and azithromycin  · Lovenox  · Trend d-dimer, inflammatory markers  · Diuresis as tolerated  · NPO with sips  · Palliative following, made some remarks that seem as if he is considering comfort care and will need to have further conversations with his family involved         Pt is critically ill and at risk of decompensation in the setting of acute respiratory failure with hypoxia requiring HF NC. Critical care time spent with pt exclusive of procedures was 35 minutes    Radiology  ( personally reviewed) CTA without PE; emphysema with patchy airspace disease   ABG No results for input(s): PHI, PO2I, PCO2I in the last 72 hours. Subjective     Patient is a 66 y.o. male admitted for COVID-19 and acute respiratory failure with hypoxia. He presented with shortness of breath for about a week with a dry cough. Additionally noted fevers and intermittent watery diarrhea. Found to be hypoxia and positive for COVID-19. Transferred to stepdown for worsening hypoxia. Requiring HF NC at [address] [place] [address]. Currently denies shortness of breath. Endorses intermittent cough. Afebrile the past 24 hours. His biggest concerns are that he wants to eat/drink and does not appreciate having to use a urinal to go to the bathroom. Family meeting was held today and he was made DNR. Review of Systems:  A comprehensive review of systems was negative except for that written in the HPI.     Past Medical History:   Diagnosis Date    Diverticulitis     Gastrointestinal disorder     History of vascular access device 2017    [facility] VAT -  5FR triple Lumen Power PICC R Basilic  39 cm    Spinal stenosis       Past Surgical History:   Procedure Laterality Date    FLEXIBLE SIGMOIDOSCOPY N/A 7/27/2017    SIGMOIDOSCOPY FLEXIBLE performed by Francia Hopson MD at [facility] ENDOSCOPY    IR INSERT NON TUNL CVC OVER 5 YRS  1/13/2021      Prior to Admission medications    Medication Sig Start Date End Date Taking? Authorizing Provider   dicyclomine (BENTYL) 20 mg tablet Take 20 mg by mouth every six (6) hours. Indications: irritable colon   Yes Provider, Historical   gabapentin (NEURONTIN) 600 mg tablet Take 600 mg by mouth three (3) times daily. Yes Provider, Historical   ibuprofen (MOTRIN) 200 mg tablet Take 600 mg by mouth daily. Yes Provider, Historical   acetaminophen (TYLENOL) 500 mg tablet Take 1,000 mg by mouth as needed for Pain. Indications: fever, muscle pain   Yes Provider, Historical   psyllium (METAMUCIL) packet Take 1 Packet by mouth two (2) times a day. Yes Provider, Historical     Current Facility-Administered Medications   Medication Dose Route Frequency    bumetanide (BUMEX) injection 1 mg  1 mg IntraVENous BID    sodium chloride (NS) flush 5-40 mL  5-40 mL IntraVENous Q8H    famotidine (PEPCID) tablet 20 mg  20 mg Oral BID    enoxaparin (LOVENOX) injection 40 mg  40 mg SubCUTAneous DAILY    dexAMETHasone (DECADRON) tablet 6 mg  6 mg Oral DAILY    cholecalciferol (VITAMIN D3) (1000 Units /25 mcg) tablet 2 Tab  2,000 Units Oral DAILY    zinc sulfate (ZINCATE) 220 (50) mg capsule 1 Cap  1 Cap Oral DAILY    ascorbic acid (vitamin C) (VITAMIN C) tablet 500 mg  500 mg Oral BID     No Known Allergies   Social History     Tobacco Use    Smoking status: Current Some Day Smoker     Packs/day: 1.00     Years: 50.00     Pack years: 50.00    Smokeless tobacco: Current User   Substance Use Topics    Alcohol use: Yes     Alcohol/week: 0.8 standard drinks     Types: 1 Cans of beer per week      No family history on file. Laboratory: I have personally reviewed the critical care flowsheet and labs. No results for input(s): WBC, HGB, HCT, PLT, HGBEXT, HCTEXT, PLTEXT in the last 72 hours. Recent Labs     01/18/21  0339 01/17/21  0258 01/16/21  0400   INR 1.2* 1.3* 1.2*       Objective:     Mode Rate Tidal Volume Pressure FiO2 PEEP            90 %       Vital Signs:     TMAX(24)      Intake/Output:   Last shift:         Last 3 shifts: No intake/output data recorded. [id number]    Intake/Output Summary (Last 24 hours) at 1/18/2021 1713  Last data filed at 1/18/2021 0700  Gross per 24 hour   Intake    Output 900 ml   Net -900 ml     EXAM:   GENERAL: well developed and in no distress, HEENT:  PERRL, EOMI, no alar flaring or epistaxis, NECK:  no jugular vein distention, no retractions, LUNGS: CTAB, respirations non-labored , HEART:  Regular rate and rhythm with no MGR; no edema is present, ABDOMEN:  soft with no tenderness, bowel sounds present, EXTREMITIES:  warm with no cyanosis, SKIN:  no jaundice or ecchymosis and NEUROLOGIC:  alert and oriented, grossly non-focal    Gary Vargas MD  [facility]

## 2021-01-18 NOTE — PROGRESS NOTES
Palliative Medicine      Code Status: DNR    Advance Care Planning:  Advance Care Planning 1/18/2021   Patient's Healthcare Decision Maker is: Legal Next of Kin (Wife is currently hospitalized; 3 adult children would serve as surrogate decision makers if wife is unable to serve in that role)   Confirm Advance Directive Not on file   Patient Would Like to Complete Advance Directive Might already be in place, per report; unable to verify at this time       Patient / Family Encounter Documentation    Participants (names): Adult children Yomi Chatman, and Kavon Paredes, brother-in-law Neri Hinkle, Palliative Medicine (Dr. Stephen Odell, Estefania Elena)     Narrative:  Palliative Medicine team made joint call to pt's family after Dr. Stephen Odell made visit to pt; SW did not visit directly with pt due to [facility] contact precautions. Spoke with pt's three adult children along with brother of pt's wife, Estee Ballard, who is currently hospitalized at [facility]. Son Madina Acosta lives in the home with pt/wife, is a student at [employer] Family was updated re: pt's current medical condition, including increased O2 needs; pt is now on 50 liters hi-flow O2. Family is aware that pt has been eager to go home, shared that pt \"hates the hospital\" and is likely worried about Madina Acosta being alone now that both parents are hospitalized. Family was educated re: NPO status, struggled with the idea of pt not eating/drinking. Dr. Stephen Odell shared of her earlier conversation with pt re: code status, explained to family that pt expressed wish for DNR/DNI. Son expressed belief that pt was just being \"macho\" when making this decision, stated pt \"trusts the system and thinks they'll take care of him,\" indicated belief that pt does not fully understand the ramifications of this decision. Dr. Stephen Odell offered to revisit discussion with pt with family present via Zoom.       Psychosocial Issues Identified/ Resilience Factors:  Family expressed concern re: pt's anxiety, requested meds to alleviate emotional distress. Pt and wife are both currently hospitalized at different facilities (wife was reportedly admitted to in psych for suicide attempt/etoh abuse). Family shared that pt is Buddhist, attends services primarily during the holidays, expressed belief that pt might find comfort in the Lord's Prayer though indicated presence of a  might have the unintended effect of increasing anxiety. Goals of Care / Plan: Dr. James Lundberg returned to room following above phone conversation; pt confirmed wish for DNR/DNI with family witnessing conversation via [address] [place] [address] Avenue. SW updated Dr. Corrine Gillis and  re: conversation with family, placed Music Therapy consult to address anxiety, discussed pt's care with Care Manager. Family is aware that additional Zoom sessions can be arranged (as family meetings with Palliative team or just as time for family to spend privately with pt). SW will follow for support. Thank you for including Palliative Medicine in the care of Mr. Singh Sam.      Yanci Bell LCSW, [facility]-  [phone])

## 2021-01-18 NOTE — PROGRESS NOTES
TRANSFER - IN REPORT:    Verbal report received  From Cory Bush on  being transferred to [facility] for urgent transfer   Report consisted of patients Situation, Background, Assessment and   Recommendations(SBAR).           RUR 9% low  Is This a Readmission NO  Is this a Bundle NO    Sena Tabor RN

## 2021-01-18 NOTE — PROGRESS NOTES
Shift Change:     Bedside and Verbal shift change report given to Meryle Leeks (oncoming nurse) by Monty Colon RN (offgoing nurse). Report included the following information SBAR, Kardex and Recent Results. CHARTING IN DISASTER MODE    Shift Summary:    0203: Pnt desat down to 86%, Midflow titrated up to 15L, pnt maintaining sats at 92%. 0345: Pnt desat to 85% on 15L Midflow. Pnt sat up, instructed to deep breath, sats up to 87%. Pharmacy messaged to send albuterol inhaler. 9370: Inhaler administered, pnt up to 91%. Respiratory has BiPAP on standby if pnt no longer maintaining sats. 0354: Dr. Manohar Sneed on call and notified of pnt changes. MD agreed to upgrade to stepdown if pnt needs continuous BiPAP.      0438: Pnt desat down to 86% and maintaining, unable to maintain sats with deep breathing. Order added to transfer to [facility]. Charge RN notified and notified Supervisor to assign Stepdown bed. Pnt placed on Hi Flow 45L at 92% FiO2, maintain sats at 92%. Pnt educated on need for Hi Flow, explained that his body is requiring more oxygen that what the Midflow is able to give him. Pnt unhappy, but willing to wear it. Explained that we will be moving him to a Stepdown bed as soon as it's available for better monitoring of his oxygen. 4317: Dr. Breana Michel requesting order for HiFlow  6833: Hi Flow Order added    0515: Supervisor called asking for update for bed placement. Stated he will be transferred to the ICU and ICU Charge is to call as soon as she can    56: Patient transferred to ICU with RN and Tech    TRANSFER - OUT REPORT:    Verbal report given to Echo Torres RN(name) on Corrina Medina  being transferred to ICU (unit) for change in patient condition(Increased Oxygen Requirements)       Report consisted of patients Situation, Background, Assessment and   Recommendations(SBAR). Information from the following report(s) SBAR, Kardex and Recent Results was reviewed with the receiving nurse.     Lines: Triple Lumen 01/13/21 Right Internal jugular (Active)   Central Line Being Utilized Yes 01/17/21 1704   Criteria for Appropriate Use Limited/no vessel suitable for conventional peripheral access 01/17/21 1704   Site Assessment Clean, dry, & intact 01/17/21 1704   Infiltration Assessment 0 01/17/21 1704   Affected Extremity/Extremities Color distal to insertion site pink (or appropriate for race) 01/17/21 1704   Date of Last Dressing Change 01/13/21 01/17/21 1704   Dressing Status Clean, dry, & intact 01/17/21 1704   Dressing Type Transparent 01/17/21 1704   Action Taken Open ports on tubing capped 01/17/21 1704   Proximal Hub Color/Line Status White 01/17/21 1704   Positive Blood Return (Medial Site) Yes 01/17/21 1704   Medial Hub Color/Line Status Blue 01/17/21 1704   Positive Blood Return (Lateral Site) Yes 01/17/21 1704   Distal Hub Color/Line Status Brown 01/17/21 1704   Positive Blood Return (Site #3) Yes 01/17/21 1704   Alcohol Cap Used Yes 01/17/21 1704        Opportunity for questions and clarification was provided.       Patient transported with:   Monitor  O2 @ 15 nonrebreather liters

## 2021-01-18 NOTE — PROGRESS NOTES
Transition of care note:    RUR 9%    LOS 7 days  Covid 19+    Plan: 1. Due to pt having oxygen desaturations in the mid-80s on mid-flow oxygen,pt was transferred from [facility] last night and placed on high flow oxygen (50L/90% FiO2)    2. Pt lives with his wife,Marya ([phone]) in a 2 story home with 4 entry steps. Of note: Wife is hospitalized currently @ [facility] [place] s/p suicide attempt/ETOH abuse. Per son,son updated pt regarding wife's hospitalization yesterday . Per son,pt has had ongoing ETOH problems. Son is Yari Dinh. His number is [phone]. Son would like to be contacted first as his mother does not have access to a phone right now. Son is a donavan @ [employer] and he may take off a gap semester due to his parents's illnesses. 3.Another contact son gave me is pt.'s Powell Gaucher who is the pt.'s brother -in-law. His number is [phone]. 4.Son is interested in pt eventually going to inpatient pulmonary rehab @ [facility] or [facility] in the future for pt. 5.[facility] is accepting pt after 10 days from symptom start for Covid 19 pts. [facility] accepts patients immediately once they are weaned down to 4 liters nc. 6.[facility] IPR will accept pts who are positive if it has been greater than 21 days after diagnosis and pt is without symptoms . They will accept pts if they are Covid + ,if it has been less than 21 days and pt is without symptoms,they will be admitted on precautions. If pt is covid + and continues with symtoms,then the [facility] liason must examine the case with their physician. 7.Son expressed appreciation regarding being informed regarding his father's transfer to ICU/SD. 8.I updated son regarding nurse update times between 2 and 4 pm and 10pm to midnight. Son would like for nurse to call him  Between 2 and 4. I updated pt.'s nurse/    10. I discussed pt with Palliative Medicine.     Sg Boewn

## 2021-01-18 NOTE — PROGRESS NOTES
0700- Bedside and Verbal shift change report given to [address] (oncoming nurse) by Ciera Ortega (offgoing nurse). Report included the following information SBAR, Kardex, ED Summary, Intake/Output, MAR, Accordion, Recent Results, Cardiac Rhythm NSR and Alarm Parameters . 0800- pt. Assessment performed. See flowsheet. Pt. received on high flow O2. 50 L at 90%. Call bell within reach. Pt. Agitated and asking to leave hospital. Explained why we are using high flow oxygen. Pt. Turns self. Paged Dr. Felice Chu regarding pt's NPO status and PO medication orders. 0900 - In patient room to calm and redirect   1000-  In patient room to calm and redirect; Pt. Turns self   1200- Pt. Reassessment performed; no changes from previous assessment. Pt. Turns self. 1400- Pt resting in bed. 1500- Sitter in place outside of room  1600- Pt. Reassessment performed; no changes from previous assessment   1800 - Sitter  In place outside room. See MAR for all medication administration records. 1900-  Bedside and Verbal shift change report given to Delfino Flores (oncoming nurse) by Samantha Santiago RN (offgoing nurse). Report included the following information SBAR, Kardex, ED Summary, Intake/Output, MAR, Accordion, Recent Results, Cardiac Rhythm NSR and Alarm Parameters .

## 2021-01-19 LAB
ALBUMIN SERPL-MCNC: 3.6 G/DL (ref 3.5–5)
ALBUMIN/GLOB SERPL: 0.8 {RATIO} (ref 1.1–2.2)
ALP SERPL-CCNC: 59 U/L (ref 45–117)
ALT SERPL-CCNC: 87 U/L (ref 12–78)
ANION GAP SERPL CALC-SCNC: 7 MMOL/L (ref 5–15)
APTT PPP: 28.6 SEC (ref 22.1–31)
AST SERPL-CCNC: 50 U/L (ref 15–37)
BILIRUB SERPL-MCNC: 1.1 MG/DL (ref 0.2–1)
BUN SERPL-MCNC: 61 MG/DL (ref 6–20)
BUN/CREAT SERPL: 46 (ref 12–20)
CALCIUM SERPL-MCNC: 8.8 MG/DL (ref 8.5–10.1)
CHLORIDE SERPL-SCNC: 98 MMOL/L (ref 97–108)
CO2 SERPL-SCNC: 29 MMOL/L (ref 21–32)
CREAT SERPL-MCNC: 1.32 MG/DL (ref 0.7–1.3)
D DIMER PPP FEU-MCNC: 0.86 MG/L FEU (ref 0–0.65)
ERYTHROCYTE [DISTWIDTH] IN BLOOD BY AUTOMATED COUNT: 13.1 % (ref 11.5–14.5)
FIBRINOGEN PPP-MCNC: 541 MG/DL (ref 200–475)
GLOBULIN SER CALC-MCNC: 4.5 G/DL (ref 2–4)
GLUCOSE SERPL-MCNC: 125 MG/DL (ref 65–100)
HCT VFR BLD AUTO: 51.6 % (ref 36.6–50.3)
HGB BLD-MCNC: 18.3 G/DL (ref 12.1–17)
INR PPP: 1.2 (ref 0.9–1.1)
MCH RBC QN AUTO: 30.4 PG (ref 26–34)
MCHC RBC AUTO-ENTMCNC: 35.5 G/DL (ref 30–36.5)
MCV RBC AUTO: 85.9 FL (ref 80–99)
NRBC # BLD: 0 K/UL (ref 0–0.01)
NRBC BLD-RTO: 0 PER 100 WBC
PLATELET # BLD AUTO: 348 K/UL (ref 150–400)
PMV BLD AUTO: 10.3 FL (ref 8.9–12.9)
POTASSIUM SERPL-SCNC: 3.6 MMOL/L (ref 3.5–5.1)
PROT SERPL-MCNC: 8.1 G/DL (ref 6.4–8.2)
PROTHROMBIN TIME: 12.8 SEC (ref 9–11.1)
RBC # BLD AUTO: 6.01 M/UL (ref 4.1–5.7)
SODIUM SERPL-SCNC: 134 MMOL/L (ref 136–145)
THERAPEUTIC RANGE,PTTT: NORMAL SECS (ref 58–77)
WBC # BLD AUTO: 13.7 K/UL (ref 4.1–11.1)

## 2021-01-19 PROCEDURE — 77010033711 HC HIGH FLOW OXYGEN

## 2021-01-19 PROCEDURE — 85610 PROTHROMBIN TIME: CPT

## 2021-01-19 PROCEDURE — 74011250637 HC RX REV CODE- 250/637: Performed by: INTERNAL MEDICINE

## 2021-01-19 PROCEDURE — 74011000250 HC RX REV CODE- 250: Performed by: INTERNAL MEDICINE

## 2021-01-19 PROCEDURE — 74011250637 HC RX REV CODE- 250/637: Performed by: HOSPITALIST

## 2021-01-19 PROCEDURE — 36415 COLL VENOUS BLD VENIPUNCTURE: CPT

## 2021-01-19 PROCEDURE — 74011250636 HC RX REV CODE- 250/636: Performed by: HOSPITALIST

## 2021-01-19 PROCEDURE — 85730 THROMBOPLASTIN TIME PARTIAL: CPT

## 2021-01-19 PROCEDURE — 80053 COMPREHEN METABOLIC PANEL: CPT

## 2021-01-19 PROCEDURE — 85379 FIBRIN DEGRADATION QUANT: CPT

## 2021-01-19 PROCEDURE — 85384 FIBRINOGEN ACTIVITY: CPT

## 2021-01-19 PROCEDURE — 85027 COMPLETE CBC AUTOMATED: CPT

## 2021-01-19 PROCEDURE — 65660000000 HC RM CCU STEPDOWN

## 2021-01-19 RX ORDER — ZINC SULFATE 50(220)MG
1 CAPSULE ORAL DAILY
Status: DISCONTINUED | OUTPATIENT
Start: 2021-01-19 | End: 2021-01-19

## 2021-01-19 RX ORDER — ZINC GLUCONATE 50 MG
50 TABLET ORAL DAILY
Status: DISCONTINUED | OUTPATIENT
Start: 2021-01-19 | End: 2021-01-29 | Stop reason: HOSPADM

## 2021-01-19 RX ADMIN — Medication 50 MG: at 21:24

## 2021-01-19 RX ADMIN — FAMOTIDINE 20 MG: 20 TABLET, FILM COATED ORAL at 08:04

## 2021-01-19 RX ADMIN — Medication 10 ML: at 06:00

## 2021-01-19 RX ADMIN — BUMETANIDE 1 MG: 0.25 INJECTION, SOLUTION INTRAMUSCULAR; INTRAVENOUS at 18:00

## 2021-01-19 RX ADMIN — OXYCODONE HYDROCHLORIDE AND ACETAMINOPHEN 500 MG: 500 TABLET ORAL at 08:04

## 2021-01-19 RX ADMIN — DEXAMETHASONE 6 MG: 6 TABLET ORAL at 08:04

## 2021-01-19 RX ADMIN — OXYCODONE HYDROCHLORIDE AND ACETAMINOPHEN 500 MG: 500 TABLET ORAL at 18:00

## 2021-01-19 RX ADMIN — FAMOTIDINE 20 MG: 20 TABLET, FILM COATED ORAL at 18:00

## 2021-01-19 RX ADMIN — Medication 2 TABLET: at 08:04

## 2021-01-19 RX ADMIN — ENOXAPARIN SODIUM 40 MG: 40 INJECTION SUBCUTANEOUS at 08:04

## 2021-01-19 RX ADMIN — DIPHENHYDRAMINE HYDROCHLORIDE 25 MG: 25 CAPSULE ORAL at 23:57

## 2021-01-19 RX ADMIN — Medication 10 ML: at 21:24

## 2021-01-19 RX ADMIN — BUMETANIDE 1 MG: 0.25 INJECTION, SOLUTION INTRAMUSCULAR; INTRAVENOUS at 08:03

## 2021-01-19 NOTE — PROGRESS NOTES
Transition of care note:    RUR 12%    Pt is Covid 19 +    LOS 8 days    Pt is on high flow 55 L/90% Fio2.    Pt is a DNR/DNI    Further discussions will be done with pt/family if pt decides he does not want to continue with full restorative measures.    Depending upon pt/family decisions,options include compassionate transitioning into comfort measures versus [facility] hospice versus home with hospice versus eventual  pulmonary rehab @ [facility].    Once decisions have been made,then case management will assist pt/family with his choice.    Sena Tabor RN

## 2021-01-19 NOTE — PROGRESS NOTES
[facility] [place] [facility]  [address] [facility], [address] [place] [address]  [phone]      Medical Progress Note      NAME:         Autumn Samayoa   :        1945  MRM:        [id number]    Date of service: 2021      Subjective: Patient has been seen and examined as a follow up for multiple medical issues. Chart, labs, diagnostics reviewed. He appears comfortable but still wanting to go home. Not coughing and not in distress. No fever or chills. Objective:    Vital Signs:    Visit Vitals  /86 (BP 1 Location: Right arm)   Pulse 77   Temp 98.6 °F (37 °C)   Resp 14   Ht 5' 10\" (1.778 m)   Wt 82.7 kg (182 lb 6.4 oz)   SpO2 90%   BMI 26.17 kg/m²          Intake/Output Summary (Last 24 hours) at 2021 1723  Last data filed at 2021 0300  Gross per 24 hour   Intake    Output 1150 ml   Net -1150 ml        Physical Examination:    General: he remains very weak, frail but not in distress    Eyes:   pink conjunctivae, PERRLA with no discharge. ENT:   no ottorrhea or rhinorrhea with dry mucous membranes  Pulm: decreased but clear breath sounds without much crackles. No wheezes  Card:  no JVD or murmurs, has normal heart rate,  without thrills, bruits   Abd:  Soft, non-tender, non-distended, normoactive bowel sounds   Neuro: Awake and alert but intermittently confused. Generally a non focal exam.   Psych:  Has a poor insight to his illness.      Current Facility-Administered Medications   Medication Dose Route Frequency    bumetanide (BUMEX) injection 1 mg  1 mg IntraVENous BID    melatonin (rapid dissolve) tablet 5 mg  5 mg Oral QHS PRN    sodium chloride (NS) flush 5-40 mL  5-40 mL IntraVENous Q8H    sodium chloride (NS) flush 5-40 mL  5-40 mL IntraVENous PRN    polyethylene glycol (MIRALAX) packet 17 g  17 g Oral DAILY PRN    bisacodyL (DULCOLAX) suppository 10 mg  10 mg Rectal DAILY PRN    ondansetron [facility]) injection 4 mg  4 mg IntraVENous Q6H PRN    famotidine (PEPCID) tablet 20 mg  20 mg Oral BID    enoxaparin (LOVENOX) injection 40 mg  40 mg SubCUTAneous DAILY    acetaminophen (TYLENOL) tablet 650 mg  650 mg Oral Q6H PRN    Or    acetaminophen (TYLENOL) suppository 650 mg  650 mg Rectal Q6H PRN    guaiFENesin-dextromethorphan (ROBITUSSIN DM) 100-10 mg/5 mL syrup 5 mL  5 mL Oral Q4H PRN    dexAMETHasone (DECADRON) tablet 6 mg  6 mg Oral DAILY    labetaloL (NORMODYNE;TRANDATE) 20 mg/4 mL (5 mg/mL) injection 20 mg  20 mg IntraVENous Q4H PRN    hydrALAZINE (APRESOLINE) 20 mg/mL injection 20 mg  20 mg IntraVENous Q4H PRN    alum-mag hydroxide-simeth (MYLANTA) oral suspension 30 mL  30 mL Oral Q4H PRN    LORazepam (ATIVAN) injection 0.5 mg  0.5 mg IntraVENous Q6H PRN    oxyCODONE IR (ROXICODONE) tablet 5 mg  5 mg Oral Q4H PRN    oxyCODONE IR (ROXICODONE) tablet 10 mg  10 mg Oral Q4H PRN    morphine injection 2 mg  2 mg IntraVENous Q4H PRN    diphenhydrAMINE (BENADRYL) injection 25 mg  25 mg IntraVENous Q6H PRN    diphenhydrAMINE (BENADRYL) capsule 25 mg  25 mg Oral Q6H PRN    albuterol (PROVENTIL HFA, VENTOLIN HFA, PROAIR HFA) inhaler 4 Puff  4 Puff Inhalation Q4H PRN    simethicone (MYLICON) tablet 80 mg  80 mg Oral QID PRN    cholecalciferol (VITAMIN D3) (1000 Units /25 mcg) tablet 2 Tab  2,000 Units Oral DAILY    zinc sulfate (ZINCATE) 220 (50) mg capsule 1 Cap  1 Cap Oral DAILY    ascorbic acid (vitamin C) (VITAMIN C) tablet 500 mg  500 mg Oral BID        Laboratory data and review:    No results for input(s): WBC, HGB, HCT, PLT, HGBEXT, HCTEXT, PLTEXT, HGBEXT, HCTEXT, PLTEXT in the last 72 hours.   Recent Labs     01/19/21  0259 01/18/21  0339 01/17/21  0258   INR 1.2* 1.2* 1.3*     No components found for: [place]    Diagnostics: Imaging studies have been reviewed    Telemetry reviewed by me:   normal sinus rhythm    Assessment and Plan:    COVID-19 virus infection (1/11/2021) POA: having a prolonged course to recovery. Pro calcitonin was normal. Completed  Remdesivir, Azithromycin. Continue Dexamethasone, Vitamin C and Zinc, Wean oxygen as tolerated. We have been discussing with his son Hortencia Keen) and brother in law ( Mr Francis Hopkins - [phone]). Patient's wife currently hospitalized and in light of the patient's frail nature, he will need rehab prior to returning home. Palliative care following. Now a DNR       Acute respiratory failure with hypoxia POA: due to COVID infection and likely underlying emphysema. CTA chest done 1/15 showed no PE. Scattered interstitial opacities with moderate emphysema vs pulm edema. Continue IV bumex. Wean down as tolerated    Centrilobular emphysema POA: noted on CTA chest. Continue Combivent respimat. Albuterol PRN. Pulmonology following    Acute metabolic encephalopathy (1/54/9074) POA: this is intermittent but much improved this morning. Likely from COVID, hypoxia. Continue supportive care      Physical debility: due to COVID. Continue PT, OT    Bradycardia: likely has underlying sleep apnea. TSH normal.  Keep on telemetry. Outpatient sleep study. Seen by cardiology.  Outpatient follow up     Total time spent for the patient's care: [facility] discussed with: Patient, Care Manager and Nursing Staff    Discussed:  Care Plan and D/C Planning    Prophylaxis:  Lovenox    Anticipated Disposition:  Rehab           ___________________________________________________    Attending Physician:   Leah Bower MD

## 2021-01-19 NOTE — CONSULTS
[facility]     Name: Zaina Iqbal MRN: [id number]   : 1945 Hospital: [address] [facility]   Date: 2021        Impression Plan   1. COVID-19 PNA  2. Acute respiratory failure with hypoxia  3. Suspected LEATHA               · Goal sats 88% or higher, wean as tolerated  · Decadron  · Zinc, vitamin c  · Completed remdesvir and azithromycin  · Lovenox  · Trend d-dimer, inflammatory markers  · Labs ordered  · Diuresis as tolerated  · NPO with sips  · Palliative following, made some remarks that seem as if he is considering comfort care and will need to have further conversations with his family involved         Pt is critically ill and at risk of decompensation in the setting of acute respiratory failure with hypoxia requiring HF NC. Critical care time spent with pt exclusive of procedures was 35 minutes    Radiology  ( personally reviewed) CTA without PE; emphysema with patchy airspace disease   ABG No results for input(s): PHI, PO2I, PCO2I in the last 72 hours. Subjective     Patient is a 66 y.o. male admitted for COVID-19 and acute respiratory failure with hypoxia. He presented with shortness of breath for about a week with a dry cough. Additionally noted fevers and intermittent watery diarrhea. Found to be hypoxia and positive for COVID-19. Transferred to stepdown for worsening hypoxia. Requiring HF NC at [address] [place] [address]. Currently denies shortness of breath. Endorses intermittent cough. Afebrile the past 24 hours. His biggest concerns are that he wants to eat/drink and does not appreciate having to use a urinal to go to the bathroom. Family meeting was held today and he was made DNR. Interval history:  Remains on HF at 45L  He is frustrated that he is not expected to make a quick turn around  Has not had labs in several days    Review of Systems:  A comprehensive review of systems was negative except for that written in the HPI.     Past Medical History:   Diagnosis Date    Diverticulitis     Gastrointestinal disorder     History of vascular access device 07/24/2017    [facility] VAT -  5FR triple Lumen Power PICC R Basilic  39 cm    Spinal stenosis       Past Surgical History:   Procedure Laterality Date    FLEXIBLE SIGMOIDOSCOPY N/A 7/27/2017    SIGMOIDOSCOPY FLEXIBLE performed by Ric Klein MD at [facility] ENDOSCOPY    IR INSERT NON TUNL CVC OVER 5 YRS  1/13/2021      Prior to Admission medications    Medication Sig Start Date End Date Taking? Authorizing Provider   dicyclomine (BENTYL) 20 mg tablet Take 20 mg by mouth every six (6) hours. Indications: irritable colon   Yes Provider, Historical   gabapentin (NEURONTIN) 600 mg tablet Take 600 mg by mouth three (3) times daily. Yes Provider, Historical   ibuprofen (MOTRIN) 200 mg tablet Take 600 mg by mouth daily. Yes Provider, Historical   acetaminophen (TYLENOL) 500 mg tablet Take 1,000 mg by mouth as needed for Pain. Indications: fever, muscle pain   Yes Provider, Historical   psyllium (METAMUCIL) packet Take 1 Packet by mouth two (2) times a day. Yes Provider, Historical     Current Facility-Administered Medications   Medication Dose Route Frequency    bumetanide (BUMEX) injection 1 mg  1 mg IntraVENous BID    sodium chloride (NS) flush 5-40 mL  5-40 mL IntraVENous Q8H    famotidine (PEPCID) tablet 20 mg  20 mg Oral BID    enoxaparin (LOVENOX) injection 40 mg  40 mg SubCUTAneous DAILY    dexAMETHasone (DECADRON) tablet 6 mg  6 mg Oral DAILY    cholecalciferol (VITAMIN D3) (1000 Units /25 mcg) tablet 2 Tab  2,000 Units Oral DAILY    zinc sulfate (ZINCATE) 220 (50) mg capsule 1 Cap  1 Cap Oral DAILY    ascorbic acid (vitamin C) (VITAMIN C) tablet 500 mg  500 mg Oral BID     No Known Allergies   Social History     Tobacco Use    Smoking status: Current Some Day Smoker     Packs/day: 1.00     Years: 50.00     Pack years: 50.00    Smokeless tobacco: Current User   Substance Use Topics    Alcohol use:  Yes     Alcohol/week: 0.8 standard drinks     Types: 1 Cans of beer per week      No family history on file. Laboratory: I have personally reviewed the critical care flowsheet and labs. No results for input(s): WBC, HGB, HCT, PLT, HGBEXT, HCTEXT, PLTEXT, HGBEXT, HCTEXT, PLTEXT in the last 72 hours.   Recent Labs     01/19/21  0259 01/18/21  0339 01/17/21  0258   INR 1.2* 1.2* 1.3*       Objective:     Mode Rate Tidal Volume Pressure FiO2 PEEP            90 %       Vital Signs:     TMAX(24)      Intake/Output:   Last shift:         Last 3 shifts: 01/19 0701 - 01/19 1900  In: -   Out: 400 [Urine:400]RRIOLAST3    Intake/Output Summary (Last 24 hours) at 1/19/2021 1042  Last data filed at 1/19/2021 0900  Gross per 24 hour   Intake    Output 1550 ml   Net -1550 ml     EXAM:   GENERAL: well developed and in no distress, HEENT:  PERRL, EOMI, no alar flaring or epistaxis, NECK:  no jugular vein distention, no retractions, LUNGS: CTAB, respirations non-labored , HEART:  Regular rate and rhythm with no MGR; no edema is present, ABDOMEN:  soft with no tenderness, bowel sounds present, EXTREMITIES:  warm with no cyanosis, SKIN:  no jaundice or ecchymosis and NEUROLOGIC:  alert and oriented, grossly non-focal    May Mckinney MD  [facility]

## 2021-01-19 NOTE — PROGRESS NOTES
1900: Bedside shift change report given to Devin Gorman RN (oncoming nurse) by Annemarie Grewal RN (offgoing nurse). Report included the following information SBAR, Kardex, Procedure Summary, Intake/Output, MAR, Recent Results and Cardiac Rhythm SB/NSR. Primary Nurse Kenneth Hughes RN and Oleg Dozier performed a dual skin assessment on this patient No impairment noted  Adrien score is 18    2000: Shift  Assessment completed            Mental Status:  Pt A/Ox4, calm and cooperative            Respiratory: Lungs clear, diminished in bases, on Hi Flow 40L at 90%            Cardiac: VSS, S1S2, peripheral pulses present/palpable            GI/: Voiding/Urinal, bowel sounds present            IV Drips: R Triple IJ SL +Blood Return. 2100: Son Jeff Figueroa updated via telephone. 2200: Pt resting, mouth care provided    0000: Reassessment completed. No changes from previous assessment    0300: Pt agitated, removed O2, stating he wants to leave and wants OJ. Pt desating with removal of O2 and agitation. Pt redirected, Hi-FLow. Labs drawn, pt provided sips of water. 0400: Reassessment completed. Changes noted, see flow sheet. 0600: Pt calm, resting in bed, offered sips of water. 0700: Bedside shift change report given to Cal Nation RN (oncoming nurse) by Devin Gorman RN (offgoing nurse). Report included the following information SBAR, Kardex, Procedure Summary, Intake/Output, MAR and Cardiac Rhythm NSR.

## 2021-01-19 NOTE — PROGRESS NOTES
Music Therapy Assessment  Mireya [id number]     1945  66 y.o.  male    Patient Telephone Number: [phone] (home)   Sabianism Affiliation: Linda Gonzalez   Language: English   Patient Active Problem List    Diagnosis Date Noted    Diarrhea 01/11/2021    Acute respiratory insufficiency 01/11/2021    Hypoxia 01/11/2021    COVID-19 virus infection 01/11/2021    Acute metabolic encephalopathy 10/53/7112    C. difficile colitis 08/07/2017    Colitis, acute 08/05/2017    Colitis 07/21/2017        Date: 1/19/2021            Total Time (in minutes): 20          SFM 3 INTERVNTNL CARE    Mental Status:   [x] Alert [  ] Nancye Gungiles [  ]  Confused  [  ] Minimally responsive  [  ] Sleeping    Communication Status: [  ] Impaired Speech [  ] Nonverbal -N/A    Physical Status:   [x] Oxygen in use  [  ] Hard of Hearing [  ] Vision Impaired  [  ] Ambulatory  [  ] Ambulatory with assistance [  ] Non-ambulatory     Music Preferences, Background: [address] and [address] from the 1960's. Pt requested a [employer] during this session. Clinical Problem addressed: Improve mood, positive social interaction, support healthy coping. Goal(s) met in session:  Physical/Pain management (Scale of 1-10):    Pre-session rating: Pt didn't report on pain. Post-session rating: Pt didn't report on pain.    [  ] Increased relaxation   [  ] Affected breathing patterns  [  ] Decreased muscle tension   [  ] Decreased agitation  [  ] Affected heart rate    [  ] Increased alertness     Emotional/Psychological:  [  ] Increased self-expression   [  ] Decreased aggressive behavior   [  ] Decreased feelings of stress  [  ] Discussed healthy coping skills     [  ] Improved mood    [  ] Decreased withdrawn behavior     Social:  [  ] Decreased feelings of isolation/loneliness [x] Positive social interaction   [  ] Provided support and/or comfort for family/friends    Spiritual:  [  ] Spiritual support    [  ] Expressed peace  [  ] Expressed shaila    [  ] Discussed beliefs    Techniques Utilized (Check all that apply):   [  ] Procedural support MT [  ] Music for relaxation [x] Patient preferred music  [  ] Ivy analysis  [  ] Song choice  [  ] Music for validation  [  ] Entrainment  [  ] Movement to music [  ] Guided visualization  [  ] Shan Carey  [  ] Patient instrument playing [  ] Maggie [place] writing  [x] Sing along   [  ] Irlanda Jimbo  [  ] Sensory stimulation  [  ] Active Listening  [  ] Music for spiritual support [  ] Making of CDs as gifts    Session Observations:  Referral from [place], Palliative . The patient's (pt's) RN Mi and the Palliative  assisted with getting an iPad to the pt so he could participate in a virtual music therapy session. This music therapist (MT) connected with the pt over Telehealth. MT asked the pt how he was feeling and he said weak. MT asked the pt about his music preferences and pt shared these. He requested to hear a [employer]. MT sang and played I've Got Friends in [place] with vik. Pt increased self-expression in response to the music as evidenced by (AEB) singing along with parts of the song. Throughout the session, the pt moved the iPad he was holding and the camera was not always on his face. MT gently alerted the pt of this and he said he would try to hold it still, but was not always able to. As a result, the MT's ability to observe how pt was nonverbally responding to the music was more limited. MT briefly shared about the option of songwriting but the pt declined doing this today. He said he wasn't feeling well and wasn't up for anymore music right now. MT expressed understanding and concluded the virtual session. Pt thanked MT for the music.     PANCHITO Veras (Music Therapist-Board Certified)  Spiritual Care Department  Referral-based service

## 2021-01-19 NOTE — PROGRESS NOTES
Pt compliant with VS and plan of care. IS 2200 with good effort, tolerating ice chips, able to move self in bed. Continues to request to go home, Covid treatment plan explained.

## 2021-01-20 LAB
ALBUMIN SERPL-MCNC: 3.5 G/DL (ref 3.5–5)
ALBUMIN/GLOB SERPL: 0.8 {RATIO} (ref 1.1–2.2)
ALP SERPL-CCNC: 56 U/L (ref 45–117)
ALT SERPL-CCNC: 114 U/L (ref 12–78)
ANION GAP SERPL CALC-SCNC: 8 MMOL/L (ref 5–15)
APTT PPP: 25.9 SEC (ref 22.1–31)
AST SERPL-CCNC: 69 U/L (ref 15–37)
BASOPHILS # BLD: 0 K/UL (ref 0–0.1)
BASOPHILS NFR BLD: 0 % (ref 0–1)
BILIRUB SERPL-MCNC: 1.2 MG/DL (ref 0.2–1)
BUN SERPL-MCNC: 63 MG/DL (ref 6–20)
BUN/CREAT SERPL: 45 (ref 12–20)
CALCIUM SERPL-MCNC: 8.7 MG/DL (ref 8.5–10.1)
CHLORIDE SERPL-SCNC: 96 MMOL/L (ref 97–108)
CO2 SERPL-SCNC: 30 MMOL/L (ref 21–32)
CREAT SERPL-MCNC: 1.39 MG/DL (ref 0.7–1.3)
D DIMER PPP FEU-MCNC: 1.1 MG/L FEU (ref 0–0.65)
DIFFERENTIAL METHOD BLD: ABNORMAL
EOSINOPHIL # BLD: 0 K/UL (ref 0–0.4)
EOSINOPHIL NFR BLD: 0 % (ref 0–7)
ERYTHROCYTE [DISTWIDTH] IN BLOOD BY AUTOMATED COUNT: 13.1 % (ref 11.5–14.5)
FIBRINOGEN PPP-MCNC: 447 MG/DL (ref 200–475)
GLOBULIN SER CALC-MCNC: 4.3 G/DL (ref 2–4)
GLUCOSE BLD STRIP.AUTO-MCNC: 143 MG/DL (ref 65–100)
GLUCOSE BLD STRIP.AUTO-MCNC: 162 MG/DL (ref 65–100)
GLUCOSE SERPL-MCNC: 114 MG/DL (ref 65–100)
HCT VFR BLD AUTO: 52.2 % (ref 36.6–50.3)
HGB BLD-MCNC: 18 G/DL (ref 12.1–17)
IMM GRANULOCYTES # BLD AUTO: 0.1 K/UL (ref 0–0.04)
IMM GRANULOCYTES NFR BLD AUTO: 1 % (ref 0–0.5)
INR PPP: 1.3 (ref 0.9–1.1)
LYMPHOCYTES # BLD: 0.8 K/UL (ref 0.8–3.5)
LYMPHOCYTES NFR BLD: 6 % (ref 12–49)
MCH RBC QN AUTO: 30.1 PG (ref 26–34)
MCHC RBC AUTO-ENTMCNC: 34.5 G/DL (ref 30–36.5)
MCV RBC AUTO: 87.1 FL (ref 80–99)
MONOCYTES # BLD: 1.5 K/UL (ref 0–1)
MONOCYTES NFR BLD: 11 % (ref 5–13)
NEUTS SEG # BLD: 12 K/UL (ref 1.8–8)
NEUTS SEG NFR BLD: 82 % (ref 32–75)
NRBC # BLD: 0 K/UL (ref 0–0.01)
NRBC BLD-RTO: 0 PER 100 WBC
PLATELET # BLD AUTO: 363 K/UL (ref 150–400)
PMV BLD AUTO: 10.8 FL (ref 8.9–12.9)
POTASSIUM SERPL-SCNC: 3.8 MMOL/L (ref 3.5–5.1)
PROT SERPL-MCNC: 7.8 G/DL (ref 6.4–8.2)
PROTHROMBIN TIME: 13.1 SEC (ref 9–11.1)
RBC # BLD AUTO: 5.99 M/UL (ref 4.1–5.7)
SERVICE CMNT-IMP: ABNORMAL
SERVICE CMNT-IMP: ABNORMAL
SODIUM SERPL-SCNC: 134 MMOL/L (ref 136–145)
THERAPEUTIC RANGE,PTTT: NORMAL SECS (ref 58–77)
WBC # BLD AUTO: 14.5 K/UL (ref 4.1–11.1)

## 2021-01-20 PROCEDURE — 36415 COLL VENOUS BLD VENIPUNCTURE: CPT

## 2021-01-20 PROCEDURE — 74011000250 HC RX REV CODE- 250: Performed by: INTERNAL MEDICINE

## 2021-01-20 PROCEDURE — 74011250636 HC RX REV CODE- 250/636: Performed by: HOSPITALIST

## 2021-01-20 PROCEDURE — 74011000258 HC RX REV CODE- 258: Performed by: INTERNAL MEDICINE

## 2021-01-20 PROCEDURE — 99231 SBSQ HOSP IP/OBS SF/LOW 25: CPT | Performed by: INTERNAL MEDICINE

## 2021-01-20 PROCEDURE — 85025 COMPLETE CBC W/AUTO DIFF WBC: CPT

## 2021-01-20 PROCEDURE — 85379 FIBRIN DEGRADATION QUANT: CPT

## 2021-01-20 PROCEDURE — 74011250637 HC RX REV CODE- 250/637: Performed by: INTERNAL MEDICINE

## 2021-01-20 PROCEDURE — 97535 SELF CARE MNGMENT TRAINING: CPT | Performed by: OCCUPATIONAL THERAPIST

## 2021-01-20 PROCEDURE — 97530 THERAPEUTIC ACTIVITIES: CPT

## 2021-01-20 PROCEDURE — 74011250637 HC RX REV CODE- 250/637: Performed by: HOSPITALIST

## 2021-01-20 PROCEDURE — 82962 GLUCOSE BLOOD TEST: CPT

## 2021-01-20 PROCEDURE — 85610 PROTHROMBIN TIME: CPT

## 2021-01-20 PROCEDURE — 85384 FIBRINOGEN ACTIVITY: CPT

## 2021-01-20 PROCEDURE — 65660000000 HC RM CCU STEPDOWN

## 2021-01-20 PROCEDURE — 77010033711 HC HIGH FLOW OXYGEN

## 2021-01-20 PROCEDURE — 80053 COMPREHEN METABOLIC PANEL: CPT

## 2021-01-20 PROCEDURE — 97110 THERAPEUTIC EXERCISES: CPT

## 2021-01-20 PROCEDURE — 85730 THROMBOPLASTIN TIME PARTIAL: CPT

## 2021-01-20 RX ORDER — DEXTROSE MONOHYDRATE AND SODIUM CHLORIDE 5; .9 G/100ML; G/100ML
50 INJECTION, SOLUTION INTRAVENOUS CONTINUOUS
Status: DISCONTINUED | OUTPATIENT
Start: 2021-01-20 | End: 2021-01-27

## 2021-01-20 RX ADMIN — BUMETANIDE 1 MG: 0.25 INJECTION, SOLUTION INTRAMUSCULAR; INTRAVENOUS at 17:34

## 2021-01-20 RX ADMIN — Medication 10 ML: at 22:37

## 2021-01-20 RX ADMIN — ENOXAPARIN SODIUM 40 MG: 40 INJECTION SUBCUTANEOUS at 09:00

## 2021-01-20 RX ADMIN — BUMETANIDE 1 MG: 0.25 INJECTION, SOLUTION INTRAMUSCULAR; INTRAVENOUS at 08:45

## 2021-01-20 RX ADMIN — OXYCODONE HYDROCHLORIDE AND ACETAMINOPHEN 500 MG: 500 TABLET ORAL at 08:45

## 2021-01-20 RX ADMIN — OXYCODONE HYDROCHLORIDE AND ACETAMINOPHEN 500 MG: 500 TABLET ORAL at 17:34

## 2021-01-20 RX ADMIN — Medication 2 TABLET: at 08:45

## 2021-01-20 RX ADMIN — Medication 10 ML: at 14:30

## 2021-01-20 RX ADMIN — DEXAMETHASONE 6 MG: 6 TABLET ORAL at 09:00

## 2021-01-20 RX ADMIN — DEXTROSE MONOHYDRATE AND SODIUM CHLORIDE 50 ML/HR: 5; .9 INJECTION, SOLUTION INTRAVENOUS at 10:52

## 2021-01-20 RX ADMIN — FAMOTIDINE 20 MG: 20 TABLET, FILM COATED ORAL at 09:00

## 2021-01-20 RX ADMIN — FAMOTIDINE 20 MG: 20 TABLET, FILM COATED ORAL at 17:34

## 2021-01-20 RX ADMIN — Medication 10 ML: at 06:07

## 2021-01-20 NOTE — PROGRESS NOTES
Problem: Mobility Impaired (Adult and Pediatric)  Goal: *Acute Goals and Plan of Care (Insert Text)  Description: FUNCTIONAL STATUS PRIOR TO ADMISSION: Patient was independent and active without use of DME.    HOME SUPPORT PRIOR TO ADMISSION: The patient lived with his wife and son but did not require assist.    Physical Therapy Goals  Initiated 1/15/2021  1. Patient will move from supine to sit and sit to supine , scoot up and down, and roll side to side in bed with independence within 7 day(s). 2.  Patient will transfer from bed to chair and chair to bed with modified independence using the least restrictive device within 7 day(s). 3.  Patient will perform sit to stand with modified independence within 7 day(s). 4.  Patient will ambulate with modified independence for 120 feet with the least restrictive device within 7 day(s). Outcome: Progressing Towards Goal   PHYSICAL THERAPY TREATMENT  Patient: Hailee Gillis (57 y.o. male)  Date: 1/20/2021  Diagnosis: COVID-19 virus infection [U07.1]  Hypoxia [R09.02]  Acute respiratory insufficiency [R06.89]  Prerenal azotemia [R79.89]  Diarrhea [R19.7] COVID-19 virus infection       Precautions:    Chart, physical therapy assessment, plan of care and goals were reviewed. ASSESSMENT  Patient continues with skilled PT services and is progressing towards goals. Pt limited by impulsivity, decreased safety awareness decreased motivation to work with therapy and generalized weakness. Pt received on HighFlow NC and maintained O2 saturations 89-96% with functional mobility. Pt overall SBA-SUP for bed mobility, transfers and short gait distance limited by IV lines and HighFlow connection. Returned to supine at end of session as IV length does not allow for sitting up in the chair. Anticipate steady progress at pt weans from O2.      Current Level of Function Impacting Discharge (mobility/balance): SBA-SUP level, impulsive    Other factors to consider for discharge: high O2 needs, pt states he can go home today         PLAN :  Patient continues to benefit from skilled intervention to address the above impairments. Continue treatment per established plan of care. to address goals. Recommendation for discharge: (in order for the patient to meet his/her long term goals)  To be determined: Pulmonary rehab vs home with assistance and HHPT    This discharge recommendation:  Has been made in collaboration with the attending provider and/or case management    IF patient discharges home will need the following DME: oxygen       SUBJECTIVE:   Patient stated I just want a Pepsi.     OBJECTIVE DATA SUMMARY:   Critical Behavior:  Neurologic State: Alert, Irritable  Orientation Level: Oriented X4  Cognition: Impulsive, Follows commands  Safety/Judgement: Decreased awareness of need for assistance, Lack of insight into deficits  Functional Mobility Training:  Bed Mobility:  Rolling: Supervision  Supine to Sit: Supervision  Sit to Supine: Stand-by assistance  Scooting: Supervision        Transfers:  Sit to Stand: Stand-by assistance  Stand to Sit: Stand-by assistance        Bed to Chair: Stand-by assistance                    Balance:  Sitting: Intact  Standing: Impaired  Standing - Static: Good  Standing - Dynamic : Fair  Ambulation/Gait Training:  Distance (ft): 8 Feet (ft)  Assistive Device: Gait belt  Ambulation - Level of Assistance: Stand-by assistance                       Activity Tolerance:   Fair, desaturates with exertion and requires oxygen, and requires rest breaks    After treatment patient left in no apparent distress:   Supine in bed and Call bell within reach    COMMUNICATION/COLLABORATION:   The patients plan of care was discussed with: Registered nurse.      Karin Luna, PT   Time Calculation: 29 mins

## 2021-01-20 NOTE — WOUND CARE
Wound Consult: new patient consult for LOS,continous HFNC in ICU. Spoke with patients nurse. Mi stated patient agitated and self removes HFNC frequently, skin intact underneath HFNC, and who hands off of no outstanding findings with skin and verbalizes prevention stategies including turning and repositioning. Off loading heels,skin care for pressure redistribution. Patient resting on a Kiara in touch HAL bed. Recommendations: 
If any noted redness on nose or ears, pad with duoderm or foam dressing Continue all skin care/preventive measures . Reposition:  Continue to turn every 1-2hrs to reposition for pressure relief, with pillows to protect bony prominences/float heels Continue every 2hr continence checks; keep clean and dry and apply ointments/creams as appropriate for prevention of incontinence dermatitis;

## 2021-01-20 NOTE — PROGRESS NOTES
Palliative Medicine Consult  Happy: [phone] ([phone])    Patient Name: Power Bartholomew  YOB: 1945    Date of Initial Consult: 1/18/21  Reason for Consult: Care decisions   Requesting Provider: Dr. Dash Wilkes   Primary Care Physician: Zay Baez MD     SUMMARY:   Power Bartholomew is a 66 y.o. with a past history of spinal stenosis and tobacco use. He presented with one week of symptoms consistent with covid-19 and tested positive on admission on 1/10/2021. He was admitted with a diagnosis of acute hypoxic respiratory failure and had been requiring HFNC on [facility]. He completed a course of Remdesevir and azithromycin this admission, remains on Decadron, Vit C and Zinc.  He required transfer to a stepdown bed in the unit early the morning of 1/18. His  Current medical issues leading to Palliative Medicine involvement include:  High risk decline, psychosocial support. Pscyhosocial Hx-  > 20 yrs to Marya. He has three children, Dewy Rose Madonna and PHILL. PALLIATIVE DIAGNOSES:   1. Anxiety about health  2. Acute hypoxic respiratory failure due to COVID-19 viral pna  3. Emphysematous changes on CTA- 1/15  4. Tobacco use       PLAN:   1. Patient remains on stepdown bed in ICU requiring HFNC- currently 95% FiO2, 40L. 2. Condition is stable at present, he remains alert and conversant. 3. He does not have acute delirium and is able to participate in care decisions. 4. He does require continued education and support in regards to ongoing need for hospitalization, repeats throughout the day his desire to go home. Overall he is less anxious, not attempting to get out of bed. 5. He remains at high risk for decline so I will continue to be able for periodic follow up and family support. Today patient declined me calling his family, he has been talking to them regularly. 6. Code status- DNR based on conversation on 1/18- see notes.    7. Spiritual needs- pt identifies as Adventist. Family reports he would appreciate  support periodically. 8. Family- well supported by children and brother in law. Wife is currently a pscyhiatric inpatient. 9. Symptoms- anxiety and irritability. Frequent reeducation, redirection. Consider initiation of Precedex if this worsens. 10. Patient will strongly benefit from regular Zoom family calls if possible. 11. Thank you for the opportunity to participate in the care of Tom Brewster  12. Communicated plan of care with: Palliative IDTZion 192 Team including bedside RN. GOALS OF CARE / TREATMENT PREFERENCES:     GOALS OF CARE:  Patient/Health Care Proxy Stated Goals: Cure    TREATMENT PREFERENCES:   Code Status: DNR    Advance Care Planning:  [x] [facility] Interdisciplinary Team has updated the ACP Navigator with Health Care Decision Maker and Patient Capacity      Primary Decision Maker: Joey Alcantar - Spouse - [phone]    Secondary Decision Maker: Hines Hatchet - Child - [phone]    Secondary Decision Maker: Naseem Lin - Son - [phone]    Secondary Decision Maker: Ben Phipps - Daughter - [phone]     Advance Care Planning 1/18/2021   Patient's Healthcare Decision Maker is: Legal Next of Kin   Confirm Advance Directive -   Patient Would Like to Complete Advance Directive -       Medical Interventions: Limited additional interventions(No CPR or intubation)       Other:    As far as possible, the palliative care team has discussed with patient / health care proxy about goals of care / treatment preferences for patient. HISTORY:     History obtained from:  Patient, chart review, family    CHIEF COMPLAINT: \"I would like to go home\"    HPI/SUBJECTIVE:    The patient is:   [x] Verbal and participatory  [] Non-participatory due to:     67 yo admitted with several days of sob, cough, weakness. Symptoms have persisted throughout hospitalization. Oxygenation worsened overnight. On day of initial consult he expressed frustration at already having been here a week, wants to leave [facility]. He is worried about his wife, children. 1/20- pt alert, conversant asking to go home repeatedly, wants one of us to stay in room with him. Denies discomfort, pain, dyspnea, states he is \"bored. \"      Clinical Pain Assessment (nonverbal scale for severity on nonverbal patients):   Clinical Pain Assessment  Severity: 0          Duration: for how long has pt been experiencing pain (e.g., 2 days, 1 month, years)  Frequency: how often pain is an issue (e.g., several times per day, once every few days, constant)     FUNCTIONAL ASSESSMENT:     Palliative Performance Scale (PPS):  PPS: 70       PSYCHOSOCIAL/SPIRITUAL SCREENING:     Palliative IDT has assessed this patient for cultural preferences / practices and a referral made as appropriate to needs (Cultural Services, Patient Advocacy, Ethics, etc.)    Any spiritual / Sikh concerns:  [] Yes /  [x] No    Caregiver Burnout:  [] Yes /  [x] No /  [x] No Caregiver Present      Anticipatory grief assessment:   [x] Normal  / [] Maladaptive       ESAS Anxiety: Anxiety: 5    ESAS Depression:          REVIEW OF SYSTEMS:     Positive and pertinent negative findings in ROS are noted above in HPI. The following systems were [x] reviewed / [] unable to be reviewed as noted in HPI  Other findings are noted below. Systems: constitutional, ears/nose/mouth/throat, respiratory, gastrointestinal, genitourinary, musculoskeletal, integumentary, neurologic, psychiatric, endocrine. Positive findings noted below. Modified ESAS Completed by: provider           Pain: 0   Anxiety: 5       Dyspnea: 0           Stool Occurrence(s): 1        PHYSICAL EXAM:     From RN flowsheet:  Wt Readings from Last 3 Encounters:   01/18/21 182 lb 6.4 oz (82.7 kg)   10/04/19 215 lb (97.5 kg)   08/05/17 192 lb (87.1 kg)     Blood pressure 109/75, pulse 76, temperature 98.4 °F (36.9 °C), resp. rate 22, height 5' 10\" (1.778 m), weight 182 lb 6.4 oz (82.7 kg), SpO2 91 %. Pain Scale 1: Numeric (0 - 10)  Pain Intensity 1: 0                 Last bowel movement, if known:     Constitutional: age appropriate  male; lying in bed. Eyes: pupils equal, anicteric  ENMT: no nasal discharge, moist mucous membranes  Cardiovascular: regular rhythm, distal pulses intact  Respiratory: breathing not labored on HFNC  Gastrointestinal: soft non-tender, +bowel sounds  Musculoskeletal: no deformity, no tenderness to palpation  Skin: warm, dry  Neurologic: A&Ox3, following commands, moving all extremities  Psychiatric: calm, cooperative today       HISTORY:     Principal Problem:    COVID-19 virus infection (1/11/2021)    Active Problems:    Diarrhea (1/11/2021)      Acute respiratory insufficiency (1/11/2021)      Hypoxia (1/11/2021)      Acute metabolic encephalopathy (5/11/0328)      Past Medical History:   Diagnosis Date    Diverticulitis     Gastrointestinal disorder     History of vascular access device 07/24/2017    [facility] VAT -  5FR triple Lumen Power PICC R Basilic  39 cm    Spinal stenosis       Past Surgical History:   Procedure Laterality Date    FLEXIBLE SIGMOIDOSCOPY N/A 7/27/2017    SIGMOIDOSCOPY FLEXIBLE performed by Ferny Grijalva [facility]    IR INSERT NON TUNL CVC OVER 5 YRS  1/13/2021      No family history on file. History reviewed, no pertinent family history. Social History     Tobacco Use    Smoking status: Current Some Day Smoker     Packs/day: 1.00     Years: 50.00     Pack years: 50.00    Smokeless tobacco: Current User   Substance Use Topics    Alcohol use:  Yes     Alcohol/week: 0.8 standard drinks     Types: 1 Cans of beer per week     No Known Allergies   Current Facility-Administered Medications   Medication Dose Route Frequency    dextrose 5% and 0.9% NaCl infusion  50 mL/hr IntraVENous CONTINUOUS    ipratropium-albuterol (COMBIVENT RESPIMAT) 20 mcg-100 mcg inhalation spray  1 Puff Inhalation Q6H PRN    zinc gluconate tablet 50 mg  50 mg Oral DAILY    bumetanide (BUMEX) injection 1 mg  1 mg IntraVENous BID    melatonin (rapid dissolve) tablet 5 mg  5 mg Oral QHS PRN    sodium chloride (NS) flush 5-40 mL  5-40 mL IntraVENous Q8H    sodium chloride (NS) flush 5-40 mL  5-40 mL IntraVENous PRN    polyethylene glycol (MIRALAX) packet 17 g  17 g Oral DAILY PRN    bisacodyL (DULCOLAX) suppository 10 mg  10 mg Rectal DAILY PRN    ondansetron (ZOFRAN) injection 4 mg  4 mg IntraVENous Q6H PRN    famotidine (PEPCID) tablet 20 mg  20 mg Oral BID    enoxaparin (LOVENOX) injection 40 mg  40 mg SubCUTAneous DAILY    acetaminophen (TYLENOL) tablet 650 mg  650 mg Oral Q6H PRN    Or    acetaminophen (TYLENOL) suppository 650 mg  650 mg Rectal Q6H PRN    guaiFENesin-dextromethorphan (ROBITUSSIN DM) 100-10 mg/5 mL syrup 5 mL  5 mL Oral Q4H PRN    labetaloL (NORMODYNE;TRANDATE) 20 mg/4 mL (5 mg/mL) injection 20 mg  20 mg IntraVENous Q4H PRN    hydrALAZINE (APRESOLINE) 20 mg/mL injection 20 mg  20 mg IntraVENous Q4H PRN    alum-mag hydroxide-simeth (MYLANTA) oral suspension 30 mL  30 mL Oral Q4H PRN    LORazepam (ATIVAN) injection 0.5 mg  0.5 mg IntraVENous Q6H PRN    oxyCODONE IR (ROXICODONE) tablet 5 mg  5 mg Oral Q4H PRN    oxyCODONE IR (ROXICODONE) tablet 10 mg  10 mg Oral Q4H PRN    morphine injection 2 mg  2 mg IntraVENous Q4H PRN    diphenhydrAMINE (BENADRYL) injection 25 mg  25 mg IntraVENous Q6H PRN    diphenhydrAMINE (BENADRYL) capsule 25 mg  25 mg Oral Q6H PRN    simethicone (MYLICON) tablet 80 mg  80 mg Oral QID PRN    cholecalciferol (VITAMIN D3) (1000 Units /25 mcg) tablet 2 Tab  2,000 Units Oral DAILY    ascorbic acid (vitamin C) (VITAMIN C) tablet 500 mg  500 mg Oral BID          LAB AND IMAGING FINDINGS:     Lab Results   Component Value Date/Time    WBC 14.5 (H) 01/20/2021 03:33 AM    HGB 18.0 (H) 01/20/2021 03:33 AM    PLATELET 327 15/22/2547 03:33 AM     Lab Results   Component Value Date/Time    Sodium 134 (L) 01/20/2021 03:33 AM    Potassium 3.8 01/20/2021 03:33 AM    Chloride 96 (L) 01/20/2021 03:33 AM    CO2 30 01/20/2021 03:33 AM    BUN 63 (H) 01/20/2021 03:33 AM    Creatinine 1.39 (H) 01/20/2021 03:33 AM    Calcium 8.7 01/20/2021 03:33 AM    Magnesium 2.8 (H) 01/13/2021 03:51 AM    Phosphorus 2.6 01/13/2021 03:51 AM      Lab Results   Component Value Date/Time    Alk. phosphatase 56 01/20/2021 03:33 AM    Protein, total 7.8 01/20/2021 03:33 AM    Albumin 3.5 01/20/2021 03:33 AM    Globulin 4.3 (H) 01/20/2021 03:33 AM     Lab Results   Component Value Date/Time    INR 1.3 (H) 01/20/2021 03:33 AM    Prothrombin time 13.1 (H) 01/20/2021 03:33 AM    aPTT 25.9 01/20/2021 03:33 AM      Lab Results   Component Value Date/Time    Ferritin 1,431 (H) 01/10/2021 10:35 PM      Lab Results   Component Value Date/Time    pH 7.44 01/11/2021 05:09 AM    PCO2 31 (L) 01/11/2021 05:09 AM    PO2 69 (L) 01/11/2021 05:09 AM     No components found for: GLPOC   No results found for: CPK, CKMB             Total time:   15m  Counseling / coordination time, spent as noted above: 15m  > 50% counseling / coordination?: y    Prolonged service was provided for  []30 min   []75 min in face to face time in the presence of the patient, spent as noted above. Note: this can only be billed with 97096 (initial) or 05471 (follow up). If multiple start / stop times, list each separately.

## 2021-01-20 NOTE — CONSULTS
[facility]     Name: Zacarias Sutherland MRN: [id number]   : 1945 Hospital: [address] [facility]   Date: 2021        Impression Plan   1. COVID-19 PNA  2. Acute respiratory failure with hypoxia  3. Suspected LEATHA               · Goal sats 88% or higher, wean HF as tolerated  · Decadron  · Zinc, vitamin c  · Completed remdesvir and azithromycin  · Lovenox  · Trend d-dimer, inflammatory markers  · Diuresis as tolerated  · NPO with sips, will add gentle IVF and will need to consider adding TPN if he fails to improve  · Palliative following         Pt is critically ill and at risk of decompensation in the setting of acute respiratory failure with hypoxia requiring HF NC. Critical care time spent with pt exclusive of procedures was 30 minutes    Radiology  ( personally reviewed) CTA without PE; emphysema with patchy airspace disease   ABG No results for input(s): PHI, PO2I, PCO2I in the last 72 hours. Subjective     Patient is a 66 y.o. male admitted for COVID-19 and acute respiratory failure with hypoxia. He presented with shortness of breath for about a week with a dry cough. Additionally noted fevers and intermittent watery diarrhea. Found to be hypoxia and positive for COVID-19. Transferred to stepdown for worsening hypoxia. Requiring HF NC at [address] [place] [address]. Currently denies shortness of breath. Endorses intermittent cough. Afebrile the past 24 hours. His biggest concerns are that he wants to eat/drink and does not appreciate having to use a urinal to go to the bathroom. Family meeting was held today and he was made DNR. Interval history:  Remains on HF at [facility] to wish to go home    Review of Systems:  A comprehensive review of systems was negative except for that written in the HPI.     Past Medical History:   Diagnosis Date    Diverticulitis     Gastrointestinal disorder     History of vascular access device 2017    [facility] VAT -  5FR triple Lumen Power PICC R Basilic  39 cm    Spinal stenosis       Past Surgical History:   Procedure Laterality Date    FLEXIBLE SIGMOIDOSCOPY N/A 7/27/2017    SIGMOIDOSCOPY FLEXIBLE performed by Dorothy Dominguez MD at [facility]    IR INSERT NON TUNL CVC OVER 5 YRS  1/13/2021      Prior to Admission medications    Medication Sig Start Date End Date Taking? Authorizing Provider   dicyclomine (BENTYL) 20 mg tablet Take 20 mg by mouth every six (6) hours. Indications: irritable colon   Yes Provider, Historical   gabapentin (NEURONTIN) 600 mg tablet Take 600 mg by mouth three (3) times daily. Yes Provider, Historical   ibuprofen (MOTRIN) 200 mg tablet Take 600 mg by mouth daily. Yes Provider, Historical   acetaminophen (TYLENOL) 500 mg tablet Take 1,000 mg by mouth as needed for Pain. Indications: fever, muscle pain   Yes Provider, Historical   psyllium (METAMUCIL) packet Take 1 Packet by mouth two (2) times a day. Yes Provider, Historical     Current Facility-Administered Medications   Medication Dose Route Frequency    dextrose 5% and 0.9% NaCl infusion  50 mL/hr IntraVENous CONTINUOUS    zinc gluconate tablet 50 mg  50 mg Oral DAILY    bumetanide (BUMEX) injection 1 mg  1 mg IntraVENous BID    sodium chloride (NS) flush 5-40 mL  5-40 mL IntraVENous Q8H    famotidine (PEPCID) tablet 20 mg  20 mg Oral BID    enoxaparin (LOVENOX) injection 40 mg  40 mg SubCUTAneous DAILY    dexAMETHasone (DECADRON) tablet 6 mg  6 mg Oral DAILY    cholecalciferol (VITAMIN D3) (1000 Units /25 mcg) tablet 2 Tab  2,000 Units Oral DAILY    ascorbic acid (vitamin C) (VITAMIN C) tablet 500 mg  500 mg Oral BID     No Known Allergies   Social History     Tobacco Use    Smoking status: Current Some Day Smoker     Packs/day: 1.00     Years: 50.00     Pack years: 50.00    Smokeless tobacco: Current User   Substance Use Topics    Alcohol use: Yes     Alcohol/week: 0.8 standard drinks     Types: 1 Cans of beer per week      No family history on file. Laboratory: I have personally reviewed the critical care flowsheet and labs.      Recent Labs     01/20/21  0333 01/19/21  1115   WBC 14.5* 13.7*   HGB 18.0* 18.3*   HCT 52.2* 51.6*    348     Recent Labs     01/20/21  0333 01/19/21  1115 01/19/21  0259 01/18/21  0339   * 134*  --   --    K 3.8 3.6  --   --    CL 96* 98  --   --    CO2 30 29  --   --    * 125*  --   --    BUN 63* 61*  --   --    CREA 1.39* 1.32*  --   --    CA 8.7 8.8  --   --    ALB 3.5 3.6  --   --    * 87*  --   --    INR 1.3*  --  1.2* 1.2*       Objective:     Mode Rate Tidal Volume Pressure FiO2 PEEP            90 %       Vital Signs:     TMAX(24)      Intake/Output:   Last shift:         Last 3 shifts: 01/20 0701 - 01/20 1900  In: 60 [P.O.:60]  Out: 250 [Urine:250][id number]    Intake/Output Summary (Last 24 hours) at 1/20/2021 0920  Last data filed at 1/20/2021 0810  Gross per 24 hour   Intake 240 ml   Output 1050 ml   Net -810 ml     EXAM:   GENERAL: well developed and in no distress, HEENT:  PERRL, EOMI, no alar flaring or epistaxis, NECK:  no jugular vein distention, no retractions, LUNGS: CTAB, respirations non-labored , HEART:  Regular rate and rhythm with no MGR; no edema is present, ABDOMEN:  soft with no tenderness, bowel sounds present, EXTREMITIES:  warm with no cyanosis, SKIN:  no jaundice or ecchymosis and NEUROLOGIC:  alert and oriented, grossly non-focal    Russell Workman MD  [facility]

## 2021-01-20 NOTE — PROGRESS NOTES
Problem: Self Care Deficits Care Plan (Adult)  Goal: *Acute Goals and Plan of Care (Insert Text)  Description:   FUNCTIONAL STATUS PRIOR TO ADMISSION: Patient was independent and active without use of DME.     HOME SUPPORT: The patient lived with wife and college age son but did not require assist.    Occupational Therapy Goals  Initiated 1/15/2021  1. Patient will perform lower body dressing with supervision/set-up within 7 day(s). 2.  Patient will perform grooming standing at sink with no LOB with supervision/set-up within 7 day(s). 3.  Patient will perform ADL item retrieval with reaching high and low with supervision/set-up within 7 day(s). 4.  Patient will perform toilet transfers with supervision/set-up within 7 day(s). 5.  Patient will perform all aspects of toileting with supervision/set-up within 7 day(s). 6.  Patient will utilize energy conservation techniques during functional activities with verbal cues within 7 day(s). Outcome: Progressing Towards Goal       OCCUPATIONAL THERAPY TREATMENT  Patient: Power Bartholomew (89 y.o. male)  Date: 1/20/2021  Diagnosis: COVID-19 virus infection [U07.1]  Hypoxia [R09.02]  Acute respiratory insufficiency [R06.89]  Prerenal azotemia [R79.89]  Diarrhea [R19.7] COVID-19 virus infection       Precautions:    Chart, occupational therapy assessment, plan of care, and goals were reviewed. ASSESSMENT  Patient continues with skilled OT services and is progressing towards goals. Patient presents with lack of insight to deficits even with education and as a result feels UE and breathing exercises are futile, declined to perform exercises with instruction. Was able to sit edge of bed ~ 20 minutes and completed grooming, stood 3 x's at edge of bed with SBA, decreased safety for stand to sit as he plopped back on bed.   Oxygen sats stable on 35 liters highflow throughout    Current Level of Function Impacting Discharge (ADLs): grooming with set up, toileting with urinal in standing and SBA    Other factors to consider for discharge: patient reports his wife drinks, patient asked for moonshine when getting ready to leave         PLAN :  Patient continues to benefit from skilled intervention to address the above impairments. Continue treatment per established plan of care. to address goals. Recommend with staff: encourage UE and pursed lip breathing exercises in addition to IS    Recommend next OT session: as tolerated    Recommendation for discharge: (in order for the patient to meet his/her long term goals)  To be determined: pulmonary rehab, ? If he will agree or can make decision    This discharge recommendation:  Has not yet been discussed the attending provider and/or case management    IF patient discharges home will need the following DME:        SUBJECTIVE:   Patient stated Sawyer Jaramillo want me to do these exercises and it's bullshit.  stated to his son on phone who called during tx    OBJECTIVE DATA SUMMARY:   Cognitive/Behavioral Status:  Neurologic State: Alert;Confused;Irritable  Orientation Level: Oriented X4  Cognition: Impulsive; Follows commands        Safety/Judgement: Decreased awareness of need for assistance; Lack of insight into deficits    Functional Mobility and Transfers for ADLs:  Bed Mobility:  Supine to Sit: Supervision  Sit to Supine: Stand-by assistance  Scooting: Supervision    Transfers:  Sit to Stand: Stand-by assistance     Bed to Chair: Contact guard assistance;Minimum assistance    Balance:  Sitting: Intact  Standing: Impaired  Standing - Static: Good; Unsupported  Standing - Dynamic : Not tested    ADL Intervention:   Instructed on pursed lip breathing and issued handout, performed 1 time and declined further, educated on benefit with min to no carryover    Grooming  Grooming Assistance: Set-up  Position Performed: Seated edge of bed  Washing Face: Set-up  Washing Hands: Set-up  Brushing Teeth: Set-up        Toileting  Toileting Assistance: Stand-by assistance  Bladder Hygiene: Stand-by assistance(standing to use urinal)    Cognitive Retraining  Safety/Judgement: Decreased awareness of need for assistance; Lack of insight into deficits    Therapeutic Exercises:   Issued UE exercises and educated on benefit of performance with controlled breathing, patient scoffed at exercises and declined to perform, handouts left in room     Pain:  No complaint    Activity Tolerance:   35 liters highflow throughout and sats > or= 90%  majority of time, decreased briefly to 88%    After treatment patient left in no apparent distress:   Supine in bed, Call bell within reach, and Side rails x 3    COMMUNICATION/COLLABORATION:   The patients plan of care was discussed with: Physical therapist and Registered nurse.      John Glover OTR/L  Time Calculation: 36 mins

## 2021-01-20 NOTE — PROGRESS NOTES
Problem: Risk for Spread of Infection  Goal: Prevent transmission of infectious organism to others  Description: Prevent the transmission of infectious organisms to other patients, staff members, and visitors. Outcome: Progressing Towards Goal     Problem: Patient Education:  Go to Education Activity  Goal: Patient/Family Education  Outcome: Progressing Towards Goal     Problem: Falls - Risk of  Goal: *Absence of Falls  Description: Document Barbara Collins Fall Risk and appropriate interventions in the flowsheet. Outcome: Progressing Towards Goal  Note: Fall Risk Interventions:  Mobility Interventions: Bed/chair exit alarm, Patient to call before getting OOB, OT consult for ADLs, PT Consult for assist device competence, PT Consult for mobility concerns    Mentation Interventions: Adequate sleep, hydration, pain control, Gait belt with transfers/ambulation    Medication Interventions: Bed/chair exit alarm, Evaluate medications/consider consulting pharmacy, Teach patient to arise slowly, Utilize gait belt for transfers/ambulation, Patient to call before getting OOB    Elimination Interventions: Call light in reach, Bed/chair exit alarm, Urinal in reach, Toileting schedule/hourly rounds, Patient to call for help with toileting needs              Problem: Patient Education: Go to Patient Education Activity  Goal: Patient/Family Education  Outcome: Progressing Towards Goal     Problem: Patient Education: Go to Patient Education Activity  Goal: Patient/Family Education  Outcome: Progressing Towards Goal     Problem: Patient Education: Go to Patient Education Activity  Goal: Patient/Family Education  Outcome: Progressing Towards Goal     Problem: Pressure Injury - Risk of  Goal: *Prevention of pressure injury  Description: Document Adrien Scale and appropriate interventions in the flowsheet.   Outcome: Progressing Towards Goal  Note: Pressure Injury Interventions:  Sensory Interventions: Assess changes in LOC         Activity Interventions: Pressure redistribution bed/mattress(bed type), Increase time out of bed    Mobility Interventions: HOB 30 degrees or less, Turn and reposition approx.  every two hours(pillow and wedges)    Nutrition Interventions: Document food/fluid/supplement intake                     Problem: Patient Education: Go to Patient Education Activity  Goal: Patient/Family Education  Outcome: Progressing Towards Goal

## 2021-01-20 NOTE — PROGRESS NOTES
1900: Bedside shift change report given to Jean-Claude Bob RN (oncoming nurse) by Anjum Antonio RN (offgoing nurse). Report included the following information SBAR, Kardex, Procedure Summary, Intake/Output, MAR, Recent Results and Cardiac Rhythm NSR. Primary Nurse Heather Dietrich RN and Anjum Antonio RN performed a dual skin assessment on this patient No impairment noted Adrien score is 21 
 
2000: Shift  Assessment completed           Mental Status:  Pt A/Ox4, calm and cooperative           Respiratory: Lungs clear, diminished in bases, on Hi Flow 45L at 90%           Cardiac: VSS, S1S2, peripheral pulses present/palpable 
          GI/: Voiding/Urinal, bowel sounds present 
          IV Drips: R Triple IJ SL +Blood Return 
 
2300: Son Joel Strong updated via telephone. 0000: Reassessment completed. Changes noted, see flow sheet. Pt agitated, pulling hi-flow off frequently d/t nose irritation. Pt redirected, benadryl offered for irritation and sleep aid. Pt with low temp, blankets applied with improvement 
 
0300: Labs drawn, Reassessment completed. No changes from previous assessment  
 
0600: Pt agitated and screaming for help for ice chips/OJ. Attempted to console pt, ice and OJ provided. Pt continues to scream.  
 
0700: Bedside shift change report given to Anjum Antonio RN (oncoming nurse) by Jean-Claude Bob RN (offgoing nurse). Report included the following information SBAR, Kardex, Procedure Summary, Intake/Output, MAR, Recent Results and Cardiac Rhythm NSR.

## 2021-01-20 NOTE — PROGRESS NOTES
Problem: Risk for Spread of Infection  Goal: Prevent transmission of infectious organism to others  Description: Prevent the transmission of infectious organisms to other patients, staff members, and visitors. Outcome: Progressing Towards Goal     Problem: Falls - Risk of  Goal: *Absence of Falls  Description: Document Macario Grijalva Fall Risk and appropriate interventions in the flowsheet.   Outcome: Progressing Towards Goal  Note: Fall Risk Interventions:  Mobility Interventions: Bed/chair exit alarm, Communicate number of staff needed for ambulation/transfer, Patient to call before getting OOB, Strengthening exercises (ROM-active/passive), Utilize gait belt for transfers/ambulation    Mentation Interventions: Adequate sleep, hydration, pain control, Bed/chair exit alarm    Medication Interventions: Utilize gait belt for transfers/ambulation    Elimination Interventions: Bed/chair exit alarm, Call light in reach

## 2021-01-20 NOTE — PROGRESS NOTES
Transition of care note:    RUR 11%    Pt is Covid 19 +    LOS 9 days  Plan: 1. Pt is on high flow oxygen(45L/90% Fio2)  2. When pt is stable,please consider PT and OT consults . 3.Depending upon how pt progresses in the hospital will determine his eventual disposition. 4.Son,Loc  is very hopeful that pt will progress to the point he can go to inpatient pulmonary rehab @ [facility] or [facility] if pt agrees with the plan. 5.As pt is dealing with many complicated issues with his wife,I am holding off discussing rehab with pt until PT and OTs recommendations have been submitted. 6.If eventually accepted @ [facility],their requirements are for pt to be on 6 liters oxygen. 7.[facility] is accepting pt after 10 days from symptom start for Covid 19 pts. [facility] accepts patients immediately once they are weaned down to 4 liters nc.   8.[facility] will accept pts who are positive if it has been greater than 21 days after diagnosis and pt is without symptoms . They will accept pts if they are Covid + ,if it has been less than 21 days and pt is without symptoms,they will be admitted on precautions. If pt is covid + and continues with symtoms,then the [facility] liason must examine the case with their physician  9. I am continuing to follow pt for discharge needs.     Victorina Randle Path Notes (To The Dermatopathologist): Please check margins.

## 2021-01-20 NOTE — PROGRESS NOTES
[facility]  [address] [facility], [place], [address] [place] [address]  [phone]      Medical Progress Note      NAME: Galen Child   :  1945  MRM:  [id number]    Date of service: 2021  1:56 PM       Assessment and Plan:   1. COVID-19 virus infection (2021) POA: Completed  Remdesivir, Azithromycin, Dexamethasone. Continue Vitamin C and Zinc, Wean oxygen as tolerated. Currently on high flow O2. Palliative care following. Now a DNR        2.  Acute respiratory failure with hypoxia due to COVID pneumonia also likely underlying emphysema. CTA chest done 1/15 showed no PE. Scattered interstitial opacities with moderate emphysema vs pulm edema. Continue supplement O2. pulmonary following.      3. Centrilobular emphysema: noted on CTA chest. Continue Combivent respimat. Albuterol PRN. Pulmonology following     4. Acute metabolic encephalopathy () POA: this is intermittent. Likely from COVID, hypoxia. Continue supportive care       5. Physical debility: due to COVID. Continue PT, OT                  Subjective:     Chief Complaint[de-identified] Patient was seen and examined as a follow up for covid pneumonia. Chart was reviewed. denies SOB     ROS:  (bold if positive, if negative)    Tolerating PT  Tolerating Diet        Objective:     Last 24hrs VS reviewed since prior progress note.  Most recent are:    Visit Vitals  /80 (BP 1 Location: Right arm, BP Patient Position: Sitting)   Pulse 65   Temp 98.2 °F (36.8 °C)   Resp 15   Ht 5' 10\" (1.778 m)   Wt 82.7 kg (182 lb 6.4 oz)   SpO2 (!) (P) 89%   BMI 26.17 kg/m²     SpO2 Readings from Last 6 Encounters:   21 (!) (P) 89%   10/04/19 92%   17 99%   17 96%   17 96%   17 95%    O2 Flow Rate (L/min): (P) 40 l/min       Intake/Output Summary (Last 24 hours) at 2021 1356  Last data filed at 2021 1210  Gross per 24 hour   Intake 305 ml   Output 1075 ml   Net -770 ml        Physical Exam:    Gen: Well-developed, well-nourished, in no acute distress  HEENT:  Pink conjunctivae, PERRL, hearing intact to voice, moist mucous membranes  Neck:  Supple, without masses, thyroid non-tender  Resp:  No accessory muscle use, clear breath sounds without wheezes rales or rhonchi  Card:  No murmurs, normal S1, S2 without thrills, bruits or peripheral edema  Abd:  Soft, non-tender, non-distended, normoactive bowel sounds are present, no palpable organomegaly and no detectable hernias  Lymph:  No cervical or inguinal adenopathy  Musc:  No cyanosis or clubbing  Skin:  No rashes or ulcers, skin turgor is good  Neuro:  Cranial nerves are grossly intact, no focal motor weakness, follows commands appropriately  Psych:  Good insight, oriented to person, place and time, alert  __________________________________________________________________  Medications Reviewed: (see below)  Medications:     Current Facility-Administered Medications   Medication Dose Route Frequency    dextrose 5% and 0.9% NaCl infusion  50 mL/hr IntraVENous CONTINUOUS    ipratropium-albuterol (COMBIVENT RESPIMAT) 20 mcg-100 mcg inhalation spray  1 Puff Inhalation Q6H PRN    zinc gluconate tablet 50 mg  50 mg Oral DAILY    bumetanide (BUMEX) injection 1 mg  1 mg IntraVENous BID    melatonin (rapid dissolve) tablet 5 mg  5 mg Oral QHS PRN    sodium chloride (NS) flush 5-40 mL  5-40 mL IntraVENous Q8H    sodium chloride (NS) flush 5-40 mL  5-40 mL IntraVENous PRN    polyethylene glycol (MIRALAX) packet 17 g  17 g Oral DAILY PRN    bisacodyL (DULCOLAX) suppository 10 mg  10 mg Rectal DAILY PRN    ondansetron (ZOFRAN) injection 4 mg  4 mg IntraVENous Q6H PRN    famotidine (PEPCID) tablet 20 mg  20 mg Oral BID    enoxaparin (LOVENOX) injection 40 mg  40 mg SubCUTAneous DAILY    acetaminophen (TYLENOL) tablet 650 mg  650 mg Oral Q6H PRN    Or    acetaminophen (TYLENOL) suppository 650 mg  650 mg Rectal Q6H PRN    guaiFENesin-dextromethorphan (ROBITUSSIN DM) 100-10 mg/5 mL syrup 5 mL  5 mL Oral Q4H PRN    labetaloL (NORMODYNE;TRANDATE) 20 mg/4 mL (5 mg/mL) injection 20 mg  20 mg IntraVENous Q4H PRN    hydrALAZINE (APRESOLINE) 20 mg/mL injection 20 mg  20 mg IntraVENous Q4H PRN    alum-mag hydroxide-simeth (MYLANTA) oral suspension 30 mL  30 mL Oral Q4H PRN    LORazepam (ATIVAN) injection 0.5 mg  0.5 mg IntraVENous Q6H PRN    oxyCODONE IR (ROXICODONE) tablet 5 mg  5 mg Oral Q4H PRN    oxyCODONE IR (ROXICODONE) tablet 10 mg  10 mg Oral Q4H PRN    morphine injection 2 mg  2 mg IntraVENous Q4H PRN    diphenhydrAMINE (BENADRYL) injection 25 mg  25 mg IntraVENous Q6H PRN    diphenhydrAMINE (BENADRYL) capsule 25 mg  25 mg Oral Q6H PRN    simethicone (MYLICON) tablet 80 mg  80 mg Oral QID PRN    cholecalciferol (VITAMIN D3) (1000 Units /25 mcg) tablet 2 Tab  2,000 Units Oral DAILY    ascorbic acid (vitamin C) (VITAMIN C) tablet 500 mg  500 mg Oral BID        Lab Data Reviewed: (see below)  Lab Review:     Recent Labs     01/20/21 0333 01/19/21 1115   WBC 14.5* 13.7*   HGB 18.0* 18.3*   HCT 52.2* 51.6*    348     Recent Labs     01/20/21  0333 01/19/21  1115 01/19/21  0259 01/18/21 0339   * 134*  --   --    K 3.8 3.6  --   --    CL 96* 98  --   --    CO2 30 29  --   --    * 125*  --   --    BUN 63* 61*  --   --    CREA 1.39* 1.32*  --   --    CA 8.7 8.8  --   --    ALB 3.5 3.6  --   --    TBILI 1.2* 1.1*  --   --    * 87*  --   --    INR 1.3*  --  1.2* 1.2*     Lab Results   Component Value Date/Time    Glucose (POC) 162 (H) 01/20/2021 12:08 PM    Glucose (POC) 105 (H) 01/13/2021 04:41 PM    Glucose (POC) 110 (H) 08/04/2017 05:38 AM    Glucose (POC) 101 (H) 08/03/2017 10:58 PM    Glucose (POC) 100 08/03/2017 05:53 PM     No results for input(s): PH, PCO2, PO2, HCO3, FIO2 in the last 72 hours.   Recent Labs     01/20/21  0333 01/19/21  0259 01/18/21  0339   INR 1.3* 1.2* 1.2*     All Micro Results     Procedure Component Value Units Date/Time    C. DIFFICILE AG & TOXIN A/B [[id number]] Collected: 01/11/21 0145    Order Status: Canceled Specimen: Stool     ENTERIC BACTERIA PANEL, DNA [[id number]]     Order Status: Canceled Specimen: Stool           I have reviewed notes of prior 24hr. Other pertinent lab: Total time spent with patient: 28 I personally reviewed chart, notes, data and current medications in the medical record. I have personally examined and treated the patient at bedside during this period.                  Care Plan discussed with: Patient, Nursing Staff and >50% of time spent in counseling and coordination of care    Discussed:  Care Plan    Prophylaxis:  Lovenox    Disposition:  Home w/Family           ___________________________________________________    Attending Physician: Debi Stover MD

## 2021-01-21 LAB
APTT PPP: 28.2 SEC (ref 22.1–31)
COMMENT, HOLDF: NORMAL
D DIMER PPP FEU-MCNC: 1.03 MG/L FEU (ref 0–0.65)
FIBRINOGEN PPP-MCNC: 392 MG/DL (ref 200–475)
GLUCOSE BLD STRIP.AUTO-MCNC: 107 MG/DL (ref 65–100)
GLUCOSE BLD STRIP.AUTO-MCNC: 117 MG/DL (ref 65–100)
GLUCOSE BLD STRIP.AUTO-MCNC: 121 MG/DL (ref 65–100)
GLUCOSE BLD STRIP.AUTO-MCNC: 126 MG/DL (ref 65–100)
GLUCOSE BLD STRIP.AUTO-MCNC: 135 MG/DL (ref 65–100)
INR PPP: 1.3 (ref 0.9–1.1)
PROTHROMBIN TIME: 13.5 SEC (ref 9–11.1)
SAMPLES BEING HELD,HOLD: NORMAL
SERVICE CMNT-IMP: ABNORMAL
THERAPEUTIC RANGE,PTTT: NORMAL SECS (ref 58–77)

## 2021-01-21 PROCEDURE — 97110 THERAPEUTIC EXERCISES: CPT | Performed by: OCCUPATIONAL THERAPIST

## 2021-01-21 PROCEDURE — 74011250636 HC RX REV CODE- 250/636: Performed by: HOSPITALIST

## 2021-01-21 PROCEDURE — 74011250637 HC RX REV CODE- 250/637: Performed by: HOSPITALIST

## 2021-01-21 PROCEDURE — 85379 FIBRIN DEGRADATION QUANT: CPT

## 2021-01-21 PROCEDURE — 65660000000 HC RM CCU STEPDOWN

## 2021-01-21 PROCEDURE — 85384 FIBRINOGEN ACTIVITY: CPT

## 2021-01-21 PROCEDURE — 85730 THROMBOPLASTIN TIME PARTIAL: CPT

## 2021-01-21 PROCEDURE — 74011250637 HC RX REV CODE- 250/637: Performed by: INTERNAL MEDICINE

## 2021-01-21 PROCEDURE — 82962 GLUCOSE BLOOD TEST: CPT

## 2021-01-21 PROCEDURE — 74011000258 HC RX REV CODE- 258: Performed by: INTERNAL MEDICINE

## 2021-01-21 PROCEDURE — 97530 THERAPEUTIC ACTIVITIES: CPT

## 2021-01-21 PROCEDURE — 85610 PROTHROMBIN TIME: CPT

## 2021-01-21 PROCEDURE — 2709999900 HC NON-CHARGEABLE SUPPLY

## 2021-01-21 PROCEDURE — 74011000250 HC RX REV CODE- 250: Performed by: INTERNAL MEDICINE

## 2021-01-21 PROCEDURE — 77010033711 HC HIGH FLOW OXYGEN

## 2021-01-21 RX ORDER — POTASSIUM CHLORIDE 750 MG/1
40 TABLET, FILM COATED, EXTENDED RELEASE ORAL ONCE
Status: COMPLETED | OUTPATIENT
Start: 2021-01-21 | End: 2021-01-21

## 2021-01-21 RX ADMIN — OXYCODONE HYDROCHLORIDE AND ACETAMINOPHEN 500 MG: 500 TABLET ORAL at 18:12

## 2021-01-21 RX ADMIN — Medication 10 ML: at 04:38

## 2021-01-21 RX ADMIN — BUMETANIDE 1 MG: 0.25 INJECTION, SOLUTION INTRAMUSCULAR; INTRAVENOUS at 08:31

## 2021-01-21 RX ADMIN — Medication 10 ML: at 21:56

## 2021-01-21 RX ADMIN — ENOXAPARIN SODIUM 40 MG: 40 INJECTION SUBCUTANEOUS at 08:31

## 2021-01-21 RX ADMIN — FAMOTIDINE 20 MG: 20 TABLET, FILM COATED ORAL at 18:12

## 2021-01-21 RX ADMIN — DEXTROSE MONOHYDRATE AND SODIUM CHLORIDE 50 ML/HR: 5; .9 INJECTION, SOLUTION INTRAVENOUS at 06:16

## 2021-01-21 RX ADMIN — FAMOTIDINE 20 MG: 20 TABLET, FILM COATED ORAL at 08:30

## 2021-01-21 RX ADMIN — OXYCODONE HYDROCHLORIDE AND ACETAMINOPHEN 500 MG: 500 TABLET ORAL at 08:30

## 2021-01-21 RX ADMIN — Medication 50 MG: at 08:31

## 2021-01-21 RX ADMIN — Medication 10 ML: at 08:32

## 2021-01-21 RX ADMIN — Medication 2 TABLET: at 08:30

## 2021-01-21 RX ADMIN — BUMETANIDE 1 MG: 0.25 INJECTION, SOLUTION INTRAMUSCULAR; INTRAVENOUS at 18:17

## 2021-01-21 RX ADMIN — POTASSIUM CHLORIDE 40 MEQ: 750 TABLET, FILM COATED, EXTENDED RELEASE ORAL at 18:12

## 2021-01-21 NOTE — PROGRESS NOTES
Problem: Self Care Deficits Care Plan (Adult)  Goal: *Acute Goals and Plan of Care (Insert Text)  Description:   FUNCTIONAL STATUS PRIOR TO ADMISSION: Patient was independent and active without use of DME.     HOME SUPPORT: The patient lived with wife and college age son but did not require assist.    Occupational Therapy Goals  Initiated 1/15/2021  1. Patient will perform lower body dressing with supervision/set-up within 7 day(s). 2.  Patient will perform grooming standing at sink with no LOB with supervision/set-up within 7 day(s). 3.  Patient will perform ADL item retrieval with reaching high and low with supervision/set-up within 7 day(s). 4.  Patient will perform toilet transfers with supervision/set-up within 7 day(s). 5.  Patient will perform all aspects of toileting with supervision/set-up within 7 day(s). 6.  Patient will utilize energy conservation techniques during functional activities with verbal cues within 7 day(s). Outcome: Progressing Towards Goal     OCCUPATIONAL THERAPY TREATMENT  Patient: Carlos Watt (57 y.o. male)  Date: 1/21/2021  Diagnosis: COVID-19 virus infection [U07.1]  Hypoxia [R09.02]  Acute respiratory insufficiency [R06.89]  Prerenal azotemia [R79.89]  Diarrhea [R19.7] COVID-19 virus infection       Precautions:  fall, Covid +  Chart, occupational therapy assessment, plan of care, and goals were reviewed. ASSESSMENT  Patient continues with skilled OT services and is progressing slowly towards goals. He declined ADL, EOB and OOB participation this session. Only agreeable to BUE AROM exer to improve his endurance. He required constant instruction for active participation. Educated pt on importance of OOB activity. He verbalized fair understanding. Recommend IP pulmonary rehab at discharge.      Current Level of Function Impacting Discharge (ADLs): pt refused participation    Other factors to consider for discharge: see above         PLAN :  Patient continues to benefit from skilled intervention to address the above impairments. Continue treatment per established plan of care. to address goals. Recommend with staff: OOB to chair at least 3x day as able. Encourage active participation in all ADLs. Recommend next OT session: ADLs seated EOB/chair, endurance, standing    Recommendation for discharge: (in order for the patient to meet his/her long term goals)  Therapy 3 hours per day 5-7 days per week    This discharge recommendation:  Has been made in collaboration with the attending provider and/or case management    IF patient discharges home will need the following DME: TBD       SUBJECTIVE:   Patient stated I am not getting up or moving from this bed.     OBJECTIVE DATA SUMMARY:   Cognitive/Behavioral Status:  Neurologic State: Alert;Irritable  Orientation Level: Oriented X4  Cognition: Decreased attention/concentration; Follows commands  Perception: Appears intact  Perseveration: Perseverates during ADLS;Perseverates during conversation  Safety/Judgement: Awareness of environment;Decreased awareness of need for assistance;Decreased awareness of need for safety;Decreased insight into deficits; Fall prevention    Functional Mobility and Transfers for ADLs:  Bed Mobility:  Supine to Sit: Modified independent  Sit to Supine: Supervision  Scooting: Modified independent    Transfers:  Sit to Stand: Stand-by assistance          Balance:  Sitting: Intact  Standing: Without support  Standing - Static: Good  Standing - Dynamic : Fair    ADL Intervention:  Cognitive Retraining  Safety/Judgement: Awareness of environment;Decreased awareness of need for assistance;Decreased awareness of need for safety;Decreased insight into deficits; Fall prevention    Therapeutic Exercises: Only agreeable to BUE AROM exer to improve his endurance. He required constant instruction for active participation.   Pt performed B shoulder flexion, abduction, horizontal abduction and chest presses at 10 reps each with rest break between each set. Pain:  0/10    Activity Tolerance:   Fair    After treatment patient left in no apparent distress:   Supine in bed and Call bell within reach    COMMUNICATION/COLLABORATION:   The patients plan of care was discussed with: Physical therapist and Registered nurse.      Tri Su OT  Time Calculation: 14 mins

## 2021-01-21 NOTE — PROGRESS NOTES
Shift Summary    Bedside and Verbal shift change report given to MOISÉS James RN (oncoming nurse) by A. Nora Goldberg, RN (offgoing nurse). Report included the following information SBAR, Kardex, Intake/Output, MAR, Recent Results and Cardiac Rhythm  . Patient in bed resting quietly. No complaints at this time. Assessment completed. NSR noted. Fluids running at 50mL/hr. All central line ports flush with positive blood return. Lungs CTA. Tachypnic, but not labored. Hi Flow @ 40L/90%. O2 sats at 96%. Patient Afebrile. Patient refuses to leave BP cuff on. Education provided three times. Patient states it is not comfortable to leave on. Patient refusing to remove pants for CHG bath this AM. CHG completed to upper body and around central line. 0700- Verbal shift change report given to Johny YOUNG (oncoming nurse) by MOISÉS James RN (offgoing nurse). Report included the following information SBAR, Kardex, Intake/Output, MAR, Recent Results and Cardiac Rhythm  .

## 2021-01-21 NOTE — PROGRESS NOTES
Comprehensive Nutrition Assessment    Type and Reason for Visit: Reassess    Nutrition Recommendations/Plan:     1. Pt has been NPO x 4 days now. If pt remains NPO x 3 more days, recommend initiating TPN. TPN REC's:  Day 1: D20, 5% AA at 42 ml/hr. Day 2: Goal of D20, 5% AA at 63 ml/hr with 500 ml 20% lipids at 42 ml/hr x 12h 3x/week. (Goal TPN will provide 1762 kcals, 76 gm protein - this will meet 88% pt's kcal needs and 100% protein needs)    Check TG prior to initiation of TPN and weekly while on lipids. 2.  If O2 requirements improve and pt able to take PO, recommend advancing to Regular diet as appropriate. Nutrition Assessment:      1/21: F/u. Pt transferred to ICU secondary to increasing O2 needs. Currently on 35 L HFNC. Made NPO on 1/18 due to worsening respiratory status. NPO x 4 days now. MD notes plan to initiate TPN if O2 needs remain high. D5 NaCl running at 50 ml/hr (providing 204 kcals/day). Labs: BUN 63, Cr 1.39 (K+ and phos WNL), LFT's slightly elevated, POC -162-107. Meds: Vit C and D3, Bumex, zinc.     1/15: 67 yo male admitted for COVID, hypoxia. PMhx: diverticulitis, cdiff. Overweight per BMI of 25. No recent weights available in EMR hx. Last weight from 15 months ago and pt is down 16.9kg since then (this is 17% loss which is not significant for time frame). Pt is COVID -19 +. Called into room and spoke with pt via phone. Pt denies that much weight loss, stating he has gone from 210lbs down to 205lbs. Current bed weight is charted as 177 lbs. Will continue to monitor for trends. Regular diet ordered. Pt states he has been eating about 50% all meals. Reports much better PO intakes at home with very good appetite. Pt was not interested in ONS at this time, very much wanting to go home today. On 6 L NC O2.  Labs: reviewed. Meds: Vit C and D3, zinc, decadron. Malnutrition Assessment:  Does not meet criteria.     Estimated Daily Nutrient Needs:  Energy (kcal): 1996(REE 1536 x AF 1.3); Weight Used for Energy Requirements: Current  Protein (g): 64-80(0.8-1gm/kg/d); Weight Used for Protein Requirements: Current  Fluid (ml/day): 1996; Method Used for Fluid Requirements: 1 ml/kcal      Nutrition Related Findings:  LBM 1/20; No edema noted      Wounds:    None       Current Nutrition Therapies:  DIET NPO With Ice Chips, With Sips of Clear Fluids    Anthropometric Measures:  · Height:  5' 10\" (177.8 cm)  · Current Body Wt:  82.7 kg (182 lb 5.1 oz)   · Admission Body Wt:       · Usual Body Wt:        · Ideal Body Wt:   :      · Adjusted Body Weight:   ; Weight Adjustment for: No adjustment   · Adjusted BMI:       · BMI Category: Overweight (BMI 25.0-29. 9)       Nutrition Diagnosis:   · Inadequate protein-energy intake related to inadequate protein-energy intake as evidenced by NPO or clear liquid status due to medical condition    1/21: Nutrition Dx continues - NPO.       Nutrition Interventions:   Food and/or Nutrient Delivery: Start oral diet, Start parenteral nutrition  Nutrition Education and Counseling: No recommendations at this time  Coordination of Nutrition Care: Continue to monitor while inpatient    Goals:  Advance PO diet with intakes > 50% or initiate PN to meet > 75% EEN's within next 3-4 days       Nutrition Monitoring and Evaluation:   Behavioral-Environmental Outcomes:    Food/Nutrient Intake Outcomes: Food and nutrient intake  Physical Signs/Symptoms Outcomes: Biochemical data, GI status, Weight    Discharge Planning:    Continue current diet     Electronically signed by Gianna Cosme RD on 1/21/2021     Contact: [phone]

## 2021-01-21 NOTE — PROGRESS NOTES
TAMIA note:    RUR 11%  Pt is covid 19 +    Pt is on high flow 35 liters/85% Fio2. Recommendations are for inpatient pulmonary rehab . [facility] but will need an order from attending for inpatient pulmonary rehab.     Ace Scott

## 2021-01-21 NOTE — PROGRESS NOTES
Problem: Mobility Impaired (Adult and Pediatric)  Goal: *Acute Goals and Plan of Care (Insert Text)  Description: FUNCTIONAL STATUS PRIOR TO ADMISSION: Patient was independent and active without use of DME.    HOME SUPPORT PRIOR TO ADMISSION: The patient lived with his wife and son but did not require assist.    Physical Therapy Goals  Initiated 1/15/2021  1. Patient will move from supine to sit and sit to supine , scoot up and down, and roll side to side in bed with independence within 7 day(s). 2.  Patient will transfer from bed to chair and chair to bed with modified independence using the least restrictive device within 7 day(s). 3.  Patient will perform sit to stand with modified independence within 7 day(s). 4.  Patient will ambulate with modified independence for 120 feet with the least restrictive device within 7 day(s). Outcome: Not Met   PHYSICAL THERAPY TREATMENT  Patient: Power Bartholomew (75 y.o. male)  Date: 1/21/2021  Diagnosis: COVID-19 virus infection [U07.1]  Hypoxia [R09.02]  Acute respiratory insufficiency [R06.89]  Prerenal azotemia [R79.89]  Diarrhea [R19.7] COVID-19 virus infection       Precautions:  Droplet +, falls  Chart, physical therapy assessment, plan of care and goals were reviewed. ASSESSMENT  Patient continues with skilled PT services and is progressing towards goals. Pt appears highly anxious and presents with self-limiting behaviors. With encouragement he participates in bed mobility, sitting edge of bed, repeated sit to/from stand transfers, and brief side-stepping toward head of bed. He declines further mobility or positioning out of bed in chair. SpO2 74% with poor pleth after impulsive sit to supine bed mobility and rebounds to 96% with brief rest. Pt utilizing high flow NC 40L/min 100% FiO2 which is consistent with O2 needs during yesterday's treatment.  Discussed line positioning with RT who agrees to relocate high flow set-up to alternative side of bed to allow for advanced pt mobility/ambulation. Current Level of Function Impacting Discharge (mobility/balance): supervision; cues for safety    Other factors to consider for discharge: none additional         PLAN :  Patient continues to benefit from skilled intervention to address the above impairments. Continue treatment per established plan of care. to address goals. Recommendation for discharge: (in order for the patient to meet his/her long term goals)  Therapy 3 hours per day 5-7 days per week for pulmonary rehab if discharged at current functional level    This discharge recommendation:  Has not yet been discussed the attending provider and/or case management    IF patient discharges home will need the following DME: to be determined (TBD)       SUBJECTIVE:   Patient stated God! My mouth is so dry!  Pt requesting orange juice - notified RN and RT who is entering room at PT exit. Pt received supine, agreeable to PT and cleared by RN. OBJECTIVE DATA SUMMARY:   Critical Behavior:  Neurologic State: Alert  Orientation Level: Oriented X4  Cognition: Follows commands  Safety/Judgement: Decreased awareness of need for assistance, Lack of insight into deficits  Functional Mobility Training:  Bed Mobility:     Supine to Sit: Modified independent  Sit to Supine: Supervision  Scooting: Modified independent        Transfers:  Sit to Stand: Stand-by assistance  Stand to Sit: Stand-by assistance         Performed x 4    Pt initially stating need to use urinal but once seated he declines use. Balance:  Sitting: Intact  Standing: Without support  Standing - Static: Good  Standing - Dynamic : Fair  Ambulation/Gait Trainin lateral steps toward head of bed with CGA. Pain Rating:  Denies pain; presents with persistent hiccups.     Activity Tolerance:   desaturates with exertion and requires oxygen    After treatment patient left in no apparent distress:   Supine in bed, Call bell within reach, Side rails x 3, and RT present    COMMUNICATION/COLLABORATION:   The patients plan of care was discussed with: Registered nurse       Tra Morgan PT, DPT   Time Calculation: 34 mins

## 2021-01-21 NOTE — CONSULTS
[facility]     Name: Galen Child MRN: [id number]   : 1945 Hospital: [address]   Date: 2021        Impression Plan   1. COVID-19 PNA  2. Acute respiratory failure with hypoxia  3. Suspected LEATHA               · Goal sats 88% or higher, wean HF as tolerated  · Decadron  · Zinc, vitamin c  · Completed remdesvir and azithromycin  · Lovenox  · Trend d-dimer, inflammatory markers  · Diuresis as tolerated  · NPO with sips, will add gentle IVF and will need to consider adding TPN if he fails to improve; will keep central line in  · Palliative following         Pt is critically ill and at risk of decompensation in the setting of acute respiratory failure with hypoxia requiring HF NC. Critical care time spent with pt exclusive of procedures was 30 minutes    Radiology  ( personally reviewed) CTA without PE; emphysema with patchy airspace disease   ABG No results for input(s): PHI, PO2I, PCO2I in the last 72 hours. Subjective     Patient is a 66 y.o. male admitted for COVID-19 and acute respiratory failure with hypoxia. He presented with shortness of breath for about a week with a dry cough. Additionally noted fevers and intermittent watery diarrhea. Found to be hypoxia and positive for COVID-19. Transferred to stepdown for worsening hypoxia. Requiring HF NC at [address] [place] [address]. Currently denies shortness of breath. Endorses intermittent cough. Afebrile the past 24 hours. His biggest concerns are that he wants to eat/drink and does not appreciate having to use a urinal to go to the bathroom. Family meeting was held today and he was made DNR. Interval history:  Remains on HF at [address] [place] [address]  Very frustrated that he cannot eat    Review of Systems:  A comprehensive review of systems was negative except for that written in the HPI.     Past Medical History:   Diagnosis Date    Diverticulitis     Gastrointestinal disorder     History of vascular access device 2017    [facility] VAT - 5FR triple Lumen Power PICC R Basilic  39 cm    Spinal stenosis       Past Surgical History:   Procedure Laterality Date    FLEXIBLE SIGMOIDOSCOPY N/A 7/27/2017    SIGMOIDOSCOPY FLEXIBLE performed by Binu Chaidez MD at [facility]    IR INSERT NON TUNL CVC OVER 5 YRS  1/13/2021      Prior to Admission medications    Medication Sig Start Date End Date Taking? Authorizing Provider   dicyclomine (BENTYL) 20 mg tablet Take 20 mg by mouth every six (6) hours. Indications: irritable colon   Yes Provider, Historical   gabapentin (NEURONTIN) 600 mg tablet Take 600 mg by mouth three (3) times daily. Yes Provider, Historical   ibuprofen (MOTRIN) 200 mg tablet Take 600 mg by mouth daily. Yes Provider, Historical   acetaminophen (TYLENOL) 500 mg tablet Take 1,000 mg by mouth as needed for Pain. Indications: fever, muscle pain   Yes Provider, Historical   psyllium (METAMUCIL) packet Take 1 Packet by mouth two (2) times a day. Yes Provider, Historical     Current Facility-Administered Medications   Medication Dose Route Frequency    dextrose 5% and 0.9% NaCl infusion  50 mL/hr IntraVENous CONTINUOUS    zinc gluconate tablet 50 mg  50 mg Oral DAILY    bumetanide (BUMEX) injection 1 mg  1 mg IntraVENous BID    sodium chloride (NS) flush 5-40 mL  5-40 mL IntraVENous Q8H    famotidine (PEPCID) tablet 20 mg  20 mg Oral BID    enoxaparin (LOVENOX) injection 40 mg  40 mg SubCUTAneous DAILY    cholecalciferol (VITAMIN D3) (1000 Units /25 mcg) tablet 2 Tab  2,000 Units Oral DAILY    ascorbic acid (vitamin C) (VITAMIN C) tablet 500 mg  500 mg Oral BID     No Known Allergies   Social History     Tobacco Use    Smoking status: Current Some Day Smoker     Packs/day: 1.00     Years: 50.00     Pack years: 50.00    Smokeless tobacco: Current User   Substance Use Topics    Alcohol use: Yes     Alcohol/week: 0.8 standard drinks     Types: 1 Cans of beer per week      No family history on file.        Laboratory: I have personally reviewed the critical care flowsheet and labs.      Recent Labs     01/20/21  0333 01/19/21  1115   WBC 14.5* 13.7*   HGB 18.0* 18.3*   HCT 52.2* 51.6*    348     Recent Labs     01/21/21  0124 01/20/21  0333 01/19/21  1115 01/19/21  0259   NA  --  134* 134*  --    K  --  3.8 3.6  --    CL  --  96* 98  --    CO2  --  30 29  --    GLU  --  114* 125*  --    BUN  --  63* 61*  --    CREA  --  1.39* 1.32*  --    CA  --  8.7 8.8  --    ALB  --  3.5 3.6  --    ALT  --  114* 87*  --    INR 1.3* 1.3*  --  1.2*       Objective:     Mode Rate Tidal Volume Pressure FiO2 PEEP            92 %       Vital Signs:     TMAX(24)      Intake/Output:   Last shift:         Last 3 shifts: 01/21 0701 - 01/21 1900  In: -   Out: 200 [Urine:200]RRIOLAST3    Intake/Output Summary (Last 24 hours) at 1/21/2021 1122  Last data filed at 1/21/2021 0800  Gross per 24 hour   Intake 556.67 ml   Output 1750 ml   Net -1193.33 ml     EXAM:   GENERAL: well developed and in no distress, HEENT:  PERRL, EOMI, no alar flaring or epistaxis, NECK:  no jugular vein distention, no retractions, LUNGS: CTAB, respirations non-labored , HEART:  Regular rate and rhythm with no MGR; no edema is present, ABDOMEN:  soft with no tenderness, bowel sounds present, EXTREMITIES:  warm with no cyanosis, SKIN:  no jaundice or ecchymosis and NEUROLOGIC:  alert and oriented, grossly non-focal    Rose Dukes MD  [facility]

## 2021-01-21 NOTE — PROGRESS NOTES
Eugene Kelly [facility]  [address] [facility], [address] [place] [address]  [phone]      Medical Progress Note      NAME: Anjana Boyer   :  1945  MRM:  [id number]    Date of service: 2021  1:56 PM       Assessment and Plan:   1. COVID-19 virus infection (2021). Completed  Remdesivir, Azithromycin, Dexamethasone. Continue Vitamin C and Zinc, Wean oxygen as tolerated. Currently on high flow O2. Palliative care following. Now a DNR        2.  Acute respiratory failure with hypoxia due to COVID pneumonia also likely underlying emphysema. CTA chest done 1/15 showed no PE. Scattered interstitial opacities with moderate emphysema vs pulm edema. Continue supplement O2. pulmonary following.      3. Centrilobular emphysema: noted on CTA chest. Continue Combivent respimat. Albuterol PRN. Pulmonology following     4. Acute metabolic encephalopathy (3/45/0222) POA: this is intermittent. Likely from COVID, hypoxia. Continue supportive care       5. Physical debility: due to COVID. Continue PT, OT         code status: DNR         Subjective:     Chief Complaint[de-identified] Patient was seen and examined as a follow up for covid pneumonia. Chart was reviewed. wants to go home. ROS:  (bold if positive, if negative)    Tolerating PT  Tolerating Diet        Objective:     Last 24hrs VS reviewed since prior progress note.  Most recent are:    Visit Vitals  /64 (BP Patient Position: Sitting)   Pulse 63   Temp (!) 96.3 °F (35.7 °C)   Resp 11   Ht 5' 10\" (1.778 m)   Wt 82.7 kg (182 lb 6.4 oz)   SpO2 100%   BMI 26.17 kg/m²     SpO2 Readings from Last 6 Encounters:   21 100%   10/04/19 92%   17 99%   17 96%   17 96%   17 95%    O2 Flow Rate (L/min): 35 l/min       Intake/Output Summary (Last 24 hours) at 2021 1303  Last data filed at 2021 0800  Gross per 24 hour   Intake 431.67 ml   Output 1575 ml   Net -1143.33 ml        Physical Exam:    Gen:  Well-developed, well-nourished, in no acute distress  HEENT:  Pink conjunctivae, PERRL, hearing intact to voice, moist mucous membranes  Neck:  Supple, without masses, thyroid non-tender  Resp:  No accessory muscle use, clear breath sounds without wheezes rales or rhonchi  Card:  No murmurs, normal S1, S2 without thrills, bruits or peripheral edema  Abd:  Soft, non-tender, non-distended, normoactive bowel sounds are present, no palpable organomegaly and no detectable hernias  Lymph:  No cervical or inguinal adenopathy  Musc:  No cyanosis or clubbing  Skin:  No rashes or ulcers, skin turgor is good  Neuro:  Cranial nerves are grossly intact, no focal motor weakness, follows commands appropriately  Psych:  Good insight, oriented to person, place and time, alert  __________________________________________________________________  Medications Reviewed: (see below)  Medications:     Current Facility-Administered Medications   Medication Dose Route Frequency    dextrose 5% and 0.9% NaCl infusion  50 mL/hr IntraVENous CONTINUOUS    ipratropium-albuterol (COMBIVENT RESPIMAT) 20 mcg-100 mcg inhalation spray  1 Puff Inhalation Q6H PRN    zinc gluconate tablet 50 mg  50 mg Oral DAILY    bumetanide (BUMEX) injection 1 mg  1 mg IntraVENous BID    melatonin (rapid dissolve) tablet 5 mg  5 mg Oral QHS PRN    sodium chloride (NS) flush 5-40 mL  5-40 mL IntraVENous Q8H    sodium chloride (NS) flush 5-40 mL  5-40 mL IntraVENous PRN    polyethylene glycol (MIRALAX) packet 17 g  17 g Oral DAILY PRN    bisacodyL (DULCOLAX) suppository 10 mg  10 mg Rectal DAILY PRN    ondansetron (ZOFRAN) injection 4 mg  4 mg IntraVENous Q6H PRN    famotidine (PEPCID) tablet 20 mg  20 mg Oral BID    enoxaparin (LOVENOX) injection 40 mg  40 mg SubCUTAneous DAILY    acetaminophen (TYLENOL) tablet 650 mg  650 mg Oral Q6H PRN    Or    acetaminophen (TYLENOL) suppository 650 mg  650 mg Rectal Q6H PRN    guaiFENesin-dextromethorphan (ROBITUSSIN DM) 100-10 mg/5 mL syrup 5 mL  5 mL Oral Q4H PRN    labetaloL (NORMODYNE;TRANDATE) 20 mg/4 mL (5 mg/mL) injection 20 mg  20 mg IntraVENous Q4H PRN    hydrALAZINE (APRESOLINE) 20 mg/mL injection 20 mg  20 mg IntraVENous Q4H PRN    alum-mag hydroxide-simeth (MYLANTA) oral suspension 30 mL  30 mL Oral Q4H PRN    LORazepam (ATIVAN) injection 0.5 mg  0.5 mg IntraVENous Q6H PRN    oxyCODONE IR (ROXICODONE) tablet 5 mg  5 mg Oral Q4H PRN    oxyCODONE IR (ROXICODONE) tablet 10 mg  10 mg Oral Q4H PRN    morphine injection 2 mg  2 mg IntraVENous Q4H PRN    diphenhydrAMINE (BENADRYL) injection 25 mg  25 mg IntraVENous Q6H PRN    diphenhydrAMINE (BENADRYL) capsule 25 mg  25 mg Oral Q6H PRN    simethicone (MYLICON) tablet 80 mg  80 mg Oral QID PRN    cholecalciferol (VITAMIN D3) (1000 Units /25 mcg) tablet 2 Tab  2,000 Units Oral DAILY    ascorbic acid (vitamin C) (VITAMIN C) tablet 500 mg  500 mg Oral BID        Lab Data Reviewed: (see below)  Lab Review:     Recent Labs     01/20/21  0333 01/19/21  1115   WBC 14.5* 13.7*   HGB 18.0* 18.3*   HCT 52.2* 51.6*    348     Recent Labs     01/21/21  0124 01/20/21  0333 01/19/21  1115 01/19/21  0259   NA  --  134* 134*  --    K  --  3.8 3.6  --    CL  --  96* 98  --    CO2  --  30 29  --    GLU  --  114* 125*  --    BUN  --  63* 61*  --    CREA  --  1.39* 1.32*  --    CA  --  8.7 8.8  --    ALB  --  3.5 3.6  --    TBILI  --  1.2* 1.1*  --    ALT  --  114* 87*  --    INR 1.3* 1.3*  --  1.2*     Lab Results   Component Value Date/Time    Glucose (POC) 135 (H) 01/21/2021 12:22 PM    Glucose (POC) 126 (H) 01/21/2021 06:11 AM    Glucose (POC) 107 (H) 01/21/2021 01:22 AM    Glucose (POC) 143 (H) 01/20/2021 05:23 PM    Glucose (POC) 162 (H) 01/20/2021 12:08 PM     No results for input(s): PH, PCO2, PO2, HCO3, FIO2 in the last 72 hours.   Recent Labs     01/21/21  0124 01/20/21  0333 01/19/21  0259   INR 1.3* 1.3* 1.2*     All Micro Results     Procedure Component Value Units Date/Time C. DIFFICILE AG & TOXIN A/B [[id number]] Collected: 01/11/21 0145    Order Status: Canceled Specimen: Stool     ENTERIC BACTERIA PANEL, DNA [[id number]]     Order Status: Canceled Specimen: Stool           I have reviewed notes of prior 24hr. Other pertinent lab: Total time spent with patient: 28 I personally reviewed chart, notes, data and current medications in the medical record. I have personally examined and treated the patient at bedside during this period.                  Care Plan discussed with: Patient, Nursing Staff and >50% of time spent in counseling and coordination of care    Discussed:  Care Plan    Prophylaxis:  Lovenox    Disposition:  Home w/Family           ___________________________________________________    Attending Physician: Jenny Ferreira MD

## 2021-01-21 NOTE — PROGRESS NOTES
Report given to Andre Pascual oncoming nurse. Details of progress, medications, oxygen status and hemodynamics given.

## 2021-01-22 LAB
ALBUMIN SERPL-MCNC: 3.1 G/DL (ref 3.5–5)
ALBUMIN/GLOB SERPL: 0.8 {RATIO} (ref 1.1–2.2)
ALP SERPL-CCNC: 56 U/L (ref 45–117)
ALT SERPL-CCNC: 151 U/L (ref 12–78)
ANION GAP SERPL CALC-SCNC: 2 MMOL/L (ref 5–15)
APTT PPP: 29.6 SEC (ref 22.1–31)
AST SERPL-CCNC: 66 U/L (ref 15–37)
BASOPHILS # BLD: 0 K/UL (ref 0–0.1)
BASOPHILS NFR BLD: 0 % (ref 0–1)
BILIRUB SERPL-MCNC: 1.2 MG/DL (ref 0.2–1)
BUN SERPL-MCNC: 42 MG/DL (ref 6–20)
BUN/CREAT SERPL: 37 (ref 12–20)
CALCIUM SERPL-MCNC: 7.8 MG/DL (ref 8.5–10.1)
CHLORIDE SERPL-SCNC: 101 MMOL/L (ref 97–108)
CO2 SERPL-SCNC: 32 MMOL/L (ref 21–32)
COMMENT, HOLDF: NORMAL
CREAT SERPL-MCNC: 1.15 MG/DL (ref 0.7–1.3)
CRP SERPL-MCNC: 1.18 MG/DL (ref 0–0.6)
D DIMER PPP FEU-MCNC: 0.77 MG/L FEU (ref 0–0.65)
DIFFERENTIAL METHOD BLD: ABNORMAL
EOSINOPHIL # BLD: 0.1 K/UL (ref 0–0.4)
EOSINOPHIL NFR BLD: 0 % (ref 0–7)
ERYTHROCYTE [DISTWIDTH] IN BLOOD BY AUTOMATED COUNT: 13.1 % (ref 11.5–14.5)
FERRITIN SERPL-MCNC: 1418 NG/ML (ref 26–388)
FIBRINOGEN PPP-MCNC: 436 MG/DL (ref 200–475)
GLOBULIN SER CALC-MCNC: 3.7 G/DL (ref 2–4)
GLUCOSE BLD STRIP.AUTO-MCNC: 116 MG/DL (ref 65–100)
GLUCOSE SERPL-MCNC: 112 MG/DL (ref 65–100)
HCT VFR BLD AUTO: 47.5 % (ref 36.6–50.3)
HGB BLD-MCNC: 16.2 G/DL (ref 12.1–17)
IMM GRANULOCYTES # BLD AUTO: 0.1 K/UL (ref 0–0.04)
IMM GRANULOCYTES NFR BLD AUTO: 1 % (ref 0–0.5)
INR PPP: 1.4 (ref 0.9–1.1)
LYMPHOCYTES # BLD: 0.8 K/UL (ref 0.8–3.5)
LYMPHOCYTES NFR BLD: 6 % (ref 12–49)
MCH RBC QN AUTO: 29.9 PG (ref 26–34)
MCHC RBC AUTO-ENTMCNC: 34.1 G/DL (ref 30–36.5)
MCV RBC AUTO: 87.8 FL (ref 80–99)
MONOCYTES # BLD: 1.4 K/UL (ref 0–1)
MONOCYTES NFR BLD: 10 % (ref 5–13)
NEUTS SEG # BLD: 10.9 K/UL (ref 1.8–8)
NEUTS SEG NFR BLD: 83 % (ref 32–75)
NRBC # BLD: 0 K/UL (ref 0–0.01)
NRBC BLD-RTO: 0 PER 100 WBC
PLATELET # BLD AUTO: 263 K/UL (ref 150–400)
PMV BLD AUTO: 10.8 FL (ref 8.9–12.9)
POTASSIUM SERPL-SCNC: 3.8 MMOL/L (ref 3.5–5.1)
PROT SERPL-MCNC: 6.8 G/DL (ref 6.4–8.2)
PROTHROMBIN TIME: 13.8 SEC (ref 9–11.1)
RBC # BLD AUTO: 5.41 M/UL (ref 4.1–5.7)
SAMPLES BEING HELD,HOLD: NORMAL
SERVICE CMNT-IMP: ABNORMAL
SODIUM SERPL-SCNC: 135 MMOL/L (ref 136–145)
THERAPEUTIC RANGE,PTTT: NORMAL SECS (ref 58–77)
WBC # BLD AUTO: 13.2 K/UL (ref 4.1–11.1)

## 2021-01-22 PROCEDURE — 65270000029 HC RM PRIVATE

## 2021-01-22 PROCEDURE — 97530 THERAPEUTIC ACTIVITIES: CPT

## 2021-01-22 PROCEDURE — 85610 PROTHROMBIN TIME: CPT

## 2021-01-22 PROCEDURE — 74011250637 HC RX REV CODE- 250/637: Performed by: INTERNAL MEDICINE

## 2021-01-22 PROCEDURE — 2709999900 HC NON-CHARGEABLE SUPPLY

## 2021-01-22 PROCEDURE — 82728 ASSAY OF FERRITIN: CPT

## 2021-01-22 PROCEDURE — 77010033711 HC HIGH FLOW OXYGEN

## 2021-01-22 PROCEDURE — 85379 FIBRIN DEGRADATION QUANT: CPT

## 2021-01-22 PROCEDURE — 77030029065 HC DRSG HEMO QCLOT ZMED -B

## 2021-01-22 PROCEDURE — 80053 COMPREHEN METABOLIC PANEL: CPT

## 2021-01-22 PROCEDURE — 74011000258 HC RX REV CODE- 258: Performed by: INTERNAL MEDICINE

## 2021-01-22 PROCEDURE — 85384 FIBRINOGEN ACTIVITY: CPT

## 2021-01-22 PROCEDURE — 97116 GAIT TRAINING THERAPY: CPT

## 2021-01-22 PROCEDURE — 85730 THROMBOPLASTIN TIME PARTIAL: CPT

## 2021-01-22 PROCEDURE — 74011000250 HC RX REV CODE- 250: Performed by: INTERNAL MEDICINE

## 2021-01-22 PROCEDURE — 85025 COMPLETE CBC W/AUTO DIFF WBC: CPT

## 2021-01-22 PROCEDURE — 74011250636 HC RX REV CODE- 250/636: Performed by: HOSPITALIST

## 2021-01-22 PROCEDURE — 86140 C-REACTIVE PROTEIN: CPT

## 2021-01-22 PROCEDURE — 74011250637 HC RX REV CODE- 250/637: Performed by: HOSPITALIST

## 2021-01-22 PROCEDURE — 82962 GLUCOSE BLOOD TEST: CPT

## 2021-01-22 PROCEDURE — 36415 COLL VENOUS BLD VENIPUNCTURE: CPT

## 2021-01-22 RX ORDER — CHLORPROMAZINE HYDROCHLORIDE 10 MG/1
10 TABLET, FILM COATED ORAL ONCE
Status: COMPLETED | OUTPATIENT
Start: 2021-01-22 | End: 2021-01-22

## 2021-01-22 RX ADMIN — OXYCODONE HYDROCHLORIDE AND ACETAMINOPHEN 500 MG: 500 TABLET ORAL at 08:19

## 2021-01-22 RX ADMIN — Medication 30 ML: at 21:28

## 2021-01-22 RX ADMIN — ENOXAPARIN SODIUM 40 MG: 40 INJECTION SUBCUTANEOUS at 08:19

## 2021-01-22 RX ADMIN — Medication 5 ML: at 14:00

## 2021-01-22 RX ADMIN — LORAZEPAM 0.5 MG: 2 INJECTION INTRAMUSCULAR; INTRAVENOUS at 00:21

## 2021-01-22 RX ADMIN — Medication 10 ML: at 05:26

## 2021-01-22 RX ADMIN — FAMOTIDINE 20 MG: 20 TABLET, FILM COATED ORAL at 08:19

## 2021-01-22 RX ADMIN — DEXTROSE MONOHYDRATE AND SODIUM CHLORIDE 50 ML/HR: 5; .9 INJECTION, SOLUTION INTRAVENOUS at 04:27

## 2021-01-22 RX ADMIN — Medication 2 TABLET: at 09:30

## 2021-01-22 RX ADMIN — Medication 50 MG: at 08:19

## 2021-01-22 RX ADMIN — BUMETANIDE 1 MG: 0.25 INJECTION, SOLUTION INTRAMUSCULAR; INTRAVENOUS at 08:18

## 2021-01-22 RX ADMIN — CHLORPROMAZINE HYDROCHLORIDE 10 MG: 10 TABLET, SUGAR COATED ORAL at 19:00

## 2021-01-22 NOTE — ROUTINE PROCESS
Patient has repeatedly removed blood pressure cuff. Blood pressure data collection inconsistent throughout afternoon due to this.

## 2021-01-22 NOTE — PROGRESS NOTES
@0700 Bedside and Verbal shift change report given to Heidi Qureshi RN (oncoming nurse) by Nanci Cisneros RN(offgoing nurse). Report included the following information SBAR, Kardex, ED Summary, Procedure Summary, Intake/Output, MAR, Accordion, Recent Results, Med Rec Status, Cardiac Rhythm Sinus and Alarm Parameters . @3130  Dr. Dang Helton arrives and exams the patient. Plan of care is to transition to 6 lpm nasal canula, OOB to chair, and proning if he is willing.

## 2021-01-22 NOTE — PROGRESS NOTES
I left pt another voice message on his phone to discuss inpatient pulmonary rehab. I also tried to contact pt by his hospital phone. In checking his room,pt is having lab work drawn.     Balta Chun

## 2021-01-22 NOTE — PROGRESS NOTES
Problem: Self Care Deficits Care Plan (Adult)  Goal: *Acute Goals and Plan of Care (Insert Text)  Description:   FUNCTIONAL STATUS PRIOR TO ADMISSION: Patient was independent and active without use of DME.     HOME SUPPORT: The patient lived with wife and college age son but did not require assist.    Occupational Therapy Goals  Initiated 1/15/2021; All goals reviewed and remain appropriate-Continue with goals as of 1/22/2021;  1. Patient will perform lower body dressing with supervision/set-up within 7 day(s). 2.  Patient will perform grooming standing at sink with no LOB with supervision/set-up within 7 day(s). 3.  Patient will perform ADL item retrieval with reaching high and low with supervision/set-up within 7 day(s). 4.  Patient will perform toilet transfers with supervision/set-up within 7 day(s). 5.  Patient will perform all aspects of toileting with supervision/set-up within 7 day(s). 6.  Patient will utilize energy conservation techniques during functional activities with verbal cues within 7 day(s). Outcome: Progressing Towards Goal   OCCUPATIONAL THERAPY TREATMENT/WEEKLY RE-ASSESSMENT  Patient: Federica Torres (3 y.o. male)  Date: 1/22/2021  Diagnosis: COVID-19 virus infection [U07.1]  Hypoxia [R09.02]  Acute respiratory insufficiency [R06.89]  Prerenal azotemia [R79.89]  Diarrhea [R19.7] COVID-19 virus infection       Precautions:  fall, contact plus   Chart, occupational therapy assessment, plan of care, and goals were reviewed. ASSESSMENT  Patient continues with skilled OT services and is progressing slowly towards goals. Patient was seen today for weekly re-assessment where progress has been very minimal since initial evaluation. He continues to desaturate on 4L O2 with activity, he is limited by weakness, fatigue, and poor endurance, and presents with mild confusion (disoriented to time and situation).   Patient performed transfer to EOB and stood with poor tolerance d/t drop in BP (see below and doc flowsheets) and SpO2 therefore returned to supine. Patient with poor tolerance for ADLs and poorly motivated to contribute to personal care d/t fatigue. Patient was educated on energy conservation, pacing, and pursed lip breathing techniques; Max verbal cues/reminders needed for carryover. Discussed benefits of attempting prone position in bed and encouraged patient to attempt however he was not agreeable this date. Patient would benefit from continued skilled OT to progress towards goals and improve overall independence. Patient Vitals during OT tx:   Position Pulse BP SpO2   01/22/21 1235  Supine; post activity 83 116/73 (!) 88 %   01/22/21 1219 Standing 81 (!) 79/55 (!) 84 %   01/22/21 1215 Sitting 83 (!) 73/56 (!) 86 %          Current Level of Function Impacting Discharge (ADLs): Patient required supervision to [place] [id number] for both bed mobility and OOB transfers. Patient currently requires setup to min A for UB ADLs and mod A for LB ADLs. PLAN :  Goals have been updated based on progression since last assessment. Patient continues to benefit from skilled intervention to address the above impairments. Continue to follow patient 5 times a week to address goals. Recommend with staff:   Recommend patient be OOB to chair as frequently as tolerated; Goal of 3x/day for all meals for 10 minutes at a time with clearance provided by RN as vitals stabilize. For toileting needs, recommend bedside commode or bed level. Encourage patient involvement in personal care as able. Encourage exercises frequently throughout the day. Recommend next OT session: Continue towards set goals.     Recommendation for discharge: (in order for the patient to meet his/her long term goals)  Pulmonary based rehab     This discharge recommendation:  Has been made in collaboration with the attending provider and/or case management    IF patient discharges home will need the following DME: none SUBJECTIVE:   Patient stated I don't like feeling like this; I'm so weak.     OBJECTIVE DATA SUMMARY:   Cognitive/Behavioral Status:  Neurologic State: Alert  Orientation Level: Oriented to person;Oriented to place; Disoriented to time;Disoriented to situation  Cognition: Follows commands;Decreased attention/concentration;Poor safety awareness  Perception: Appears intact  Perseveration: No perseveration noted  Safety/Judgement: Decreased awareness of environment    Functional Mobility and Transfers for ADLs:  Bed Mobility:  Rolling: Supervision  Supine to Sit: Supervision  Sit to Supine: Supervision  Scooting: Contact guard assistance    Transfers:  Sit to Stand: Contact guard assistance  Bed to Chair: Contact guard assistance    Balance:  Sitting: Intact; High guard  Standing: Impaired  Standing - Static: Fair  Standing - Dynamic : Fair    ADL Intervention:  Feeding  Feeding Assistance: Set-up    Grooming  Grooming Assistance: Set-up    Upper Body Bathing  Bathing Assistance: Minimum assistance    Lower Body Bathing  Bathing Assistance: Moderate assistance    Upper Body Dressing Assistance  Dressing Assistance: Minimum assistance    Lower Body Dressing Assistance  Dressing Assistance: Moderate assistance    Toileting  Toileting Assistance: Moderate assistance  Bladder Hygiene: Minimum assistance  Bowel Hygiene: Moderate assistance  Clothing Management: Moderate assistance    Cognitive Retraining  Safety/Judgement: Decreased awareness of environment    Patient instructed and indicated understanding energy conservation techniques to increase independence and safety during all ADLs for end goal of returning home. Patient encouraged to use energy conservation techniques during ADLs to increase participation in life activities patient prefers, to ensure more frequent good days. If having a bad day, evaluate tasks completed day before and re-plan how to save energy to complete same task.  Patient confirmed understanding of energy conservation techniques and demonstrated understanding during activity. Provided verbal cuing for education for breathing techniques including pursed lip breathing techniques (PLB) and circulatory breathing. Additionally, patient was educated on energy conservation techniques, including how they specifically apply to functional activities; This includes pacing, rest breaks, and planning. Patient with good verbal understanding however needing additional cuing through out tasks to use techniques. Additional time needed during tasks. Activity Tolerance:   Fair    After treatment patient left in no apparent distress:   Supine in bed, Call bell within reach, and Bed / chair alarm activated    COMMUNICATION/COLLABORATION:   The patients plan of care was discussed with: Physical therapist, Registered nurse, and patient .      Leigh Ann Vyas, OTR/L  Time Calculation: 27 mins

## 2021-01-22 NOTE — PROGRESS NOTES
Spiritual Care Assessment/Progress Note  [address]      NAME: Tono Segovia      MRN: [id number]  AGE: 66 y.o. SEX: male  Rastafarian Affiliation: Judaism   Language: English     1/22/2021     Total Time (in minutes): 8     Spiritual Assessment begun in [facility] through conversation with:         [x]Patient        [] Family    [] Friend(s)        Reason for Consult: Other (comment)(Music Therapy)     Spiritual beliefs: (Please include comment if needed)     [] Identifies with a shaila tradition:         [] Supported by a shaila community:            [] Claims no spiritual orientation:           [] Seeking spiritual identity:                [] Adheres to an individual form of spirituality:           [x] Not able to assess:                           Identified resources for coping:      [x] Prayer                               [] Music                  [] Guided Imagery     [] Family/friends                 [] Pet visits     [] Devotional reading                         [] Unknown     [] Other:                                              Interventions offered during this visit: (See comments for more details)    Patient Interventions: Prayer (actual)           Plan of Care:     [] Support spiritual and/or cultural needs    [] Support AMD and/or advance care planning process      [] Support grieving process   [] Coordinate Rites and/or Rituals    [] Coordination with community clergy   [] No spiritual needs identified at this time   [] Detailed Plan of Care below (See Comments)  [] Make referral to Music Therapy  [] Make referral to Pet Therapy     [] Make referral to Addiction services  [] Make referral to [facility]  [] Make referral to Spiritual Care Partner  [] No future visits requested        [x] Follow up upon further referrals     Comments:  attempted palliative care initial spiritual assessment in the ICU.  Consulted with nurse who went in to talk with pt in his contact isolation room about 's availability for support. Per nurse, pt stated he was not up for conversation right now, but prayer would be appreciated.  stood by his door and offered prayer. Pt acknowledged 's presence at his door and nodded to appreciate prayer offered. Spiritual care is still available for support upon further referrals. Visited by: Claudia Reyes.   To page : [phone]

## 2021-01-22 NOTE — PROGRESS NOTES
Problem: Mobility Impaired (Adult and Pediatric)  Goal: *Acute Goals and Plan of Care (Insert Text)  Description: FUNCTIONAL STATUS PRIOR TO ADMISSION: Patient was independent and active without use of DME.    HOME SUPPORT PRIOR TO ADMISSION: The patient lived with his wife and son but did not require assist.    Physical Therapy Goals  Initiated 1/15/2021; continue same goals as of 1/22/2021   1. Patient will move from supine to sit and sit to supine , scoot up and down, and roll side to side in bed with independence within 7 day(s). 2.  Patient will transfer from bed to chair and chair to bed with modified independence using the least restrictive device within 7 day(s). 3.  Patient will perform sit to stand with modified independence within 7 day(s). 4.  Patient will ambulate with modified independence for 120 feet with the least restrictive device within 7 day(s). Outcome: Progressing Towards Goal   PHYSICAL THERAPY TREATMENT: WEEKLY REASSESSMENT  Patient: Seda Donnelly (69 y.o. male)  Date: 1/22/2021  Primary Diagnosis: COVID-19 virus infection [U07.1]  Hypoxia [R09.02]  Acute respiratory insufficiency [R06.89]  Prerenal azotemia [R79.89]  Diarrhea [R19.7]        Precautions: fall, covid precautions         ASSESSMENT  Patient continues with skilled PT services and is progressing towards goals. Communicated with nurse cleared for therapy. Patient supine on bed when received. Patient appears little bit confuse today oriented to self and place disoriented to time and situations. Rolled on the edge of bed supervision, sit to stand CGA, ambulate lateral steps towards the head of bed. Patient BP dropped during standing. Assisted back to bed supine reposition patient up high on the bed and place bed to semi chair position. Communicated with nurse who agreed to monitor patient.         Vitals:    01/22/21 1150 01/22/21 1215 01/22/21 1219 01/22/21 1235   BP:  (!) 73/56 (!) 79/55 116/73   BP 1 Location: BP Patient Position:  Sitting Standing Supine; Post activity   Pulse:  83 81 83   Resp:       Temp:       SpO2: 97% (!) 86% (!) 84% (!) 88%   Weight:       Height:                Patient's progression toward goals since last assessment: fair. Current Level of Function Impacting Discharge (mobility/balance): CGA with transfers and ambulation    Functional Outcome Measure: The patient scored 70/100 on the barthel index outcome measure. Other factors to consider for discharge: fall         PLAN :  Goals have been updated based on progression since last assessment. Patient continues to benefit from skilled intervention to address the above impairments. Recommendations and Planned Interventions: bed mobility training, transfer training, gait training, therapeutic exercises, neuromuscular re-education, orthotic/prosthetic training, patient and family training/education, and therapeutic activities      Frequency/Duration: Patient will be followed by physical therapy:  5 times a week to address goals. Recommendation for discharge: (in order for the patient to meet his/her long term goals)  Therapy up to 5 days/week in pulmonary rehab setting vs. Intensive HHPT     This discharge recommendation:  Has been made in collaboration with the attending provider and/or case management    IF patient discharges home will need the following DME: patient owns DME required for discharge         SUBJECTIVE:   Patient stated I feel tired.     OBJECTIVE DATA SUMMARY:   HISTORY:    Past Medical History:   Diagnosis Date    Diverticulitis     Gastrointestinal disorder     History of vascular access device 07/24/2017    [facility] VAT -  5FR triple Lumen Power PICC R Basilic  39 cm    Spinal stenosis      Past Surgical History:   Procedure Laterality Date    FLEXIBLE SIGMOIDOSCOPY N/A 7/27/2017    SIGMOIDOSCOPY FLEXIBLE performed by Gwendolyn Dominguez MD at [facility] ENDOSCOPY    IR INSERT NON TUNL CVC OVER 5 YRS  1/13/2021       Personal factors and/or comorbidities impacting plan of care:     Home Situation  Home Environment: Private residence  # Steps to Enter: 0  One/Two Story Residence: Two story  Living Alone: No  Support Systems: Child(ivon), Spouse/Significant Other/Partner  Patient Expects to be Discharged to[de-identified] Private residence  Current DME Used/Available at Home: None  Tub or Shower Type: Shower    EXAMINATION/PRESENTATION/DECISION MAKING:   Critical Behavior:  Neurologic State: Alert  Orientation Level: Oriented to person, Oriented to place, Disoriented to time, Disoriented to situation  Cognition: Follows commands, Decreased attention/concentration, Poor safety awareness  Safety/Judgement: Decreased awareness of environment  Hearing: Auditory  Auditory Impairment: None    Range Of Motion:  AROM: Within functional limits           PROM: Within functional limits           Strength:    Strength: Within functional limits                    Tone & Sensation:                                  Coordination:  Coordination: Within functional limits  Vision:      Functional Mobility:  Bed Mobility:  Rolling: Supervision  Supine to Sit: Supervision  Sit to Supine: Supervision  Scooting: Contact guard assistance  Transfers:  Sit to Stand: Contact guard assistance  Stand to Sit: Contact guard assistance  Stand Pivot Transfers: Contact guard assistance     Bed to Chair: Contact guard assistance              Balance:   Sitting: Intact; High guard  Standing: Impaired  Standing - Static: Fair  Standing - Dynamic : Fair  Ambulation/Gait Training:  Distance (ft): 2 Feet (ft)(lateral steps)  Assistive Device: Gait belt; Other (comment)(hand held assist)  Ambulation - Level of Assistance: Contact guard assistance     Gait Description (WDL): Exceptions to WDL  Gait Abnormalities: Path deviations        Base of Support: Widened     Speed/Krystle: Fluctuations                         Therapeutic Exercises:    Instructed patient to continue active range of motion exercise on both legs while up on chair or on bed. Functional Measure:  Barthel Index:    Bathin  Bladder: 10  Bowels: 10  Groomin  Dressing: 10  Feeding: 10  Mobility: 0  Stairs: 0  Toilet Use: 10  Transfer (Bed to Chair and Back): 15  Total: 70/100       The Barthel ADL Index: Guidelines  1. The index should be used as a record of what a patient does, not as a record of what a patient could do. 2. The main aim is to establish degree of independence from any help, physical or verbal, however minor and for whatever reason. 3. The need for supervision renders the patient not independent. 4. A patient's performance should be established using the best available evidence. Asking the patient, friends/relatives and nurses are the usual sources, but direct observation and common sense are also important. However direct testing is not needed. 5. Usually the patient's performance over the preceding 24-48 hours is important, but occasionally longer periods will be relevant. 6. Middle categories imply that the patient supplies over 50 per cent of the effort. 7. Use of aids to be independent is allowed. Joann Dutta., Barthel, D.W. (8920). Functional evaluation: the Barthel Index. [facility] (14)2. LORENZO Holt, Galindo Escobar.Gal., Lou, [address] [place] [address] (). Measuring the change indisability after inpatient rehabilitation; comparison of the responsiveness of the Barthel Index and Functional [place] Measure. Journal of Neurology, Neurosurgery, and Psychiatry, 66(4), 308-771. Patricia Kinney, N.J.A, TAM Hendricks.AMBREEN, & Citlali English M.A. (2004.) Assessment of post-stroke quality of life in cost-effectiveness studies: The usefulness of the Barthel Index and the EuroQoL-5D.  Quality of Life Research, 13, 427-43          Pain Ratin/10    Activity Tolerance:   Good    After treatment patient left in no apparent distress:   Supine in bed, Heels elevated for pressure relief, Call bell within reach, Bed / chair alarm activated, and Side rails x 3    COMMUNICATION/EDUCATION:   The patients plan of care was discussed with: Occupational therapist and Registered nurse. Fall prevention education was provided and the patient/caregiver indicated understanding. Thank you for this referral.  Bria Man, PT,WCC.    Time Calculation: 25 mins

## 2021-01-22 NOTE — PROGRESS NOTES
Addendum-    I discussed inpatient pulmonary rehab with pt by phone . Pt is resistant to going to inpatient pulmonary rehab. \"I just want to go home. \" Rationale for pt going to pulmonary rehab discussed with pt .  Pt.'s nurse has also been supportive in talking with pt regarding inpatient pulmonary rehab. I informed pt that both his son and brother-in-law are supportive of pt going to rehab. Pt responded \"Oh are they? \"  I am enlisting the assistance of pt.'s attending in regards to the pulmonary rehab issue. Both son and brother-in-law plan to encourage pt to go to rehab so he will be successful when returning home .     Rhonda Gamboa

## 2021-01-22 NOTE — PROGRESS NOTES
Transition of care note:    Plan:  Pt is Covid 19+  RUR 12%  LOS 11 days    Plan: 1. Pt is a DNR.DNI. 2.I discussed pt with intensivist and pt.'s nurse. Per intensivist,if pt continues to progress ,he may be stable for rehab in a few days. 3.Orders obtained from attending for inpatient pulmonary rehab( if pt is willing to go.) When I talked with pt.'s son today,he is 100% supportive of pt going to inpatient pulmonary rehab. 4.Pt has been weaned off of his high flow oxygen and is now on 6 liters oxygen. O2 sats 92%. 5. I contacted pt.'s son,Loc @ [phone] who has requested I call his uncle(pt.'s brother-in-law Brinton Merlin @ [phone].  6. I reminded son regarding nurse updates between 2 and 4 pm and 10 pm to midnight. 7.Son informed me that is mother is being discharged from the psychiatric facility today but will be staying with her sibling in [place] for a few days much to son's relief . Son told me his mother has agreed to go to a day program Monday through Friday 9am  to 5 pm for intensive outpatient psychiatric care. 8.I attempted to call pt on his room phone but he did not answer. I called his cell phone but he did not answer  I left pt a voice message and will talk with pt.'s nurse to insure his phone is accessible. 9. I   contacted pt.'s brother-in-law per son's request.  10.I discussed pt with Briana Mcdaniels who informed me Francis Olson is pt.'s liason with Briana. Her number is [phone]. 11.Per liason,[facility] will have a bed for pt early next week (Monday or Tuesday)Liason is aiming for a Monday admission to [facility] if attending ,,pulmonary rehab physician and pt are in agreement. Celestino Miranda  [phone]    Per discussion with pt.'s nurse,nurse will either hook me up with pt by phone today or he will update pt that he will likely go to inpatient pulmonary rehab the first of next week.   Celestino Miranda

## 2021-01-22 NOTE — CONSULTS
[facility]     Name: Jostin Madrigal MRN: [id number]   : 1945 Hospital: [address]   Date: 2021        Impression Plan   1. COVID-19 PNA  2. Acute respiratory failure with hypoxia  3. Suspected LEATHA               · Goal sats 88% or higher, wean O2 as tolerated  · Decadron completed  · Zinc, vitamin c  · Completed remdesvir and azithromycin  · Lovenox  · Trend d-dimer, inflammatory markers  · Diuresis as tolerated  · Will restart a diet  · Palliative following         Radiology  ( personally reviewed) CTA without PE; emphysema with patchy airspace disease   ABG No results for input(s): PHI, PO2I, PCO2I in the last 72 hours. Subjective     Patient is a 66 y.o. male admitted for COVID-19 and acute respiratory failure with hypoxia. He presented with shortness of breath for about a week with a dry cough. Additionally noted fevers and intermittent watery diarrhea. Found to be hypoxia and positive for COVID-19. Transferred to stepdown for worsening hypoxia. Requiring HF NC at [address] [place] [address]. Currently denies shortness of breath. Endorses intermittent cough. Afebrile the past 24 hours. His biggest concerns are that he wants to eat/drink and does not appreciate having to use a urinal to go to the bathroom. Family meeting was held today and he was made DNR. Interval history:  Doing well on 6L NC  Got up multiple times and ambulated about the room despite education not to do so    Review of Systems:  A comprehensive review of systems was negative except for that written in the HPI.     Past Medical History:   Diagnosis Date    Diverticulitis     Gastrointestinal disorder     History of vascular access device 2017    [facility] VAT -  5FR triple Lumen Power PICC R Basilic  39 cm    Spinal stenosis       Past Surgical History:   Procedure Laterality Date    FLEXIBLE SIGMOIDOSCOPY N/A 2017    SIGMOIDOSCOPY FLEXIBLE performed by Ferny Grijalva MD at [address] [place] [address] NON TUNL CVC OVER 5 YRS  1/13/2021      Prior to Admission medications    Medication Sig Start Date End Date Taking? Authorizing Provider   dicyclomine (BENTYL) 20 mg tablet Take 20 mg by mouth every six (6) hours. Indications: irritable colon   Yes Provider, Historical   gabapentin (NEURONTIN) 600 mg tablet Take 600 mg by mouth three (3) times daily. Yes Provider, Historical   ibuprofen (MOTRIN) 200 mg tablet Take 600 mg by mouth daily. Yes Provider, Historical   acetaminophen (TYLENOL) 500 mg tablet Take 1,000 mg by mouth as needed for Pain. Indications: fever, muscle pain   Yes Provider, Historical   psyllium (METAMUCIL) packet Take 1 Packet by mouth two (2) times a day. Yes Provider, Historical     Current Facility-Administered Medications   Medication Dose Route Frequency    dextrose 5% and 0.9% NaCl infusion  50 mL/hr IntraVENous CONTINUOUS    zinc gluconate tablet 50 mg  50 mg Oral DAILY    bumetanide (BUMEX) injection 1 mg  1 mg IntraVENous BID    sodium chloride (NS) flush 5-40 mL  5-40 mL IntraVENous Q8H    famotidine (PEPCID) tablet 20 mg  20 mg Oral BID    enoxaparin (LOVENOX) injection 40 mg  40 mg SubCUTAneous DAILY    cholecalciferol (VITAMIN D3) (1000 Units /25 mcg) tablet 2 Tab  2,000 Units Oral DAILY    ascorbic acid (vitamin C) (VITAMIN C) tablet 500 mg  500 mg Oral BID     No Known Allergies   Social History     Tobacco Use    Smoking status: Current Some Day Smoker     Packs/day: 1.00     Years: 50.00     Pack years: 50.00    Smokeless tobacco: Current User   Substance Use Topics    Alcohol use: Yes     Alcohol/week: 0.8 standard drinks     Types: 1 Cans of beer per week      No family history on file. Laboratory: I have personally reviewed the critical care flowsheet and labs.      Recent Labs     01/22/21  0302 01/20/21  0333 01/19/21  1115   WBC 13.2* 14.5* 13.7*   HGB 16.2 18.0* 18.3*   HCT 47.5 52.2* 51.6*    363 348     Recent Labs     01/22/21  0302 01/21/21  0124 01/20/21  0333 01/19/21  1115   *  --  134* 134*   K 3.8  --  3.8 3.6     --  96* 98   CO2 32  --  30 29   *  --  114* 125*   BUN 42*  --  63* 61*   CREA 1.15  --  1.39* 1.32*   CA 7.8*  --  8.7 8.8   ALB 3.1*  --  3.5 3.6   *  --  114* 87*   INR 1.4* 1.3* 1.3*  --        Objective:     Mode Rate Tidal Volume Pressure FiO2 PEEP            85 %       Vital Signs:     TMAX(24)      Intake/Output:   Last shift:         Last 3 shifts: No intake/output data recorded. RRIOLAST3    Intake/Output Summary (Last 24 hours) at 1/22/2021 0819  Last data filed at 1/22/2021 0000  Gross per 24 hour   Intake    Output 950 ml   Net -950 ml     EXAM:   GENERAL: well developed and in no distress, HEENT:  PERRL, EOMI, no alar flaring or epistaxis, NECK:  no jugular vein distention, no retractions, LUNGS: CTAB, respirations non-labored , HEART:  Regular rate and rhythm with no MGR; no edema is present, ABDOMEN:  soft with no tenderness, bowel sounds present, EXTREMITIES:  warm with no cyanosis, SKIN:  no jaundice or ecchymosis and NEUROLOGIC:  alert and oriented, grossly non-focal    Mark Butt MD  [facility]

## 2021-01-22 NOTE — PROGRESS NOTES
Eugene Kelly lexie [address]  [address] [facility], [place], [address] [place] [address]  [phone]      Medical Progress Note      NAME: Cece Luis   :  1945  MRM:  [id number]    Date/Time: 2021  1:28 PM         Subjective:     Chief Complaint:  Willim Crumble, HHFNC on side of face, but maintaining sats. Wants to eat. Otherwise, no complaints. ROS:  (bold if positive, if negative)    Tolerating PT  Tolerating Diet          Objective:       Vitals:          Last 24hrs VS reviewed since prior progress note. Most recent are:    Visit Vitals  /73 (BP Patient Position: Supine; Post activity)   Pulse 83   Temp 98 °F (36.7 °C)   Resp 15   Ht 5' 10\" (1.778 m)   Wt 82.7 kg (182 lb 6.4 oz)   SpO2 (!) 88%   BMI 26.17 kg/m²     SpO2 Readings from Last 6 Encounters:   21 (!) 88%   10/04/19 92%   17 99%   17 96%   17 96%   17 95%    O2 Flow Rate (L/min): 4 l/min       Intake/Output Summary (Last 24 hours) at 2021 1328  Last data filed at 2021 0000  Gross per 24 hour   Intake    Output 950 ml   Net -950 ml          Exam:     Physical Exam:    Gen:  Well-developed, well-nourished, in no acute distress  HEENT:  Pink conjunctivae, PERRL, hearing intact to voice, moist mucous membranes  Neck:  Supple, without masses, thyroid non-tender  Resp:  No accessory muscle use, clear breath sounds without wheezes rales or rhonchi  Card:  No murmurs, normal S1, S2 without thrills, bruits or peripheral edema  Abd:  Soft, non-tender, non-distended, normoactive bowel sounds are present  Musc:  No cyanosis or clubbing  Skin:  No rashes or ulcers, skin turgor is good  Neuro: Oriented to place and self  Psych:  Good insight,      Medications Reviewed: (see below)    Lab Data Reviewed: (see below)    ______________________________________________________________________    Medications:     Current Facility-Administered Medications   Medication Dose Route Frequency    dextrose 5% and 0.9% NaCl infusion  50 mL/hr IntraVENous CONTINUOUS    ipratropium-albuterol (COMBIVENT RESPIMAT) 20 mcg-100 mcg inhalation spray  1 Puff Inhalation Q6H PRN    zinc gluconate tablet 50 mg  50 mg Oral DAILY    melatonin (rapid dissolve) tablet 5 mg  5 mg Oral QHS PRN    sodium chloride (NS) flush 5-40 mL  5-40 mL IntraVENous Q8H    sodium chloride (NS) flush 5-40 mL  5-40 mL IntraVENous PRN    polyethylene glycol (MIRALAX) packet 17 g  17 g Oral DAILY PRN    bisacodyL (DULCOLAX) suppository 10 mg  10 mg Rectal DAILY PRN    ondansetron (ZOFRAN) injection 4 mg  4 mg IntraVENous Q6H PRN    famotidine (PEPCID) tablet 20 mg  20 mg Oral BID    enoxaparin (LOVENOX) injection 40 mg  40 mg SubCUTAneous DAILY    acetaminophen (TYLENOL) tablet 650 mg  650 mg Oral Q6H PRN    Or    acetaminophen (TYLENOL) suppository 650 mg  650 mg Rectal Q6H PRN    guaiFENesin-dextromethorphan (ROBITUSSIN DM) 100-10 mg/5 mL syrup 5 mL  5 mL Oral Q4H PRN    labetaloL (NORMODYNE;TRANDATE) 20 mg/4 mL (5 mg/mL) injection 20 mg  20 mg IntraVENous Q4H PRN    hydrALAZINE (APRESOLINE) 20 mg/mL injection 20 mg  20 mg IntraVENous Q4H PRN    alum-mag hydroxide-simeth (MYLANTA) oral suspension 30 mL  30 mL Oral Q4H PRN    LORazepam (ATIVAN) injection 0.5 mg  0.5 mg IntraVENous Q6H PRN    oxyCODONE IR (ROXICODONE) tablet 5 mg  5 mg Oral Q4H PRN    oxyCODONE IR (ROXICODONE) tablet 10 mg  10 mg Oral Q4H PRN    morphine injection 2 mg  2 mg IntraVENous Q4H PRN    diphenhydrAMINE (BENADRYL) injection 25 mg  25 mg IntraVENous Q6H PRN    diphenhydrAMINE (BENADRYL) capsule 25 mg  25 mg Oral Q6H PRN    simethicone (MYLICON) tablet 80 mg  80 mg Oral QID PRN    cholecalciferol (VITAMIN D3) (1000 Units /25 mcg) tablet 2 Tab  2,000 Units Oral DAILY    ascorbic acid (vitamin C) (VITAMIN C) tablet 500 mg  500 mg Oral BID            Lab Review:     Recent Labs     01/22/21  0302 01/20/21  0333   WBC 13.2* 14.5*   HGB 16.2 18.0*   HCT 47.5 52.2*  363     Recent Labs     01/22/21  0302 01/21/21  0124 01/20/21  0333   *  --  134*   K 3.8  --  3.8     --  96*   CO2 32  --  30   *  --  114*   BUN 42*  --  63*   CREA 1.15  --  1.39*   CA 7.8*  --  8.7   ALB 3.1*  --  3.5   *  --  114*   INR 1.4* 1.3* 1.3*     No components found for: [place]         Assessment / Plan:     Principal Problem:    COVID-19 virus infection (1/11/2021)    Active Problems:    Diarrhea (1/11/2021)      Acute respiratory insufficiency (1/11/2021)      Hypoxia (1/11/2021)      Acute metabolic encephalopathy (5/43/8005)      #AHRF 2/2 COVID-19: Has now weaned off HFNC to 6L nc this morning. He is s/o remdesivir, dexamethasone, azithromycin. Does not appear grossly volume overloaded at this time so will hold on further diuresis. - Cont supplements, supportive care, PT/OT   - Prone as able   - Advance diet   - LMWH ppx    #CHAVA: Admit Scr normal, peak 1.39, now 1.15. Has been on bumex. I/O suggest he is -5.5L since admit. No echo on file. CT 1/15 with pulm infiltrates vs edema; favor the former.    - Hold bumex   - Increase PO intake for now   - Renally dose, avoid nephrotoxins    #Cog impairment vs delirium: He is oriented to self and place, but not time. High risk for inpt delirium.  Prior to admit, he was independent and active without DME   - OT cog eval   - Lights on, orientation    Central Line day 9, for access; d/c today if PIV  Nutrition: ADAT    Total time spent with patient: 30 [address] discussed with: Patient    Discussed:  Care Plan    Prophylaxis:  Lovenox     Disposition:  Home w/Family           ___________________________________________________    Attending Physician: Carol Lazar MD

## 2021-01-23 LAB
ANION GAP SERPL CALC-SCNC: 4 MMOL/L (ref 5–15)
APTT PPP: 29.2 SEC (ref 22.1–31)
BUN SERPL-MCNC: 33 MG/DL (ref 6–20)
BUN/CREAT SERPL: 34 (ref 12–20)
CALCIUM SERPL-MCNC: 7.5 MG/DL (ref 8.5–10.1)
CHLORIDE SERPL-SCNC: 101 MMOL/L (ref 97–108)
CO2 SERPL-SCNC: 31 MMOL/L (ref 21–32)
COMMENT, HOLDF: NORMAL
CREAT SERPL-MCNC: 0.98 MG/DL (ref 0.7–1.3)
D DIMER PPP FEU-MCNC: 0.59 MG/L FEU (ref 0–0.65)
FIBRINOGEN PPP-MCNC: 456 MG/DL (ref 200–475)
GLUCOSE SERPL-MCNC: 110 MG/DL (ref 65–100)
INR PPP: 1.4 (ref 0.9–1.1)
POTASSIUM SERPL-SCNC: 3.4 MMOL/L (ref 3.5–5.1)
PROTHROMBIN TIME: 13.9 SEC (ref 9–11.1)
SAMPLES BEING HELD,HOLD: NORMAL
SODIUM SERPL-SCNC: 136 MMOL/L (ref 136–145)
THERAPEUTIC RANGE,PTTT: NORMAL SECS (ref 58–77)

## 2021-01-23 PROCEDURE — 80048 BASIC METABOLIC PNL TOTAL CA: CPT

## 2021-01-23 PROCEDURE — 77010033678 HC OXYGEN DAILY

## 2021-01-23 PROCEDURE — 74011250637 HC RX REV CODE- 250/637: Performed by: HOSPITALIST

## 2021-01-23 PROCEDURE — 85610 PROTHROMBIN TIME: CPT

## 2021-01-23 PROCEDURE — 74011000258 HC RX REV CODE- 258: Performed by: INTERNAL MEDICINE

## 2021-01-23 PROCEDURE — 74011250636 HC RX REV CODE- 250/636: Performed by: HOSPITALIST

## 2021-01-23 PROCEDURE — 85379 FIBRIN DEGRADATION QUANT: CPT

## 2021-01-23 PROCEDURE — 36415 COLL VENOUS BLD VENIPUNCTURE: CPT

## 2021-01-23 PROCEDURE — 94760 N-INVAS EAR/PLS OXIMETRY 1: CPT

## 2021-01-23 PROCEDURE — 85730 THROMBOPLASTIN TIME PARTIAL: CPT

## 2021-01-23 PROCEDURE — 74011250637 HC RX REV CODE- 250/637: Performed by: INTERNAL MEDICINE

## 2021-01-23 PROCEDURE — 65270000029 HC RM PRIVATE

## 2021-01-23 PROCEDURE — 77010033711 HC HIGH FLOW OXYGEN

## 2021-01-23 PROCEDURE — 85384 FIBRINOGEN ACTIVITY: CPT

## 2021-01-23 PROCEDURE — 2709999900 HC NON-CHARGEABLE SUPPLY

## 2021-01-23 RX ORDER — QUETIAPINE FUMARATE 25 MG/1
12.5 TABLET, FILM COATED ORAL 2 TIMES DAILY
Status: DISCONTINUED | OUTPATIENT
Start: 2021-01-23 | End: 2021-01-24

## 2021-01-23 RX ADMIN — Medication 10 ML: at 18:30

## 2021-01-23 RX ADMIN — DEXTROSE MONOHYDRATE AND SODIUM CHLORIDE 50 ML/HR: 5; .9 INJECTION, SOLUTION INTRAVENOUS at 03:36

## 2021-01-23 RX ADMIN — Medication 10 ML: at 21:49

## 2021-01-23 RX ADMIN — Medication 2 TABLET: at 09:25

## 2021-01-23 RX ADMIN — OXYCODONE HYDROCHLORIDE AND ACETAMINOPHEN 500 MG: 500 TABLET ORAL at 09:25

## 2021-01-23 RX ADMIN — OXYCODONE HYDROCHLORIDE AND ACETAMINOPHEN 500 MG: 500 TABLET ORAL at 18:28

## 2021-01-23 RX ADMIN — Medication 50 MG: at 09:25

## 2021-01-23 RX ADMIN — FAMOTIDINE 20 MG: 20 TABLET, FILM COATED ORAL at 18:28

## 2021-01-23 RX ADMIN — FAMOTIDINE 20 MG: 20 TABLET, FILM COATED ORAL at 09:25

## 2021-01-23 RX ADMIN — ENOXAPARIN SODIUM 40 MG: 40 INJECTION SUBCUTANEOUS at 09:25

## 2021-01-23 RX ADMIN — Medication 40 ML: at 05:15

## 2021-01-23 RX ADMIN — Medication 5 MG: at 21:49

## 2021-01-23 RX ADMIN — LORAZEPAM 0.5 MG: 2 INJECTION INTRAMUSCULAR; INTRAVENOUS at 14:03

## 2021-01-23 RX ADMIN — QUETIAPINE FUMARATE 12.5 MG: 25 TABLET ORAL at 18:28

## 2021-01-23 RX ADMIN — LORAZEPAM 0.5 MG: 2 INJECTION INTRAMUSCULAR; INTRAVENOUS at 21:49

## 2021-01-23 NOTE — PROGRESS NOTES
[id number] TRANSFER - OUT REPORT:    Verbal report given to RN on June Beckman  being transferred to  for routine progression of care       Report consisted of patients Situation, Background, Assessment and   Recommendations(SBAR). Information from the following report(s) SBAR, Kardex, ED Summary, Intake/Output, MAR, Accordion, Recent Results, Med Rec Status and Cardiac Rhythm SR was reviewed with the receiving nurse. Lines:   Triple Lumen 01/13/21 Right Internal jugular (Active)   Central Line Being Utilized Yes 01/22/21 0400   Criteria for Appropriate Use Limited/no vessel suitable for conventional peripheral access 01/22/21 0400   Site Assessment Clean, dry, & intact 01/22/21 1936   Infiltration Assessment 0 01/22/21 1936   Affected Extremity/Extremities Color distal to insertion site pink (or appropriate for race); Pulses palpable;Range of motion performed 01/22/21 1936   Date of Last Dressing Change 01/22/21 01/22/21 0400   Dressing Status Clean, dry, & intact; New 01/22/21 0400   Dressing Type Transparent;Disk with Chlorhexadine gluconate (CHG) 01/22/21 0400   Action Taken Open ports on tubing capped 01/22/21 0400   Proximal Hub Color/Line Status White;Capped;Flushed 01/22/21 0400   Positive Blood Return (Medial Site) Yes 01/22/21 0400   Medial Hub Color/Line Status Blue;Capped;Flushed 01/22/21 0400   Positive Blood Return (Lateral Site) Yes 01/22/21 0400   Distal Hub Color/Line Status Brown;Capped;Flushed 01/22/21 0400   Positive Blood Return (Site #3) Yes 01/22/21 0400   Alcohol Cap Used Yes 01/21/21 1200        Opportunity for questions and clarification was provided.       Patient transported with:   Monitor  O2 @ 6 liters  Registered Nurse

## 2021-01-23 NOTE — PROGRESS NOTES
[facility] [place] [facility]  [address] [facility], [place], [address] [place] [address]  [phone]      Medical Progress Note      NAME: Bard Frias   :  1945  MRM:  [id number]    Date/Time: 2021  2:03 PM         Subjective:     Chief Complaint:  Maria Esther Marquis, down to 4-6L. Very adamant about going home. Can express to me why he is here and what happens if he goes. He denies cough, sob    ROS:  (bold if positive, if negative)    Tolerating PT  Tolerating Diet          Objective:       Vitals:          Last 24hrs VS reviewed since prior progress note.  Most recent are:    Visit Vitals  /60 (BP 1 Location: Right arm, BP Patient Position: At rest)   Pulse 65   Temp 97.8 °F (36.6 °C)   Resp 16   Ht 5' 10\" (1.778 m)   Wt 82.7 kg (182 lb 6.4 oz)   SpO2 93%   BMI 26.17 kg/m²     SpO2 Readings from Last 6 Encounters:   21 93%   10/04/19 92%   17 99%   17 96%   17 96%   17 95%    O2 Flow Rate (L/min): 5 l/min       Intake/Output Summary (Last 24 hours) at 2021 1402  Last data filed at 2021 7362  Gross per 24 hour   Intake 3193.33 ml   Output 1250 ml   Net 1943.33 ml          Exam:     Physical Exam:    Gen:  Well-developed, well-nourished, in no acute distress  HEENT:  Pink conjunctivae, PERRL, hearing intact to voice, moist mucous membranes  Neck:  Supple, without masses, thyroid non-tender  Resp:  No accessory muscle use, clear breath sounds without wheezes rales or rhonchi  Card:  No murmurs, normal S1, S2 without thrills, bruits or peripheral edema  Abd:  Soft, non-tender, non-distended, normoactive bowel sounds are present  Musc:  No cyanosis or clubbing  Skin:  No rashes or ulcers, skin turgor is good  Neuro: Oriented to place and self  Psych:  Good insight,      Medications Reviewed: (see below)    Lab Data Reviewed: (see below)    ______________________________________________________________________    Medications:     Current Facility-Administered Medications   Medication Dose Route Frequency    QUEtiapine (SEROquel) tablet 12.5 mg  12.5 mg Oral BID    dextrose 5% and 0.9% NaCl infusion  50 mL/hr IntraVENous CONTINUOUS    ipratropium-albuterol (COMBIVENT RESPIMAT) 20 mcg-100 mcg inhalation spray  1 Puff Inhalation Q6H PRN    zinc gluconate tablet 50 mg  50 mg Oral DAILY    melatonin (rapid dissolve) tablet 5 mg  5 mg Oral QHS PRN    sodium chloride (NS) flush 5-40 mL  5-40 mL IntraVENous Q8H    sodium chloride (NS) flush 5-40 mL  5-40 mL IntraVENous PRN    polyethylene glycol (MIRALAX) packet 17 g  17 g Oral DAILY PRN    bisacodyL (DULCOLAX) suppository 10 mg  10 mg Rectal DAILY PRN    ondansetron (ZOFRAN) injection 4 mg  4 mg IntraVENous Q6H PRN    famotidine (PEPCID) tablet 20 mg  20 mg Oral BID    enoxaparin (LOVENOX) injection 40 mg  40 mg SubCUTAneous DAILY    acetaminophen (TYLENOL) tablet 650 mg  650 mg Oral Q6H PRN    Or    acetaminophen (TYLENOL) suppository 650 mg  650 mg Rectal Q6H PRN    guaiFENesin-dextromethorphan (ROBITUSSIN DM) 100-10 mg/5 mL syrup 5 mL  5 mL Oral Q4H PRN    labetaloL (NORMODYNE;TRANDATE) 20 mg/4 mL (5 mg/mL) injection 20 mg  20 mg IntraVENous Q4H PRN    hydrALAZINE (APRESOLINE) 20 mg/mL injection 20 mg  20 mg IntraVENous Q4H PRN    alum-mag hydroxide-simeth (MYLANTA) oral suspension 30 mL  30 mL Oral Q4H PRN    LORazepam (ATIVAN) injection 0.5 mg  0.5 mg IntraVENous Q6H PRN    oxyCODONE IR (ROXICODONE) tablet 5 mg  5 mg Oral Q4H PRN    oxyCODONE IR (ROXICODONE) tablet 10 mg  10 mg Oral Q4H PRN    morphine injection 2 mg  2 mg IntraVENous Q4H PRN    diphenhydrAMINE (BENADRYL) injection 25 mg  25 mg IntraVENous Q6H PRN    diphenhydrAMINE (BENADRYL) capsule 25 mg  25 mg Oral Q6H PRN    simethicone (MYLICON) tablet 80 mg  80 mg Oral QID PRN    cholecalciferol (VITAMIN D3) (1000 Units /25 mcg) tablet 2 Tab  2,000 Units Oral DAILY    ascorbic acid (vitamin C) (VITAMIN C) tablet 500 mg  500 mg Oral BID Lab Review:     Recent Labs     01/22/21  0302   WBC 13.2*   HGB 16.2   HCT 47.5        Recent Labs     01/23/21  0449 01/23/21  0351 01/22/21  0302 01/21/21  0124   NA  --  136 135*  --    K  --  3.4* 3.8  --    CL  --  101 101  --    CO2  --  31 32  --    GLU  --  110* 112*  --    BUN  --  33* 42*  --    CREA  --  0.98 1.15  --    CA  --  7.5* 7.8*  --    ALB  --   --  3.1*  --    ALT  --   --  151*  --    INR 1.4*  --  1.4* 1.3*     No components found for: [place]         Assessment / Plan:     Principal Problem:    COVID-19 virus infection (1/11/2021)    Active Problems:    Diarrhea (1/11/2021)      Acute respiratory insufficiency (1/11/2021)      Hypoxia (1/11/2021)      Acute metabolic encephalopathy (3/83/2372)      #AHRF 2/2 COVID-19: Has now weaned off HFNC to 4-6L nc, but still desats with ambulation. He is s/p remdesivir, dexamethasone, azithromycin. Does not appear grossly volume overloaded at this time so will hold on further diuresis. - Cont supplements, supportive care, PT/OT   - Prone as able   - Advance diet   - LMWH ppx   - Strongly recommend SNf/pulm rehab. Pt desires to go home. Discussion with multiple family members today, all agreeing that he should go to [facility]. Pt's brother, Iban Howard, will continue to encourage pt to do this. If not, pt will likely fail at home. For better or worse, patient still not medically ready for discharge given significant O2 demands. #CHAVA: Admit Scr normal, peak 1.39. Has been on bumex. I/O suggest he is -5.5L since admit. No echo on file. CT 1/15 with pulm infiltrates vs edema; favor the former. Held bumex, Scr improving     #Cog impairment vs delirium: He is oriented to self and place, but not time. High risk for inpt delirium. Prior to admit, he was independent and active without DME.  Prior admissions notable for anger and confusion.   - Will start low dose seroquel BID    Total time spent with patient: 300 Alejo Escobar discussed with: Patient    Discussed:  Care Plan    Prophylaxis:  Lovenox     Disposition:  SNF           ___________________________________________________    Attending Physician: Loc Foster MD

## 2021-01-23 NOTE — PROGRESS NOTES
1/23/2021  Case Management Note    3:32 PM  Spoke with Deborah Vargas, patient's brother in law. Plan is now for patient's wife to seek residential treatment starting Monday. They spoke with patient on conference call about going to rehab, and he's still not convinced, but they will keep encouraging him. Regardless he is not medically ready. RWODY Paniagua    11:43 AM  Patient's son called back and let me know that patient's brother in law is working on organizing a conference call with himself, the patient, and the patient's wife. He gave me the number the patient will need to dial in, along with the time and access code. I have passed this information on to [facility] to take into the room. ROWDY Paniagua    11:05 AM  Per MD, patient is very angry with the idea of going to rehab, even though he has been accepted at [facility] with a bed available early this coming week. I have called patient's son to get an update on the home situation and see if he had thought of encouraging his dad to attend rehab. Evidently, while the plan had been for patient's wife to discharge from [facility] and stay with a sibling while attending a day program (sounds like partial hospitalization, son states it is in [facility]), that plan has fallen through, and she is insisting on returning home as well  Patient's son is of the opinion that neither of them should really be home, and they especially should not be home together as the patient will be unable to cope with his wife's situation and she will be unable to cope with his. Patient's son is going to call his uncle who has been involved in care and update him.  I have also said I will call him around 1pm. Will continue to follow    ROWDY Paniagua

## 2021-01-23 NOTE — PROGRESS NOTES
Per Dr Abril Olsen, home evaluation can be done closer to discharge, patient may be discharged on 1/25/2021

## 2021-01-23 NOTE — ROUTINE PROCESS
46 - Spoke to son of patient Hortencia Keen). Updated him that his father is upset/agitated and does not want to be here any longer, wants to go home. RN educated family that he is still requiring oxygen and it is not in his best interest to leave. Family is in agreement and would like patient to stay. Patient now requesting to speak to physician. Will continue to monitor. 56 - RN notified patient that he needs oxygen and it is in his best interest to stay in the hospital, however he can leave against medical advice. Patient Called son to ask for a ride. Son does not want to  his father. He called this RN and is very concerned about his dad. He says this is not like him. He requested his father get medication for agitation, however RN explained she cannot make patient take it, even if MD orders it. Will get MD to reach out to son.

## 2021-01-24 LAB
ANION GAP SERPL CALC-SCNC: 3 MMOL/L (ref 5–15)
BASOPHILS # BLD: 0 K/UL (ref 0–0.1)
BASOPHILS NFR BLD: 0 % (ref 0–1)
BUN SERPL-MCNC: 22 MG/DL (ref 6–20)
BUN/CREAT SERPL: 26 (ref 12–20)
CALCIUM SERPL-MCNC: 7.6 MG/DL (ref 8.5–10.1)
CHLORIDE SERPL-SCNC: 104 MMOL/L (ref 97–108)
CO2 SERPL-SCNC: 29 MMOL/L (ref 21–32)
CREAT SERPL-MCNC: 0.86 MG/DL (ref 0.7–1.3)
DIFFERENTIAL METHOD BLD: ABNORMAL
EOSINOPHIL # BLD: 0.2 K/UL (ref 0–0.4)
EOSINOPHIL NFR BLD: 2 % (ref 0–7)
ERYTHROCYTE [DISTWIDTH] IN BLOOD BY AUTOMATED COUNT: 13.2 % (ref 11.5–14.5)
GLUCOSE SERPL-MCNC: 97 MG/DL (ref 65–100)
HCT VFR BLD AUTO: 39.3 % (ref 36.6–50.3)
HGB BLD-MCNC: 13.3 G/DL (ref 12.1–17)
IMM GRANULOCYTES # BLD AUTO: 0.1 K/UL (ref 0–0.04)
IMM GRANULOCYTES NFR BLD AUTO: 1 % (ref 0–0.5)
LYMPHOCYTES # BLD: 0.6 K/UL (ref 0.8–3.5)
LYMPHOCYTES NFR BLD: 6 % (ref 12–49)
MCH RBC QN AUTO: 30 PG (ref 26–34)
MCHC RBC AUTO-ENTMCNC: 33.8 G/DL (ref 30–36.5)
MCV RBC AUTO: 88.5 FL (ref 80–99)
MONOCYTES # BLD: 1.2 K/UL (ref 0–1)
MONOCYTES NFR BLD: 11 % (ref 5–13)
NEUTS SEG # BLD: 8.6 K/UL (ref 1.8–8)
NEUTS SEG NFR BLD: 80 % (ref 32–75)
NRBC # BLD: 0 K/UL (ref 0–0.01)
NRBC BLD-RTO: 0 PER 100 WBC
PLATELET # BLD AUTO: 181 K/UL (ref 150–400)
PMV BLD AUTO: 11 FL (ref 8.9–12.9)
POTASSIUM SERPL-SCNC: 3.6 MMOL/L (ref 3.5–5.1)
RBC # BLD AUTO: 4.44 M/UL (ref 4.1–5.7)
RBC MORPH BLD: ABNORMAL
SODIUM SERPL-SCNC: 136 MMOL/L (ref 136–145)
WBC # BLD AUTO: 10.7 K/UL (ref 4.1–11.1)

## 2021-01-24 PROCEDURE — 74011000258 HC RX REV CODE- 258: Performed by: INTERNAL MEDICINE

## 2021-01-24 PROCEDURE — 94760 N-INVAS EAR/PLS OXIMETRY 1: CPT

## 2021-01-24 PROCEDURE — 80048 BASIC METABOLIC PNL TOTAL CA: CPT

## 2021-01-24 PROCEDURE — 74011250637 HC RX REV CODE- 250/637: Performed by: INTERNAL MEDICINE

## 2021-01-24 PROCEDURE — 74011250637 HC RX REV CODE- 250/637: Performed by: HOSPITALIST

## 2021-01-24 PROCEDURE — 85025 COMPLETE CBC W/AUTO DIFF WBC: CPT

## 2021-01-24 PROCEDURE — 36415 COLL VENOUS BLD VENIPUNCTURE: CPT

## 2021-01-24 PROCEDURE — 77010033678 HC OXYGEN DAILY

## 2021-01-24 PROCEDURE — 74011250636 HC RX REV CODE- 250/636: Performed by: HOSPITALIST

## 2021-01-24 PROCEDURE — 65270000029 HC RM PRIVATE

## 2021-01-24 RX ORDER — QUETIAPINE FUMARATE 25 MG/1
12.5 TABLET, FILM COATED ORAL
Status: DISCONTINUED | OUTPATIENT
Start: 2021-01-24 | End: 2021-01-26

## 2021-01-24 RX ADMIN — FAMOTIDINE 20 MG: 20 TABLET, FILM COATED ORAL at 09:24

## 2021-01-24 RX ADMIN — LORAZEPAM 0.5 MG: 2 INJECTION INTRAMUSCULAR; INTRAVENOUS at 22:38

## 2021-01-24 RX ADMIN — DEXTROSE MONOHYDRATE AND SODIUM CHLORIDE 50 ML/HR: 5; .9 INJECTION, SOLUTION INTRAVENOUS at 08:32

## 2021-01-24 RX ADMIN — Medication 10 ML: at 05:47

## 2021-01-24 RX ADMIN — Medication 10 ML: at 22:38

## 2021-01-24 RX ADMIN — Medication 10 ML: at 10:26

## 2021-01-24 RX ADMIN — QUETIAPINE FUMARATE 12.5 MG: 25 TABLET ORAL at 22:39

## 2021-01-24 RX ADMIN — Medication 5 MG: at 22:39

## 2021-01-24 RX ADMIN — OXYCODONE HYDROCHLORIDE AND ACETAMINOPHEN 500 MG: 500 TABLET ORAL at 17:26

## 2021-01-24 RX ADMIN — ENOXAPARIN SODIUM 40 MG: 40 INJECTION SUBCUTANEOUS at 09:25

## 2021-01-24 RX ADMIN — OXYCODONE 10 MG: 5 TABLET ORAL at 16:24

## 2021-01-24 RX ADMIN — Medication 50 MG: at 09:25

## 2021-01-24 RX ADMIN — OXYCODONE HYDROCHLORIDE AND ACETAMINOPHEN 500 MG: 500 TABLET ORAL at 09:25

## 2021-01-24 RX ADMIN — FAMOTIDINE 20 MG: 20 TABLET, FILM COATED ORAL at 17:26

## 2021-01-24 RX ADMIN — Medication 2 TABLET: at 09:24

## 2021-01-24 RX ADMIN — QUETIAPINE FUMARATE 12.5 MG: 25 TABLET ORAL at 09:25

## 2021-01-24 NOTE — PROGRESS NOTES
Patient complaining of pain on left ear due to nasal cannula tubing. I applied silicone cream and added cushions to his nasal cannula. Primary RN present and aware.

## 2021-01-24 NOTE — ROUTINE PROCESS
Verbal shift change report given to Kalee Razo (oncoming nurse) by Aida Martínez (offgoing nurse). Report included the following information SBAR, Kardex, ED Summary, Intake/Output, MAR, Accordion, Recent Results, Med Rec Status and Cardiac Rhythm NSR.

## 2021-01-24 NOTE — PROGRESS NOTES
[facility] [place] [address]  [address] [place] [address], [address] [place] [address]  [phone]      Medical Progress Note      NAME: Cece Luis   :  1945  MRM:  [id number]    Date/Time: 2021  5:08 PM         Subjective:     Chief Complaint:  Willim Crumble, down to 4-6L. Very adamant about going home. Angry still but deflects questions abouthis conversation with family yesterday    ROS:  (bold if positive, if negative)    Tolerating PT  Tolerating Diet          Objective:       Vitals:          Last 24hrs VS reviewed since prior progress note.  Most recent are:    Visit Vitals  BP (!) 163/68 (BP 1 Location: Right arm, BP Patient Position: At rest)   Pulse 73   Temp 97.5 °F (36.4 °C)   Resp 17   Ht 5' 10\" (1.778 m)   Wt 82.7 kg (182 lb 6.4 oz)   SpO2 93%   BMI 26.17 kg/m²     SpO2 Readings from Last 6 Encounters:   21 93%   10/04/19 92%   17 99%   17 96%   17 96%   17 95%    O2 Flow Rate (L/min): 5 l/min       Intake/Output Summary (Last 24 hours) at 2021 1708  Last data filed at 2021 0004  Gross per 24 hour   Intake 0 ml   Output 400 ml   Net -400 ml          Exam:     Physical Exam:    Gen:  Well-developed, well-nourished, in no acute distress  HEENT:  Pink conjunctivae, PERRL, hearing intact to voice, moist mucous membranes  Neck:  Supple, without masses, thyroid non-tender  Resp:  No accessory muscle use, clear breath sounds without wheezes rales or rhonchi  Card:  No murmurs, normal S1, S2 without thrills, bruits or peripheral edema  Abd:  Soft, non-tender, non-distended, normoactive bowel sounds are present  Musc:  No cyanosis or clubbing  Skin:  No rashes or ulcers, skin turgor is good  Neuro: Oriented to place and self  Psych:  Good insight, angry     Medications Reviewed: (see below)    Lab Data Reviewed: (see below)    ______________________________________________________________________    Medications:     Current Facility-Administered Medications   Medication Dose Route Frequency    QUEtiapine (SEROquel) tablet 12.5 mg  12.5 mg Oral QHS    dextrose 5% and 0.9% NaCl infusion  50 mL/hr IntraVENous CONTINUOUS    ipratropium-albuterol (COMBIVENT RESPIMAT) 20 mcg-100 mcg inhalation spray  1 Puff Inhalation Q6H PRN    zinc gluconate tablet 50 mg  50 mg Oral DAILY    melatonin (rapid dissolve) tablet 5 mg  5 mg Oral QHS PRN    sodium chloride (NS) flush 5-40 mL  5-40 mL IntraVENous Q8H    sodium chloride (NS) flush 5-40 mL  5-40 mL IntraVENous PRN    polyethylene glycol (MIRALAX) packet 17 g  17 g Oral DAILY PRN    bisacodyL (DULCOLAX) suppository 10 mg  10 mg Rectal DAILY PRN    ondansetron (ZOFRAN) injection 4 mg  4 mg IntraVENous Q6H PRN    famotidine (PEPCID) tablet 20 mg  20 mg Oral BID    enoxaparin (LOVENOX) injection 40 mg  40 mg SubCUTAneous DAILY    acetaminophen (TYLENOL) tablet 650 mg  650 mg Oral Q6H PRN    Or    acetaminophen (TYLENOL) suppository 650 mg  650 mg Rectal Q6H PRN    guaiFENesin-dextromethorphan (ROBITUSSIN DM) 100-10 mg/5 mL syrup 5 mL  5 mL Oral Q4H PRN    labetaloL (NORMODYNE;TRANDATE) 20 mg/4 mL (5 mg/mL) injection 20 mg  20 mg IntraVENous Q4H PRN    hydrALAZINE (APRESOLINE) 20 mg/mL injection 20 mg  20 mg IntraVENous Q4H PRN    alum-mag hydroxide-simeth (MYLANTA) oral suspension 30 mL  30 mL Oral Q4H PRN    LORazepam (ATIVAN) injection 0.5 mg  0.5 mg IntraVENous Q6H PRN    oxyCODONE IR (ROXICODONE) tablet 5 mg  5 mg Oral Q4H PRN    oxyCODONE IR (ROXICODONE) tablet 10 mg  10 mg Oral Q4H PRN    morphine injection 2 mg  2 mg IntraVENous Q4H PRN    diphenhydrAMINE (BENADRYL) injection 25 mg  25 mg IntraVENous Q6H PRN    diphenhydrAMINE (BENADRYL) capsule 25 mg  25 mg Oral Q6H PRN    simethicone (MYLICON) tablet 80 mg  80 mg Oral QID PRN    cholecalciferol (VITAMIN D3) (1000 Units /25 mcg) tablet 2 Tab  2,000 Units Oral DAILY    ascorbic acid (vitamin C) (VITAMIN C) tablet 500 mg  500 mg Oral BID Lab Review:     Recent Labs     01/24/21  0324 01/22/21  0302   WBC 10.7 13.2*   HGB 13.3 16.2   HCT 39.3 47.5    263     Recent Labs     01/24/21  0324 01/23/21  0449 01/23/21  0351 01/22/21  0302     --  136 135*   K 3.6  --  3.4* 3.8     --  101 101   CO2 29  --  31 32   GLU 97  --  110* 112*   BUN 22*  --  33* 42*   CREA 0.86  --  0.98 1.15   CA 7.6*  --  7.5* 7.8*   ALB  --   --   --  3.1*   ALT  --   --   --  151*   INR  --  1.4*  --  1.4*     No components found for: [place]         Assessment / Plan:     Principal Problem:    COVID-19 virus infection (1/11/2021)    Active Problems:    Diarrhea (1/11/2021)      Acute respiratory insufficiency (1/11/2021)      Hypoxia (1/11/2021)      Acute metabolic encephalopathy (7/42/8202)      #AHRF 2/2 COVID-19: Has now weaned off HFNC to 4-6L nc, but still desats with ambulation. He is s/p remdesivir, dexamethasone, azithromycin. Does not appear grossly volume overloaded at this time so will hold on further diuresis. - Cont supplements, supportive care, PT/OT   - Prone as able   - Advance diet   - LMWH ppx   - Strongly recommend SNf/pulm rehab. Pt still refusing, though questionable capacity. Family to encourage SNF. Still needing excessive O2, but stable    #CHAVA: Admit Scr normal, peak 1.39. Has been on bumex. I/O suggest he is -5.5L since admit. No echo on file. CT 1/15 with pulm infiltrates vs edema; favor the former. Held bumex, Scr improving     #Cog impairment vs delirium: He is oriented to self and place, but not time. High risk for inpt delirium. Prior to admit, he was independent and active without DME.  Prior admissions notable for anger and confusion.   - Started low dose seroquel qhs    Total time spent with patient: 30 [address] Tim[address] Gao discussed with: Patient    Discussed:  Care Plan    Prophylaxis:  Lovenox     Disposition:  SNF           ___________________________________________________    Attending Physician: Sagar Mireles MD

## 2021-01-24 NOTE — PROGRESS NOTES
Bedside and Verbal shift change report given to GALEN RN  (oncoming nurse) by Jessica Bui RN  (offgoing nurse). Report included the following information SBAR, Kardex and Recent Results. ..    MD is here to see the pt. .    C/O pain. . Medicated. .     1900-  Resting. .    1930-  Bedside and Verbal shift change report given to [id number]  [facility] Radha (oncoming nurse) by Louise Kamara RN  (offgoing nurse). Report included the following information SBAR, Kardex and Recent Results.

## 2021-01-24 NOTE — PROGRESS NOTES
Bedside and Written shift change report given to Khushbu (oncoming nurse) by Naida Wyatt (offgoing nurse). Report included the following information SBAR and Recent Results.

## 2021-01-25 PROCEDURE — 97530 THERAPEUTIC ACTIVITIES: CPT

## 2021-01-25 PROCEDURE — 74011250636 HC RX REV CODE- 250/636: Performed by: HOSPITALIST

## 2021-01-25 PROCEDURE — 74011250637 HC RX REV CODE- 250/637: Performed by: INTERNAL MEDICINE

## 2021-01-25 PROCEDURE — 74011000258 HC RX REV CODE- 258: Performed by: INTERNAL MEDICINE

## 2021-01-25 PROCEDURE — 94760 N-INVAS EAR/PLS OXIMETRY 1: CPT

## 2021-01-25 PROCEDURE — 65270000029 HC RM PRIVATE

## 2021-01-25 PROCEDURE — 97535 SELF CARE MNGMENT TRAINING: CPT

## 2021-01-25 PROCEDURE — 74011000250 HC RX REV CODE- 250: Performed by: NURSE PRACTITIONER

## 2021-01-25 PROCEDURE — 74011250637 HC RX REV CODE- 250/637: Performed by: HOSPITALIST

## 2021-01-25 PROCEDURE — 77010033678 HC OXYGEN DAILY

## 2021-01-25 RX ORDER — BUMETANIDE 0.25 MG/ML
1 INJECTION INTRAMUSCULAR; INTRAVENOUS ONCE
Status: COMPLETED | OUTPATIENT
Start: 2021-01-25 | End: 2021-01-25

## 2021-01-25 RX ADMIN — DEXTROSE MONOHYDRATE AND SODIUM CHLORIDE 50 ML/HR: 5; .9 INJECTION, SOLUTION INTRAVENOUS at 22:10

## 2021-01-25 RX ADMIN — Medication 10 ML: at 14:00

## 2021-01-25 RX ADMIN — OXYCODONE HYDROCHLORIDE AND ACETAMINOPHEN 500 MG: 500 TABLET ORAL at 09:20

## 2021-01-25 RX ADMIN — DEXTROSE MONOHYDRATE AND SODIUM CHLORIDE 50 ML/HR: 5; .9 INJECTION, SOLUTION INTRAVENOUS at 02:16

## 2021-01-25 RX ADMIN — Medication 2 TABLET: at 09:20

## 2021-01-25 RX ADMIN — OXYCODONE HYDROCHLORIDE AND ACETAMINOPHEN 500 MG: 500 TABLET ORAL at 17:07

## 2021-01-25 RX ADMIN — FAMOTIDINE 20 MG: 20 TABLET, FILM COATED ORAL at 17:07

## 2021-01-25 RX ADMIN — FAMOTIDINE 20 MG: 20 TABLET, FILM COATED ORAL at 09:20

## 2021-01-25 RX ADMIN — OXYCODONE 10 MG: 5 TABLET ORAL at 22:09

## 2021-01-25 RX ADMIN — Medication 50 MG: at 09:20

## 2021-01-25 RX ADMIN — ENOXAPARIN SODIUM 40 MG: 40 INJECTION SUBCUTANEOUS at 09:26

## 2021-01-25 RX ADMIN — Medication 5 MG: at 22:09

## 2021-01-25 RX ADMIN — OXYCODONE 10 MG: 5 TABLET ORAL at 09:53

## 2021-01-25 RX ADMIN — Medication 10 ML: at 07:05

## 2021-01-25 RX ADMIN — Medication 10 ML: at 22:10

## 2021-01-25 RX ADMIN — LORAZEPAM 0.5 MG: 2 INJECTION INTRAMUSCULAR; INTRAVENOUS at 22:09

## 2021-01-25 RX ADMIN — BUMETANIDE 1 MG: 0.25 INJECTION, SOLUTION INTRAMUSCULAR; INTRAVENOUS at 09:53

## 2021-01-25 RX ADMIN — QUETIAPINE FUMARATE 12.5 MG: 25 TABLET ORAL at 22:09

## 2021-01-25 NOTE — PROGRESS NOTES
Comprehensive Nutrition Assessment    Type and Reason for Visit: Reassess    Nutrition Recommendations/Plan:   1. Continue regular diet. 2. Add Ensure Enlive BID to promote adequate intake. Nutrition Assessment:      1/25: F/u. Diet advanced to regular. No answer when attempting to call pt's room phone x multiple attempts. Per RN, pt ate about 50% breakfast with a lot of encouragement this AM. Says he was not interested in lunch at home and didn't think he was going to eat anything. Says he does well with liquids and might benefit from liquid nutrition supplements- will add Ensure Enlive BID and monitor acceptance. Labs- Ca 7.6. Meds- Vit. C, zinc, Vit. D3, [email or web address].    1/21: F/u. Pt transferred to ICU secondary to increasing O2 needs. Currently on 35 L HFNC. Made NPO on 1/18 due to worsening respiratory status. NPO x 4 days now. MD notes plan to initiate TPN if O2 needs remain high. D5 NaCl running at 50 ml/hr (providing 204 kcals/day). Labs: BUN 63, Cr 1.39 (K+ and phos WNL), LFT's slightly elevated, POC -162-107. Meds: Vit C and D3, Bumex, zinc.     1/15: 67 yo male admitted for COVID, hypoxia. PMhx: diverticulitis, cdiff. Overweight per BMI of 25. No recent weights available in EMR hx. Last weight from 15 months ago and pt is down 16.9kg since then (this is 17% loss which is not significant for time frame). Pt is COVID -19 +. Called into room and spoke with pt via phone. Pt denies that much weight loss, stating he has gone from 210lbs down to 205lbs. Current bed weight is charted as 177 lbs. Will continue to monitor for trends. Regular diet ordered. Pt states he has been eating about 50% all meals. Reports much better PO intakes at home with very good appetite. Pt was not interested in ONS at this time, very much wanting to go home today. On 6 L NC O2.  Labs: reviewed. Meds: Vit C and D3, zinc, decadron. Malnutrition Assessment:  Does not meet criteria.     Estimated Daily Nutrient Needs:  Energy (kcal): 1996(REE 1536 x AF 1.3); Weight Used for Energy Requirements: Current  Protein (g): 64-80(0.8-1gm/kg/d); Weight Used for Protein Requirements: Current  Fluid (ml/day): 1996; Method Used for Fluid Requirements: 1 ml/kcal    Nutrition Related Findings:  LBM 1/20      Wounds:    None       Current Nutrition Therapies:  DIET REGULAR  DIET NUTRITIONAL SUPPLEMENTS Breakfast, Lunch; Ensure Enlive    Anthropometric Measures:  · Height:  5' 10\" (177.8 cm)  · Current Body Wt:  82.7 kg (182 lb 5.1 oz)   · Admission Body Wt:       · Usual Body Wt:        · Ideal Body Wt:   :      · Adjusted Body Weight:   ; Weight Adjustment for: No adjustment   · Adjusted BMI:       · BMI Category: Overweight (BMI 25.0-29. 9)       Nutrition Diagnosis:   · Inadequate protein-energy intake related to inadequate protein-energy intake as evidenced by NPO or clear liquid status due to medical condition    1/21: Nutrition Dx continues - NPO.  1/25: Nutrition dx continues, fair po intake.     Nutrition Interventions:   Food and/or Nutrient Delivery: Continue current diet, Start oral nutrition supplement  Nutrition Education and Counseling: No recommendations at this time  Coordination of Nutrition Care: Continue to monitor while inpatient    Goals:  PO intake >50% meals + ONS next 2-4 days       Nutrition Monitoring and Evaluation:   Behavioral-Environmental Outcomes: None identified  Food/Nutrient Intake Outcomes: Food and nutrient intake, Supplement intake  Physical Signs/Symptoms Outcomes: Biochemical data, GI status, Weight    Discharge Planning:    Continue current diet     Electronically signed by Juan Pablo Pastor RD on 1/25/2021     Contact: [phone]

## 2021-01-25 NOTE — PROGRESS NOTES
8:45 AM  Consult received, chart reviewed. Patient has been accepted at [facility] when he is medically cleared for discharge. Call placed to son regarding next steps today in working with his Dad regarding disposition. Message left.

## 2021-01-25 NOTE — PROGRESS NOTES
1/25/2021   CARE MANAGEMENT NOTE:  CM reviewed EMR and handoff received from weekend  Felicity Arredondo). Pt was admitted with COVID, acute respiratory insufficiency with hypoxia. Reportedly, pt resides with his wife who has been to inpt psych. Secondary Decision Maker: Edita Avelar - Child - [phone]    Secondary Decision Maker: Divine Ponce - Son - [phone]    Secondary Decision Maker: Adilson Acosta - Daughter - [phone]   Eva Duggan, pt's brother in law ([phone]) is employed in healthcare and he has been instrumental in encouraging pt to consider pulmonary rehab. RUR 15%; LOS 14 days    Transition Plan of Care:  1. Pulmonary following for medical management; pt on 5 L today with goal of 88% or greater  2. PT note dated 1/25 - pt ambulated 2 feet and inpt rehab was recommended  3. Pt has been accepted to [facility] ([place]). Reportedly, pt has been resistant and would rather discharge home    CM will continue to follow pt for definitive discharge plan.   Alexandra

## 2021-01-25 NOTE — PROGRESS NOTES
Problem: Self Care Deficits Care Plan (Adult)  Goal: *Acute Goals and Plan of Care (Insert Text)  Description:   FUNCTIONAL STATUS PRIOR TO ADMISSION: Patient was independent and active without use of DME.     HOME SUPPORT: The patient lived with wife and college age son but did not require assist.    Occupational Therapy Goals  Initiated 1/15/2021; All goals reviewed and remain appropriate-Continue with goals as of 1/22/2021;  1. Patient will perform lower body dressing with supervision/set-up within 7 day(s). 2.  Patient will perform grooming standing at sink with no LOB with supervision/set-up within 7 day(s). 3.  Patient will perform ADL item retrieval with reaching high and low with supervision/set-up within 7 day(s). 4.  Patient will perform toilet transfers with supervision/set-up within 7 day(s). 5.  Patient will perform all aspects of toileting with supervision/set-up within 7 day(s). 6.  Patient will utilize energy conservation techniques during functional activities with verbal cues within 7 day(s). Outcome: Progressing Towards Goal  OCCUPATIONAL THERAPY TREATMENT  Patient: Galen Child (1 y.o. male)  Date: 1/25/2021  Diagnosis: COVID-19 virus infection [U07.1]  Hypoxia [R09.02]  Acute respiratory insufficiency [R06.89]  Prerenal azotemia [R79.89]  Diarrhea [R19.7] COVID-19 virus infection       Precautions:    Chart, occupational therapy assessment, plan of care, and goals were reviewed. ASSESSMENT  Patient continues with skilled OT services and is progressing towards goals. However patient limited with participation in therapy services today by complaints of back pain. Nursing aware and meds provided prior to OT session. Patient orthostatic during today's session /69- supine, 107/70- standing, 135/77- supine. Patient also impulsively standing for toileting task today - increased fall risk noted.        Current Level of Function Impacting Discharge (ADLs): up to Max A to complete LB ADL due to back pain     Other factors to consider for discharge: independent PLOF         PLAN :  Patient continues to benefit from skilled intervention to address the above impairments. Continue treatment per established plan of care. to address goals. Recommend with staff: OOB activity    Recommend next OT session: continue POC     Recommendation for discharge: (in order for the patient to meet his/her long term goals)  Therapy 3 hours per day 5-7 days per week    This discharge recommendation:  A follow-up discussion with the attending provider and/or case management is planned    IF patient discharges home will need the following DME: none       SUBJECTIVE:   Patient stated my back hurts! Milo Manzano    OBJECTIVE DATA SUMMARY:   Cognitive/Behavioral Status:  Neurologic State: Alert  Orientation Level: Oriented X4  Cognition: Follows commands             Functional Mobility and Transfers for ADLs:  Bed Mobility:  Supine to Sit: Minimum assistance  Sit to Supine: Stand-by assistance    Transfers:  Sit to Stand: Contact guard assistance          Balance:  Sitting: Intact  Standing: Impaired; Without support  Standing - Static: Good  Standing - Dynamic : Fair    ADL Intervention:        Used urinal in stand   Toileting  Toileting Assistance: Contact guard assistance  Clothing Management: Contact guard assistance       Pain:  Unable to give number on scale reported low back pain as \"a lot\"    Activity Tolerance:   Fair    After treatment patient left in no apparent distress:   Supine in bed, Call bell within reach, and Side rails x 3    COMMUNICATION/COLLABORATION:   The patients plan of care was discussed with: Physical therapy assistant and Registered nurse.      Dena Akers OT  Time Calculation: 22 mins

## 2021-01-25 NOTE — PROGRESS NOTES
Rt Note:    Received order for Home Oxygen evaluation. Pt falling asleep while I am talking to him. Pt up to use the urinal. Very unsteady on his feet, stumbling backwards. Unable to assess pt at this time due to this. Nurse aware.     Javon Talley RCP

## 2021-01-25 NOTE — PROGRESS NOTES
VAt Not e- Called by Wyatt Sullivan RN to assess pt for [facility] or other access in order to be able to remove IJ CVC which has been in place since 1/13/21. Chart reviewed, PIV or extended dwell PIV seem like best alternatives. Will assess pt today when radiology procedures allow. Please call [phone] for questions or concerns.

## 2021-01-25 NOTE — PROGRESS NOTES
Called VAT team to review chart to see if we can get a PIV in place as TLCL has been in for 12 days.

## 2021-01-25 NOTE — PROGRESS NOTES
Problem: Mobility Impaired (Adult and Pediatric)  Goal: *Acute Goals and Plan of Care (Insert Text)  Description: FUNCTIONAL STATUS PRIOR TO ADMISSION: Patient was independent and active without use of DME.    HOME SUPPORT PRIOR TO ADMISSION: The patient lived with his wife and son but did not require assist.    Physical Therapy Goals  Initiated 1/15/2021; continue same goals as of 1/22/2021   1. Patient will move from supine to sit and sit to supine , scoot up and down, and roll side to side in bed with independence within 7 day(s). 2.  Patient will transfer from bed to chair and chair to bed with modified independence using the least restrictive device within 7 day(s). 3.  Patient will perform sit to stand with modified independence within 7 day(s). 4.  Patient will ambulate with modified independence for 120 feet with the least restrictive device within 7 day(s). Outcome: Progressing Towards Goal  Note:   PHYSICAL THERAPY TREATMENT  Patient: Cece Luis (5 y.o. male)  Date: 1/25/2021  Diagnosis: COVID-19 virus infection [U07.1]  Hypoxia [R09.02]  Acute respiratory insufficiency [R06.89]  Prerenal azotemia [R79.89]  Diarrhea [R19.7] COVID-19 virus infection       Precautions:    Chart, physical therapy assessment, plan of care and goals were reviewed. ASSESSMENT  Patient continues with skilled PT services and progressing towards goals. Pt received in bed, max encouragement to participate with therapy, somewhat impulsive to stand for urination once sitting EOB. Pt orthostatic BP with positional changes. O2 sat stable using 4L throughout session. Pt declined attempt for gait training or transfer to chair and returned to bed.    Patient Vitals for the past 4 hrs:   Pt position Pulse BP SpO2   01/25/21 1042 Supine post activity -- 135/77 --   01/25/21 1039 standing 112 107/70 --   01/25/21 1033 Preactivity  HOB elevated -- 138/69 95 %          Current Level of Function Impacting Discharge (mobility/balance): Min A    Other factors to consider for discharge:          PLAN :  Patient continues to benefit from skilled intervention to address the above impairments. Continue treatment per established plan of care. to address goals. Recommendation for discharge: (in order for the patient to meet his/her long term goals)  Therapy 3 hours per day 5-7 days per week    This discharge recommendation:  Has been made in collaboration with the attending provider and/or case management    IF patient discharges home will need the following DME: to be determined (TBD)       SUBJECTIVE:   Patient stated Edgar Marcano do I have to get up.     OBJECTIVE DATA SUMMARY:   Critical Behavior:  Neurologic State: Alert  Orientation Level: Oriented X4  Cognition: Follows commands  Safety/Judgement: Decreased awareness of environment  Functional Mobility Training:  Bed Mobility:     Supine to Sit: Minimum assistance  Sit to Supine: Stand-by assistance           Transfers:  Sit to Stand: Contact guard assistance  Stand to Sit: Contact guard assistance                             Balance:  Sitting: Intact  Standing: Impaired; Without support  Standing - Static: Good  Standing - Dynamic : Fair  Ambulation/Gait Training:  Distance (ft): 2 Feet (ft)     Ambulation - Level of Assistance: Contact guard assistance                 Base of Support: Widened                             Stairs: Therapeutic Exercises:     Pain Rating:  LBP with positional change    Activity Tolerance:   Fair    After treatment patient left in no apparent distress:   Supine in bed, Call bell within reach, and Bed / chair alarm activated    COMMUNICATION/COLLABORATION:   The patients plan of care was discussed with: Occupational therapist and Registered nurse.      Esther Quispe   Time Calculation: 18 mins

## 2021-01-25 NOTE — PROGRESS NOTES
[facility]     Name: Yisel Bledsoe MRN: [id number]   : 1945 Hospital: [address]   Date: 2021        Impression Plan   1. COVID-19 PNA  2. Acute respiratory failure with hypoxia, currently on 5LNC o2  3. Suspected LEATHA               · Goal sats 88% or higher, wean O2 as tolerated  · Decadron completed  · Zinc, vitamin c  · Completed remdesvir and azithromycin  · Lovenox  · Trend d-dimer, inflammatory markers  · Diuresis as tolerated-- dose with 1mg iv bumex today         Radiology  ( personally reviewed) CTA without PE; emphysema with patchy airspace disease   ABG No results for input(s): PHI, PO2I, PCO2I in the last 72 hours. Subjective     Patient is a 66 y.o. male admitted for COVID-19 and acute respiratory failure with hypoxia. He presented with shortness of breath for about a week with a dry cough. Additionally noted fevers and intermittent watery diarrhea. Found to be hypoxia and positive for COVID-19. Transferred to stepdown for worsening hypoxia. Requiring HF NC at [address] [place] [address]. Currently denies shortness of breath. Endorses intermittent cough. Afebrile the past 24 hours. His biggest concerns are that he wants to eat/drink and does not appreciate having to use a urinal to go to the bathroom. Family meeting was held today and he was made DNR. Interval history: On 5LNC o2  He denies any complaints. Review of Systems:  A comprehensive review of systems was negative except for that written in the HPI.     Past Medical History:   Diagnosis Date    Diverticulitis     Gastrointestinal disorder     History of vascular access device 2017    [facility] VAT -  5FR triple Lumen Power PICC R Basilic  39 cm    Spinal stenosis       Past Surgical History:   Procedure Laterality Date    FLEXIBLE SIGMOIDOSCOPY N/A 2017    SIGMOIDOSCOPY FLEXIBLE performed by Maggy Figueroa MD at [facility] ENDOSCOPY    IR INSERT NON TUNL CVC OVER 5 YRS  2021      Prior to Admission medications    Medication Sig Start Date End Date Taking? Authorizing Provider   dicyclomine (BENTYL) 20 mg tablet Take 20 mg by mouth every six (6) hours. Indications: irritable colon   Yes Provider, Historical   gabapentin (NEURONTIN) 600 mg tablet Take 600 mg by mouth three (3) times daily. Yes Provider, Historical   ibuprofen (MOTRIN) 200 mg tablet Take 600 mg by mouth daily. Yes Provider, Historical   acetaminophen (TYLENOL) 500 mg tablet Take 1,000 mg by mouth as needed for Pain. Indications: fever, muscle pain   Yes Provider, Historical   psyllium (METAMUCIL) packet Take 1 Packet by mouth two (2) times a day. Yes Provider, Historical     Current Facility-Administered Medications   Medication Dose Route Frequency    QUEtiapine (SEROquel) tablet 12.5 mg  12.5 mg Oral QHS    dextrose 5% and 0.9% NaCl infusion  50 mL/hr IntraVENous CONTINUOUS    zinc gluconate tablet 50 mg  50 mg Oral DAILY    sodium chloride (NS) flush 5-40 mL  5-40 mL IntraVENous Q8H    famotidine (PEPCID) tablet 20 mg  20 mg Oral BID    enoxaparin (LOVENOX) injection 40 mg  40 mg SubCUTAneous DAILY    cholecalciferol (VITAMIN D3) (1000 Units /25 mcg) tablet 2 Tab  2,000 Units Oral DAILY    ascorbic acid (vitamin C) (VITAMIN C) tablet 500 mg  500 mg Oral BID     No Known Allergies   Social History     Tobacco Use    Smoking status: Current Some Day Smoker     Packs/day: 1.00     Years: 50.00     Pack years: 50.00    Smokeless tobacco: Current User   Substance Use Topics    Alcohol use: Yes     Alcohol/week: 0.8 standard drinks     Types: 1 Cans of beer per week      No family history on file. Laboratory: I have personally reviewed the critical care flowsheet and labs.      Recent Labs     01/24/21 0324   WBC 10.7   HGB 13.3   HCT 39.3        Recent Labs     01/24/21  0324 01/23/21  0449 01/23/21  0351     --  136   K 3.6  --  3.4*     --  101   CO2 29  --  31   GLU 97  --  110*   BUN 22*  -- 33*   CREA 0.86  --  0.98   CA 7.6*  --  7.5*   INR  --  1.4*  --        Objective:     Mode Rate Tidal Volume Pressure FiO2 PEEP            46 %       Vital Signs:     TMAX(24)      Intake/Output:   Last shift:         Last 3 shifts: No intake/output data recorded. [id number]    Intake/Output Summary (Last 24 hours) at 1/25/2021 6723  Last data filed at 1/25/2021 0700  Gross per 24 hour   Intake 1792.5 ml   Output 1600 ml   Net 192.5 ml     EXAM:   GENERAL: well developed and in no distress, HEENT:  PERRL, EOMI, no alar flaring or epistaxis, NECK:  no jugular vein distention, no retractions, LUNGS: CTAB, respirations non-labored , HEART:  Regular rate and rhythm with no MGR; no edema is present, ABDOMEN:  soft with no tenderness, bowel sounds present, EXTREMITIES:  warm with no cyanosis, SKIN:  no jaundice or ecchymosis and NEUROLOGIC:  alert and oriented, grossly non-focal    Alex Aid, NP  [facility]

## 2021-01-25 NOTE — PROGRESS NOTES
2000: Rn attempted to change patient's IJ dressing. Patient very angry and he refused. Second attempt at 0530 and patient still refused. Will continue to monitor. Bedside and Verbal shift change report given to Kirsten Ni (oncoming nurse) by [facility]. Daisy Young RN  (offgoing nurse). Report included the following information SBAR, Kardex, MAR and Recent Results.

## 2021-01-25 NOTE — PROGRESS NOTES
Eugene Kelly lexie Pasco 77  [address] [facility], [address] [place] [address], [address] [place] [address]  [phone]      Medical Progress Note      NAME: Rosio Alvarado   :  1945  MRM:  [id number]    Date/Time: 2021  3:08 PM           Subjective:     Chief Complaint:  Alex Simmers, down to 4-6L. Very adamant about going home. No complaints other than wanting to go home    ROS:  (bold if positive, if negative)    Tolerating PT  Tolerating Diet          Objective:       Vitals:          Last 24hrs VS reviewed since prior progress note.  Most recent are:    Visit Vitals  /66   Pulse 96   Temp 98.7 °F (37.1 °C)   Resp 16   Ht 5' 10\" (1.778 m)   Wt 82.7 kg (182 lb 6.4 oz)   SpO2 95%   BMI 26.17 kg/m²     SpO2 Readings from Last 6 Encounters:   21 95%   10/04/19 92%   17 99%   17 96%   17 96%   17 95%    O2 Flow Rate (L/min): 4 l/min       Intake/Output Summary (Last 24 hours) at 2021 1508  Last data filed at 2021 1415  Gross per 24 hour   Intake 1682.5 ml   Output 2275 ml   Net -592.5 ml          Exam:     Physical Exam:    Gen:  Well-developed, well-nourished, in no acute distress  HEENT:  Pink conjunctivae, PERRL, hearing intact to voice, moist mucous membranes  Neck:  Supple, without masses, thyroid non-tender  Resp:  No accessory muscle use, clear breath sounds without wheezes rales or rhonchi  Card:  No murmurs, normal S1, S2 without thrills, bruits or peripheral edema  Abd:  Soft, non-tender, non-distended, normoactive bowel sounds are present  Musc:  No cyanosis or clubbing  Skin:  No rashes or ulcers, skin turgor is good  Neuro: Oriented to place and self  Psych:  Good insight, angry     Medications Reviewed: (see below)    Lab Data Reviewed: (see below)    ______________________________________________________________________    Medications:     Current Facility-Administered Medications   Medication Dose Route Frequency    QUEtiapine (SEROquel) tablet 12.5 mg  12.5 mg Oral QHS    dextrose 5% and 0.9% NaCl infusion  50 mL/hr IntraVENous CONTINUOUS    ipratropium-albuterol (COMBIVENT RESPIMAT) 20 mcg-100 mcg inhalation spray  1 Puff Inhalation Q6H PRN    zinc gluconate tablet 50 mg  50 mg Oral DAILY    melatonin (rapid dissolve) tablet 5 mg  5 mg Oral QHS PRN    sodium chloride (NS) flush 5-40 mL  5-40 mL IntraVENous Q8H    sodium chloride (NS) flush 5-40 mL  5-40 mL IntraVENous PRN    polyethylene glycol (MIRALAX) packet 17 g  17 g Oral DAILY PRN    bisacodyL (DULCOLAX) suppository 10 mg  10 mg Rectal DAILY PRN    ondansetron (ZOFRAN) injection 4 mg  4 mg IntraVENous Q6H PRN    famotidine (PEPCID) tablet 20 mg  20 mg Oral BID    enoxaparin (LOVENOX) injection 40 mg  40 mg SubCUTAneous DAILY    acetaminophen (TYLENOL) tablet 650 mg  650 mg Oral Q6H PRN    Or    acetaminophen (TYLENOL) suppository 650 mg  650 mg Rectal Q6H PRN    guaiFENesin-dextromethorphan (ROBITUSSIN DM) 100-10 mg/5 mL syrup 5 mL  5 mL Oral Q4H PRN    labetaloL (NORMODYNE;TRANDATE) 20 mg/4 mL (5 mg/mL) injection 20 mg  20 mg IntraVENous Q4H PRN    hydrALAZINE (APRESOLINE) 20 mg/mL injection 20 mg  20 mg IntraVENous Q4H PRN    alum-mag hydroxide-simeth (MYLANTA) oral suspension 30 mL  30 mL Oral Q4H PRN    LORazepam (ATIVAN) injection 0.5 mg  0.5 mg IntraVENous Q6H PRN    oxyCODONE IR (ROXICODONE) tablet 5 mg  5 mg Oral Q4H PRN    oxyCODONE IR (ROXICODONE) tablet 10 mg  10 mg Oral Q4H PRN    morphine injection 2 mg  2 mg IntraVENous Q4H PRN    diphenhydrAMINE (BENADRYL) injection 25 mg  25 mg IntraVENous Q6H PRN    diphenhydrAMINE (BENADRYL) capsule 25 mg  25 mg Oral Q6H PRN    simethicone (MYLICON) tablet 80 mg  80 mg Oral QID PRN    cholecalciferol (VITAMIN D3) (1000 Units /25 mcg) tablet 2 Tab  2,000 Units Oral DAILY    ascorbic acid (vitamin C) (VITAMIN C) tablet 500 mg  500 mg Oral BID            Lab Review:     Recent Labs     01/24/21  0324   WBC 10.7   HGB 13.3   HCT 39.3   PLT 181     Recent Labs     01/24/21  0324 01/23/21  0449 01/23/21  0351     --  136   K 3.6  --  3.4*     --  101   CO2 29  --  31   GLU 97  --  110*   BUN 22*  --  33*   CREA 0.86  --  0.98   CA 7.6*  --  7.5*   INR  --  1.4*  --      No components found for: [place]         Assessment / Plan:     Principal Problem:    COVID-19 virus infection (1/11/2021)    Active Problems:    Diarrhea (1/11/2021)      Acute respiratory insufficiency (1/11/2021)      Hypoxia (1/11/2021)      Acute metabolic encephalopathy (6/81/4026)      #AHRF 2/2 COVID-19: Has now weaned off HFNC to 4-6L nc, but still desats with ambulation. He is s/p remdesivir, dexamethasone, azithromycin. Does not appear grossly volume overloaded at this time so will hold on further diuresis. - Cont supplements, supportive care, PT/OT   - Prone as able   - Advance diet   - LMWH ppx   - Strongly recommend SNF/pulm rehab. Pt still refusing, though questionable capacity. Family to encourage SNF. Still needing excessive O2, but stable    #CHAVA: Admit Scr normal, peak 1.39. Had been on bumex. I/O suggest he is -5.5L since admit. No echo on file. CT 1/15 with pulm infiltrates vs edema; favor the former. Hold further diuresis     #Cog impairment vs delirium: He is oriented to self and place, but not time. High risk for inpt delirium. Prior to admit, he was independent and active without DME.  Prior admissions notable for anger and confusion.   - Started low dose seroquel qhs    Total time spent with patient: 30 [address] discussed with: Patient    Discussed:  Care Plan    Prophylaxis:  Lovenox     Disposition:  SNF           ___________________________________________________    Attending Physician: Elieser Dan MD

## 2021-01-25 NOTE — PROGRESS NOTES
Request for PIV access to remove IJ. Assessed pt, pt already has an uncharted 8 cm Endurance PIV placed in LUE with positive blood return. IJ can be removed, RN made aware.

## 2021-01-25 NOTE — PROGRESS NOTES
1- Spoke with son who was given an update on patient condition. 1500- Patient has left upper extremity #20 IV placed in ICU. Oncoming rn, Shasta Ra to remove IJ  1510- Bedside shift change report given to Neelam Parekh (oncoming nurse) by Moe Tyler (offgoing nurse). Report included the following information SBAR, Kardex, Procedure Summary, Intake/Output, MAR, Recent Results and Cardiac Rhythm NSR.

## 2021-01-26 LAB
COVID-19 RAPID TEST, COVR: DETECTED
SARS-COV-2, COV2: NORMAL
SOURCE, COVRS: ABNORMAL

## 2021-01-26 PROCEDURE — 74011250636 HC RX REV CODE- 250/636: Performed by: INTERNAL MEDICINE

## 2021-01-26 PROCEDURE — 87635 SARS-COV-2 COVID-19 AMP PRB: CPT

## 2021-01-26 PROCEDURE — 2709999900 HC NON-CHARGEABLE SUPPLY

## 2021-01-26 PROCEDURE — 77010033678 HC OXYGEN DAILY

## 2021-01-26 PROCEDURE — 94760 N-INVAS EAR/PLS OXIMETRY 1: CPT

## 2021-01-26 PROCEDURE — 97116 GAIT TRAINING THERAPY: CPT

## 2021-01-26 PROCEDURE — 65270000029 HC RM PRIVATE

## 2021-01-26 PROCEDURE — 74011250636 HC RX REV CODE- 250/636: Performed by: HOSPITALIST

## 2021-01-26 PROCEDURE — 74011250637 HC RX REV CODE- 250/637: Performed by: INTERNAL MEDICINE

## 2021-01-26 PROCEDURE — 74011000258 HC RX REV CODE- 258: Performed by: INTERNAL MEDICINE

## 2021-01-26 PROCEDURE — 97535 SELF CARE MNGMENT TRAINING: CPT

## 2021-01-26 PROCEDURE — 74011250637 HC RX REV CODE- 250/637: Performed by: HOSPITALIST

## 2021-01-26 PROCEDURE — 97530 THERAPEUTIC ACTIVITIES: CPT

## 2021-01-26 RX ADMIN — Medication 10 ML: at 06:00

## 2021-01-26 RX ADMIN — Medication 10 ML: at 21:05

## 2021-01-26 RX ADMIN — OXYCODONE HYDROCHLORIDE AND ACETAMINOPHEN 500 MG: 500 TABLET ORAL at 17:37

## 2021-01-26 RX ADMIN — DEXTROSE MONOHYDRATE AND SODIUM CHLORIDE 50 ML/HR: 5; .9 INJECTION, SOLUTION INTRAVENOUS at 16:40

## 2021-01-26 RX ADMIN — OXYCODONE 5 MG: 5 TABLET ORAL at 11:51

## 2021-01-26 RX ADMIN — FAMOTIDINE 20 MG: 20 TABLET, FILM COATED ORAL at 08:59

## 2021-01-26 RX ADMIN — Medication 50 MG: at 08:59

## 2021-01-26 RX ADMIN — ENOXAPARIN SODIUM 40 MG: 40 INJECTION SUBCUTANEOUS at 08:59

## 2021-01-26 RX ADMIN — SODIUM CHLORIDE, POTASSIUM CHLORIDE, SODIUM LACTATE AND CALCIUM CHLORIDE 1000 ML: 600; 310; 30; 20 INJECTION, SOLUTION INTRAVENOUS at 11:52

## 2021-01-26 RX ADMIN — OXYCODONE HYDROCHLORIDE AND ACETAMINOPHEN 500 MG: 500 TABLET ORAL at 08:59

## 2021-01-26 RX ADMIN — Medication 2 TABLET: at 08:59

## 2021-01-26 RX ADMIN — FAMOTIDINE 20 MG: 20 TABLET, FILM COATED ORAL at 17:37

## 2021-01-26 NOTE — PROGRESS NOTES
Bedside and Verbal shift change report given to HANNAH Holland (oncoming nurse) by Pablo Conte. Jaden Early RN  (offgoing nurse). Report included the following information SBAR, Kardex, MAR and Recent Results.

## 2021-01-26 NOTE — PROGRESS NOTES
Eugene Kelly [facility] [place] [facility]  [address] [facility], [place], [address] [place] [address]  [phone]      Medical Progress Note      NAME: Yisel Bledsoe   :  1945  MRM:  [id number]    Date/Time: 2021 6:08 PM           Subjective:     Chief Complaint:  Sofia Ventura, down to 4-6L. Very adamant about going home. He seems more fatigued today and is less conversant, but still angry. Deflects orientation questions    ROS:  (bold if positive, if negative)    Tolerating PT  Tolerating Diet          Objective:       Vitals:          Last 24hrs VS reviewed since prior progress note.  Most recent are:    Visit Vitals  /62 (BP 1 Location: Right arm, BP Patient Position: At rest)   Pulse 73   Temp 98.7 °F (37.1 °C)   Resp 18   Ht 5' 10\" (1.778 m)   Wt 82.7 kg (182 lb 6.4 oz)   SpO2 96%   BMI 26.17 kg/m²     SpO2 Readings from Last 6 Encounters:   21 96%   10/04/19 92%   17 99%   17 96%   17 96%   17 95%    O2 Flow Rate (L/min): 4 l/min       Intake/Output Summary (Last 24 hours) at 2021 1808  Last data filed at 2021 0900  Gross per 24 hour   Intake 1940 ml   Output 740 ml   Net 1200 ml          Exam:     Physical Exam:    Gen:  Well-developed, well-nourished, in no acute distress  HEENT:  Pink conjunctivae, PERRL, hearing intact to voice, moist mucous membranes  Neck:  Supple, without masses, thyroid non-tender  Resp:  No accessory muscle use, clear breath sounds without wheezes rales or rhonchi  Card:  No murmurs, normal S1, S2 without thrills, bruits or peripheral edema  Abd:  Soft, non-tender, non-distended, normoactive bowel sounds are present  Musc:  No cyanosis or clubbing  Skin:  No rashes or ulcers, skin turgor is good  Neuro: Oriented to place and self  Psych:  Good insight, angry     Medications Reviewed: (see below)    Lab Data Reviewed: (see below)    ______________________________________________________________________    Medications:     Current Facility-Administered Medications   Medication Dose Route Frequency    QUEtiapine (SEROquel) tablet 12.5 mg  12.5 mg Oral QHS    dextrose 5% and 0.9% NaCl infusion  50 mL/hr IntraVENous CONTINUOUS    ipratropium-albuterol (COMBIVENT RESPIMAT) 20 mcg-100 mcg inhalation spray  1 Puff Inhalation Q6H PRN    zinc gluconate tablet 50 mg  50 mg Oral DAILY    melatonin (rapid dissolve) tablet 5 mg  5 mg Oral QHS PRN    sodium chloride (NS) flush 5-40 mL  5-40 mL IntraVENous Q8H    sodium chloride (NS) flush 5-40 mL  5-40 mL IntraVENous PRN    polyethylene glycol (MIRALAX) packet 17 g  17 g Oral DAILY PRN    bisacodyL (DULCOLAX) suppository 10 mg  10 mg Rectal DAILY PRN    ondansetron (ZOFRAN) injection 4 mg  4 mg IntraVENous Q6H PRN    famotidine (PEPCID) tablet 20 mg  20 mg Oral BID    enoxaparin (LOVENOX) injection 40 mg  40 mg SubCUTAneous DAILY    acetaminophen (TYLENOL) tablet 650 mg  650 mg Oral Q6H PRN    Or    acetaminophen (TYLENOL) suppository 650 mg  650 mg Rectal Q6H PRN    guaiFENesin-dextromethorphan (ROBITUSSIN DM) 100-10 mg/5 mL syrup 5 mL  5 mL Oral Q4H PRN    labetaloL (NORMODYNE;TRANDATE) 20 mg/4 mL (5 mg/mL) injection 20 mg  20 mg IntraVENous Q4H PRN    hydrALAZINE (APRESOLINE) 20 mg/mL injection 20 mg  20 mg IntraVENous Q4H PRN    alum-mag hydroxide-simeth (MYLANTA) oral suspension 30 mL  30 mL Oral Q4H PRN    LORazepam (ATIVAN) injection 0.5 mg  0.5 mg IntraVENous Q6H PRN    oxyCODONE IR (ROXICODONE) tablet 5 mg  5 mg Oral Q4H PRN    oxyCODONE IR (ROXICODONE) tablet 10 mg  10 mg Oral Q4H PRN    morphine injection 2 mg  2 mg IntraVENous Q4H PRN    diphenhydrAMINE (BENADRYL) injection 25 mg  25 mg IntraVENous Q6H PRN    diphenhydrAMINE (BENADRYL) capsule 25 mg  25 mg Oral Q6H PRN    simethicone (MYLICON) tablet 80 mg  80 mg Oral QID PRN    cholecalciferol (VITAMIN D3) (1000 Units /25 mcg) tablet 2 Tab  2,000 Units Oral DAILY    ascorbic acid (vitamin C) (VITAMIN C) tablet 500 mg  500 mg Oral BID            Lab Review:     Recent Labs     01/24/21  0324   WBC 10.7   HGB 13.3   HCT 39.3        Recent Labs     01/24/21  0324      K 3.6      CO2 29   GLU 97   BUN 22*   CREA 0.86   CA 7.6*     No components found for: [place]         Assessment / Plan:     Principal Problem:    COVID-19 virus infection (1/11/2021)    Active Problems:    Diarrhea (1/11/2021)      Acute respiratory insufficiency (1/11/2021)      Hypoxia (1/11/2021)      Acute metabolic encephalopathy (1/44/7742)      #AHRF 2/2 COVID-19: Has now weaned off HFNC to 4-6L nc, but still desats with ambulation. He is s/p remdesivir, dexamethasone, azithromycin. Does not appear grossly volume overloaded at this time so will hold on further diuresis. - Cont supplements, supportive care, PT/OT   - Prone as able   - Advance diet   - LMWH ppx   - Strongly recommend SNF/pulm rehab. Pt still refusing, though questionable capacity. Family to encourage SNF. Still needing excessive O2, but stable    #Delirium: He is oriented to self and place, but not time. High risk for inpt delirium. Prior to admit, he was independent and active without DME. Prior admissions notable for anger and confusion.   - Stop seroquel qhs   - Psych eval for capacity   - Encourage OOB and lights on during day    #CHAVA: Admit Scr normal, peak 1.39. Had been on bumex. I/O suggest he is -5.5L since admit. No echo on file. CT 1/15 with pulm infiltrates vs edema; favor the former.  Hold further diuresis         Total time spent with patient: 30 895 [facility] discussed with: Patient    Discussed:  Care Plan    Prophylaxis:  Lovenox     Disposition:  SNF           ___________________________________________________    Attending Physician: Colt Lyon MD

## 2021-01-26 NOTE — PROGRESS NOTES
1/26/2021   CARE MANAGEMENT NOTE:  CM reviewed EMR for clinical updates. Pt was admitted with COVID, acute respiratory insufficiency with hypoxia. Reportedly, pt resides with his wife who has been to inpt psych.    Secondary Decision Maker: Sofya Sick - Child - [phone]    Secondary Decision Maker: Abel Ribeiro - Son - [phone]    Secondary Decision Maker: Carolina Constitution party - Daughter - [phone]   Remedios Duke, pt's brother in law ([phone]) is employed in healthcare and he has been instrumental in encouraging pt to consider pulmonary rehab.     RUR 14%; LOS 15 days     Transition Plan of Care:  1  Pt has been accepted to [facility] ([place]). Pt is continue to decline despite family's encouragement. 2.  Psych eval has been ordered - cognitive impairment vs delirium. He is only oriented to self and place.     CM will continue to follow pt for definitive discharge plan.   Alexandra

## 2021-01-26 NOTE — CONSULTS
PSYCHIATRY CONSULT NOTE:    REASON FOR CONSULT: capacity/ anger      HISTORY OF PRESENTING COMPLAINT:  Layton Baker is a 66 y.o. WHITE OR  male who is currently admitted to the medical floor at [facility] with COVID-19. He initially came in with shortness of breath. He is currently oriented to self, month, and part of his situation. He has difficulty answering some direct questions. He is discharged focused and wants to go home at discharge, per chart this does not appear to be the recommended option for him. He denies SI/HI/AH/VH. He appears to fall asleep multiple times during interview. PAST PSYCHIATRIC HISTORY and SUBSTANCE ABUSE HISTORY:  denies      PAST MEDICAL HISTORY:  Please see H&P for details. Past Medical History:   Diagnosis Date    Diverticulitis     Gastrointestinal disorder     History of vascular access device 07/24/2017    [facility] VAT -  5FR triple Lumen Power PICC R Basilic  39 cm    Spinal stenosis            Lab Results   Component Value Date/Time    WBC 10.7 01/24/2021 03:24 AM    Hemoglobin (POC) 15.6 02/23/2016 05:38 PM    HGB 13.3 01/24/2021 03:24 AM    Hematocrit (POC) 46 02/23/2016 05:38 PM    HCT 39.3 01/24/2021 03:24 AM    PLATELET 151 86/55/6083 03:24 AM    MCV 88.5 01/24/2021 03:24 AM      Lab Results   Component Value Date/Time    Sodium 136 01/24/2021 03:24 AM    Potassium 3.6 01/24/2021 03:24 AM    Chloride 104 01/24/2021 03:24 AM    CO2 29 01/24/2021 03:24 AM    Anion gap 3 (L) 01/24/2021 03:24 AM    Glucose 97 01/24/2021 03:24 AM    BUN 22 (H) 01/24/2021 03:24 AM    Creatinine 0.86 01/24/2021 03:24 AM    BUN/Creatinine ratio 26 (H) 01/24/2021 03:24 AM    GFR est AA >60 01/24/2021 03:24 AM    GFR est non-AA >60 01/24/2021 03:24 AM    Calcium 7.6 (L) 01/24/2021 03:24 AM    Bilirubin, total 1.2 (H) 01/22/2021 03:02 AM    Alk.  phosphatase 56 01/22/2021 03:02 AM    Protein, total 6.8 01/22/2021 03:02 AM    Albumin 3.1 (L) 01/22/2021 03:02 AM Globulin 3.7 01/22/2021 03:02 AM    A-G Ratio 0.8 (L) 01/22/2021 03:02 AM    ALT (SGPT) 151 (H) 01/22/2021 03:02 AM          PSYCHOSOCIAL HISTORY:  Lives with wife      MENTAL STATUS EXAM:    General appearance: appropriately groomed, lying in bed with feet hanging off side of bed   Eye contact: Avoids eye contact  Speech: Spontaneous, soft, decreased output. Affect : blunted  Mood: \"fine \"  Thought Process: Logical, goal directed  Perception: Denies AH or VH. Thought Content: Denies SI or Plan  Insight: Partial  Judgement: Limited  Cognition: oriented to self, month     ASSESSMENT AND PLAN:  Emelia Jones meets criteria for a diagnosis of altered mental status r/o delirium. As Chris Ramos is not fully oriented and does not appear to understand his need for further medical and nursing care (he states \"I've been having problems with oxygen, but I think that is bullshit\"), I do not believe that he currently has capacity to make medical decisions. Thank you for the consultation.     Lunda Snellen, DORAP-BC  January 26, 2021

## 2021-01-26 NOTE — PROGRESS NOTES
Problem: Mobility Impaired (Adult and Pediatric)  Goal: *Acute Goals and Plan of Care (Insert Text)  Description: FUNCTIONAL STATUS PRIOR TO ADMISSION: Patient was independent and active without use of DME.    HOME SUPPORT PRIOR TO ADMISSION: The patient lived with his wife and son but did not require assist.    Physical Therapy Goals  Initiated 1/15/2021; continue same goals as of 1/22/2021   1. Patient will move from supine to sit and sit to supine , scoot up and down, and roll side to side in bed with independence within 7 day(s). 2.  Patient will transfer from bed to chair and chair to bed with modified independence using the least restrictive device within 7 day(s). 3.  Patient will perform sit to stand with modified independence within 7 day(s). 4.  Patient will ambulate with modified independence for 120 feet with the least restrictive device within 7 day(s). Note:   PHYSICAL THERAPY TREATMENT  Patient: Carlos Watt (34 y.o. male)  Date: 1/26/2021  Diagnosis: COVID-19 virus infection [U07.1]  Hypoxia [R09.02]  Acute respiratory insufficiency [R06.89]  Prerenal azotemia [R79.89]  Diarrhea [R19.7] COVID-19 virus infection       Precautions:    Chart, physical therapy assessment, plan of care and goals were reviewed. ASSESSMENT  Patient continues with skilled PT services. Pt supine to sit to stand with CGA. Pt ambulated 30ft with min assist hand held on RA. Pts SPO2 dropped to 88% on RA with ambulation. Pt was assisted in rest room before back to bed with CGA. Pt returned to 4L of O2 with SPO2 90% post ambulation supine. Pt is very impulsive. Progressing with mobility. Continue goals. Current Level of Function Impacting Discharge (mobility/balance): Pt is CGA to min assist for mobility. PLAN :  Patient continues to benefit from skilled intervention to address the above impairments. Continue treatment per established plan of care. to address goals.     Recommendation for discharge: (in order for the patient to meet his/her long term goals)  Therapy 3 hours per day 5-7 days per week    This discharge recommendation:  Has been made in collaboration with the attending provider and/or case management    IF patient discharges home will need the following DME: to be determined (TBD)       SUBJECTIVE:       OBJECTIVE DATA SUMMARY:   Critical Behavior:  Neurologic State: Alert  Orientation Level: Oriented to person, Oriented to place, Disoriented to time, Disoriented to situation  Cognition: Poor safety awareness  Safety/Judgement: Decreased awareness of environment  Functional Mobility Training:  Bed Mobility:     Supine to Sit: Minimum assistance  Sit to Supine: Contact guard assistance;Minimum assistance           Transfers:  Sit to Stand: Contact guard assistance  Stand to Sit: Contact guard assistance                             Balance:  Sitting: Intact  Standing: With support  Standing - Static: Fair  Ambulation/Gait Training:  Distance (ft): 30 Feet (ft)  Assistive Device: Gait belt  Ambulation - Level of Assistance: Minimal assistance hand held         Gait Abnormalities: Path deviations;Trunk sway increased        Base of Support: Narrowed     Speed/Krystle: Pace decreased (<100 feet/min)                Activity Tolerance:   Fair    After treatment patient left in no apparent distress:   Supine in bed    COMMUNICATION/COLLABORATION:   The patients plan of care was discussed with: Physical therapist.     Aditi Veronica PTA   Time Calculation: 23 mins

## 2021-01-26 NOTE — PROGRESS NOTES
[facility]     Name: Vivienne Kelsey MRN: [id number]   : 1945 [facility]   Date: 2021        Impression Plan   1. COVID-19 PNA  2. Acute respiratory failure with hypoxia  3. Suspected LEATHA  4. Emphysema noted on chest CT               · Goal sats 88% or higher, wean O2 as tolerated  · Decadron completed  · Zinc, vitamin c  · Completed remdesvir and azithromycin  · Lovenox  · Trend d-dimer, inflammatory markers  · Diuresis prn       Radiology  ( personally reviewed) CTA without PE; emphysema with patchy airspace disease   ABG No results for input(s): PHI, PO2I, PCO2I in the last 72 hours. Subjective     Patient is a 66 y.o. male admitted for COVID-19 and acute respiratory failure with hypoxia. He presented with shortness of breath for about a week with a dry cough. Additionally noted fevers and intermittent watery diarrhea. Found to be hypoxia and positive for COVID-19. Transferred to stepdown for worsening hypoxia. Requiring HF NC at [address] [place] [address]. Currently denies shortness of breath. Endorses intermittent cough. Afebrile the past 24 hours. His biggest concerns are that he wants to eat/drink and does not appreciate having to use a urinal to go to the bathroom. Family meeting was held today and he was made DNR. Interval history:  He c/o neck and back pain, requesting pain medications    4L O2    No labs for today      Review of Systems:  A comprehensive review of systems was negative except for that written in the HPI.     Past Medical History:   Diagnosis Date    Diverticulitis     Gastrointestinal disorder     History of vascular access device 2017    [facility] VAT -  5FR triple Lumen Power PICC R Basilic  39 cm    Spinal stenosis       Past Surgical History:   Procedure Laterality Date    FLEXIBLE SIGMOIDOSCOPY N/A 2017    SIGMOIDOSCOPY FLEXIBLE [facility] Venkata Olson MD [facility] ENDOSCOPY    IR INSERT NON TUNL CVC OVER 5 YRS  2021 Prior to Admission medications    Medication Sig Start Date End Date Taking? Authorizing Provider   dicyclomine (BENTYL) 20 mg tablet Take 20 mg by mouth every six (6) hours. Indications: irritable colon   Yes Provider, Historical   gabapentin (NEURONTIN) 600 mg tablet Take 600 mg by mouth three (3) times daily. Yes Provider, Historical   ibuprofen (MOTRIN) 200 mg tablet Take 600 mg by mouth daily. Yes Provider, Historical   acetaminophen (TYLENOL) 500 mg tablet Take 1,000 mg by mouth as needed for Pain. Indications: fever, muscle pain   Yes Provider, Historical   psyllium (METAMUCIL) packet Take 1 Packet by mouth two (2) times a day. Yes Provider, Historical     Current Facility-Administered Medications   Medication Dose Route Frequency    QUEtiapine (SEROquel) tablet 12.5 mg  12.5 mg Oral QHS    dextrose 5% and 0.9% NaCl infusion  50 mL/hr IntraVENous CONTINUOUS    zinc gluconate tablet 50 mg  50 mg Oral DAILY    sodium chloride (NS) flush 5-40 mL  5-40 mL IntraVENous Q8H    famotidine (PEPCID) tablet 20 mg  20 mg Oral BID    enoxaparin (LOVENOX) injection 40 mg  40 mg SubCUTAneous DAILY    cholecalciferol (VITAMIN D3) (1000 Units /25 mcg) tablet 2 Tab  2,000 Units Oral DAILY    ascorbic acid (vitamin C) (VITAMIN C) tablet 500 mg  500 mg Oral BID     No Known Allergies   Social History     Tobacco Use    Smoking status: Current Some Day Smoker     Packs/day: 1.00     Years: 50.00     Pack years: 50.00    Smokeless tobacco: Current User   Substance Use Topics    Alcohol use: Yes     Alcohol/week: 0.8 standard drinks     Types: 1 Cans of beer per week      No family history on file. Laboratory: I have personally reviewed the critical care flowsheet and labs.      Recent Labs     01/24/21  0324   WBC 10.7   HGB 13.3   HCT 39.3        Recent Labs     01/24/21  0324      K 3.6      CO2 29   GLU 97   BUN 22*   CREA 0.86   CA 7.6*       Objective:     Mode Rate Tidal Volume Pressure FiO2 PEEP            46 %       Vital Signs:     TMAX(24)      Intake/Output:   Last shift:         Last 3 shifts: No intake/output data recorded. [id number]    Intake/Output Summary (Last 24 hours) at 1/26/2021 8063  Last data filed at 1/26/2021 0630  Gross per 24 hour   Intake 1935 ml   Output 1815 ml   Net 120 ml     EXAM:   GENERAL: well developed and in no distress, HEENT:  PERRL, EOMI, no alar flaring or epistaxis, NECK:  no jugular vein distention, no retractions, LUNGS: CTAB, respirations non-labored , HEART:  Regular rate and rhythm with no MGR; no edema is present, ABDOMEN:  soft with no tenderness, bowel sounds present, EXTREMITIES:  warm with no cyanosis, SKIN:  no jaundice or ecchymosis and NEUROLOGIC:  alert and oriented, grossly non-focal    Dariusz Quan [facility], [facility]

## 2021-01-26 NOTE — PROGRESS NOTES
Problem: Self Care Deficits Care Plan (Adult)  Goal: *Acute Goals and Plan of Care (Insert Text)  Description:   FUNCTIONAL STATUS PRIOR TO ADMISSION: Patient was independent and active without use of DME.     HOME SUPPORT: The patient lived with wife and college age son but did not require assist.    Occupational Therapy Goals  Initiated 1/15/2021; All goals reviewed and remain appropriate-Continue with goals as of 1/22/2021;  1. Patient will perform lower body dressing with supervision/set-up within 7 day(s). 2.  Patient will perform grooming standing at sink with no LOB with supervision/set-up within 7 day(s). 3.  Patient will perform ADL item retrieval with reaching high and low with supervision/set-up within 7 day(s). 4.  Patient will perform toilet transfers with supervision/set-up within 7 day(s). 5.  Patient will perform all aspects of toileting with supervision/set-up within 7 day(s). 6.  Patient will utilize energy conservation techniques during functional activities with verbal cues within 7 day(s). Outcome: Progressing Towards Goal   OCCUPATIONAL THERAPY TREATMENT  Patient: Juliocesar Shepard (81 y.o. male)  Date: 1/26/2021  Diagnosis: COVID-19 virus infection [U07.1]  Hypoxia [R09.02]  Acute respiratory insufficiency [R06.89]  Prerenal azotemia [R79.89]  Diarrhea [R19.7] COVID-19 virus infection       Precautions:    Chart, occupational therapy assessment, plan of care, and goals were reviewed. ASSESSMENT  Patient continues with skilled OT services and is progressing towards goals. Pt  fully dressed asking to use the restroom. Pt ambulated to bathroom with min assist hand held on RA. Pts SPO2 dropped to 88% on RA with ambulation. Pt stood to use commode needing min assist for cloth management before returning back to bed. Pt returned to 4L of O2 with SPO2 90% post ambulation supine. Pt is very impulsive with decreased safety awareness.  He screamed when returning to bed due to neck and shoulder pain. Current Level of Function Impacting Discharge (ADLs): Min assist cloth management, contact guard toileting    Other factors to consider for discharge:          PLAN :  Patient continues to benefit from skilled intervention to address the above impairments. Continue treatment per established plan of care. to address goals. Recommend with staff: Out of bed to chair for ADl's, there ex, there act    Recommend next OT session: Cont towards goals    Recommendation for discharge: (in order for the patient to meet his/her long term goals)  Therapy 3 hours per day 5-7 days per week    This discharge recommendation:  Has not yet been discussed the attending provider and/or case management    IF patient discharges home will need the following DME:        SUBJECTIVE:   Patient stated I have to use the restroom.     OBJECTIVE DATA SUMMARY:   Cognitive/Behavioral Status:  Neurologic State: Alert  Orientation Level: Oriented to person;Oriented to place  Cognition: Impaired decision making             Functional Mobility and Transfers for ADLs:  Bed Mobility:  Supine to Sit: Minimum assistance  Sit to Supine: Contact guard assistance;Minimum assistance    Transfers:  Sit to Stand: Contact guard assistance  Functional Transfers  Toilet Transfer : Contact guard assistance       Balance:  Sitting: Intact  Standing: With support  Standing - Static: Fair    ADL Intervention:       Toileting  Clothing Management: Contact guard assistance         Activity Tolerance:   Fair    After treatment patient left in no apparent distress:   Supine in bed    COMMUNICATION/COLLABORATION:   The patients plan of care was discussed with: Physical therapy assistant, Occupational therapist, and Registered nurse.      ALEX Haas  Time Calculation: 20 mins

## 2021-01-26 NOTE — PROGRESS NOTES
Bedside and Verbal shift change report given to Merna Amor RN (oncoming nurse) by Camille Umaña RN (offgoing nurse). Report included the following information SBAR, Kardex, MAR, Accordion and Recent Results.

## 2021-01-27 ENCOUNTER — APPOINTMENT (OUTPATIENT)
Dept: CT IMAGING | Age: 76
DRG: 177 | End: 2021-01-27
Attending: INTERNAL MEDICINE
Payer: MEDICARE

## 2021-01-27 LAB
ALBUMIN SERPL-MCNC: 2 G/DL (ref 3.5–5)
ALBUMIN/GLOB SERPL: 0.6 {RATIO} (ref 1.1–2.2)
ALP SERPL-CCNC: 51 U/L (ref 45–117)
ALT SERPL-CCNC: 74 U/L (ref 12–78)
ANION GAP SERPL CALC-SCNC: 5 MMOL/L (ref 5–15)
APPEARANCE UR: CLEAR
AST SERPL-CCNC: 24 U/L (ref 15–37)
BACTERIA URNS QL MICRO: NEGATIVE /HPF
BASOPHILS # BLD: 0 K/UL (ref 0–0.1)
BASOPHILS NFR BLD: 0 % (ref 0–1)
BILIRUB SERPL-MCNC: 0.8 MG/DL (ref 0.2–1)
BILIRUB UR QL: NEGATIVE
BUN SERPL-MCNC: 12 MG/DL (ref 6–20)
BUN/CREAT SERPL: 17 (ref 12–20)
CALCIUM SERPL-MCNC: 7.8 MG/DL (ref 8.5–10.1)
CHLORIDE SERPL-SCNC: 105 MMOL/L (ref 97–108)
CO2 SERPL-SCNC: 29 MMOL/L (ref 21–32)
COLOR UR: ABNORMAL
CREAT SERPL-MCNC: 0.69 MG/DL (ref 0.7–1.3)
DIFFERENTIAL METHOD BLD: ABNORMAL
EOSINOPHIL # BLD: 0.1 K/UL (ref 0–0.4)
EOSINOPHIL NFR BLD: 2 % (ref 0–7)
EPITH CASTS URNS QL MICRO: ABNORMAL /LPF
ERYTHROCYTE [DISTWIDTH] IN BLOOD BY AUTOMATED COUNT: 13.5 % (ref 11.5–14.5)
GLOBULIN SER CALC-MCNC: 3.5 G/DL (ref 2–4)
GLUCOSE SERPL-MCNC: 120 MG/DL (ref 65–100)
GLUCOSE UR STRIP.AUTO-MCNC: NEGATIVE MG/DL
HCT VFR BLD AUTO: 36.7 % (ref 36.6–50.3)
HGB BLD-MCNC: 12.4 G/DL (ref 12.1–17)
HGB UR QL STRIP: NEGATIVE
HYALINE CASTS URNS QL MICRO: ABNORMAL /LPF (ref 0–5)
IMM GRANULOCYTES # BLD AUTO: 0 K/UL (ref 0–0.04)
IMM GRANULOCYTES NFR BLD AUTO: 1 % (ref 0–0.5)
KETONES UR QL STRIP.AUTO: ABNORMAL MG/DL
LEUKOCYTE ESTERASE UR QL STRIP.AUTO: NEGATIVE
LYMPHOCYTES # BLD: 0.5 K/UL (ref 0.8–3.5)
LYMPHOCYTES NFR BLD: 7 % (ref 12–49)
MCH RBC QN AUTO: 29.9 PG (ref 26–34)
MCHC RBC AUTO-ENTMCNC: 33.8 G/DL (ref 30–36.5)
MCV RBC AUTO: 88.4 FL (ref 80–99)
MONOCYTES # BLD: 1 K/UL (ref 0–1)
MONOCYTES NFR BLD: 13 % (ref 5–13)
NEUTS SEG # BLD: 5.5 K/UL (ref 1.8–8)
NEUTS SEG NFR BLD: 77 % (ref 32–75)
NITRITE UR QL STRIP.AUTO: NEGATIVE
NRBC # BLD: 0 K/UL (ref 0–0.01)
NRBC BLD-RTO: 0 PER 100 WBC
PH UR STRIP: 7 [PH] (ref 5–8)
PLATELET # BLD AUTO: 153 K/UL (ref 150–400)
PMV BLD AUTO: 10.4 FL (ref 8.9–12.9)
POTASSIUM SERPL-SCNC: 3.3 MMOL/L (ref 3.5–5.1)
PROT SERPL-MCNC: 5.5 G/DL (ref 6.4–8.2)
PROT UR STRIP-MCNC: 30 MG/DL
RBC # BLD AUTO: 4.15 M/UL (ref 4.1–5.7)
RBC #/AREA URNS HPF: ABNORMAL /HPF (ref 0–5)
SODIUM SERPL-SCNC: 139 MMOL/L (ref 136–145)
SP GR UR REFRACTOMETRY: 1.02 (ref 1–1.03)
UA: UC IF INDICATED,UAUC: ABNORMAL
UROBILINOGEN UR QL STRIP.AUTO: 1 EU/DL (ref 0.2–1)
WBC # BLD AUTO: 7.2 K/UL (ref 4.1–11.1)
WBC URNS QL MICRO: ABNORMAL /HPF (ref 0–4)

## 2021-01-27 PROCEDURE — 80053 COMPREHEN METABOLIC PANEL: CPT

## 2021-01-27 PROCEDURE — 97535 SELF CARE MNGMENT TRAINING: CPT

## 2021-01-27 PROCEDURE — 70450 CT HEAD/BRAIN W/O DYE: CPT

## 2021-01-27 PROCEDURE — 65270000029 HC RM PRIVATE

## 2021-01-27 PROCEDURE — 94760 N-INVAS EAR/PLS OXIMETRY 1: CPT

## 2021-01-27 PROCEDURE — 74011250637 HC RX REV CODE- 250/637: Performed by: INTERNAL MEDICINE

## 2021-01-27 PROCEDURE — 85025 COMPLETE CBC W/AUTO DIFF WBC: CPT

## 2021-01-27 PROCEDURE — 81001 URINALYSIS AUTO W/SCOPE: CPT

## 2021-01-27 PROCEDURE — 97530 THERAPEUTIC ACTIVITIES: CPT

## 2021-01-27 PROCEDURE — 74011250636 HC RX REV CODE- 250/636: Performed by: HOSPITALIST

## 2021-01-27 PROCEDURE — 74011250637 HC RX REV CODE- 250/637: Performed by: HOSPITALIST

## 2021-01-27 PROCEDURE — 77010033678 HC OXYGEN DAILY

## 2021-01-27 PROCEDURE — 36415 COLL VENOUS BLD VENIPUNCTURE: CPT

## 2021-01-27 PROCEDURE — 97116 GAIT TRAINING THERAPY: CPT

## 2021-01-27 RX ORDER — POTASSIUM CHLORIDE 750 MG/1
40 TABLET, FILM COATED, EXTENDED RELEASE ORAL
Status: COMPLETED | OUTPATIENT
Start: 2021-01-27 | End: 2021-01-27

## 2021-01-27 RX ADMIN — POTASSIUM CHLORIDE 40 MEQ: 750 TABLET, FILM COATED, EXTENDED RELEASE ORAL at 11:49

## 2021-01-27 RX ADMIN — OXYCODONE HYDROCHLORIDE AND ACETAMINOPHEN 500 MG: 500 TABLET ORAL at 17:11

## 2021-01-27 RX ADMIN — Medication 50 MG: at 09:00

## 2021-01-27 RX ADMIN — FAMOTIDINE 20 MG: 20 TABLET, FILM COATED ORAL at 09:00

## 2021-01-27 RX ADMIN — FAMOTIDINE 20 MG: 20 TABLET, FILM COATED ORAL at 17:11

## 2021-01-27 RX ADMIN — Medication 10 ML: at 13:45

## 2021-01-27 RX ADMIN — Medication 2 TABLET: at 09:59

## 2021-01-27 RX ADMIN — ENOXAPARIN SODIUM 40 MG: 40 INJECTION SUBCUTANEOUS at 09:00

## 2021-01-27 RX ADMIN — OXYCODONE 5 MG: 5 TABLET ORAL at 09:57

## 2021-01-27 RX ADMIN — OXYCODONE HYDROCHLORIDE AND ACETAMINOPHEN 500 MG: 500 TABLET ORAL at 09:00

## 2021-01-27 RX ADMIN — Medication 10 ML: at 05:04

## 2021-01-27 RX ADMIN — Medication 10 ML: at 21:07

## 2021-01-27 NOTE — ROUTINE PROCESS
Bedside shift change report given to Marli Guy RN (oncoming nurse) by Morales Sheffield RN (offgoing nurse). Report included the following information SBAR, Kardex, Intake/Output, MAR and Accordion.

## 2021-01-27 NOTE — PROGRESS NOTES
[facility]     Name: Cece Luis MRN: [id number]   : 1945 Hospital: [address]   Date: 2021        Impression Plan   1. COVID-19 PNA  2. Acute respiratory failure with hypoxia  3. Suspected LEATHA  4. Emphysema noted on chest CT               · Goal sats 88% or higher. · Recommend exercise oximetry prior to discharge  · Decadron completed  · Zinc, vitamin c  · Completed remdesvir and azithromycin  · Lovenox  · Diuresis prn    Will sign off and arrange OP pulm f/u       Radiology  ( personally reviewed) CTA without PE; emphysema with patchy airspace disease   ABG No results for input(s): PHI, PO2I, PCO2I in the last 72 hours. Subjective     Patient is a 66 y.o. male admitted for COVID-19 and acute respiratory failure with hypoxia. He presented with shortness of breath for about a week with a dry cough. Additionally noted fevers and intermittent watery diarrhea. Found to be hypoxia and positive for COVID-19. Transferred to stepdown for worsening hypoxia. Requiring HF NC at [address] [place] [address]. Currently denies shortness of breath. Endorses intermittent cough. Afebrile the past 24 hours. His biggest concerns are that he wants to eat/drink and does not appreciate having to use a urinal to go to the bathroom. Family meeting was held today and he was made DNR. Interval history:  He c/o neck and back pain. Denies sob    Upon entering the room, oxygen tubing is disconnected from the wall. His O2 sats on RA are 88-90%. Psych note reviewed      Review of Systems:  A comprehensive review of systems was negative except for that written in the HPI.     Past Medical History:   Diagnosis Date    Diverticulitis     Gastrointestinal disorder     History of vascular access device 2017    [facility] VAT -  5FR triple Lumen Power PICC R Basilic  39 cm    Spinal stenosis       Past Surgical History:   Procedure Laterality Date    FLEXIBLE SIGMOIDOSCOPY N/A 2017    SIGMOIDOSCOPY FLEXIBLE performed by Francia Hopson MD at [facility] ENDOSCOPY    IR INSERT NON TUNL CVC OVER 5 YRS  1/13/2021      Prior to Admission medications    Medication Sig Start Date End Date Taking? Authorizing Provider   dicyclomine (BENTYL) 20 mg tablet Take 20 mg by mouth every six (6) hours. Indications: irritable colon   Yes Provider, Historical   gabapentin (NEURONTIN) 600 mg tablet Take 600 mg by mouth three (3) times daily. Yes Provider, Historical   ibuprofen (MOTRIN) 200 mg tablet Take 600 mg by mouth daily. Yes Provider, Historical   acetaminophen (TYLENOL) 500 mg tablet Take 1,000 mg by mouth as needed for Pain. Indications: fever, muscle pain   Yes Provider, Historical   psyllium (METAMUCIL) packet Take 1 Packet by mouth two (2) times a day. Yes Provider, Historical     Current Facility-Administered Medications   Medication Dose Route Frequency    dextrose 5% and 0.9% NaCl infusion  50 mL/hr IntraVENous CONTINUOUS    zinc gluconate tablet 50 mg  50 mg Oral DAILY    sodium chloride (NS) flush 5-40 mL  5-40 mL IntraVENous Q8H    famotidine (PEPCID) tablet 20 mg  20 mg Oral BID    enoxaparin (LOVENOX) injection 40 mg  40 mg SubCUTAneous DAILY    cholecalciferol (VITAMIN D3) (1000 Units /25 mcg) tablet 2 Tab  2,000 Units Oral DAILY    ascorbic acid (vitamin C) (VITAMIN C) tablet 500 mg  500 mg Oral BID     No Known Allergies   Social History     Tobacco Use    Smoking status: Current Some Day Smoker     Packs/day: 1.00     Years: 50.00     Pack years: 50.00    Smokeless tobacco: Current User   Substance Use Topics    Alcohol use: Yes     Alcohol/week: 0.8 standard drinks     Types: 1 Cans of beer per week      No family history on file. Laboratory: I have personally reviewed the critical care flowsheet and labs.      Recent Labs     01/27/21 0213   WBC 7.2   HGB 12.4   HCT 36.7        Recent Labs     01/27/21 0213      K 3.3*      CO2 29   *   BUN 12   CREA 0.69*   CA 7.8* ALB 2.0*   ALT 74       Objective:     Mode Rate Tidal Volume Pressure FiO2 PEEP            46 %       Vital Signs:     TMAX(24)      Intake/Output:   Last shift:         Last 3 shifts: 01/27 0701 - 01/27 1900  In: 616.7 [I.V.:616.7]  Out: - RRIOLAST3    Intake/Output Summary (Last 24 hours) at 1/27/2021 0846  Last data filed at 1/27/2021 0750  Gross per 24 hour   Intake 1571.67 ml   Output 910 ml   Net 661.67 ml     EXAM:   GENERAL: well developed and in no distress, HEENT:  PERRL, EOMI, no alar flaring or epistaxis, NECK:  no jugular vein distention, no retractions, LUNGS: CTAB, respirations non-labored , HEART:  Regular rate and rhythm with no MGR; no edema is present, ABDOMEN:  soft with no tenderness, bowel sounds present, EXTREMITIES:  warm with no cyanosis, SKIN:  no jaundice or ecchymosis and NEUROLOGIC:  alert and oriented, grossly non-focal    Sandra Marie 136    Attending addendum:  Case discussed with APC. Agree with plan as outlined above.

## 2021-01-27 NOTE — PROGRESS NOTES
Eugene Kelly lexie [place] [address]  [address]  [phone]      Medical Progress Note      NAME: Power Bartholomew   :  1945  MRM:  [id number]    Date/Time: 2021 6:08 PM           Subjective:     Chief Complaint:  Patient seen and examined. Oxygen requirements decreasing. Has no complaints. Determined that he does not have capacity to make his own decisions; dicussed dispo with wife and son who both want patient to go to pulmonary rehab. ROS:  (bold if positive, if negative)    Tolerating PT  Tolerating Diet          Objective:       Vitals:          Last 24hrs VS reviewed since prior progress note.  Most recent are:    Visit Vitals  /63 (BP 1 Location: Right arm, BP Patient Position: At rest)   Pulse 75   Temp 99.6 °F (37.6 °C)   Resp 12   Ht 5' 10\" (1.778 m)   Wt 82.7 kg (182 lb 6.4 oz)   SpO2 93%   BMI 26.17 kg/m²     SpO2 Readings from Last 6 Encounters:   21 93%   10/04/19 92%   17 99%   17 96%   17 96%   17 95%    O2 Flow Rate (L/min): 1 l/min       Intake/Output Summary (Last 24 hours) at 2021 1837  Last data filed at 2021 1158  Gross per 24 hour   Intake 616.67 ml   Output 360 ml   Net 256.67 ml          Exam:     Physical Exam:    Gen:  Well-developed, well-nourished, in no acute distress  HEENT:  Pink conjunctivae, PERRL, hearing intact to voice, moist mucous membranes  Neck:  Supple, without masses, thyroid non-tender  Resp:  No accessory muscle use, clear breath sounds without wheezes rales or rhonchi  Card:  No murmurs, normal S1, S2 without thrills, bruits or peripheral edema  Abd:  Soft, non-tender, non-distended, normoactive bowel sounds are present  Musc:  No cyanosis or clubbing  Skin:  No rashes or ulcers, skin turgor is good  Neuro: Oriented to place and self but not time   Psych:  Poor insight      Medications Reviewed: (see below)    Lab Data Reviewed: (see below)    ______________________________________________________________________    Medications:     Current Facility-Administered Medications   Medication Dose Route Frequency    ipratropium-albuterol (COMBIVENT RESPIMAT) 20 mcg-100 mcg inhalation spray  1 Puff Inhalation Q6H PRN    zinc gluconate tablet 50 mg  50 mg Oral DAILY    melatonin (rapid dissolve) tablet 5 mg  5 mg Oral QHS PRN    sodium chloride (NS) flush 5-40 mL  5-40 mL IntraVENous Q8H    sodium chloride (NS) flush 5-40 mL  5-40 mL IntraVENous PRN    polyethylene glycol (MIRALAX) packet 17 g  17 g Oral DAILY PRN    bisacodyL (DULCOLAX) suppository 10 mg  10 mg Rectal DAILY PRN    ondansetron (ZOFRAN) injection 4 mg  4 mg IntraVENous Q6H PRN    famotidine (PEPCID) tablet 20 mg  20 mg Oral BID    enoxaparin (LOVENOX) injection 40 mg  40 mg SubCUTAneous DAILY    acetaminophen (TYLENOL) tablet 650 mg  650 mg Oral Q6H PRN    Or    acetaminophen (TYLENOL) suppository 650 mg  650 mg Rectal Q6H PRN    guaiFENesin-dextromethorphan (ROBITUSSIN DM) 100-10 mg/5 mL syrup 5 mL  5 mL Oral Q4H PRN    labetaloL (NORMODYNE;TRANDATE) 20 mg/4 mL (5 mg/mL) injection 20 mg  20 mg IntraVENous Q4H PRN    hydrALAZINE (APRESOLINE) 20 mg/mL injection 20 mg  20 mg IntraVENous Q4H PRN    alum-mag hydroxide-simeth (MYLANTA) oral suspension 30 mL  30 mL Oral Q4H PRN    LORazepam (ATIVAN) injection 0.5 mg  0.5 mg IntraVENous Q6H PRN    oxyCODONE IR (ROXICODONE) tablet 5 mg  5 mg Oral Q4H PRN    oxyCODONE IR (ROXICODONE) tablet 10 mg  10 mg Oral Q4H PRN    morphine injection 2 mg  2 mg IntraVENous Q4H PRN    diphenhydrAMINE (BENADRYL) injection 25 mg  25 mg IntraVENous Q6H PRN    diphenhydrAMINE (BENADRYL) capsule 25 mg  25 mg Oral Q6H PRN    simethicone (MYLICON) tablet 80 mg  80 mg Oral QID PRN    cholecalciferol (VITAMIN D3) (1000 Units /25 mcg) tablet 2 Tab  2,000 Units Oral DAILY    ascorbic acid (vitamin C) (VITAMIN C) tablet 500 mg  500 mg Oral BID Lab Review:     Recent Labs     01/27/21  0213   WBC 7.2   HGB 12.4   HCT 36.7        Recent Labs     01/27/21  0213      K 3.3*      CO2 29   *   BUN 12   CREA 0.69*   CA 7.8*   ALB 2.0*   ALT 74     No components found for: GLPOC         Assessment / Plan:     #AHRF 2/2 COVID-19: Weaning NC. Improving. s/p remdesivir, dexamethasone, azithromycin. Does not appear grossly volume overloaded at this time so will hold on further diuresis. - Cont supplements, supportive care, PT/OT   - Prone as able   - Advance diet   - LMWH ppx   - Plan for DC to pulm rehab. Patient lacks capacity to make own decisions; wife and son desire pulm rehab. #Delirium: Prior to admit, he was independent and active without DME. Prior admissions notable for anger and confusion.   - no further seroquel qhs   - Psych evaluated; patient lacks capacity to make own decisions    - Encourage OOB and lights on during day    #CHAVA: Admit Scr normal, peak 1.39. Had been on bumex. I/O suggest he is -5.5L since admit. No echo on file. CT 1/15 with pulm infiltrates vs edema; favor the former.  Hold further diuresis       Total time spent with patient: 30 4030 [facility] discussed with: Patient    Discussed:  Care Plan    Prophylaxis:  Lovenox     Disposition:  pulm rehab            ___________________________________________________    Attending Physician: Arnaldo Griffiths DO

## 2021-01-27 NOTE — PROGRESS NOTES
Problem: Self Care Deficits Care Plan (Adult)  Goal: *Acute Goals and Plan of Care (Insert Text)  Description:   FUNCTIONAL STATUS PRIOR TO ADMISSION: Patient was independent and active without use of DME.     HOME SUPPORT: The patient lived with wife and college age son but did not require assist.    Occupational Therapy Goals  Initiated 1/15/2021; All goals reviewed and remain appropriate-Continue with goals as of 1/22/2021;  1. Patient will perform lower body dressing with supervision/set-up within 7 day(s). 2.  Patient will perform grooming standing at sink with no LOB with supervision/set-up within 7 day(s). 3.  Patient will perform ADL item retrieval with reaching high and low with supervision/set-up within 7 day(s). 4.  Patient will perform toilet transfers with supervision/set-up within 7 day(s). 5.  Patient will perform all aspects of toileting with supervision/set-up within 7 day(s). 6.  Patient will utilize energy conservation techniques during functional activities with verbal cues within 7 day(s). Outcome: Progressing Towards Goal   OCCUPATIONAL THERAPY TREATMENT  Patient: Amanda Alvarez (89 y.o. male)  Date: 1/27/2021  Diagnosis: COVID-19 virus infection [U07.1]  Hypoxia [R09.02]  Acute respiratory insufficiency [R06.89]  Prerenal azotemia [R79.89]  Diarrhea [R19.7] COVID-19 virus infection       Precautions:    Chart, occupational therapy assessment, plan of care, and goals were reviewed. ASSESSMENT  Patient continues with skilled OT services and is progressing towards goals. Patient received supine in bed,  he is eager for OOB and requesting therapist allow him to go home. He reports neck pain and yells out in pain with extension/rotation. Patient requires min assist to come to EOB. Patient reports increased pain in neck in sitting, and perseverative throughout session. He attempts to reposition with hands on bed, or elevated on RW to alleviate pain.  Patient stands with min assist today. He is noted to bed impulsive and with difficulty following verbal cues for safety. He is able to transfer to chair with min assist.  Patient left in chair at end of session, alarm set. Patient requires continued OT services to improve function     Current Level of Function Impacting Discharge (ADLs): min assist for mobility    Other factors to consider for discharge:          PLAN :  Patient continues to benefit from skilled intervention to address the above impairments. Continue treatment per established plan of care. to address goals. Recommend with staff:     Recommend next OT session: continue goals     Recommendation for discharge: (in order for the patient to meet his/her long term goals)  Therapy 3 hours per day 5-7 days per week    This discharge recommendation:  Has been made in collaboration with the attending provider and/or case management    IF patient discharges home will need the following DME: TBD       SUBJECTIVE:   Patient stated My neck hurts! Thor Lias    OBJECTIVE DATA SUMMARY:   Cognitive/Behavioral Status:     Orientation Level: Oriented to person;Oriented to place;Oriented to situation  Cognition: Impaired decision making  Perception: Appears intact  Perseveration: Perseverates during conversation  Safety/Judgement: Decreased awareness of environment;Decreased awareness of need for assistance;Decreased awareness of need for safety;Decreased insight into deficits    Functional Mobility and Transfers for ADLs:  Bed Mobility:  Rolling: Contact guard assistance  Supine to Sit: Minimum assistance  Sit to Supine: Minimum assistance;Contact guard assistance;Assist x2  Scooting: Modified independent    Transfers:  Sit to Stand: Minimum assistance;Assist x2          Balance:  Sitting: Intact  Standing: Impaired  Standing - Static: Good;Constant support  Standing - Dynamic : Fair    ADL Intervention:                                     Cognitive Retraining  Safety/Judgement: Decreased awareness of environment;Decreased awareness of need for assistance;Decreased awareness of need for safety;Decreased insight into deficits    Therapeutic Exercises:     Pain:  Complains of pain to neck but does not rate    Activity Tolerance:   Good    After treatment patient left in no apparent distress:   Sitting in chair, Call bell within reach, and Bed / chair alarm activated    COMMUNICATION/COLLABORATION:   The patients plan of care was discussed with: Physical therapist and Registered nurse.      Krystal Posadas, OTR/L  Time Calculation: 33 mins

## 2021-01-27 NOTE — PROGRESS NOTES
1/27/2021   CARE MANAGEMENT NOTE:  CM reviewed EMR for clinical updates. Pt was admitted with COVID, acute respiratory insufficiency with hypoxia.    Reportedly, pt resides with his wife who has been to inpt psych.    Secondary Decision Maker: Ofeila Pang - Child - [phone]    Secondary Decision Maker: Bhanu Fan - Son - [phone]    Secondary Decision Maker: Jori Pardo - Daughter - [phone]   Diego Dailey, pt's brother in law ([phone]) is employed in healthcare and he has been instrumental in encouraging pt to consider pulmonary rehab.     RUR 14%; LOS 15 days     Transition Plan of Care:  1  Pt was accepted to [facility] ([facility]) earlier in the week however pt declined.     2.  Psych eval completed on 1/26 and per NP pt does not have capacity to make medical decisions.   Son Jeff Figueroa agrees to pt going to pulmonary rehab especially since pt cannot be in the home alone (pt's wife will be in a facility for one month).       CM will contact [facility] and inquire of bed availability per son's request.  Alexandra

## 2021-01-27 NOTE — PROGRESS NOTES
Bedside shift change report given to Ever Rachel RN (oncoming nurse) by Melanie Pham RN (offgoing nurse). Report included the following information SBAR, Intake/Output, MAR and Recent Results.

## 2021-01-27 NOTE — PROGRESS NOTES
Problem: Mobility Impaired (Adult and Pediatric)  Goal: *Acute Goals and Plan of Care (Insert Text)  Description: FUNCTIONAL STATUS PRIOR TO ADMISSION: Patient was independent and active without use of DME.    HOME SUPPORT PRIOR TO ADMISSION: The patient lived with his wife and son but did not require assist.    Physical Therapy Goals  Initiated 1/15/2021; continue same goals as of 1/22/2021   1. Patient will move from supine to sit and sit to supine , scoot up and down, and roll side to side in bed with independence within 7 day(s). 2.  Patient will transfer from bed to chair and chair to bed with modified independence using the least restrictive device within 7 day(s). 3.  Patient will perform sit to stand with modified independence within 7 day(s). 4.  Patient will ambulate with modified independence for 120 feet with the least restrictive device within 7 day(s). Outcome: Progressing Towards Goal   PHYSICAL THERAPY TREATMENT  Patient: Layton Baker (86 y.o. male)  Date: 1/27/2021  Diagnosis: COVID-19 virus infection [U07.1]  Hypoxia [R09.02]  Acute respiratory insufficiency [R06.89]  Prerenal azotemia [R79.89]  Diarrhea [R19.7] COVID-19 virus infection       Precautions:    Chart, physical therapy assessment, plan of care and goals were reviewed. ASSESSMENT  Patient continues with skilled PT services and is progressing towards goals. Received supine in bed on 2LO2 with sats of 93% Hr of 76. Pt c/o severe neck pain, yet unable to identify why. RN stating hx of cervical stenosis. Pt needing Min A to EOB  with supine to sit. Sit to stand with Min Ax2. Tendency to be impulsive. Gait of 30' in room with RW and Min A with sats holding at 92%. Dropping to 85% on room air. Placed in chair with pillow to support head and neck. RN informed of need for pain med for neck per MD. Govind Gray with return home.    Documentation for home O2:     ROOM AIR    AT REST   O2 SATS  90 HR  87   ROOM AIR WITH ACTIVITY 02 SATS  85 HR  87   (2    ) LITERS OF O2 WITH ACTIVITY O2 SATS  92 HR  86   (2    )LITERS OF 02 PATIENT LEFT COMFORTABLY  SITTING/SUPINE 02 SATS  95 HR  82        Current Level of Function Impacting Discharge (mobility/balance): Other factors to consider for discharge: impulsive         PLAN :  Patient continues to benefit from skilled intervention to address the above impairments. Continue treatment per established plan of care. to address goals. Recommendation for discharge: (in order for the patient to meet his/her long term goals)  Physical therapy at least 2 days/week in the home or SNF for rehab    This discharge recommendation:  Has been made in collaboration with the attending provider and/or case management    IF patient discharges home will need the following DME: may need RW       SUBJECTIVE:   Patient stated I need to go home.     OBJECTIVE DATA SUMMARY:   Critical Behavior:  Neurologic State: Alert  Orientation Level: Oriented X4  Cognition: Impaired decision making  Safety/Judgement: Decreased awareness of environment  Functional Mobility Training:  Bed Mobility:  Rolling: Contact guard assistance  Supine to Sit: Minimum assistance  Sit to Supine: Minimum assistance;Contact guard assistance;Assist x2  Scooting: Modified independent        Transfers:  Sit to Stand: Minimum assistance;Assist x2  Stand to Sit: Minimum assistance;Assist x2(verbal cues to use UE's; poor eccentric quads)                             Balance:  Sitting: Intact  Standing: Impaired  Standing - Static: Good;Constant support  Standing - Dynamic : Fair  Ambulation/Gait Training:  Distance (ft): 30 Feet (ft)  Assistive Device: Gait belt;Walker, rolling  Ambulation - Level of Assistance: Minimal assistance        Gait Abnormalities: Path deviations;Decreased step clearance        Base of Support: Narrowed     Speed/Krystle: Slow;Pace decreased (<100 feet/min)                     Pain Rating:  10/10 neck    Activity Tolerance:   Fair    After treatment patient left in no apparent distress:   Sitting in chair, Heels elevated for pressure relief, Call bell within reach, and Bed / chair alarm activated    COMMUNICATION/COLLABORATION:   The patients plan of care was discussed with: Occupational therapist and Registered nurse.      Paulo Sutton, PT   Time Calculation: 33 mins

## 2021-01-28 VITALS
RESPIRATION RATE: 18 BRPM | BODY MASS INDEX: 26.11 KG/M2 | WEIGHT: 182.4 LBS | OXYGEN SATURATION: 94 % | HEIGHT: 70 IN | HEART RATE: 74 BPM | SYSTOLIC BLOOD PRESSURE: 115 MMHG | DIASTOLIC BLOOD PRESSURE: 60 MMHG | TEMPERATURE: 99.1 F

## 2021-01-28 LAB
ANION GAP SERPL CALC-SCNC: 7 MMOL/L (ref 5–15)
BASOPHILS # BLD: 0 K/UL (ref 0–0.1)
BASOPHILS NFR BLD: 0 % (ref 0–1)
BUN SERPL-MCNC: 14 MG/DL (ref 6–20)
BUN/CREAT SERPL: 21 (ref 12–20)
CALCIUM SERPL-MCNC: 8 MG/DL (ref 8.5–10.1)
CHLORIDE SERPL-SCNC: 105 MMOL/L (ref 97–108)
CO2 SERPL-SCNC: 28 MMOL/L (ref 21–32)
COMMENT, HOLDF: NORMAL
CREAT SERPL-MCNC: 0.68 MG/DL (ref 0.7–1.3)
DIFFERENTIAL METHOD BLD: ABNORMAL
EOSINOPHIL # BLD: 0.1 K/UL (ref 0–0.4)
EOSINOPHIL NFR BLD: 2 % (ref 0–7)
ERYTHROCYTE [DISTWIDTH] IN BLOOD BY AUTOMATED COUNT: 13.6 % (ref 11.5–14.5)
GLUCOSE SERPL-MCNC: 110 MG/DL (ref 65–100)
HCT VFR BLD AUTO: 37.8 % (ref 36.6–50.3)
HGB BLD-MCNC: 12.4 G/DL (ref 12.1–17)
IMM GRANULOCYTES # BLD AUTO: 0.1 K/UL (ref 0–0.04)
IMM GRANULOCYTES NFR BLD AUTO: 1 % (ref 0–0.5)
LYMPHOCYTES # BLD: 0.8 K/UL (ref 0.8–3.5)
LYMPHOCYTES NFR BLD: 12 % (ref 12–49)
MCH RBC QN AUTO: 29.3 PG (ref 26–34)
MCHC RBC AUTO-ENTMCNC: 32.8 G/DL (ref 30–36.5)
MCV RBC AUTO: 89.4 FL (ref 80–99)
MONOCYTES # BLD: 0.8 K/UL (ref 0–1)
MONOCYTES NFR BLD: 12 % (ref 5–13)
NEUTS SEG # BLD: 4.5 K/UL (ref 1.8–8)
NEUTS SEG NFR BLD: 73 % (ref 32–75)
NRBC # BLD: 0 K/UL (ref 0–0.01)
NRBC BLD-RTO: 0 PER 100 WBC
PLATELET # BLD AUTO: 171 K/UL (ref 150–400)
PMV BLD AUTO: 10.8 FL (ref 8.9–12.9)
POTASSIUM SERPL-SCNC: 3.4 MMOL/L (ref 3.5–5.1)
RBC # BLD AUTO: 4.23 M/UL (ref 4.1–5.7)
RBC MORPH BLD: ABNORMAL
SAMPLES BEING HELD,HOLD: NORMAL
SODIUM SERPL-SCNC: 140 MMOL/L (ref 136–145)
WBC # BLD AUTO: 6.3 K/UL (ref 4.1–11.1)

## 2021-01-28 PROCEDURE — 97530 THERAPEUTIC ACTIVITIES: CPT

## 2021-01-28 PROCEDURE — 65270000029 HC RM PRIVATE

## 2021-01-28 PROCEDURE — 74011250636 HC RX REV CODE- 250/636: Performed by: HOSPITALIST

## 2021-01-28 PROCEDURE — 74011250637 HC RX REV CODE- 250/637: Performed by: INTERNAL MEDICINE

## 2021-01-28 PROCEDURE — 36415 COLL VENOUS BLD VENIPUNCTURE: CPT

## 2021-01-28 PROCEDURE — 97116 GAIT TRAINING THERAPY: CPT

## 2021-01-28 PROCEDURE — 85025 COMPLETE CBC W/AUTO DIFF WBC: CPT

## 2021-01-28 PROCEDURE — 97535 SELF CARE MNGMENT TRAINING: CPT

## 2021-01-28 PROCEDURE — 74011250637 HC RX REV CODE- 250/637: Performed by: HOSPITALIST

## 2021-01-28 PROCEDURE — 80048 BASIC METABOLIC PNL TOTAL CA: CPT

## 2021-01-28 PROCEDURE — 94760 N-INVAS EAR/PLS OXIMETRY 1: CPT

## 2021-01-28 RX ORDER — FAMOTIDINE 20 MG/1
20 TABLET, FILM COATED ORAL 2 TIMES DAILY
Qty: 60 TAB | Refills: 0 | Status: SHIPPED
Start: 2021-01-28

## 2021-01-28 RX ORDER — ZINC GLUCONATE 50 MG
50 TABLET ORAL DAILY
Qty: 30 TAB | Refills: 0 | Status: SHIPPED
Start: 2021-01-29

## 2021-01-28 RX ORDER — ASCORBIC ACID 500 MG
500 TABLET ORAL 2 TIMES DAILY
Qty: 60 TAB | Refills: 0 | Status: SHIPPED
Start: 2021-01-28

## 2021-01-28 RX ORDER — CALCIUM CARB/MAGNESIUM CARB 311-232MG
5 TABLET ORAL
Qty: 30 TAB | Refills: 0 | Status: SHIPPED
Start: 2021-01-28

## 2021-01-28 RX ORDER — ACETAMINOPHEN 325 MG/1
650 TABLET ORAL
Qty: 30 TAB | Refills: 0 | Status: SHIPPED
Start: 2021-01-28

## 2021-01-28 RX ADMIN — FAMOTIDINE 20 MG: 20 TABLET, FILM COATED ORAL at 08:47

## 2021-01-28 RX ADMIN — OXYCODONE HYDROCHLORIDE AND ACETAMINOPHEN 500 MG: 500 TABLET ORAL at 08:47

## 2021-01-28 RX ADMIN — ENOXAPARIN SODIUM 40 MG: 40 INJECTION SUBCUTANEOUS at 08:47

## 2021-01-28 RX ADMIN — ACETAMINOPHEN 650 MG: 325 TABLET ORAL at 19:44

## 2021-01-28 RX ADMIN — ACETAMINOPHEN 650 MG: 325 TABLET ORAL at 01:26

## 2021-01-28 RX ADMIN — Medication 2000 UNITS: at 08:47

## 2021-01-28 RX ADMIN — ACETAMINOPHEN 650 MG: 325 TABLET ORAL at 08:47

## 2021-01-28 RX ADMIN — Medication 50 MG: at 08:47

## 2021-01-28 RX ADMIN — MORPHINE SULFATE 2 MG: 2 INJECTION, SOLUTION INTRAMUSCULAR; INTRAVENOUS at 08:48

## 2021-01-28 RX ADMIN — Medication 10 ML: at 22:34

## 2021-01-28 NOTE — DISCHARGE SUMMARY
Eugene Robin [facility]  [facility], [address] [place] [address]  [phone]    Physician Discharge Summary     Patient ID:  Arden Zambrano  [id number]  76 y.o.  1945    Admit date: 1/10/2021    Discharge date and time: 1/28/2021 3:34 PM    Admission Diagnoses: COVID-19 virus infection [U07.1]  Hypoxia [R09.02]  Acute respiratory insufficiency [R06.89]  Prerenal azotemia [R79.89]  Diarrhea [R19.7]    Discharge Diagnoses:  Principal Diagnosis COVID-19 virus infection                                            Principal Problem:    COVID-19 virus infection (1/11/2021)    Active Problems:    Diarrhea (1/11/2021)      Acute respiratory insufficiency (1/11/2021)      Hypoxia (1/11/2021)      Acute metabolic encephalopathy (5/66/4238)       Hospital Course:     AHRF 2/2 COVID-19: down to 2 liters NC. Improving. s/p remdesivir, dexamethasone, azithromycin. Discharge to pulm rehab.      Delirium/encephalopathy: improving. Prior to admission, he was independent and active without DME. No further seroquel or narcotics. Ct head w/ no acute findings. Psych evaluated; patient lacks capacity to make own decisions; wife and son made decision for patient to go to pulm rehab; speaking to patient today he agrees to pulm rehab.      Bradycardia: resolved. Possibly due to acute illness. F/u with cardio OP. CHAVA: peak Cr 1.39; now wnl. stable. F/u Op with PCP. PCP: Joaquin Sanders MD     Consults: Cardiology and Pulmonary/Intensive care    Significant Diagnostic Studies:     cta chest   IMPRESSION  IMPRESSION:   There is no pulmonary embolism. There is no aortic aneurysm or dissection.   Scattered interstitial and parenchymal opacity superimposed on moderate  centrilobular emphysematous change may be related to pulmonary edema versus  multifocal viral infectious process.     Coronary artery disease and mild cardiac megaly.     Hepatic steatosis.       Discharge Exam:  Physical Exam:    Gen: Well-developed, well-nourished, in no acute distress  HEENT:  Pink conjunctivae, PERRL, hearing intact to voice, moist mucous membranes  Neck:  Supple, without masses, thyroid non-tender  Resp:  No accessory muscle use, clear breath sounds without wheezes rales or rhonchi  Card:  No murmurs, normal S1, S2 without thrills, bruits or peripheral edema  Abd:  Soft, non-tender, non-distended, normoactive bowel sounds are present  Musc:  No cyanosis or clubbing  Skin:  No rashes or ulcers, skin turgor is good  Neuro: Oriented to place and self but not time   Psych:  Poor insight     Disposition: pulmonary rehab   Discharge Condition: Stable    Patient Instructions:   Current Discharge Medication List      START taking these medications    Details   zinc gluconate 50 mg tablet Take 1 Tab by mouth daily. Qty: 30 Tab, Refills: 0      melatonin, rapid dissolve, 5 mg TbDi tablet Take 1 Tab by mouth nightly as needed for Other (insomnia). Qty: 30 Tab, Refills: 0      ipratropium-albuteroL (COMBIVENT RESPIMAT)  mcg/actuation inhaler Take 1 Puff by inhalation every six (6) hours as needed for Wheezing. Qty: 1 Inhaler, Refills: 0      famotidine (PEPCID) 20 mg tablet Take 1 Tab by mouth two (2) times a day. Qty: 60 Tab, Refills: 0      ascorbic acid, vitamin C, (VITAMIN C) 500 mg tablet Take 1 Tab by mouth two (2) times a day. Qty: 60 Tab, Refills: 0         CONTINUE these medications which have CHANGED    Details   acetaminophen (TYLENOL) 325 mg tablet Take 2 Tabs by mouth every six (6) hours as needed for Pain. Qty: 30 Tab, Refills: 0         CONTINUE these medications which have NOT CHANGED    Details   dicyclomine (BENTYL) 20 mg tablet Take 20 mg by mouth every six (6) hours. Indications: irritable colon      gabapentin (NEURONTIN) 600 mg tablet Take 600 mg by mouth three (3) times daily. psyllium (METAMUCIL) packet Take 1 Packet by mouth two (2) times a day.          STOP taking these medications ibuprofen (MOTRIN) 200 mg tablet Comments:   Reason for Stopping:             Activity: Activity as tolerated  Diet: Regular Diet  Wound Care: None needed    Follow-up with  Follow-up Information     Follow up With Specialties Details Why Contact Info    Sara Che MD Cardiology, Internal Medicine On 2/15/2021 240 pm [address]      [facility]   [facility] Lyndon Roblero MD Pulmonary Disease In 1 week pulm follow up and sleep study  [address]  [address]      Loren Oconnor MD Family Medicine In 2 days [facility] Follow Up  [address] [address] [phone]            Follow-up tests/labs as above.      Signed:  Osito Parra DO  1/28/2021  3:34 PM  **I personally spent 35 min on discharge**

## 2021-01-28 NOTE — DISCHARGE INSTRUCTIONS
HOSPITALIST DISCHARGE INSTRUCTIONS  NAME: Mandy Palacios   :  1945   MRN:  [id number]     Date/Time:  2021 3:16 PM    ADMIT DATE: 1/10/2021     DISCHARGE DATE: 2021     ADMITTING DIAGNOSIS:  COVID    DISCHARGE DIAGNOSIS:  COVID     Patient Education        [address] [facility] [facility] Treatment for COVID-19: Care Instructions  Overview     You are being sent home from the hospital after being treated for COVID-19. Being in the hospital can be hard, especially if you've been in the intensive care unit (ICU). Even though you're going home, you probably don't feel well yet. Healing from COVID-19 takes time. You may feel very tired for weeks or months afterward, especially if you were on a ventilator. It will take time to get back to your old level of activity. Some people may have long-lasting health problems. But most people can look forward to feeling a little better every day. If you were on a ventilator, your throat may be sore and your voice hoarse or raspy for a while. After leaving the hospital, some people have feelings of anxiety and depression. They may have nightmares. Or in their mind they may relive events that happened in the hospital (flashbacks). You can always reach out to your doctor if you're having trouble with these symptoms. Your doctor will tell you if you need to isolate yourself at home, and when you can end isolation. Follow-up care is a key part of your treatment and safety. Be sure to make and go to all appointments, and call your doctor if you are having problems. It's also a good idea to know your test results and keep a list of the medicines you take. How can you care for yourself at home? · Get plenty of rest. It can help you feel better. · Be kind to yourself if it's taking longer than you expected to feel better. You've been through a stressful time. · Get up and walk around every hour or two while you're resting.  Slowly increase your activity as you start to feel better. · Eat healthy foods. · Drink plenty of fluids. If you have kidney, heart, or liver disease and have to limit fluids, talk with your doctor before you increase the amount of fluids you drink. · Take acetaminophen (such as Tylenol) to reduce a fever. It may also help with muscle aches. Read and follow all instructions on the label. If you are in isolation after you get home  · Wear a cloth face cover when you are around other people. It can help stop the spread of the virus when you cough or sneeze. · Limit contact with people in your home. If possible, stay in a separate bedroom and use a separate bathroom. · If you have to leave home, avoid crowds and try to stay at least 6 feet away from other people. · Avoid contact with pets and other animals. · Cover your mouth and nose with a tissue when you cough or sneeze. Then throw it in the trash right away. · Wash your hands often, especially after you cough or sneeze. Use soap and water, and scrub for at least 20 seconds. If soap and water aren't available, use an alcohol-based hand . · Don't share personal household items. These include bedding, towels, cups and glasses, and eating utensils. · [address] in the warmest water allowed for the fabric type, and dry it completely. It's okay to wash other people's laundry with yours. · Clean and disinfect your home every day. Use household  and disinfectant wipes or sprays. Take special care to clean things that you grab with your hands. These include doorknobs, remote controls, phones, and handles on your refrigerator and microwave. And don't forget countertops, tabletops, bathrooms, and computer keyboards. When should you call for help? Call 911 anytime you think you may need emergency care. For example, call if you have life-threatening symptoms, such as:    · You have severe trouble breathing.  (You can't talk at all.)     · You have constant chest pain or pressure.     · You are severely dizzy or lightheaded.     · You are confused or can't think clearly.     · Your face and lips have a blue color.     · You passed out (lost consciousness) or are very hard to wake up. Call your doctor now or seek immediate medical care if:    · You have moderate trouble breathing. (You can't speak a full sentence.)     · You are coughing up blood (more than about 1 teaspoon).     · You have signs of low blood pressure. These include feeling lightheaded; being too weak to stand; and having cold, pale, clammy skin. Watch closely for changes in your health, and be sure to contact your doctor if:    · Your symptoms get worse.     · You are not getting better as expected.     · You have new or worse symptoms of anxiety, depression, nightmares, or flashbacks. Call before you go to the doctor's office. Follow their instructions. And wear a cloth face cover. Current as of: December 18, 2020               Content Version: 12.7  © 2006-2021 [employer]. Care instructions adapted under license by [employer] (which disclaims liability or warranty for this information). If you have questions about a medical condition or this instruction, always ask your healthcare professional. [address] any warranty or liability for your use of this information. MEDICATIONS:    · It is important that you take the medication exactly as they are prescribed. · Keep your medication in the bottles provided by the pharmacist and keep a list of the medication names, dosages, and times to be taken in your wallet.    · Do not take other medications without consulting your doctor     Pain Management: per above medications    What to do at Home    Recommended diet:  Regular Diet    Recommended activity: Activity as tolerated    1) Return to the hospital if you feel worse    2) If you experience any of the following symptoms then please call your primary care physician or return to the emergency room if you cannot get hold of your doctor:  Fever, chills, nausea, vomiting, diarrhea, change in mentation, falling, bleeding, shortness of breath, chest pain, severe headache, severe abdominal pain. Follow Up: Follow-up Information     Follow up With Specialties Details Why Contact Info    Sara Che MD Cardiology, Internal Medicine On 2/15/2021 240 pm [address] [facility]   61 [facility] Lyndon Roblero MD Pulmonary Disease In 1 week pulm follow up and sleep study  [address] [facility]  [address]      Loren Oconnor MD Family Medicine In 2 days [facility] Follow Up  [address] [facility]  [phone]         Please check BMP tommorrow 1/29. Information obtained by :  I understand that if any problems occur once I am at home I am to contact my physician. I understand and acknowledge receipt of the instructions indicated above.                                                                                                                                            Physician's or R.N.'s Signature                                                                  Date/Time                                                                                                                                              Patient or Representative Signature                                                          Date/Time

## 2021-01-28 NOTE — PROGRESS NOTES
Problem: Self Care Deficits Care Plan (Adult)  Goal: *Acute Goals and Plan of Care (Insert Text)  Description:   FUNCTIONAL STATUS PRIOR TO ADMISSION: Patient was independent and active without use of DME.     HOME SUPPORT: The patient lived with wife and college age son but did not require assist.    Occupational Therapy Goals  Initiated 1/15/2021; All goals reviewed and remain appropriate-Continue with goals as of 1/22/2021;Reviewed 1/29/2021 for weekly reassessment, pt progressing, continue all  1. Patient will perform lower body dressing with supervision/set-up within 7 day(s). 2.  Patient will perform grooming standing at sink with no LOB with supervision/set-up within 7 day(s). 3.  Patient will perform ADL item retrieval with reaching high and low with supervision/set-up within 7 day(s). 4.  Patient will perform toilet transfers with supervision/set-up within 7 day(s). 5.  Patient will perform all aspects of toileting with supervision/set-up within 7 day(s). 6.  Patient will utilize energy conservation techniques during functional activities with verbal cues within 7 day(s). Outcome: Progressing Towards Goal  OCCUPATIONAL THERAPY TREATMENT/WEEKLY RE-EVALUATION  Patient: Juliocesar Shepard (76 y.o. male)  Date: 1/28/2021  Diagnosis: COVID-19 virus infection [U07.1]  Hypoxia [R09.02]  Acute respiratory insufficiency [R06.89]  Prerenal azotemia [R79.89]  Diarrhea [R19.7] COVID-19 virus infection       Precautions:    Chart, occupational therapy assessment, plan of care, and goals were reviewed. ASSESSMENT  Based on the objective data described below, pt is progressing towards goals, limited from independent baseline by decreased balance, endurance and strength, and impaired functional mobility. Pt received supine in bed, agreeable to participate. Came to sitting EOB with supervision, good sitting balance. He ambulated to bathroom with min A x1-2 (HHA) pt unsteady and with wide HILARY.  Performed bowel hygiene and brief standing grooming ADL with CGA before transferring to chair, fatigued with activity. Received on 2 L O2, required to maintain sats over 90% with activity. Remained in chair at end of session with needs met. Pt is below his functional baseline at this time primarily due to decreased endurance and strength. Continue to recommend rehab at discharge to increase independence and safety. Current Level of Function Impacting Discharge (ADLs): Min A short distance transfers, independent-mod A ADLs    Other factors to consider for discharge: fall risk, below PLOF         PLAN :  Goals have been updated based on progression since last assessment. Patient continues to benefit from skilled intervention to address the above impairments. Continue to follow patient 5 times a week to address goals. Recommendation for discharge: (in order for the patient to meet his/her long term goals)  Therapy 3 hours per day 5-7 days per week    This discharge recommendation:  Has been made in collaboration with the attending provider and/or case management    Equipment recommendations for successful discharge (if) home: TBD       SUBJECTIVE:   Patient stated Thanks for your help.     OBJECTIVE DATA SUMMARY:   Cognitive/Behavioral Status:  Neurologic State: Alert     Cognition: Decreased attention/concentration  Perception: Appears intact  Perseveration: No perseveration noted  Safety/Judgement: Fall prevention;Decreased awareness of need for assistance;Decreased awareness of need for safety    Functional Mobility and Transfers for ADLs:  Bed Mobility:  Supine to Sit: Supervision;Bed Modified  Scooting: Supervision    Transfers:  Sit to Stand: Minimum assistance          Balance:  Sitting: Intact  Standing: Impaired; With support  Standing - Static: Fair;Constant support  Standing - Dynamic : Fair;Constant support    ADL Intervention:   Bowel hygiene: CGA    Grooming  Washing Hands: Contact guard assistance(standing)      Cognitive Retraining  Safety/Judgement: Fall prevention;Decreased awareness of need for assistance;Decreased awareness of need for safety    Pain:  0/10    Activity Tolerance:   Fair and requires rest breaks    After treatment patient left in no apparent distress:   Sitting in chair and Call bell within reach    COMMUNICATION/COLLABORATION:   The patients plan of care was discussed with: Physical Therapist and Registered Nurse    Estrada Maza OT  Time Calculation: 29 mins

## 2021-01-28 NOTE — PROGRESS NOTES
Problem: Mobility Impaired (Adult and Pediatric)  Goal: *Acute Goals and Plan of Care (Insert Text)  Description: FUNCTIONAL STATUS PRIOR TO ADMISSION: Patient was independent and active without use of DME.    HOME SUPPORT PRIOR TO ADMISSION: The patient lived with his wife and son but did not require assist.    Physical Therapy Goals  Initiated 1/15/2021; continue same goals as of 1/22/2021, Goals cont. 1/28/21  1. Patient will move from supine to sit and sit to supine , scoot up and down, and roll side to side in bed with independence within 7 day(s). 2.  Patient will transfer from bed to chair and chair to bed with modified independence using the least restrictive device within 7 day(s). 3.  Patient will perform sit to stand with modified independence within 7 day(s). 4.  Patient will ambulate with modified independence for 120 feet with the least restrictive device within 7 day(s). Outcome: Progressing Towards Goal   PHYSICAL THERAPY TREATMENT  Patient: Amanda Alvarez (22 y.o. male)  Date: 1/28/2021  Diagnosis: COVID-19 virus infection [U07.1]  Hypoxia [R09.02]  Acute respiratory insufficiency [R06.89]  Prerenal azotemia [R79.89]  Diarrhea [R19.7] COVID-19 virus infection       Precautions:    Chart, physical therapy assessment, plan of care and goals were reviewed. ASSESSMENT  Patient continues with skilled PT services and is progressing towards goals. Patient is received on 2L/min O2 with sats 98%. He demonstrates the ability to transition supine to sit with continued use of bed rails for assistance. Patient declines use of RW ambulating in and out of the restroom with Min A and hand held assistance of 1-2. Patient demonstrates altered center of gravity with posterior lean and widened stance. Without HHA he furniture walks. Brief gait trials is performed on RA but patient desaturates while seated on commode. He is placed back on 2L/min O2 with sats above 90%.  Patient is left in bedside chair with all needs met. Current Level of Function Impacting Discharge (mobility/balance): Min A provided HHA    Other factors to consider for discharge: medical stability, decreased balance, increased risk for falls          PLAN :  Patient continues to benefit from skilled intervention to address the above impairments. Continue treatment per established plan of care. to address goals. Recommendation for discharge: (in order for the patient to meet his/her long term goals)  Therapy 3 hours per day 5-7 days per week with pulmonary focus    This discharge recommendation:  Has been made in collaboration with the attending provider and/or case management    IF patient discharges home will need the following DME: to be determined (TBD)       SUBJECTIVE:   Patient stated i'm fine just shut the door.     OBJECTIVE DATA SUMMARY:   Critical Behavior:  Neurologic State: Alert  Orientation Level: Oriented to person  Cognition: Decreased attention/concentration, Impaired decision making, Poor safety awareness  Safety/Judgement: Decreased awareness of environment, Decreased awareness of need for assistance, Decreased awareness of need for safety, Decreased insight into deficits  Functional Mobility Training:  Bed Mobility:     Supine to Sit: Supervision;Bed Modified     Scooting: Supervision        Transfers:  Sit to Stand: Minimum assistance  Stand to Sit: Minimum assistance                             Balance:  Sitting: Intact  Standing: Impaired; With support  Standing - Static: Fair;Constant support  Standing - Dynamic : Fair;Constant support  Ambulation/Gait Training:  Distance (ft): 20 Feet (ft)  Assistive Device: Gait belt(bilateral HHA)  Ambulation - Level of Assistance: Minimal assistance        Gait Abnormalities: Path deviations;Decreased step clearance        Base of Support: Narrowed; Center of gravity altered     Speed/Krystle: Slow;Shuffled                       Stairs:               Therapeutic Exercises:     Pain Rating:      Activity Tolerance:   Fair and requires rest breaks    After treatment patient left in no apparent distress:   Sitting in chair, Call bell within reach, and Bed / chair alarm activated    COMMUNICATION/COLLABORATION:   The patients plan of care was discussed with: Occupational therapist and Registered nurse.      Kirill Pugh, PT   Time Calculation: 27 mins

## 2021-01-28 NOTE — PROGRESS NOTES
Bedside and Verbal shift change report given to Michael Vargas RN (oncoming nurse) by Tramaine Elise RN (offgoing nurse). Report included the following information SBAR, Kardex, ED Summary, Procedure Summary, MAR, Recent Results and Med Rec Status.

## 2021-01-28 NOTE — PROGRESS NOTES
Bedside and Verbal shift change report given to HANNAH Zuniga (oncoming nurse) by Gardenia Montes RN and Courtney Pierce RN (offgoing nurse). Report included the following information SBAR, Kardex, Intake/Output, MAR and Accordion.

## 2021-01-29 PROCEDURE — 74011250637 HC RX REV CODE- 250/637: Performed by: HOSPITALIST

## 2021-01-29 RX ADMIN — DIPHENHYDRAMINE HYDROCHLORIDE 25 MG: 25 CAPSULE ORAL at 01:21

## 2021-01-29 RX ADMIN — Medication 5 MG: at 01:20

## 2021-01-29 RX ADMIN — OXYCODONE 10 MG: 5 TABLET ORAL at 01:20

## 2021-01-29 NOTE — PROGRESS NOTES
[facility] presented to 5th Floor at appoximately 1745 but due to pending labs was unable to leave with patient. González Sarmiento MD notified. Stat labs were drawn. RN agreed to call [facility] for new travel time once labs resulted.

## 2021-01-29 NOTE — PROGRESS NOTES
2000    New ETA from [facility] for midnight. Primary RN updated.

## 2021-02-15 ENCOUNTER — OFFICE VISIT (OUTPATIENT)
Dept: CARDIOLOGY CLINIC | Age: 76
End: 2021-02-15
Payer: MEDICARE

## 2021-02-15 ENCOUNTER — CLINICAL SUPPORT (OUTPATIENT)
Dept: CARDIOLOGY CLINIC | Age: 76
End: 2021-02-15
Payer: MEDICARE

## 2021-02-15 VITALS
HEART RATE: 77 BPM | OXYGEN SATURATION: 95 % | SYSTOLIC BLOOD PRESSURE: 102 MMHG | DIASTOLIC BLOOD PRESSURE: 62 MMHG | WEIGHT: 190 LBS | BODY MASS INDEX: 27.2 KG/M2 | HEIGHT: 70 IN

## 2021-02-15 DIAGNOSIS — U07.1 COVID-19 VIRUS INFECTION: Primary | ICD-10-CM

## 2021-02-15 DIAGNOSIS — R00.1 BRADYCARDIA: ICD-10-CM

## 2021-02-15 DIAGNOSIS — R00.1 BRADYCARDIA: Primary | ICD-10-CM

## 2021-02-15 PROCEDURE — 93005 ELECTROCARDIOGRAM TRACING: CPT | Performed by: INTERNAL MEDICINE

## 2021-02-15 PROCEDURE — G0463 HOSPITAL OUTPT CLINIC VISIT: HCPCS | Performed by: INTERNAL MEDICINE

## 2021-02-15 PROCEDURE — G8419 CALC BMI OUT NRM PARAM NOF/U: HCPCS | Performed by: INTERNAL MEDICINE

## 2021-02-15 PROCEDURE — 99214 OFFICE O/P EST MOD 30 MIN: CPT | Performed by: INTERNAL MEDICINE

## 2021-02-15 PROCEDURE — 93227 XTRNL ECG REC<48 HR R&I: CPT | Performed by: INTERNAL MEDICINE

## 2021-02-15 PROCEDURE — 1101F PT FALLS ASSESS-DOCD LE1/YR: CPT | Performed by: INTERNAL MEDICINE

## 2021-02-15 PROCEDURE — 1111F DSCHRG MED/CURRENT MED MERGE: CPT | Performed by: INTERNAL MEDICINE

## 2021-02-15 PROCEDURE — G8428 CUR MEDS NOT DOCUMENT: HCPCS | Performed by: INTERNAL MEDICINE

## 2021-02-15 PROCEDURE — 93225 XTRNL ECG REC<48 HRS REC: CPT | Performed by: INTERNAL MEDICINE

## 2021-02-15 PROCEDURE — G8510 SCR DEP NEG, NO PLAN REQD: HCPCS | Performed by: INTERNAL MEDICINE

## 2021-02-15 PROCEDURE — G8536 NO DOC ELDER MAL SCRN: HCPCS | Performed by: INTERNAL MEDICINE

## 2021-02-15 NOTE — PATIENT INSTRUCTIONS
24-hour Holter monitor- Bradycardia Check echocardiogram for cardiac function. Check TSH and Free T4. Phone followup of the test results.

## 2021-02-15 NOTE — PROGRESS NOTES
Chief Complaint   Patient presents with   [facility] Follow Up     1/10 COVID 19    Slow Heart Rate    Abnormal EKG     Visit Vitals  /62 (BP 1 Location: Left upper arm, BP Patient Position: Sitting)   Pulse 77   Ht 5' 10\" (1.778 m)   Wt 190 lb (86.2 kg)   SpO2 95%   BMI 27.26 kg/m²     Chest pain denied   SOB pt states sob with exertion   Palpitations denied   Swelling in hands/feet denied   Dizziness denied   Recent hospital stays ED 1/10; [facility]  Refills denied

## 2021-02-15 NOTE — PROGRESS NOTES
Reason for New Visit: Bradycardia      HPI: Arden Zambrano is a 66 y.o. male with past medical history significant for spinal stenosis, history of diverticulitis s/p resection of the partial colon. He was recently admitted to the hospital on January 10, 2021 and was discharged home on January 28. He was admitted for Covid related infection and had hypoxia, acute respiratory failure, prerenal azotemia diarrhea. All of the symptoms have completely recovered. While in the hospital he was noted to have significant bradycardia with heart rate of 36 bpm.  He was not on any AV clemencia blocking agents. His troponin was negative. His electrolytes were normal.  He was observed and his heart rate slowly improved. Upon discharge he was asked to follow-up with cardiology for further evaluation of significant bradycardia. On my evaluation today he denies any previous cardiac history. He is known to have bradycardia. His father had bradycardia. EKG from recent hospital admission from January 13, 2021 was personally reviewed. EKG at that time demonstrated sinus bradycardia with heart rate of 46 bpm with normal axis, normal intervals, normal ST segment. We repeated EKG today. .  EKG demonstrated normal sinus rhythm, normal axis, normal intervals, normal ST segment. Ventricular rate 66 bpm.    Plan:    1. Bradycardia: This was likely incidental and related to significant hypoxemia at the time when he had significant Covid related infection. This appears to have completely resolved all unclear if he has any periods of bradycardia especially when he is resting. Will do a 24-hour Holter monitor. Check echocardiogram for cardiac function. Check TSH and Free T4. Avoid AV clemencia blocking agents. 2. Phone followup of the test results. ATTENTION:   This medical record was transcribed using an electronic medical records/speech recognition system.   Although proofread, it may and can contain electronic, spelling and other errors. Corrections may be executed at a later time. Please feel free to contact us for any clarifications as needed. Past Medical History:   Diagnosis Date    Diverticulitis     Gastrointestinal disorder     History of vascular access device 07/24/2017    [facility] VAT -  5FR triple Lumen Power PICC R Basilic  39 cm    Spinal stenosis             Past Surgical History:   Procedure Laterality Date    FLEXIBLE SIGMOIDOSCOPY N/A 7/27/2017    SIGMOIDOSCOPY FLEXIBLE performed by Maggy Figueroa MD at [facility]    IR INSERT NON TUNL CVC OVER 5 YRS  1/13/2021             No family history on file. Social History     Socioeconomic History    Marital status:      Spouse name: Not on file    Number of children: Not on file    Years of education: Not on file    Highest education level: Not on file   Occupational History    Not on file   Social Needs    Financial resource strain: Not on file    Food insecurity     Worry: Not on file     Inability: Not on file    Transportation needs     Medical: Not on file     Non-medical: Not on file   Tobacco Use    Smoking status: Former Smoker     Packs/day: 1.00     Years: 50.00     Pack years: 50.00    Smokeless tobacco: Current User   Substance and Sexual Activity    Alcohol use:  Yes     Alcohol/week: 0.8 standard drinks     Types: 1 Cans of beer per week    Drug use: No    Sexual activity: Not on file   Lifestyle    Physical activity     Days per week: Not on file     Minutes per session: Not on file    Stress: Not on file   Relationships    Social connections     Talks on phone: Not on file     Gets together: Not on file     Attends Evangelical service: Not on file     Active member of club or organization: Not on file     Attends meetings of clubs or organizations: Not on file     Relationship status: Not on file    Intimate partner violence     Fear of current or ex partner: Not on file     Emotionally abused: Not on file     Physically abused: Not on file     Forced sexual activity: Not on file   Other Topics Concern    Not on file   Social History Narrative    Not on file       No Known Allergies         Current Outpatient Medications   Medication Sig Dispense Refill    melatonin, rapid dissolve, 5 mg TbDi tablet Take 1 Tab by mouth nightly as needed for Other (insomnia). 30 Tab 0    acetaminophen (TYLENOL) 325 mg tablet Take 2 Tabs by mouth every six (6) hours as needed for Pain. 30 Tab 0    gabapentin (NEURONTIN) 600 mg tablet Take 300 mg by mouth three (3) times daily.  psyllium (METAMUCIL) packet Take 1 Packet by mouth two (2) times a day.  zinc gluconate 50 mg tablet Take 1 Tab by mouth daily. 30 Tab 0    ipratropium-albuteroL (COMBIVENT RESPIMAT)  mcg/actuation inhaler Take 1 Puff by inhalation every six (6) hours as needed for Wheezing. 1 Inhaler 0    famotidine (PEPCID) 20 mg tablet Take 1 Tab by mouth two (2) times a day. 60 Tab 0    ascorbic acid, vitamin C, (VITAMIN C) 500 mg tablet Take 1 Tab by mouth two (2) times a day. 60 Tab 0    dicyclomine (BENTYL) 20 mg tablet Take 20 mg by mouth every six (6) hours. Indications: irritable colon          ROS:  12 point review of systems was performed.  All negative except for HPI     Physical Exam:  Visit Vitals  /62 (BP 1 Location: Left upper arm, BP Patient Position: Sitting)   Pulse 77   Ht 5' 10\" (1.778 m)   Wt 190 lb (86.2 kg)   SpO2 95%   BMI 27.26 kg/m²       Gen:  Well-developed, well-nourished, in no acute distress  HEENT:  Pink conjunctivae, PERRL, hearing intact to voice, moist mucous membranes  Neck:  Supple, without masses, thyroid non-tender  Resp:  No accessory muscle use, clear breath sounds without wheezes rales or rhonchi  Card:  No murmurs, normal S1, S2 without thrills, bruits or peripheral edema  Abd:  Soft, non-tender, non-distended, normoactive bowel sounds are present, no palpable organomegaly and no detectable hernias  Lymph:  No cervical or inguinal adenopathy  Musc:  No cyanosis or clubbing  Skin:  No rashes or ulcers, skin turgor is good  Neuro:  Cranial nerves are grossly intact, no focal motor weakness, follows commands appropriately  Psych:  Good insight, oriented to person, place and time, alert     Labs:     Lab Results   Component Value Date/Time    WBC 6.3 01/28/2021 05:54 AM    HGB 12.4 01/28/2021 05:54 AM    Hemoglobin (POC) 15.6 02/23/2016 05:38 PM    HCT 37.8 01/28/2021 05:54 AM    Hematocrit (POC) 46 02/23/2016 05:38 PM    PLATELET 716 24/74/6529 05:54 AM    MCV 89.4 01/28/2021 05:54 AM     Lab Results   Component Value Date/Time    Glucose 110 (H) 01/28/2021 06:16 PM    Glucose (POC) 116 (H) 01/22/2021 05:22 AM    Creatinine (POC) 0.9 02/23/2016 05:38 PM    Creatinine 0.68 (L) 01/28/2021 06:16 PM      Lab Results   Component Value Date/Time    Triglyceride 64 08/02/2017 03:12 AM     Lab Results   Component Value Date/Time    ALT (SGPT) 74 01/27/2021 02:13 AM    Alk.  phosphatase 51 01/27/2021 02:13 AM    Bilirubin, total 0.8 01/27/2021 02:13 AM    Albumin 2.0 (L) 01/27/2021 02:13 AM    Protein, total 5.5 (L) 01/27/2021 02:13 AM    INR 1.4 (H) 01/23/2021 04:49 AM    Prothrombin time 13.9 (H) 01/23/2021 04:49 AM    PLATELET 985 08/34/3507 05:54 AM     Lab Results   Component Value Date/Time    INR 1.4 (H) 01/23/2021 04:49 AM    INR 1.4 (H) 01/22/2021 03:02 AM    INR 1.3 (H) 01/21/2021 01:24 AM    Prothrombin time 13.9 (H) 01/23/2021 04:49 AM    Prothrombin time 13.8 (H) 01/22/2021 03:02 AM    Prothrombin time 13.5 (H) 01/21/2021 01:24 AM      Lab Results   Component Value Date/Time    GFR est non-AA >60 01/28/2021 06:16 PM    GFRNA, POC >60 02/23/2016 05:38 PM    GFR est AA >60 01/28/2021 06:16 PM    GFRAA, POC >60 02/23/2016 05:38 PM    Creatinine 0.68 (L) 01/28/2021 06:16 PM    Creatinine (POC) 0.9 02/23/2016 05:38 PM    BUN 14 01/28/2021 06:16 PM    BUN (POC) 21 (H) 02/23/2016 05:38 PM    Sodium 140 01/28/2021 06:16 PM    Sodium (POC) 136 02/23/2016 05:38 PM    Potassium 3.4 (L) 01/28/2021 06:16 PM    Potassium (POC) 4.0 02/23/2016 05:38 PM    Chloride 105 01/28/2021 06:16 PM    Chloride (POC) 103 02/23/2016 05:38 PM    CO2 28 01/28/2021 06:16 PM    Magnesium 2.8 (H) 01/13/2021 03:51 AM    Phosphorus 2.6 01/13/2021 03:51 AM     No results found for: PSA, Demetrius Ort, [id number], [id number]  Lab Results   Component Value Date/Time    TSH 0.95 01/13/2021 03:51 AM      Lab Results   Component Value Date/Time    Glucose 110 (H) 01/28/2021 06:16 PM    Glucose (POC) 116 (H) 01/22/2021 05:22 AM      Lab Results   Component Value Date/Time    Troponin-I, Qt. <0.05 01/10/2021 11:18 PM      No results found for: BNP, BNPP, BNPPPOC, XBNPT, BNPNT   Lab Results   Component Value Date/Time    Sodium 140 01/28/2021 06:16 PM    Potassium 3.4 (L) 01/28/2021 06:16 PM    Chloride 105 01/28/2021 06:16 PM    CO2 28 01/28/2021 06:16 PM    Anion gap 7 01/28/2021 06:16 PM    Glucose 110 (H) 01/28/2021 06:16 PM    BUN 14 01/28/2021 06:16 PM    Creatinine 0.68 (L) 01/28/2021 06:16 PM    BUN/Creatinine ratio 21 (H) 01/28/2021 06:16 PM    GFR est AA >60 01/28/2021 06:16 PM    GFR est non-AA >60 01/28/2021 06:16 PM    Calcium 8.0 (L) 01/28/2021 06:16 PM      Lab Results   Component Value Date/Time    Sodium 140 01/28/2021 06:16 PM    Potassium 3.4 (L) 01/28/2021 06:16 PM    Chloride 105 01/28/2021 06:16 PM    CO2 28 01/28/2021 06:16 PM    Anion gap 7 01/28/2021 06:16 PM    Glucose 110 (H) 01/28/2021 06:16 PM    BUN 14 01/28/2021 06:16 PM    Creatinine 0.68 (L) 01/28/2021 06:16 PM    BUN/Creatinine ratio 21 (H) 01/28/2021 06:16 PM    GFR est AA >60 01/28/2021 06:16 PM    GFR est non-AA >60 01/28/2021 06:16 PM    Calcium 8.0 (L) 01/28/2021 06:16 PM    Bilirubin, total 0.8 01/27/2021 02:13 AM    ALT (SGPT) 74 01/27/2021 02:13 AM    Alk.  phosphatase 51 01/27/2021 02:13 AM    Protein, total 5.5 (L) 01/27/2021 02:13 AM    Albumin 2.0 (L) 01/27/2021 02:13 AM Globulin 3.5 01/27/2021 02:13 AM    A-G Ratio 0.6 (L) 01/27/2021 02:13 AM      No results found for: HBA1C, CCY8TWXX, HGBE8, UBV7SORU, JJB8ORJQ      No results for input(s): CPK, CKMB, TROIQ in the last 72 hours. No lab exists for component: CKQMB, CPKMB        Problem List:     Problem List  Date Reviewed: 7/29/2017          Codes Class Noted    Diarrhea ICD-10-CM: R19.7  ICD-9-CM: 787.91  1/11/2021        Acute respiratory insufficiency ICD-10-CM: R06.89  ICD-9-CM: 518.82  1/11/2021        Hypoxia ICD-10-CM: R09.02  ICD-9-CM: 799.02  1/11/2021        COVID-19 virus infection ICD-10-CM: U07.1  ICD-9-CM: 079.89  1/11/2021        Acute metabolic encephalopathy NXJ-28-CC: G93.41  ICD-9-CM: 348.31  1/11/2021        C. difficile colitis ICD-10-CM: A04.72  ICD-9-CM: 008.45  8/7/2017        Colitis, acute ICD-10-CM: K52.9  ICD-9-CM: 558.9  8/5/2017        Colitis ICD-10-CM: K52.9  ICD-9-CM: 558.9  7/21/2017                Sawyer Blanco MD, [facility] [place]

## 2021-02-15 NOTE — PROGRESS NOTES
All orders entered per VO of Dr. Jeri Luna:        24-hour Holter monitor- Bradycardia     Check echocardiogram for cardiac function.       Check TSH and Free T4. Phone followup of the test results. [employer] # [id number] placed on patient per VO of Dr Jeri Luna. Dx: Bradycardia    24 hr monitor placed at 1527. All instructions and Diary given to patient. Pt expressed understanding. Opportunities for questions, clarifications, and concerns provided.

## 2021-02-17 ENCOUNTER — TRANSCRIBE ORDER (OUTPATIENT)
Dept: SCHEDULING | Age: 76
End: 2021-02-17

## 2021-02-17 DIAGNOSIS — R93.89 ABNORMAL CHEST CT: Primary | ICD-10-CM

## 2021-02-19 ENCOUNTER — DOCUMENTATION ONLY (OUTPATIENT)
Dept: CARDIOLOGY CLINIC | Age: 76
End: 2021-02-19

## 2021-02-19 NOTE — PROGRESS NOTES
[employer] holter results:    Findings  Amelia Willis was in sinus with First degree AV block. The average heart rate, excluding ectopy, was 64 BPM with a minimum of 43 BPM at 05:15 D2 and a maximum of 114 BPM at 13:07 D2. Heart beats, including ectopy, totaled [phone] beats. There were no VENTRICULAR ectopics found. There were no SUPRAVENTRICULAR ECTOPICS found. Diary was submitted, symptoms/activities noted.  Triggered events noted    results sent to scanning

## 2021-03-02 ENCOUNTER — ANCILLARY PROCEDURE (OUTPATIENT)
Dept: CARDIOLOGY CLINIC | Age: 76
End: 2021-03-02
Payer: MEDICARE

## 2021-03-02 VITALS
DIASTOLIC BLOOD PRESSURE: 58 MMHG | SYSTOLIC BLOOD PRESSURE: 112 MMHG | BODY MASS INDEX: 27.2 KG/M2 | HEIGHT: 70 IN | WEIGHT: 190 LBS

## 2021-03-02 DIAGNOSIS — U07.1 COVID-19 VIRUS INFECTION: ICD-10-CM

## 2021-03-02 DIAGNOSIS — R00.1 BRADYCARDIA: ICD-10-CM

## 2021-03-02 PROCEDURE — 93306 TTE W/DOPPLER COMPLETE: CPT | Performed by: INTERNAL MEDICINE

## 2021-03-04 LAB
ECHO AO ROOT DIAM: 3.48 CM
ECHO AV AREA PEAK VELOCITY: 3.38 CM2
ECHO AV AREA VTI: 3.94 CM2
ECHO AV AREA/BSA PEAK VELOCITY: 1.7 CM2/M2
ECHO AV AREA/BSA VTI: 1.9 CM2/M2
ECHO AV MEAN GRADIENT: 6.21 MMHG
ECHO AV PEAK GRADIENT: 14.81 MMHG
ECHO AV PEAK VELOCITY: 192.41 CM/S
ECHO AV VTI: 34.91 CM
ECHO EST RA PRESSURE: 3 MMHG
ECHO LA AREA 4C: 25.18 CM2
ECHO LA MAJOR AXIS: 3.69 CM
ECHO LA MINOR AXIS: 1.81 CM
ECHO LA VOL 2C: 62.99 ML (ref 18–58)
ECHO LA VOL 4C: 83.75 ML (ref 18–58)
ECHO LA VOL BP: 77.93 ML (ref 18–58)
ECHO LA VOL/BSA BIPLANE: 38.16 ML/M2 (ref 16–28)
ECHO LA VOLUME INDEX A2C: 30.84 ML/M2 (ref 16–28)
ECHO LA VOLUME INDEX A4C: 41.01 ML/M2 (ref 16–28)
ECHO LV E' LATERAL VELOCITY: 11.32 CM/S
ECHO LV E' SEPTAL VELOCITY: 8.12 CM/S
ECHO LV EDV A2C: 99.69 ML
ECHO LV EDV A4C: 93.79 ML
ECHO LV EDV BP: 97.5 ML (ref 67–155)
ECHO LV EDV INDEX A4C: 45.9 ML/M2
ECHO LV EDV INDEX BP: 47.7 ML/M2
ECHO LV EDV NDEX A2C: 48.8 ML/M2
ECHO LV EJECTION FRACTION A2C: 64 PERCENT
ECHO LV EJECTION FRACTION A4C: 72 PERCENT
ECHO LV EJECTION FRACTION BIPLANE: 67.2 PERCENT (ref 55–100)
ECHO LV ESV A2C: 35.83 ML
ECHO LV ESV A4C: 26.31 ML
ECHO LV ESV BP: 32.01 ML (ref 22–58)
ECHO LV ESV INDEX A2C: 17.5 ML/M2
ECHO LV ESV INDEX A4C: 12.9 ML/M2
ECHO LV ESV INDEX BP: 15.7 ML/M2
ECHO LV INTERNAL DIMENSION DIASTOLIC: 4.95 CM (ref 4.2–5.9)
ECHO LV INTERNAL DIMENSION SYSTOLIC: 3.17 CM
ECHO LV IVSD: 0.79 CM (ref 0.6–1)
ECHO LV MASS 2D: 133.5 G (ref 88–224)
ECHO LV MASS INDEX 2D: 65.4 G/M2 (ref 49–115)
ECHO LV POSTERIOR WALL DIASTOLIC: 0.81 CM (ref 0.6–1)
ECHO LVOT DIAM: 2.4 CM
ECHO LVOT PEAK GRADIENT: 8.21 MMHG
ECHO LVOT PEAK VELOCITY: 143.26 CM/S
ECHO LVOT SV: 137.4 ML
ECHO LVOT VTI: 30.3 CM
ECHO MV A VELOCITY: 47.39 CM/S
ECHO MV E DECELERATION TIME (DT): 206.81 MS
ECHO MV E VELOCITY: 82.81 CM/S
ECHO MV E/A RATIO: 1.75
ECHO MV E/E' LATERAL: 7.32
ECHO MV E/E' RATIO (AVERAGED): 8.76
ECHO MV E/E' SEPTAL: 10.2
ECHO MV MAX VELOCITY: 98.58 CM/S
ECHO MV MEAN GRADIENT: 1.2 MMHG
ECHO MV PEAK GRADIENT: 3.89 MMHG
ECHO MV PRESSURE HALF TIME (PHT): 59.97 MS
ECHO MV REGURGITANT VTIA: 151.19 CM
ECHO MV VTI: 26.46 CM
ECHO RA AREA 4C: 16.22 CM2
ECHO RIGHT VENTRICULAR SYSTOLIC PRESSURE (RVSP): 43 MMHG
ECHO RV INTERNAL DIMENSION: 4.13 CM
ECHO RV TAPSE: 2.88 CM (ref 1.5–2)
ECHO TV REGURGITANT MAX VELOCITY: 314.6 CM/S
ECHO TV REGURGITANT PEAK GRADIENT: 39.59 MMHG
MR PISA PV: 497.71 CM/S

## 2021-03-14 NOTE — PROGRESS NOTES
Pls notify>>    No significant holter findings. [employer] holter results:     Findings  Kathrine Zaman was in sinus with First degree AV block. The average heart rate, excluding ectopy, was 64 BPM with a minimum of 43 BPM at 05:15 D2 and a maximum of 114 BPM at 13:07 D2. Heart beats, including ectopy, totaled [phone] beats. There were no VENTRICULAR ectopics found. There were no SUPRAVENTRICULAR ECTOPICS found. Diary was submitted, symptoms/activities noted.  Triggered events noted     results sent to scanning

## 2021-03-15 ENCOUNTER — TELEPHONE (OUTPATIENT)
Dept: CARDIOLOGY CLINIC | Age: 76
End: 2021-03-15

## 2021-03-15 NOTE — TELEPHONE ENCOUNTER
Per Dr. Crawford Shoulder, \"No significant holter findings\"    Spoke with pt, I identified with name and birth date. I informed the pt of Holter results and Echo results. Pt expressed understanding. Pt has no further questions or concerns at this time.

## 2021-03-22 ENCOUNTER — HOSPITAL ENCOUNTER (OUTPATIENT)
Dept: CT IMAGING | Age: 76
Discharge: HOME | End: 2021-03-22
Attending: NURSE PRACTITIONER
Payer: MEDICARE

## 2021-03-22 DIAGNOSIS — R93.89 ABNORMAL CHEST CT: ICD-10-CM

## 2021-03-22 PROCEDURE — 71250 CT THORAX DX C-: CPT

## 2021-03-31 ENCOUNTER — TRANSCRIBE ORDER (OUTPATIENT)
Dept: SCHEDULING | Age: 76
End: 2021-03-31

## 2021-03-31 DIAGNOSIS — R93.89 ABNORMAL CHEST CT: Primary | ICD-10-CM

## 2021-09-07 ENCOUNTER — TRANSCRIBE ORDER (OUTPATIENT)
Dept: SCHEDULING | Age: 76
End: 2021-09-07

## 2021-09-07 DIAGNOSIS — M47.817 LUMBOSACRAL SPONDYLOSIS WITHOUT MYELOPATHY: ICD-10-CM

## 2021-09-07 DIAGNOSIS — M48.062 LUMBAR STENOSIS WITH NEUROGENIC CLAUDICATION: Primary | ICD-10-CM

## 2021-09-07 DIAGNOSIS — R20.1 HYPOESTHESIA OF SKIN: ICD-10-CM

## 2021-09-14 ENCOUNTER — HOSPITAL ENCOUNTER (OUTPATIENT)
Dept: MRI IMAGING | Age: 76
Discharge: HOME | End: 2021-09-14
Attending: PHYSICAL MEDICINE & REHABILITATION
Payer: MEDICARE

## 2021-09-14 DIAGNOSIS — R20.1 HYPOESTHESIA OF SKIN: ICD-10-CM

## 2021-09-14 DIAGNOSIS — M48.062 LUMBAR STENOSIS WITH NEUROGENIC CLAUDICATION: ICD-10-CM

## 2021-09-14 DIAGNOSIS — M47.817 LUMBOSACRAL SPONDYLOSIS WITHOUT MYELOPATHY: ICD-10-CM

## 2021-09-14 PROCEDURE — 72148 MRI LUMBAR SPINE W/O DYE: CPT

## 2022-03-18 PROBLEM — K52.9 COLITIS, ACUTE: Status: ACTIVE | Noted: 2017-08-05

## 2022-03-18 PROBLEM — K52.9 COLITIS: Status: ACTIVE | Noted: 2017-07-21

## 2022-03-19 PROBLEM — U07.1 COVID-19 VIRUS INFECTION: Status: ACTIVE | Noted: 2021-01-11

## 2022-03-19 PROBLEM — A04.72 C. DIFFICILE COLITIS: Status: ACTIVE | Noted: 2017-08-07

## 2022-03-19 PROBLEM — R19.7 DIARRHEA: Status: ACTIVE | Noted: 2021-01-11

## 2022-03-19 PROBLEM — R09.02 HYPOXIA: Status: ACTIVE | Noted: 2021-01-11

## 2022-03-19 PROBLEM — R06.89 ACUTE RESPIRATORY INSUFFICIENCY: Status: ACTIVE | Noted: 2021-01-11

## 2022-03-19 PROBLEM — G93.41 ACUTE METABOLIC ENCEPHALOPATHY: Status: ACTIVE | Noted: 2021-01-11

## 2023-01-09 ENCOUNTER — APPOINTMENT (OUTPATIENT)
Dept: CT IMAGING | Age: 78
DRG: 477 | End: 2023-01-09
Attending: STUDENT IN AN ORGANIZED HEALTH CARE EDUCATION/TRAINING PROGRAM
Payer: MEDICARE

## 2023-01-09 ENCOUNTER — HOSPITAL ENCOUNTER (INPATIENT)
Age: 78
LOS: 13 days | Discharge: HOME HEALTH | DRG: 477 | End: 2023-01-23
Attending: STUDENT IN AN ORGANIZED HEALTH CARE EDUCATION/TRAINING PROGRAM | Admitting: INTERNAL MEDICINE
Payer: MEDICARE

## 2023-01-09 DIAGNOSIS — E83.52 HYPERCALCEMIA: ICD-10-CM

## 2023-01-09 DIAGNOSIS — C79.9 METASTATIC ADENOCARCINOMA OF UNKNOWN ORIGIN (HCC): ICD-10-CM

## 2023-01-09 DIAGNOSIS — R00.1 SINUS BRADYCARDIA: ICD-10-CM

## 2023-01-09 DIAGNOSIS — M25.511 ACUTE PAIN OF RIGHT SHOULDER: ICD-10-CM

## 2023-01-09 DIAGNOSIS — C79.51 BONY METASTASIS (HCC): ICD-10-CM

## 2023-01-09 DIAGNOSIS — C80.1 PRIMARY CANCER OF UNKNOWN SITE (HCC): ICD-10-CM

## 2023-01-09 DIAGNOSIS — R41.82 ALTERED MENTAL STATUS, UNSPECIFIED ALTERED MENTAL STATUS TYPE: Primary | ICD-10-CM

## 2023-01-09 DIAGNOSIS — N17.9 AKI (ACUTE KIDNEY INJURY) (HCC): ICD-10-CM

## 2023-01-09 DIAGNOSIS — R40.4 TRANSIENT ALTERATION OF AWARENESS: ICD-10-CM

## 2023-01-09 DIAGNOSIS — G89.3 CANCER RELATED PAIN: ICD-10-CM

## 2023-01-09 LAB
ALBUMIN SERPL-MCNC: 3.4 G/DL (ref 3.5–5)
ALBUMIN/GLOB SERPL: 0.7 (ref 1.1–2.2)
ALP SERPL-CCNC: 133 U/L (ref 45–117)
ALT SERPL-CCNC: 31 U/L (ref 12–78)
ANION GAP SERPL CALC-SCNC: 9 MMOL/L (ref 5–15)
APPEARANCE UR: ABNORMAL
AST SERPL-CCNC: 43 U/L (ref 15–37)
BACTERIA URNS QL MICRO: NEGATIVE /HPF
BASOPHILS # BLD: 0 K/UL (ref 0–0.1)
BASOPHILS NFR BLD: 0 % (ref 0–1)
BILIRUB SERPL-MCNC: 0.6 MG/DL (ref 0.2–1)
BILIRUB UR QL: NEGATIVE
BUN SERPL-MCNC: 27 MG/DL (ref 6–20)
BUN/CREAT SERPL: 20 (ref 12–20)
CALCIUM SERPL-MCNC: 12.5 MG/DL (ref 8.5–10.1)
CHLORIDE SERPL-SCNC: 102 MMOL/L (ref 97–108)
CO2 SERPL-SCNC: 28 MMOL/L (ref 21–32)
COLOR UR: ABNORMAL
COMMENT, HOLDF: NORMAL
CREAT SERPL-MCNC: 1.37 MG/DL (ref 0.7–1.3)
DIFFERENTIAL METHOD BLD: ABNORMAL
EOSINOPHIL # BLD: 0 K/UL (ref 0–0.4)
EOSINOPHIL NFR BLD: 0 % (ref 0–7)
EPITH CASTS URNS QL MICRO: ABNORMAL /LPF
ERYTHROCYTE [DISTWIDTH] IN BLOOD BY AUTOMATED COUNT: 13 % (ref 11.5–14.5)
GLOBULIN SER CALC-MCNC: 4.6 G/DL (ref 2–4)
GLUCOSE SERPL-MCNC: 122 MG/DL (ref 65–100)
GLUCOSE UR STRIP.AUTO-MCNC: NEGATIVE MG/DL
HCT VFR BLD AUTO: 46.6 % (ref 36.6–50.3)
HGB BLD-MCNC: 15.9 G/DL (ref 12.1–17)
HGB UR QL STRIP: NEGATIVE
HYALINE CASTS URNS QL MICRO: ABNORMAL /LPF (ref 0–2)
IMM GRANULOCYTES # BLD AUTO: 0.1 K/UL (ref 0–0.04)
IMM GRANULOCYTES NFR BLD AUTO: 1 % (ref 0–0.5)
KETONES UR QL STRIP.AUTO: NEGATIVE MG/DL
LEUKOCYTE ESTERASE UR QL STRIP.AUTO: NEGATIVE
LYMPHOCYTES # BLD: 1.3 K/UL (ref 0.8–3.5)
LYMPHOCYTES NFR BLD: 10 % (ref 12–49)
MCH RBC QN AUTO: 29.6 PG (ref 26–34)
MCHC RBC AUTO-ENTMCNC: 34.1 G/DL (ref 30–36.5)
MCV RBC AUTO: 86.8 FL (ref 80–99)
MONOCYTES # BLD: 1.1 K/UL (ref 0–1)
MONOCYTES NFR BLD: 9 % (ref 5–13)
NEUTS SEG # BLD: 10 K/UL (ref 1.8–8)
NEUTS SEG NFR BLD: 80 % (ref 32–75)
NITRITE UR QL STRIP.AUTO: NEGATIVE
NRBC # BLD: 0 K/UL (ref 0–0.01)
NRBC BLD-RTO: 0 PER 100 WBC
PH UR STRIP: 6.5 (ref 5–8)
PLATELET # BLD AUTO: 220 K/UL (ref 150–400)
PMV BLD AUTO: 9.9 FL (ref 8.9–12.9)
POTASSIUM SERPL-SCNC: 3.7 MMOL/L (ref 3.5–5.1)
PROT SERPL-MCNC: 8 G/DL (ref 6.4–8.2)
PROT UR STRIP-MCNC: NEGATIVE MG/DL
RBC # BLD AUTO: 5.37 M/UL (ref 4.1–5.7)
RBC #/AREA URNS HPF: ABNORMAL /HPF (ref 0–5)
SAMPLES BEING HELD,HOLD: NORMAL
SODIUM SERPL-SCNC: 139 MMOL/L (ref 136–145)
SP GR UR REFRACTOMETRY: 1.01 (ref 1–1.03)
TROPONIN-HIGH SENSITIVITY: 24 NG/L (ref 0–76)
UR CULT HOLD, URHOLD: NORMAL
UROBILINOGEN UR QL STRIP.AUTO: 1 EU/DL (ref 0.2–1)
WBC # BLD AUTO: 12.5 K/UL (ref 4.1–11.1)
WBC URNS QL MICRO: ABNORMAL /HPF (ref 0–4)

## 2023-01-09 PROCEDURE — 74011250637 HC RX REV CODE- 250/637: Performed by: EMERGENCY MEDICINE

## 2023-01-09 PROCEDURE — 74011250636 HC RX REV CODE- 250/636: Performed by: STUDENT IN AN ORGANIZED HEALTH CARE EDUCATION/TRAINING PROGRAM

## 2023-01-09 PROCEDURE — 99285 EMERGENCY DEPT VISIT HI MDM: CPT

## 2023-01-09 PROCEDURE — 36415 COLL VENOUS BLD VENIPUNCTURE: CPT

## 2023-01-09 PROCEDURE — 96361 HYDRATE IV INFUSION ADD-ON: CPT

## 2023-01-09 PROCEDURE — 81001 URINALYSIS AUTO W/SCOPE: CPT

## 2023-01-09 PROCEDURE — 70450 CT HEAD/BRAIN W/O DYE: CPT

## 2023-01-09 PROCEDURE — 96360 HYDRATION IV INFUSION INIT: CPT

## 2023-01-09 PROCEDURE — 84484 ASSAY OF TROPONIN QUANT: CPT

## 2023-01-09 PROCEDURE — 80053 COMPREHEN METABOLIC PANEL: CPT

## 2023-01-09 PROCEDURE — 85025 COMPLETE CBC W/AUTO DIFF WBC: CPT

## 2023-01-09 RX ORDER — ACETAMINOPHEN 500 MG
1000 TABLET ORAL ONCE
Status: COMPLETED | OUTPATIENT
Start: 2023-01-09 | End: 2023-01-09

## 2023-01-09 RX ADMIN — SODIUM CHLORIDE 1000 ML: 9 INJECTION, SOLUTION INTRAVENOUS at 21:13

## 2023-01-09 RX ADMIN — ACETAMINOPHEN 1000 MG: 500 TABLET, FILM COATED ORAL at 23:25

## 2023-01-10 ENCOUNTER — APPOINTMENT (OUTPATIENT)
Dept: GENERAL RADIOLOGY | Age: 78
DRG: 477 | End: 2023-01-10
Attending: NURSE PRACTITIONER
Payer: MEDICARE

## 2023-01-10 ENCOUNTER — HOSPITAL ENCOUNTER (INPATIENT)
Dept: MRI IMAGING | Age: 78
Discharge: HOME | DRG: 477 | End: 2023-01-10
Attending: NURSE PRACTITIONER
Payer: MEDICARE

## 2023-01-10 LAB
ALBUMIN SERPL-MCNC: 2.5 G/DL (ref 3.5–5)
ALBUMIN/GLOB SERPL: 0.7 (ref 1.1–2.2)
ALP SERPL-CCNC: 96 U/L (ref 45–117)
ALT SERPL-CCNC: 26 U/L (ref 12–78)
AMMONIA PLAS-SCNC: <10 UMOL/L
ANION GAP SERPL CALC-SCNC: 6 MMOL/L (ref 5–15)
AST SERPL-CCNC: 31 U/L (ref 15–37)
BASOPHILS # BLD: 0 K/UL (ref 0–0.1)
BASOPHILS NFR BLD: 0 % (ref 0–1)
BILIRUB SERPL-MCNC: 0.4 MG/DL (ref 0.2–1)
BUN SERPL-MCNC: 35 MG/DL (ref 6–20)
BUN/CREAT SERPL: 29 (ref 12–20)
CALCIUM SERPL-MCNC: 11.2 MG/DL (ref 8.5–10.1)
CHLORIDE SERPL-SCNC: 107 MMOL/L (ref 97–108)
CO2 SERPL-SCNC: 28 MMOL/L (ref 21–32)
CREAT SERPL-MCNC: 1.19 MG/DL (ref 0.7–1.3)
DIFFERENTIAL METHOD BLD: ABNORMAL
EOSINOPHIL # BLD: 0.1 K/UL (ref 0–0.4)
EOSINOPHIL NFR BLD: 1 % (ref 0–7)
ERYTHROCYTE [DISTWIDTH] IN BLOOD BY AUTOMATED COUNT: 12.9 % (ref 11.5–14.5)
ERYTHROCYTE [DISTWIDTH] IN BLOOD BY AUTOMATED COUNT: 13 % (ref 11.5–14.5)
GLOBULIN SER CALC-MCNC: 3.7 G/DL (ref 2–4)
GLUCOSE SERPL-MCNC: 109 MG/DL (ref 65–100)
HCT VFR BLD AUTO: 38.7 % (ref 36.6–50.3)
HCT VFR BLD AUTO: 40.1 % (ref 36.6–50.3)
HGB BLD-MCNC: 12.5 G/DL (ref 12.1–17)
HGB BLD-MCNC: 13.2 G/DL (ref 12.1–17)
IMM GRANULOCYTES # BLD AUTO: 0.1 K/UL (ref 0–0.04)
IMM GRANULOCYTES NFR BLD AUTO: 1 % (ref 0–0.5)
LYMPHOCYTES # BLD: 1.3 K/UL (ref 0.8–3.5)
LYMPHOCYTES NFR BLD: 15 % (ref 12–49)
MCH RBC QN AUTO: 29.3 PG (ref 26–34)
MCH RBC QN AUTO: 29.3 PG (ref 26–34)
MCHC RBC AUTO-ENTMCNC: 32.3 G/DL (ref 30–36.5)
MCHC RBC AUTO-ENTMCNC: 32.9 G/DL (ref 30–36.5)
MCV RBC AUTO: 89.1 FL (ref 80–99)
MCV RBC AUTO: 90.8 FL (ref 80–99)
MONOCYTES # BLD: 0.8 K/UL (ref 0–1)
MONOCYTES NFR BLD: 9 % (ref 5–13)
NEUTS SEG # BLD: 6.4 K/UL (ref 1.8–8)
NEUTS SEG NFR BLD: 74 % (ref 32–75)
NRBC # BLD: 0 K/UL (ref 0–0.01)
NRBC # BLD: 0 K/UL (ref 0–0.01)
NRBC BLD-RTO: 0 PER 100 WBC
NRBC BLD-RTO: 0 PER 100 WBC
PLATELET # BLD AUTO: 179 K/UL (ref 150–400)
PLATELET # BLD AUTO: 187 K/UL (ref 150–400)
PMV BLD AUTO: 9.8 FL (ref 8.9–12.9)
PMV BLD AUTO: 9.9 FL (ref 8.9–12.9)
POTASSIUM SERPL-SCNC: 3.7 MMOL/L (ref 3.5–5.1)
PROT SERPL-MCNC: 6.2 G/DL (ref 6.4–8.2)
RBC # BLD AUTO: 4.26 M/UL (ref 4.1–5.7)
RBC # BLD AUTO: 4.5 M/UL (ref 4.1–5.7)
SODIUM SERPL-SCNC: 141 MMOL/L (ref 136–145)
TSH SERPL DL<=0.05 MIU/L-ACNC: 1.35 UIU/ML (ref 0.36–3.74)
VIT B12 SERPL-MCNC: 1602 PG/ML (ref 193–986)
WBC # BLD AUTO: 10.1 K/UL (ref 4.1–11.1)
WBC # BLD AUTO: 8.7 K/UL (ref 4.1–11.1)

## 2023-01-10 PROCEDURE — 74011250636 HC RX REV CODE- 250/636: Performed by: INTERNAL MEDICINE

## 2023-01-10 PROCEDURE — 85025 COMPLETE CBC W/AUTO DIFF WBC: CPT

## 2023-01-10 PROCEDURE — 74011250637 HC RX REV CODE- 250/637: Performed by: INTERNAL MEDICINE

## 2023-01-10 PROCEDURE — 74011000250 HC RX REV CODE- 250: Performed by: NURSE PRACTITIONER

## 2023-01-10 PROCEDURE — 65270000029 HC RM PRIVATE

## 2023-01-10 PROCEDURE — 85027 COMPLETE CBC AUTOMATED: CPT

## 2023-01-10 PROCEDURE — 36415 COLL VENOUS BLD VENIPUNCTURE: CPT

## 2023-01-10 PROCEDURE — 82140 ASSAY OF AMMONIA: CPT

## 2023-01-10 PROCEDURE — 97162 PT EVAL MOD COMPLEX 30 MIN: CPT

## 2023-01-10 PROCEDURE — 73030 X-RAY EXAM OF SHOULDER: CPT

## 2023-01-10 PROCEDURE — 80053 COMPREHEN METABOLIC PANEL: CPT

## 2023-01-10 PROCEDURE — 97116 GAIT TRAINING THERAPY: CPT

## 2023-01-10 PROCEDURE — 84443 ASSAY THYROID STIM HORMONE: CPT

## 2023-01-10 PROCEDURE — 73221 MRI JOINT UPR EXTREM W/O DYE: CPT

## 2023-01-10 PROCEDURE — 74011000250 HC RX REV CODE- 250: Performed by: INTERNAL MEDICINE

## 2023-01-10 PROCEDURE — 82607 VITAMIN B-12: CPT

## 2023-01-10 RX ORDER — ACETAMINOPHEN 325 MG/1
650 TABLET ORAL
Status: DISCONTINUED | OUTPATIENT
Start: 2023-01-10 | End: 2023-01-23 | Stop reason: HOSPADM

## 2023-01-10 RX ORDER — SODIUM CHLORIDE 0.9 % (FLUSH) 0.9 %
5-40 SYRINGE (ML) INJECTION EVERY 8 HOURS
Status: DISCONTINUED | OUTPATIENT
Start: 2023-01-10 | End: 2023-01-23 | Stop reason: HOSPADM

## 2023-01-10 RX ORDER — ACETAMINOPHEN 650 MG/1
650 SUPPOSITORY RECTAL
Status: DISCONTINUED | OUTPATIENT
Start: 2023-01-10 | End: 2023-01-23 | Stop reason: HOSPADM

## 2023-01-10 RX ORDER — LANOLIN ALCOHOL/MO/W.PET/CERES
5 CREAM (GRAM) TOPICAL
Status: DISCONTINUED | OUTPATIENT
Start: 2023-01-10 | End: 2023-01-20

## 2023-01-10 RX ORDER — LIDOCAINE 4 G/100G
2 PATCH TOPICAL EVERY 24 HOURS
Status: DISCONTINUED | OUTPATIENT
Start: 2023-01-10 | End: 2023-01-23 | Stop reason: HOSPADM

## 2023-01-10 RX ORDER — ENOXAPARIN SODIUM 100 MG/ML
40 INJECTION SUBCUTANEOUS DAILY
Status: DISCONTINUED | OUTPATIENT
Start: 2023-01-10 | End: 2023-01-23 | Stop reason: HOSPADM

## 2023-01-10 RX ORDER — SODIUM CHLORIDE, SODIUM LACTATE, POTASSIUM CHLORIDE, CALCIUM CHLORIDE 600; 310; 30; 20 MG/100ML; MG/100ML; MG/100ML; MG/100ML
125 INJECTION, SOLUTION INTRAVENOUS CONTINUOUS
Status: DISCONTINUED | OUTPATIENT
Start: 2023-01-10 | End: 2023-01-10

## 2023-01-10 RX ORDER — KETOROLAC TROMETHAMINE 30 MG/ML
15 INJECTION, SOLUTION INTRAMUSCULAR; INTRAVENOUS
Status: DISPENSED | OUTPATIENT
Start: 2023-01-10 | End: 2023-01-15

## 2023-01-10 RX ORDER — POLYETHYLENE GLYCOL 3350 17 G/17G
17 POWDER, FOR SOLUTION ORAL DAILY PRN
Status: DISCONTINUED | OUTPATIENT
Start: 2023-01-10 | End: 2023-01-18

## 2023-01-10 RX ORDER — ONDANSETRON 4 MG/1
4 TABLET, ORALLY DISINTEGRATING ORAL
Status: DISCONTINUED | OUTPATIENT
Start: 2023-01-10 | End: 2023-01-23 | Stop reason: HOSPADM

## 2023-01-10 RX ORDER — FAMOTIDINE 20 MG/1
20 TABLET, FILM COATED ORAL 2 TIMES DAILY
Status: DISCONTINUED | OUTPATIENT
Start: 2023-01-10 | End: 2023-01-23 | Stop reason: HOSPADM

## 2023-01-10 RX ORDER — SODIUM CHLORIDE 9 MG/ML
75 INJECTION, SOLUTION INTRAVENOUS CONTINUOUS
Status: DISCONTINUED | OUTPATIENT
Start: 2023-01-10 | End: 2023-01-11

## 2023-01-10 RX ORDER — SODIUM CHLORIDE 0.9 % (FLUSH) 0.9 %
5-40 SYRINGE (ML) INJECTION AS NEEDED
Status: DISCONTINUED | OUTPATIENT
Start: 2023-01-10 | End: 2023-01-23 | Stop reason: HOSPADM

## 2023-01-10 RX ORDER — ONDANSETRON 2 MG/ML
4 INJECTION INTRAMUSCULAR; INTRAVENOUS
Status: DISCONTINUED | OUTPATIENT
Start: 2023-01-10 | End: 2023-01-23 | Stop reason: HOSPADM

## 2023-01-10 RX ADMIN — Medication 10 ML: at 14:39

## 2023-01-10 RX ADMIN — SODIUM CHLORIDE 75 ML/HR: 9 INJECTION, SOLUTION INTRAVENOUS at 09:03

## 2023-01-10 RX ADMIN — ENOXAPARIN SODIUM 40 MG: 100 INJECTION SUBCUTANEOUS at 09:04

## 2023-01-10 RX ADMIN — KETOROLAC TROMETHAMINE 15 MG: 30 INJECTION, SOLUTION INTRAMUSCULAR; INTRAVENOUS at 10:05

## 2023-01-10 RX ADMIN — KETOROLAC TROMETHAMINE 15 MG: 30 INJECTION, SOLUTION INTRAMUSCULAR; INTRAVENOUS at 20:54

## 2023-01-10 RX ADMIN — KETOROLAC TROMETHAMINE 15 MG: 30 INJECTION, SOLUTION INTRAMUSCULAR; INTRAVENOUS at 03:05

## 2023-01-10 RX ADMIN — Medication 10 ML: at 21:03

## 2023-01-10 RX ADMIN — FAMOTIDINE 20 MG: 20 TABLET, FILM COATED ORAL at 20:54

## 2023-01-10 RX ADMIN — Medication 4.5 MG: at 03:04

## 2023-01-10 RX ADMIN — SODIUM CHLORIDE, POTASSIUM CHLORIDE, SODIUM LACTATE AND CALCIUM CHLORIDE 125 ML/HR: 600; 310; 30; 20 INJECTION, SOLUTION INTRAVENOUS at 03:07

## 2023-01-10 RX ADMIN — PSYLLIUM HUSK 1 PACKET: 3.4 POWDER ORAL at 21:02

## 2023-01-10 RX ADMIN — ACETAMINOPHEN 650 MG: 325 TABLET ORAL at 15:49

## 2023-01-10 RX ADMIN — Medication 4.5 MG: at 20:54

## 2023-01-10 NOTE — CONSULTS
ORTHOPAEDIC CONSULT NOTE    Subjective:     Date of Consultation:  January 10, 2023      Florina Burroughs is a 76 y.o. male with PMH of sciatica who is being seen for R shoulder pain with recent history of R rotator cuff tear/strain. Per chart review and discussion with pt and his wife today the pt injured is R shoulder in late November and has been seen several times at [facility] with last follow up visit with Dr. Lanza [place]uya on 12/30/22. At this last visit the pt also reported back strain secondary to decreased ability to use RUE to get out of bed, the plan of care at that visit was to continue with conservative care with heat, NSAIDs, and started of flexeril for spasms. Per wife she discovered the pt was taking 2 tabs of flexeril in the AM which caused drowsiness and falls which promoted the visit to the ED. This afternoon pt is still mildly lethargic and slow to answer questions. + pain in shoulder, mild pain in back per pt. Patient Active Problem List    Diagnosis Date Noted    Diarrhea 01/11/2021    Acute respiratory insufficiency 01/11/2021    Hypoxia 01/11/2021    COVID-19 virus infection 69/11/0952    Acute metabolic encephalopathy 13/78/4177    C. difficile colitis 08/07/2017    Colitis, acute 08/05/2017    Colitis 07/21/2017     No family history on file. Social History     Tobacco Use    Smoking status: Former     Packs/day: 1.00     Years: 50.00     Pack years: 50.00     Types: Cigarettes    Smokeless tobacco: Current   Substance Use Topics    Alcohol use:  Yes     Alcohol/week: 0.8 standard drinks     Types: 1 Cans of beer per week     Past Medical History:   Diagnosis Date    Diverticulitis     Gastrointestinal disorder     History of vascular access device 07/24/2017    [facility] VAT -  5FR triple Lumen Power PICC R Basilic  39 cm    Spinal stenosis       Past Surgical History:   Procedure Laterality Date    FLEXIBLE SIGMOIDOSCOPY N/A 7/27/2017    SIGMOIDOSCOPY FLEXIBLE performed by Tsering High MD at [facility] ENDOSCOPY    IR INSERT NON TUNL CVC OVER 5 YRS  1/13/2021      Prior to Admission medications    Medication Sig Start Date End Date Taking? Authorizing Provider   zinc gluconate 50 mg tablet Take 1 Tab by mouth daily. 1/29/21   Myles Khan DO   melatonin, rapid dissolve, 5 mg TbDi tablet Take 1 Tab by mouth nightly as needed for Other (insomnia). 1/28/21   Myles Khan DO   ipratropium-albuteroL (COMBIVENT RESPIMAT)  mcg/actuation inhaler Take 1 Puff by inhalation every six (6) hours as needed for Wheezing. 1/28/21   Myles Khan DO   famotidine (PEPCID) 20 mg tablet Take 1 Tab by mouth two (2) times a day. 1/28/21   Myles Khan DO   ascorbic acid, vitamin C, (VITAMIN C) 500 mg tablet Take 1 Tab by mouth two (2) times a day. 1/28/21   Myles Khan DO   acetaminophen (TYLENOL) 325 mg tablet Take 2 Tabs by mouth every six (6) hours as needed for Pain. 1/28/21   Myles Khan DO   dicyclomine (BENTYL) 20 mg tablet Take 20 mg by mouth every six (6) hours. Indications: irritable colon    Provider, Historical   gabapentin (NEURONTIN) 600 mg tablet Take 300 mg by mouth three (3) times daily. Provider, Historical   psyllium (METAMUCIL) packet Take 1 Packet by mouth two (2) times a day.     Provider, Historical     Current Facility-Administered Medications   Medication Dose Route Frequency    melatonin tablet 4.5 mg  4.5 mg Oral QHS    sodium chloride (NS) flush 5-40 mL  5-40 mL IntraVENous Q8H    sodium chloride (NS) flush 5-40 mL  5-40 mL IntraVENous PRN    acetaminophen (TYLENOL) tablet 650 mg  650 mg Oral Q6H PRN    Or    acetaminophen (TYLENOL) suppository 650 mg  650 mg Rectal Q6H PRN    polyethylene glycol (MIRALAX) packet 17 g  17 g Oral DAILY PRN    ondansetron (ZOFRAN ODT) tablet 4 mg  4 mg Oral Q8H PRN    Or    ondansetron (ZOFRAN) injection 4 mg  4 mg IntraVENous Q6H PRN    enoxaparin (LOVENOX) injection 40 mg  40 mg SubCUTAneous DAILY    ketorolac (TORADOL) injection 15 mg  15 mg IntraVENous Q6H PRN    0.9% sodium chloride infusion  75 mL/hr IntraVENous CONTINUOUS     Current Outpatient Medications   Medication Sig    zinc gluconate 50 mg tablet Take 1 Tab by mouth daily. melatonin, rapid dissolve, 5 mg TbDi tablet Take 1 Tab by mouth nightly as needed for Other (insomnia). ipratropium-albuteroL (COMBIVENT RESPIMAT)  mcg/actuation inhaler Take 1 Puff by inhalation every six (6) hours as needed for Wheezing. famotidine (PEPCID) 20 mg tablet Take 1 Tab by mouth two (2) times a day. ascorbic acid, vitamin C, (VITAMIN C) 500 mg tablet Take 1 Tab by mouth two (2) times a day. acetaminophen (TYLENOL) 325 mg tablet Take 2 Tabs by mouth every six (6) hours as needed for Pain. dicyclomine (BENTYL) 20 mg tablet Take 20 mg by mouth every six (6) hours. Indications: irritable colon    gabapentin (NEURONTIN) 600 mg tablet Take 300 mg by mouth three (3) times daily. psyllium (METAMUCIL) packet Take 1 Packet by mouth two (2) times a day. No Known Allergies     Review of Systems:  A comprehensive review of systems was negative except for that written in the HPI. Mental Status:  lethargic     Objective:     Patient Vitals for the past 8 hrs:   BP Temp Pulse Resp SpO2   01/10/23 1140 (!) 150/68 98.6 °F (37 °C) 68 19 94 %   01/10/23 0939 139/67 98.5 °F (36.9 °C) 74 22 92 %     Temp (24hrs), Av.2 °F (36.8 °C), Min:97.6 °F (36.4 °C), Max:98.6 °F (37 °C)      Gen: Well-developed,  in no acute distress, lethargic   Musc: RUE - + TTP of anterior shoulder, bicep NTTP,  posterior NTTP, no shoulder edema/ effusion/ erythema/ warmth noted, AROM of R shoulder limited by pain with flexion to 120 with abduction 80 with ability to PROM an additional 10-20 degress forward flexion, negative drip test, NVI strength 4+/5. Spine NTTP with mild TTP of paraspinal muscles, no notable spasms on exam, no back pain with bed mobility, bed mobility limited by R shoulder pain. Skin: No skin breakdown noted. Skin warm, pink, dr    Imaging Review:   Narrative & Impression   EXAM: XR SHOULDER RT AP/LAT MIN 2 V     INDICATION: pain and falls. COMPARISON: CTA chest 3/22/2021, chest radiograph 1/13/2021. FINDINGS: Three views of the right shoulder demonstrate mottled lucencies and  apparent cortical disruption at the inferior aspect of the glenoid and posterior  aspect of the acromion, not definitely seen on 1/13/2021 chest radiograph. Moderate acromioclavicular and mild glenohumeral osteoarthritis. Included soft  tissues are grossly unremarkable. IMPRESSION  1. Mottled lucencies and apparent cortical disruption at the inferior aspect of  the glenoid and posterior aspect of the acromion, not definitely seen on  1/13/2021 chest radiograph. Aggressive lytic osseous lesions +/- pathologic  fractures may have this appearance. A nonemergent CT versus contrast-enhanced  MRI of the shoulder is recommended for better characterization. 2.  Mild to moderate osteoarthritis. 23X       Labs:   Recent Results (from the past 24 hour(s))   SAMPLES BEING HELD    Collection Time: 01/09/23  8:04 PM   Result Value Ref Range    SAMPLES BEING HELD  1SST, 1RED     COMMENT        Add-on orders for these samples will be processed based on acceptable specimen integrity and analyte stability, which may vary by analyte. CBC WITH AUTOMATED DIFF    Collection Time: 01/09/23  8:04 PM   Result Value Ref Range    WBC 12.5 (H) 4.1 - 11.1 K/uL    RBC 5.37 4.10 - 5.70 M/uL    HGB 15.9 12.1 - 17.0 g/dL    HCT 46.6 36.6 - 50.3 %    MCV 86.8 80.0 - 99.0 FL    MCH 29.6 26.0 - 34.0 PG    MCHC 34.1 30.0 - 36.5 g/dL    RDW 13.0 11.5 - 14.5 %    PLATELET 932 145 - 033 K/uL    MPV 9.9 8.9 - 12.9 FL    NRBC 0.0 0  WBC    ABSOLUTE NRBC 0.00 0.00 - 0.01 K/uL    NEUTROPHILS 80 (H) 32 - 75 %    LYMPHOCYTES 10 (L) 12 - 49 %    MONOCYTES 9 5 - 13 %    EOSINOPHILS 0 0 - 7 %    BASOPHILS 0 0 - 1 %    IMMATURE GRANULOCYTES 1 (H) 0.0 - 0.5 %    ABS. NEUTROPHILS 10.0 (H) 1.8 - 8.0 K/UL    ABS. LYMPHOCYTES 1.3 0.8 - 3.5 K/UL    ABS. MONOCYTES 1.1 (H) 0.0 - 1.0 K/UL    ABS. EOSINOPHILS 0.0 0.0 - 0.4 K/UL    ABS. BASOPHILS 0.0 0.0 - 0.1 K/UL    ABS. IMM. GRANS. 0.1 (H) 0.00 - 0.04 K/UL    DF AUTOMATED     METABOLIC PANEL, COMPREHENSIVE    Collection Time: 01/09/23  8:04 PM   Result Value Ref Range    Sodium 139 136 - 145 mmol/L    Potassium 3.7 3.5 - 5.1 mmol/L    Chloride 102 97 - 108 mmol/L    CO2 28 21 - 32 mmol/L    Anion gap 9 5 - 15 mmol/L    Glucose 122 (H) 65 - 100 mg/dL    BUN 27 (H) 6 - 20 MG/DL    Creatinine 1.37 (H) 0.70 - 1.30 MG/DL    BUN/Creatinine ratio 20 12 - 20      eGFR 52 (L) >60 ml/min/1.73m2    Calcium 12.5 (H) 8.5 - 10.1 MG/DL    Bilirubin, total 0.6 0.2 - 1.0 MG/DL    ALT (SGPT) 31 12 - 78 U/L    AST (SGOT) 43 (H) 15 - 37 U/L    Alk. phosphatase 133 (H) 45 - 117 U/L    Protein, total 8.0 6.4 - 8.2 g/dL    Albumin 3.4 (L) 3.5 - 5.0 g/dL    Globulin 4.6 (H) 2.0 - 4.0 g/dL    A-G Ratio 0.7 (L) 1.1 - 2.2     TROPONIN-HIGH SENSITIVITY    Collection Time: 01/09/23  8:04 PM   Result Value Ref Range    Troponin-High Sensitivity 24 0 - 76 ng/L   URINALYSIS W/MICROSCOPIC    Collection Time: 01/09/23  8:33 PM   Result Value Ref Range    Color YELLOW/STRAW      Appearance CLOUDY (A) CLEAR      Specific gravity 1.013 1.003 - 1.030      pH (UA) 6.5 5.0 - 8.0      Protein Negative NEG mg/dL    Glucose Negative NEG mg/dL    Ketone Negative NEG mg/dL    Bilirubin Negative NEG      Blood Negative NEG      Urobilinogen 1.0 0.2 - 1.0 EU/dL    Nitrites Negative NEG      Leukocyte Esterase Negative NEG      WBC 0-4 0 - 4 /hpf    RBC 0-5 0 - 5 /hpf    Epithelial cells FEW FEW /lpf    Bacteria Negative NEG /hpf    Hyaline cast 0-2 0 - 2 /lpf   URINE CULTURE HOLD SAMPLE    Collection Time: 01/09/23  8:33 PM    Specimen: Urine   Result Value Ref Range    Urine culture hold        Urine on hold in Microbiology dept for 2 days.   If unpreserved urine is submitted, it cannot be used for addtional testing after 24 hours, recollection will be required. CBC W/O DIFF    Collection Time: 01/10/23  4:54 AM   Result Value Ref Range    WBC 10.1 4.1 - 11.1 K/uL    RBC 4.50 4. 10 - 5.70 M/uL    HGB 13.2 12.1 - 17.0 g/dL    HCT 40.1 36.6 - 50.3 %    MCV 89.1 80.0 - 99.0 FL    MCH 29.3 26.0 - 34.0 PG    MCHC 32.9 30.0 - 36.5 g/dL    RDW 13.0 11.5 - 14.5 %    PLATELET 250 967 - 341 K/uL    MPV 9.9 8.9 - 12.9 FL    NRBC 0.0 0  WBC    ABSOLUTE NRBC 0.00 0.00 - 0.01 K/uL         Impression:     Patient Active Problem List    Diagnosis Date Noted    Diarrhea 01/11/2021    Acute respiratory insufficiency 01/11/2021    Hypoxia 01/11/2021    COVID-19 virus infection 47/07/2180    Acute metabolic encephalopathy 46/68/8156    C. difficile colitis 08/07/2017    Colitis, acute 08/05/2017    Colitis 07/21/2017     Active Problems:    Acute metabolic encephalopathy (2/25/0847)        Plan:   -  R shoulder pain/ rotator cuff tear,back pain  - shoulder XR reveled questionable lesion that warrant an MRI, will order MRI with contrast, continue po pain management as per medicine team with addition of lido patch. - ortho to follow     Dr. Georges Warner aware and agrees with plan as above. Juan Michel, NP  Orthopedic Nurse Practitioner   [place]       Patient seen and examined this morning, he continues to report discomfort in the right anterior shoulder mostly with motion or with weightbearing. Patient reports lifting something heavy and then presenting to Ortho on-call for shoulder pain, he was last seen by Dr. Rosales Nguyen, felt to have likely rotator cuff tear, unfortunately he had several falls after that visit down onto his right side with significant increase in right shoulder pain. On presentation to the ER he did not did have x-rays and I recommended MRI of the right shoulder.   I reviewed with patient and his wife who accompanies him today, that there are concerning features for neoplastic process within the right glenoid. He does not have any known primary. He certainly has pathologic fracture of the right glenoid. Discussed with patient and his wife, he will need to have work-up with the hospitalist and oncology for neoplastic process. They understand this. Regarding his shoulder, discussed with him I would consult with Dr. Jeancarlos Newsome, (Shoulder specialist as this is out of my area of expertise. I have contacted him. In the interim, he should remain nonweightbearing to the right shoulder but maintain elbow range of motion. Patient does have a sling at home and his wife will bring that in for ambulation tolerance. They were asking whether he could pursue showering which I think is reasonable with help. Ortho will follow and offer shoulder recs as primary team pursues further workup.    Signed By: Claire Campos MD     January 11, 2023

## 2023-01-10 NOTE — H&P
Hospitalist Admission Note    NAME:  Rimma Hartley. :  1945   MRN:  [id number]     Date/Time:  1/10/2023 2:04 AM    Patient PCP: None  ________________________________________________________________________    Given the patient's current clinical presentation, I have a high level of concern for decompensation if discharged from the emergency department. Complex decision making was performed, which includes reviewing the patient's available past medical records, laboratory results, and x-ray films. My assessment of this patient's clinical condition and my plan of care is as follows. Assessment / Plan:    Confusion:    The patient comes in w/ right shoulder injury and increased confusion and somnolence    VSS  WBC 12.5, HG 15.9  BUN 27, Cr 1.37  UA neg LE, neg nitrite  CT Head - no acute intracranial process - minimal chronic microvascular changes    Admit and cont to monitor. Suspect his symptoms maybe due to pain medications. Hold pain and sedating medications  Use IV Toradol prn for pain  Consult Ortho for right shoulder strain    2. Leukocytosis:    No active symptoms of infection  Hold ABX and re-evalaute    Body mass index is 28.8 kg/m².:  25.0 - 29.9:  Overweight    I have personally reviewed the radiographs, laboratory data in Epic and decisions and statements above are based partially on this personal interpretation. Code Status: Full Code  Surrogate Decision Maker  Ayo Gu (wife)  [phone]  Yari Dinh (son)  [phone]    Prophylaxis:  Lovenox SQ     Subjective:   CHIEF COMPLAINT: Confusion    HISTORY OF PRESENT ILLNESS:       The patient is a 77 y/o C M w/ PMH lumbar radiculopathy who comes in with his family due to increased confusion, lethargy and increased somnolence. The patient injured his right shoulder 1 month ago as he was lifting a heavy garbage bag. Since that time he has had increased pain and is unable to use his right upper extremity.   He has been using his left upper extremity to compensate. He describes a constant achy and sharp pain with any movement and has been unable to obtain any relief. He was started on Meloxicam and Tramadol as needed. Past Medical History:   Diagnosis Date    Diverticulitis     Gastrointestinal disorder     History of vascular access device 07/24/2017    [facility] VAT -  5FR triple Lumen Power PICC R Basilic  39 cm    Spinal stenosis       Past Surgical History:   Procedure Laterality Date    FLEXIBLE SIGMOIDOSCOPY N/A 7/27/2017    SIGMOIDOSCOPY FLEXIBLE performed by Rachel Saucedo MD at [facility] END[facility]    IR INSERT NON TUNL CVC OVER 5 YRS  1/13/2021     Social History     Tobacco Use    Smoking status: Former     Packs/day: 1.00     Years: 50.00     Pack years: 50.00     Types: Cigarettes    Smokeless tobacco: Current   Substance Use Topics    Alcohol use: Yes     Alcohol/week: 0.8 standard drinks     Types: 1 Cans of beer per week      FH:    M - pre-mature CAD w/ CVA at age <55    No Known Allergies     Prior to Admission medications    Medication Sig Start Date End Date Taking? Authorizing Provider   zinc gluconate 50 mg tablet Take 1 Tab by mouth daily. 1/29/21   Sukhdev Sender, DO   melatonin, rapid dissolve, 5 mg TbDi tablet Take 1 Tab by mouth nightly as needed for Other (insomnia). 1/28/21   Fortunatoa Sender, DO   ipratropium-albuteroL (COMBIVENT RESPIMAT)  mcg/actuation inhaler Take 1 Puff by inhalation every six (6) hours as needed for Wheezing. 1/28/21   Fortunatoa Sender, DO   famotidine (PEPCID) 20 mg tablet Take 1 Tab by mouth two (2) times a day. 1/28/21   Wyvonna Sender, DO   ascorbic acid, vitamin C, (VITAMIN C) 500 mg tablet Take 1 Tab by mouth two (2) times a day. 1/28/21   Wyvotammya Sender, DO   acetaminophen (TYLENOL) 325 mg tablet Take 2 Tabs by mouth every six (6) hours as needed for Pain. 1/28/21   Kathrynnna Sender, DO   dicyclomine (BENTYL) 20 mg tablet Take 20 mg by mouth every six (6) hours.  Indications: irritable colon Provider, Historical   gabapentin (NEURONTIN) 600 mg tablet Take 300 mg by mouth three (3) times daily. Provider, Historical   psyllium (METAMUCIL) packet Take 1 Packet by mouth two (2) times a day.     Provider, Historical       REVIEW OF SYSTEMS:  See HPI for details  General: negative for fever, chills, sweats, weakness, weight loss  Eyes: negative for blurred vision, eye pain, loss of vision, diplopia  Ear Nose and Throat: negative for rhinorrhea, pharyngitis, otalgia, tinnitus, speech or swallowing difficulties  Respiratory:  negative for pleuritic pain, cough, sputum production, wheezing, SOB, WINSTON  Cardiology:  negative for chest pain, palpitations, orthopnea, PND, edema, syncope   Gastrointestinal: negative for abdominal pain, N/V, dysphagia, change in bowel habits, bleeding  Genitourinary: negative for frequency, urgency, dysuria, hematuria, incontinence  Muskuloskeletal : negative for arthralgia, myalgia  Hematology: negative for easy bruising, bleeding, lymphadenopathy  Dermatological: negative for rash, ulceration, mole change, new lesion  Endocrine: negative for hot flashes or polydipsia  Neurological: negative for headache, dizziness, confusion, focal weakness, paresthesia, memory loss, gait disturbance  Psychological: negative for anxiety, depression, agitation      Objective:   VITALS:    Visit Vitals  /68 (BP 1 Location: Right upper arm, BP Patient Position: At rest)   Pulse 100   Temp 97.6 °F (36.4 °C)   Resp 18   Ht 5' 9\" (1.753 m)   Wt 88.5 kg (195 lb)   SpO2 96%   BMI 28.80 kg/m²     PHYSICAL EXAM:    Physical Exam:    Gen: Well-developed, well-nourished, in no acute distress  HEENT:  Pink conjunctivae, PERRL, hearing intact to voice, moist mucous membranes  Neck: Supple, without masses, thyroid non-tender  Resp: No accessory muscle use, clear breath sounds without wheezes rales or rhonchi  Card: No murmurs, normal S1, S2 without thrills, bruits or peripheral edema  Abd:  Soft, non-tender, non-distended, normoactive bowel sounds are present, no palpable organomegaly and no detectable hernias  Lymph:  No cervical or inguinal adenopathy  Musc: No cyanosis or clubbing  Skin: No rashes or ulcers, skin turgor is good  Neuro:  Cranial nerves are grossly intact, no focal motor weakness, follows commands appropriately  Psych:  confused, oriented to person, place and time,     RUE proximal 4/5  RUE distal 4/5  LUE proximal 5/5  LUE distal 5/5    RLE proximal 5/5  RLE distal 5/5  LLE proximal 5/5  LLE distal 5/5    _______________________________________________________________________  Care Plan discussed with:  Pt's condition, Imaging findings, Lab findings, Assessment, and Care Plan discussed with: Patient  _______________________________________________________________________    Probable disposition:  Home  ________________________________________________________________________    Comments   >50% of visit spent in counseling and coordination of care  Chart reviewed  Discussion with patient and/or family and questions answered     ________________________________________________________________________  Signed: Nelia Alvarez MD        Procedures: see electronic medical records for all procedures/Xrays and details which were not copied into this note but were reviewed prior to creation of Plan. LAB DATA REVIEWED:    Recent Results (from the past 24 hour(s))   SAMPLES BEING HELD    Collection Time: 01/09/23  8:04 PM   Result Value Ref Range    SAMPLES BEING HELD  1SST, 1RED     COMMENT        Add-on orders for these samples will be processed based on acceptable specimen integrity and analyte stability, which may vary by analyte.    CBC WITH AUTOMATED DIFF    Collection Time: 01/09/23  8:04 PM   Result Value Ref Range    WBC 12.5 (H) 4.1 - 11.1 K/uL    RBC 5.37 4.10 - 5.70 M/uL    HGB 15.9 12.1 - 17.0 g/dL    HCT 46.6 36.6 - 50.3 %    MCV 86.8 80.0 - 99.0 FL    MCH 29.6 26.0 - 34.0 PG    MCHC 34.1 30.0 - 36.5 g/dL    RDW 13.0 11.5 - 14.5 %    PLATELET 295 362 - 598 K/uL    MPV 9.9 8.9 - 12.9 FL    NRBC 0.0 0  WBC    ABSOLUTE NRBC 0.00 0.00 - 0.01 K/uL    NEUTROPHILS 80 (H) 32 - 75 %    LYMPHOCYTES 10 (L) 12 - 49 %    MONOCYTES 9 5 - 13 %    EOSINOPHILS 0 0 - 7 %    BASOPHILS 0 0 - 1 %    IMMATURE GRANULOCYTES 1 (H) 0.0 - 0.5 %    ABS. NEUTROPHILS 10.0 (H) 1.8 - 8.0 K/UL    ABS. LYMPHOCYTES 1.3 0.8 - 3.5 K/UL    ABS. MONOCYTES 1.1 (H) 0.0 - 1.0 K/UL    ABS. EOSINOPHILS 0.0 0.0 - 0.4 K/UL    ABS. BASOPHILS 0.0 0.0 - 0.1 K/UL    ABS. IMM. GRANS. 0.1 (H) 0.00 - 0.04 K/UL    DF AUTOMATED     METABOLIC PANEL, COMPREHENSIVE    Collection Time: 01/09/23  8:04 PM   Result Value Ref Range    Sodium 139 136 - 145 mmol/L    Potassium 3.7 3.5 - 5.1 mmol/L    Chloride 102 97 - 108 mmol/L    CO2 28 21 - 32 mmol/L    Anion gap 9 5 - 15 mmol/L    Glucose 122 (H) 65 - 100 mg/dL    BUN 27 (H) 6 - 20 MG/DL    Creatinine 1.37 (H) 0.70 - 1.30 MG/DL    BUN/Creatinine ratio 20 12 - 20      eGFR 52 (L) >60 ml/min/1.73m2    Calcium 12.5 (H) 8.5 - 10.1 MG/DL    Bilirubin, total 0.6 0.2 - 1.0 MG/DL    ALT (SGPT) 31 12 - 78 U/L    AST (SGOT) 43 (H) 15 - 37 U/L    Alk.  phosphatase 133 (H) 45 - 117 U/L    Protein, total 8.0 6.4 - 8.2 g/dL    Albumin 3.4 (L) 3.5 - 5.0 g/dL    Globulin 4.6 (H) 2.0 - 4.0 g/dL    A-G Ratio 0.7 (L) 1.1 - 2.2     TROPONIN-HIGH SENSITIVITY    Collection Time: 01/09/23  8:04 PM   Result Value Ref Range    Troponin-High Sensitivity 24 0 - 76 ng/L   URINALYSIS W/MICROSCOPIC    Collection Time: 01/09/23  8:33 PM   Result Value Ref Range    Color YELLOW/STRAW      Appearance CLOUDY (A) CLEAR      Specific gravity 1.013 1.003 - 1.030      pH (UA) 6.5 5.0 - 8.0      Protein Negative NEG mg/dL    Glucose Negative NEG mg/dL    Ketone Negative NEG mg/dL    Bilirubin Negative NEG      Blood Negative NEG      Urobilinogen 1.0 0.2 - 1.0 EU/dL    Nitrites Negative NEG      Leukocyte Esterase Negative NEG      WBC 0-4 0 - 4 /hpf    RBC 0-5 0 - 5 /hpf    Epithelial cells FEW FEW /lpf    Bacteria Negative NEG /hpf    Hyaline cast 0-2 0 - 2 /lpf   URINE CULTURE HOLD SAMPLE    Collection Time: 01/09/23  8:33 PM    Specimen: Urine   Result Value Ref Range    Urine culture hold        Urine on hold in Microbiology dept for 2 days. If unpreserved urine is submitted, it cannot be used for addtional testing after 24 hours, recollection will be required.

## 2023-01-10 NOTE — ED NOTES
Verbal shift change report given to [employer]  (oncoming nurse) by Gary Marx  (offgoing nurse). Report included the following information SBAR, Kardex, ED Summary, MAR, and Recent Results.

## 2023-01-10 NOTE — PROGRESS NOTES
Spiritual Care Assessment/Progress Note  [address]      NAME: Gustavo Hylton. MRN: [id number]  AGE: 76 y.o. SEX: male  Hoahaoism Affiliation: Quaker   Language: English     1/10/2023     Total Time (in minutes): 16     Spiritual Assessment begun in [facility] EMERGENCY DEPT through conversation with:         []Patient        [] Family    [] Friend(s)        Reason for Consult: Emergency Department visit, Initial/Spiritual assessment, patient floor     Spiritual beliefs: (Please include comment if needed)     [x] Identifies with a shaila tradition:  Gigi Price       [] Supported by a shaila community:            [] Claims no spiritual orientation:           [] Seeking spiritual identity:                [] Adheres to an individual form of spirituality:           [] Not able to assess:                           Identified resources for coping:      [] Prayer                               [] Music                  [] Guided Imagery     [x] Family/friends                 [] Pet visits     [] Devotional reading                         [] Unknown     [] Other:                                               Interventions offered during this visit: (See comments for more details)    Patient Interventions: Affirmation of emotions/emotional suffering, Coping skills reviewed/reinforced     Family/Friend(s):  Affirmation of emotions/emotional suffering, Coping skills reviewed/reinforced     Plan of Care:     [x] Support spiritual and/or cultural needs    [] Support AMD and/or advance care planning process      [x] Support grieving process   [] Coordinate Rites and/or Rituals    [] Coordination with community clergy   [] No spiritual needs identified at this time   [] Detailed Plan of Care below (See Comments)  [] Make referral to Music Therapy  [] Make referral to Pet Therapy     [] Make referral to Addiction services  [] Make referral to [facility]  [] Make referral to Spiritual Care Partner  [] No future visits requested        [] Contact Spiritual Care for further referrals     Comments: Rounding in Emergency Room: Provided support to Randee Clemens and his spouse Marya. Bill share that he's ok, just waiting on a room and test results. No spiritual needs noted. Provided words of comfort and support, ministry of presence, empathic listening, hospitality and Intro to spiritual care. [address] [facility]   paging Service [phone] ([phone])

## 2023-01-10 NOTE — ED PROVIDER NOTES
HPI     Date of Service:  1/9/2023    Patient:  Melany Callahan. Chief Complaint:  Altered mental status and Fall       HPI:  Melany Ayala is a 76 y.o.  male with a past medical history of below who presents for evaluation of altered mental status. Per patient's family for the last 2 days patient has had increasing confusion and difficultly answering questions. They also endorse that he has been generally weak and fatigued and unable to get out of bed causing several falls. No other known recent illness. He has been following with outpatient provider for right shoulder pain/rotator cuff tear and has been taking a muscle relaxant for this but no other pain medications. He complains of right shoulder pain but otherwise no complaints. Past Medical History:   Diagnosis Date    Diverticulitis     Gastrointestinal disorder     History of vascular access device 07/24/2017    [facility] VAT -  5FR triple Lumen Power PICC R Basilic  39 cm    Spinal stenosis        Past Surgical History:   Procedure Laterality Date    FLEXIBLE SIGMOIDOSCOPY N/A 7/27/2017    SIGMOIDOSCOPY FLEXIBLE [facility]    IR INSERT NON TUNL CVC OVER 5 YRS  1/13/2021         No family history on file. Social History     Socioeconomic History    Marital status:      Spouse name: Not on file    Number of children: Not on file    Years of education: Not on file    Highest education level: Not on file   Occupational History    Not on file   Tobacco Use    Smoking status: Former     Packs/day: 1.00     Years: 50.00     Pack years: 50.00     Types: Cigarettes    Smokeless tobacco: Current   Substance and Sexual Activity    Alcohol use:  Yes     Alcohol/week: 0.8 standard drinks     Types: 1 Cans of beer per week    Drug use: No    Sexual activity: Not on file   Other Topics Concern    Not on file   Social History Narrative    Not on file     Social Determinants of Health     Financial Resource Strain: Not on file   Food Insecurity: Not on file   Transportation Needs: Not on file   Physical Activity: Not on file   Stress: Not on file   Social Connections: Not on file   Intimate Partner Violence: Not on file   Housing Stability: Not on file         ALLERGIES: Patient has no known allergies. Review of Systems   Constitutional:  Negative for chills and fever. HENT:  Negative for congestion and rhinorrhea. Eyes:  Negative for discharge and redness. Respiratory:  Negative for cough and shortness of breath. Cardiovascular:  Negative for chest pain. Gastrointestinal:  Negative for abdominal pain, diarrhea, nausea and vomiting. Musculoskeletal:  Positive for arthralgias. Neurological:  Positive for speech difficulty. Psychiatric/Behavioral:  Positive for confusion. Negative for agitation. Vitals:    01/09/23 1954   BP: 116/66   Pulse: (!) 111   Resp: 16   Temp: 98 °F (36.7 °C)   SpO2: 95%   Weight: 88.5 kg (195 lb)   Height: 5' 9\" (1.753 m)            Physical Exam  Vitals and nursing note reviewed. Constitutional:       General: He is not in acute distress. Appearance: Normal appearance. He is not ill-appearing or toxic-appearing. HENT:      Head: Normocephalic. Eyes:      Extraocular Movements: Extraocular movements intact. Conjunctiva/sclera: Conjunctivae normal.      Pupils: Pupils are equal, round, and reactive to light. Cardiovascular:      Rate and Rhythm: Normal rate. Pulses: Normal pulses. Pulmonary:      Effort: Pulmonary effort is normal. No respiratory distress. Abdominal:      General: Abdomen is flat. Palpations: Abdomen is soft. Tenderness: There is no abdominal tenderness. Musculoskeletal:         General: Normal range of motion. Skin:     General: Skin is warm and dry. Capillary Refill: Capillary refill takes less than 2 seconds. Neurological:      General: No focal deficit present. Mental Status: He is alert.  He is confused. Cranial Nerves: No cranial nerve deficit. Sensory: No sensory deficit. Motor: No weakness. Psychiatric:         Mood and Affect: Mood normal.         Behavior: Behavior normal.        Medical Decision Making      DECISION MAKING:  Home Martinez is a 76 y.o. male who comes in as above. On exam patient is slow to answer questions, confused to date. He is otherwise neurologically intact with 5 out of 5 bilateral upper and lower extremity strength. Given symptom onset of 2 days he is not within window for stroke activation or TNK. Differential diagnosis includes, not limited to, CVA, intracranial hemorrhage or mass, UTI, medication side effect. CBC shows mild leukocytosis at 12.5 K. No anemia. Electrolytes are stable. BUN is 27 and creatinine 1.37, this is increased from his baseline. Calcium is elevated at 12.5. Patient will be given IV fluids for CHAVA and hypercalcemia. UA negative for infection. CT of the head without any acute intracranial process. On reevaluation, patient remains unchanged. I discussed results with patient and his family. Patient will be admitted for further stroke rule out with MRI. I did discuss his case with the hospitalist, Dr. Marvel Dancer, for admission. Perfect Serve Consult for Admission  10:12 PM    ED Room Number: ERB/B  Patient Name and age: Home Martinez 76 y.o.  male  Working Diagnosis: Altered mental status, unspecified altered mental status type  (primary encounter diagnosis)  CHAVA (acute kidney injury) ([facility] [address]    COVID-19 Suspicion:  no  Sepsis present:  no  Reassessment needed: no  Code Status:  Full Code  Readmission: no  Isolation Requirements:  no  Recommended Level of Care:  telemetry  Department:[facility] ED - [phone]  Other:  76 y.o.  male with a past medical history of below who presents for evaluation of altered mental status.  Per family over the last 2 days increasing confusion and today difficultly answering questions appropriately. On my exam slow to respond and answer questions, confused to time but otherwise oriented and neuro intact. CT head without any acute findings. CHAVA with creat 1.37 and BUN 27, labs otherwise normal and UA w/o infection. Amount and/or Complexity of Data Reviewed  Independent Historian: spouse     Details: son  Labs: ordered. Decision-making details documented in ED Course. Radiology: ordered. Decision-making details documented in ED Course. ECG/medicine tests: ordered and independent interpretation performed. Decision-making details documented in ED Course. Risk  Decision regarding hospitalization. ED Course as of 01/09/23 2209 Mon Jan 09, 2023 2048 EKG, 12 LEAD, INITIAL  ECG at 2014, interpreted by me: Sinus tachycardia, rate 101 bpm.  Left axis deviation. Normal intervals. No ST elevations. Nonspecific ST abnormality. [SH]      ED Course User Index  [SH] Leny Dan DO       Procedures        LABS:  Recent Results (from the past 6 hour(s))   SAMPLES BEING HELD    Collection Time: 01/09/23  8:04 PM   Result Value Ref Range    SAMPLES BEING HELD  1SST, 1RED     COMMENT        Add-on orders for these samples will be processed based on acceptable specimen integrity and analyte stability, which may vary by analyte. CBC WITH AUTOMATED DIFF    Collection Time: 01/09/23  8:04 PM   Result Value Ref Range    WBC 12.5 (H) 4.1 - 11.1 K/uL    RBC 5.37 4.10 - 5.70 M/uL    HGB 15.9 12.1 - 17.0 g/dL    HCT 46.6 36.6 - 50.3 %    MCV 86.8 80.0 - 99.0 FL    MCH 29.6 26.0 - 34.0 PG    MCHC 34.1 30.0 - 36.5 g/dL    RDW 13.0 11.5 - 14.5 %    PLATELET 765 368 - 877 K/uL    MPV 9.9 8.9 - 12.9 FL    NRBC 0.0 0  WBC    ABSOLUTE NRBC 0.00 0.00 - 0.01 K/uL    NEUTROPHILS 80 (H) 32 - 75 %    LYMPHOCYTES 10 (L) 12 - 49 %    MONOCYTES 9 5 - 13 %    EOSINOPHILS 0 0 - 7 %    BASOPHILS 0 0 - 1 %    IMMATURE GRANULOCYTES 1 (H) 0.0 - 0.5 %    ABS.  NEUTROPHILS 10.0 (H) 1.8 - 8.0 K/UL    ABS. LYMPHOCYTES 1.3 0.8 - 3.5 K/UL    ABS. MONOCYTES 1.1 (H) 0.0 - 1.0 K/UL    ABS. EOSINOPHILS 0.0 0.0 - 0.4 K/UL    ABS. BASOPHILS 0.0 0.0 - 0.1 K/UL    ABS. IMM. GRANS. 0.1 (H) 0.00 - 0.04 K/UL    DF AUTOMATED     METABOLIC PANEL, COMPREHENSIVE    Collection Time: 01/09/23  8:04 PM   Result Value Ref Range    Sodium 139 136 - 145 mmol/L    Potassium 3.7 3.5 - 5.1 mmol/L    Chloride 102 97 - 108 mmol/L    CO2 28 21 - 32 mmol/L    Anion gap 9 5 - 15 mmol/L    Glucose 122 (H) 65 - 100 mg/dL    BUN 27 (H) 6 - 20 MG/DL    Creatinine 1.37 (H) 0.70 - 1.30 MG/DL    BUN/Creatinine ratio 20 12 - 20      eGFR 52 (L) >60 ml/min/1.73m2    Calcium 12.5 (H) 8.5 - 10.1 MG/DL    Bilirubin, total 0.6 0.2 - 1.0 MG/DL    ALT (SGPT) 31 12 - 78 U/L    AST (SGOT) 43 (H) 15 - 37 U/L    Alk. phosphatase 133 (H) 45 - 117 U/L    Protein, total 8.0 6.4 - 8.2 g/dL    Albumin 3.4 (L) 3.5 - 5.0 g/dL    Globulin 4.6 (H) 2.0 - 4.0 g/dL    A-G Ratio 0.7 (L) 1.1 - 2.2     TROPONIN-HIGH SENSITIVITY    Collection Time: 01/09/23  8:04 PM   Result Value Ref Range    Troponin-High Sensitivity 24 0 - 76 ng/L   URINALYSIS W/MICROSCOPIC    Collection Time: 01/09/23  8:33 PM   Result Value Ref Range    Color YELLOW/STRAW      Appearance CLOUDY (A) CLEAR      Specific gravity 1.013 1.003 - 1.030      pH (UA) 6.5 5.0 - 8.0      Protein Negative NEG mg/dL    Glucose Negative NEG mg/dL    Ketone Negative NEG mg/dL    Bilirubin Negative NEG      Blood Negative NEG      Urobilinogen 1.0 0.2 - 1.0 EU/dL    Nitrites Negative NEG      Leukocyte Esterase Negative NEG      WBC 0-4 0 - 4 /hpf    RBC 0-5 0 - 5 /hpf    Epithelial cells FEW FEW /lpf    Bacteria Negative NEG /hpf    Hyaline cast 0-2 0 - 2 /lpf   URINE CULTURE HOLD SAMPLE    Collection Time: 01/09/23  8:33 PM    Specimen: Urine   Result Value Ref Range    Urine culture hold        Urine on hold in Microbiology dept for 2 days.   If unpreserved urine is submitted, it cannot be used for addtional testing after 24 hours, recollection will be required. IMAGING:  CT HEAD WO CONT   Final Result   No acute intracranial process. Imaging findings consistent with minimal chronic microvascular ischemic change. There is a mild degree of cerebral atrophy. Medications During Visit:  Medications   sodium chloride 0.9 % bolus infusion 1,000 mL (1,000 mL IntraVENous New Bag 1/9/23 4592)         IMPRESSION:  1. Altered mental status, unspecified altered mental status type    2.  CHAVA (acute kidney injury) ([facility] [address]        DISPOSITION:  Admitted      [facility],

## 2023-01-10 NOTE — ED TRIAGE NOTES
Pt to triage in wheelchair w/ increased confusion and falls over the last 4 days. Son of pt reports in the last hour and a half he started asking basic questions when he realized he father wasn't responding appropriately.

## 2023-01-10 NOTE — PROGRESS NOTES
Per P&T policy, ketorolac dosing has been adjusted to 15 mg for weight less than 50 kg or age greater than 72years old. [facility]  Ketorolac Auto Substitution  August 2016       Background: In the past 12 months, [facility] pharmacists have recommended dose adjustments for ketorolac injection for 25 orders. The acceptance rate is 96% for these recommendations. Per the package insert, a dose adjustment is recommended  for patients weighing less than 50 kg or greater than 72years old. Also, the PI advises that ketorolac have an increased incidence of GI bleeding, ulceration, and perforation when used in geriatric patients. Recommendation:  Pharmacists will automatically adjust the dose of ketorolac in patient greater than 72years old or less than 50 kg of body weight as follows:    Ketorolac Patients ?  72years old or < 50 kg   Ordered:  Ketorolac 30-60 mg IV/IM q6h/q6h PRN   Revise Order and Dispense:   Ketorolac 15 mg IV/IM q6h/q6h PRN se:        Rationale:  Ensures that patient receives dosing as outlined in PI  Improves patient safety  Reduces interruption to provider

## 2023-01-10 NOTE — PROGRESS NOTES
Problem: Mobility Impaired (Adult and Pediatric)  Goal: *Acute Goals and Plan of Care (Insert Text)  Description: FUNCTIONAL STATUS PRIOR TO ADMISSION: Patient was independent and active without use of DME prior to R shoulder injury in November 2022. He has experienced gradual decline in function, requiring furniture support or assist for ambulation. + falls in recent months. HOME SUPPORT PRIOR TO ADMISSION: The patient lived with spouse who assists as needed. Physical Therapy Goals  Initiated 1/10/2023  1. Patient will move from supine to sit and sit to supine  in bed with supervision within 7 day(s). 2.  Patient will transfer from bed to chair and chair to bed with supervision/set-up using the least restrictive device within 7 day(s). 3.  Patient will perform sit to stand with supervision/set-up within 7 day(s). 4.  Patient will ambulate with supervision/set-up for 150 feet with the least restrictive device within 7 day(s). 5.  Patient will ascend/descend 14 stairs with one handrail(s) with supervision/set-up within 7 day(s). Outcome: Not Met   PHYSICAL THERAPY EVALUATION  Patient: Leonardo Sargent (73 y.o. male)  Date: 1/10/2023  Primary Diagnosis: Acute metabolic encephalopathy [W82.08]       Precautions:  Fall (NWB R shoulder (pending MRI))    ASSESSMENT  Based on the objective data described below, the patient presents with impaired balance, decreased insight into deficits, decreased strength, and limited functional mobility on day 1 of admission with confusion and somnolence. Pt has experienced gradual decline in function since injury to R shoulder in November associated with lifting a heavy object. He has since experienced back pain and falls at home. Pt presents today agreeable to mobility and with good compliance NWB RUE given initial education and occasional cues/reminders.  (MRI pending at time of evaluation.) Pt performs bed mobility, sits without support at edge of bed, and ambulates to/from restroom in ED as below. He verbalizes impaired balance which he attributes to presence of PT. Pt requires 24-hour assistance for safety with mobility at this time. Current Level of Function Impacting Discharge (mobility/balance): mod A bed mobility    Functional Outcome Measure: The patient scored 55 on the Barthel outcome measure which is indicative of partially dependent status. Other factors to consider for discharge: none additional     Patient will benefit from skilled therapy intervention to address the above noted impairments. PLAN :  Recommendations and Planned Interventions: bed mobility training, transfer training, gait training, therapeutic exercises, neuromuscular re-education, edema management/control, patient and family training/education, and therapeutic activities      Frequency/Duration: Patient will be followed by physical therapy:  5 times a week to address goals. Recommendation for discharge: (in order for the patient to meet his/her long term goals)  Physical therapy at least 2 days/week in the home AND ensure assist and/or supervision for safety with all mobility    This discharge recommendation:  Has not yet been discussed the attending provider and/or case management    IF patient discharges home will need the following DME: to be determined (TBD)         SUBJECTIVE:   Patient stated No one wants to golf as much as I do.     Pt received supine, agreeable to PT and cleared by RN.     OBJECTIVE DATA SUMMARY:   HISTORY:    Past Medical History:   Diagnosis Date    Diverticulitis     Gastrointestinal disorder     History of vascular access device 07/24/2017    [facility] VAT -  5FR triple Lumen Power PICC R Basilic  39 cm    Spinal stenosis      Past Surgical History:   Procedure Laterality Date    FLEXIBLE SIGMOIDOSCOPY N/A 7/27/2017    SIG[facility]    IR INSERT NON TUNL CVC OVER 5 YRS  1/13/2021       Personal factors and/or comorbidities impacting plan of care: as above    Home Situation  Home Environment: Private residence  # Steps to Enter: 3  Rails to Enter: Yes  Hand Rails : Left  One/Two Story Residence: Two story  # of Interior Steps: 14  Interior Rails: Left  Living Alone: No  Support Systems: Spouse/Significant Other    EXAMINATION/PRESENTATION/DECISION MAKING:   Critical Behavior:  Neurologic State: Alert  Orientation Level: Oriented to person  Cognition: Follows commands  Safety/Judgement: Decreased awareness of need for assistance, Decreased insight into deficits       Skin:  LE exposed skin intact  Edema: none noted distal LEs  Range Of Motion:  AROM: Generally decreased, functional                       Strength:    Strength: Generally decreased, functional                    Tone & Sensation:   Tone: Normal              Sensation: Impaired (B LEs consistent with baseline per spouse)               Coordination:  Coordination: Generally decreased, functional       Functional Mobility:  Bed Mobility:     Supine to Sit: Moderate assistance; Additional time  Sit to Supine: Minimum assistance; Additional time  Scooting: Contact guard assistance    Transfers:  Sit to Stand: Contact guard assistance; Additional time  Stand to Sit: Contact guard assistance; Additional time                    Balance:   Sitting: Without support  Sitting - Static: Good (unsupported)  Sitting - Dynamic: Good (unsupported)  Standing: With support  Standing - Static: Good  Standing - Dynamic : Fair  Ambulation/Gait Training:  Distance (ft):  (20' x 2 to/from restroom in ED)  Assistive Device: Gait belt (LUE hand held assist)  Ambulation - Level of Assistance: Minimal assistance        Gait Abnormalities: Decreased step clearance; Path deviations; increased sway, loss of balance x 2        Base of Support: Widened     Speed/Krystle: Pace decreased (<100 feet/min)                                Functional Measure:  Barthel Index:    Bathing: 0  Bladder: 10  Bowels: 10  Groomin  Dressin  Feeding: 10  Mobility: 0  Stairs: 0  Toilet Use: 5  Transfer (Bed to Chair and Back): 10  Total: 55/100       The Barthel ADL Index: Guidelines  1. The index should be used as a record of what a patient does, not as a record of what a patient could do. 2. The main aim is to establish degree of independence from any help, physical or verbal, however minor and for whatever reason. 3. The need for supervision renders the patient not independent. 4. A patient's performance should be established using the best available evidence. Asking the patient, friends/relatives and nurses are the usual sources, but direct observation and common sense are also important. However direct testing is not needed. 5. Usually the patient's performance over the preceding 24-48 hours is important, but occasionally longer periods will be relevant. 6. Middle categories imply that the patient supplies over 50 per cent of the effort. 7. Use of aids to be independent is allowed. Score Interpretation (from [facility])    Independent   60-79 Minimally independent   40-59 Partially dependent   20-39 Very dependent   <20 Totally dependent     -Stephane Galvan., Barthel, DBaltaW. (1965). Functional evaluation: the Barthel Index. [address] [facility] ([address] (). The Barthel activities of daily living index: self-reporting versus actual performance in the old (> or = 75 years). Journal of [address] [place] [address] 45(7), [facility], BÁRBARA.F, Yumiko Paz., [place]. (). Measuring the change in disability after inpatient rehabilitation; comparison of the responsiveness of the Barthel Index and Functional [place] Measure. Journal of Neurology, Neurosurgery, and Psychiatry, 66(4), 940-731.   Yuly Liliana, N.J.A, JESE Hendricks, & Alix Cameron M.A. (2004) Assessment of post-stroke quality of life in cost-effectiveness studies: The usefulness of the Barthel Index and the EuroQoL-5D. Quality of Life Research, 15, 788-97            Physical Therapy Evaluation Charge Determination   History Examination Presentation Decision-Making   HIGH Complexity :3+ comorbidities / personal factors will impact the outcome/ POC  HIGH Complexity : 4+ Standardized tests and measures addressing body structure, function, activity limitation and / or participation in recreation  MEDIUM Complexity : Evolving with changing characteristics  Other outcome measures barthel  MEDIUM      Based on the above components, the patient evaluation is determined to be of the following complexity level: MEDIUM    Pain Ratin/10 R shoulder after mobility, decreased with rest.  Offered repositioning, education, encouragement, distraction. Activity Tolerance:   requires rest breaks    After treatment patient left in no apparent distress:   Supine in bed, Call bell within reach, and PCT at bedside    COMMUNICATION/EDUCATION:   The patients plan of care was discussed with: Registered nurse. Fall prevention education was provided and the patient/caregiver indicated understanding., Patient/family have participated as able in goal setting and plan of care. , and Patient/family agree to work toward stated goals and plan of care.     Thank you for this referral.  Emma Nix, PT, DPT   Time Calculation: 29 mins

## 2023-01-10 NOTE — PROGRESS NOTES
[facility]  [address] [facility], [address] [place] [address]  [phone]      Medical Progress Note      NAME: Governlucy Holden. :  1945  MRM:  [id number]    Date/Time of service: 1/10/2023       Subjective:     Chief Complaint:  Patient was personally seen and examined by me during this time period. Chart reviewed. FU ams, right shoulder pain. Continues to have right shoulder pain. No f/c. Objective:       Vitals:       Last 24hrs VS reviewed since prior progress note.  Most recent are:    Visit Vitals  /70 (BP 1 Location: Left upper arm, BP Patient Position: At rest)   Pulse 70   Temp 98.5 °F (36.9 °C)   Resp 17   Ht 5' 9\" (1.753 m)   Wt 88.5 kg (195 lb)   SpO2 92%   BMI 28.80 kg/m²     SpO2 Readings from Last 6 Encounters:   01/10/23 92%   02/15/21 95%   21 94%   10/04/19 92%   17 99%   17 96%          Intake/Output Summary (Last 24 hours) at 1/10/2023 1841  Last data filed at 1/10/2023 1540  Gross per 24 hour   Intake 1496.25 ml   Output --   Net 1496.25 ml        Exam:     Physical Exam:    Gen:  Well-developed, well-nourished, in no acute distress  HEENT:  Pink conjunctivae, PERRL, hearing intact to voice  Resp:  No accessory muscle use, clear breath sounds without wheezes rales or rhonchi  Card:  RRR, No murmurs, normal S1, S2, no peripheral edema  Abd:  Soft, non-tender, non-distended, normoactive bowel sounds are present  Musc:  No cyanosis or clubbing  Skin:  No rashes or ulcers, skin turgor is good  Neuro:  Cranial nerves 3-12 are grossly intact, follows commands appropriately  Psych:  Oriented to person, place, and time, Alert with good insight      Medications Reviewed: (see below)    Lab Data Reviewed: (see below)    ______________________________________________________________________    Medications:     Current Facility-Administered Medications   Medication Dose Route Frequency    melatonin tablet 4.5 mg  4.5 mg Oral QHS sodium chloride (NS) flush 5-40 mL  5-40 mL IntraVENous Q8H    sodium chloride (NS) flush 5-40 mL  5-40 mL IntraVENous PRN    acetaminophen (TYLENOL) tablet 650 mg  650 mg Oral Q6H PRN    Or    acetaminophen (TYLENOL) suppository 650 mg  650 mg Rectal Q6H PRN    polyethylene glycol (MIRALAX) packet 17 g  17 g Oral DAILY PRN    ondansetron (ZOFRAN ODT) tablet 4 mg  4 mg Oral Q8H PRN    Or    ondansetron (ZOFRAN) injection 4 mg  4 mg IntraVENous Q6H PRN    enoxaparin (LOVENOX) injection 40 mg  40 mg SubCUTAneous DAILY    ketorolac (TORADOL) injection 15 mg  15 mg IntraVENous Q6H PRN    0.9% sodium chloride infusion  75 mL/hr IntraVENous CONTINUOUS    lidocaine 4 % patch 2 Patch  2 Patch TransDERmal Q24H    famotidine (PEPCID) tablet 20 mg  20 mg Oral BID    psyllium husk-aspartame (METAMUCIL FIBER) packet 1 Packet  1 Packet Oral BID     Current Outpatient Medications   Medication Sig    zinc gluconate 50 mg tablet Take 1 Tab by mouth daily. melatonin, rapid dissolve, 5 mg TbDi tablet Take 1 Tab by mouth nightly as needed for Other (insomnia). ipratropium-albuteroL (COMBIVENT RESPIMAT)  mcg/actuation inhaler Take 1 Puff by inhalation every six (6) hours as needed for Wheezing. famotidine (PEPCID) 20 mg tablet Take 1 Tab by mouth two (2) times a day. ascorbic acid, vitamin C, (VITAMIN C) 500 mg tablet Take 1 Tab by mouth two (2) times a day. acetaminophen (TYLENOL) 325 mg tablet Take 2 Tabs by mouth every six (6) hours as needed for Pain. dicyclomine (BENTYL) 20 mg tablet Take 20 mg by mouth every six (6) hours. Indications: irritable colon    gabapentin (NEURONTIN) 600 mg tablet Take 300 mg by mouth three (3) times daily. psyllium (METAMUCIL) packet Take 1 Packet by mouth two (2) times a day.           Lab Review:     Recent Labs     01/10/23  0454 01/09/23 2004   WBC 10.1 12.5*   HGB 13.2 15.9   HCT 40.1 46.6    220     Recent Labs     01/09/23 2004      K 3.7      CO2 28 *   BUN 27*   CREA 1.37*   CA 12.5*   ALB 3.4*   TBILI 0.6   ALT 31     Lab Results   Component Value Date/Time    Glucose (POC) 116 (H) 01/22/2021 05:22 AM    Glucose (POC) 121 (H) 01/21/2021 11:52 PM    Glucose (POC) 117 (H) 01/21/2021 07:44 PM    Glucose (POC) 135 (H) 01/21/2021 12:22 PM    Glucose (POC) 126 (H) 01/21/2021 06:11 AM          Assessment / Plan: Altered mental status/history of mild dementia POA: Resolved. Wife notes some underlying mild dementia. Possibly due to gabapentin. UA without evidence of infection. Check TSH. Check B12. Check ammonia. CT head no acute concerns. Consider MRI brain. Holding home gabapentin. Monitor. Right shoulder pain POA:Acute on chronic. X-ray of shoulder with concern for aggressive lytic osseous lesions. Obtain MRI shoulder. Lidocaine patch. Tylenol as needed. IV to as needed. Avoid narcotics as able due to concern for underlying dementia. Ortho consulted. History of spinal stenosis: PT/OT.     Total time spent with patient: 28 Minutes **I personally saw and examined the patient during this time period**                 Care Plan discussed with: Patient, Family, Nursing Staff, and Consultant/Specialist    Discussed:  Care Plan    Prophylaxis:  Lovenox    Disposition:  Home w/Family           ___________________________________________________    Attending Physician: Gera Linn DO

## 2023-01-11 ENCOUNTER — APPOINTMENT (OUTPATIENT)
Dept: MRI IMAGING | Age: 78
DRG: 477 | End: 2023-01-11
Attending: NURSE PRACTITIONER
Payer: MEDICARE

## 2023-01-11 ENCOUNTER — APPOINTMENT (OUTPATIENT)
Dept: CT IMAGING | Age: 78
DRG: 477 | End: 2023-01-11
Attending: NURSE PRACTITIONER
Payer: MEDICARE

## 2023-01-11 LAB
ALBUMIN SERPL-MCNC: 2.7 G/DL (ref 3.5–5)
ALBUMIN/GLOB SERPL: 0.7 (ref 1.1–2.2)
ALP SERPL-CCNC: 104 U/L (ref 45–117)
ALT SERPL-CCNC: 28 U/L (ref 12–78)
ANION GAP SERPL CALC-SCNC: 8 MMOL/L (ref 5–15)
AST SERPL-CCNC: 35 U/L (ref 15–37)
BASOPHILS # BLD: 0 K/UL (ref 0–0.1)
BASOPHILS NFR BLD: 0 % (ref 0–1)
BILIRUB SERPL-MCNC: 0.6 MG/DL (ref 0.2–1)
BUN SERPL-MCNC: 35 MG/DL (ref 6–20)
BUN/CREAT SERPL: 33 (ref 12–20)
CALCIUM SERPL-MCNC: 11.6 MG/DL (ref 8.5–10.1)
CHLORIDE SERPL-SCNC: 107 MMOL/L (ref 97–108)
CO2 SERPL-SCNC: 26 MMOL/L (ref 21–32)
CREAT SERPL-MCNC: 1.06 MG/DL (ref 0.7–1.3)
DIFFERENTIAL METHOD BLD: ABNORMAL
EOSINOPHIL # BLD: 0.2 K/UL (ref 0–0.4)
EOSINOPHIL NFR BLD: 2 % (ref 0–7)
ERYTHROCYTE [DISTWIDTH] IN BLOOD BY AUTOMATED COUNT: 13.1 % (ref 11.5–14.5)
GLOBULIN SER CALC-MCNC: 3.9 G/DL (ref 2–4)
GLUCOSE SERPL-MCNC: 104 MG/DL (ref 65–100)
HCT VFR BLD AUTO: 40.8 % (ref 36.6–50.3)
HGB BLD-MCNC: 13.7 G/DL (ref 12.1–17)
IGA SERPL-MCNC: 198 MG/DL (ref 70–400)
IGG SERPL-MCNC: 651 MG/DL (ref 700–1600)
IGM SERPL-MCNC: 53 MG/DL (ref 40–230)
IMM GRANULOCYTES # BLD AUTO: 0.1 K/UL (ref 0–0.04)
IMM GRANULOCYTES NFR BLD AUTO: 1 % (ref 0–0.5)
LYMPHOCYTES # BLD: 1.2 K/UL (ref 0.8–3.5)
LYMPHOCYTES NFR BLD: 13 % (ref 12–49)
MCH RBC QN AUTO: 29.2 PG (ref 26–34)
MCHC RBC AUTO-ENTMCNC: 33.6 G/DL (ref 30–36.5)
MCV RBC AUTO: 87 FL (ref 80–99)
MONOCYTES # BLD: 0.8 K/UL (ref 0–1)
MONOCYTES NFR BLD: 9 % (ref 5–13)
NEUTS SEG # BLD: 7.3 K/UL (ref 1.8–8)
NEUTS SEG NFR BLD: 75 % (ref 32–75)
NRBC # BLD: 0 K/UL (ref 0–0.01)
NRBC BLD-RTO: 0 PER 100 WBC
PLATELET # BLD AUTO: 186 K/UL (ref 150–400)
PMV BLD AUTO: 10 FL (ref 8.9–12.9)
POTASSIUM SERPL-SCNC: 3.4 MMOL/L (ref 3.5–5.1)
PROT SERPL-MCNC: 6.6 G/DL (ref 6.4–8.2)
PSA SERPL-MCNC: 5.2 NG/ML (ref 0.01–4)
RBC # BLD AUTO: 4.69 M/UL (ref 4.1–5.7)
SODIUM SERPL-SCNC: 141 MMOL/L (ref 136–145)
WBC # BLD AUTO: 9.6 K/UL (ref 4.1–11.1)

## 2023-01-11 PROCEDURE — 84153 ASSAY OF PSA TOTAL: CPT

## 2023-01-11 PROCEDURE — 74011000636 HC RX REV CODE- 636: Performed by: INTERNAL MEDICINE

## 2023-01-11 PROCEDURE — 74011000258 HC RX REV CODE- 258: Performed by: NURSE PRACTITIONER

## 2023-01-11 PROCEDURE — 83521 IG LIGHT CHAINS FREE EACH: CPT

## 2023-01-11 PROCEDURE — A9576 INJ PROHANCE MULTIPACK: HCPCS | Performed by: RADIOLOGY

## 2023-01-11 PROCEDURE — 82784 ASSAY IGA/IGD/IGG/IGM EACH: CPT

## 2023-01-11 PROCEDURE — 70553 MRI BRAIN STEM W/O & W/DYE: CPT

## 2023-01-11 PROCEDURE — 65270000029 HC RM PRIVATE

## 2023-01-11 PROCEDURE — 74011250636 HC RX REV CODE- 250/636: Performed by: NURSE PRACTITIONER

## 2023-01-11 PROCEDURE — 74011000250 HC RX REV CODE- 250: Performed by: INTERNAL MEDICINE

## 2023-01-11 PROCEDURE — 71260 CT THORAX DX C+: CPT

## 2023-01-11 PROCEDURE — 74011250637 HC RX REV CODE- 250/637: Performed by: INTERNAL MEDICINE

## 2023-01-11 PROCEDURE — 36415 COLL VENOUS BLD VENIPUNCTURE: CPT

## 2023-01-11 PROCEDURE — 74011000250 HC RX REV CODE- 250: Performed by: NURSE PRACTITIONER

## 2023-01-11 PROCEDURE — 74011250636 HC RX REV CODE- 250/636: Performed by: RADIOLOGY

## 2023-01-11 PROCEDURE — 80053 COMPREHEN METABOLIC PANEL: CPT

## 2023-01-11 PROCEDURE — 99223 1ST HOSP IP/OBS HIGH 75: CPT | Performed by: INTERNAL MEDICINE

## 2023-01-11 PROCEDURE — 85025 COMPLETE CBC W/AUTO DIFF WBC: CPT

## 2023-01-11 PROCEDURE — 74011250636 HC RX REV CODE- 250/636: Performed by: INTERNAL MEDICINE

## 2023-01-11 RX ORDER — SODIUM CHLORIDE 9 MG/ML
50 INJECTION, SOLUTION INTRAVENOUS CONTINUOUS
Status: DISCONTINUED | OUTPATIENT
Start: 2023-01-11 | End: 2023-01-20

## 2023-01-11 RX ORDER — POTASSIUM CHLORIDE 750 MG/1
20 TABLET, FILM COATED, EXTENDED RELEASE ORAL
Status: COMPLETED | OUTPATIENT
Start: 2023-01-11 | End: 2023-01-11

## 2023-01-11 RX ORDER — HYDRALAZINE HYDROCHLORIDE 20 MG/ML
20 INJECTION INTRAMUSCULAR; INTRAVENOUS
Status: DISCONTINUED | OUTPATIENT
Start: 2023-01-11 | End: 2023-01-23 | Stop reason: HOSPADM

## 2023-01-11 RX ADMIN — PSYLLIUM HUSK 1 PACKET: 3.4 POWDER ORAL at 18:34

## 2023-01-11 RX ADMIN — Medication 10 ML: at 15:36

## 2023-01-11 RX ADMIN — POTASSIUM CHLORIDE 20 MEQ: 750 TABLET, FILM COATED, EXTENDED RELEASE ORAL at 09:13

## 2023-01-11 RX ADMIN — PSYLLIUM HUSK 1 PACKET: 3.4 POWDER ORAL at 09:13

## 2023-01-11 RX ADMIN — ACETAMINOPHEN 650 MG: 325 TABLET ORAL at 12:02

## 2023-01-11 RX ADMIN — GADOTERIDOL 17 ML: 279.3 INJECTION, SOLUTION INTRAVENOUS at 14:38

## 2023-01-11 RX ADMIN — IOPAMIDOL 100 ML: 755 INJECTION, SOLUTION INTRAVENOUS at 13:33

## 2023-01-11 RX ADMIN — KETOROLAC TROMETHAMINE 15 MG: 30 INJECTION, SOLUTION INTRAMUSCULAR; INTRAVENOUS at 12:02

## 2023-01-11 RX ADMIN — Medication 4.5 MG: at 21:50

## 2023-01-11 RX ADMIN — ENOXAPARIN SODIUM 40 MG: 100 INJECTION SUBCUTANEOUS at 09:13

## 2023-01-11 RX ADMIN — Medication 10 ML: at 07:08

## 2023-01-11 RX ADMIN — HYDRALAZINE HYDROCHLORIDE 20 MG: 20 INJECTION INTRAMUSCULAR; INTRAVENOUS at 02:28

## 2023-01-11 RX ADMIN — FAMOTIDINE 20 MG: 20 TABLET, FILM COATED ORAL at 18:34

## 2023-01-11 RX ADMIN — FAMOTIDINE 20 MG: 20 TABLET, FILM COATED ORAL at 09:13

## 2023-01-11 RX ADMIN — SODIUM CHLORIDE 125 ML/HR: 9 INJECTION, SOLUTION INTRAVENOUS at 12:03

## 2023-01-11 RX ADMIN — ZOLEDRONIC ACID 4 MG: 4 INJECTION, SOLUTION, CONCENTRATE INTRAVENOUS at 15:36

## 2023-01-11 RX ADMIN — KETOROLAC TROMETHAMINE 15 MG: 30 INJECTION, SOLUTION INTRAMUSCULAR; INTRAVENOUS at 03:33

## 2023-01-11 NOTE — PROGRESS NOTES
[facility]  [address]  [phone]      Medical Progress Note      NAME: Tiago Sheppard. :  1945  MRM:  [id number]    Date/Time of service: 2023       Subjective:     Chief Complaint:  Patient was personally seen and examined by me during this time period. Chart reviewed. FU ams, right shoulder pain. Continues to have right shoulder pain; reports good strength. No f/c. Ortho also at bedside. Objective:       Vitals:       Last 24hrs VS reviewed since prior progress note.  Most recent are:    Visit Vitals  /80   Pulse 67   Temp 97.9 °F (36.6 °C)   Resp 16   Ht 5' 7\" (1.702 m)   Wt 87.5 kg (192 lb 14.4 oz)   SpO2 92%   BMI 30.21 kg/m²     SpO2 Readings from Last 6 Encounters:   23 92%   02/15/21 95%   21 94%   10/04/19 92%   17 99%   17 96%    O2 Flow Rate (L/min): 2 l/min     Intake/Output Summary (Last 24 hours) at 2023 1456  Last data filed at 2023 0810  Gross per 24 hour   Intake 496.25 ml   Output 400 ml   Net 96.25 ml          Exam:     Physical Exam:    Gen:  Well-developed, well-nourished, in no acute distress  HEENT:  Pink conjunctivae, PERRL, hearing intact to voice  Resp:  No accessory muscle use, clear breath sounds without wheezes rales or rhonchi  Card:  RRR, No murmurs, normal S1, S2, no peripheral edema  Abd:  Soft, non-tender, non-distended, normoactive bowel sounds are present  Musc:  No cyanosis or clubbing  Skin:  No rashes or ulcers, skin turgor is good  Neuro:  Cranial nerves 3-12 are grossly intact, follows commands appropriately  Psych:  Oriented to person, place, and time, Alert with good insight      Medications Reviewed: (see below)    Lab Data Reviewed: (see below)    ______________________________________________________________________    Medications:     Current Facility-Administered Medications   Medication Dose Route Frequency    hydrALAZINE (APRESOLINE) 20 mg/mL injection 20 mg  20 mg IntraVENous Q4H PRN    0.9% sodium chloride infusion  125 mL/hr IntraVENous CONTINUOUS    zoledronic acid (ZOMETA) 4 mg in 0.9% sodium chloride 100 mL IVPB  4 mg IntraVENous NOW    zoledronic acid (ZOMETA) 4 mg in 0.9% sodium chloride 100 mL IVPB  4 mg IntraVENous NOW    melatonin tablet 4.5 mg  4.5 mg Oral QHS    sodium chloride (NS) flush 5-40 mL  5-40 mL IntraVENous Q8H    sodium chloride (NS) flush 5-40 mL  5-40 mL IntraVENous PRN    acetaminophen (TYLENOL) tablet 650 mg  650 mg Oral Q6H PRN    Or    acetaminophen (TYLENOL) suppository 650 mg  650 mg Rectal Q6H PRN    polyethylene glycol (MIRALAX) packet 17 g  17 g Oral DAILY PRN    ondansetron (ZOFRAN ODT) tablet 4 mg  4 mg Oral Q8H PRN    Or    ondansetron (ZOFRAN) injection 4 mg  4 mg IntraVENous Q6H PRN    enoxaparin (LOVENOX) injection 40 mg  40 mg SubCUTAneous DAILY    ketorolac (TORADOL) injection 15 mg  15 mg IntraVENous Q6H PRN    lidocaine 4 % patch 2 Patch  2 Patch TransDERmal Q24H    famotidine (PEPCID) tablet 20 mg  20 mg Oral BID    psyllium husk-aspartame (METAMUCIL FIBER) packet 1 Packet  1 Packet Oral BID          Lab Review:     Recent Labs     01/11/23  0404 01/10/23  1857 01/10/23  0454   WBC 9.6 8.7 10.1   HGB 13.7 12.5 13.2   HCT 40.8 38.7 40.1    179 187       Recent Labs     01/11/23  0404 01/10/23  1857 01/09/23 2004    141 139   K 3.4* 3.7 3.7    107 102   CO2 26 28 28   * 109* 122*   BUN 35* 35* 27*   CREA 1.06 1.19 1.37*   CA 11.6* 11.2* 12.5*   ALB 2.7* 2.5* 3.4*   TBILI 0.6 0.4 0.6   ALT 28 26 31       Lab Results   Component Value Date/Time    Glucose (POC) 116 (H) 01/22/2021 05:22 AM    Glucose (POC) 121 (H) 01/21/2021 11:52 PM    Glucose (POC) 117 (H) 01/21/2021 07:44 PM    Glucose (POC) 135 (H) 01/21/2021 12:22 PM    Glucose (POC) 126 (H) 01/21/2021 06:11 AM          Assessment / Plan: Altered mental status/history of mild dementia POA: Resolved.   Wife notes some underlying mild dementia. Possibly due to gabapentin. Due to hypercalcemia? UA without evidence of infection. TSH WNL. B12 ok. Ammonia wnl. CT head no acute concerns. Obtain MRI brain without contrast due to concern for metastatic disease. Holding home gabapentin. Monitor. Right shoulder pain POA: Acute on chronic. Suspect due to metastatic disease. Lidocaine patch. Tylenol as needed. IV to as needed. Avoid narcotics as able due to concern for underlying dementia. Ortho consulted. Concern for mets POA: MRI shoulder showing concern for metastatic disease. Obtaining CT abdomen pelvis with contrast.  PSA per oncology. Considering possible biopsy. Oncology consulted. Elevated calcium POA: Due to malignancy? Due to intravascular volume depletion? Continue IVF. Zometa as needed. Oncology consulted. History of spinal stenosis: PT/OT.     Total time spent with patient: 28 Minutes **I personally saw and examined the patient during this time period**                 Care Plan discussed with: Patient, Family, Nursing Staff, and Consultant/Specialist    Discussed:  Care Plan    Prophylaxis:  Lovenox    Disposition:  Home w/Family           ___________________________________________________    Attending Physician: Yanique Lion DO

## 2023-01-11 NOTE — PROGRESS NOTES
1/11/2023 2:58 PM   Care Management Assessment      Reason for Admission: Emergency -     ICD-10-CM ICD-9-CM    1. Altered mental status, unspecified altered mental status type  R41.82 780.97       2. CHAVA (acute kidney injury) ([facility] [address]  N17.9 584.9       3. Hypercalcemia  E83.52 275.42           Assessment:   [x]In person with pt's wife, Marya, pt off the floor for testing    RUR: 8%  Risk Level: [x]Low []Moderate []High  Value-based purchasing: [] Yes [x] No    Advance Directive: Full Code.    [] No AD on file. [x] AD on file. Kandis Whitmore already loaded in chart. [] Current AD not on file. Copy requested. [] Requests AD, and referral submitted to [facility]. Healthcare Decision Maker:   Primary Decision Maker: Yana Broderick - Spouse - [phone]    Secondary Decision Maker: Flori Patel - Child - [phone]    Secondary Decision Maker: Clem Medina - [phone]    Secondary Decision Maker: Elie Nissen - Daughter - [phone]        Assessment:    Age: 76 y.o.     Sex: [x] Male []Female     Residency: [x]Private residence []Apartment []Assisted Living []LTC []Other:   Exterior Steps:3  Interior Steps: 15    Lives With: [x]With spouse and 21year old son, Pantera Boatman    Prior functioning:  [x]Independent with ADLs and iADLS []Dependent with ADLs and iADLs []Partial dependence, Specify:       Prior transportation method: [x]Self [x]Spouse [x]Other family members []Medicaid transport []Other:     Prior DME required:  [x]None []RW []Cane []Crutches []Bedside commode []CPAP []Home O2 (Liter/Provider: ) []Nebulizer   []Shower Chair []Wheelchair []Hospital Bed []Deborah []Stair lift []Rollator []Other:    DME available: []None [x]RW [x]Cane []Crutches []Bedside commode []CPAP []Home O2 (Liter/Provider: ) []Nebulizer   []Shower Chair []Wheelchair []Hospital Bed []Deborah []Stair lift []Rollator [x]Other: Raised toilet seat    Rehab history: []None []Outpatient PT [x]Home Health (Pt's wife unable to recall agency but will look for information at home tonight to relay to CM on 1/12) []SNF (Provider/Date: ) [x]IPR ([facility]) []LTC (Provider/Date: ) []Hospice (Provider/Date: )  []Other:     Covid vaccination status:   [] Yes, Type:  [] Booster 1 [] Booster 2  [x] No  [] N/A / Not asked    Insurer:   Insurance Information                  [place] [employer] Phone: [phone]    Subscriber: Ammy Mij[employer]. Subscriber#: [id number]    Group#: -- Precert#: --        BLUE CROSS/[place] BLUE CROSS SUPPLEMENT MEDICARE Phone: --    SubsAquiris Guest Subscriber#: [id number]    Group#: [id number] Precert#: --            PCP: [facility]    Address: None ([address]) Patient stated that they have no PCP   Phone number: None   Current patient: [x]Yes []No   Approximate date of last visit: Recent   Access to virtual PCP visits: []Yes []No     Pharmacy: [facility] Transport: Pt's wife           Transition of care plan:      [x] Home with Home Health   - Provider: Pt's wife to contact CM on 1/12 with name of preferred home health agency. Home health agency contact information at pt's home. CM will follow. MINDA Jarrett    Care Management Interventions  PCP Verified by CM:  Yes (Uses a PCP with [facility])  MyChart Signup: No  Discharge Durable Medical Equipment: No  Physical Therapy Consult: Yes  Occupational Therapy Consult: No  Speech Therapy Consult: No  Support Systems: Spouse/Significant Other, Child(ivon)  Discharge Location  Patient Expects to be Discharged to[de-identified] Home with home health

## 2023-01-11 NOTE — PROGRESS NOTES
Orthopaedic Progress Note    2023 10:00 AM     Patient: Mai Bain MRN: [id number]  SSN: [id number]    YOB: 1945  Age: 76 y.o. Sex: male      Admit date:  2023  Admitting Physician:  Darin Reese MD   Consulting Physician(s): Treatment Team: Attending Provider: Rey Severino DO; Consulting Provider: Jo Ann Erickson MD; Consulting Provider: Sanjeev Griffin NP; Physician: Rey Severino DO; Staff Nurse: Mil Qiunn LPN; Consulting Provider: Yahir Olivares MD; Physical Therapy Assistant: Renee Walker; Consulting Provider: David Rodriguez MD; Consulting Provider: Melanie Bender MD    SUBJECTIVE:     Mai Bain is a 76 y.o. male out of bed and ambulating in room on arrival. No family present, nursing assistant present. No prior Hx. Of any cancers  per patient. He states last colonoscopy was 2 years ago. Still with moderate right shoulder pain. Hospitalist in room to discuss MRI results also. No complaints of nausea, vomiting, dizziness, lightheadedness, chest pain, or shortness of breath. OBJECTIVE:       Physical Exam:  General: Alert, cooperative, no distress. Respiratory: Respirations unlabored  Neurological:  Neurovascular exam within normal limits. Motor: + DF/PF. Musculoskeletal: Mild to moderate TTP of Right upper humerus, no effusion, no ecchymosis, limited shoulder ROM due to pain with Flex to 120, abd to 80, Good elbow ROM,  strength 4+/5, NVI, good  strength.       Vital Signs:        Patient Vitals for the past 8 hrs:   BP Temp Pulse Resp SpO2 Height Weight   23 0905 131/65 98.1 °F (36.7 °C) 69 16 93 % -- --   23 0326 (!) 156/53 97.8 °F (36.6 °C) 79 16 96 % 5' 7\" (1.702 m) 20 kg (44 lb 1.5 oz)                                          Temp (24hrs), Av.3 °F (36.8 °C), Min:97.8 °F (36.6 °C), Max:98.6 °F (37 °C)      Labs:        Recent Labs     23  0404   HCT 40.8   HGB 13.7     Lab Results Component Value Date/Time    Sodium 141 01/11/2023 04:04 AM    Potassium 3.4 (L) 01/11/2023 04:04 AM    Chloride 107 01/11/2023 04:04 AM    CO2 26 01/11/2023 04:04 AM    Glucose 104 (H) 01/11/2023 04:04 AM    BUN 35 (H) 01/11/2023 04:04 AM    Creatinine 1.06 01/11/2023 04:04 AM    Calcium 11.6 (H) 01/11/2023 04:04 AM       PT/OT:        Gait  Base of Support: Widened  Speed/Krystle: Pace decreased (<100 feet/min)  Gait Abnormalities: Decreased step clearance, Path deviations  Ambulation - Level of Assistance: Minimal assistance  Distance (ft):  (20' x 2 to/from restroom in ED)  Assistive Device: Gait belt (LUE hand held assist)       Patient mobility  Bed Mobility Training  Supine to Sit: Moderate assistance, Additional time  Sit to Supine: Minimum assistance, Additional time  Scooting: Contact guard assistance  Transfer Training  Sit to Stand: Contact guard assistance, Additional time  Stand to Sit: Contact guard assistance, Additional time      Gait Training  Assistive Device: Gait belt (LUE hand held assist)  Ambulation - Level of Assistance: Minimal assistance  Distance (ft):  (20' x 2 to/from restroom in ED)            ASSESSMENT / PLAN:   Active Problems:    Acute metabolic encephalopathy (4/99/8240)       Right shoulder pain with RTC tear and metastatic lesions of the glenoid/ humerus.   - Recommendations from Dr. Codey Cleary are non-operative treatment, minimal to NWB RUE (may use arm for balance only), sling for comfort. - Hem/ Onc consulted and defer treatment to their team at this time. Please contact Ortho via Perfect serve if additional questions or concerns arise.         Signed By:  Esthela Wall, [address] [facility]

## 2023-01-11 NOTE — PROGRESS NOTES
Bedside shift change report given to HANNAH Vila (oncoming nurse) by Bg Cote (offgoing nurse). Report included the following information SBAR, Kardex, and MAR.

## 2023-01-11 NOTE — PROGRESS NOTES
Problem: Falls - Risk of  Goal: *Absence of Falls  Description: Document Macario Grijalva Fall Risk and appropriate interventions in the flowsheet.   Outcome: Progressing Towards Goal  Note: Fall Risk Interventions:                                Problem: Patient Education: Go to Patient Education Activity  Goal: Patient/Family Education  Outcome: Progressing Towards Goal     Problem: Patient Education: Go to Patient Education Activity  Goal: Patient/Family Education  Outcome: Progressing Towards Goal     Problem: Pain  Goal: *Control of Pain  Outcome: Progressing Towards Goal

## 2023-01-11 NOTE — PROGRESS NOTES
1230 pt locked himself in bathroom x 2 wife present in room , able to get patient to unlock the door. Advise not to lock the door. 1300 IV pulled out by patient , new IV placed in Left forearm.     1345  transported to [address] [place] [address] pt returned to room from CT

## 2023-01-11 NOTE — PROGRESS NOTES
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 Medicine team made aware and will consult oncology in AM.   Per discussion earlier with wife and chart review pt us no known hx of Cancer, pt will need work up for primary source of metastatic disease. Ortho has not discussed results of MRI at this time, We will review results in am.     At this time con tinue pain management as per orders. Will advise NWB RUE to prevent further injury.

## 2023-01-11 NOTE — CONSULTS
[facility] [place] at [address], [address] [facility] 100[address] [facility]  Joanie [phone]  F: [phone]      Reason for Visit:   Claudene Norfolk. is a 76 y.o. male who is seen for evaluation of concern for metastatic disease. Hematology/Oncology Treatment History:   none    History of Present Illness:   Claudene Norfolk. is a pleasant 76 y.o. male who was admitted on  for AMS and falls at home. Family noticed confusion at home x 2 days. Has had rt shoulder pain/injury x 1 month  and been following with  outpatient provider. ED labs WBC 12.5  Creat 1.3 Ca 12.5 ED imaging CT head no acute intracranial process; minimal chronic microvascular ischemic change ; XR rt shoulder mottled lucencies  of glenoid and posterior acromion; aggressive lytic osseous lesions  MRI reveals extensive bony metastatic disease to glenoid and marrow replacement in the proximal humeral neck compatible with metastatic disease. Small tear anterior supraspinatus. Pt unable to state why he was brought to the hospital. States having pain to rt shoulder; unsure about any confusion at home. Correctly states name, yr , location and president. Denies SOB, pain other than rt shoulder, changes in bowels, N/V. Denies personal hx of cancer. Family Hx of cancer: Mother; ? lung  : blended family; 2 children ( Annika/Dar) and step child Nataliia Segura) ; retired    Smoking hx: denies   ETOH: social drinker  EGD/Colonoscopy: 2017 s/p sigmoid colon resection/diverticulosis     No family at bedside. Addendum: wife arrived; updated on plan and corrected hx above. States pt has not been eating or drinking for the last 2 weeks. Has had decreased activity for several weeks.  Noted decrease in mental changes since Nov.     Past Medical History:   Diagnosis Date    Diverticulitis     Gastrointestinal disorder     History of vascular access device 2017    [facility] VAT -  5FR triple Lumen Power PICC R Basilic  39 cm    Spinal stenosis        Past Surgical History:   Procedure Laterality Date    FLEXIBLE SIGMOIDOSCOPY N/A 7/27/2017    SIGMOIDOSCOPY FLEXIBLE performed by Maggy Figueroa MD at [facility] ENDOSCOPY    IR INSERT NON TUNL CVC OVER 5 YRS  1/13/2021       Social History     Socioeconomic History    Marital status:      Spouse name: Not on file    Number of children: Not on file    Years of education: Not on file    Highest education level: Not on file   Occupational History    Not on file   Tobacco Use    Smoking status: Former     Packs/day: 1.00     Years: 50.00     Pack years: 50.00     Types: Cigarettes    Smokeless tobacco: Current   Substance and Sexual Activity    Alcohol use: Yes     Alcohol/week: 0.8 standard drinks     Types: 1 Cans of beer per week    Drug use: No    Sexual activity: Not on file   Other Topics Concern    Not on file   Social History Narrative    Not on file     Social Determinants of Health     Financial Resource Strain: Not on file   Food Insecurity: Not on file   Transportation Needs: Not on file   Physical Activity: Not on file   Stress: Not on file   Social Connections: Not on file   Intimate Partner Violence: Not on file   Housing Stability: Not on file       No family history on file.     Current Facility-Administered Medications   Medication Dose Route Frequency    hydrALAZINE (APRESOLINE) 20 mg/mL injection 20 mg  20 mg IntraVENous Q4H PRN    melatonin tablet 4.5 mg  4.5 mg Oral QHS    sodium chloride (NS) flush 5-40 mL  5-40 mL IntraVENous Q8H    sodium chloride (NS) flush 5-40 mL  5-40 mL IntraVENous PRN    acetaminophen (TYLENOL) tablet 650 mg  650 mg Oral Q6H PRN    Or    acetaminophen (TYLENOL) suppository 650 mg  650 mg Rectal Q6H PRN    polyethylene glycol (MIRALAX) packet 17 g  17 g Oral DAILY PRN    ondansetron (ZOFRAN ODT) tablet 4 mg  4 mg Oral Q8H PRN    Or    ondansetron (ZOFRAN) injection 4 mg  4 mg IntraVENous Q6H PRN enoxaparin (LOVENOX) injection 40 mg  40 mg SubCUTAneous DAILY    ketorolac (TORADOL) injection 15 mg  15 mg IntraVENous Q6H PRN    lidocaine 4 % patch 2 Patch  2 Patch TransDERmal Q24H    famotidine (PEPCID) tablet 20 mg  20 mg Oral BID    psyllium husk-aspartame (METAMUCIL FIBER) packet 1 Packet  1 Packet Oral BID       No Known Allergies       Review of Systems: A complete review of systems was obtained, reviewed. Pertinent findings reviewed above. Physical Exam:   Visit Vitals  /65 (BP 1 Location: Left upper arm, BP Patient Position: At rest;Lying)   Pulse 69   Temp 98.1 °F (36.7 °C)   Resp 16   Ht 5' 7\" (1.702 m)   Wt 44 lb 1.5 oz (20 kg)   SpO2 93%   BMI 6.91 kg/m²     ECOG PS: 1-2  General: disheveled; no distress  Eyes: PERRLA, anicteric sclerae  HENT: atraumatic, oropharynx clear  Neck: supple  Lymphatic: no cervical, supraclavicular, or inguinal adenopathy  Respiratory: CTAB, normal respiratory effort  CV: normal rate, regular rhythm, no murmurs, no peripheral edema  GI: soft, nontender, nondistended, no masses, no hepatomegaly, no splenomegaly  MS: digits without clubbing or cyanosis. Skin: no rashes, no ecchymoses, no petechia. normal temperature, turgor, and texture. Psych: alert, oriented, appropriate affect, fair judgment/insight    Results:     Lab Results   Component Value Date/Time    WBC 9.6 01/11/2023 04:04 AM    HGB 13.7 01/11/2023 04:04 AM    HCT 40.8 01/11/2023 04:04 AM    PLATELET 904 58/36/2855 04:04 AM    MCV 87.0 01/11/2023 04:04 AM    ABS.  NEUTROPHILS 7.3 01/11/2023 04:04 AM    Hemoglobin (POC) 15.6 02/23/2016 05:38 PM    Hematocrit (POC) 46 02/23/2016 05:38 PM     Lab Results   Component Value Date/Time    Sodium 141 01/11/2023 04:04 AM    Potassium 3.4 (L) 01/11/2023 04:04 AM    Chloride 107 01/11/2023 04:04 AM    CO2 26 01/11/2023 04:04 AM    Glucose 104 (H) 01/11/2023 04:04 AM    BUN 35 (H) 01/11/2023 04:04 AM    Creatinine 1.06 01/11/2023 04:04 AM    GFR est AA >60 01/28/2021 06:16 PM    GFR est non-AA >60 01/28/2021 06:16 PM    Calcium 11.6 (H) 01/11/2023 04:04 AM    Sodium (POC) 136 02/23/2016 05:38 PM    Potassium (POC) 4.0 02/23/2016 05:38 PM    Chloride (POC) 103 02/23/2016 05:38 PM    Glucose (POC) 116 (H) 01/22/2021 05:22 AM    BUN (POC) 21 (H) 02/23/2016 05:38 PM    Creatinine (POC) 0.9 02/23/2016 05:38 PM    Calcium, ionized (POC) 1.09 (L) 02/23/2016 05:38 PM     Lab Results   Component Value Date/Time    Bilirubin, total 0.6 01/11/2023 04:04 AM    ALT (SGPT) 28 01/11/2023 04:04 AM    Alk. phosphatase 104 01/11/2023 04:04 AM    Protein, total 6.6 01/11/2023 04:04 AM    Albumin 2.7 (L) 01/11/2023 04:04 AM    Globulin 3.9 01/11/2023 04:04 AM     Lab Results   Component Value Date/Time    Ferritin 1,418 (H) 01/22/2021 03:02 AM    Vitamin B12 1,602 (H) 01/10/2023 07:01 PM     (H) 01/10/2021 11:18 PM    C-Reactive protein 1.18 (H) 01/22/2021 03:02 AM    TSH 1.35 01/10/2023 07:01 PM    Lipase 80 10/04/2019 02:08 PM       Lab Results   Component Value Date/Time    INR 1.4 (H) 01/23/2021 04:49 AM    aPTT 29.2 01/23/2021 04:49 AM    D-dimer 0.59 01/23/2021 04:49 AM    Fibrinogen 456 01/23/2021 04:49 AM      Lab Results   Component Value Date/Time     (H) 01/10/2021 11:18 PM      1/9/23 CT head wo cont  IMPRESSION  No acute intracranial process. Imaging findings consistent with minimal chronic microvascular ischemic change. There is a mild degree of cerebral atrophy. 1/10/23 XR should   IMPRESSION  1. Mottled lucencies and apparent cortical disruption at the inferior aspect of  the glenoid and posterior aspect of the acromion, not definitely seen on  1/13/2021 chest radiograph. Aggressive lytic osseous lesions +/- pathologic  fractures may have this appearance. A nonemergent CT versus contrast-enhanced  MRI of the shoulder is recommended for better characterization. 2.  Mild to moderate osteoarthritis.       1/10/23 MRI Rt Shoulder  IMPRESSION  1. Extensive bony metastatic disease to the glenoid with additional areas of  marrow replacement in the proximal humeral neck. This is compatible with  metastatic disease  2. Small high-grade partial-thickness undersurface tear anterior supraspinatus    Test results above have been reviewed. Assessment/Recommendations:     Bony mets   Noted to glenoid/ humeral head concerning for metastatic disease  -will eval with CT C/A/P  -add PSA   -possible bx in am  -will need  NM bone scan at somepoint    2. AMS  Notes reference baseline dementia per family; ? Worsened by elevated calcium  CT Head: unrevealing  -complete eval with MRI brain       3. Elevated calcium   Admission Ca 12.5  This am 11.6 corrects to 12.6  -consider  Zometa 4 mg IV x 1 dose once correct weight obtained on pt; discussed with pharmacy  -initiate IVFs  cc/hr  -continue to monitor    4. Rt shoulder pain  Ortho consulted  ===========================================  I personally saw and evaluated the patient and performed the key components of medical decision making. The history, physical exam, and documentation were performed by Kaylin Dietz NP. I reviewed and verified the above documentation and modified it as needed. S:    64-year-old male with new onset of altered mental status fall at home presents with pain and confusion found to have hypercalcemia. Imaging shows extensive bone lesions especially of the axial skeleton as well as focal areas in the right shoulder and frontal skull. Patient unable to provide a specific history but notes that he has right shoulder pain. Daughter at bedside notes patient with altered mental status. She also adds to history that patient stop smoking about 6 years ago. Otherwise, limited history provided by patient. O:       Physical Exam  Constitutional: No acute distress. , Non-toxic appearance. , and Chronic ill appearance. HENT: Normocephalic and atraumatic head.     Eyes: Normal Conjunctivae. Anicteric sclerae. Cardiovascular: S1,S2 auscultated. No pitting edema. Pulmonary: Normal Respiratory Effort. No wheezing. No rhonchi. No rales. Abdominal: Normal bowel sounds. Soft Abdomen to palpation. No abdominal tenderness. No guarding. Musculoskeletal: No muscle pain on palpation. No temporal muscle wasting on inspection. Skin: No jaundice. No rash. Neurological: Alert and oriented. No tremor on inspection. Psychiatric: normal affect. No inarticulate speech. Recent Results (from the past 24 hour(s))   CBC WITH AUTOMATED DIFF    Collection Time: 01/11/23  4:04 AM   Result Value Ref Range    WBC 9.6 4.1 - 11.1 K/uL    RBC 4.69 4.10 - 5.70 M/uL    HGB 13.7 12.1 - 17.0 g/dL    HCT 40.8 36.6 - 50.3 %    MCV 87.0 80.0 - 99.0 FL    MCH 29.2 26.0 - 34.0 PG    MCHC 33.6 30.0 - 36.5 g/dL    RDW 13.1 11.5 - 14.5 %    PLATELET 408 972 - 718 K/uL    MPV 10.0 8.9 - 12.9 FL    NRBC 0.0 0  WBC    ABSOLUTE NRBC 0.00 0.00 - 0.01 K/uL    NEUTROPHILS 75 32 - 75 %    LYMPHOCYTES 13 12 - 49 %    MONOCYTES 9 5 - 13 %    EOSINOPHILS 2 0 - 7 %    BASOPHILS 0 0 - 1 %    IMMATURE GRANULOCYTES 1 (H) 0.0 - 0.5 %    ABS. NEUTROPHILS 7.3 1.8 - 8.0 K/UL    ABS. LYMPHOCYTES 1.2 0.8 - 3.5 K/UL    ABS. MONOCYTES 0.8 0.0 - 1.0 K/UL    ABS. EOSINOPHILS 0.2 0.0 - 0.4 K/UL    ABS. BASOPHILS 0.0 0.0 - 0.1 K/UL    ABS. IMM.  GRANS. 0.1 (H) 0.00 - 0.04 K/UL    DF AUTOMATED     METABOLIC PANEL, COMPREHENSIVE    Collection Time: 01/11/23  4:04 AM   Result Value Ref Range    Sodium 141 136 - 145 mmol/L    Potassium 3.4 (L) 3.5 - 5.1 mmol/L    Chloride 107 97 - 108 mmol/L    CO2 26 21 - 32 mmol/L    Anion gap 8 5 - 15 mmol/L    Glucose 104 (H) 65 - 100 mg/dL    BUN 35 (H) 6 - 20 MG/DL    Creatinine 1.06 0.70 - 1.30 MG/DL    BUN/Creatinine ratio 33 (H) 12 - 20      eGFR >60 >60 ml/min/1.73m2    Calcium 11.6 (H) 8.5 - 10.1 MG/DL    Bilirubin, total 0.6 0.2 - 1.0 MG/DL    ALT (SGPT) 28 12 - 78 U/L    AST (SGOT) 35 15 - 37 U/L    Alk. phosphatase 104 45 - 117 U/L    Protein, total 6.6 6.4 - 8.2 g/dL    Albumin 2.7 (L) 3.5 - 5.0 g/dL    Globulin 3.9 2.0 - 4.0 g/dL    A-G Ratio 0.7 (L) 1.1 - 2.2          MRI BRAIN W WO CONT    Result Date: 1/11/2023  EXAM:  MRI BRAIN W WO CONT INDICATION:    68-year-old male with altered mental status and baseline dementia, presenting with concern for metastatic disease. COMPARISON:  1/9/2023 head CT. CONTRAST: 17 ml ProHance. TECHNIQUE:  Multiplanar multisequence acquisition without and with contrast of the brain. FINDINGS: There is bilaterally symmetric mild diffuse enhancing dural thickening, suboptimally evaluated on prior study. Basal CSF cisterns are patent. There is no cerebellar tonsillar herniation. Diffuse parenchymal volume loss with diffusely prominent ventricles and extraventricular CSF spaces. Patchy periventricular and deep white matter T2/FLAIR hyperintensity, nonspecific and likely microangiopathic ischemic changes. There is no acute infarct, hemorrhage, extra-axial fluid collection, or mass effect. Expected arterial flow-voids are present. There is no obvious enhancing mass lesion. Moderate diffuse mucosal thickening of the paranasal sinuses. The mastoids are free of fluid bilaterally. Status post left lense replacement. The orbits are otherwise unremarkable. Parasagittal right frontal enhancing infiltrative intraosseous measuring up to 3.1 x 3.1 x 1.5 cm with demonstratable intracranial extension along the parasagittal right frontal region (image 116 of series 1200), corresponding to ill-defined salt-and-pepper lytic lesion seen on prior head CT. Parasagittal right frontal enhancing intraosseous lesion with intracranial extension measuring up to 3.1 x 3.1 cm, suspicious for aggressive lesion such as metastatic or primary osseous lesion like multiple myeloma, or lymphoma. Clinical correlation is advised. This may be an amenable to percutaneous biopsy for tissue diagnosis. Underlying associated diffuse bilateral enhancing dural thickening which may suggest meningitis versus carcinomatosis. Intracranial hypertension is in the differential diagnostic considerations. No acute infarct, intracranial hemorrhage or brain mass lesion. CT CHEST ABD PELV W CONT    Result Date: 1/11/2023  EXAM: CT CHEST ABD PELV W CONT INDICATION: Abnormal bone lesions in the right glenoid and proximal right humerus on MRI right shoulder. Evaluate source of potential metastatic disease. Normal with blood cell count. No known malignancy. COMPARISON: Portable chest on 1/10/2021 and 1/13/2021. CT chest on 3/22/2021. CT angiography chest on 1/15/2021. IV CONTRAST: 100 mL of Isovue-370. ORAL CONTRAST: None TECHNIQUE: Following the uneventful intravenous administration of contrast, thin axial images were obtained through the chest, abdomen and pelvis. Coronal and sagittal reformats were generated. CT dose reduction was achieved through use of a standardized protocol tailored for this examination and automatic exposure control for dose modulation. FINDINGS: CHEST WALL: No mass or axillary lymphadenopathy. THYROID: No nodule. MEDIASTINUM: Heterogeneous AP window lymphadenopathy measures 1.9 x 1.5 cm. Fatty hilum is replaced. Other mediastinal lymph nodes are physiologic in size and morphology. WILLY: No mass or lymphadenopathy. THORACIC AORTA: Calcific atherosclerosis without aneurysm. Pulsation artifact. MAIN PULMONARY ARTERY: Normal caliber, no central embolism. TRACHEA/BRONCHI: Subtle secretions or dependent trachea. No bronchiectasis. ESOPHAGUS: No wall thickening or dilatation. HEART: Normal sized, no effusion. Coronary artery calcium: present PLEURA: No effusion or pneumothorax. LUNGS: Moderate centrilobular emphysema. No lung mass or suspicious nodule. No fibrosis. No evidence of edema. LIVER: Multiple small hypoenhancing liver lesions in segments 2, 7, 6, and 5.  Largest lesion is inferiorly in segment 7 and measures 1.1 cm. Hepatic surface is smooth. BILIARY TREE: Gallbladder is within normal limits. CBD is not dilated. SPLEEN: within normal limits. PANCREAS: Atrophy. No mass or inflammation. ADRENALS: Unremarkable. KIDNEYS: Small bilateral simple benign renal cysts. No solid renal mass or hydronephrosis. STOMACH: Incomplete distention, limited evaluation. SMALL BOWEL: No dilatation or wall thickening. COLON: Mild diverticulosis. Previous sigmoid colon surgery. No inflammation. No measurable mass. APPENDIX: Normal. PERITONEUM: No ascites or pneumoperitoneum. RETROPERITONEUM: Aortic atherosclerosis without aneurysm. No lymphadenopathy. REPRODUCTIVE ORGANS: Prostate gland is mildly heterogeneous. URINARY BLADDER: No mass or calculus. BONES: There are innumerable lucent bone lesions in the spine, including T4, T6, T7, T8, T9, T10 vertebral bodies and L2, L3, and L5 lumbar vertebral bodies. Lucent lesion is in the distal sternal body. Lucent lesion in the right glenoid. Subtle lytic lesion in the anterior right iliac bone and anterior left iliac bone near the ASIS. Ill-defined lytic lesion in the posterior left fourth and eighth ribs. Subtle soft tissue replacing lytic lesion in the superior right scapula. ABDOMINAL WALL: No mass or hernia. ADDITIONAL COMMENTS: N/A     1. Multifocal osseous metastatic disease in the thoracic spine, lumbar spine, pelvis, left ribs, and right scapula. Potential biopsy sites including anterior left iliac bone and superior right scapula. 2. Multiple small hepatic lesions are suspicious for metastatic disease in this clinical scenario. 3. Moderate centrilobular emphysema. No pulmonary nodule. 4. Mediastinal AP window lymphadenopathy. 5. All findings suspicious for malignancy are new since 2021. Recommendation: Check PSA. Review result of any recent colonoscopy. Consider bone scan to establish baseline. A/P:    Bony metastasis ([facility] [address]  Daughter present at bedside.   We discussed with her that we have concerns about stage IV cancer diagnosis but he needs a definitive tissue diagnosis. We discussed that it is difficult to speculate primary cancer. We discussed checking for prostate cancer with PSA. Also, multiple myeloma work-up ordered. Regardless, patient needs a tissue diagnosis. We will plan for potential biopsy tomorrow with patient and family to ultimately decide about procedure. We discussed that even if he may not be a candidate for any treatment diagnosis can help provide some closure. Will order palliative care consultation. Hypercalcemia  Recommend checking PTH. Suspicion elevated calcium could be directly related to bone lesions or hypercalcemia of malignancy. Agree with Zometa. Transient alteration of awareness  -Could be due to hypercalcemia. MRI brain shows frontal scalp lesion with intracranial involvement but patient does not seem to have any clear focal neurologic symptoms. Acute pain of right shoulder  -Agree with orthopedics consultation. Follow-Up:  Will follow along; please contact if any specific concerns.       Florencio Banda MD  Hematology/Medical Oncology Provider  [facility]bigniew Lyman  P: [phone]

## 2023-01-11 NOTE — ED NOTES
TRANSFER - OUT REPORT:    Verbal report given to HANNAH Yoon(name) on Claudene Norfolk.  being transferred to [facility](unit) for routine progression of care       Report consisted of patients Situation, Background, Assessment and   Recommendations(SBAR). Information from the following report(s) SBAR, Kardex, ED Summary and Recent Results was reviewed with the receiving nurse. Lines:   Peripheral IV 01/09/23 Anterior;Left;Proximal Forearm (Active)   Site Assessment Clean, dry, & intact 01/10/23 1540   Phlebitis Assessment 0 01/10/23 1540   Infiltration Assessment 0 01/10/23 1540   Dressing Status Clean, dry, & intact 01/10/23 1540   Dressing Type Transparent;Tape 01/10/23 1540   Hub Color/Line Status Pink;Flushed 01/10/23 1540        Opportunity for questions and clarification was provided.       Patient transported with:   Monitor  O2 @ 2 liters

## 2023-01-12 ENCOUNTER — APPOINTMENT (OUTPATIENT)
Dept: CT IMAGING | Age: 78
DRG: 477 | End: 2023-01-12
Attending: NURSE PRACTITIONER
Payer: MEDICARE

## 2023-01-12 PROBLEM — C79.51 BONY METASTASIS (HCC): Status: ACTIVE | Noted: 2023-01-11

## 2023-01-12 PROBLEM — E83.52 HYPERCALCEMIA: Status: ACTIVE | Noted: 2023-01-11

## 2023-01-12 PROBLEM — R40.4 TRANSIENT ALTERATION OF AWARENESS: Status: ACTIVE | Noted: 2023-01-11

## 2023-01-12 PROBLEM — C79.51 BONY METASTASIS: Status: ACTIVE | Noted: 2023-01-11

## 2023-01-12 PROBLEM — E83.52 HYPERCALCEMIA: Status: ACTIVE | Noted: 2023-01-12

## 2023-01-12 PROBLEM — M25.511 ACUTE PAIN OF RIGHT SHOULDER: Status: ACTIVE | Noted: 2023-01-11

## 2023-01-12 LAB
ALBUMIN SERPL-MCNC: 2.7 G/DL (ref 3.5–5)
ALBUMIN/GLOB SERPL: 0.7 (ref 1.1–2.2)
ALP SERPL-CCNC: 98 U/L (ref 45–117)
ALT SERPL-CCNC: 29 U/L (ref 12–78)
ANION GAP SERPL CALC-SCNC: 8 MMOL/L (ref 5–15)
AST SERPL-CCNC: 49 U/L (ref 15–37)
BASOPHILS # BLD: 0 K/UL (ref 0–0.1)
BASOPHILS NFR BLD: 0 % (ref 0–1)
BILIRUB SERPL-MCNC: 0.5 MG/DL (ref 0.2–1)
BUN SERPL-MCNC: 33 MG/DL (ref 6–20)
BUN/CREAT SERPL: 27 (ref 12–20)
CALCIUM SERPL-MCNC: 11.5 MG/DL (ref 8.5–10.1)
CHLORIDE SERPL-SCNC: 109 MMOL/L (ref 97–108)
CO2 SERPL-SCNC: 24 MMOL/L (ref 21–32)
CREAT SERPL-MCNC: 1.24 MG/DL (ref 0.7–1.3)
DIFFERENTIAL METHOD BLD: ABNORMAL
EOSINOPHIL # BLD: 0.1 K/UL (ref 0–0.4)
EOSINOPHIL NFR BLD: 1 % (ref 0–7)
ERYTHROCYTE [DISTWIDTH] IN BLOOD BY AUTOMATED COUNT: 12.9 % (ref 11.5–14.5)
GLOBULIN SER CALC-MCNC: 4.1 G/DL (ref 2–4)
GLUCOSE SERPL-MCNC: 102 MG/DL (ref 65–100)
HCT VFR BLD AUTO: 38.8 % (ref 36.6–50.3)
HGB BLD-MCNC: 12.8 G/DL (ref 12.1–17)
IMM GRANULOCYTES # BLD AUTO: 0.1 K/UL (ref 0–0.04)
IMM GRANULOCYTES NFR BLD AUTO: 1 % (ref 0–0.5)
LYMPHOCYTES # BLD: 0.8 K/UL (ref 0.8–3.5)
LYMPHOCYTES NFR BLD: 9 % (ref 12–49)
MCH RBC QN AUTO: 28.8 PG (ref 26–34)
MCHC RBC AUTO-ENTMCNC: 33 G/DL (ref 30–36.5)
MCV RBC AUTO: 87.2 FL (ref 80–99)
MONOCYTES # BLD: 0.6 K/UL (ref 0–1)
MONOCYTES NFR BLD: 7 % (ref 5–13)
NEUTS SEG # BLD: 7.3 K/UL (ref 1.8–8)
NEUTS SEG NFR BLD: 82 % (ref 32–75)
NRBC # BLD: 0 K/UL (ref 0–0.01)
NRBC BLD-RTO: 0 PER 100 WBC
PLATELET # BLD AUTO: 189 K/UL (ref 150–400)
PMV BLD AUTO: 10.1 FL (ref 8.9–12.9)
POTASSIUM SERPL-SCNC: 4.6 MMOL/L (ref 3.5–5.1)
PROT SERPL-MCNC: 6.8 G/DL (ref 6.4–8.2)
RBC # BLD AUTO: 4.45 M/UL (ref 4.1–5.7)
RBC MORPH BLD: ABNORMAL
SODIUM SERPL-SCNC: 141 MMOL/L (ref 136–145)
WBC # BLD AUTO: 8.9 K/UL (ref 4.1–11.1)

## 2023-01-12 PROCEDURE — 0QB23ZX EXCISION OF RIGHT PELVIC BONE, PERCUTANEOUS APPROACH, DIAGNOSTIC: ICD-10-PCS | Performed by: RADIOLOGY

## 2023-01-12 PROCEDURE — 74011000250 HC RX REV CODE- 250: Performed by: INTERNAL MEDICINE

## 2023-01-12 PROCEDURE — 74011250636 HC RX REV CODE- 250/636: Performed by: INTERNAL MEDICINE

## 2023-01-12 PROCEDURE — 36415 COLL VENOUS BLD VENIPUNCTURE: CPT

## 2023-01-12 PROCEDURE — 88313 SPECIAL STAINS GROUP 2: CPT

## 2023-01-12 PROCEDURE — 88341 IMHCHEM/IMCYTCHM EA ADD ANTB: CPT

## 2023-01-12 PROCEDURE — 74011250637 HC RX REV CODE- 250/637: Performed by: INTERNAL MEDICINE

## 2023-01-12 PROCEDURE — 88333 PATH CONSLTJ SURG CYTO XM 1: CPT

## 2023-01-12 PROCEDURE — 99153 MOD SED SAME PHYS/QHP EA: CPT

## 2023-01-12 PROCEDURE — 99222 1ST HOSP IP/OBS MODERATE 55: CPT | Performed by: STUDENT IN AN ORGANIZED HEALTH CARE EDUCATION/TRAINING PROGRAM

## 2023-01-12 PROCEDURE — 80053 COMPREHEN METABOLIC PANEL: CPT

## 2023-01-12 PROCEDURE — 88342 IMHCHEM/IMCYTCHM 1ST ANTB: CPT

## 2023-01-12 PROCEDURE — 65270000029 HC RM PRIVATE

## 2023-01-12 PROCEDURE — 74011250636 HC RX REV CODE- 250/636: Performed by: NURSE PRACTITIONER

## 2023-01-12 PROCEDURE — 74011250636 HC RX REV CODE- 250/636

## 2023-01-12 PROCEDURE — 99152 MOD SED SAME PHYS/QHP 5/>YRS: CPT

## 2023-01-12 PROCEDURE — 77012 CT SCAN FOR NEEDLE BIOPSY: CPT

## 2023-01-12 PROCEDURE — 74011000250 HC RX REV CODE- 250: Performed by: NURSE PRACTITIONER

## 2023-01-12 PROCEDURE — 85025 COMPLETE CBC W/AUTO DIFF WBC: CPT

## 2023-01-12 PROCEDURE — 88307 TISSUE EXAM BY PATHOLOGIST: CPT

## 2023-01-12 RX ORDER — OXYCODONE HYDROCHLORIDE 5 MG/1
5 TABLET ORAL
Status: DISCONTINUED | OUTPATIENT
Start: 2023-01-12 | End: 2023-01-14

## 2023-01-12 RX ORDER — SODIUM CHLORIDE 9 MG/ML
25 INJECTION, SOLUTION INTRAVENOUS
Status: COMPLETED | OUTPATIENT
Start: 2023-01-12 | End: 2023-01-12

## 2023-01-12 RX ORDER — FENTANYL CITRATE 50 UG/ML
25-100 INJECTION, SOLUTION INTRAMUSCULAR; INTRAVENOUS
Status: DISCONTINUED | OUTPATIENT
Start: 2023-01-12 | End: 2023-01-12

## 2023-01-12 RX ORDER — GABAPENTIN 100 MG/1
200 CAPSULE ORAL 3 TIMES DAILY
Status: DISCONTINUED | OUTPATIENT
Start: 2023-01-13 | End: 2023-01-23 | Stop reason: HOSPADM

## 2023-01-12 RX ORDER — MIDAZOLAM HYDROCHLORIDE 1 MG/ML
.5-5 INJECTION, SOLUTION INTRAMUSCULAR; INTRAVENOUS
Status: DISCONTINUED | OUTPATIENT
Start: 2023-01-12 | End: 2023-01-12

## 2023-01-12 RX ADMIN — Medication 10 ML: at 05:47

## 2023-01-12 RX ADMIN — SODIUM CHLORIDE 125 ML/HR: 9 INJECTION, SOLUTION INTRAVENOUS at 08:36

## 2023-01-12 RX ADMIN — Medication 4.5 MG: at 21:42

## 2023-01-12 RX ADMIN — KETOROLAC TROMETHAMINE 15 MG: 30 INJECTION, SOLUTION INTRAMUSCULAR; INTRAVENOUS at 20:27

## 2023-01-12 RX ADMIN — PSYLLIUM HUSK 1 PACKET: 3.4 POWDER ORAL at 17:29

## 2023-01-12 RX ADMIN — KETOROLAC TROMETHAMINE 15 MG: 30 INJECTION, SOLUTION INTRAMUSCULAR; INTRAVENOUS at 17:30

## 2023-01-12 RX ADMIN — SODIUM CHLORIDE 25 ML/HR: 9 INJECTION, SOLUTION INTRAVENOUS at 12:00

## 2023-01-12 RX ADMIN — KETOROLAC TROMETHAMINE 15 MG: 30 INJECTION, SOLUTION INTRAMUSCULAR; INTRAVENOUS at 01:52

## 2023-01-12 RX ADMIN — FAMOTIDINE 20 MG: 20 TABLET, FILM COATED ORAL at 17:29

## 2023-01-12 RX ADMIN — SODIUM CHLORIDE 125 ML/HR: 9 INJECTION, SOLUTION INTRAVENOUS at 01:53

## 2023-01-12 RX ADMIN — FENTANYL CITRATE 25 MCG: 50 INJECTION, SOLUTION INTRAMUSCULAR; INTRAVENOUS at 12:10

## 2023-01-12 RX ADMIN — FENTANYL CITRATE 25 MCG: 50 INJECTION, SOLUTION INTRAMUSCULAR; INTRAVENOUS at 12:02

## 2023-01-12 RX ADMIN — SODIUM CHLORIDE 125 ML/HR: 9 INJECTION, SOLUTION INTRAVENOUS at 21:42

## 2023-01-12 RX ADMIN — MIDAZOLAM 0.5 MG: 1 INJECTION INTRAMUSCULAR; INTRAVENOUS at 12:33

## 2023-01-12 RX ADMIN — FENTANYL CITRATE 25 MCG: 50 INJECTION, SOLUTION INTRAMUSCULAR; INTRAVENOUS at 12:40

## 2023-01-12 RX ADMIN — KETOROLAC TROMETHAMINE 15 MG: 30 INJECTION, SOLUTION INTRAMUSCULAR; INTRAVENOUS at 08:36

## 2023-01-12 RX ADMIN — Medication 10 ML: at 21:42

## 2023-01-12 RX ADMIN — MIDAZOLAM 1 MG: 1 INJECTION INTRAMUSCULAR; INTRAVENOUS at 12:00

## 2023-01-12 RX ADMIN — ACETAMINOPHEN 650 MG: 325 TABLET ORAL at 14:40

## 2023-01-12 NOTE — PROGRESS NOTES
1514 pt drowsy after procedure, resting comfortably at this time. Several family members at bedside. PT will follow up next treatment day. 1200 Pt off floor for biopsy.  PT will attempt later today

## 2023-01-12 NOTE — PROGRESS NOTES
Transport To CT exam room for bone biopsy with sedation. 12:00 PM Assisted onto CT table prone position of comfort. Sedation monitoring equipment applied as well as C02 nasal canula @ 3L/NC. IV fluids started. Sedation timeout performed with staff to include medication allergies. 12:00 PM Sedation started. 12:25 PM Tolerating sedation well. Procedural timeout performed and procedure started. 12:45 PM Sedation end. Total sedation medications administered Versed 1.5 MG and Fentanyl 75 mcg IV. Total sedation time 45 minutes. Tolerated sedation well. 12:52 Procedure end. Total procedure time 27 minutes. Tolerated well. Gauze and Tegaderm dressing to right ileac crest/pelvis CDI. Tolerated procedure well. 12:55 PM Patient assisted from CT table to stretcher and transported back to radiology nurse dept for recovery on room air, IV fluids discontinued. 13:15 PM Resting quietly post procedure. Bandage is CDI to right lower pelvic area. Sleeping at intervals. 13:30 PM Given ginger ale, tolerated well. Denies any nausea. 13:45 PM Gisela back to baseline and [facility]    Verbal report given to Rahat Faye RN(name) on [facility].  being transferred to [facility] med/surg(unit) for routine progression of care       Report consisted of patients Situation, Background, Assessment and   Recommendations(SBAR). Information from the following report(s) Procedure Summary, MAR, and Recent Results was reviewed with the receiving nurse. Lines:   Peripheral IV 01/11/23 Anterior;Left;Proximal Forearm (Active)       Peripheral IV 01/12/23 Anterior; Left Forearm (Active)        Opportunity for questions and clarification was provided.       Patient transported with:   [employer]

## 2023-01-12 NOTE — CONSULTS
Palliative Medicine Consult  Goyal: [phone] ([id number])    Patient Name: Melany Callahan. YOB: 1945    Date of Initial Consult: 1/12/2023  Reason for Consult: Care Decisions  Requesting Provider: Gibson Ribera DO   Primary Care Physician: Jimbo, MD Ashley     SUMMARY:   Melany Ayala is a 76 y.o. male with a past history of spinal stenosis and lumbar radiculopathy who was admitted on 1/9/2023 from home with a diagnosis of confusion and subsequently found to have widespread metastatic bony malignancy. Patient apparently having right shoulder pain for >1 month being evaluated by outside physician. Became confused previous 2 days prior to admission leading family to bring him to [facility] for evaluation. Current medical issues leading to Palliative Medicine involvement include: Care Decisions. Social Hx:  Lives at home with wife, Katy Selby,  >20 years. 3 children, Hank and Griselda Orona with Andres Lower (Marya's son). Able to manage own ADLs at home per family with occasional assistance from wife. Generally well functioning. Retired manufacturing representative. PALLIATIVE DIAGNOSES:   Suspected Bone Malignancy  Right Shoulder Pain  Encephalopathy  Hypercalcemia  Goals of Care  Palliative Care Encounter     PLAN:   Goals of Care  Reviewed medical chart for history and updated clinical information  Met with patient in room with 3 children, son-in-law, and grandson at bedside. Wife not present. Patient still quite sedated after undergoing bone biopsy. Family voices understanding of current state of disease though voice this is all new/surprising as patient simply had shoulder pain leading to present work-up and findings. Deferred goals of care today as patient too sedated to participate and wife/NOK not present. Gave family Palliative contact information and will visit tomorrow when hopefully patient more alert and wife present.   Pain  Suspected due to malignancy, mostly right shoulder  Continue prn toradol and lidocaine patch  Gabapentin added this morning by attending  Will add oxycodone 5mg PO every 4 hours as needed for pain. Given extent of disease and previous levels of pain, I do think opioid will be needed and is reasonable in pain of malignancy. Start low to hopefully avoid untoward side effects. Hypercalcemia/Encephalopathy  Possibly hypercalcemia of malignancy given bone involvement, contributing to confusion. Unable to fully assess given sedation post biopsy. Getting IV fluids and Zoledronic acid. Delirium precautions: redirect/reorient as possible, lights on during day, minimize interruptions at night, avoid restraints and offending medications like benzodiazepines, allow family presence at bedside. Medication only as needed for agitation that is not redirectable and placing patient or caregivers at risk of harm. Surrogate:  No AMD in chart. Wife is legal NOK. Code Status:  Full Code. Patient unable to participate in discussion today.    Initial consult note routed to primary continuity provider and/or primary health care team members  Communicated plan of care with: Palliative IDTZion 192 Team     GOALS OF CARE / TREATMENT PREFERENCES:     GOALS OF CARE:  Patient/Health Care Proxy Stated Goals:  (unable to assess)    TREATMENT PREFERENCES:   Code Status: Full Code    Patient and family's personal goals include: unable to assess, patient sedated post-procedure    Important upcoming milestones or family events: none reported    The patient identifies the following as important for living well: none reported      Advance Care Planning:  [x] [facility] Interdisciplinary Team has updated the ACP Navigator with Health Care Decision Maker and Patient Capacity      Primary Decision Maker: Abbie Velázquez - [phone]    Secondary Decision Maker: Ruslan Fernandez - Son - [phone]    Secondary Decision Maker: Octavia Cardoza - Daughter - [phone]  Advance Care Planning 1/11/2023   Patient's Healthcare Decision Maker is: -   Confirm Advance Directive Yes, on file   Patient Would Like to Complete Advance Directive -   Does the patient have other document types Other (comment)               Other:    As far as possible, the palliative care team has discussed with patient / health care proxy about goals of care / treatment preferences for patient. HISTORY:     History obtained from: medical chart, family    CHIEF COMPLAINT: confusion, pain    HPI/SUBJECTIVE:    The patient is:   [] Verbal and participatory  [x] Non-participatory due to: sedation post-procedure     Clinical Pain Assessment (nonverbal scale for severity on nonverbal patients): Activity (Movement): Lying quietly, normal position    Duration: for how long has pt been experiencing pain (e.g., 2 days, 1 month, years)  Frequency: how often pain is an issue (e.g., several times per day, once every few days, constant)     FUNCTIONAL ASSESSMENT:     Palliative Performance Scale (PPS):  PPS: 50       PSYCHOSOCIAL/SPIRITUAL SCREENING:     Palliative IDT has assessed this patient for cultural preferences / practices and a referral made as appropriate to needs (Cultural Services, Patient Advocacy, Ethics, etc.)    Any spiritual / Congregational concerns:  [] Yes /  [x] No   If \"Yes\" to discuss with pastoral care during IDT     Does caregiver feel burdened by caring for their loved one:   [] Yes /  [x] No /  [] No Caregiver Present/Available [] No Caregiver [] Pt Lives at Facility  If \"Yes\" to discuss with social work during IDT    Anticipatory grief assessment:   [x] Normal  / [] Maladaptive     If \"Maladaptive\" to discuss with social work during IDT    ESAS Anxiety: Anxiety:  (unable to obtain)    ESAS Depression:          REVIEW OF SYSTEMS:     Positive and pertinent negative findings in ROS are noted above in HPI.   The following systems were [x] reviewed / [] unable to be reviewed as noted in HPI  Other findings are noted below. Systems: constitutional, ears/nose/mouth/throat, respiratory, gastrointestinal, genitourinary, musculoskeletal, integumentary, neurologic, psychiatric, endocrine. Positive findings noted below. Modified ESAS Completed by: provider           Pain:  (unable to obtain)   Anxiety:  (unable to obtain) Nausea:  (unable to obtain)     Dyspnea:  (unable to obtain)                    PHYSICAL EXAM:     From RN flowsheet:  Wt Readings from Last 3 Encounters:   01/11/23 192 lb 14.4 oz (87.5 kg)   03/02/21 190 lb (86.2 kg)   02/15/21 190 lb (86.2 kg)     Blood pressure (!) 168/90, pulse (!) 52, temperature 98.5 °F (36.9 °C), resp. rate 16, height 5' 7\" (1.702 m), weight 192 lb 14.4 oz (87.5 kg), SpO2 91 %.     Pain Scale 1: Numeric (0 - 10)  Pain Intensity 1: 0     Pain Location 1: Shoulder  Pain Orientation 1: Left  Pain Description 1: Aching  Pain Intervention(s) 1: Rest  Last bowel movement, if known:     Constitutional: sleeping in bed, no distress  Eyes: pupils equal, anicteric  ENMT: no nasal discharge, moist mucous membranes  Cardiovascular: regular rate at time of exam, distal pulses intact  Respiratory: breathing not labored, symmetric chest rise  Gastrointestinal: soft non-tender, +bowel sounds  Musculoskeletal: no deformity, no tenderness to palpation  Skin: warm, dry  Neurologic: sedated, not following commands  Psychiatric: no hallucinations, no agitation       HISTORY:     Active Problems:    Acute metabolic encephalopathy (7/33/9189)      Bony metastasis (HCC) (1/11/2023)      Hypercalcemia (1/11/2023)      Transient alteration of awareness (1/11/2023)      Acute pain of right shoulder (1/11/2023)    Past Medical History:   Diagnosis Date    Diverticulitis     Gastrointestinal disorder     History of vascular access device 07/24/2017    [facility] VAT -  5FR triple Lumen Power PICC R Basilic  39 cm    Spinal stenosis       Past Surgical History:   Procedure Laterality Date FLEXIBLE SIGMOIDOSCOPY N/A 7/27/2017    SIGMOIDOSCOPY FLEXIBLE performed by Renetta Farah MD at [facility] ENDOSCOPY    IR INSERT NON TUNL CVC OVER 5 YRS  1/13/2021      No family history on file. History reviewed, no pertinent family history. Social History     Tobacco Use    Smoking status: Former     Packs/day: 1.00     Years: 50.00     Pack years: 50.00     Types: Cigarettes    Smokeless tobacco: Current   Substance Use Topics    Alcohol use:  Yes     Alcohol/week: 0.8 standard drinks     Types: 1 Cans of beer per week     No Known Allergies   Current Facility-Administered Medications   Medication Dose Route Frequency    [START ON 1/13/2023] gabapentin (NEURONTIN) capsule 200 mg  200 mg Oral TID    oxyCODONE IR (ROXICODONE) tablet 5 mg  5 mg Oral Q4H PRN    hydrALAZINE (APRESOLINE) 20 mg/mL injection 20 mg  20 mg IntraVENous Q4H PRN    0.9% sodium chloride infusion  125 mL/hr IntraVENous CONTINUOUS    melatonin tablet 4.5 mg  4.5 mg Oral QHS    sodium chloride (NS) flush 5-40 mL  5-40 mL IntraVENous Q8H    sodium chloride (NS) flush 5-40 mL  5-40 mL IntraVENous PRN    acetaminophen (TYLENOL) tablet 650 mg  650 mg Oral Q6H PRN    Or    acetaminophen (TYLENOL) suppository 650 mg  650 mg Rectal Q6H PRN    polyethylene glycol (MIRALAX) packet 17 g  17 g Oral DAILY PRN    ondansetron (ZOFRAN ODT) tablet 4 mg  4 mg Oral Q8H PRN    Or    ondansetron (ZOFRAN) injection 4 mg  4 mg IntraVENous Q6H PRN    [Held by provider] enoxaparin (LOVENOX) injection 40 mg  40 mg SubCUTAneous DAILY    ketorolac (TORADOL) injection 15 mg  15 mg IntraVENous Q6H PRN    lidocaine 4 % patch 2 Patch  2 Patch TransDERmal Q24H    famotidine (PEPCID) tablet 20 mg  20 mg Oral BID    psyllium husk-aspartame (METAMUCIL FIBER) packet 1 Packet  1 Packet Oral BID          LAB AND IMAGING FINDINGS:     Lab Results   Component Value Date/Time    WBC 8.9 01/12/2023 01:50 AM    HGB 12.8 01/12/2023 01:50 AM    PLATELET 152 48/13/1023 01:50 AM     Lab Results Component Value Date/Time    Sodium 141 01/12/2023 01:14 AM    Potassium 4.6 01/12/2023 01:14 AM    Chloride 109 (H) 01/12/2023 01:14 AM    CO2 24 01/12/2023 01:14 AM    BUN 33 (H) 01/12/2023 01:14 AM    Creatinine 1.24 01/12/2023 01:14 AM    Calcium 11.5 (H) 01/12/2023 01:14 AM    Magnesium 2.8 (H) 01/13/2021 03:51 AM    Phosphorus 2.6 01/13/2021 03:51 AM      Lab Results   Component Value Date/Time    Alk. phosphatase 98 01/12/2023 01:14 AM    Protein, total 6.8 01/12/2023 01:14 AM    Albumin 2.7 (L) 01/12/2023 01:14 AM    Globulin 4.1 (H) 01/12/2023 01:14 AM     Lab Results   Component Value Date/Time    INR 1.4 (H) 01/23/2021 04:49 AM    Prothrombin time 13.9 (H) 01/23/2021 04:49 AM    aPTT 29.2 01/23/2021 04:49 AM      Lab Results   Component Value Date/Time    Ferritin 1,418 (H) 01/22/2021 03:02 AM      Lab Results   Component Value Date/Time    pH 7.44 01/11/2021 05:09 AM    PCO2 31 (L) 01/11/2021 05:09 AM    PO2 69 (L) 01/11/2021 05:09 AM     No components found for: GLPOC   No results found for: CPK, CKMB             Total time: 55 minutes  Counseling / coordination time, spent as noted above: 35 minutes  > 50% counseling / coordination?: yes    Prolonged service was provided for  []30 min   []75 min in face to face time in the presence of the patient, spent as noted above. Time Start:   Time End:   Note: this can only be billed with 72310 (initial) or 47717 (follow up). If multiple start / stop times, list each separately.

## 2023-01-12 NOTE — PROGRESS NOTES
Phone consent for CT guided percutaneous bone biopsy  with conscious sedation obtained via phone with wife, witnessed by rad nurse staff and provider [facility]. All questions and concerns addressed.

## 2023-01-12 NOTE — PROGRESS NOTES
1/12/2023 2:23 PM   CM received return phone call from pt's wife. Pt's wife was unable to find previous home health agency information. CM discussed alternative home health options. Pt's wife selected [facility] as preference. Referral sent to [facility] and accepted via [employer]. 1/12/2023 1:25 PM   CM called to pt's wife, Estee Ballard at [phone] and lvm requesting to know name of home health agency pt had used in the past. CM will follow. MINDA Singh    Care Management Progress Note      ICD-10-CM ICD-9-CM    1. Altered mental status, unspecified altered mental status type  R41.82 780.97       2. CHAVA (acute kidney injury) ([facility] [address]  N17.9 584.9       3. Hypercalcemia  E83.52 275.42       4. Bony metastasis ([facility] [address] [C79.51 (ICD-10-CM)]  C79.51 198.5       5. Transient alteration of awareness [R40.4 (ICD-10-CM)]  R40.4 780.02       6. Acute pain of right shoulder [M25.511 (ICD-10-CM)]  M25.511 719.41           RUR:  9%  Risk Level: [x]Low []Moderate []High  Value-based purchasing: [] Yes [x] No  Bundle patient: [] Yes [x] No   Specify:     Transition of care plan:  Ongoing medical management  Home with family at discharge  Pt needing home health PT/OT/RN, [facility] has accepted  Outpatient follow-up. Pt's family to transport    Care Management Interventions  PCP Verified by CM:  Yes (Uses a PCP with [facility])  Transition of Care Consult (CM Consult): [address]: No  Reason Outside [place]: Patient already serviced by other home care/hospice agency  MyChart Signup: No  Discharge Durable Medical Equipment: No  Physical Therapy Consult: Yes  Occupational Therapy Consult: No  Speech Therapy Consult: No  Support Systems: Spouse/Significant Other, Child(ivon)  The Plan for Transition of Care is Related to the Following Treatment Goals : home health  The Patient and/or Patient Representative was Provided with a Choice of Provider and Agrees with the Discharge Plan?: Yes  Freedom of Choice List was Provided with Basic Dialogue that Supports the Patient's Individualized Plan of Care/Goals, Treatment Preferences and Shares the Quality Data Associated with the Providers?: Yes  Discharge Location  Patient Expects to be Discharged to[de-identified] Home with home health

## 2023-01-12 NOTE — PROGRESS NOTES
Eugene Kelly [facility]  [address] [facility], [address] [place] [address]  [phone]      Medical Progress Note      NAME: Rimma Hartley. :  1945  MRM:  [id number]    Date/Time of service: 2023       Subjective:     Chief Complaint:  Patient was personally seen and examined by me during this time period. Chart reviewed. FU ams, right shoulder pain. Continues to have right shoulder pain. No dyspnea, no abd pain. Daughter at bedside. Objective:       Vitals:       Last 24hrs VS reviewed since prior progress note.  Most recent are:    Visit Vitals  BP (!) 149/45 (BP 1 Location: Left upper arm, BP Patient Position: Reclining;Semi fowlers)   Pulse (!) 41   Temp 98.9 °F (37.2 °C)   Resp 16   Ht 5' 7\" (1.702 m)   Wt 87.5 kg (192 lb 14.4 oz)   SpO2 91%   BMI 30.21 kg/m²     SpO2 Readings from Last 6 Encounters:   23 91%   02/15/21 95%   21 94%   10/04/19 92%   17 99%   17 96%    O2 Flow Rate (L/min): 2 l/min     Intake/Output Summary (Last 24 hours) at 2023 1113  Last data filed at 2023 0524  Gross per 24 hour   Intake 2043.75 ml   Output --   Net 2043.75 ml          Exam:     Physical Exam:    Gen:  Well-developed, well-nourished, in no acute distress  HEENT:  Pink conjunctivae, PERRL, hearing intact to voice  Resp:  No accessory muscle use, clear breath sounds without wheezes rales or rhonchi  Card:  RRR, No murmurs, normal S1, S2, no peripheral edema  Abd:  Soft, non-tender, non-distended, normoactive bowel sounds are present  Musc:  No cyanosis or clubbing  Skin:  No rashes or ulcers, skin turgor is good  Neuro:  Cranial nerves 3-12 are grossly intact, follows commands appropriately  Psych:  Oriented to person, place, and time, Alert with good insight      Medications Reviewed: (see below)    Lab Data Reviewed: (see below)    ______________________________________________________________________    Medications:     Current Facility-Administered Medications   Medication Dose Route Frequency    0.9% sodium chloride infusion  25 mL/hr IntraVENous RAD ONCE    fentaNYL citrate (PF) injection  mcg   mcg IntraVENous Rad Multiple    midazolam (VERSED) injection 0.5-5 mg  0.5-5 mg IntraVENous Rad Multiple    hydrALAZINE (APRESOLINE) 20 mg/mL injection 20 mg  20 mg IntraVENous Q4H PRN    0.9% sodium chloride infusion  125 mL/hr IntraVENous CONTINUOUS    melatonin tablet 4.5 mg  4.5 mg Oral QHS    sodium chloride (NS) flush 5-40 mL  5-40 mL IntraVENous Q8H    sodium chloride (NS) flush 5-40 mL  5-40 mL IntraVENous PRN    acetaminophen (TYLENOL) tablet 650 mg  650 mg Oral Q6H PRN    Or    acetaminophen (TYLENOL) suppository 650 mg  650 mg Rectal Q6H PRN    polyethylene glycol (MIRALAX) packet 17 g  17 g Oral DAILY PRN    ondansetron (ZOFRAN ODT) tablet 4 mg  4 mg Oral Q8H PRN    Or    ondansetron (ZOFRAN) injection 4 mg  4 mg IntraVENous Q6H PRN    [Held by provider] enoxaparin (LOVENOX) injection 40 mg  40 mg SubCUTAneous DAILY    ketorolac (TORADOL) injection 15 mg  15 mg IntraVENous Q6H PRN    lidocaine 4 % patch 2 Patch  2 Patch TransDERmal Q24H    famotidine (PEPCID) tablet 20 mg  20 mg Oral BID    psyllium husk-aspartame (METAMUCIL FIBER) packet 1 Packet  1 Packet Oral BID          Lab Review:     Recent Labs     01/12/23  0150 01/11/23  0404 01/10/23  1857   WBC 8.9 9.6 8.7   HGB 12.8 13.7 12.5   HCT 38.8 40.8 38.7    186 179       Recent Labs     01/12/23  0114 01/11/23  0404 01/10/23  1857    141 141   K 4.6 3.4* 3.7   * 107 107   CO2 24 26 28   * 104* 109*   BUN 33* 35* 35*   CREA 1.24 1.06 1.19   CA 11.5* 11.6* 11.2*   ALB 2.7* 2.7* 2.5*   TBILI 0.5 0.6 0.4   ALT 29 28 26       Lab Results   Component Value Date/Time    Glucose (POC) 116 (H) 01/22/2021 05:22 AM    Glucose (POC) 121 (H) 01/21/2021 11:52 PM    Glucose (POC) 117 (H) 01/21/2021 07:44 PM    Glucose (POC) 135 (H) 01/21/2021 12:22 PM Glucose (POC) 126 (H) 01/21/2021 06:11 AM          Assessment / Plan: Altered mental status/history of mild dementia POA: Resolved. Wife notes some underlying mild dementia. Possibly due to gabapentin? .  Due to hypercalcemia? Due to mets? UA without evidence of infection. TSH WNL. B12 ok. Ammonia wnl. CT head no acute concerns. MRI brain with lesion s/f met or primary lesion . Resume lower dose home gabapentin. Monitor. Right shoulder pain POA: Acute on chronic. Suspect due to metastatic disease. Lidocaine patch. Tylenol as needed. IV Toradol to as needed. Resume gabapentin at lower dose. Avoid narcotics as able due to concern for underlying dementia. Ortho following. Concern for mets POA: MRI shoulder showing concern for metastatic disease. CT abdomen pelvis with contrast w/ concern for diffuse malignancy. PSA per oncology. Plan for biopsy today. Oncology following. Elevated calcium POA: Due to malignancy? Continue IVF. S/p Zomet; continue  as needed. Oncology following. History of spinal stenosis: PT/OT.     Total time spent with patient: 28 Minutes **I personally saw and examined the patient during this time period**                 Care Plan discussed with: Patient, Family, Nursing Staff, and Consultant/Specialist    Discussed:  Care Plan    Prophylaxis:  Lovenox    Disposition:  Home w/Family           ___________________________________________________    Attending Physician: Annalise Hernandez DO

## 2023-01-12 NOTE — PROGRESS NOTES
Problem: Falls - Risk of  Goal: *Absence of Falls  Description: Document Leafy Apple Fall Risk and appropriate interventions in the flowsheet.   Outcome: Progressing Towards Goal  Note: Fall Risk Interventions:                                Problem: Patient Education: Go to Patient Education Activity  Goal: Patient/Family Education  Outcome: Progressing Towards Goal     Problem: Patient Education: Go to Patient Education Activity  Goal: Patient/Family Education  Outcome: Progressing Towards Goal     Problem: Pain  Goal: *Control of Pain  Outcome: Progressing Towards Goal     Problem: [address] (Adult/Pediatric)  Goal: *STG: Participates in treatment plan  Outcome: Progressing Towards Goal  Goal: *STG: Seeks staff when feelings of anxiety and fear arise  Outcome: Progressing Towards Goal  Goal: *STG: Attends activities and groups  Outcome: Progressing Towards Goal  Goal: *STG: Absence of lethality  Outcome: Progressing Towards Goal  Goal: *STG: Demonstrates ability to understand and use improved judgment in daily activities and relationships  Outcome: Progressing Towards Goal  Goal: *STG: Remains safe in hospital  Outcome: Progressing Towards Goal  Goal: *LTG: Obtains optimum level of functioning  Outcome: Progressing Towards Goal  Goal: *STG/LTG: Maintain structure for daily activities  Outcome: Progressing Towards Goal  Goal: *STG/LTG: Complies with medication therapy  Outcome: Progressing Towards Goal  Goal: Interventions  Outcome: Progressing Towards Goal

## 2023-01-12 NOTE — PROGRESS NOTES
Ultrasound IV by Jesús Montgomery RN :  Procedure Note    Patient meets criteria for US IV insertion. Ultrasound IV education provided to patient. Opportunities for questions given. Ultrasound used for PIV placement:  20gauge 1.25in BD Nexiva  L forearm location. 2 X Attempt(s). Flushed with ease; vigorous blood return. Procedure tolerated well. Primary RN aware of IV placement and added to LDA.       Jesús Montgomery RN

## 2023-01-12 NOTE — PROGRESS NOTES
Problem: Falls - Risk of  Goal: *Absence of Falls  Description: Document Patrick Cease Fall Risk and appropriate interventions in the flowsheet.   Outcome: Progressing Towards Goal  Note: Fall Risk Interventions:                                Problem: Patient Education: Go to Patient Education Activity  Goal: Patient/Family Education  Outcome: Progressing Towards Goal     Problem: Patient Education: Go to Patient Education Activity  Goal: Patient/Family Education  Outcome: Progressing Towards Goal     Problem: Pain  Goal: *Control of Pain  Outcome: Progressing Towards Goal

## 2023-01-12 NOTE — PROGRESS NOTES
Bedside shift change report given to Maddie/ Kevin (oncoming nurse) by Caprice Angulo (offgoing nurse). Report included the following information SBAR, Kardex, MAR, and Recent Results.

## 2023-01-12 NOTE — PROGRESS NOTES
INTERVENTIONAL RADIOLOGY  Preoperative History and Physical      Patient:  Tammy Villegas :  1945  Age:  76 y.o. MRN:  [id number]  Today's Date:  2023      CC / HPI   Tammy Villegas is a 76 y.o. male with a history of pelvic bone lesion who presents for CT guided right pelvic bone biopsy. PAST MEDICAL HISTORY  Past Medical History:   Diagnosis Date    Diverticulitis     Gastrointestinal disorder     History of vascular access device 2017    [facility] VAT -  5FR triple Lumen Power PICC R Basilic  39 cm    Spinal stenosis        PAST SURGICAL HISTORY  Past Surgical History:   Procedure Laterality Date    FLEXIBLE SIGMOIDOSCOPY N/A 2017    SIGMOIDOSCOPY FLEXIBLE performed by Mil Grey MD at [facility] ENDOSCOPY    IR INSERT NON TUNL CVC OVER 5 YRS  2021       SOCIAL HISTORY  Social History     Socioeconomic History    Marital status:      Spouse name: Not on file    Number of children: Not on file    Years of education: Not on file    Highest education level: Not on file   Occupational History    Not on file   Tobacco Use    Smoking status: Former     Packs/day: 1.00     Years: 50.00     Pack years: 50.00     Types: Cigarettes    Smokeless tobacco: Current   Substance and Sexual Activity    Alcohol use: Yes     Alcohol/week: 0.8 standard drinks     Types: 1 Cans of beer per week    Drug use: No    Sexual activity: Not on file   Other Topics Concern    Not on file   Social History Narrative    Not on file     Social Determinants of Health     Financial Resource Strain: Not on file   Food Insecurity: Not on file   Transportation Needs: Not on file   Physical Activity: Not on file   Stress: Not on file   Social Connections: Not on file   Intimate Partner Violence: Not on file   Housing Stability: Not on file       FAMILY HISTORY  No family history on file.     CURRENT MEDICATIONS  Current Facility-Administered Medications   Medication Dose Route Frequency Provider Last Rate Last Admin    0.9% sodium chloride infusion  25 mL/hr IntraVENous RAD ONCE [place], [place], [place]        fentaNYL citrate (PF) injection  mcg   mcg IntraVENous Rad Multiple [place], [place], [place]        midazolam (VERSED) injection 0.5-5 mg  0.5-5 mg IntraVENous Rad Multiple [place], [place], [place]        hydrALAZINE (APRESOLINE) 20 mg/mL injection 20 mg  20 mg IntraVENous Q4H PRN Donnise Boeck, MD   20 mg at 01/11/23 0228    0.9% sodium chloride infusion  125 mL/hr IntraVENous CONTINUOUS Schoeneweis, Ann,  mL/hr at 01/12/23 0836 125 mL/hr at 01/12/23 0836    melatonin tablet 4.5 mg  4.5 mg Oral QHS Donnise Boeck, MD   4.5 mg at 01/11/23 2150    sodium chloride (NS) flush 5-40 mL  5-40 mL IntraVENous Q8H Donnise Boeck, MD   10 mL at 01/12/23 0547    sodium chloride (NS) flush 5-40 mL  5-40 mL IntraVENous PRN Donnise Boeck, MD        acetaminophen (TYLENOL) tablet 650 mg  650 mg Oral Q6H PRN Donnise Boeck, MD   650 mg at 01/11/23 1202    Or    acetaminophen (TYLENOL) suppository 650 mg  650 mg Rectal Q6H PRN Donnise Boeck, MD        polyethylene glycol (MIRALAX) packet 17 g  17 g Oral DAILY PRN Donnise Boeck, MD        ondansetron (ZOFRAN ODT) tablet 4 mg  4 mg Oral Q8H PRN Donnise Boeck, MD        Or    [facility]ans[facility]ron [facility] injection 4 mg  4 mg IntraVENous Q6H PRN Donnise Boeck, MD        [Held by provider] enoxaparin (LOVENOX) injection 40 mg  40 mg SubCUTAneous DAILY Donnise Boeck, MD   40 mg at 01/11/23 0913    ketorolac (TORADOL) injection 15 mg  15 mg IntraVENous Q6H PRN Donnise Boeck, MD   15 mg at 01/12/23 0836    lidocaine 4 % patch 2 Patch  2 Patch TransDERmal Q24H Juanjo Castillo NP   2 Patch at 01/11/23 1452    famotidine (PEPCID) tablet 20 mg  20 mg Oral BID ColemanConstance, DO   20 mg at 01/11/23 1834    psyllium husk-aspartame (METAMUCIL FIBER) packet 1 Packet  1 Packet Oral BID Paul Abreu, DO   1 Packet at 01/11/23 1834       ALLERGIES  No Known Allergies    DIAGNOSTIC STUDIES   IMAGING STUDIES  Relevant Imaging studies reviewed    LABS  Lab Results   Component Value Date/Time    WBC 8.9 01/12/2023 01:50 AM    Hemoglobin (POC) 15.6 02/23/2016 05:38 PM    HGB 12.8 01/12/2023 01:50 AM    Hematocrit (POC) 46 02/23/2016 05:38 PM    HCT 38.8 01/12/2023 01:50 AM    PLATELET 907 44/87/7572 01:50 AM    MCV 87.2 01/12/2023 01:50 AM     Lab Results   Component Value Date/Time    Sodium 141 01/12/2023 01:14 AM    Potassium 4.6 01/12/2023 01:14 AM    Chloride 109 (H) 01/12/2023 01:14 AM    CO2 24 01/12/2023 01:14 AM    Anion gap 8 01/12/2023 01:14 AM    Glucose 102 (H) 01/12/2023 01:14 AM    BUN 33 (H) 01/12/2023 01:14 AM    Creatinine 1.24 01/12/2023 01:14 AM    BUN/Creatinine ratio 27 (H) 01/12/2023 01:14 AM    GFR est AA >60 01/28/2021 06:16 PM    GFR est non-AA >60 01/28/2021 06:16 PM    Calcium 11.5 (H) 01/12/2023 01:14 AM     Lab Results   Component Value Date/Time    INR 1.4 (H) 01/23/2021 04:49 AM    Prothrombin time 13.9 (H) 01/23/2021 04:49 AM       PHYSICAL EXAM   BP (!) 149/45 (BP 1 Location: Left upper arm, BP Patient Position: Reclining;Semi fowlers)   Pulse (!) 41   Temp 98.9 °F (37.2 °C)   Resp 16   Ht 5' 7\" (1.702 m)   Wt 87.5 kg (192 lb 14.4 oz)   SpO2 91%   BMI 30.21 kg/m²   General:  NAD  Heart:  RRR  Lungs:  NWOB  Neurological:  AAOX3    PLAN   Procedure to be performed:  CT guided right pelvic bone biopsy  Plan for sedation:  moderate  Post procedure plan:  observation per protocol  Informed consent:  risks, benefits, and alternatives reviewed with the patient / family who agree to proceed  Code status:  Full Code      [facility], . [address] Radiology, [facility].

## 2023-01-12 NOTE — PROGRESS NOTES
TRANSFER - IN REPORT:    Verbal report received from [facility]) on Governor Navin.  being received from 4th floor medical  426(unit) for ordered procedure      Report consisted of patients Situation, Background, Assessment and   Recommendations(SBAR). Information from the following report(s) Pre Procedure Checklist was reviewed with the receiving nurse. Opportunity for questions and clarification was provided. Assessment completed upon patients arrival to unit and care assumed. Daughter is accompanying patient and wife will be contacted for phone consent. I have already spoke with wife to explain nursing care related to bone biopsy /sedation, voiced good understanding. Provider called to come bedside. 10:00 Assessment completed by RN to include vitals - see flow sheet. S1, S2 noted, rhythm is sinus bradycardia which is normal for him and also with family history according to daughter. Ascultation reveals lungs clear bilaterally. Mallampati noted as a 2. Patient received IV Toradol for right shoulder pain this morning. 20 gauge IV present in left forearm, positive blood return and flush.

## 2023-01-13 LAB
ALBUMIN SERPL-MCNC: 2.8 G/DL (ref 3.5–5)
ALBUMIN/GLOB SERPL: 0.8 (ref 1.1–2.2)
ALP SERPL-CCNC: 100 U/L (ref 45–117)
ALT SERPL-CCNC: 56 U/L (ref 12–78)
ANION GAP SERPL CALC-SCNC: 10 MMOL/L (ref 5–15)
AST SERPL-CCNC: 70 U/L (ref 15–37)
ATRIAL RATE: 41 BPM
BASOPHILS # BLD: 0 K/UL (ref 0–0.1)
BASOPHILS NFR BLD: 0 % (ref 0–1)
BILIRUB SERPL-MCNC: 0.4 MG/DL (ref 0.2–1)
BUN SERPL-MCNC: 37 MG/DL (ref 6–20)
BUN/CREAT SERPL: 30 (ref 12–20)
CALCIUM SERPL-MCNC: 9.9 MG/DL (ref 8.5–10.1)
CALCULATED P AXIS, ECG09: -13 DEGREES
CALCULATED R AXIS, ECG10: -8 DEGREES
CALCULATED T AXIS, ECG11: 66 DEGREES
CHLORIDE SERPL-SCNC: 112 MMOL/L (ref 97–108)
CO2 SERPL-SCNC: 23 MMOL/L (ref 21–32)
CREAT SERPL-MCNC: 1.23 MG/DL (ref 0.7–1.3)
DIAGNOSIS, 93000: NORMAL
DIFFERENTIAL METHOD BLD: ABNORMAL
EOSINOPHIL # BLD: 0.1 K/UL (ref 0–0.4)
EOSINOPHIL NFR BLD: 1 % (ref 0–7)
ERYTHROCYTE [DISTWIDTH] IN BLOOD BY AUTOMATED COUNT: 13.1 % (ref 11.5–14.5)
GLOBULIN SER CALC-MCNC: 3.6 G/DL (ref 2–4)
GLUCOSE SERPL-MCNC: 110 MG/DL (ref 65–100)
HCT VFR BLD AUTO: 42 % (ref 36.6–50.3)
HGB BLD-MCNC: 13.6 G/DL (ref 12.1–17)
IMM GRANULOCYTES # BLD AUTO: 0.1 K/UL (ref 0–0.04)
IMM GRANULOCYTES NFR BLD AUTO: 1 % (ref 0–0.5)
LYMPHOCYTES # BLD: 0.8 K/UL (ref 0.8–3.5)
LYMPHOCYTES NFR BLD: 9 % (ref 12–49)
MCH RBC QN AUTO: 28.8 PG (ref 26–34)
MCHC RBC AUTO-ENTMCNC: 32.4 G/DL (ref 30–36.5)
MCV RBC AUTO: 89 FL (ref 80–99)
MONOCYTES # BLD: 0.7 K/UL (ref 0–1)
MONOCYTES NFR BLD: 8 % (ref 5–13)
NEUTS SEG # BLD: 6.9 K/UL (ref 1.8–8)
NEUTS SEG NFR BLD: 81 % (ref 32–75)
NRBC # BLD: 0 K/UL (ref 0–0.01)
NRBC BLD-RTO: 0 PER 100 WBC
P-R INTERVAL, ECG05: 184 MS
PLATELET # BLD AUTO: 189 K/UL (ref 150–400)
PMV BLD AUTO: 10.5 FL (ref 8.9–12.9)
POTASSIUM SERPL-SCNC: 3.7 MMOL/L (ref 3.5–5.1)
PROT SERPL-MCNC: 6.4 G/DL (ref 6.4–8.2)
Q-T INTERVAL, ECG07: 468 MS
QRS DURATION, ECG06: 82 MS
QTC CALCULATION (BEZET), ECG08: 386 MS
RBC # BLD AUTO: 4.72 M/UL (ref 4.1–5.7)
SODIUM SERPL-SCNC: 145 MMOL/L (ref 136–145)
VENTRICULAR RATE, ECG03: 41 BPM
WBC # BLD AUTO: 8.7 K/UL (ref 4.1–11.1)

## 2023-01-13 PROCEDURE — 74011000250 HC RX REV CODE- 250: Performed by: NURSE PRACTITIONER

## 2023-01-13 PROCEDURE — 97116 GAIT TRAINING THERAPY: CPT

## 2023-01-13 PROCEDURE — 74011250636 HC RX REV CODE- 250/636: Performed by: INTERNAL MEDICINE

## 2023-01-13 PROCEDURE — 94761 N-INVAS EAR/PLS OXIMETRY MLT: CPT

## 2023-01-13 PROCEDURE — 74011250637 HC RX REV CODE- 250/637: Performed by: INTERNAL MEDICINE

## 2023-01-13 PROCEDURE — 36415 COLL VENOUS BLD VENIPUNCTURE: CPT

## 2023-01-13 PROCEDURE — 65270000046 HC RM TELEMETRY

## 2023-01-13 PROCEDURE — 74011000250 HC RX REV CODE- 250: Performed by: INTERNAL MEDICINE

## 2023-01-13 PROCEDURE — 85025 COMPLETE CBC W/AUTO DIFF WBC: CPT

## 2023-01-13 PROCEDURE — APPSS30 APP SPLIT SHARED TIME 16-30 MINUTES: Performed by: NURSE PRACTITIONER

## 2023-01-13 PROCEDURE — 93005 ELECTROCARDIOGRAM TRACING: CPT

## 2023-01-13 PROCEDURE — 99223 1ST HOSP IP/OBS HIGH 75: CPT | Performed by: INTERNAL MEDICINE

## 2023-01-13 PROCEDURE — 80053 COMPREHEN METABOLIC PANEL: CPT

## 2023-01-13 PROCEDURE — 74011250636 HC RX REV CODE- 250/636: Performed by: NURSE PRACTITIONER

## 2023-01-13 PROCEDURE — 74011250637 HC RX REV CODE- 250/637: Performed by: STUDENT IN AN ORGANIZED HEALTH CARE EDUCATION/TRAINING PROGRAM

## 2023-01-13 PROCEDURE — 99232 SBSQ HOSP IP/OBS MODERATE 35: CPT | Performed by: STUDENT IN AN ORGANIZED HEALTH CARE EDUCATION/TRAINING PROGRAM

## 2023-01-13 RX ADMIN — GABAPENTIN 200 MG: 100 CAPSULE ORAL at 16:26

## 2023-01-13 RX ADMIN — Medication 4.5 MG: at 21:09

## 2023-01-13 RX ADMIN — ACETAMINOPHEN 650 MG: 325 TABLET ORAL at 06:49

## 2023-01-13 RX ADMIN — OXYCODONE 5 MG: 5 TABLET ORAL at 06:49

## 2023-01-13 RX ADMIN — GABAPENTIN 200 MG: 100 CAPSULE ORAL at 21:09

## 2023-01-13 RX ADMIN — ACETAMINOPHEN 650 MG: 325 TABLET ORAL at 16:26

## 2023-01-13 RX ADMIN — FAMOTIDINE 20 MG: 20 TABLET, FILM COATED ORAL at 17:28

## 2023-01-13 RX ADMIN — Medication 10 ML: at 06:09

## 2023-01-13 RX ADMIN — GABAPENTIN 200 MG: 100 CAPSULE ORAL at 08:02

## 2023-01-13 RX ADMIN — OXYCODONE 5 MG: 5 TABLET ORAL at 21:09

## 2023-01-13 RX ADMIN — PSYLLIUM HUSK 1 PACKET: 3.4 POWDER ORAL at 17:28

## 2023-01-13 RX ADMIN — Medication 10 ML: at 22:00

## 2023-01-13 RX ADMIN — SODIUM CHLORIDE 125 ML/HR: 9 INJECTION, SOLUTION INTRAVENOUS at 06:49

## 2023-01-13 RX ADMIN — KETOROLAC TROMETHAMINE 15 MG: 30 INJECTION, SOLUTION INTRAMUSCULAR; INTRAVENOUS at 17:28

## 2023-01-13 RX ADMIN — KETOROLAC TROMETHAMINE 15 MG: 30 INJECTION, SOLUTION INTRAMUSCULAR; INTRAVENOUS at 11:40

## 2023-01-13 RX ADMIN — PSYLLIUM HUSK 1 PACKET: 3.4 POWDER ORAL at 08:03

## 2023-01-13 RX ADMIN — OXYCODONE 5 MG: 5 TABLET ORAL at 00:49

## 2023-01-13 RX ADMIN — FAMOTIDINE 20 MG: 20 TABLET, FILM COATED ORAL at 08:02

## 2023-01-13 RX ADMIN — KETOROLAC TROMETHAMINE 15 MG: 30 INJECTION, SOLUTION INTRAMUSCULAR; INTRAVENOUS at 03:29

## 2023-01-13 NOTE — PROGRESS NOTES
[facility]                    Cardiology Care Note     [x]Initial Encounter     []Follow-up    Patient Name: Nico Yarbroguh - :1945 - CR  Primary Cardiologist: none   Consulting Cardiologist: [facility] Physicians: Jerrell Bowens MD     Reason for encounter: bradycardia     HPI:       Nico Sainz is a 76 y.o. male with PMH significant of spinal stenosis diverticulosis who presents for evaluation of altered mental status. Per patient's family for 2 days patient has had increasing confusion and difficultly answering questions. They also endorse that he has been generally weak and fatigued and unable to get out of bed causing several falls. No other known recent illness. He has been following with outpatient provider for right shoulder pain/rotator cuff tear and has been taking a muscle relaxant for this but no other pain medications. Per wife pt always had a slow HR and low BP. TSH 1.23     MRI shoulder shows metastatic dse. Subjective: Nico Sainz reports felt lightheaded yesterday when lying in the bed. Assessment and Plan     1 on going sinus bradycardia: LV function normal in . In chart review and discussions with pt he has always had a low heart rate. He denies dizziness when he stands up. I have asked nursing to walk the pt to see if there is an appropriate HR acceleration with activity. Other options would be a stress test for chronotropic incompetence. Would check orthostatics. Avoid AV clemencia agents. Consider event monitor . 2 metastatic dse: bone biopsy pending          ____________________________________________________________    Cardiac testing  21    ECHO ADULT COMPLETE 2021 3/4/2021    Interpretation Summary  · LV: Calculated LVEF is 67%. Biplane method used to measure ejection fraction.  Normal cavity size, wall thickness, systolic function (ejection fraction normal) and diastolic function. Wall motion: normal.  · MV: Mild to moderate mitral valve regurgitation is present. · TV: Mild tricuspid valve regurgitation is present. · PA: Pulmonary arterial systolic pressure is 43 mmHg. Signed by: Bakari Hernandez MD on 3/4/2021 12:35 PM              Most recent HS troponins:  No results for input(s): TROPHS in the last 72 hours. No lab exists for component:  CKMB    ECG:   EKG Results       Procedure 720 Value Units Date/Time    EKG, 12 LEAD, INITIAL [[id number]] Collected: 01/13/23 0815    Order Status: Completed Updated: 01/13/23 0819     Ventricular Rate 41 BPM      Atrial Rate 41 BPM      P-R Interval 184 ms      QRS Duration 82 ms      Q-T Interval 468 ms      QTC Calculation (Bezet) 386 ms      Calculated P Axis -13 degrees      Calculated R Axis -8 degrees      Calculated T Axis 66 degrees      Diagnosis --     Marked sinus bradycardia  Abnormal ECG  When compared with ECG of 13-JAN-2021 00:04,  No significant change was found      EKG, 12 LEAD, INITIAL [[id number]]     Order Status: Sent               Review of Systems:    [x]All other systems reviewed and all negative except as written in HPI    [] Patient unable to provide secondary to condition    Past Medical History:   Diagnosis Date    Diverticulitis     Gastrointestinal disorder     History of vascular access device 07/24/2017    [facility] VAT -  5FR triple Lumen Power PICC R Basilic  39 cm    Spinal stenosis      Past Surgical History:   Procedure Laterality Date    FLEXIBLE SIGMOIDOSCOPY N/A 7/27/2017    SIGMOIDOSCOPY FLEXIBLE performed by Tsering High MD at [facility] END[facility]    IR INSERT NON TUNL CVC OVER 5 YRS  1/13/2021     Social Hx:  reports that he has quit smoking. He has a 50.00 pack-year smoking history. He uses smokeless tobacco. He reports current alcohol use of about 0.8 standard drinks per week. He reports that he does not use drugs.   Family Hx: family history is not on file. No Known Allergies       OBJECTIVE:  Wt Readings from Last 3 Encounters:   01/11/23 87.5 kg (192 lb 14.4 oz)   03/02/21 86.2 kg (190 lb)   02/15/21 86.2 kg (190 lb)       Intake/Output Summary (Last 24 hours) at 1/13/2023 1042  Last data filed at 1/13/2023 0826  Gross per 24 hour   Intake 5502.92 ml   Output 0 ml   Net 5502.92 ml       Physical Exam:    Vitals:   Vitals:    01/12/23 2345 01/13/23 0409 01/13/23 0650 01/13/23 0757   BP: (!) 154/68 (!) 136/53  (!) 146/63   Pulse: (!) 49 (!) 48  (!) 40   Resp: 16 18  16   Temp: 97.7 °F (36.5 °C) 99 °F (37.2 °C) 99.8 °F (37.7 °C) 98.3 °F (36.8 °C)   SpO2: 91% 90%  93%   Weight:       Height:         Telemetry: sinus bradycardia     Gen: Well-developed, well-nourished, in no acute distress  Neck: Supple, No JVD, No Carotid Bruit  Resp: No accessory muscle use, Clear breath sounds, No rales or rhonchi  Card: Slow Rate,regular Rhythm, Normal S1, S2, No murmurs, rubs or gallop. Abd:   Soft, non-tender, non-distended, BS+   MSK: No cyanosis  Skin: No rashes    Neuro: Moving all four extremities, follows commands appropriately  Psych: Fair insight, oriented to person, place, alert, Nml Affect  LE: No edema    Data Review:     Radiology:   XR Results (most recent):  Results from Hospital Encounter encounter on 01/09/23    XR SHOULDER RT AP/LAT MIN 2 V    Narrative  EXAM: XR SHOULDER RT AP/LAT MIN 2 V    INDICATION: pain and falls. COMPARISON: CTA chest 3/22/2021, chest radiograph 1/13/2021. FINDINGS: Three views of the right shoulder demonstrate mottled lucencies and  apparent cortical disruption at the inferior aspect of the glenoid and posterior  aspect of the acromion, not definitely seen on 1/13/2021 chest radiograph. Moderate acromioclavicular and mild glenohumeral osteoarthritis. Included soft  tissues are grossly unremarkable. Impression  1.   Mottled lucencies and apparent cortical disruption at the inferior aspect of  the glenoid and posterior aspect of the acromion, not definitely seen on  1/13/2021 chest radiograph. Aggressive lytic osseous lesions +/- pathologic  fractures may have this appearance. A nonemergent CT versus contrast-enhanced  MRI of the shoulder is recommended for better characterization. 2.  Mild to moderate osteoarthritis. 23X      Recent Labs     01/13/23  0325 01/12/23  0114    141   K 3.7 4.6   * 109*   CO2 23 24   BUN 37* 33*   CREA 1.23 1.24   * 102*   CA 9.9 11.5*     Recent Labs     01/13/23  0325 01/12/23  0150   WBC 8.7 8.9   HGB 13.6 12.8   HCT 42.0 38.8    189     Recent Labs     01/13/23 0325 01/12/23  0114    98     No results for input(s): CHOL, LDLC in the last 72 hours.     No lab exists for component: TGL, HDLC,  HBA1C      Current meds:    Current Facility-Administered Medications:     gabapentin (NEURONTIN) capsule 200 mg, 200 mg, Oral, TID, Constance Cee, DO, 200 mg at 01/13/23 0802    oxyCODONE IR (ROXICODONE) tablet 5 mg, 5 mg, Oral, Q4H PRN, Amy CRUZ MD, 5 mg at 01/13/23 0649    hydrALAZINE (APRESOLINE) 20 mg/mL injection 20 mg, 20 mg, IntraVENous, Q4H PRN, Zehra Fuentes MD, 20 mg at 01/11/23 0228    0.9% sodium chloride infusion, 50 mL/hr, IntraVENous, CONTINUOUS, Jf Navarro DO, Last Rate: 125 mL/hr at 01/13/23 0649, 125 mL/hr at 01/13/23 0649    melatonin tablet 4.5 mg, 4.5 mg, Oral, QHS, Zehra Fuentes MD, 4.5 mg at 01/12/23 2142    sodium chloride (NS) flush 5-40 mL, 5-40 mL, IntraVENous, Q8H, Ariadne Wasserman MD, 10 mL at 01/13/23 5802    sodium chloride (NS) flush 5-40 mL, 5-40 mL, IntraVENous, PRN, Zehra Fuentes MD    acetaminophen (TYLENOL) tablet 650 mg, 650 mg, Oral, Q6H PRN, 650 mg at 01/13/23 0649 **OR** acetaminophen (TYLENOL) suppository 650 mg, 650 mg, Rectal, Q6H PRN, Zehra Fuentes MD    polyethylene glycol (MIRALAX) packet 17 g, 17 g, Oral, DAILY PRN, Zehra Fuentes MD    ondansetron (ZOFRAN ODT) tablet 4 mg, 4 mg, Oral, Q8H PRN **OR** ondansetron [facility]) injection 4 mg, 4 mg, IntraVENous, Q6H PRN, Griselda Han, MD    [Held by provider] enoxaparin (LOVENOX) injection 40 mg, 40 mg, SubCUTAneous, DAILY, Dali Wasserman MD, 40 mg at 01/11/23 0913    ketorolac (TORADOL) injection 15 mg, 15 mg, IntraVENous, Q6H PRN, Griselda Han, MD, 15 mg at 01/13/23 0329    lidocaine 4 % patch 2 Patch, 2 Patch, TransDERmal, Q24H, Marii Castillo NP, 2 Patch at 01/12/23 1440    famotidine (PEPCID) tablet 20 mg, 20 mg, Oral, BID, Constance Cee DO, 20 mg at 01/13/23 0802    psyllium husk-aspartame (METAMUCIL FIBER) packet 1 Packet, 1 Packet, Oral, BID, Kenia Flores DO, 1 Packet at 01/13/23 Howard Arreola 76, NP    [facility] Cardiology  Call center: (O) [phone]  (S) [phone]      CC: Jimbo, MD Ashley

## 2023-01-13 NOTE — PROGRESS NOTES
Problem: Falls - Risk of  Goal: *Absence of Falls  Description: Document Leafy Apple Fall Risk and appropriate interventions in the flowsheet.   Outcome: Progressing Towards Goal  Note: Fall Risk Interventions:                                Problem: Patient Education: Go to Patient Education Activity  Goal: Patient/Family Education  Outcome: Progressing Towards Goal     Problem: Patient Education: Go to Patient Education Activity  Goal: Patient/Family Education  Outcome: Progressing Towards Goal     Problem: Pain  Goal: *Control of Pain  Outcome: Progressing Towards Goal

## 2023-01-13 NOTE — PROGRESS NOTES
1/13/2023 10:30 AM Pt discussed in IDR, possible discharge on 1/14. CM notified [facility] of anticipated discharge on 1/14. CM will follow. MINDA Mir    Care Management Progress Note         ICD-10-CM ICD-9-CM     1. Altered mental status, unspecified altered mental status type  R41.82 780.97         2. CHAVA (acute kidney injury) ([facility] [address]  N17.9 584.9         3. Hypercalcemia  E83.52 275.42         4. Bony metastasis ([facility] [address] [C79.51 (ICD-10-CM)]  C79.51 198.5         5. Transient alteration of awareness [R40.4 (ICD-10-CM)]  R40.4 780.02         6. Acute pain of right shoulder [M25.511 (ICD-10-CM)]  M25.511 719.41               RUR:  9%  Risk Level: [x]Low []Moderate []High  Value-based purchasing: [] Yes [x] No  Bundle patient: [] Yes [x] No              Specify:      Transition of care plan:  Ongoing medical management  Home with family at discharge  Pt needing home health PT/OT/RN, [facility] has accepted  Outpatient follow-up.   Pt's family to transport

## 2023-01-13 NOTE — PROGRESS NOTES
Pt. HR was in low 40s. When RN rechecked it was 52. RN notified doctor on call. Doctor advised to monitor and also doctor will check with the Charge if pt. Is on any beta blocker will held that.

## 2023-01-13 NOTE — PROGRESS NOTES
1291: RRT RN rounded on patient for a MEWS of 3, HR 40s. Dr Bashir Amor placed orders for transfer to tele with EKG to be obtained. Upon assessment patient resting in bed with wife at bedside in no acute distress. Denies CP/SOB and reports his HR does not normally run low. EKG obtained showing sinus bradycardia with HR 41. Will put on a tele box until telemetry bed assigned. Primary RN made aware of POC.     7715: Spoke with Dr Bashir Amor- is okay with patient being on remote tele, will place transfer order.  Patient placed on remote tele box

## 2023-01-13 NOTE — PROGRESS NOTES
Problem: Falls - Risk of  Goal: *Absence of Falls  Description: Document Radha Dihn Fall Risk and appropriate interventions in the flowsheet.   Outcome: Progressing Towards Goal  Note: Fall Risk Interventions:                                Problem: Patient Education: Go to Patient Education Activity  Goal: Patient/Family Education  Outcome: Progressing Towards Goal     Problem: Pain  Goal: *Control of Pain  Outcome: Progressing Towards Goal     Problem: [address] (Adult/Pediatric)  Goal: *STG: Participates in treatment plan  Outcome: Progressing Towards Goal  Goal: *STG: Seeks staff when feelings of anxiety and fear arise  Outcome: Progressing Towards Goal  Goal: *STG: Attends activities and groups  Outcome: Progressing Towards Goal  Goal: *STG: Absence of lethality  Outcome: Progressing Towards Goal  Goal: *STG: Demonstrates ability to understand and use improved judgment in daily activities and relationships  Outcome: Progressing Towards Goal  Goal: *STG: Remains safe in hospital  Outcome: Progressing Towards Goal  Goal: *LTG: Obtains optimum level of functioning  Outcome: Progressing Towards Goal  Goal: *STG/LTG: Maintain structure for daily activities  Outcome: Progressing Towards Goal  Goal: *STG/LTG: Complies with medication therapy  Outcome: Progressing Towards Goal  Goal: Interventions  Outcome: Progressing Towards Goal

## 2023-01-13 NOTE — PROGRESS NOTES
Physician Progress Note      PATIENT:               Maria Esther Walters  CSN #:                  [id number]  :                       1945  ADMIT DATE:       2023 8:15 PM  [address] [place] DATE:  RESPONDING  PROVIDER #:        LESLI MOHR DO          QUERY TEXT:    Good Afternoon    This patient admitted for Right shoulder pain and found to have metastatic bone. The patient is also noted with confusion, frequent falls, and somnolence. H&P notes \"suspect his symptoms are due to pain meds\"    2023-Progress notes 'AMS, possibly due to gabapentin, ? Hypercalcemia and due to Mets? If possible, can you please clarify the AMS and please document in the progress notes and discharge summary if you are evaluating and / or treating any of the following: The medical record reflects the following:  Risk Factors: Newly dx. with Mets to bone, Hypercalcemia, hx of right shoulder pain, and had been on Gabapentin only mild Dementia  Clinical Indicators: Presented with increased confusion, somnolence, CT of head negative for acute process, Calcium noted @ 12.5---given zomet and Calcium down to 9.9 on   Treatment: Ct head, Zomet, IVF, Labs, MRI, Hold pain meds initially and sedating meds, initially also held Gabapentin, but now restarted but at [place] lower dose as of 2023- oral oxycodone prn but still avoid narcotics as able due to underlying dementia    Thank you  ROWDY CorreaN,RN, CPQ, CCDS, SMART  Options provided:  -- Drug-induced encephalopathy due to Gabapentin  -- Metabolic encephalopathy  -- Toxic encephalopathy  -- Toxic metabolic encephalopathy  -- Other - I will add my own diagnosis  -- Disagree - Not applicable / Not valid  -- Disagree - Clinically unable to determine / Unknown  -- Refer to Clinical Documentation Reviewer    PROVIDER RESPONSE TEXT:    Provider is clinically unable to determine a response to this query.     Query created by: Luis Shin on 2023 2:33 PM      Electronically signed by:  Pritesh Dave DO 1/13/2023 4:10 PM

## 2023-01-13 NOTE — PROGRESS NOTES
Problem: Mobility Impaired (Adult and Pediatric)  Goal: *Acute Goals and Plan of Care (Insert Text)  Description: FUNCTIONAL STATUS PRIOR TO ADMISSION: Patient was independent and active without use of DME prior to R shoulder injury in November 2022. He has experienced gradual decline in function, requiring furniture support or assist for ambulation. + falls in recent months. HOME SUPPORT PRIOR TO ADMISSION: The patient lived with spouse who assists as needed. Physical Therapy Goals  Initiated 1/10/2023  1. Patient will move from supine to sit and sit to supine  in bed with supervision within 7 day(s). 2.  Patient will transfer from bed to chair and chair to bed with supervision/set-up using the least restrictive device within 7 day(s). 3.  Patient will perform sit to stand with supervision/set-up within 7 day(s). 4.  Patient will ambulate with supervision/set-up for 150 feet with the least restrictive device within 7 day(s). 5.  Patient will ascend/descend 14 stairs with one handrail(s) with supervision/set-up within 7 day(s). Outcome: Progressing Towards Goal  Note:   PHYSICAL THERAPY TREATMENT  Patient: Anuel Ritchie (73 y.o. male)  Date: 1/13/2023  Diagnosis: Acute metabolic encephalopathy [Z36.12] <principal problem not specified>      Precautions: Fall (NWB R shoulder (pending MRI))  Chart, physical therapy assessment, plan of care and goals were reviewed. ASSESSMENT  Patient continues with skilled PT services and progressing towards goals. C/o leg cramping while in bed, family at bedside, agreeable to participate with therapy. Mod A x 1 need for assist with bed mobility 2/2 R shoulder pain. Verbal cues for sequencing. Decreased safety awareness with transfers may benefit from quad cane for increased steadying with gait training. No over LOB tho requires Min A x 1 HR 80's with activity.  Assisted pt back to bed to visit with family     Current Level of Function Impacting Discharge (mobility/balance): min/mod A     Other factors to consider for discharge:          PLAN :  Patient continues to benefit from skilled intervention to address the above impairments. Continue treatment per established plan of care. to address goals. Recommendation for discharge: (in order for the patient to meet his/her long term goals)  Physical therapy at least 2 days/week in the home AND ensure assist and/or supervision for safety with mobility    This discharge recommendation:  Has been made in collaboration with the attending provider and/or case management    IF patient discharges home will need the following DME: to be determined (TBD)       SUBJECTIVE:   Patient stated my legs have been hurting.     OBJECTIVE DATA SUMMARY:   Critical Behavior:  Neurologic State: Alert  Orientation Level: Appropriate for age, Disoriented to place, Disoriented to situation  Cognition: Follows commands  Safety/Judgement: Decreased awareness of need for assistance, Decreased insight into deficits  Functional Mobility Training:  Bed Mobility:     Supine to Sit: Moderate assistance;Assist x1  Sit to Supine: Minimum assistance;Assist x1           Transfers:  Sit to Stand: Minimum assistance; Additional time;Assist x1  Stand to Sit: Contact guard assistance                             Balance:  Sitting: Without support  Standing: With support  Standing - Static: Fair  Standing - Dynamic : Fair  Ambulation/Gait Training:  Distance (ft): 50 Feet (ft)  Assistive Device: Gait belt  Ambulation - Level of Assistance: Assist x1;Additional time                 Base of Support: Widened     Speed/Krystle: Pace decreased (<100 feet/min)                       Stairs:               Therapeutic Exercises:     Pain Rating:      Activity Tolerance:   Good    After treatment patient left in no apparent distress:   Supine in bed, Call bell within reach, Bed / chair alarm activated, and Caregiver / family present    COMMUNICATION/COLLABORATION: The patients plan of care was discussed with: Registered nurse.      Cricket Aschoff   Time Calculation: 21 mins

## 2023-01-13 NOTE — PROGRESS NOTES
[facility] at [address], [address] [facility] [address] [facility]  [place]: [phone]  F: [phone]      Reason for Visit:   Leodan Smallwood is a 76 y.o. male who is seen for evaluation of concern for metastatic disease. Hematology/Oncology Treatment History:   none    History of Present Illness:   Leodan Smallwood is a pleasant 76 y.o. male who was admitted on 1/9;23 for AMS and falls at home. Family noticed confusion at home x 2 days. Has had rt shoulder pain/injury x 1 month  and been following with  outpatient provider. ED labs WBC 12.5  Creat 1.3 Ca 12.5 ED imaging CT head no acute intracranial process; minimal chronic microvascular ischemic change ; XR rt shoulder mottled lucencies  of glenoid and posterior acromion; aggressive lytic osseous lesions  MRI reveals extensive bony metastatic disease to glenoid and marrow replacement in the proximal humeral neck compatible with metastatic disease. Small tear anterior supraspinatus. Pt unable to state why he was brought to the hospital. States having pain to rt shoulder; unsure about any confusion at home. Correctly states name, yr , location and president. Denies SOB, pain other than rt shoulder, changes in bowels, N/V. Denies personal hx of cancer. Family Hx of cancer: Mother; ? lung  : blended family; 2 children ( Annika/Dar) and step child Evelinsanjay Giles) ; retired    Smoking hx: denies   ETOH: social drinker  EGD/Colonoscopy: 2017 s/p sigmoid colon resection/diverticulosis     No family at bedside. Addendum: wife arrived; updated on plan and corrected hx above. States pt has not been eating or drinking for the last 2 weeks. Has had decreased activity for several weeks. Noted decrease in mental changes since Nov.         Interval History:     Pt resting in bed. No complaints    Wife, Marya at bedside. Reviewed image findings with her.  All questions answered at this time.     Past Medical History:   Diagnosis Date    Diverticulitis     Gastrointestinal disorder     History of vascular access device 07/24/2017    [facility] VAT -  5FR triple Lumen Power PICC R Basilic  39 cm    Spinal stenosis        Past Surgical History:   Procedure Laterality Date    FLEXIBLE SIGMOIDOSCOPY N/A 7/27/2017    SIGMOIDOSCOPY FLEXIBLE performed by Dorothy Dominguez MD at [facility] ENDOSCOPY    IR INSERT NON TUNL CVC OVER 5 YRS  1/13/2021       Social History     Socioeconomic History    Marital status:      Spouse name: Not on file    Number of children: Not on file    Years of education: Not on file    Highest education level: Not on file   Occupational History    Not on file   Tobacco Use    Smoking status: Former     Packs/day: 1.00     Years: 50.00     Pack years: 50.00     Types: Cigarettes    Smokeless tobacco: Current   Substance and Sexual Activity    Alcohol use: Yes     Alcohol/week: 0.8 standard drinks     Types: 1 Cans of beer per week    Drug use: No    Sexual activity: Not on file   Other Topics Concern    Not on file   Social History Narrative    Not on file     Social Determinants of Health     Financial Resource Strain: Not on file   Food Insecurity: Not on file   Transportation Needs: Not on file   Physical Activity: Not on file   Stress: Not on file   Social Connections: Not on file   Intimate Partner Violence: Not on file   Housing Stability: Not on file       No family history on file.     Current Facility-Administered Medications   Medication Dose Route Frequency    gabapentin (NEURONTIN) capsule 200 mg  200 mg Oral TID    oxyCODONE IR (ROXICODONE) tablet 5 mg  5 mg Oral Q4H PRN    hydrALAZINE (APRESOLINE) 20 mg/mL injection 20 mg  20 mg IntraVENous Q4H PRN    0.9% sodium chloride infusion  50 mL/hr IntraVENous CONTINUOUS    melatonin tablet 4.5 mg  4.5 mg Oral QHS    sodium chloride (NS) flush 5-40 mL  5-40 mL IntraVENous Q8H    sodium chloride (NS) flush 5-40 mL  5-40 mL IntraVENous PRN acetaminophen (TYLENOL) tablet 650 mg  650 mg Oral Q6H PRN    Or    acetaminophen (TYLENOL) suppository 650 mg  650 mg Rectal Q6H PRN    polyethylene glycol (MIRALAX) packet 17 g  17 g Oral DAILY PRN    ondansetron (ZOFRAN ODT) tablet 4 mg  4 mg Oral Q8H PRN    Or    ondansetron (ZOFRAN) injection 4 mg  4 mg IntraVENous Q6H PRN    [Held by provider] enoxaparin (LOVENOX) injection 40 mg  40 mg SubCUTAneous DAILY    ketorolac (TORADOL) injection 15 mg  15 mg IntraVENous Q6H PRN    lidocaine 4 % patch 2 Patch  2 Patch TransDERmal Q24H    famotidine (PEPCID) tablet 20 mg  20 mg Oral BID    psyllium husk-aspartame (METAMUCIL FIBER) packet 1 Packet  1 Packet Oral BID       No Known Allergies       Review of Systems: A complete review of systems was obtained, reviewed. Pertinent findings reviewed above. Physical Exam:   Visit Vitals  BP (!) 156/60 (BP Patient Position: Lying)   Pulse 67   Temp 98.4 °F (36.9 °C)   Resp 16   Ht 5' 7\" (1.702 m)   Wt 192 lb 14.4 oz (87.5 kg)   SpO2 91%   BMI 30.21 kg/m²       General: no distress  Eyes: anicteric sclerae  HENT: atraumatic  Neck: supple  Respiratory: normal respiratory effort  CV: no peripheral edema  GI: soft, nontender, nondistended, no masses, no hepatomegaly, no splenomegaly  Skin: no rashes; no ecchymoses; no petechiae  Psych: alert, oriented, appropriate affect, poor judgment/insight     Results:     Lab Results   Component Value Date/Time    WBC 8.7 01/13/2023 03:25 AM    HGB 13.6 01/13/2023 03:25 AM    HCT 42.0 01/13/2023 03:25 AM    PLATELET 773 52/68/0662 03:25 AM    MCV 89.0 01/13/2023 03:25 AM    ABS.  NEUTROPHILS 6.9 01/13/2023 03:25 AM    Hemoglobin (POC) 15.6 02/23/2016 05:38 PM    Hematocrit (POC) 46 02/23/2016 05:38 PM     Lab Results   Component Value Date/Time    Sodium 145 01/13/2023 03:25 AM    Potassium 3.7 01/13/2023 03:25 AM    Chloride 112 (H) 01/13/2023 03:25 AM    CO2 23 01/13/2023 03:25 AM    Glucose 110 (H) 01/13/2023 03:25 AM    BUN 37 (H) 01/13/2023 03:25 AM    Creatinine 1.23 01/13/2023 03:25 AM    GFR est AA >60 01/28/2021 06:16 PM    GFR est non-AA >60 01/28/2021 06:16 PM    Calcium 9.9 01/13/2023 03:25 AM    Sodium (POC) 136 02/23/2016 05:38 PM    Potassium (POC) 4.0 02/23/2016 05:38 PM    Chloride (POC) 103 02/23/2016 05:38 PM    Glucose (POC) 116 (H) 01/22/2021 05:22 AM    BUN (POC) 21 (H) 02/23/2016 05:38 PM    Creatinine (POC) 0.9 02/23/2016 05:38 PM    Calcium, ionized (POC) 1.09 (L) 02/23/2016 05:38 PM     Lab Results   Component Value Date/Time    Bilirubin, total 0.4 01/13/2023 03:25 AM    ALT (SGPT) 56 01/13/2023 03:25 AM    Alk. phosphatase 100 01/13/2023 03:25 AM    Protein, total 6.4 01/13/2023 03:25 AM    Albumin 2.8 (L) 01/13/2023 03:25 AM    Globulin 3.6 01/13/2023 03:25 AM     Lab Results   Component Value Date/Time    Ferritin 1,418 (H) 01/22/2021 03:02 AM    Vitamin B12 1,602 (H) 01/10/2023 07:01 PM     (H) 01/10/2021 11:18 PM    C-Reactive protein 1.18 (H) 01/22/2021 03:02 AM    TSH 1.35 01/10/2023 07:01 PM    Lipase 80 10/04/2019 02:08 PM       Lab Results   Component Value Date/Time    INR 1.4 (H) 01/23/2021 04:49 AM    aPTT 29.2 01/23/2021 04:49 AM    D-dimer 0.59 01/23/2021 04:49 AM    Fibrinogen 456 01/23/2021 04:49 AM      Lab Results   Component Value Date/Time    Prostate Specific Ag 5.2 (H) 01/11/2023 01:17 PM     (H) 01/10/2021 11:18 PM      1/9/23 CT head wo cont  IMPRESSION  No acute intracranial process. Imaging findings consistent with minimal chronic microvascular ischemic change. There is a mild degree of cerebral atrophy. 1/10/23 XR should   IMPRESSION  1. Mottled lucencies and apparent cortical disruption at the inferior aspect of  the glenoid and posterior aspect of the acromion, not definitely seen on  1/13/2021 chest radiograph. Aggressive lytic osseous lesions +/- pathologic  fractures may have this appearance.  A nonemergent CT versus contrast-enhanced  MRI of the shoulder is recommended for better characterization. 2.  Mild to moderate osteoarthritis. 1/10/23 MRI Rt Shoulder  IMPRESSION  1. Extensive bony metastatic disease to the glenoid with additional areas of  marrow replacement in the proximal humeral neck. This is compatible with  metastatic disease  2. Small high-grade partial-thickness undersurface tear anterior supraspinatus    1/11/23   IMPRESSION     1. Multifocal osseous metastatic disease in the thoracic spine, lumbar spine,  pelvis, left ribs, and right scapula. Potential biopsy sites including anterior  left iliac bone and superior right scapula. 2. Multiple small hepatic lesions are suspicious for metastatic disease in this  clinical scenario. 3. Moderate centrilobular emphysema. No pulmonary nodule. 4. Mediastinal AP window lymphadenopathy. 5. All findings suspicious for malignancy are new since 2021. Recommendation: Check PSA. Review result of any recent colonoscopy. Consider  bone scan to establish baseline. Test results above have been reviewed. 1/11/23 Brain MRI  IMPRESSION  Parasagittal right frontal enhancing intraosseous lesion with intracranial  extension measuring up to 3.1 x 3.1 cm, suspicious for aggressive lesion such as  metastatic or primary osseous lesion like multiple myeloma, or lymphoma. Clinical correlation is advised. This may be an amenable to percutaneous biopsy  for tissue diagnosis. Underlying associated diffuse bilateral enhancing dural thickening which may  suggest meningitis versus carcinomatosis. Intracranial hypertension is in the  differential diagnostic considerations. No acute infarct, intracranial hemorrhage or brain mass lesion. Assessment/Recommendations:     Bony mets   Multifocal osseous metastatic disease in the thoracic spine, lumbar spine,  pelvis, left ribs, and right scapula  -- PSA  5.2  - 1/12/23 bone bx rt iliac lesion: path pending  --may need  NM bone scan at somepoint    2.  AMS  Notes reference baseline dementia per family; ? Worsened by elevated calcium  CT Head: unrevealing; MRI brain no acute infarct or  intracranial hemorrhage or brain mass lesion. --cont supportive care    3. Elevated calcium   Resolved  - 1/11/23 Zometa 4 mg IV x 1   -initiate IVFs  cc/hr  -continue to monitor    4. Rt shoulder pain/ RTC tear/mets bone   --Ortho consulted; medical management with sling  --? Will eval role for rad/onc once path back    5. Pain management  Palliative team following       6.  Sinus leeanna: cardiology consulted    Plan reviewed with Dr Feliciano Woody

## 2023-01-13 NOTE — ACP (ADVANCE CARE PLANNING)
Advance Care Planning 1/13/2023   Patient's Healthcare Decision Maker is: Legal Next of [address] Name:  Marcus Medicine (wife)   Confirm Advance Directive Not on file   Does the patient have other document types Not on file      Pt does not have AMD on file. In absence of verified Medical POA, wife Marcus Preston is legal NOK and surrogate decision maker if pt is unable to speak for himself.

## 2023-01-13 NOTE — PROGRESS NOTES
Medicare pt has received, reviewed, and signed 2nd IM letter informing them of their right to appeal the discharge. Signed copy has been placed on pt bedside chart.   Vincenzo Molina CMS

## 2023-01-13 NOTE — CONSULTS
Palliative Medicine Progress Note  Jay Jay: [phone] ([id number])    Patient Name: Edgar Nunez. YOB: 1945    Date of Initial Consult: 1/12/2023  Date of Follow-Up: 1/13/2023  Reason for Consult: Care Decisions  Requesting Provider: Armaan العراقي DO   Primary Care Physician: Jimbo, MD Ashley     SUMMARY:   Edgar Bright is a 76 y.o. male with a past history of spinal stenosis and lumbar radiculopathy who was admitted on 1/9/2023 from home with a diagnosis of confusion and subsequently found to have widespread metastatic bony malignancy. Patient apparently having right shoulder pain for >1 month being evaluated by outside physician. Became confused previous 2 days prior to admission leading family to bring him to [facility] for evaluation. Current medical issues leading to Palliative Medicine involvement include: Care Decisions. Social Hx:  Lives at home with wife, Goldie Crandall,  >20 years. 3 children, [place] and Perez Eliu with Jessica Cobos (Marya's son). Able to manage own ADLs at home per family with occasional assistance from wife. Generally well functioning. Retired manufacturing representative. Interval Hx:  Patient eating lunch with family at bedside. Denies pain at present. Continues with prn IV toradol and prn PO oxycodone with 2 doses in past 24 hours. No nausea. BM yesterday. PALLIATIVE DIAGNOSES:   Suspected Bone Malignancy  Right Shoulder Pain  Encephalopathy  Hypercalcemia  Goals of Care  Palliative Care Encounter     PLAN:   Goals of Care  Reviewed medical chart for history and updated clinical information  Met with patient in room with 3 children present. Wife not present, report she had just left. Patient more awake than before. Expressed understanding of findings thus far with suspected cancer present with widespread osseous mets. Biopsy still pending.   Declines to discuss [address] while biopsy pending, though acknowledges this is cancer and at severe stage.    Will aim to re-engage goals once pathology and possible treatment plans known, hopefully beginning of next week. Should he be discharged in meantime, will refer to outpatient Palliative clinic for ongoing symptom management and [address] discussions. Pain  Suspected due to malignancy, mostly right shoulder  Continue prn toradol and lidocaine patch  Gabapentin 200mg PO three times daily per Hospitalist  Continue oxycodone 5mg PO every 4 hours as needed for pain. If pain worsening over weekend, increase of % (7.5mg-10mg every 4 hours as needed) would be appropriate  Hypercalcemia/Encephalopathy  Possibly hypercalcemia of malignancy given bone involvement, contributing to confusion. Unable to fully assess given sedation post biopsy. Getting IV fluids and Zoledronic acid. Delirium precautions: redirect/reorient as possible, lights on during day, minimize interruptions at night, avoid restraints and offending medications like benzodiazepines, allow family presence at bedside. Medication only as needed for agitation that is not redirectable and placing patient or caregivers at risk of harm. Surrogate:  No AMD in chart. Wife is legal NOK. Code Status:  Remains. Full Code. Communicated plan of care with: Palliative IDT, [address] Team  Palliative will continue to follow along.      GOALS OF CARE / TREATMENT PREFERENCES:     GOALS OF CARE:  Patient/Health Care Proxy Stated Goals:  (determine results of biopsy, learn about treatment options)    TREATMENT PREFERENCES:   Code Status: Full Code    Patient and family's personal goals include: determine results of biopsy, learn about treatment options    Important upcoming milestones or family events: none reported    The patient identifies the following as important for living well: none reported      Advance Care Planning:  [x] The [facility] Interdisciplinary Team has updated the ACP Navigator with [address] and Patient Capacity      Primary Decision Maker: Waqas Deshpande - [phone]    Secondary Decision Maker: Curry Huizar - [phone]    Secondary Decision Maker: Duglas Castro - [phone]  Advance Care Planning 1/13/2023   Patient's Healthcare Decision Maker is: Legal Next of Kin   Confirm Advance Directive -   Patient Would Like to Complete Advance Directive -   Does the patient have other document types -               Other:    As far as possible, the palliative care team has discussed with patient / health care proxy about goals of care / treatment preferences for patient.      HISTORY:     History obtained from: medical chart, family    CHIEF COMPLAINT: confusion, pain    HPI/SUBJECTIVE:    The patient is:   [x] Verbal and participatory  [] Non-participatory due to: sedation post-procedure     Clinical Pain Assessment (nonverbal scale for severity on nonverbal patients):   Clinical Pain Assessment  Severity: 2  Location: right shoulder  Character: ache  Duration: 1 month  Effect: limits mobility/range of motion  Factors: worse with movement, better with rest and medication  Frequency: intermittent, wax/wane     Activity (Movement): Lying quietly, normal position    Duration: for how long has pt been experiencing pain (e.g., 2 days, 1 month, years)  Frequency: how often pain is an issue (e.g., several times per day, once every few days, constant)     FUNCTIONAL ASSESSMENT:     Palliative Performance Scale (PPS):  PPS: 40       PSYCHOSOCIAL/SPIRITUAL SCREENING:     Palliative IDT has assessed this patient for cultural preferences / practices and a referral made as appropriate to needs (Cultural Services, Patient Advocacy, Ethics, etc.)    Any spiritual / Protestant concerns:  [] Yes /  [x] No   If \"Yes\" to discuss with pastoral care during IDT     Does caregiver feel burdened by caring for their loved one:   [] Yes /  [x] No /  [] No Caregiver Present/Available [] No Caregiver [] Pt Lives at Facility  If \"Yes\" to discuss with social work during IDT    Anticipatory grief assessment:   [x] Normal  / [] Maladaptive     If \"Maladaptive\" to discuss with social work during IDT    ESAS Anxiety: Anxiety: 0    ESAS Depression:          REVIEW OF SYSTEMS:     Positive and pertinent negative findings in ROS are noted above in HPI. The following systems were [x] reviewed / [] unable to be reviewed as noted in HPI  Other findings are noted below. Systems: constitutional, ears/nose/mouth/throat, respiratory, gastrointestinal, genitourinary, musculoskeletal, integumentary, neurologic, psychiatric, endocrine. Positive findings noted below. Modified ESAS Completed by: provider           Pain: 2   Anxiety: 0 Nausea: 0     Dyspnea: 0     Constipation: No     Stool Occurrence(s): 0        PHYSICAL EXAM:     From RN flowsheet:  Wt Readings from Last 3 Encounters:   01/11/23 192 lb 14.4 oz (87.5 kg)   03/02/21 190 lb (86.2 kg)   02/15/21 190 lb (86.2 kg)     Blood pressure (!) 176/73, pulse 76, temperature 98 °F (36.7 °C), resp. rate 16, height 5' 7\" (1.702 m), weight 192 lb 14.4 oz (87.5 kg), SpO2 91 %.     Pain Scale 1: Numeric (0 - 10)  Pain Intensity 1: 4     Pain Location 1: Shoulder  Pain Orientation 1: Right  Pain Description 1: Aching  Pain Intervention(s) 1: Medication (see MAR)  Last bowel movement, if known:     Constitutional: sitting up in bed, no distress  Eyes: pupils equal, anicteric  ENMT: no nasal discharge, moist mucous membranes  Cardiovascular: regular rate at time of exam, distal pulses intact  Respiratory: breathing not labored, symmetric chest rise  Gastrointestinal: soft, non-tender, +bowel sounds  Musculoskeletal: no deformity, no tenderness to palpation  Skin: warm, dry  Neurologic: awake and alert, communicates self, follows commands  Psychiatric: no hallucinations, no agitation       HISTORY:     Active Problems:    Acute metabolic encephalopathy (5/16/2316)      Bony metastasis (HCC) (1/11/2023)      Hypercalcemia (1/11/2023)      Transient alteration of awareness (1/11/2023)      Acute pain of right shoulder (1/11/2023)    Past Medical History:   Diagnosis Date    Diverticulitis     Gastrointestinal disorder     History of vascular access device 07/24/2017    [facility] VAT -  5FR triple Lumen Power PICC R Basilic  39 cm    Spinal stenosis       Past Surgical History:   Procedure Laterality Date    FLEXIBLE SIGMOIDOSCOPY N/A 7/27/2017    SIGMOIDOSCOPY FLEXIBLE performed by Alanna Swain MD at [facility]    IR INSERT NON TUNL CVC OVER 5 YRS  1/13/2021      No family history on file. History reviewed, no pertinent family history. Social History     Tobacco Use    Smoking status: Former     Packs/day: 1.00     Years: 50.00     Pack years: 50.00     Types: Cigarettes    Smokeless tobacco: Current   Substance Use Topics    Alcohol use:  Yes     Alcohol/week: 0.8 standard drinks     Types: 1 Cans of beer per week     No Known Allergies   Current Facility-Administered Medications   Medication Dose Route Frequency    gabapentin (NEURONTIN) capsule 200 mg  200 mg Oral TID    oxyCODONE IR (ROXICODONE) tablet 5 mg  5 mg Oral Q4H PRN    hydrALAZINE (APRESOLINE) 20 mg/mL injection 20 mg  20 mg IntraVENous Q4H PRN    0.9% sodium chloride infusion  50 mL/hr IntraVENous CONTINUOUS    melatonin tablet 4.5 mg  4.5 mg Oral QHS    sodium chloride (NS) flush 5-40 mL  5-40 mL IntraVENous Q8H    sodium chloride (NS) flush 5-40 mL  5-40 mL IntraVENous PRN    acetaminophen (TYLENOL) tablet 650 mg  650 mg Oral Q6H PRN    Or    acetaminophen (TYLENOL) suppository 650 mg  650 mg Rectal Q6H PRN    polyethylene glycol (MIRALAX) packet 17 g  17 g Oral DAILY PRN    ondansetron (ZOFRAN ODT) tablet 4 mg  4 mg Oral Q8H PRN    Or    ondansetron (ZOFRAN) injection 4 mg  4 mg IntraVENous Q6H PRN    enoxaparin (LOVENOX) injection 40 mg  40 mg SubCUTAneous DAILY    ketorolac (TORADOL) injection 15 mg  15 mg IntraVENous Q6H PRN lidocaine 4 % patch 2 Patch  2 Patch TransDERmal Q24H    famotidine (PEPCID) tablet 20 mg  20 mg Oral BID    psyllium husk-aspartame (METAMUCIL FIBER) packet 1 Packet  1 Packet Oral BID          LAB AND IMAGING FINDINGS:     Lab Results   Component Value Date/Time    WBC 8.7 01/13/2023 03:25 AM    HGB 13.6 01/13/2023 03:25 AM    PLATELET 208 27/67/5479 03:25 AM     Lab Results   Component Value Date/Time    Sodium 145 01/13/2023 03:25 AM    Potassium 3.7 01/13/2023 03:25 AM    Chloride 112 (H) 01/13/2023 03:25 AM    CO2 23 01/13/2023 03:25 AM    BUN 37 (H) 01/13/2023 03:25 AM    Creatinine 1.23 01/13/2023 03:25 AM    Calcium 9.9 01/13/2023 03:25 AM    Magnesium 2.8 (H) 01/13/2021 03:51 AM    Phosphorus 2.6 01/13/2021 03:51 AM      Lab Results   Component Value Date/Time    Alk. phosphatase 100 01/13/2023 03:25 AM    Protein, total 6.4 01/13/2023 03:25 AM    Albumin 2.8 (L) 01/13/2023 03:25 AM    Globulin 3.6 01/13/2023 03:25 AM     Lab Results   Component Value Date/Time    INR 1.4 (H) 01/23/2021 04:49 AM    Prothrombin time 13.9 (H) 01/23/2021 04:49 AM    aPTT 29.2 01/23/2021 04:49 AM      Lab Results   Component Value Date/Time    Ferritin 1,418 (H) 01/22/2021 03:02 AM      Lab Results   Component Value Date/Time    pH 7.44 01/11/2021 05:09 AM    PCO2 31 (L) 01/11/2021 05:09 AM    PO2 69 (L) 01/11/2021 05:09 AM     No components found for: GLPOC   No results found for: CPK, CKMB             Total time: 40 minutes  Counseling / coordination time, spent as noted above: 30 minutes  > 50% counseling / coordination?: yes    Prolonged service was provided for  []30 min   []75 min in face to face time in the presence of the patient, spent as noted above. Time Start:   Time End:   Note: this can only be billed with 27224 (initial) or 79123 (follow up). If multiple start / stop times, list each separately.

## 2023-01-13 NOTE — PROGRESS NOTES
Eugene Kelly [facility]  [address] [facility], [address] [place] [address]  [phone]      Medical Progress Note      NAME: Tammy Briseno. :  1945  MRM:  [id number]    Date/Time of service: 2023       Subjective:     Chief Complaint:  Patient was personally seen and examined by me during this time period. Chart reviewed. FU ams, right shoulder pain. NOW with sinus bradycardia; no ass chest pain, but does endorse light headedness. Continues to have right shoulder pain. Discussed with wife. Objective:       Vitals:       Last 24hrs VS reviewed since prior progress note.  Most recent are:    Visit Vitals  BP (!) 146/63 (BP 1 Location: Left upper arm, BP Patient Position: At rest)   Pulse (!) 40   Temp 98.3 °F (36.8 °C)   Resp 16   Ht 5' 7\" (1.702 m)   Wt 87.5 kg (192 lb 14.4 oz)   SpO2 93%   BMI 30.21 kg/m²     SpO2 Readings from Last 6 Encounters:   23 93%   02/15/21 95%   21 94%   10/04/19 92%   17 99%   17 96%    O2 Flow Rate (L/min): 0 l/min     Intake/Output Summary (Last 24 hours) at 2023 3836  Last data filed at 2023 0758  Gross per 24 hour   Intake 5422.92 ml   Output 0 ml   Net 5422.92 ml          Exam:     Physical Exam:    Gen:  Well-developed, well-nourished, in no acute distress  HEENT:  Pink conjunctivae, PERRL, hearing intact to voice  Resp:  No accessory muscle use, clear breath sounds without wheezes rales or rhonchi  Card:  bradycardia, No murmurs, normal S1, S2, no peripheral edema  Abd:  Soft, non-tender, non-distended, normoactive bowel sounds are present  Musc:  No cyanosis or clubbing  Skin:  No rashes or ulcers, skin turgor is good  Neuro:  Cranial nerves 3-12 are grossly intact, follows commands appropriately  Psych:  Oriented to person, place, and time, Alert with fair insight      Medications Reviewed: (see below)    Lab Data Reviewed: (see below)    ______________________________________________________________________    Medications:     Current Facility-Administered Medications   Medication Dose Route Frequency    gabapentin (NEURONTIN) capsule 200 mg  200 mg Oral TID    oxyCODONE IR (ROXICODONE) tablet 5 mg  5 mg Oral Q4H PRN    hydrALAZINE (APRESOLINE) 20 mg/mL injection 20 mg  20 mg IntraVENous Q4H PRN    0.9% sodium chloride infusion  50 mL/hr IntraVENous CONTINUOUS    melatonin tablet 4.5 mg  4.5 mg Oral QHS    sodium chloride (NS) flush 5-40 mL  5-40 mL IntraVENous Q8H    sodium chloride (NS) flush 5-40 mL  5-40 mL IntraVENous PRN    acetaminophen (TYLENOL) tablet 650 mg  650 mg Oral Q6H PRN    Or    acetaminophen (TYLENOL) suppository 650 mg  650 mg Rectal Q6H PRN    polyethylene glycol (MIRALAX) packet 17 g  17 g Oral DAILY PRN    ondansetron (ZOFRAN ODT) tablet 4 mg  4 mg Oral Q8H PRN    Or    ondansetron (ZOFRAN) injection 4 mg  4 mg IntraVENous Q6H PRN    [Held by provider] enoxaparin (LOVENOX) injection 40 mg  40 mg SubCUTAneous DAILY    ketorolac (TORADOL) injection 15 mg  15 mg IntraVENous Q6H PRN    lidocaine 4 % patch 2 Patch  2 Patch TransDERmal Q24H    famotidine (PEPCID) tablet 20 mg  20 mg Oral BID    psyllium husk-aspartame (METAMUCIL FIBER) packet 1 Packet  1 Packet Oral BID          Lab Review:     Recent Labs     01/13/23  0325 01/12/23  0150 01/11/23  0404   WBC 8.7 8.9 9.6   HGB 13.6 12.8 13.7   HCT 42.0 38.8 40.8    189 186       Recent Labs     01/13/23  0325 01/12/23  0114 01/11/23  0404    141 141   K 3.7 4.6 3.4*   * 109* 107   CO2 23 24 26   * 102* 104*   BUN 37* 33* 35*   CREA 1.23 1.24 1.06   CA 9.9 11.5* 11.6*   ALB 2.8* 2.7* 2.7*   TBILI 0.4 0.5 0.6   ALT 56 29 28       Lab Results   Component Value Date/Time    Glucose (POC) 116 (H) 01/22/2021 05:22 AM    Glucose (POC) 121 (H) 01/21/2021 11:52 PM    Glucose (POC) 117 (H) 01/21/2021 07:44 PM    Glucose (POC) 135 (H) 01/21/2021 12:22 PM Glucose (POC) 126 (H) 01/21/2021 06:11 AM          Assessment / Plan:     Bradycardia/ Lightheadedness: Possible anesthesia affect still? EKG shows sinus bradycardia. TSH wnl. Prioe Echo in 2021 ok. ADD cardiac monitor. Consult cardio given sx     Altered mental status/history of mild dementia POA: Resolved. Wife notes some underlying mild dementia. Possibly due to gabapentin? .  Due to hypercalcemia? Due to mets? UA without evidence of infection. TSH WNL. B12 ok. Ammonia wnl. CT head no acute concerns. MRI brain with lesion s/f met or primary lesion . COntinue lower dose home gabapentin. Monitor. Right shoulder pain POA: Acute on chronic. Suspect due to metastatic disease. Lidocaine patch. Tylenol as needed. IV Toradol to as needed. Continue gabapentin at lower dose. Oral oxycodone prn, but avoid narcotics as able due to concern for underlying dementia. Ortho following. Palliative care following     Concern for mets POA: MRI shoulder showing concern for metastatic disease. CT abdomen pelvis with contrast w/ concern for diffuse malignancy. PSA per oncology. S/p pelvic biopsy 1/12. Oncology following. Elevated calcium POA: Improved. Due to malignancy? Continue IVF. S/p Zomet; continue  as needed. Oncology following. History of spinal stenosis: PT/OT.     Total time spent with patient: 28 Minutes **I personally saw and examined the patient during this time period**                 Care Plan discussed with: Patient, Family, Nursing Staff, and Consultant/Specialist    Discussed:  Care Plan    Prophylaxis:  Lovenox    Disposition:  Home w/Family           ___________________________________________________    Attending Physician: Gera Linn, DO

## 2023-01-14 PROBLEM — E87.6 HYPOKALEMIA: Status: ACTIVE | Noted: 2023-01-14

## 2023-01-14 PROBLEM — R00.1 BRADYCARDIA: Status: ACTIVE | Noted: 2023-01-14

## 2023-01-14 PROBLEM — R03.0 ELEVATED BP WITHOUT DIAGNOSIS OF HYPERTENSION: Status: ACTIVE | Noted: 2023-01-14

## 2023-01-14 PROBLEM — J43.2 CENTRILOBULAR EMPHYSEMA (HCC): Status: ACTIVE | Noted: 2023-01-14

## 2023-01-14 LAB
ALBUMIN SERPL-MCNC: 2.5 G/DL (ref 3.5–5)
ALBUMIN/GLOB SERPL: 0.8 (ref 1.1–2.2)
ALP SERPL-CCNC: 110 U/L (ref 45–117)
ALT SERPL-CCNC: 56 U/L (ref 12–78)
ANION GAP SERPL CALC-SCNC: 9 MMOL/L (ref 5–15)
AST SERPL-CCNC: 68 U/L (ref 15–37)
BASOPHILS # BLD: 0.1 K/UL (ref 0–0.1)
BASOPHILS NFR BLD: 1 % (ref 0–1)
BILIRUB SERPL-MCNC: 0.6 MG/DL (ref 0.2–1)
BUN SERPL-MCNC: 26 MG/DL (ref 6–20)
BUN/CREAT SERPL: 30 (ref 12–20)
CALCIUM SERPL-MCNC: 8.5 MG/DL (ref 8.5–10.1)
CHLORIDE SERPL-SCNC: 113 MMOL/L (ref 97–108)
CO2 SERPL-SCNC: 22 MMOL/L (ref 21–32)
CREAT SERPL-MCNC: 0.88 MG/DL (ref 0.7–1.3)
DIFFERENTIAL METHOD BLD: ABNORMAL
EOSINOPHIL # BLD: 0.2 K/UL (ref 0–0.4)
EOSINOPHIL NFR BLD: 2 % (ref 0–7)
ERYTHROCYTE [DISTWIDTH] IN BLOOD BY AUTOMATED COUNT: 13.4 % (ref 11.5–14.5)
GLOBULIN SER CALC-MCNC: 3.3 G/DL (ref 2–4)
GLUCOSE SERPL-MCNC: 116 MG/DL (ref 65–100)
HCT VFR BLD AUTO: 40.8 % (ref 36.6–50.3)
HGB BLD-MCNC: 12.8 G/DL (ref 12.1–17)
IMM GRANULOCYTES # BLD AUTO: 0.1 K/UL (ref 0–0.04)
IMM GRANULOCYTES NFR BLD AUTO: 1 % (ref 0–0.5)
LYMPHOCYTES # BLD: 0.7 K/UL (ref 0.8–3.5)
LYMPHOCYTES NFR BLD: 8 % (ref 12–49)
MCH RBC QN AUTO: 29.6 PG (ref 26–34)
MCHC RBC AUTO-ENTMCNC: 31.4 G/DL (ref 30–36.5)
MCV RBC AUTO: 94.4 FL (ref 80–99)
MONOCYTES # BLD: 0.7 K/UL (ref 0–1)
MONOCYTES NFR BLD: 7 % (ref 5–13)
NEUTS SEG # BLD: 7.5 K/UL (ref 1.8–8)
NEUTS SEG NFR BLD: 81 % (ref 32–75)
NRBC # BLD: 0 K/UL (ref 0–0.01)
NRBC BLD-RTO: 0 PER 100 WBC
PLATELET # BLD AUTO: 157 K/UL (ref 150–400)
PMV BLD AUTO: 10 FL (ref 8.9–12.9)
POTASSIUM SERPL-SCNC: 3.2 MMOL/L (ref 3.5–5.1)
PROT SERPL-MCNC: 5.8 G/DL (ref 6.4–8.2)
RBC # BLD AUTO: 4.32 M/UL (ref 4.1–5.7)
RBC MORPH BLD: ABNORMAL
SODIUM SERPL-SCNC: 144 MMOL/L (ref 136–145)
WBC # BLD AUTO: 9.3 K/UL (ref 4.1–11.1)

## 2023-01-14 PROCEDURE — 97116 GAIT TRAINING THERAPY: CPT

## 2023-01-14 PROCEDURE — 36415 COLL VENOUS BLD VENIPUNCTURE: CPT

## 2023-01-14 PROCEDURE — 74011250636 HC RX REV CODE- 250/636: Performed by: INTERNAL MEDICINE

## 2023-01-14 PROCEDURE — 74011000250 HC RX REV CODE- 250: Performed by: INTERNAL MEDICINE

## 2023-01-14 PROCEDURE — 99232 SBSQ HOSP IP/OBS MODERATE 35: CPT | Performed by: INTERNAL MEDICINE

## 2023-01-14 PROCEDURE — 74011250637 HC RX REV CODE- 250/637: Performed by: INTERNAL MEDICINE

## 2023-01-14 PROCEDURE — 80053 COMPREHEN METABOLIC PANEL: CPT

## 2023-01-14 PROCEDURE — 74011000250 HC RX REV CODE- 250: Performed by: NURSE PRACTITIONER

## 2023-01-14 PROCEDURE — 65270000046 HC RM TELEMETRY

## 2023-01-14 PROCEDURE — 85025 COMPLETE CBC W/AUTO DIFF WBC: CPT

## 2023-01-14 PROCEDURE — 97530 THERAPEUTIC ACTIVITIES: CPT

## 2023-01-14 RX ORDER — OXYCODONE HYDROCHLORIDE 5 MG/1
7.5 TABLET ORAL
Status: DISCONTINUED | OUTPATIENT
Start: 2023-01-14 | End: 2023-01-17

## 2023-01-14 RX ORDER — POTASSIUM CHLORIDE 7.45 MG/ML
10 INJECTION INTRAVENOUS
Status: COMPLETED | OUTPATIENT
Start: 2023-01-14 | End: 2023-01-14

## 2023-01-14 RX ORDER — OXYCODONE HYDROCHLORIDE 5 MG/1
10 TABLET ORAL
Status: DISCONTINUED | OUTPATIENT
Start: 2023-01-14 | End: 2023-01-17

## 2023-01-14 RX ADMIN — HYDRALAZINE HYDROCHLORIDE 20 MG: 20 INJECTION INTRAMUSCULAR; INTRAVENOUS at 01:32

## 2023-01-14 RX ADMIN — PSYLLIUM HUSK 1 PACKET: 3.4 POWDER ORAL at 08:00

## 2023-01-14 RX ADMIN — Medication 10 ML: at 22:09

## 2023-01-14 RX ADMIN — OXYCODONE HYDROCHLORIDE 7.5 MG: 5 TABLET ORAL at 13:08

## 2023-01-14 RX ADMIN — Medication 4.5 MG: at 22:08

## 2023-01-14 RX ADMIN — OXYCODONE HYDROCHLORIDE 10 MG: 5 TABLET ORAL at 22:28

## 2023-01-14 RX ADMIN — POTASSIUM CHLORIDE 10 MEQ: 7.45 INJECTION INTRAVENOUS at 13:11

## 2023-01-14 RX ADMIN — FAMOTIDINE 20 MG: 20 TABLET, FILM COATED ORAL at 22:10

## 2023-01-14 RX ADMIN — POTASSIUM CHLORIDE 10 MEQ: 7.45 INJECTION INTRAVENOUS at 09:15

## 2023-01-14 RX ADMIN — KETOROLAC TROMETHAMINE 15 MG: 30 INJECTION, SOLUTION INTRAMUSCULAR; INTRAVENOUS at 07:56

## 2023-01-14 RX ADMIN — ENOXAPARIN SODIUM 40 MG: 100 INJECTION SUBCUTANEOUS at 08:00

## 2023-01-14 RX ADMIN — GABAPENTIN 200 MG: 100 CAPSULE ORAL at 22:08

## 2023-01-14 RX ADMIN — SODIUM CHLORIDE 50 ML/HR: 9 INJECTION, SOLUTION INTRAVENOUS at 02:43

## 2023-01-14 RX ADMIN — KETOROLAC TROMETHAMINE 15 MG: 30 INJECTION, SOLUTION INTRAMUSCULAR; INTRAVENOUS at 16:17

## 2023-01-14 RX ADMIN — GABAPENTIN 200 MG: 100 CAPSULE ORAL at 08:00

## 2023-01-14 RX ADMIN — SODIUM CHLORIDE, PRESERVATIVE FREE 10 ML: 5 INJECTION INTRAVENOUS at 01:34

## 2023-01-14 RX ADMIN — FAMOTIDINE 20 MG: 20 TABLET, FILM COATED ORAL at 08:00

## 2023-01-14 RX ADMIN — GABAPENTIN 200 MG: 100 CAPSULE ORAL at 16:17

## 2023-01-14 RX ADMIN — PSYLLIUM HUSK 1 PACKET: 3.4 POWDER ORAL at 22:13

## 2023-01-14 RX ADMIN — POTASSIUM CHLORIDE 10 MEQ: 7.45 INJECTION INTRAVENOUS at 14:50

## 2023-01-14 RX ADMIN — POTASSIUM CHLORIDE 10 MEQ: 7.45 INJECTION INTRAVENOUS at 11:28

## 2023-01-14 RX ADMIN — KETOROLAC TROMETHAMINE 15 MG: 30 INJECTION, SOLUTION INTRAMUSCULAR; INTRAVENOUS at 01:32

## 2023-01-14 NOTE — PROGRESS NOTES
Problem: Falls - Risk of  Goal: *Absence of Falls  Description: Document Brittney Doroteoguerda Fall Risk and appropriate interventions in the flowsheet.   Outcome: Progressing Towards Goal  Note: Fall Risk Interventions:                                Problem: Patient Education: Go to Patient Education Activity  Goal: Patient/Family Education  Outcome: Progressing Towards Goal     Problem: Patient Education: Go to Patient Education Activity  Goal: Patient/Family Education  Outcome: Progressing Towards Goal     Problem: Pain  Goal: *Control of Pain  Outcome: Progressing Towards Goal

## 2023-01-14 NOTE — PROGRESS NOTES
Eugene Kelly [facility]  [address] [facility], [address] [place] [address]  [phone]      Medical Progress Note      NAME: Kwesi Puente. :  1945  MRM:  [id number]    Date/Time of service: 2023       Subjective:     Chief Complaint:  Patient was personally seen and examined by me during this time period. Chart reviewed. FU ams, right shoulder pain. Persistent  sinus bradycardia; Continues to have right shoulder pain. Discussed family, wife not at bedside       Objective:       Vitals:       Last 24hrs VS reviewed since prior progress note.  Most recent are:    Visit Vitals  BP (!) 165/73 (BP 1 Location: Left upper arm, BP Patient Position: At rest)   Pulse 74   Temp 98 °F (36.7 °C)   Resp 18   Ht 5' 7\" (1.702 m)   Wt 87.5 kg (192 lb 14.4 oz)   SpO2 92%   BMI 30.21 kg/m²     SpO2 Readings from Last 6 Encounters:   23 92%   02/15/21 95%   21 94%   10/04/19 92%   17 99%   17 96%    O2 Flow Rate (L/min): 0 l/min     Intake/Output Summary (Last 24 hours) at 2023 1237  Last data filed at 2023 0700  Gross per 24 hour   Intake 1736 ml   Output 0 ml   Net 1736 ml          Exam:     Physical Exam:    Gen:  Well-developed, well-nourished, in no acute distress  HEENT:  Pink conjunctivae, PERRL, hearing intact to voice  Resp:  No accessory muscle use, clear breath sounds without wheezes rales or rhonchi  Card:  bradycardia, No murmurs, normal S1, S2, no peripheral edema  Abd:  Soft, non-tender, non-distended, normoactive bowel sounds are present  Musc:  No cyanosis or clubbing  Skin:  No rashes or ulcers, skin turgor is good  Neuro:  Cranial nerves 3-12 are grossly intact, follows commands appropriately  Psych:  Oriented to person, place, and time, Alert with fair insight      Medications Reviewed: (see below)    Lab Data Reviewed: (see below)    ______________________________________________________________________    Medications:     Current Facility-Administered Medications   Medication Dose Route Frequency    potassium chloride 10 mEq in 100 ml IVPB  10 mEq IntraVENous Q1H    oxyCODONE IR (ROXICODONE) tablet 7.5 mg  7.5 mg Oral Q4H PRN    oxyCODONE IR (ROXICODONE) tablet 10 mg  10 mg Oral Q4H PRN    gabapentin (NEURONTIN) capsule 200 mg  200 mg Oral TID    hydrALAZINE (APRESOLINE) 20 mg/mL injection 20 mg  20 mg IntraVENous Q4H PRN    0.9% sodium chloride infusion  50 mL/hr IntraVENous CONTINUOUS    melatonin tablet 4.5 mg  4.5 mg Oral QHS    sodium chloride (NS) flush 5-40 mL  5-40 mL IntraVENous Q8H    sodium chloride (NS) flush 5-40 mL  5-40 mL IntraVENous PRN    acetaminophen (TYLENOL) tablet 650 mg  650 mg Oral Q6H PRN    Or    acetaminophen (TYLENOL) suppository 650 mg  650 mg Rectal Q6H PRN    polyethylene glycol (MIRALAX) packet 17 g  17 g Oral DAILY PRN    ondansetron (ZOFRAN ODT) tablet 4 mg  4 mg Oral Q8H PRN    Or    ondansetron (ZOFRAN) injection 4 mg  4 mg IntraVENous Q6H PRN    enoxaparin (LOVENOX) injection 40 mg  40 mg SubCUTAneous DAILY    ketorolac (TORADOL) injection 15 mg  15 mg IntraVENous Q6H PRN    lidocaine 4 % patch 2 Patch  2 Patch TransDERmal Q24H    famotidine (PEPCID) tablet 20 mg  20 mg Oral BID    psyllium husk-aspartame (METAMUCIL FIBER) packet 1 Packet  1 Packet Oral BID          Lab Review:     Recent Labs     01/14/23 0345 01/13/23  0325 01/12/23  0150   WBC 9.3 8.7 8.9   HGB 12.8 13.6 12.8   HCT 40.8 42.0 38.8    189 189       Recent Labs     01/14/23  0345 01/13/23  0325 01/12/23  0114    145 141   K 3.2* 3.7 4.6   * 112* 109*   CO2 22 23 24   * 110* 102*   BUN 26* 37* 33*   CREA 0.88 1.23 1.24   CA 8.5 9.9 11.5*   ALB 2.5* 2.8* 2.7*   TBILI 0.6 0.4 0.5   ALT 56 56 29       Lab Results   Component Value Date/Time    Glucose (POC) 116 (H) 01/22/2021 05:22 AM    Glucose (POC) 121 (H) 01/21/2021 11:52 PM    Glucose (POC) 117 (H) 01/21/2021 07:44 PM    Glucose (POC) 135 (H) 01/21/2021 12:22 PM    Glucose (POC) 126 (H) 01/21/2021 06:11 AM          Assessment / Plan:     Bradycardia/ Lightheadedness: EKG shows sinus bradycardia. TSH wnl. Prior Echo in 2021 ok. Cont cardiac monitor. Consult cardio  - recommend DC clemencia agents and prn follow up    Altered mental status/history of mild dementia POA: Resolved. Wife notes some underlying mild dementia. Possibly due to gabapentin? .  Due to hypercalcemia? Due to mets? UA without evidence of infection. TSH WNL. B12 ok. Ammonia wnl. CT head no acute concerns. MRI brain with lesion s/f met or primary lesion . COntinue lower dose home gabapentin. Monitor. Stable improved now    Right shoulder pain POA: Acute on chronic. Suspect due to metastatic disease. Lidocaine patch. Tylenol as needed. IV Toradol to as needed. Continue gabapentin at lower dose. Oral oxycodone prn, dose increased due to uncontrolled pain. Palliative note reviewed. Ortho following. Palliative care following     Concern for mets POA: MRI shoulder showing concern for metastatic disease. CT abdomen pelvis with contrast w/ concern for diffuse malignancy. PSA per oncology. S/p pelvic biopsy 1/12. Oncology following. Elevated calcium POA: Improved. Due to malignancy? Continue IVF. S/p Zomet; continue  as needed. Oncology following. History of spinal stenosis: PT/OT.     Total time spent with patient: 28 Minutes **I personally saw and examined the patient during this time period**                 Care Plan discussed with: Patient, Family, Nursing Staff, and Consultant/Specialist    Discussed:  Care Plan    Prophylaxis:  Lovenox    Disposition:  Home w/Family           ___________________________________________________    Attending Physician: Deanna Ornelas MD

## 2023-01-14 NOTE — PROGRESS NOTES
Problem: Mobility Impaired (Adult and Pediatric)  Goal: *Acute Goals and Plan of Care (Insert Text)  Description: FUNCTIONAL STATUS PRIOR TO ADMISSION: Patient was independent and active without use of DME prior to R shoulder injury in November 2022. He has experienced gradual decline in function, requiring furniture support or assist for ambulation. + falls in recent months. HOME SUPPORT PRIOR TO ADMISSION: The patient lived with spouse who assists as needed. Physical Therapy Goals  Initiated 1/10/2023  1. Patient will move from supine to sit and sit to supine  in bed with supervision within 7 day(s). 2.  Patient will transfer from bed to chair and chair to bed with supervision/set-up using the least restrictive device within 7 day(s). 3.  Patient will perform sit to stand with supervision/set-up within 7 day(s). 4.  Patient will ambulate with supervision/set-up for 150 feet with the least restrictive device within 7 day(s). 5.  Patient will ascend/descend 14 stairs with one handrail(s) with supervision/set-up within 7 day(s). Note:   PHYSICAL THERAPY TREATMENT  Patient: Melany Ayala (73 y.o. male)  Date: 1/14/2023  Diagnosis: Acute metabolic encephalopathy [O22.62] <principal problem not specified>      Precautions: Fall (NWB R shoulder (pending MRI))  Chart, physical therapy assessment, plan of care and goals were reviewed. ASSESSMENT  Patient continues with skilled PT services. Pt supine to sit with mod assist of 2. Pt needs much cueing to not use painful  right  arm (shoulder). Pt sit to stand with bed elevated min assist of 2. Pt was able to ambulate 5ft x 2 min assist of 2 with a quad cane. Pt is unsteady and need much cueing. Pt fatigues quickly. Pt requesting back to bed needing mod assist of 2. Pt positioned with pillows to support right arm. Pt would benefit from OT consult for ADL and managing NWB right shoulder. Continue goals.        PLAN :  Patient continues to benefit from skilled intervention to address the above impairments. Continue treatment per established plan of care. to address goals. Recommendation for discharge: (in order for the patient to meet his/her long term goals)  Physical therapy at least 2 days/week in the home  TBD    This discharge recommendation:  Has been made in collaboration with the attending provider and/or case management    IF patient discharges home will need the following DME: to be determined (TBD)           OBJECTIVE DATA SUMMARY:   Critical Behavior:  Neurologic State: Alert  Orientation Level: Appropriate for age  Cognition: Follows commands  Safety/Judgement: Decreased awareness of need for assistance, Decreased insight into deficits  Functional Mobility Training:  Bed Mobility:     Supine to Sit: Moderate assistance;Assist x2  Sit to Supine:  Moderate assistance;Assist x2           Transfers:  Sit to Stand: Minimum assistance of 2  Stand to Sit: Contact guard assistance;Minimum assistance                             Balance:  Sitting - Static: Good (unsupported)  Standing: With support  Standing - Static: Fair  Ambulation/Gait Training:  Distance (ft): 10 Feet (ft)  Assistive Device: Gait belt;Cane, quad  Ambulation - Level of Assistance: Minimal assistance;Assist x2        Gait Abnormalities: Decreased step clearance        Base of Support: Narrowed     Speed/Krystle: Pace decreased (<100 feet/min)  Step Length: Right shortened;Left shortened              Activity Tolerance:   Fair    After treatment patient left in no apparent distress:   Supine in bed    COMMUNICATION/COLLABORATION:   The patients plan of care was discussed with: Physical therapist.     Elisabeth Avery PTA   Time Calculation: 23 mins

## 2023-01-15 LAB
ALBUMIN SERPL-MCNC: 2.3 G/DL (ref 3.5–5)
ALBUMIN/GLOB SERPL: 0.6 (ref 1.1–2.2)
ALP SERPL-CCNC: 116 U/L (ref 45–117)
ALT SERPL-CCNC: 48 U/L (ref 12–78)
ANION GAP SERPL CALC-SCNC: 8 MMOL/L (ref 5–15)
AST SERPL-CCNC: 59 U/L (ref 15–37)
BASOPHILS # BLD: 0 K/UL (ref 0–0.1)
BASOPHILS NFR BLD: 0 % (ref 0–1)
BILIRUB SERPL-MCNC: 0.4 MG/DL (ref 0.2–1)
BUN SERPL-MCNC: 21 MG/DL (ref 6–20)
BUN/CREAT SERPL: 24 (ref 12–20)
CALCIUM SERPL-MCNC: 8.2 MG/DL (ref 8.5–10.1)
CHLORIDE SERPL-SCNC: 111 MMOL/L (ref 97–108)
CO2 SERPL-SCNC: 23 MMOL/L (ref 21–32)
CREAT SERPL-MCNC: 0.87 MG/DL (ref 0.7–1.3)
DIFFERENTIAL METHOD BLD: ABNORMAL
EOSINOPHIL # BLD: 0.2 K/UL (ref 0–0.4)
EOSINOPHIL NFR BLD: 2 % (ref 0–7)
ERYTHROCYTE [DISTWIDTH] IN BLOOD BY AUTOMATED COUNT: 13.3 % (ref 11.5–14.5)
GLOBULIN SER CALC-MCNC: 3.7 G/DL (ref 2–4)
GLUCOSE SERPL-MCNC: 106 MG/DL (ref 65–100)
HCT VFR BLD AUTO: 37.1 % (ref 36.6–50.3)
HGB BLD-MCNC: 12.1 G/DL (ref 12.1–17)
IMM GRANULOCYTES # BLD AUTO: 0.1 K/UL (ref 0–0.04)
IMM GRANULOCYTES NFR BLD AUTO: 1 % (ref 0–0.5)
LYMPHOCYTES # BLD: 1.2 K/UL (ref 0.8–3.5)
LYMPHOCYTES NFR BLD: 12 % (ref 12–49)
MCH RBC QN AUTO: 28.7 PG (ref 26–34)
MCHC RBC AUTO-ENTMCNC: 32.6 G/DL (ref 30–36.5)
MCV RBC AUTO: 87.9 FL (ref 80–99)
MONOCYTES # BLD: 1 K/UL (ref 0–1)
MONOCYTES NFR BLD: 10 % (ref 5–13)
NEUTS SEG # BLD: 7.2 K/UL (ref 1.8–8)
NEUTS SEG NFR BLD: 75 % (ref 32–75)
NRBC # BLD: 0 K/UL (ref 0–0.01)
NRBC BLD-RTO: 0 PER 100 WBC
PLATELET # BLD AUTO: 193 K/UL (ref 150–400)
PMV BLD AUTO: 10.3 FL (ref 8.9–12.9)
POTASSIUM SERPL-SCNC: 3.4 MMOL/L (ref 3.5–5.1)
PROT SERPL-MCNC: 6 G/DL (ref 6.4–8.2)
RBC # BLD AUTO: 4.22 M/UL (ref 4.1–5.7)
SODIUM SERPL-SCNC: 142 MMOL/L (ref 136–145)
WBC # BLD AUTO: 9.7 K/UL (ref 4.1–11.1)

## 2023-01-15 PROCEDURE — 74011250637 HC RX REV CODE- 250/637: Performed by: INTERNAL MEDICINE

## 2023-01-15 PROCEDURE — 65270000046 HC RM TELEMETRY

## 2023-01-15 PROCEDURE — 74011000250 HC RX REV CODE- 250: Performed by: NURSE PRACTITIONER

## 2023-01-15 PROCEDURE — 36415 COLL VENOUS BLD VENIPUNCTURE: CPT

## 2023-01-15 PROCEDURE — 85025 COMPLETE CBC W/AUTO DIFF WBC: CPT

## 2023-01-15 PROCEDURE — 80053 COMPREHEN METABOLIC PANEL: CPT

## 2023-01-15 PROCEDURE — 74011250636 HC RX REV CODE- 250/636: Performed by: INTERNAL MEDICINE

## 2023-01-15 PROCEDURE — 74011000250 HC RX REV CODE- 250: Performed by: INTERNAL MEDICINE

## 2023-01-15 RX ORDER — VALSARTAN 80 MG/1
80 TABLET ORAL DAILY
Status: DISCONTINUED | OUTPATIENT
Start: 2023-01-15 | End: 2023-01-23 | Stop reason: HOSPADM

## 2023-01-15 RX ORDER — HYDROMORPHONE HYDROCHLORIDE 1 MG/ML
0.5 INJECTION, SOLUTION INTRAMUSCULAR; INTRAVENOUS; SUBCUTANEOUS
Status: DISCONTINUED | OUTPATIENT
Start: 2023-01-15 | End: 2023-01-21

## 2023-01-15 RX ORDER — AMOXICILLIN 250 MG
1 CAPSULE ORAL DAILY
Status: DISCONTINUED | OUTPATIENT
Start: 2023-01-16 | End: 2023-01-18

## 2023-01-15 RX ADMIN — PSYLLIUM HUSK 1 PACKET: 3.4 POWDER ORAL at 09:36

## 2023-01-15 RX ADMIN — GABAPENTIN 200 MG: 100 CAPSULE ORAL at 17:45

## 2023-01-15 RX ADMIN — POTASSIUM BICARBONATE 40 MEQ: 782 TABLET, EFFERVESCENT ORAL at 13:29

## 2023-01-15 RX ADMIN — GABAPENTIN 200 MG: 100 CAPSULE ORAL at 22:02

## 2023-01-15 RX ADMIN — GABAPENTIN 200 MG: 100 CAPSULE ORAL at 09:36

## 2023-01-15 RX ADMIN — ACETAMINOPHEN 650 MG: 325 TABLET ORAL at 13:29

## 2023-01-15 RX ADMIN — OXYCODONE HYDROCHLORIDE 7.5 MG: 5 TABLET ORAL at 17:45

## 2023-01-15 RX ADMIN — Medication 4.5 MG: at 22:01

## 2023-01-15 RX ADMIN — VALSARTAN 80 MG: 80 TABLET ORAL at 13:29

## 2023-01-15 RX ADMIN — OXYCODONE HYDROCHLORIDE 10 MG: 5 TABLET ORAL at 09:36

## 2023-01-15 RX ADMIN — SODIUM CHLORIDE, PRESERVATIVE FREE 10 ML: 5 INJECTION INTRAVENOUS at 04:23

## 2023-01-15 RX ADMIN — POTASSIUM BICARBONATE 40 MEQ: 782 TABLET, EFFERVESCENT ORAL at 22:07

## 2023-01-15 RX ADMIN — KETOROLAC TROMETHAMINE 15 MG: 30 INJECTION, SOLUTION INTRAMUSCULAR; INTRAVENOUS at 00:51

## 2023-01-15 RX ADMIN — PSYLLIUM HUSK 1 PACKET: 3.4 POWDER ORAL at 17:45

## 2023-01-15 RX ADMIN — ENOXAPARIN SODIUM 40 MG: 100 INJECTION SUBCUTANEOUS at 09:36

## 2023-01-15 RX ADMIN — SODIUM CHLORIDE 50 ML/HR: 9 INJECTION, SOLUTION INTRAVENOUS at 04:22

## 2023-01-15 RX ADMIN — SODIUM CHLORIDE 50 ML/HR: 9 INJECTION, SOLUTION INTRAVENOUS at 09:36

## 2023-01-15 RX ADMIN — FAMOTIDINE 20 MG: 20 TABLET, FILM COATED ORAL at 17:45

## 2023-01-15 RX ADMIN — FAMOTIDINE 20 MG: 20 TABLET, FILM COATED ORAL at 09:36

## 2023-01-15 RX ADMIN — OXYCODONE HYDROCHLORIDE 10 MG: 5 TABLET ORAL at 22:01

## 2023-01-15 RX ADMIN — Medication 10 ML: at 22:02

## 2023-01-15 NOTE — PROGRESS NOTES
[facility] [facility]  [address] [facility], [place], [id number] [place] [address]  [phone]      Hospitalist  Progress Note      NAME:         Tammy Briseno. :        1945  MRM:        [id number]    Date of service: 1/15/2023      Chief complaint: Generalized body aches    Interval HPI: Patient says he has generalized body aches, better controlled by current pain regimen. He has no chest pain or SOB. Back discomfort. No fever or chills. Objective:    Vital Signs:    Visit Vitals  BP (!) 188/86 (BP 1 Location: Left upper arm, BP Patient Position: Semi fowlers; Lying;Supine)   Pulse 74   Temp 98 °F (36.7 °C)   Resp 18   Ht 5' 7\" (1.702 m)   Wt 87.5 kg (192 lb 14.4 oz)   SpO2 93%   BMI 30.21 kg/m²        Intake/Output Summary (Last 24 hours) at 1/15/2023 1052  Last data filed at 1/15/2023 0659  Gross per 24 hour   Intake 1973.17 ml   Output --   Net 1973.17 ml        Physical Examination:    General:   Weak and ill looking, uncomfortable but not in distress   Eyes:   pink conjunctivae, PERRLA with no discharge. ENT:   no ottorrhea or rhinorrhea with dry mucous membranes  Neck: no masses, thyroid non-tender and trachea central.  Pulm:  no accessory muscle use, decreased but clear breath sounds without crackles or wheezes  Card:  no JVD or murmurs, has regular and normal S1, S2 without thrills, bruits or peripheral edema  Abd:  Soft, non-tender, non-distended, normoactive bowel sounds with no palpable organomegaly  Musc:  No cyanosis, clubbing, atrophy or deformities. Generalized back discomfort  Skin:  No rashes, bruising or ulcers. Neuro: Awake and alert.  Generally a non focal exam. Follows commands appropriately  Psych:  Has a fair insight to his illness     Current Facility-Administered Medications   Medication Dose Route Frequency    potassium bicarb-citric acid (EFFER-K) tablet 40 mEq  40 mEq Oral BID    oxyCODONE IR (ROXICODONE) tablet 7.5 mg  7.5 mg Oral Q4H PRN    oxyCODONE IR (ROXICODONE) tablet 10 mg  10 mg Oral Q4H PRN    gabapentin (NEURONTIN) capsule 200 mg  200 mg Oral TID    hydrALAZINE (APRESOLINE) 20 mg/mL injection 20 mg  20 mg IntraVENous Q4H PRN    0.9% sodium chloride infusion  50 mL/hr IntraVENous CONTINUOUS    melatonin tablet 4.5 mg  4.5 mg Oral QHS    sodium chloride (NS) flush 5-40 mL  5-40 mL IntraVENous Q8H    sodium chloride (NS) flush 5-40 mL  5-40 mL IntraVENous PRN    acetaminophen (TYLENOL) tablet 650 mg  650 mg Oral Q6H PRN    Or    acetaminophen (TYLENOL) suppository 650 mg  650 mg Rectal Q6H PRN    polyethylene glycol (MIRALAX) packet 17 g  17 g Oral DAILY PRN    ondansetron (ZOFRAN ODT) tablet 4 mg  4 mg Oral Q8H PRN    Or    ondansetron (ZOFRAN) injection 4 mg  4 mg IntraVENous Q6H PRN    enoxaparin (LOVENOX) injection 40 mg  40 mg SubCUTAneous DAILY    lidocaine 4 % patch 2 Patch  2 Patch TransDERmal Q24H    famotidine (PEPCID) tablet 20 mg  20 mg Oral BID    psyllium husk-aspartame (METAMUCIL FIBER) packet 1 Packet  1 Packet Oral BID        Laboratory data and review:    Recent Labs     01/15/23  0416 01/14/23  0345 01/13/23  0325   WBC 9.7 9.3 8.7   HGB 12.1 12.8 13.6   HCT 37.1 40.8 42.0    157 189     Recent Labs     01/15/23  0416 01/14/23  0345 01/13/23  0325    144 145   K 3.4* 3.2* 3.7   * 113* 112*   CO2 23 22 23   * 116* 110*   BUN 21* 26* 37*   CREA 0.87 0.88 1.23   CA 8.2* 8.5 9.9   ALB 2.3* 2.5* 2.8*   ALT 48 56 56     No components found for: [place]    Diagnostics: Imaging studies have been reviewed    Telemetry reviewed by me:   normal sinus rhythm    Assessment and Plan:    Bony metastasis ([facility] [address] (1/11/2023) POA: CT scan abdomen and pelvis done showed multifocal osseous metastatic disease in the thoracic spine, lumbar spine,  pelvis, left ribs, and right scapula. Potential biopsy sites including anterior left iliac bone and superior right scapula.  Multiple small hepatic lesions are suspicious for metastatic disease. MRi brain as noted above. Seen by Oncology and he is sp a CT guided right iliac lesion biopsy. Path is pending. Oxycodone PRN. Gabapentin, Lidoderm patch. IV Dilaudid PRN     Acute metabolic encephalopathy (5/59/4064) / hx Dementia POA: symptoms much better now and were likely triggered by electrolyte derangements. No focus of infection. TSH and vitamin B12 were normal. CT scan head neg and MRi brain neg for acute infarct. There was however a parasagittal right frontal enhancing intraosseous lesion with intracranial extension measuring up to 3.1 x 3.1 cm, suspicious for aggressive lesion such as metastatic or primary osseous lesion like multiple myeloma, or lymphoma. Oncology following. Monitor clinical progress    Acute pain of right shoulder (1/11/2023) POA: due to osseous metastatic disease and a small high-grade partial-thickness undersurface tear anterior supraspinatus as noted on the shoulder MRi. Ortho and palliative care following. Diane carrillo as we await pathology result    Bradycardia (1/14/2023) POA: HR better. TSH normal. Echo done 3/2021 showed a normal LV function. Seen by cardiology. No AV clemencia blocking agents. Monitor    Elevated BP without diagnosis of hypertension (1/14/2023) POA: partly driven by pain. Start Diovan. IV Hydralazine PRN. Monitor BPs    Hypercalcemia (1/11/2023) POA:due to malignancy given diffuse osseous involvement. He received Zometa. IV fluids. Monitor    Centrilobular emphysema POA: noted on CT scan. Nebs PRN    Hypokalemia (1/14/2023) - replete and follow K+    I personally reviewed chart, notes, data and current medications in the medical record. I have personally examined and treated the patient at bedside during this period. To assist coordination of care and communication with nursing and staff, this note may be preliminary early in the day, but finalized by end of the day.                  Care Plan discussed with: Patient, Care Manager, and Nursing Staff    Discussed:  Care Plan and D/C Planning    Prophylaxis:  H2B/PPI    Anticipated Disposition: HH PT, OT, RN           ___________________________________________________    Attending Physician:   Lior Walton MD

## 2023-01-16 LAB
ALBUMIN SERPL ELPH-MCNC: 2.6 G/DL (ref 2.9–4.4)
ALBUMIN SERPL-MCNC: 2.4 G/DL (ref 3.5–5)
ALBUMIN/GLOB SERPL: 0.6 (ref 1.1–2.2)
ALBUMIN/GLOB SERPL: 0.9 (ref 0.7–1.7)
ALP SERPL-CCNC: 125 U/L (ref 45–117)
ALPHA1 GLOB SERPL ELPH-MCNC: 0.4 G/DL (ref 0–0.4)
ALPHA2 GLOB SERPL ELPH-MCNC: 1 G/DL (ref 0.4–1)
ALT SERPL-CCNC: 50 U/L (ref 12–78)
ANION GAP SERPL CALC-SCNC: 8 MMOL/L (ref 5–15)
AST SERPL-CCNC: 63 U/L (ref 15–37)
B-GLOBULIN SERPL ELPH-MCNC: 0.9 G/DL (ref 0.7–1.3)
BILIRUB SERPL-MCNC: 0.6 MG/DL (ref 0.2–1)
BUN SERPL-MCNC: 14 MG/DL (ref 6–20)
BUN/CREAT SERPL: 18 (ref 12–20)
CALCIUM SERPL-MCNC: 7.9 MG/DL (ref 8.5–10.1)
CHLORIDE SERPL-SCNC: 110 MMOL/L (ref 97–108)
CO2 SERPL-SCNC: 23 MMOL/L (ref 21–32)
CREAT SERPL-MCNC: 0.78 MG/DL (ref 0.7–1.3)
GAMMA GLOB SERPL ELPH-MCNC: 0.7 G/DL (ref 0.4–1.8)
GLOBULIN SER CALC-MCNC: 3.7 G/DL (ref 2–4)
GLOBULIN SER-MCNC: 3.1 G/DL (ref 2.2–3.9)
GLUCOSE SERPL-MCNC: 112 MG/DL (ref 65–100)
IGA SERPL-MCNC: 198 MG/DL (ref 61–437)
IGG SERPL-MCNC: 646 MG/DL (ref 603–1613)
IGM SERPL-MCNC: 51 MG/DL (ref 15–143)
INTERPRETATION SERPL IEP-IMP: ABNORMAL
KAPPA LC FREE SER-MCNC: 26.9 MG/L (ref 3.3–19.4)
KAPPA LC FREE/LAMBDA FREE SER: 1.13 (ref 0.26–1.65)
LAMBDA LC FREE SERPL-MCNC: 23.9 MG/L (ref 5.7–26.3)
M PROTEIN SERPL ELPH-MCNC: ABNORMAL G/DL
MAGNESIUM SERPL-MCNC: 1.8 MG/DL (ref 1.6–2.4)
PHOSPHATE SERPL-MCNC: 1.9 MG/DL (ref 2.6–4.7)
POTASSIUM SERPL-SCNC: 3.7 MMOL/L (ref 3.5–5.1)
PROT SERPL-MCNC: 5.7 G/DL (ref 6–8.5)
PROT SERPL-MCNC: 6.1 G/DL (ref 6.4–8.2)
SODIUM SERPL-SCNC: 141 MMOL/L (ref 136–145)

## 2023-01-16 PROCEDURE — 83735 ASSAY OF MAGNESIUM: CPT

## 2023-01-16 PROCEDURE — 36415 COLL VENOUS BLD VENIPUNCTURE: CPT

## 2023-01-16 PROCEDURE — 74011250636 HC RX REV CODE- 250/636: Performed by: INTERNAL MEDICINE

## 2023-01-16 PROCEDURE — 74011250637 HC RX REV CODE- 250/637: Performed by: INTERNAL MEDICINE

## 2023-01-16 PROCEDURE — 65270000046 HC RM TELEMETRY

## 2023-01-16 PROCEDURE — 74011000250 HC RX REV CODE- 250: Performed by: INTERNAL MEDICINE

## 2023-01-16 PROCEDURE — 80053 COMPREHEN METABOLIC PANEL: CPT

## 2023-01-16 PROCEDURE — 84100 ASSAY OF PHOSPHORUS: CPT

## 2023-01-16 RX ADMIN — GABAPENTIN 200 MG: 100 CAPSULE ORAL at 09:14

## 2023-01-16 RX ADMIN — Medication 10 ML: at 13:49

## 2023-01-16 RX ADMIN — GABAPENTIN 200 MG: 100 CAPSULE ORAL at 15:52

## 2023-01-16 RX ADMIN — OXYCODONE HYDROCHLORIDE 10 MG: 5 TABLET ORAL at 05:25

## 2023-01-16 RX ADMIN — HYDRALAZINE HYDROCHLORIDE 20 MG: 20 INJECTION INTRAMUSCULAR; INTRAVENOUS at 21:03

## 2023-01-16 RX ADMIN — SENNOSIDES AND DOCUSATE SODIUM 1 TABLET: 50; 8.6 TABLET ORAL at 09:14

## 2023-01-16 RX ADMIN — HYDROMORPHONE HYDROCHLORIDE 0.5 MG: 1 INJECTION, SOLUTION INTRAMUSCULAR; INTRAVENOUS; SUBCUTANEOUS at 13:49

## 2023-01-16 RX ADMIN — Medication 10 ML: at 21:04

## 2023-01-16 RX ADMIN — ENOXAPARIN SODIUM 40 MG: 100 INJECTION SUBCUTANEOUS at 09:16

## 2023-01-16 RX ADMIN — Medication 10 ML: at 06:18

## 2023-01-16 RX ADMIN — OXYCODONE HYDROCHLORIDE 10 MG: 5 TABLET ORAL at 09:13

## 2023-01-16 RX ADMIN — SODIUM CHLORIDE, PRESERVATIVE FREE 10 ML: 5 INJECTION INTRAVENOUS at 15:52

## 2023-01-16 RX ADMIN — Medication 4.5 MG: at 21:03

## 2023-01-16 RX ADMIN — SODIUM CHLORIDE 50 ML/HR: 9 INJECTION, SOLUTION INTRAVENOUS at 05:25

## 2023-01-16 RX ADMIN — OXYCODONE HYDROCHLORIDE 10 MG: 5 TABLET ORAL at 15:52

## 2023-01-16 RX ADMIN — GABAPENTIN 200 MG: 100 CAPSULE ORAL at 21:03

## 2023-01-16 RX ADMIN — VALSARTAN 80 MG: 80 TABLET ORAL at 09:14

## 2023-01-16 RX ADMIN — PSYLLIUM HUSK 1 PACKET: 3.4 POWDER ORAL at 09:15

## 2023-01-16 RX ADMIN — FAMOTIDINE 20 MG: 20 TABLET, FILM COATED ORAL at 09:14

## 2023-01-16 NOTE — PROGRESS NOTES
Follow up Visit on 4 Post Surg. Reviewed chart prior to visit. Some Family was just leaving the room, they shared that Mr. Belkys Hodgson was taking a shower. Provided brief assurance of Prayers allowing him the privacy he needed. Contact Spiritual Care for any further referrals.   Ethan Woods., MS., [facility]  Page a  [phone]- Myesha Pillai ([phone])

## 2023-01-16 NOTE — PROGRESS NOTES
Problem: Falls - Risk of  Goal: *Absence of Falls  Description: Document Díaz Reason Fall Risk and appropriate interventions in the flowsheet.   Outcome: Progressing Towards Goal  Note: Fall Risk Interventions:                                Problem: Patient Education: Go to Patient Education Activity  Goal: Patient/Family Education  Outcome: Progressing Towards Goal     Problem: Pain  Goal: *Control of Pain  Outcome: Progressing Towards Goal     Problem: [address] (Adult/Pediatric)  Goal: *STG: Participates in treatment plan  Outcome: Progressing Towards Goal  Goal: *STG: Seeks staff when feelings of anxiety and fear arise  Outcome: Progressing Towards Goal  Goal: *STG: Attends activities and groups  Outcome: Progressing Towards Goal  Goal: *STG: Absence of lethality  Outcome: Progressing Towards Goal  Goal: *STG: Demonstrates ability to understand and use improved judgment in daily activities and relationships  Outcome: Progressing Towards Goal  Goal: *STG: Remains safe in hospital  Outcome: Progressing Towards Goal  Goal: *LTG: Obtains optimum level of functioning  Outcome: Progressing Towards Goal  Goal: *STG/LTG: Maintain structure for daily activities  Outcome: Progressing Towards Goal  Goal: *STG/LTG: Complies with medication therapy  Outcome: Progressing Towards Goal  Goal: Interventions  Outcome: Progressing Towards Goal

## 2023-01-16 NOTE — PROGRESS NOTES
1/16/2023 1:00 PM   Care Management Progress Note         ICD-10-CM ICD-9-CM     1. Altered mental status, unspecified altered mental status type  R41.82 780.97         2. CHAVA (acute kidney injury) ([facility] [address]  N17.9 584.9         3. Hypercalcemia  E83.52 275.42         4. Bony metastasis ([facility] [address] [C79.51 (ICD-10-CM)]  C79.51 198.5         5. Transient alteration of awareness [R40.4 (ICD-10-CM)]  R40.4 780.02         6. Acute pain of right shoulder [M25.511 (ICD-10-CM)]  M25.511 719.41               RUR:  9%  Risk Level: [x]Low []Moderate []High  Value-based purchasing: [] Yes [x] No  Bundle patient: [] Yes [x] No              Specify:      Transition of care plan:  Ongoing medical management, following for Oncology plan   Home with family at discharge  Pt needing home health PT/OT/RN, [facility] has accepted  Outpatient follow-up.   Pt's family to transport

## 2023-01-16 NOTE — PROGRESS NOTES
[facility]  [address] [facility], [address] [place] [address]  [phone]      Hospitalist  Progress Note      NAME:         Tyler Frias. :        1945  MRM:        [id number]    Date of service: 2023      Chief complaint: Generalized body aches    Interval HPI: Patient says he has generalized body aches, better controlled by current pain regimen. No new symptoms. No fever or chills. Afebrile. Objective:    Vital Signs:    Visit Vitals  BP (!) 148/67 (BP 1 Location: Left upper arm, BP Patient Position: At rest)   Pulse 70   Temp 97.6 °F (36.4 °C)   Resp 16   Ht 5' 7\" (1.702 m)   Wt 87.5 kg (192 lb 14.4 oz)   SpO2 93%   BMI 30.21 kg/m²        Intake/Output Summary (Last 24 hours) at 2023 0816  Last data filed at 2023 0618  Gross per 24 hour   Intake 625 ml   Output --   Net 625 ml          Physical Examination:    General:   Weak and ill looking, uncomfortable but not in distress   Eyes:   pink conjunctivae, PERRLA with no discharge. ENT:   no ottorrhea or rhinorrhea with dry mucous membranes  Pulm: decreased but clear breath sounds without crackles or wheezes  Card:  no JVD or murmurs, has regular and normal S1, S2 without thrills, bruits or peripheral edema  Abd:  Soft, non-tender, non-distended, normoactive bowel sounds with no palpable organomegaly  Musc:  No cyanosis, clubbing, atrophy or deformities. Generalized back discomfort  Skin:  No rashes, bruising or ulcers. Neuro: Awake and alert.  Generally a non focal exam. Follows commands appropriately  Psych:  Has a fair insight to his illness     Current Facility-Administered Medications   Medication Dose Route Frequency    valsartan (DIOVAN) tablet 80 mg  80 mg Oral DAILY    HYDROmorphone (DILAUDID) syringe 0.5 mg  0.5 mg IntraVENous Q6H PRN    senna-docusate (PERICOLACE) 8.6-50 mg per tablet 1 Tablet  1 Tablet Oral DAILY    oxyCODONE IR (ROXICODONE) tablet 7.5 mg  7.5 mg Oral Q4H PRN    oxyCODONE IR (ROXICODONE) tablet 10 mg  10 mg Oral Q4H PRN    gabapentin (NEURONTIN) capsule 200 mg  200 mg Oral TID    hydrALAZINE (APRESOLINE) 20 mg/mL injection 20 mg  20 mg IntraVENous Q4H PRN    0.9% sodium chloride infusion  50 mL/hr IntraVENous CONTINUOUS    melatonin tablet 4.5 mg  4.5 mg Oral QHS    sodium chloride (NS) flush 5-40 mL  5-40 mL IntraVENous Q8H    sodium chloride (NS) flush 5-40 mL  5-40 mL IntraVENous PRN    acetaminophen (TYLENOL) tablet 650 mg  650 mg Oral Q6H PRN    Or    acetaminophen (TYLENOL) suppository 650 mg  650 mg Rectal Q6H PRN    polyethylene glycol (MIRALAX) packet 17 g  17 g Oral DAILY PRN    ondansetron (ZOFRAN ODT) tablet 4 mg  4 mg Oral Q8H PRN    Or    ondansetron (ZOFRAN) injection 4 mg  4 mg IntraVENous Q6H PRN    enoxaparin (LOVENOX) injection 40 mg  40 mg SubCUTAneous DAILY    lidocaine 4 % patch 2 Patch  2 Patch TransDERmal Q24H    famotidine (PEPCID) tablet 20 mg  20 mg Oral BID    psyllium husk-aspartame (METAMUCIL FIBER) packet 1 Packet  1 Packet Oral BID        Laboratory data and review:    Recent Labs     01/15/23  0416 01/14/23  0345   WBC 9.7 9.3   HGB 12.1 12.8   HCT 37.1 40.8    157       Recent Labs     01/16/23  0147 01/15/23  0416 01/14/23  0345    142 144   K 3.7 3.4* 3.2*   * 111* 113*   CO2 23 23 22   * 106* 116*   BUN 14 21* 26*   CREA 0.78 0.87 0.88   CA 7.9* 8.2* 8.5   MG 1.8  --   --    PHOS 1.9*  --   --    ALB 2.4* 2.3* 2.5*   ALT 50 48 56       No components found for: [place]    Diagnostics: Imaging studies have been reviewed    Telemetry reviewed by me:   normal sinus rhythm    Assessment and Plan:    Bony metastasis ([facility] [address] (1/11/2023) POA: CT scan abdomen and pelvis done showed multifocal osseous metastatic disease in the thoracic spine, lumbar spine,  pelvis, left ribs, and right scapula.  Potential biopsy sites including anterior left iliac bone and superior right scapula. Multiple small hepatic lesions are suspicious for metastatic disease. MRi brain as noted below. Seen by Oncology and he is sp a CT guided right iliac lesion biopsy. Path is still pending. Oxycodone PRN. Gabapentin, Lidoderm patch. IV Dilaudid PRN. Ambulate as tolerated     Acute metabolic encephalopathy (7/84/3085) / hx Dementia POA: symptoms much better now and were likely triggered by electrolyte derangements. No focus of infection. TSH and vitamin B12 were normal. CT scan head neg and MRi brain neg for acute infarct. There was however a parasagittal right frontal enhancing intraosseous lesion with intracranial extension measuring up to 3.1 x 3.1 cm, suspicious for aggressive lesion such as metastatic or primary osseous lesion like multiple myeloma, or lymphoma. Oncology following. Monitor clinical progress    Acute pain of right shoulder (1/11/2023) POA: due to osseous metastatic disease and a small high-grade partial-thickness undersurface tear anterior supraspinatus as noted on the shoulder MRi. Ortho and palliative care following. Chares Huge control as we await pathology result    Bradycardia (1/14/2023) POA: HR better. TSH normal. Echo done 3/2021 showed a normal LV function. Seen by cardiology. No AV clemencia blocking agents. Monitor    Elevated BP without diagnosis of hypertension (1/14/2023) POA: partly driven by pain. Start Diovan. IV Hydralazine PRN. Monitor BPs    Hypercalcemia (1/11/2023) POA:due to malignancy given diffuse osseous involvement. He received Zometa. IV fluids. Monitor    Centrilobular emphysema POA: noted on CT scan. Nebs PRN    Hypokalemia (1/14/2023) - replete and follow K+    I personally reviewed chart, notes, data and current medications in the medical record. I have personally examined and treated the patient at bedside during this period.  To assist coordination of care and communication with nursing and staff, this note may be preliminary early in the day, but finalized by end of the day.                  Care Plan discussed with: Patient, Care Manager, and Nursing Staff    Discussed:  Care Plan and D/C Planning    Prophylaxis:  H2B/PPI    Anticipated Disposition:  PT, OT, RN           ___________________________________________________    Attending Physician:   Justin Melo MD

## 2023-01-16 NOTE — PROGRESS NOTES
Comprehensive Nutrition Assessment    Type and Reason for Visit: Initial, RD nutrition re-screen/LOS    Nutrition Recommendations/Plan:   Continue regular diet order. Provide Ensure High Protein TID (480 kcal, 57 g carbs, 48 g protein). Malnutrition Assessment:  Malnutrition Status:  Mild malnutrition (01/16/23 1111)    Context:  Acute illness     Findings of the 6 clinical characteristics of malnutrition:   Energy Intake:  75% or less of est energy req for 7 or more days (Reported lack of appetite x 3 weeks)  Weight Loss:  Unable to assess     Body Fat Loss:  No significant body fat loss,     Muscle Mass Loss:  No significant muscle mass loss,    Fluid Accumulation:  No significant fluid accumulation,     Strength:  Not performed     Nutrition Assessment:     Pt is a 76year old male admitted with Acute metabolic encephalopathy [W24.94]. He  has a past medical history of Diverticulitis, Gastrointestinal disorder, History of vascular access device (07/24/2017), and Spinal stenosis. Intern screen for LOS. Patient reported lack of appetite for the past 3 weeks 2/2 to injuring shoulder. Stated prior to the injury, appetite was fine. Per documentation, pt has been consuming 26-50% of most meals delivered. Family stated they were occasionally bringing in outside food. Patient reported UBW of 224# but stated he had dropped to around 202# (22# lost, 11%) in 3 weeks. Unsure of accuracy, lack of weight history in EMR since 2021. Voiced acceptance of ONS. NKFA. No difficulty chewing/swallowing. Denies any nausea, vomiting or diarrhea. Last BM was yesterday.      Wt Readings from Last 10 Encounters:   01/11/23 87.5 kg (192 lb 14.4 oz)   03/02/21 86.2 kg (190 lb)   02/15/21 86.2 kg (190 lb)   01/18/21 82.7 kg (182 lb 6.4 oz)   10/04/19 97.5 kg (215 lb)   08/05/17 87.1 kg (192 lb)   08/04/17 87.1 kg (192 lb 0.3 oz)   07/19/17 87.1 kg (192 lb)   07/18/17 87.1 kg (192 lb)   07/17/17 87.1 kg (192 lb)     Documented Meal intake:  Patient Vitals for the past 168 hrs:   % Diet Eaten   01/14/23 1815 26 - 50%   01/14/23 1259 51 - 75%   01/13/23 1256 26 - 50%   01/13/23 0826 51 - 75%       Nutrition Related Findings:      Wound Type: None  Last Bowel Movement Date: 01/15/23  Abdominal Assessment: Soft  Edema:No data recorded    Nutr. Labs:  Lab Results   Component Value Date/Time    GFR est AA >60 01/28/2021 06:16 PM    GFR est non-AA >60 01/28/2021 06:16 PM    Creatinine (POC) 0.9 02/23/2016 05:38 PM    Creatinine 0.78 01/16/2023 01:47 AM    BUN 14 01/16/2023 01:47 AM    BUN (POC) 21 (H) 02/23/2016 05:38 PM    Sodium (POC) 136 02/23/2016 05:38 PM    Sodium 141 01/16/2023 01:47 AM    Potassium 3.7 01/16/2023 01:47 AM    Potassium (POC) 4.0 02/23/2016 05:38 PM    Chloride (POC) 103 02/23/2016 05:38 PM    Chloride 110 (H) 01/16/2023 01:47 AM    CO2 23 01/16/2023 01:47 AM       Lab Results   Component Value Date/Time    Glucose 112 (H) 01/16/2023 01:47 AM    Glucose (POC) 116 (H) 01/22/2021 05:22 AM       No results found for: HBA1C, XHG1SGNQ, HLM6ZIXY, EHK7SEVE    Nutr. Meds:  0.9% NaCl @50, lovenox, pepcid, apresoline PRN, zofran PRN, miralax PRN, metamucil, pericolace, valsartan    Current Nutrition Intake & Therapies:  Average Meal Intake: 26-50%  Average Supplement Intake: None ordered  ADULT DIET Regular  ADULT ORAL NUTRITION SUPPLEMENT Dinner, Lunch, Breakfast; Low Calorie/High Protein    Anthropometric Measures:  Height: 5' 7.01\" (170.2 cm)  Ideal Body Weight (IBW): 148 lbs (67 kg)     Current Body Wt:  87.5 kg (192 lb 14.4 oz), 130.3 % IBW. Standing scale  Current BMI (kg/m2): 30.2        Weight Adjustment: No adjustment           BMI Category: Obese class 1 (BMI 30.0-34. 9)    Estimated Daily Nutrient Needs:  Energy Requirements Based On: Formula  Weight Used for Energy Requirements: Current  Energy (kcal/day): 1860 - 2015 (MSJ x 1.2-1.3)  Weight Used for Protein Requirements: Current  Protein (g/day):  (1-1.2g/kg)  Method Used for Fluid Requirements: 1 ml/kcal  Fluid (ml/day): 7317-3404 (1ml/kcal)    Nutrition Diagnosis:   Inadequate oral intake related to cognitive or neurological impairment, increased demand for energy/nutrients as evidenced by intake 26-50%    Nutrition Interventions:   Food and/or Nutrient Delivery: Continue current diet, Continue oral nutrition supplement  Nutrition Education/Counseling: Education not appropriate  Coordination of Nutrition Care: Continue to monitor while inpatient, Interdisciplinary rounds       Goals:     Goals: PO intake 50% or greater, by next RD assessment       Nutrition Monitoring and Evaluation:   Behavioral-Environmental Outcomes: None identified  Food/Nutrient Intake Outcomes: Food and nutrient intake, Supplement intake  Physical Signs/Symptoms Outcomes: Biochemical data, Weight    Discharge Planning:    Continue oral nutrition supplement, Continue current diet      Contact: Gael Jara RD Intern  RD office: [phone]

## 2023-01-17 PROCEDURE — 74011250637 HC RX REV CODE- 250/637: Performed by: INTERNAL MEDICINE

## 2023-01-17 PROCEDURE — 65270000046 HC RM TELEMETRY

## 2023-01-17 PROCEDURE — 74011250636 HC RX REV CODE- 250/636: Performed by: INTERNAL MEDICINE

## 2023-01-17 PROCEDURE — 74011250637 HC RX REV CODE- 250/637: Performed by: STUDENT IN AN ORGANIZED HEALTH CARE EDUCATION/TRAINING PROGRAM

## 2023-01-17 PROCEDURE — 97116 GAIT TRAINING THERAPY: CPT

## 2023-01-17 PROCEDURE — 99233 SBSQ HOSP IP/OBS HIGH 50: CPT | Performed by: STUDENT IN AN ORGANIZED HEALTH CARE EDUCATION/TRAINING PROGRAM

## 2023-01-17 PROCEDURE — 74011000250 HC RX REV CODE- 250: Performed by: INTERNAL MEDICINE

## 2023-01-17 PROCEDURE — 99232 SBSQ HOSP IP/OBS MODERATE 35: CPT | Performed by: INTERNAL MEDICINE

## 2023-01-17 PROCEDURE — 97530 THERAPEUTIC ACTIVITIES: CPT

## 2023-01-17 RX ORDER — ACETAMINOPHEN 500 MG
1000 TABLET ORAL EVERY 8 HOURS
Status: DISCONTINUED | OUTPATIENT
Start: 2023-01-17 | End: 2023-01-23 | Stop reason: HOSPADM

## 2023-01-17 RX ORDER — OXYCODONE HYDROCHLORIDE 5 MG/1
10 TABLET ORAL
Status: DISCONTINUED | OUTPATIENT
Start: 2023-01-17 | End: 2023-01-23 | Stop reason: HOSPADM

## 2023-01-17 RX ADMIN — FAMOTIDINE 20 MG: 20 TABLET, FILM COATED ORAL at 08:27

## 2023-01-17 RX ADMIN — PSYLLIUM HUSK 1 PACKET: 3.4 POWDER ORAL at 08:28

## 2023-01-17 RX ADMIN — Medication 10 ML: at 21:37

## 2023-01-17 RX ADMIN — Medication 10 ML: at 05:43

## 2023-01-17 RX ADMIN — GABAPENTIN 200 MG: 100 CAPSULE ORAL at 08:27

## 2023-01-17 RX ADMIN — GABAPENTIN 200 MG: 100 CAPSULE ORAL at 21:37

## 2023-01-17 RX ADMIN — GABAPENTIN 200 MG: 100 CAPSULE ORAL at 17:00

## 2023-01-17 RX ADMIN — OXYCODONE HYDROCHLORIDE 10 MG: 5 TABLET ORAL at 15:09

## 2023-01-17 RX ADMIN — Medication 4.5 MG: at 21:37

## 2023-01-17 RX ADMIN — OXYCODONE HYDROCHLORIDE 10 MG: 5 TABLET ORAL at 11:19

## 2023-01-17 RX ADMIN — Medication 10 ML: at 13:13

## 2023-01-17 RX ADMIN — PSYLLIUM HUSK 1 PACKET: 3.4 POWDER ORAL at 17:00

## 2023-01-17 RX ADMIN — VALSARTAN 80 MG: 80 TABLET ORAL at 08:27

## 2023-01-17 RX ADMIN — OXYCODONE HYDROCHLORIDE 10 MG: 5 TABLET ORAL at 00:07

## 2023-01-17 RX ADMIN — SODIUM CHLORIDE 50 ML/HR: 9 INJECTION, SOLUTION INTRAVENOUS at 06:47

## 2023-01-17 RX ADMIN — ACETAMINOPHEN 1000 MG: 500 TABLET ORAL at 21:37

## 2023-01-17 RX ADMIN — ENOXAPARIN SODIUM 40 MG: 100 INJECTION SUBCUTANEOUS at 08:27

## 2023-01-17 RX ADMIN — ACETAMINOPHEN 1000 MG: 500 TABLET ORAL at 17:00

## 2023-01-17 RX ADMIN — FAMOTIDINE 20 MG: 20 TABLET, FILM COATED ORAL at 17:00

## 2023-01-17 RX ADMIN — SENNOSIDES AND DOCUSATE SODIUM 1 TABLET: 50; 8.6 TABLET ORAL at 08:27

## 2023-01-17 RX ADMIN — OXYCODONE HYDROCHLORIDE 10 MG: 5 TABLET ORAL at 06:47

## 2023-01-17 NOTE — PROGRESS NOTES
Problem: Falls - Risk of  Goal: *Absence of Falls  Description: Document Elizabeth Klein Fall Risk and appropriate interventions in the flowsheet.   Outcome: Progressing Towards Goal  Note: Fall Risk Interventions:                                Problem: Patient Education: Go to Patient Education Activity  Goal: Patient/Family Education  Outcome: Progressing Towards Goal     Problem: Patient Education: Go to Patient Education Activity  Goal: Patient/Family Education  Outcome: Progressing Towards Goal     Problem: Pain  Goal: *Control of Pain  Outcome: Progressing Towards Goal     Problem: [address] (Adult/Pediatric)  Goal: *STG: Participates in treatment plan  Outcome: Progressing Towards Goal  Goal: *STG: Seeks staff when feelings of anxiety and fear arise  Outcome: Progressing Towards Goal  Goal: *STG: Attends activities and groups  Outcome: Progressing Towards Goal  Goal: *STG: Absence of lethality  Outcome: Progressing Towards Goal  Goal: *STG: Demonstrates ability to understand and use improved judgment in daily activities and relationships  Outcome: Progressing Towards Goal  Goal: *STG: Remains safe in hospital  Outcome: Progressing Towards Goal  Goal: *LTG: Obtains optimum level of functioning  Outcome: Progressing Towards Goal  Goal: *STG/LTG: Maintain structure for daily activities  Outcome: Progressing Towards Goal  Goal: *STG/LTG: Complies with medication therapy  Outcome: Progressing Towards Goal  Goal: Interventions  Outcome: Progressing Towards Goal

## 2023-01-17 NOTE — CONSULTS
RADIATION ONCOLOGY INPATIENT CONSULTATION NOTE    Encounter Date: 1/17/23  Patient Name: Leodan Davison. Medical Record Number: [id number]    Primary Care Provider: Ashley Choi MD    ASSESSMENT:  77 yo M with metastatic adenocarcinoma of unknown primary    METASTATIC ADENOCARCINOMA:  Patient with biopsy proven adenocarcinoma of unknown origin with biopsy of right iliac lesion. Imaging shows multifocal osseous disease throughout his spine, multiple ribs, right glenoid and superior scapula, bilateral pelvis, and possibly right distal femur. His femur pain is clinical as imaging did not scan down to cover that area. He also has multiple hepatic lesions and mediastinal adenopathy. Currently has significant pain in the right shoulder and right knee areas and is on narcotic pain medications which control the pain to some degree. Is planned for discharge to a SNF for additional rehab. He has seen medical oncology and will likely be treated with systemic therapy in the near future. But given his significant pain with associated osseous lesion in the right shoulder and significant pain in the right distal femur, I recommend palliative radiation to these sites to provide pain relief. The goal will be to treat him before he is discharged to the SNF. I discussed the logistics, risks, benefits, and side effects of treatment including a single treatment to each site with possible side effects being pain flair, bone fracture, increased fibrosis, and dermatitis. After a discussion, patient and his family were in agreement. Will plan to arrange transport to bring him to the [address] [place] [address] in the next few days for a radiation planning session followed by treatment (same day). My contact information was provided and I encouraged him to call with any questions. Thank you for allowing me to participate in Mr. Louise gabriel.       RECOMMENDATIONS:  Metastatic adenocarcinoma to bone:  will treat RIGHT shoulder with single 8Gy x 1 fraction. Also will likely treat RIGHT distal femur as patient complaining of significant pain in the area. Final decision after visualizing radiation planning scan to assess for disease in the area. REASON FOR CONSULTATION:  75 yo M seen today as an inpatient consultation for evaluation of his metastatic adenocarcinoma causing pain. DIAGNOSIS:  Adenocarcinoma of unknown primary  Secondary malignant neoplasm of bone      HISTORY OF PRESENT ILLNESS:  Information pertinent to today's evaluation are the followin yo M presents today as an inpatient consult for his metastatic adenocarcinoma to bone. 23 - patient admitted for AMS and falls at home. Family had noticed increased confusion for several days prior to admission. He also had been having right shoulder pain for the last month. 23 - ED labs WBC 12.5  Creat 1.3 Ca 12.5  23 - CT head negative  1/10/23 - xray right shoulder shows lucencies and cortical disruption at the inferior glenoid and posterior acromion  23 - MRI right shoulder shows metastatic disease to glenoid and likely proximal humerus  23 - CT c/a/p shows multifocal osseous metastatic disease to spine, pelvis, scapula, and ribs. Also multiple hepatic lesions and mediastinal adenopathy concerning for metastatic disease. 23 - brain MRI finds an osseous lesion in the right parasagittal region  23 - CT guided biopsy of right iliac osseous lesion shows adenocarcinoma    Patient is on dilaudid 0.5mg IV q6 prn and oxycodone 5-10mg q4-6 hrs PO  Currently he denies dyspnea, cough, dysphagia/odynophagia, N/V. Neurologically intact. Most pain in right shoulder and right knee region. Family at bedside. Denies personal hx of cancer. Former smoker (50 pack yrs)  Last colonoscopy: 2017 s/p sigmoid colon resection/diverticulosis      I have reviewed old/outside medical records (please see above for summary).       REVIEW OF SYSTEMS:    12 point review of systems is negative other than what is stated above in HPI    Past Medical History:   Diagnosis Date    Diverticulitis     Gastrointestinal disorder     History of vascular access device 07/24/2017    [facility] VAT -  5FR triple Lumen Power PICC R Basilic  39 cm    Spinal stenosis       Prior Radiation Therapy: No  Pacemaker: No  Collagen Vascular Disease: No    Past Surgical History:   Procedure Laterality Date    FLEXIBLE SIGMOIDOSCOPY N/A 7/27/2017    SIGMOIDOSCOPY FLEXIBLE performed by Maura Hathaway MD at [facility] ENDOSCOPY    IR INSERT NON TUNL CVC OVER 5 YRS  1/13/2021        No family history on file. Social History     Occupational History    Not on file   Tobacco Use    Smoking status: Former     Packs/day: 1.00     Years: 50.00     Pack years: 50.00     Types: Cigarettes    Smokeless tobacco: Current   Substance and Sexual Activity    Alcohol use: Yes     Alcohol/week: 0.8 standard drinks     Types: 1 Cans of beer per week    Drug use: No    Sexual activity: Not on file       ALLERGIES/MEDICATIONS:  Reviewed in EPIC    PHYSICAL EXAM:     Vital Signs for this encounter:  BSA: 2.03 meters squared  Visit Vitals  /76 (BP 1 Location: Left upper arm, BP Patient Position: At rest)   Pulse 85   Temp 98.2 °F (36.8 °C)   Resp 18   Ht 5' 7.01\" (1.702 m)   Wt 87.5 kg (192 lb 14.4 oz)   SpO2 100%   BMI 30.21 kg/m²     Karnofsky/Lansky Performance Status:  ECOG 2  General:   lying in bed in no acute distress, alert and oriented X 3  Lungs: CTAB, no audible wheezing  Lymphatics:  No palpable supraclavicular or cervical adenopathy bilaterally  Neck: no adenopathy in bilateral levels 1-6  Heart: tachycardic, mild peripheral edema  Abdomen: soft, non-tender. Bowel sounds normal. No masses,  no organomegaly. Musculoskeletal:  pain with palpation over right shoulder, especially posterior aspect. Weakness secondary to pain. Pain with palpation over right distal femur, along lateral aspect. Right LE somewhat weaker than Left secondary to pain. Neurologic: CN 2-12 grossly intact. Strength 5/5 globally, sensation normal globally, normal finger to nose      RADIOLOGY:   1/9/23 - CT head w contrast  IMPRESSION  No acute intracranial process. Imaging findings consistent with minimal chronic microvascular ischemic change. There is a mild degree of cerebral atrophy. 1/10/23 - Xray right shoulder  IMPRESSION  1. Mottled lucencies and apparent cortical disruption at the inferior aspect of  the glenoid and posterior aspect of the acromion, not definitely seen on  1/13/2021 chest radiograph. Aggressive lytic osseous lesions +/- pathologic  fractures may have this appearance. A nonemergent CT versus contrast-enhanced  MRI of the shoulder is recommended for better characterization. 2.  Mild to moderate osteoarthritis. 1/10/23 - MRI right shoulder wo contrast  IMPRESSION  1. Extensive bony metastatic disease to the glenoid with additional areas of  marrow replacement in the proximal humeral neck. This is compatible with  metastatic disease  2. Small high-grade partial-thickness undersurface tear anterior supraspinatus    1/11/23 - CT chest, abdomen, pelvis w/contrast  IMPRESSION  1. Multifocal osseous metastatic disease in the thoracic spine, lumbar spine,  pelvis, left ribs, and right scapula. Potential biopsy sites including anterior  left iliac bone and superior right scapula. 2. Multiple small hepatic lesions are suspicious for metastatic disease in this  clinical scenario. 3. Moderate centrilobular emphysema. No pulmonary nodule. 4. Mediastinal AP window lymphadenopathy.   5. All findings suspicious for malignancy are new since 2021.    1/11/23 - brain MRI w/contrast  IMPRESSION  Parasagittal right frontal enhancing intraosseous lesion with intracranial  extension measuring up to 3.1 x 3.1 cm, suspicious for aggressive lesion such as  metastatic or primary osseous lesion like multiple myeloma, or lymphoma. Clinical correlation is advised. This may be an amenable to percutaneous biopsy  for tissue diagnosis. Underlying associated diffuse bilateral enhancing dural thickening which may  suggest meningitis versus carcinomatosis. Intracranial hypertension is in the  differential diagnostic considerations. No acute infarct, intracranial hemorrhage or brain mass lesion. PATHOLOGY:     23 - CT guided biopsy right iliac lesion  CYTOLOGIC INTERPRETATION:   Bone, right iliac, Core biopsy with touch interpretation:   Metastatic adenocarcinoma with oncocytic features     LABS:  Personally reviewed      TIME and COUNSELIN minutes were spent with the patient,  acquiring the vital information vital to the case. The patient was examined to verify findings as related in the HPI, as well as other areas as needed. All imaging was personally reviewed and I agree with the radiology impressions. Time was allowed for all questions about the proposed course of care to be answered. Greater than 50% time was spent face to face with the patient in counseling and coordination of care.          Jannette Ellison MD  [facility]  Radiation Oncology Department  [address] [place] [address] [facility]  [phone]

## 2023-01-17 NOTE — PROGRESS NOTES
[facility]  [address] [facility], [id number] [place] [address]  [phone]      Hospitalist  Progress Note      NAME:         Gustavo Hylton. :        1945  MRM:        [id number]    Date of service: 2023      Chief complaint: Generalized body aches    Interval HPI: Patient says he has generalized body aches, better controlled by current pain regimen. No new symptoms. Afebrile. Objective:    Vital Signs:    Visit Vitals  /76 (BP 1 Location: Right upper arm, BP Patient Position: Lying; At rest)   Pulse 91   Temp 98.2 °F (36.8 °C)   Resp 16   Ht 5' 7.01\" (1.702 m)   Wt 87.5 kg (192 lb 14.4 oz)   SpO2 98%   BMI 30.21 kg/m²        Intake/Output Summary (Last 24 hours) at 2023 1206  Last data filed at 2023 8025  Gross per 24 hour   Intake 1370 ml   Output --   Net 1370 ml        Physical Examination:    General:   Weak and ill looking, uncomfortable but not in distress   Eyes:   pink conjunctivae, PERRLA with no discharge  Pulm: decreased but clear breath sounds without crackles or wheezes  Card:  has regular and normal S1, S2 without thrills, bruits or peripheral edema  Abd:  Soft, non-tender, non-distended, normoactive bowel sounds   Musc:  No cyanosis, clubbing, atrophy or deformities. Generalized back discomfort  Skin:  No rashes, bruising or ulcers. Neuro: Awake and alert.  Generally a non focal exam.   Psych:  Has a fair insight to his illness     Current Facility-Administered Medications   Medication Dose Route Frequency    valsartan (DIOVAN) tablet 80 mg  80 mg Oral DAILY    HYDROmorphone (DILAUDID) syringe 0.5 mg  0.5 mg IntraVENous Q6H PRN    senna-docusate (PERICOLACE) 8.6-50 mg per tablet 1 Tablet  1 Tablet Oral DAILY    oxyCODONE IR (ROXICODONE) tablet 7.5 mg  7.5 mg Oral Q4H PRN    oxyCODONE IR (ROXICODONE) tablet 10 mg  10 mg Oral Q4H PRN    gabapentin (NEURONTIN) capsule 200 mg 200 mg Oral TID    hydrALAZINE (APRESOLINE) 20 mg/mL injection 20 mg  20 mg IntraVENous Q4H PRN    0.9% sodium chloride infusion  50 mL/hr IntraVENous CONTINUOUS    melatonin tablet 4.5 mg  4.5 mg Oral QHS    sodium chloride (NS) flush 5-40 mL  5-40 mL IntraVENous Q8H    sodium chloride (NS) flush 5-40 mL  5-40 mL IntraVENous PRN    acetaminophen (TYLENOL) tablet 650 mg  650 mg Oral Q6H PRN    Or    acetaminophen (TYLENOL) suppository 650 mg  650 mg Rectal Q6H PRN    polyethylene glycol (MIRALAX) packet 17 g  17 g Oral DAILY PRN    ondansetron (ZOFRAN ODT) tablet 4 mg  4 mg Oral Q8H PRN    Or    ondansetron (ZOFRAN) injection 4 mg  4 mg IntraVENous Q6H PRN    enoxaparin (LOVENOX) injection 40 mg  40 mg SubCUTAneous DAILY    lidocaine 4 % patch 2 Patch  2 Patch TransDERmal Q24H    famotidine (PEPCID) tablet 20 mg  20 mg Oral BID    psyllium husk-aspartame (METAMUCIL FIBER) packet 1 Packet  1 Packet Oral BID        Laboratory data and review:    Recent Labs     01/15/23  0416   WBC 9.7   HGB 12.1   HCT 37.1        Recent Labs     01/16/23  0147 01/15/23  0416    142   K 3.7 3.4*   * 111*   CO2 23 23   * 106*   BUN 14 21*   CREA 0.78 0.87   CA 7.9* 8.2*   MG 1.8  --    PHOS 1.9*  --    ALB 2.4* 2.3*   ALT 50 48     No components found for: [place]    Diagnostics: Imaging studies have been reviewed    Telemetry reviewed by me:   normal sinus rhythm    Assessment and Plan:    Bony metastasis ([facility] [address] (1/11/2023) POA: CT scan abdomen and pelvis done showed multifocal osseous metastatic disease in the thoracic spine, lumbar spine,  pelvis, left ribs, and right scapula. Potential biopsy sites including anterior left iliac bone and superior right scapula. Multiple small hepatic lesions are suspicious for metastatic disease. MRi brain as noted below. Seen by Oncology and he is sp a CT guided right iliac lesion biopsy. Path is still pending. Continue Oxycodone PRN. Gabapentin, Lidoderm patch.  Wean IV Dilaudid PRN. PT, OT and CM for discharge once path is available. Acute metabolic encephalopathy (2/58/1104) / hx Dementia POA: symptoms much better now and were likely triggered by electrolyte derangements. No focus of infection. TSH and vitamin B12 were normal. CT scan head neg and MRi brain neg for acute infarct. There was however a parasagittal right frontal enhancing intraosseous lesion with intracranial extension measuring up to 3.1 x 3.1 cm, suspicious for aggressive lesion such as metastatic or primary osseous lesion like multiple myeloma, or lymphoma. Oncology following. Stable. Acute pain of right shoulder (1/11/2023) POA: due to osseous metastatic disease and a small high-grade partial-thickness undersurface tear anterior supraspinatus as noted on the shoulder MRi. Ortho and palliative care following. Pain control as we await pathology result    Bradycardia (1/14/2023) POA: HR better. TSH normal. Echo done 3/2021 showed a normal LV function. Seen by cardiology. No AV clemencia blocking agents. Monitor    Elevated BP without diagnosis of hypertension (1/14/2023) POA: partly driven by pain. Continue Diovan. IV Hydralazine PRN. Hypercalcemia (1/11/2023) POA:due to malignancy given diffuse osseous involvement. He received Zometa. Resolved     Centrilobular emphysema POA: noted on CT scan. Nebs PRN    Hypokalemia (1/14/2023) - repleted and now normal.     I personally reviewed chart, notes, data and current medications in the medical record. I have personally examined and treated the patient at bedside during this period. To assist coordination of care and communication with nursing and staff, this note may be preliminary early in the day, but finalized by end of the day.                  Care Plan discussed with: Patient, Care Manager, and Nursing Staff    Discussed:  Care Plan and D/C Planning    Prophylaxis:  H2B/PPI    Anticipated Disposition:  PT, OT, RN           ___________________________________________________    Attending Physician:   Corine Paul MD

## 2023-01-17 NOTE — PROGRESS NOTES
[facility] [place] [facility] [place] [address] [facility]  Altagracia Joe: [phone]  F: [phone]      Reason for Visit:   Arya Ledesma is a 76 y.o. male who is seen for evaluation of concern for metastatic disease. Hematology/Oncology Treatment History:     Oncology History:   Please review original records for clinical decision making. This summary highlights focused aspects of patient's ongoing care and may have a recurring section in notes with either updates or remain unchanged as a longitudinal care summary. _______________________________________________________________  <>PATHOLOGY<>Specimen #:[id number]  1/12/2023  Bone, right iliac, Core biopsy with touch interpretation: Metastatic adenocarcinoma with oncocytic features (see Comment). CANCER OF UNKNOWN PRIMARY  <>STAGING<>Cancer Staging  Bony metastasis ([address]  Staging form: Bone - Appendicular Skeleton, Trunk, Skull, and Facial Bones, AJCC 8th Edition  - Clinical: Stage IVB (cT0, cNX, pM1b) - Signed by Darrel Fernando MD on 1/17/2023  MRI SHOULDER RT WO CONT  1/10/2023 1. Extensive bony metastatic disease to the glenoid with additional areas of marrow replacement in the proximal humeral neck. This is compatible with metastatic disease 2. Small high-grade partial-thickness undersurface tear anterior supraspinatus   CT CHEST ABD PELV W CONT 1/11/2023 1. Multifocal osseous metastatic disease in the thoracic spine, lumbar spine, pelvis, left ribs, and right scapula. Potential biopsy sites including anterior left iliac bone and superior right scapula. 2. Multiple small hepatic lesions are suspicious for metastatic disease in this clinical scenario. 3. Moderate centrilobular emphysema. No pulmonary nodule. 4. Mediastinal AP window lymphadenopathy. 5. All findings suspicious for malignancy are new since 2021. Recommendation: Check PSA. Review result of any recent colonoscopy.  Consider bone scan to establish baseline. MRI BRAIN W WO CONT 1/11/2023 Parasagittal right frontal enhancing intraosseous lesion with intracranial extension measuring up to 3.1 x 3.1 cm, suspicious for aggressive lesion such as metastatic or primary osseous lesion like multiple myeloma, or lymphoma. Clinical correlation is advised. This may be an amenable to percutaneous biopsy for tissue diagnosis. Underlying associated diffuse bilateral enhancing dural thickening which may suggest meningitis versus carcinomatosis. Intracranial hypertension is in the differential diagnostic considerations. No acute infarct, intracranial hemorrhage or brain mass lesion. <>GOALS OF CARE<>PALLIATIVE INTENT    History of Present Illness:   Lluvia Limon is a pleasant 76 y.o. male who was admitted on 1/9;23 for AMS and falls at home. Family noticed confusion at home x 2 days. Has had rt shoulder pain/injury x 1 month  and been following with  outpatient provider. ED labs WBC 12.5  Creat 1.3 Ca 12.5 ED imaging CT head no acute intracranial process; minimal chronic microvascular ischemic change ; XR rt shoulder mottled lucencies  of glenoid and posterior acromion; aggressive lytic osseous lesions  MRI reveals extensive bony metastatic disease to glenoid and marrow replacement in the proximal humeral neck compatible with metastatic disease. Small tear anterior supraspinatus. Pt unable to state why he was brought to the hospital. States having pain to rt shoulder; unsure about any confusion at home. Correctly states name, yr , location and president. Denies SOB, pain other than rt shoulder, changes in bowels, N/V. Denies personal hx of cancer. Family Hx of cancer: Mother; ? lung  : blended family; 2 children ( Annika/Trip) and step child Aniya s) ; retired    Smoking hx: denies   ETOH: social drinker  EGD/Colonoscopy: 2017 s/p sigmoid colon resection/diverticulosis     No family at bedside.      Addendum: wife arrived; updated on plan and corrected hx above. States pt has not been eating or drinking for the last 2 weeks. Has had decreased activity for several weeks. Noted decrease in mental changes since Nov.     Interval History:   01/17/23 patient without any specific complaints. Wife and family at the bedside. They deny any history of salivary gland surgery. Past Medical History:   Diagnosis Date    Diverticulitis     Gastrointestinal disorder     History of vascular access device 07/24/2017    [facility] VAT -  5FR triple Lumen Power PICC R Basilic  39 cm    Spinal stenosis        Past Surgical History:   Procedure Laterality Date    FLEXIBLE SIGMOIDOSCOPY N/A 7/27/2017    SIGMOIDOSCOPY FLEXIBLE performed by Ernesto Arredondo MD at [facility]    IR INSERT NON TUNL CVC OVER 5 YRS  1/13/2021       Social History     Socioeconomic History    Marital status:      Spouse name: Not on file    Number of children: Not on file    Years of education: Not on file    Highest education level: Not on file   Occupational History    Not on file   Tobacco Use    Smoking status: Former     Packs/day: 1.00     Years: 50.00     Pack years: 50.00     Types: Cigarettes    Smokeless tobacco: Current   Substance and Sexual Activity    Alcohol use: Yes     Alcohol/week: 0.8 standard drinks     Types: 1 Cans of beer per week    Drug use: No    Sexual activity: Not on file   Other Topics Concern    Not on file   Social History Narrative    Not on file     Social Determinants of Health     Financial Resource Strain: Not on file   Food Insecurity: Not on file   Transportation Needs: Not on file   Physical Activity: Not on file   Stress: Not on file   Social Connections: Not on file   Intimate Partner Violence: Not on file   Housing Stability: Not on file       No family history on file.     Current Facility-Administered Medications   Medication Dose Route Frequency    acetaminophen (TYLENOL) tablet 1,000 mg  1,000 mg Oral Q8H oxyCODONE IR (ROXICODONE) tablet 10 mg  10 mg Oral Q4H PRN    valsartan (DIOVAN) tablet 80 mg  80 mg Oral DAILY    HYDROmorphone (DILAUDID) syringe 0.5 mg  0.5 mg IntraVENous Q6H PRN    senna-docusate (PERICOLACE) 8.6-50 mg per tablet 1 Tablet  1 Tablet Oral DAILY    gabapentin (NEURONTIN) capsule 200 mg  200 mg Oral TID    hydrALAZINE (APRESOLINE) 20 mg/mL injection 20 mg  20 mg IntraVENous Q4H PRN    0.9% sodium chloride infusion  50 mL/hr IntraVENous CONTINUOUS    melatonin tablet 4.5 mg  4.5 mg Oral QHS    sodium chloride (NS) flush 5-40 mL  5-40 mL IntraVENous Q8H    sodium chloride (NS) flush 5-40 mL  5-40 mL IntraVENous PRN    acetaminophen (TYLENOL) tablet 650 mg  650 mg Oral Q6H PRN    Or    acetaminophen (TYLENOL) suppository 650 mg  650 mg Rectal Q6H PRN    polyethylene glycol (MIRALAX) packet 17 g  17 g Oral DAILY PRN    ondansetron (ZOFRAN ODT) tablet 4 mg  4 mg Oral Q8H PRN    Or    ondansetron (ZOFRAN) injection 4 mg  4 mg IntraVENous Q6H PRN    enoxaparin (LOVENOX) injection 40 mg  40 mg SubCUTAneous DAILY    lidocaine 4 % patch 2 Patch  2 Patch TransDERmal Q24H    famotidine (PEPCID) tablet 20 mg  20 mg Oral BID    psyllium husk-aspartame (METAMUCIL FIBER) packet 1 Packet  1 Packet Oral BID       No Known Allergies    Review of Systems Provided by:  Patient  Review of Systems: A complete review of systems was obtained, reviewed. Pertinent findings reviewed above. Physical Exam:   Visit Vitals  /72 (BP 1 Location: Right upper arm, BP Patient Position: Lying; At rest)   Pulse 89   Temp 98.4 °F (36.9 °C)   Resp 17   Ht 5' 7.01\" (1.702 m)   Wt 192 lb 14.4 oz (87.5 kg)   SpO2 99%   BMI 30.21 kg/m²     ECO- Completely disabled; cannot carry on any selfcare; totally confined to bed or chair. Physical Exam  Constitutional: No acute distress. , Non-toxic appearance. , and Chronic ill appearance. HENT: Normocephalic and atraumatic head. No overt pain on palpation of right frontal scalp area. No overt palpable thyroid masses or overt palpable neck masses. Eyes: Normal Conjunctivae. Anicteric sclerae. Cardiovascular: S1,S2 auscultated. No pitting edema. Pulmonary: Normal Respiratory Effort. No wheezing. No rhonchi. No rales. Abdominal: Normal bowel sounds. Soft Abdomen to palpation. No abdominal tenderness. Skin: No jaundice. No rash. Musculoskeletal: No muscle pain on palpation. No temporal muscle wasting on inspection. Neurological: Alert and oriented. No tremor on inspection. Psychiatric: mood normal. normal speech rate. normal affect. Results:     Lab Results   Component Value Date/Time    WBC 9.7 01/15/2023 04:16 AM    HGB 12.1 01/15/2023 04:16 AM    HCT 37.1 01/15/2023 04:16 AM    PLATELET 929 58/30/3194 04:16 AM    MCV 87.9 01/15/2023 04:16 AM    ABS. NEUTROPHILS 7.2 01/15/2023 04:16 AM    Hemoglobin (POC) 15.6 02/23/2016 05:38 PM    Hematocrit (POC) 46 02/23/2016 05:38 PM     Lab Results   Component Value Date/Time    Sodium 141 01/16/2023 01:47 AM    Potassium 3.7 01/16/2023 01:47 AM    Chloride 110 (H) 01/16/2023 01:47 AM    CO2 23 01/16/2023 01:47 AM    Glucose 112 (H) 01/16/2023 01:47 AM    BUN 14 01/16/2023 01:47 AM    Creatinine 0.78 01/16/2023 01:47 AM    GFR est AA >60 01/28/2021 06:16 PM    GFR est non-AA >60 01/28/2021 06:16 PM    Calcium 7.9 (L) 01/16/2023 01:47 AM    Sodium (POC) 136 02/23/2016 05:38 PM    Potassium (POC) 4.0 02/23/2016 05:38 PM    Chloride (POC) 103 02/23/2016 05:38 PM    Glucose (POC) 116 (H) 01/22/2021 05:22 AM    BUN (POC) 21 (H) 02/23/2016 05:38 PM    Creatinine (POC) 0.9 02/23/2016 05:38 PM    Calcium, ionized (POC) 1.09 (L) 02/23/2016 05:38 PM     Lab Results   Component Value Date/Time    Bilirubin, total 0.6 01/16/2023 01:47 AM    ALT (SGPT) 50 01/16/2023 01:47 AM    Alk.  phosphatase 125 (H) 01/16/2023 01:47 AM    Protein, total 6.1 (L) 01/16/2023 01:47 AM    Albumin 2.4 (L) 01/16/2023 01:47 AM    Globulin 3.7 01/16/2023 01:47 AM     Lab Results   Component Value Date/Time    Ferritin 1,418 (H) 01/22/2021 03:02 AM    Vitamin B12 1,602 (H) 01/10/2023 07:01 PM     (H) 01/10/2021 11:18 PM    C-Reactive protein 1.18 (H) 01/22/2021 03:02 AM    TSH 1.35 01/10/2023 07:01 PM    M-Suleiman Not Observed 01/11/2023 05:33 PM    Lipase 80 10/04/2019 02:08 PM       Lab Results   Component Value Date/Time    INR 1.4 (H) 01/23/2021 04:49 AM    aPTT 29.2 01/23/2021 04:49 AM    D-dimer 0.59 01/23/2021 04:49 AM    Fibrinogen 456 01/23/2021 04:49 AM      Lab Results   Component Value Date/Time    Prostate Specific Ag 5.2 (H) 01/11/2023 01:17 PM     (H) 01/10/2021 11:18 PM      1/9/23 CT head wo cont  IMPRESSION  No acute intracranial process. Imaging findings consistent with minimal chronic microvascular ischemic change. There is a mild degree of cerebral atrophy. 1/10/23 XR should   IMPRESSION  1. Mottled lucencies and apparent cortical disruption at the inferior aspect of  the glenoid and posterior aspect of the acromion, not definitely seen on  1/13/2021 chest radiograph. Aggressive lytic osseous lesions +/- pathologic  fractures may have this appearance. A nonemergent CT versus contrast-enhanced  MRI of the shoulder is recommended for better characterization. 2.  Mild to moderate osteoarthritis. 1/10/23 MRI Rt Shoulder  IMPRESSION  1. Extensive bony metastatic disease to the glenoid with additional areas of  marrow replacement in the proximal humeral neck. This is compatible with  metastatic disease  2. Small high-grade partial-thickness undersurface tear anterior supraspinatus    Test results above have been reviewed. Assessment/Recommendations:     Cancer of Unknown Primary  -awaiting further pathology stains. Consistent with adenocarcinoma of unknown primary. Will want to request biomarker testing to see if immunotherapy is an option or if there is any druggable  mutation.    -currently patient is with poor performance status and poor appetite. Continue to monitor. Agree with palliative consultation.  -discussed with patient and family preliminary pathology diagnosis. Hypercalcemia  -improved s/p hydration and Zometa (1/11/23)  Lab Results   Component Value Date/Time    Calcium 7.9 (L) 01/16/2023 01:47 AM    Phosphorus 1.9 (L) 01/16/2023 01:47 AM     Lab Results   Component Value Date/Time    Protein, total 6.1 (L) 01/16/2023 01:47 AM    Albumin 2.4 (L) 01/16/2023 01:47 AM     Cancer Related Pain  -Discussed case with Dr. Noble Mendoza; he was kind enough to see patient today to plan palliative radiation therapy to help accelerate recovery/rehabilitation.  -Most of his pain complaints is at his right shoulder. Bony Metastasis  -Patient with negative myeloma work-up; s/p zometa.  -has a skull lesion; radiation oncology will consider potential palliative radiation to this area. Follow-Up:  Will follow along; please contact if any specific concerns. Discussed case with Kimani Birmignham in pathology, Dr. Debra Yo with Hospitalist service, Dr. Noble Mendoza with Radiation Oncology    Nelia Romo MD  Hematology/Medical Oncology Provider  [facility]bign[facility] Lyman  P: [phone]

## 2023-01-17 NOTE — PROGRESS NOTES
Problem: Mobility Impaired (Adult and Pediatric)  Goal: *Acute Goals and Plan of Care (Insert Text)  Description: FUNCTIONAL STATUS PRIOR TO ADMISSION: Patient was independent and active without use of DME prior to R shoulder injury in November 2022. He has experienced gradual decline in function, requiring furniture support or assist for ambulation. + falls in recent months. HOME SUPPORT PRIOR TO ADMISSION: The patient lived with spouse who assists as needed. Physical Therapy Goals  Goals reviewed 1/17/23 - Continue with same goals  Initiated 1/10/2023  1. Patient will move from supine to sit and sit to supine  in bed with supervision within 7 day(s). 2.  Patient will transfer from bed to chair and chair to bed with supervision/set-up using the least restrictive device within 7 day(s). 3.  Patient will perform sit to stand with supervision/set-up within 7 day(s). 4.  Patient will ambulate with supervision/set-up for 150 feet with the least restrictive device within 7 day(s). 5.  Patient will ascend/descend 14 stairs with one handrail(s) with supervision/set-up within 7 day(s). Outcome: Progressing Towards Goal    PHYSICAL THERAPY TREATMENT: WEEKLY REASSESSMENT  Patient: [place] (73 y.o. male)  Date: 1/17/2023  Primary Diagnosis: Acute metabolic encephalopathy [D04.71]       Precautions:   Fall (NWB R shoulder (pending MRI))      ASSESSMENT  Patient continues with skilled PT services and is progressing towards goals. MRI L shoulder shows extensive bony metastatic disease and small high-grade partial-thickness undersurface tear anterior supraspinatus. Pt presents with mild confusion, Based on current observations, pt continues to present with deficits in generalized strength/AROM, static/dynamic standing  balance, functional activity tolerance, impacting overall performance of functional transfers/mobility. Pt with c/o pain R shoulder and R LE - 8/10.  PT applied sling to support R UE, requires modA and additional time for supine<>sit transfers, participated in there ex's for b/l Le strengthening. Requires Shaunna for STS transfers x 2 trials, able to ambulate - 5' with quad cane but limited by pain in R Le. Overall, pt tolerates session fair today and would benefit from continued skilled PT services to address noted deficits and maximize IND/safety with functional transfers/mobility. PT goals and POC reviewed and remains appropriate at this time. PT recommending HHPT with 24 x 7 care at discharge once medically appropriate. Current Level of Function Impacting Discharge (mobility/balance): Pt requires min/modA for transfers/mobility    Other factors to consider for discharge: good family support         PLAN :  Goals have been updated based on progression since last assessment. Patient continues to benefit from skilled intervention to address the above impairments. Recommendations and Planned Interventions: bed mobility training, transfer training, gait training, therapeutic exercises, neuromuscular re-education, patient and family training/education, and therapeutic activities      Frequency/Duration: Patient will be followed by physical therapy:  5 times a week to address goals.     Recommendation for discharge: (in order for the patient to meet his/her long term goals)  Physical therapy at least 2 days/week in the home AND ensure assist and/or supervision for safety with transfers /mobility    This discharge recommendation:  Has been made in collaboration with the attending provider and/or case management    IF patient discharges home will need the following DME: quad cane         SUBJECTIVE:   Patient stated My R leg is hurting      OBJECTIVE DATA SUMMARY:   HISTORY:    Past Medical History:   Diagnosis Date    Diverticulitis     Gastrointestinal disorder     History of vascular access device 07/24/2017    [facility] VAT -  5FR triple Lumen Power PICC R Basilic  39 cm    Spinal stenosis Past Surgical History:   Procedure Laterality Date    FLEXIBLE SIGMOIDOSCOPY N/A 7/27/2017    SIGMOIDOSCOPY FLEXIBLE performed by Hansel Garcia MD at [facility]OS[facility]    IR INSERT NON TUNL CVC OVER 5 YRS  1/13/2021       Personal factors and/or comorbidities impacting plan of care:     Home Situation  Home Environment: Private residence  # Steps to Enter: 3  Rails to Enter: Yes  Hand Rails : Left  One/Two Story Residence: Two story  # of Interior Steps: 15  Interior Rails: Left  Living Alone: No  Support Systems: Spouse/Significant Other, Child(ivon)  Patient Expects to be Discharged to[de-identified] Home with home health  Current DME Used/Available at Home: None    EXAMINATION/PRESENTATION/DECISION MAKING:   Critical Behavior:  Neurologic State: Alert, Confused  Orientation Level: Disoriented to place, Oriented to person, Oriented to situation, Disoriented to time  Cognition: Follows commands  Safety/Judgement: Decreased awareness of need for assistance, Decreased insight into deficits  Hearing: Auditory  Auditory Impairment: None  Skin:  intact where exposed    Range Of Motion:   Generally decreased, functional    Strength:    Generally decreased, functional    Tone & Sensation:   Tone: Normal  Sensation: Intact     Functional Mobility:  Bed Mobility:     Supine to Sit: Moderate assistance    Transfers:  Sit to Stand: Minimum assistance; Additional time  Stand to Sit: Contact guard assistance;Minimum assistance    Balance:   Sitting: Intact; Without support  Standing: Impaired; With support  Standing - Static: Fair;Constant support  Standing - Dynamic : Fair;Constant support  Ambulation/Gait Training:  Distance (ft): 5 Feet (ft)  Assistive Device: Gait belt;Cane, quad  Ambulation - Level of Assistance: Minimal assistance    Base of Support: Narrowed     Speed/Krystle: Pace decreased (<100 feet/min)  Step Length: Right shortened;Left shortened    Therapeutic Exercises:         EXERCISE   Sets   Reps   Active Active Assist Passive Self ROM   Comments   Ankle Pumps  10 [x] [] [] []    Quad Sets/Glut Sets   [] [] [] []    Hamstring Sets   [] [] [] []    Short Arc Quads   [] [] [] []    Heel Slides   [] [] [] []    Straight Leg Raises   [] [] [] []    Hip abd/add   [] [] [] []    Long Arc Quads  5 [x] [] [] []    Marching  5 [x] [] [] []       [] [] [] []      Pain Rating:  Pain r shoulder and R Le - 8/10    Activity Tolerance:   Fair  Please refer to the flowsheet for vital signs taken during this treatment. After treatment patient left in no apparent distress:   Sitting in chair, Call bell within reach, Bed / chair alarm activated, and Caregiver / family present    COMMUNICATION/EDUCATION:   The patients plan of care was discussed with: Registered nurse. Fall prevention education was provided and the patient/caregiver indicated understanding. and Patient/family agree to work toward stated goals and plan of care.     Thank you for this referral.  Bozena Huerta   Time Calculation: 40 mins

## 2023-01-17 NOTE — PROGRESS NOTES
1/17/2023  3:22 PM  Pt discussed in IDR is continuing to require medical management for Bony metastasisAcute metabolic encephalopathy (3/23/5087) / hx Dementia, Acute pain of right shoulder, Bradycardia, Elevated BP without diagnosis of hypertension  Transitions of Care Plan:  RUR 11 % Low Risk of Readmission/Green  LOS 7 Days   Medical management continues  Awaiting path for oncology plan  PT, OT following, the recommendation is for [address] when stable to home w/ family assistance and [address]   Outpatient follow up PCP, oncology  Family will transport at DC   CM will continue to follow and assist w/ DC needs  ROWDY Murray

## 2023-01-17 NOTE — CONSULTS
Palliative Medicine Progress Note  [place]: [id number] ([id number])    Patient Name: Leonardo Mercado. YOB: 1945    Date of Initial Consult: 1/12/2023  Date of Follow-Up: 01/17/23  Reason for Consult: Care Decisions  Requesting Provider: Blake Mehta DO   Primary Care Physician: Jimbo, MD Ashley     SUMMARY:   Leonardo Sargent is a 76 y.o. male with a past history of spinal stenosis and lumbar radiculopathy who was admitted on 1/9/2023 from home with a diagnosis of confusion and subsequently found to have widespread metastatic bony malignancy. Patient apparently having right shoulder pain for >1 month being evaluated by outside physician. Became confused previous 2 days prior to admission leading family to bring him to [facility] for evaluation. Current medical issues leading to Palliative Medicine involvement include: Care Decisions. Social Hx:  Lives at home with wife, Darby Hutchins,  >20 years. 3 children, [place] and [place] with Aliya Cardoza (Marya's son). Able to manage own ADLs at home per family with occasional assistance from wife. Generally well functioning. Retired manufacturing representative. Interval Hx:  Patient getting ready to work with PT. Reports pain adequately controlled with current pain regimen, oxycodone 10mg PO x 4 doses in 24 hours (8am-8am). Had 2 bowel movements yesterday. PALLIATIVE DIAGNOSES:   Suspected Bone Malignancy  Right Shoulder Pain  Encephalopathy  Hypercalcemia  Goals of Care  Palliative Care Encounter     PLAN:   Goals of Care  Reviewed medical chart for history and updated clinical information  Met with patient in room, son Madina Acosta present. Patient awake and conversant. Expressing understanding of findings thus far with suspected cancer present with widespread osseous mets. Biopsy still pending. Will continue aim to engage goals once pathology and possible treatment plans known.   Should he be discharged in meantime, will refer to outpatient Palliative clinic for ongoing symptom management and [address] discussions. Pain  Suspected due to malignancy, mostly right shoulder  Continue oxycodone 10mg PO every 4 hours as needed, tolerating well and CrCl intact. Would give a dose 1 hour prior to PT sessions. Continue hydromorphone 0.5mg IV every 6 hours as needed for breakthrough pain, note ~equianalgesic to PO oxycodone but would be more rapid onset in acute pain setting. Gabapentin 200mg PO three times daily  Add scheduled tylenol 1000mg every 8 hours  Could trial steroids but risks of worsening confusion may outweigh benefit, especially as oxycodone seems to be effective. Hypercalcemia/Encephalopathy, improving  Possibly hypercalcemia of malignancy given bone involvement, contributing to confusion. Delirium precautions: redirect/reorient as possible, lights on during day, minimize interruptions at night, avoid restraints and offending medications like benzodiazepines, allow family presence at bedside. Medication only as needed for agitation that is not redirectable and placing patient or caregivers at risk of harm. Surrogate:  No AMD in chart. Wife is legal NOK. Code Status:  Full Code. Communicated plan of care with: Palliative IDT, [place] [address] Team  Palliative will continue to follow along.      GOALS OF CARE / TREATMENT PREFERENCES:     GOALS OF CARE:  Patient/Health Care Proxy Stated Goals:  (determine results of biopsy, learn about treatment options)    TREATMENT PREFERENCES:   Code Status: Full Code    Patient and family's personal goals include: determine results of biopsy, learn about treatment options    Important upcoming milestones or family events: none reported    The patient identifies the following as important for living well: none reported      Advance Care Planning:  [x] The [facility] Interdisciplinary Team has updated the ACP Navigator with [address] and Patient Capacity      Primary Decision [facility] - Spouse - [phone]    Secondary Decision Maker: Asad Zapien - [phone]    Secondary Decision Maker: Phillip Mcclellan - Daughter - [phone]  Advance Care Planning 1/13/2023   Patient's Healthcare Decision Maker is: Legal Next of Kin   Confirm Advance Directive -   Patient Would Like to Complete Advance Directive -   Does the patient have other document types -               Other:    As far as possible, the palliative care team has discussed with patient / health care proxy about goals of care / treatment preferences for patient.      HISTORY:     History obtained from: medical chart, family    CHIEF COMPLAINT: confusion, pain    HPI/SUBJECTIVE:    The patient is:   [x] Verbal and participatory  [] Non-participatory due to: sedation post-procedure     Clinical Pain Assessment (nonverbal scale for severity on nonverbal patients):   Clinical Pain Assessment  Severity: 2  Location: right shoulder  Character: ache  Duration: 1 month  Effect: limits mobility/range of motion  Factors: worse with movement, better with rest and medication  Frequency: intermittent, wax/wane     Activity (Movement): Lying quietly, normal position    Duration: for how long has pt been experiencing pain (e.g., 2 days, 1 month, years)  Frequency: how often pain is an issue (e.g., several times per day, once every few days, constant)     FUNCTIONAL ASSESSMENT:     Palliative Performance Scale (PPS):  PPS: 40       PSYCHOSOCIAL/SPIRITUAL SCREENING:     Palliative IDT has assessed this patient for cultural preferences / practices and a referral made as appropriate to needs (Cultural Services, Patient Advocacy, Ethics, etc.)    Any spiritual / Oriental orthodox concerns:  [] Yes /  [x] No   If \"Yes\" to discuss with pastoral care during IDT     Does caregiver feel burdened by caring for their loved one:   [] Yes /  [x] No /  [] No Caregiver Present/Available [] No Caregiver [] Pt Lives at Facility  If \"Yes\" to discuss with social work during IDT    Anticipatory grief assessment:   [x] Normal  / [] Maladaptive     If \"Maladaptive\" to discuss with social work during IDT    ESAS Anxiety: Anxiety: 0    ESAS Depression:          REVIEW OF SYSTEMS:     Positive and pertinent negative findings in ROS are noted above in HPI. The following systems were [x] reviewed / [] unable to be reviewed as noted in HPI  Other findings are noted below. Systems: constitutional, ears/nose/mouth/throat, respiratory, gastrointestinal, genitourinary, musculoskeletal, integumentary, neurologic, psychiatric, endocrine. Positive findings noted below. Modified ESAS Completed by: provider           Pain: 2   Anxiety: 0 Nausea: 0     Dyspnea: 0     Constipation: No     Stool Occurrence(s): 0        PHYSICAL EXAM:     From RN flowsheet:  Wt Readings from Last 3 Encounters:   01/11/23 192 lb 14.4 oz (87.5 kg)   03/02/21 190 lb (86.2 kg)   02/15/21 190 lb (86.2 kg)     Blood pressure 133/72, pulse 89, temperature 98.4 °F (36.9 °C), resp. rate 17, height 5' 7.01\" (1.702 m), weight 192 lb 14.4 oz (87.5 kg), SpO2 99 %.     Pain Scale 1: Visual  Pain Intensity 1: 3     Pain Location 1: Hip, Shoulder  Pain Orientation 1: Right  Pain Description 1: Aching  Pain Intervention(s) 1: Medication (see MAR)  Last bowel movement, if known:     Constitutional: sitting up in bed, no distress  Eyes: pupils equal, anicteric  ENMT: no nasal discharge, moist mucous membranes  Cardiovascular: regular rate at time of exam, no edema  Respiratory: breathing not labored, symmetric chest rise  Musculoskeletal: no deformity, moves all extremities  Skin: warm, dry  Neurologic: awake and alert, communicates self, follows commands  Psychiatric: no hallucinations, no agitation       HISTORY:     Active Problems:    Acute metabolic encephalopathy (8/18/6525)      Bony metastasis (HCC) (1/11/2023)      Hypercalcemia (1/11/2023)      Transient alteration of awareness (1/11/2023)      Acute pain of right shoulder (1/11/2023)      Bradycardia (1/14/2023)      Elevated BP without diagnosis of hypertension (1/14/2023)      Centrilobular emphysema ([address] (1/14/2023)      Hypokalemia (1/14/2023)    Past Medical History:   Diagnosis Date    Diverticulitis     Gastrointestinal disorder     History of vascular access device 07/24/2017    [facility] VAT -  5FR triple Lumen Power PICC R Basilic  39 cm    Spinal stenosis       Past Surgical History:   Procedure Laterality Date    FLEXIBLE SIGMOIDOSCOPY N/A 7/27/2017    SIGMOIDOSCOPY FLEXIBLE performed by Gwendolyn Dominguez MD at [facility] ENDOSCOPY    IR INSERT NON TUNL CVC OVER 5 YRS  1/13/2021      No family history on file. History reviewed, no pertinent family history. Social History     Tobacco Use    Smoking status: Former     Packs/day: 1.00     Years: 50.00     Pack years: 50.00     Types: Cigarettes    Smokeless tobacco: Current   Substance Use Topics    Alcohol use:  Yes     Alcohol/week: 0.8 standard drinks     Types: 1 Cans of beer per week     No Known Allergies   Current Facility-Administered Medications   Medication Dose Route Frequency    acetaminophen (TYLENOL) tablet 1,000 mg  1,000 mg Oral Q8H    oxyCODONE IR (ROXICODONE) tablet 10 mg  10 mg Oral Q4H PRN    valsartan (DIOVAN) tablet 80 mg  80 mg Oral DAILY    HYDROmorphone (DILAUDID) syringe 0.5 mg  0.5 mg IntraVENous Q6H PRN    senna-docusate (PERICOLACE) 8.6-50 mg per tablet 1 Tablet  1 Tablet Oral DAILY    gabapentin (NEURONTIN) capsule 200 mg  200 mg Oral TID    hydrALAZINE (APRESOLINE) 20 mg/mL injection 20 mg  20 mg IntraVENous Q4H PRN    0.9% sodium chloride infusion  50 mL/hr IntraVENous CONTINUOUS    melatonin tablet 4.5 mg  4.5 mg Oral QHS    sodium chloride (NS) flush 5-40 mL  5-40 mL IntraVENous Q8H    sodium chloride (NS) flush 5-40 mL  5-40 mL IntraVENous PRN    acetaminophen (TYLENOL) tablet 650 mg  650 mg Oral Q6H PRN    Or    acetaminophen (TYLENOL) suppository 650 mg  650 mg Rectal Q6H PRN polyethylene glycol (MIRALAX) packet 17 g  17 g Oral DAILY PRN    ondansetron (ZOFRAN ODT) tablet 4 mg  4 mg Oral Q8H PRN    Or    ondansetron (ZOFRAN) injection 4 mg  4 mg IntraVENous Q6H PRN    enoxaparin (LOVENOX) injection 40 mg  40 mg SubCUTAneous DAILY    lidocaine 4 % patch 2 Patch  2 Patch TransDERmal Q24H    famotidine (PEPCID) tablet 20 mg  20 mg Oral BID    psyllium husk-aspartame (METAMUCIL FIBER) packet 1 Packet  1 Packet Oral BID          LAB AND IMAGING FINDINGS:     Lab Results   Component Value Date/Time    WBC 9.7 01/15/2023 04:16 AM    HGB 12.1 01/15/2023 04:16 AM    PLATELET 528 02/43/9179 04:16 AM     Lab Results   Component Value Date/Time    Sodium 141 01/16/2023 01:47 AM    Potassium 3.7 01/16/2023 01:47 AM    Chloride 110 (H) 01/16/2023 01:47 AM    CO2 23 01/16/2023 01:47 AM    BUN 14 01/16/2023 01:47 AM    Creatinine 0.78 01/16/2023 01:47 AM    Calcium 7.9 (L) 01/16/2023 01:47 AM    Magnesium 1.8 01/16/2023 01:47 AM    Phosphorus 1.9 (L) 01/16/2023 01:47 AM      Lab Results   Component Value Date/Time    Alk. phosphatase 125 (H) 01/16/2023 01:47 AM    Protein, total 6.1 (L) 01/16/2023 01:47 AM    Albumin 2.4 (L) 01/16/2023 01:47 AM    Globulin 3.7 01/16/2023 01:47 AM     Lab Results   Component Value Date/Time    INR 1.4 (H) 01/23/2021 04:49 AM    Prothrombin time 13.9 (H) 01/23/2021 04:49 AM    aPTT 29.2 01/23/2021 04:49 AM      Lab Results   Component Value Date/Time    Ferritin 1,418 (H) 01/22/2021 03:02 AM      Lab Results   Component Value Date/Time    pH 7.44 01/11/2021 05:09 AM    PCO2 31 (L) 01/11/2021 05:09 AM    PO2 69 (L) 01/11/2021 05:09 AM     No components found for: GLPOC   No results found for: CPK, CKMB             Total time:   Counseling / coordination time, spent as noted above:   > 50% counseling / coordination?: yes    Prolonged service was provided for  []30 min   []75 min in face to face time in the presence of the patient, spent as noted above.   Time Start:   Time End:   Note: this can only be billed with 13639 (initial) or 35321 (follow up). If multiple start / stop times, list each separately.

## 2023-01-18 PROBLEM — C79.11: Status: ACTIVE | Noted: 2023-01-18

## 2023-01-18 PROBLEM — C79.9 METASTATIC ADENOCARCINOMA OF UNKNOWN ORIGIN (HCC): Status: ACTIVE | Noted: 2023-01-18

## 2023-01-18 PROBLEM — C80.1: Status: ACTIVE | Noted: 2023-01-18

## 2023-01-18 PROCEDURE — 74011250636 HC RX REV CODE- 250/636: Performed by: INTERNAL MEDICINE

## 2023-01-18 PROCEDURE — 74011250637 HC RX REV CODE- 250/637: Performed by: INTERNAL MEDICINE

## 2023-01-18 PROCEDURE — 74011250637 HC RX REV CODE- 250/637: Performed by: STUDENT IN AN ORGANIZED HEALTH CARE EDUCATION/TRAINING PROGRAM

## 2023-01-18 PROCEDURE — 99232 SBSQ HOSP IP/OBS MODERATE 35: CPT | Performed by: STUDENT IN AN ORGANIZED HEALTH CARE EDUCATION/TRAINING PROGRAM

## 2023-01-18 PROCEDURE — 97116 GAIT TRAINING THERAPY: CPT

## 2023-01-18 PROCEDURE — 74011000250 HC RX REV CODE- 250: Performed by: INTERNAL MEDICINE

## 2023-01-18 PROCEDURE — 97530 THERAPEUTIC ACTIVITIES: CPT

## 2023-01-18 PROCEDURE — 74011000250 HC RX REV CODE- 250: Performed by: NURSE PRACTITIONER

## 2023-01-18 PROCEDURE — 65270000046 HC RM TELEMETRY

## 2023-01-18 RX ORDER — AMOXICILLIN 250 MG
2 CAPSULE ORAL DAILY
Status: DISCONTINUED | OUTPATIENT
Start: 2023-01-19 | End: 2023-01-23 | Stop reason: HOSPADM

## 2023-01-18 RX ORDER — MIRTAZAPINE 15 MG/1
7.5 TABLET, FILM COATED ORAL
Status: DISCONTINUED | OUTPATIENT
Start: 2023-01-18 | End: 2023-01-20

## 2023-01-18 RX ORDER — POLYETHYLENE GLYCOL 3350 17 G/17G
17 POWDER, FOR SOLUTION ORAL DAILY PRN
Status: DISCONTINUED | OUTPATIENT
Start: 2023-01-18 | End: 2023-01-23 | Stop reason: HOSPADM

## 2023-01-18 RX ADMIN — ENOXAPARIN SODIUM 40 MG: 100 INJECTION SUBCUTANEOUS at 08:22

## 2023-01-18 RX ADMIN — ACETAMINOPHEN 1000 MG: 500 TABLET ORAL at 13:58

## 2023-01-18 RX ADMIN — GABAPENTIN 200 MG: 100 CAPSULE ORAL at 08:22

## 2023-01-18 RX ADMIN — PSYLLIUM HUSK 1 PACKET: 3.4 POWDER ORAL at 08:22

## 2023-01-18 RX ADMIN — MIRTAZAPINE 7.5 MG: 15 TABLET, FILM COATED ORAL at 20:57

## 2023-01-18 RX ADMIN — Medication 10 ML: at 13:58

## 2023-01-18 RX ADMIN — Medication 4.5 MG: at 20:57

## 2023-01-18 RX ADMIN — FAMOTIDINE 20 MG: 20 TABLET, FILM COATED ORAL at 08:21

## 2023-01-18 RX ADMIN — VALSARTAN 80 MG: 80 TABLET ORAL at 08:22

## 2023-01-18 RX ADMIN — OXYCODONE HYDROCHLORIDE 10 MG: 5 TABLET ORAL at 17:19

## 2023-01-18 RX ADMIN — OXYCODONE HYDROCHLORIDE 10 MG: 5 TABLET ORAL at 08:24

## 2023-01-18 RX ADMIN — FAMOTIDINE 20 MG: 20 TABLET, FILM COATED ORAL at 17:20

## 2023-01-18 RX ADMIN — Medication 10 ML: at 05:40

## 2023-01-18 RX ADMIN — SENNOSIDES AND DOCUSATE SODIUM 1 TABLET: 50; 8.6 TABLET ORAL at 08:22

## 2023-01-18 RX ADMIN — PSYLLIUM HUSK 1 PACKET: 3.4 POWDER ORAL at 17:20

## 2023-01-18 RX ADMIN — GABAPENTIN 200 MG: 100 CAPSULE ORAL at 20:57

## 2023-01-18 RX ADMIN — GABAPENTIN 200 MG: 100 CAPSULE ORAL at 17:20

## 2023-01-18 RX ADMIN — Medication 10 ML: at 20:58

## 2023-01-18 RX ADMIN — SODIUM CHLORIDE 50 ML/HR: 9 INJECTION, SOLUTION INTRAVENOUS at 01:31

## 2023-01-18 RX ADMIN — ACETAMINOPHEN 1000 MG: 500 TABLET ORAL at 20:57

## 2023-01-18 RX ADMIN — HYDRALAZINE HYDROCHLORIDE 20 MG: 20 INJECTION INTRAMUSCULAR; INTRAVENOUS at 04:38

## 2023-01-18 NOTE — PROGRESS NOTES
PT treatment attempted. However, pt declined for therapy at this time sec to feeling tired stating that he just got back to bed, and was up on the chair. PT will follow and attempt for PT treatment at a later time.

## 2023-01-18 NOTE — PROGRESS NOTES
Problem: Falls - Risk of  Goal: *Absence of Falls  Description: Document Carol Morataya Fall Risk and appropriate interventions in the flowsheet.   Outcome: Progressing Towards Goal  Note: Fall Risk Interventions:                                Problem: Patient Education: Go to Patient Education Activity  Goal: Patient/Family Education  Outcome: Progressing Towards Goal     Problem: Patient Education: Go to Patient Education Activity  Goal: Patient/Family Education  Outcome: Progressing Towards Goal     Problem: Pain  Goal: *Control of Pain  Outcome: Progressing Towards Goal     Problem: [address] (Adult/Pediatric)  Goal: *STG: Participates in treatment plan  Outcome: Progressing Towards Goal  Goal: *STG: Seeks staff when feelings of anxiety and fear arise  Outcome: Progressing Towards Goal  Goal: *STG: Attends activities and groups  Outcome: Progressing Towards Goal  Goal: *STG: Absence of lethality  Outcome: Progressing Towards Goal  Goal: *STG: Demonstrates ability to understand and use improved judgment in daily activities and relationships  Outcome: Progressing Towards Goal  Goal: *STG: Remains safe in hospital  Outcome: Progressing Towards Goal  Goal: *LTG: Obtains optimum level of functioning  Outcome: Progressing Towards Goal  Goal: *STG/LTG: Maintain structure for daily activities  Outcome: Progressing Towards Goal  Goal: *STG/LTG: Complies with medication therapy  Outcome: Progressing Towards Goal  Goal: Interventions  Outcome: Progressing Towards Goal

## 2023-01-18 NOTE — PROGRESS NOTES
Eugene Diazelsen [facility] [address]  [address] [facility], [id number] [place] [address]  [phone]      Hospitalist  Progress Note      NAME:         David Clinton. :        1945  MRM:        [id number]    Date of service: 2023      Chief complaint: Generalized body aches    Interval HPI: Patient says he remains weak but stable. No new symptoms. Afebrile. Objective:    Vital Signs:    Visit Vitals  BP (!) 150/70 (BP 1 Location: Left upper arm, BP Patient Position: Lying; At rest)   Pulse 88   Temp 98 °F (36.7 °C)   Resp 18   Ht 5' 7.01\" (1.702 m)   Wt 87.5 kg (192 lb 14.4 oz)   SpO2 96%   BMI 30.21 kg/m²        Intake/Output Summary (Last 24 hours) at 2023 1144  Last data filed at 2023 0935  Gross per 24 hour   Intake 2689.17 ml   Output --   Net 2689.17 ml          Physical Examination:    General:   Weak and ill looking, not in acute distress    Eyes:   pink conjunctivae, PERRLA with no discharge  Pulm: decreased but clear breath sounds without crackles or wheezes  Card:  has regular and normal S1, S2 without thrills, bruits or peripheral edema  Abd:  Soft, non-tender, non-distended, normoactive bowel sounds   Musc:  No cyanosis, clubbing, atrophy or deformities. Generalized back, right shoulder discomfort  Neuro: Awake and alert.  Generally a non focal exam.   Psych:  Has a fair insight to his illness     Current Facility-Administered Medications   Medication Dose Route Frequency    acetaminophen (TYLENOL) tablet 1,000 mg  1,000 mg Oral Q8H    oxyCODONE IR (ROXICODONE) tablet 10 mg  10 mg Oral Q4H PRN    valsartan (DIOVAN) tablet 80 mg  80 mg Oral DAILY    HYDROmorphone (DILAUDID) syringe 0.5 mg  0.5 mg IntraVENous Q6H PRN    senna-docusate (PERICOLACE) 8.6-50 mg per tablet 1 Tablet  1 Tablet Oral DAILY    gabapentin (NEURONTIN) capsule 200 mg  200 mg Oral TID    hydrALAZINE (APRESOLINE) 20 mg/mL injection 20 mg 20 mg IntraVENous Q4H PRN    0.9% sodium chloride infusion  50 mL/hr IntraVENous CONTINUOUS    melatonin tablet 4.5 mg  4.5 mg Oral QHS    sodium chloride (NS) flush 5-40 mL  5-40 mL IntraVENous Q8H    sodium chloride (NS) flush 5-40 mL  5-40 mL IntraVENous PRN    acetaminophen (TYLENOL) tablet 650 mg  650 mg Oral Q6H PRN    Or    acetaminophen (TYLENOL) suppository 650 mg  650 mg Rectal Q6H PRN    polyethylene glycol (MIRALAX) packet 17 g  17 g Oral DAILY PRN    ondansetron (ZOFRAN ODT) tablet 4 mg  4 mg Oral Q8H PRN    Or    ondansetron (ZOFRAN) injection 4 mg  4 mg IntraVENous Q6H PRN    enoxaparin (LOVENOX) injection 40 mg  40 mg SubCUTAneous DAILY    lidocaine 4 % patch 2 Patch  2 Patch TransDERmal Q24H    famotidine (PEPCID) tablet 20 mg  20 mg Oral BID    psyllium husk-aspartame (METAMUCIL FIBER) packet 1 Packet  1 Packet Oral BID        Laboratory data and review:    No results for input(s): WBC, HGB, HCT, PLT, HGBEXT, HCTEXT, PLTEXT, HGBEXT, HCTEXT, PLTEXT in the last 72 hours. Recent Labs     01/16/23  0147      K 3.7   *   CO2 23   *   BUN 14   CREA 0.78   CA 7.9*   MG 1.8   PHOS 1.9*   ALB 2.4*   ALT 50       No components found for: [place]    Diagnostics: Imaging studies have been reviewed    Telemetry reviewed by me:   normal sinus rhythm    Assessment and Plan:    Metastatic adenocarcinoma with unknown primary origin (1/18/2023) / Bony metastasis ([facility] (1/11/2023) POA: CT scan abdomen and pelvis done showed multifocal osseous metastatic disease in the thoracic spine, lumbar spine, pelvis, left ribs, and right scapula. Potential biopsy sites including anterior left iliac bone and superior right scapula. Multiple small hepatic lesions are suspicious for metastatic disease. MRi brain as noted below. Seen by Oncology and he is sp a CT guided right iliac lesion biopsy. Path showed metastatic adenocarcinoma with oncocytic features, unknown primary.  He has been reviewed by rad-oncology who plan for XRT right shoulder and possibly the right distal femur. Continue Oxycodone PRN. Gabapentin, Lidoderm patch. Wean IV Dilaudid PRN. PT, OT and CM for discharge once path is available. Acute metabolic encephalopathy (7/39/6996) / hx Dementia POA: symptoms resolved. Likely triggered by electrolyte derangements. No focus of infection. TSH and vitamin B12 were normal. CT scan head neg and MRi brain neg for acute infarct. There was however a parasagittal right frontal enhancing intraosseous lesion with intracranial extension measuring up to 3.1 x 3.1 cm, suspicious for aggressive lesion such as metastatic or primary osseous lesion like multiple myeloma, or lymphoma. Oncology following. Stable. Acute pain of right shoulder (1/11/2023) POA: due to osseous metastatic disease and a small high-grade partial-thickness undersurface tear anterior supraspinatus as noted on the shoulder MRi. Ortho and palliative care following. Pain control and radiation therapy as noted above     Bradycardia (1/14/2023) POA: Resolved. TSH normal. Echo done 3/2021 showed a normal LV function. Seen by cardiology. No AV clemencia blocking agents. Monitor    Elevated BP without diagnosis of hypertension (1/14/2023) POA: partly driven by pain. Continue Diovan. IV Hydralazine PRN. Hypercalcemia (1/11/2023) POA:due to malignancy given diffuse osseous involvement. He received Zometa. Resolved     Centrilobular emphysema POA: noted on CT scan.  Nebs PRN    Hypokalemia (1/14/2023) - repleted and now normal.                  Care Plan discussed with: Patient, Care Manager, and Nursing Staff    Discussed:  Care Plan and D/C Planning    Prophylaxis:  H2B/PPI    Anticipated Disposition:  PT, OT, RN           ___________________________________________________    Attending Physician:   Yareli Boyle MD

## 2023-01-18 NOTE — PROGRESS NOTES
1/18/2023 11:31 AM   Care Management Progress Note         ICD-10-CM ICD-9-CM     1. Altered mental status, unspecified altered mental status type  R41.82 780.97         2. CHAVA (acute kidney injury) ([facility] [address]  N17.9 584.9         3. Hypercalcemia  E83.52 275.42         4. Bony metastasis ([facility] [facility] [C79.51 (ICD-10-CM)]  C79.51 198.5         5. Transient alteration of awareness [R40.4 (ICD-10-CM)]  R40.4 780.02         6. Acute pain of right shoulder [M25.511 (ICD-10-CM)]  M25.511 719.41               RUR:  9%  Risk Level: [x]Low []Moderate []High  Value-based purchasing: [] Yes [x] No  Bundle patient: [] Yes [x] No              Specify:      Transition of care plan:  Ongoing medical management, following for Oncology and Radiation Oncology plan   Home with family at discharge  Pt needing home health PT/OT/RN, [facility] [place] [facility] accepted  Outpatient follow-up.   Pt's family to transport

## 2023-01-18 NOTE — PROGRESS NOTES
Problem: Mobility Impaired (Adult and Pediatric)  Goal: *Acute Goals and Plan of Care (Insert Text)  Description: FUNCTIONAL STATUS PRIOR TO ADMISSION: Patient was independent and active without use of DME prior to R shoulder injury in November 2022. He has experienced gradual decline in function, requiring furniture support or assist for ambulation. + falls in recent months. HOME SUPPORT PRIOR TO ADMISSION: The patient lived with spouse who assists as needed. Physical Therapy Goals  Goals reviewed 1/17/23 - Continue with same goals  Initiated 1/10/2023  1. Patient will move from supine to sit and sit to supine  in bed with supervision within 7 day(s). 2.  Patient will transfer from bed to chair and chair to bed with supervision/set-up using the least restrictive device within 7 day(s). 3.  Patient will perform sit to stand with supervision/set-up within 7 day(s). 4.  Patient will ambulate with supervision/set-up for 150 feet with the least restrictive device within 7 day(s). 5.  Patient will ascend/descend 14 stairs with one handrail(s) with supervision/set-up within 7 day(s). Outcome: Progressing Towards Goal    PHYSICAL THERAPY TREATMENT  Patient: Los Thomas (73 y.o. male)  Date: 1/18/2023  Diagnosis: Acute metabolic encephalopathy [J61.07] Metastatic adenocarcinoma of unknown origin [facility])      Precautions: Fall (NWB R shoulder (pending MRI))  Chart, physical therapy assessment, plan of care and goals were reviewed. ASSESSMENT  Patient continues with skilled PT services and is progressing towards goals. Pt received sitting at the EOB, agreeable for PT treatment with encouragement. Pt presents with generalized body ache, requires CGA for STS transfers, demonstrates fair dynamic standing balance, is able to ambulate - 22' with HHA for L UE and no instances of LOB. Pt little impulsive today, required cues for safety and to slow down. HEP for b/l Le instructed and reviewed.  Pt with increased participation today, demonstrates improved ability to perform transfers and ambulation with decreased level of assist.    Current Level of Function Impacting Discharge (mobility/balance): Pt requires CGA for transfers/mobility    Other factors to consider for discharge: Decline from functional baseline         PLAN :  Patient continues to benefit from skilled intervention to address the above impairments. Continue treatment per established plan of care. to address goals. Recommendation for discharge: (in order for the patient to meet his/her long term goals)  Physical therapy at least 2 days/week in the home AND ensure assist and/or supervision for safety with transfers/mobility    This discharge recommendation:  Has been made in collaboration with the attending provider and/or case management    IF patient discharges home will need the following DME: to be determined (TBD) : quad cane       SUBJECTIVE:   Patient stated  I have aches everywhere    OBJECTIVE DATA SUMMARY:   Critical Behavior:  Neurologic State: Alert  Orientation Level: Oriented to person, Oriented to situation, Disoriented to time, Disoriented to place  Cognition: Follows commands  Safety/Judgement: Decreased awareness of need for assistance, Decreased insight into deficits  Functional Mobility Training:    Transfers:  Sit to Stand: Contact guard assistance  Stand to Sit: Contact guard assistance    Balance:  Sitting: Intact; Without support  Standing: Impaired; Without support  Standing - Static: Good  Standing - Dynamic : Fair  Ambulation/Gait Training:  Distance (ft): 25 Feet (ft)  Assistive Device: Gait belt;Cane, quad  Ambulation - Level of Assistance: Contact guard assistance    Therapeutic Exercises:   Therapeutic Exercises:       EXERCISE   Sets   Reps   Active Active Assist   Passive Self ROM   Comments   Ankle Pumps  10 [x] [] [] []    Quad Sets/Glut Sets   [] [] [] []    Hamstring Sets   [] [] [] []    Short Arc Quads   [] [] [] []    Heel Slides  5 [x] [] [] []    Straight Leg Raises   [] [] [] []    Hip abd/add  5 [x] [] [] []    Long Arc Quads   [] [] [] []    Marching   [] [] [] []       [] [] [] []       Pain Rating:  Pt didn't rate, c/o bodyache    Activity Tolerance:   Fair    After treatment patient left in no apparent distress:   Supine in bed, Call bell within reach, and Bed / chair alarm activated    COMMUNICATION/COLLABORATION:   The patients plan of care was discussed with: Registered nurse.      Corine Burnham   Time Calculation: 23 mins

## 2023-01-18 NOTE — PROGRESS NOTES
Bedside shift change report given to Maddie (oncoming nurse) by Vianca Sidhu (offgoing nurse). Report included the following information SBAR, Intake/Output, MAR, and Recent Results.

## 2023-01-18 NOTE — PROGRESS NOTES
Medicare pt has received, reviewed, and signed 2nd IM letter informing them of their right to appeal the discharge. Signed copy has been placed on pt bedside chart.   Ric Garcia CMS

## 2023-01-18 NOTE — PROGRESS NOTES
Problem: Falls - Risk of  Goal: *Absence of Falls  Description: Document Jasmina Led Fall Risk and appropriate interventions in the flowsheet.   Outcome: Progressing Towards Goal  Note: Fall Risk Interventions:                                Problem: Patient Education: Go to Patient Education Activity  Goal: Patient/Family Education  Outcome: Progressing Towards Goal     Problem: Pain  Goal: *Control of Pain  Outcome: Progressing Towards Goal     Problem: [address] (Adult/Pediatric)  Goal: *STG: Participates in treatment plan  Outcome: Progressing Towards Goal  Goal: *STG: Seeks staff when feelings of anxiety and fear arise  Outcome: Progressing Towards Goal  Goal: *STG: Attends activities and groups  Outcome: Progressing Towards Goal  Goal: *STG: Absence of lethality  Outcome: Progressing Towards Goal  Goal: *STG: Demonstrates ability to understand and use improved judgment in daily activities and relationships  Outcome: Progressing Towards Goal  Goal: *STG: Remains safe in hospital  Outcome: Progressing Towards Goal  Goal: *LTG: Obtains optimum level of functioning  Outcome: Progressing Towards Goal  Goal: *STG/LTG: Maintain structure for daily activities  Outcome: Progressing Towards Goal  Goal: *STG/LTG: Complies with medication therapy  Outcome: Progressing Towards Goal  Goal: Interventions  Outcome: Progressing Towards Goal

## 2023-01-18 NOTE — CONSULTS
Palliative Medicine Progress Note  [place]: [phone] ([phone])    Patient Name: Rimma Hartley. YOB: 1945    Date of Initial Consult: 1/12/2023  Date of Follow-Up: 01/18/23  Reason for Consult: Care Decisions  Requesting Provider: Jenny Guzman DO   Primary Care Physician: Jimbo, MD Ashley     SUMMARY:   Rimma Spears is a 76 y.o. male with a past history of spinal stenosis and lumbar radiculopathy who was admitted on 1/9/2023 from home with a diagnosis of confusion and subsequently found to have widespread metastatic bony malignancy. Patient apparently having right shoulder pain for >1 month being evaluated by outside physician. Became confused previous 2 days prior to admission leading family to bring him to [facility] for evaluation. Current medical issues leading to Palliative Medicine involvement include: Care Decisions. Social Hx:  Lives at home with wife, Ayo Gu,  >20 years. 3 children, [place] and Margrette Dub with Tiffanie James (Marya's son). Able to manage own ADLs at home per family with occasional assistance from wife. Generally well functioning. Retired manufacturing representative. Interval Hx:  Patient worked with PT this afternoon and able to ambulate, encouraged by this. Pain tolerable with oxycodone, 4 doses in past 24 hours (8am-8am). No nausea or emesis, BM 2 days ago. Poor appetite and sleep. PALLIATIVE DIAGNOSES:   Suspected Bone Malignancy  Right Shoulder Pain  Poor appetite  Insomnia  Constipation  Encephalopathy  Hypercalcemia  Goals of Care  Palliative Care Encounter     PLAN:   Goals of Care  Reviewed medical chart for history and updated clinical information  Met with patient in room, son Pantera Contreras present. Patient awake and conversant. Expressing understanding of findings thus far with suspected cancer present with widespread osseous mets. Biopsy still pending.   Will continue aim to engage goals once pathology and possible treatment plans known. Should he be discharged in meantime, will refer to outpatient Palliative clinic for ongoing symptom management and [address] discussions. Pain  Suspected due to malignancy, mostly right shoulder. Continue oxycodone 10mg PO every 4 hours as needed, tolerating well and CrCl intact. Would give a dose 1 hour prior to PT sessions. Continue hydromorphone 0.5mg IV every 6 hours as needed for breakthrough pain, note ~equianalgesic to PO oxycodone but would be more rapid onset in acute pain setting. Gabapentin 200mg PO three times daily  Continue scheduled tylenol 1000mg every 8 hours  Rad-Onc has assessed, plan for simulation tomorrow followed by treatment to right shoulder and possible right leg depending on what imaging shows at time. Expect this will help with pain control though clear with family this is not to \"cure\"   Constipation  Increase bowel regimen to senna 2 tabs daily, titrate to 1 soft bowel movement at least every other day  Hypercalcemia/Encephalopathy, improving  Possibly hypercalcemia of malignancy given bone involvement, contributing to confusion. Delirium precautions: redirect/reorient as possible, lights on during day, minimize interruptions at night, avoid restraints and offending medications like benzodiazepines, allow family presence at bedside. Medication only as needed for agitation that is not redirectable and placing patient or caregivers at risk of harm. Poor appetite and sleep  RD following  Will add low-dose remeron 7.5mg at night to help with appetite and sleep  Surrogate:  No AMD in chart. Wife is legal NOK. Code Status:  Full Code. Communicated plan of care with: Palliative [place] [address] Team  Palliative will continue to follow along.      GOALS OF CARE / TREATMENT PREFERENCES:     GOALS OF CARE:  Patient/Health Care Proxy Stated Goals:  (determine results of biopsy, learn about treatment options)    TREATMENT PREFERENCES:   Code Status: Full Code    Patient and family's personal goals include: determine results of biopsy, learn about treatment options    Important upcoming milestones or family events: none reported    The patient identifies the following as important for living well: none reported      Advance Care Planning:  [x] [facility] Interdisciplinary Team has updated the ACP Navigator with Health Care Decision Maker and Patient Capacity      Primary Decision Maker: Bonny Ohs - [phone]    Secondary Decision Maker: Darlyn Steinberg - Son - [phone]    Secondary Decision Maker: Dion Beltre - Daughter - [phone]  Advance Care Planning 1/13/2023   Patient's Healthcare Decision Maker is: Legal Next of Kin   Confirm Advance Directive -   Patient Would Like to Complete Advance Directive -   Does the patient have other document types -               Other:    As far as possible, the palliative care team has discussed with patient / health care proxy about goals of care / treatment preferences for patient.      HISTORY:     History obtained from: medical chart, family    CHIEF COMPLAINT: confusion, pain    HPI/SUBJECTIVE:    The patient is:   [x] Verbal and participatory  [] Non-participatory due to: sedation post-procedure     Clinical Pain Assessment (nonverbal scale for severity on nonverbal patients):   Clinical Pain Assessment  Severity: 3  Location: right arm, right leg  Character: ache, sharp  Duration: > 1 month  Effect: limits movement and mobility  Factors: better with rest and meds, worse with movement  Frequency: ongoing, waxes/wanes     Activity (Movement): Lying quietly, normal position    Duration: for how long has pt been experiencing pain (e.g., 2 days, 1 month, years)  Frequency: how often pain is an issue (e.g., several times per day, once every few days, constant)     FUNCTIONAL ASSESSMENT:     Palliative Performance Scale (PPS):  PPS: 60       PSYCHOSOCIAL/SPIRITUAL SCREENING:     Palliative IDT has assessed this patient for cultural preferences / practices and a referral made as appropriate to needs (Cultural Services, Patient Advocacy, Ethics, etc.)    Any spiritual / Episcopal concerns:  [] Yes /  [x] No   If \"Yes\" to discuss with pastoral care during IDT     Does caregiver feel burdened by caring for their loved one:   [] Yes /  [x] No /  [] No Caregiver Present/Available [] No Caregiver [] Pt Lives at [facility]  If \"Yes\" to discuss with social work during IDT    Anticipatory grief assessment:   [x] Normal  / [] Maladaptive     If \"Maladaptive\" to discuss with social work during IDT    ESAS Anxiety: Anxiety: 0    ESAS Depression:          REVIEW OF SYSTEMS:     Positive and pertinent negative findings in ROS are noted above in HPI. The following systems were [x] reviewed / [] unable to be reviewed as noted in HPI  Other findings are noted below. Systems: constitutional, ears/nose/mouth/throat, respiratory, gastrointestinal, genitourinary, musculoskeletal, integumentary, neurologic, psychiatric, endocrine. Positive findings noted below. Modified ESAS Completed by: provider           Pain: 3   Anxiety: 0 Nausea: 0   Anorexia: 4 Dyspnea: 0     Constipation: No     Stool Occurrence(s): 0        PHYSICAL EXAM:     From RN flowsheet:  Wt Readings from Last 3 Encounters:   01/11/23 192 lb 14.4 oz (87.5 kg)   03/02/21 190 lb (86.2 kg)   02/15/21 190 lb (86.2 kg)     Blood pressure 133/74, pulse 79, temperature 98 °F (36.7 °C), resp. rate 17, height 5' 7.01\" (1.702 m), weight 192 lb 14.4 oz (87.5 kg), SpO2 97 %.     Pain Scale 1: Visual  Pain Intensity 1: 0     Pain Location 1: Leg, Shoulder  Pain Orientation 1: Right  Pain Description 1: Aching, Dull  Pain Intervention(s) 1: Medication (see MAR)  Last bowel movement, if known:     Constitutional: sitting up in bed, no distress  Eyes: pupils equal, anicteric  ENMT: no nasal discharge, moist mucous membranes  Cardiovascular: regular rate at time of exam, no edema  Respiratory: breathing not labored, symmetric chest rise  Musculoskeletal: no deformity, moves all extremities  Skin: warm, dry  Neurologic: awake and alert, communicates self, follows commands  Psychiatric: no hallucinations, no agitation       HISTORY:     Principal Problem:    Metastatic adenocarcinoma of unknown origin ([facility] [address] (1/18/2023)    Active Problems:    Acute metabolic encephalopathy (6/73/9985)      Bony metastasis ([address] (1/11/2023)      Hypercalcemia (1/11/2023)      Transient alteration of awareness (1/11/2023)      Acute pain of right shoulder (1/11/2023)      Bradycardia (1/14/2023)      Elevated BP without diagnosis of hypertension (1/14/2023)      Centrilobular emphysema ([address] (1/14/2023)      Hypokalemia (1/14/2023)    Past Medical History:   Diagnosis Date    Diverticulitis     Gastrointestinal disorder     History of vascular access device 07/24/2017    [facility] VAT -  5FR triple Lumen Power PICC R Basilic  39 cm    Spinal stenosis       Past Surgical History:   Procedure Laterality Date    FLEXIBLE SIGMOIDOSCOPY N/A 7/27/2017    SIGMOIDOSCOPY FLEXIBLE performed by Rachel Saucedo MD at [facility] ENDOSCOPY    IR INSERT NON TUNL CVC OVER 5 YRS  1/13/2021      No family history on file. History reviewed, no pertinent family history. Social History     Tobacco Use    Smoking status: Former     Packs/day: 1.00     Years: 50.00     Pack years: 50.00     Types: Cigarettes    Smokeless tobacco: Current   Substance Use Topics    Alcohol use:  Yes     Alcohol/week: 0.8 standard drinks     Types: 1 Cans of beer per week     No Known Allergies   Current Facility-Administered Medications   Medication Dose Route Frequency    mirtazapine (REMERON) tablet 7.5 mg  7.5 mg Oral QHS    [START ON 1/19/2023] senna-docusate (PERICOLACE) 8.6-50 mg per tablet 2 Tablet  2 Tablet Oral DAILY    polyethylene glycol (MIRALAX) packet 17 g  17 g Oral DAILY PRN    acetaminophen (TYLENOL) tablet 1,000 mg  1,000 mg Oral Q8H oxyCODONE IR (ROXICODONE) tablet 10 mg  10 mg Oral Q4H PRN    valsartan (DIOVAN) tablet 80 mg  80 mg Oral DAILY    HYDROmorphone (DILAUDID) syringe 0.5 mg  0.5 mg IntraVENous Q6H PRN    gabapentin (NEURONTIN) capsule 200 mg  200 mg Oral TID    hydrALAZINE (APRESOLINE) 20 mg/mL injection 20 mg  20 mg IntraVENous Q4H PRN    0.9% sodium chloride infusion  50 mL/hr IntraVENous CONTINUOUS    melatonin tablet 4.5 mg  4.5 mg Oral QHS    sodium chloride (NS) flush 5-40 mL  5-40 mL IntraVENous Q8H    sodium chloride (NS) flush 5-40 mL  5-40 mL IntraVENous PRN    acetaminophen (TYLENOL) tablet 650 mg  650 mg Oral Q6H PRN    Or    acetaminophen (TYLENOL) suppository 650 mg  650 mg Rectal Q6H PRN    ondansetron (ZOFRAN ODT) tablet 4 mg  4 mg Oral Q8H PRN    Or    ondansetron (ZOFRAN) injection 4 mg  4 mg IntraVENous Q6H PRN    enoxaparin (LOVENOX) injection 40 mg  40 mg SubCUTAneous DAILY    lidocaine 4 % patch 2 Patch  2 Patch TransDERmal Q24H    famotidine (PEPCID) tablet 20 mg  20 mg Oral BID    psyllium husk-aspartame (METAMUCIL FIBER) packet 1 Packet  1 Packet Oral BID          LAB AND IMAGING FINDINGS:     Lab Results   Component Value Date/Time    WBC 9.7 01/15/2023 04:16 AM    HGB 12.1 01/15/2023 04:16 AM    PLATELET 764 15/73/7127 04:16 AM     Lab Results   Component Value Date/Time    Sodium 141 01/16/2023 01:47 AM    Potassium 3.7 01/16/2023 01:47 AM    Chloride 110 (H) 01/16/2023 01:47 AM    CO2 23 01/16/2023 01:47 AM    BUN 14 01/16/2023 01:47 AM    Creatinine 0.78 01/16/2023 01:47 AM    Calcium 7.9 (L) 01/16/2023 01:47 AM    Magnesium 1.8 01/16/2023 01:47 AM    Phosphorus 1.9 (L) 01/16/2023 01:47 AM      Lab Results   Component Value Date/Time    Alk.  phosphatase 125 (H) 01/16/2023 01:47 AM    Protein, total 6.1 (L) 01/16/2023 01:47 AM    Albumin 2.4 (L) 01/16/2023 01:47 AM    Globulin 3.7 01/16/2023 01:47 AM     Lab Results   Component Value Date/Time    INR 1.4 (H) 01/23/2021 04:49 AM    Prothrombin time 13.9 (H) 01/23/2021 04:49 AM    aPTT 29.2 01/23/2021 04:49 AM      Lab Results   Component Value Date/Time    Ferritin 1,418 (H) 01/22/2021 03:02 AM      Lab Results   Component Value Date/Time    pH 7.44 01/11/2021 05:09 AM    PCO2 31 (L) 01/11/2021 05:09 AM    PO2 69 (L) 01/11/2021 05:09 AM     No components found for: GLPOC   No results found for: CPK, CKMB             Total time:   Counseling / coordination time, spent as noted above:   > 50% counseling / coordination?: yes    Prolonged service was provided for  []30 min   []75 min in face to face time in the presence of the patient, spent as noted above. Time Start:   Time End:   Note: this can only be billed with 37222 (initial) or 30237 (follow up). If multiple start / stop times, list each separately.

## 2023-01-18 NOTE — PROGRESS NOTES
[facility] transportation arranged for patient to have planning session at 9 am and wait for treatment after.

## 2023-01-19 LAB
ALBUMIN SERPL-MCNC: 2.4 G/DL (ref 3.5–5)
ALBUMIN/GLOB SERPL: 0.6 (ref 1.1–2.2)
ALP SERPL-CCNC: 122 U/L (ref 45–117)
ALT SERPL-CCNC: 55 U/L (ref 12–78)
ANION GAP SERPL CALC-SCNC: 8 MMOL/L (ref 5–15)
AST SERPL-CCNC: 59 U/L (ref 15–37)
BASOPHILS # BLD: 0.1 K/UL (ref 0–0.1)
BASOPHILS NFR BLD: 0 % (ref 0–1)
BILIRUB SERPL-MCNC: 0.6 MG/DL (ref 0.2–1)
BUN SERPL-MCNC: 12 MG/DL (ref 6–20)
BUN/CREAT SERPL: 14 (ref 12–20)
CALCIUM SERPL-MCNC: 8.3 MG/DL (ref 8.5–10.1)
CHLORIDE SERPL-SCNC: 112 MMOL/L (ref 97–108)
CO2 SERPL-SCNC: 19 MMOL/L (ref 21–32)
CREAT SERPL-MCNC: 0.83 MG/DL (ref 0.7–1.3)
DIFFERENTIAL METHOD BLD: ABNORMAL
EOSINOPHIL # BLD: 0.1 K/UL (ref 0–0.4)
EOSINOPHIL NFR BLD: 1 % (ref 0–7)
ERYTHROCYTE [DISTWIDTH] IN BLOOD BY AUTOMATED COUNT: 13.6 % (ref 11.5–14.5)
GLOBULIN SER CALC-MCNC: 4.1 G/DL (ref 2–4)
GLUCOSE SERPL-MCNC: 113 MG/DL (ref 65–100)
HCT VFR BLD AUTO: 43.5 % (ref 36.6–50.3)
HGB BLD-MCNC: 13.9 G/DL (ref 12.1–17)
IMM GRANULOCYTES # BLD AUTO: 0.1 K/UL (ref 0–0.04)
IMM GRANULOCYTES NFR BLD AUTO: 1 % (ref 0–0.5)
LYMPHOCYTES # BLD: 1.6 K/UL (ref 0.8–3.5)
LYMPHOCYTES NFR BLD: 12 % (ref 12–49)
MCH RBC QN AUTO: 28.5 PG (ref 26–34)
MCHC RBC AUTO-ENTMCNC: 32 G/DL (ref 30–36.5)
MCV RBC AUTO: 89.3 FL (ref 80–99)
MONOCYTES # BLD: 1.2 K/UL (ref 0–1)
MONOCYTES NFR BLD: 9 % (ref 5–13)
NEUTS SEG # BLD: 10.1 K/UL (ref 1.8–8)
NEUTS SEG NFR BLD: 77 % (ref 32–75)
NRBC # BLD: 0 K/UL (ref 0–0.01)
NRBC BLD-RTO: 0 PER 100 WBC
PLATELET # BLD AUTO: 311 K/UL (ref 150–400)
PMV BLD AUTO: 9.5 FL (ref 8.9–12.9)
POTASSIUM SERPL-SCNC: 4.6 MMOL/L (ref 3.5–5.1)
PROT SERPL-MCNC: 6.5 G/DL (ref 6.4–8.2)
RBC # BLD AUTO: 4.87 M/UL (ref 4.1–5.7)
SODIUM SERPL-SCNC: 139 MMOL/L (ref 136–145)
WBC # BLD AUTO: 13.1 K/UL (ref 4.1–11.1)

## 2023-01-19 PROCEDURE — 80053 COMPREHEN METABOLIC PANEL: CPT

## 2023-01-19 PROCEDURE — 85025 COMPLETE CBC W/AUTO DIFF WBC: CPT

## 2023-01-19 PROCEDURE — 74011250636 HC RX REV CODE- 250/636: Performed by: INTERNAL MEDICINE

## 2023-01-19 PROCEDURE — 74011000250 HC RX REV CODE- 250: Performed by: NURSE PRACTITIONER

## 2023-01-19 PROCEDURE — 74011000250 HC RX REV CODE- 250: Performed by: INTERNAL MEDICINE

## 2023-01-19 PROCEDURE — 74011250637 HC RX REV CODE- 250/637: Performed by: INTERNAL MEDICINE

## 2023-01-19 PROCEDURE — 99232 SBSQ HOSP IP/OBS MODERATE 35: CPT | Performed by: INTERNAL MEDICINE

## 2023-01-19 PROCEDURE — 65270000046 HC RM TELEMETRY

## 2023-01-19 PROCEDURE — 74011250637 HC RX REV CODE- 250/637: Performed by: STUDENT IN AN ORGANIZED HEALTH CARE EDUCATION/TRAINING PROGRAM

## 2023-01-19 PROCEDURE — 36415 COLL VENOUS BLD VENIPUNCTURE: CPT

## 2023-01-19 RX ORDER — HYDRALAZINE HYDROCHLORIDE 25 MG/1
25 TABLET, FILM COATED ORAL ONCE
Status: COMPLETED | OUTPATIENT
Start: 2023-01-19 | End: 2023-01-19

## 2023-01-19 RX ADMIN — PSYLLIUM HUSK 1 PACKET: 3.4 POWDER ORAL at 18:03

## 2023-01-19 RX ADMIN — VALSARTAN 80 MG: 80 TABLET ORAL at 08:52

## 2023-01-19 RX ADMIN — Medication 10 ML: at 23:26

## 2023-01-19 RX ADMIN — ENOXAPARIN SODIUM 40 MG: 100 INJECTION SUBCUTANEOUS at 08:53

## 2023-01-19 RX ADMIN — Medication 10 ML: at 13:03

## 2023-01-19 RX ADMIN — FAMOTIDINE 20 MG: 20 TABLET, FILM COATED ORAL at 17:16

## 2023-01-19 RX ADMIN — SODIUM CHLORIDE 50 ML/HR: 9 INJECTION, SOLUTION INTRAVENOUS at 06:23

## 2023-01-19 RX ADMIN — OXYCODONE HYDROCHLORIDE 10 MG: 5 TABLET ORAL at 15:40

## 2023-01-19 RX ADMIN — PSYLLIUM HUSK 1 PACKET: 3.4 POWDER ORAL at 08:54

## 2023-01-19 RX ADMIN — GABAPENTIN 200 MG: 100 CAPSULE ORAL at 23:25

## 2023-01-19 RX ADMIN — ACETAMINOPHEN 1000 MG: 500 TABLET ORAL at 06:23

## 2023-01-19 RX ADMIN — ACETAMINOPHEN 1000 MG: 500 TABLET ORAL at 13:03

## 2023-01-19 RX ADMIN — HYDRALAZINE HYDROCHLORIDE 25 MG: 25 TABLET, FILM COATED ORAL at 19:05

## 2023-01-19 RX ADMIN — Medication 4.5 MG: at 23:25

## 2023-01-19 RX ADMIN — ACETAMINOPHEN 1000 MG: 500 TABLET ORAL at 23:25

## 2023-01-19 RX ADMIN — HYDRALAZINE HYDROCHLORIDE 20 MG: 20 INJECTION INTRAMUSCULAR; INTRAVENOUS at 13:04

## 2023-01-19 RX ADMIN — SENNOSIDES AND DOCUSATE SODIUM 2 TABLET: 50; 8.6 TABLET ORAL at 08:52

## 2023-01-19 RX ADMIN — MIRTAZAPINE 7.5 MG: 15 TABLET, FILM COATED ORAL at 23:26

## 2023-01-19 RX ADMIN — FAMOTIDINE 20 MG: 20 TABLET, FILM COATED ORAL at 08:52

## 2023-01-19 RX ADMIN — GABAPENTIN 200 MG: 100 CAPSULE ORAL at 15:40

## 2023-01-19 RX ADMIN — OXYCODONE HYDROCHLORIDE 10 MG: 5 TABLET ORAL at 06:23

## 2023-01-19 RX ADMIN — GABAPENTIN 200 MG: 100 CAPSULE ORAL at 08:52

## 2023-01-19 RX ADMIN — Medication 10 ML: at 06:23

## 2023-01-19 NOTE — PROGRESS NOTES
Eugene Robin [facility] [place] [facility]  [address] [facility], [address] [place] [address]  [phone]      Hospitalist  Progress Note      NAME:         Dio Degroot. :        1945  MRM:        [id number]    Date of service: 2023      Chief complaint: Generalized body aches    Interval HPI: Patient says he remains weak but stable. No new symptoms. Afebrile. Waiting to go home but spouse not quite ready     Objective:    Vital Signs:    Visit Vitals  BP (!) 176/77 (BP 1 Location: Left upper arm, BP Patient Position: At rest;Sitting)   Pulse 82   Temp 98.2 °F (36.8 °C)   Resp 14   Ht 5' 7.01\" (1.702 m)   Wt 87.5 kg (192 lb 14.4 oz)   SpO2 95%   BMI 30.21 kg/m²        Intake/Output Summary (Last 24 hours) at 2023 1445  Last data filed at 2023 1940  Gross per 24 hour   Intake 683.33 ml   Output --   Net 683.33 ml          Physical Examination:    General:   Weak and ill looking, not in acute distress    Eyes:   pink conjunctivae, PERRLA with no discharge  Pulm: decreased but clear breath sounds without crackles or wheezes  Card:  has regular and normal S1, S2 without thrills, bruits   Abd:  Soft, non-tender, non-distended, normoactive bowel sounds   Musc: Generalized back, right shoulder discomfort  Neuro: Awake and alert.  Generally a non focal exam.   Psych:  Has a fair insight to his illness     Current Facility-Administered Medications   Medication Dose Route Frequency    mirtazapine (REMERON) tablet 7.5 mg  7.5 mg Oral QHS    senna-docusate (PERICOLACE) 8.6-50 mg per tablet 2 Tablet  2 Tablet Oral DAILY    polyethylene glycol (MIRALAX) packet 17 g  17 g Oral DAILY PRN    acetaminophen (TYLENOL) tablet 1,000 mg  1,000 mg Oral Q8H    oxyCODONE IR (ROXICODONE) tablet 10 mg  10 mg Oral Q4H PRN    valsartan (DIOVAN) tablet 80 mg  80 mg Oral DAILY    HYDROmorphone (DILAUDID) syringe 0.5 mg  0.5 mg IntraVENous Q6H PRN gabapentin (NEURONTIN) capsule 200 mg  200 mg Oral TID    hydrALAZINE (APRESOLINE) 20 mg/mL injection 20 mg  20 mg IntraVENous Q4H PRN    0.9% sodium chloride infusion  50 mL/hr IntraVENous CONTINUOUS    melatonin tablet 4.5 mg  4.5 mg Oral QHS    sodium chloride (NS) flush 5-40 mL  5-40 mL IntraVENous Q8H    sodium chloride (NS) flush 5-40 mL  5-40 mL IntraVENous PRN    acetaminophen (TYLENOL) tablet 650 mg  650 mg Oral Q6H PRN    Or    acetaminophen (TYLENOL) suppository 650 mg  650 mg Rectal Q6H PRN    ondansetron (ZOFRAN ODT) tablet 4 mg  4 mg Oral Q8H PRN    Or    ondansetron (ZOFRAN) injection 4 mg  4 mg IntraVENous Q6H PRN    enoxaparin (LOVENOX) injection 40 mg  40 mg SubCUTAneous DAILY    lidocaine 4 % patch 2 Patch  2 Patch TransDERmal Q24H    famotidine (PEPCID) tablet 20 mg  20 mg Oral BID    psyllium husk-aspartame (METAMUCIL FIBER) packet 1 Packet  1 Packet Oral BID        Laboratory data and review:    No results for input(s): WBC, HGB, HCT, PLT, HGBEXT, HCTEXT, PLTEXT, HGBEXT, HCTEXT, PLTEXT in the last 72 hours. No results for input(s): NA, K, CL, CO2, GLU, BUN, CREA, CA, MG, PHOS, ALB, TBIL, ALT, INR, INREXT, INREXT in the last 72 hours. No lab exists for component: SGOT    No components found for: [place]    Diagnostics: Imaging studies have been reviewed    Telemetry reviewed by me:   normal sinus rhythm    Assessment and Plan:    Metastatic adenocarcinoma with unknown primary origin (1/18/2023) / Bony metastasis ([facility] [address] (1/11/2023) POA: CT scan abdomen and pelvis done showed multifocal osseous metastatic disease in the thoracic spine, lumbar spine, pelvis, left ribs, and right scapula. Potential biopsy sites including anterior left iliac bone and superior right scapula. Multiple small hepatic lesions are suspicious for metastatic disease. MRi brain as noted below. Seen by Oncology and he is sp a CT guided right iliac lesion biopsy.  Path showed metastatic adenocarcinoma with oncocytic features, unknown primary. He has been reviewed by rad-oncology who did XRT right shoulder and the right distal femur for pain control 1/19. Continue Oxycodone PRN. Gabapentin, Lidoderm patch. Wean IV Dilaudid PRN. PT, OT and CM for discharge to home with Arash Waters. Spouse seems overwhelmed and needs a little time to be ready. Acute metabolic encephalopathy (7/68/3681) / hx Dementia POA: symptoms resolved. Likely triggered by electrolyte derangements. No focus of infection. TSH and vitamin B12 were normal. CT scan head neg and MRi brain neg for acute infarct. There was however a parasagittal right frontal enhancing intraosseous lesion with intracranial extension measuring up to 3.1 x 3.1 cm, suspicious for aggressive lesion such as metastatic or primary osseous lesion like multiple myeloma, or lymphoma. Outpatient follow up as scheduled with Oncology     Acute pain of right shoulder (1/11/2023) POA: due to osseous metastatic disease and a small high-grade partial-thickness undersurface tear anterior supraspinatus as noted on the shoulder MRi. Ortho and palliative care following. Pain control and he is sp radiation therapy as noted above     Bradycardia (1/14/2023) POA: Resolved. TSH normal. Echo done 3/2021 showed a normal LV function. Seen by cardiology. No AV clemencia blocking agents. Monitor    Elevated BP without diagnosis of hypertension (1/14/2023) POA: partly driven by pain. Continue Diovan (new). IV Hydralazine PRN. Hypercalcemia (1/11/2023) POA:due to malignancy given diffuse osseous involvement. He received Zometa. Resolved     Centrilobular emphysema POA: noted on CT scan.  Nebs PRN    Hypokalemia (1/14/2023) - repleted and now normal.                  Care Plan discussed with: Patient, Care Manager, and Nursing Staff    Discussed:  Care Plan and D/C Planning    Prophylaxis:  H2B/PPI, enoxaparin    Anticipated Disposition:  PT, OT, RN           ___________________________________________________    Attending Physician:   Praveena Paris MD

## 2023-01-19 NOTE — PROGRESS NOTES
Comprehensive Nutrition Assessment    Type and Reason for Visit: Reassess    Nutrition Recommendations/Plan:   Continue regular diet order  Provide Ensure High Protein TID (480 kcal, 57 g carbs, 48 g protein)       Malnutrition Assessment:  Malnutrition Status:  Mild malnutrition (01/16/23 1111)    Context:  Acute illness     Findings of the 6 clinical characteristics of malnutrition:   Energy Intake:  75% or less of est energy req for 7 or more days (Reported lack of appetite x 3 weeks)  Weight Loss:  Unable to assess     Body Fat Loss:  No significant body fat loss,     Muscle Mass Loss:  No significant muscle mass loss,    Fluid Accumulation:  No significant fluid accumulation,     Strength:  Not performed     Nutrition Assessment:     1/19: Follow up. Patient off unit at radiation during attempted RD visits. Plenty of snacks at bedside, key lime pie. No ONS intake documented at this time. No empty ONS bottles noted in room. Per documentation, patient consuming ~75% of most meals. Noted no new weights obtained. Per IDR discussion, may discharge today vs tomorrow. Last 3 Recorded Weights in this Encounter    01/09/23 1954 01/11/23 0326 01/11/23 1059   Weight: 88.5 kg (195 lb) 20 kg (44 lb 1.5 oz) 87.5 kg (192 lb 14.4 oz)       Documented Meal intake:  Patient Vitals for the past 168 hrs:   % Diet Eaten   01/18/23 0935 26 - 50%   01/17/23 1730 76 - 100%   01/17/23 1245 76 - 100%   01/17/23 0904 51 - 75%   01/16/23 1639 76 - 100%   01/16/23 1200 76 - 100%   01/16/23 0834 76 - 100%   01/14/23 1815 26 - 50%   01/14/23 1259 51 - 75%   01/13/23 1256 26 - 50%   01/13/23 0826 51 - 75%       Documentation of supplement intake:  Patient Vitals for the past 168 hrs:   Supplement intake %   01/13/23 0826 0%       Nutrition Related Findings:      Wound Type: None  Last Bowel Movement Date: 01/15/23  Abdominal Assessment: Intact  Edema:No data recorded    Nutr.  Labs:    Lab Results   Component Value Date/Time    GFR est AA >60 01/28/2021 06:16 PM    GFR est non-AA >60 01/28/2021 06:16 PM    Creatinine (POC) 0.9 02/23/2016 05:38 PM    Creatinine 0.78 01/16/2023 01:47 AM    BUN 14 01/16/2023 01:47 AM    BUN (POC) 21 (H) 02/23/2016 05:38 PM    Sodium (POC) 136 02/23/2016 05:38 PM    Sodium 141 01/16/2023 01:47 AM    Potassium 3.7 01/16/2023 01:47 AM    Potassium (POC) 4.0 02/23/2016 05:38 PM    Chloride (POC) 103 02/23/2016 05:38 PM    Chloride 110 (H) 01/16/2023 01:47 AM    CO2 23 01/16/2023 01:47 AM       Lab Results   Component Value Date/Time    Glucose 112 (H) 01/16/2023 01:47 AM    Glucose (POC) 116 (H) 01/22/2021 05:22 AM       No results found for: HBA1C, INL0LNKW, CYI6OUZC, SEE2UUYA    Nutr. Meds:  Lovenox, Pepcid, hydralazine PRN, Remeron, zofran PRN, miralax PRN, metamucil, pericolace    Current Nutrition Intake & Therapies:  Average Meal Intake: 51-75%  Average Supplement Intake: Unable to assess  ADULT DIET Regular  ADULT ORAL NUTRITION SUPPLEMENT Dinner, Lunch, Breakfast; Low Calorie/High Protein    Anthropometric Measures:  Height: 5' 7.01\" (170.2 cm)  Ideal Body Weight (IBW): 148 lbs (67 kg)     Current Body Wt:  87.5 kg (192 lb 14.4 oz), 130.3 % IBW. Standing scale  Current BMI (kg/m2): 30.2        Weight Adjustment: No adjustment                 BMI Category: Obese class 1 (BMI 30.0-34. 9)    Estimated Daily Nutrient Needs:  Energy Requirements Based On: Formula  Weight Used for Energy Requirements: Current  Energy (kcal/day): 1860 - 2015 (MSJ x 1.2-1.3)  Weight Used for Protein Requirements: Current  Protein (g/day):  (1-1.2g/kg)  Method Used for Fluid Requirements: 1 ml/kcal  Fluid (ml/day): 8416-0152 (1ml/kcal)    Nutrition Diagnosis:   Increased nutrient needs related to catabolic illness as evidenced by  (cancer)  Inadequate oral intake related to cognitive or neurological impairment, increased demand for energy/nutrients as evidenced by intake 39-33% - No longer applicable     Nutrition Interventions: Food and/or Nutrient Delivery: Continue current diet, Continue oral nutrition supplement  Nutrition Education/Counseling: No recommendations at this time  Coordination of Nutrition Care: Continue to monitor while inpatient, Interdisciplinary rounds  Plan of Care discussed with: IDR team    Goals:  Previous Goal Met: Progressing toward goal(s)  Goals: PO intake 50% or greater, by next RD assessment       Nutrition Monitoring and Evaluation:   Behavioral-Environmental Outcomes: None identified  Food/Nutrient Intake Outcomes: Food and nutrient intake, Supplement intake  Physical Signs/Symptoms Outcomes: Biochemical data, Weight    Discharge Planning:    Continue oral nutrition supplement, Continue current diet    Nataliia Nowak MS, RD  Contact: Ext: [phone], or via [employer]

## 2023-01-19 NOTE — PROGRESS NOTES
Problem: Pressure Injury - Risk of  Goal: *Prevention of pressure injury  Description: Document Adrien Scale and appropriate interventions in the flowsheet.   Outcome: Progressing Towards Goal  Note: Pressure Injury Interventions:  Sensory Interventions: Minimize linen layers, Keep linens dry and wrinkle-free         Activity Interventions: PT/OT evaluation, Pressure redistribution bed/mattress(bed type)    Mobility Interventions: PT/OT evaluation, Pressure redistribution bed/mattress (bed type)    Nutrition Interventions: Document food/fluid/supplement intake                     Problem: Patient Education: Go to Patient Education Activity  Goal: Patient/Family Education  Outcome: Progressing Towards Goal     Problem: Pain  Goal: *Control of Pain  Outcome: Progressing Towards Goal

## 2023-01-19 NOTE — PROGRESS NOTES
1/19/2023 2:52 PM CM spoke with treating PT, relayed concerns for pt being able to perform stairs prior to discharge as pt has stairs to bedroom at home. Home health is arranged through [facility] for PT/OT/RN. CM will follow. MINDA Giang    Care Management Progress Note         ICD-10-CM ICD-9-CM     1. Altered mental status, unspecified altered mental status type  R41.82 780.97         2. CHAVA (acute kidney injury) ([facility] [address]  N17.9 584.9         3. Hypercalcemia  E83.52 275.42         4. Bony metastasis ([facility] [address] [C79.51 (ICD-10-CM)]  C79.51 198.5         5. Transient alteration of awareness [R40.4 (ICD-10-CM)]  R40.4 780.02         6. Acute pain of right shoulder [M25.511 (ICD-10-CM)]  M25.511 719.41               RUR:  9%  Risk Level: [x]Low []Moderate []High  Value-based purchasing: [] Yes [x] No  Bundle patient: [] Yes [x] No              Specify:      Transition of care plan:  Ongoing medical management, Oncology following  Home with family at discharge  Pt needing home health PT/OT/RN, [facility] has accepted  Outpatient follow-up.   Pt's family to transport

## 2023-01-19 NOTE — PROGRESS NOTES
Bedside shift change report given to Sal Diallo (oncoming nurse) by Quincy Estevez (offgoing nurse). Report included the following information SBAR, Intake/Output, MAR, and Recent Results.

## 2023-01-19 NOTE — PROGRESS NOTES
Physical Therapy Note    Chart reviewed. Discussed patient with RN. Patient off unit at radiation. Will defer for now.     Heather Peacock, PT, DPT

## 2023-01-19 NOTE — PROGRESS NOTES
Bedside and Verbal shift change report given to Say Schuster RN (oncoming nurse) by Emilia Amato RN (offgoing nurse). Report included the following information SBAR, Kardex, ED Summary, Intake/Output, MAR, and Recent Results.

## 2023-01-19 NOTE — PROGRESS NOTES
[facility] [place] at [address] [facility]  Eduardo Gals: [phone]  F: [phone]      Reason for Visit:   Elma High is a 76 y.o. male who is seen for evaluation of concern for metastatic disease. Hematology/Oncology Treatment History:     Oncology History:   Please review original records for clinical decision making. This summary highlights focused aspects of patient's ongoing care and may have a recurring section in notes with either updates or remain unchanged as a longitudinal care summary. _______________________________________________________________  <>PATHOLOGY<>Specimen #:[id number]  1/12/2023  Bone, right iliac, Core biopsy with touch interpretation: Metastatic adenocarcinoma with oncocytic features (see Comment). CANCER OF UNKNOWN PRIMARY  <>STAGING<>Cancer Staging  Bony metastasis ([facility] [address]  Staging form: Bone - Appendicular Skeleton, Trunk, Skull, and Facial Bones, AJCC 8th Edition  - Clinical: Stage IVB (cT0, cNX, pM1b) - Signed by Balaji De La Paz MD on 1/17/2023  MRI SHOULDER RT WO CONT  1/10/2023 1. Extensive bony metastatic disease to the glenoid with additional areas of marrow replacement in the proximal humeral neck. This is compatible with metastatic disease 2. Small high-grade partial-thickness undersurface tear anterior supraspinatus   CT CHEST ABD PELV W CONT 1/11/2023 1. Multifocal osseous metastatic disease in the thoracic spine, lumbar spine, pelvis, left ribs, and right scapula. Potential biopsy sites including anterior left iliac bone and superior right scapula. 2. Multiple small hepatic lesions are suspicious for metastatic disease in this clinical scenario. 3. Moderate centrilobular emphysema. No pulmonary nodule. 4. Mediastinal AP window lymphadenopathy. 5. All findings suspicious for malignancy are new since 2021. Recommendation: Check PSA. Review result of any recent colonoscopy.  Consider bone scan to establish baseline. MRI BRAIN W WO CONT 1/11/2023 Parasagittal right frontal enhancing intraosseous lesion with intracranial extension measuring up to 3.1 x 3.1 cm, suspicious for aggressive lesion such as metastatic or primary osseous lesion like multiple myeloma, or lymphoma. Clinical correlation is advised. This may be an amenable to percutaneous biopsy for tissue diagnosis. Underlying associated diffuse bilateral enhancing dural thickening which may suggest meningitis versus carcinomatosis. Intracranial hypertension is in the differential diagnostic considerations. No acute infarct, intracranial hemorrhage or brain mass lesion. <>GOALS OF CARE<>PALLIATIVE INTENT    History of Present Illness:   Dio Llanes is a pleasant 76 y.o. male who was admitted on 1/9;23 for AMS and falls at home. Family noticed confusion at home x 2 days. Has had rt shoulder pain/injury x 1 month  and been following with  outpatient provider. ED labs WBC 12.5  Creat 1.3 Ca 12.5 ED imaging CT head no acute intracranial process; minimal chronic microvascular ischemic change ; XR rt shoulder mottled lucencies  of glenoid and posterior acromion; aggressive lytic osseous lesions  MRI reveals extensive bony metastatic disease to glenoid and marrow replacement in the proximal humeral neck compatible with metastatic disease. Small tear anterior supraspinatus. Pt unable to state why he was brought to the hospital. States having pain to rt shoulder; unsure about any confusion at home. Correctly states name, yr , location and president. Denies SOB, pain other than rt shoulder, changes in bowels, N/V. Denies personal hx of cancer. Family Hx of cancer: Mother; ? lung  : blended family; 2 children ( Annika/Trip) and step child Darylayse Lala) ; retired    Smoking hx: denies   ETOH: social drinker  EGD/Colonoscopy: 2017 s/p sigmoid colon resection/diverticulosis     No family at bedside.      Addendum: wife arrived; updated on plan and corrected hx above. States pt has not been eating or drinking for the last 2 weeks. Has had decreased activity for several weeks. Noted decrease in mental changes since Nov.     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 Reports got tx today. Wife and additional family at bedside. Wife concerned about pt having 14 steps at home to get to upstairs bedroom. Past Medical History:   Diagnosis Date    Diverticulitis     Gastrointestinal disorder     History of vascular access device 07/24/2017    [facility] VAT -  5FR triple Lumen Power PICC R Basilic  39 cm    Spinal stenosis        Past Surgical History:   Procedure Laterality Date    FLEXIBLE SIGMOIDOSCOPY N/A 7/27/2017    SIGMOIDOSCOPY FLEXIBLE performed by Rachel Saucedo MD at [facility] ENDOSCOPY    IR INSERT NON TUNL CVC OVER 5 YRS  1/13/2021       Social History     Socioeconomic History    Marital status:      Spouse name: Not on file    Number of children: Not on file    Years of education: Not on file    Highest education level: Not on file   Occupational History    Not on file   Tobacco Use    Smoking status: Former     Packs/day: 1.00     Years: 50.00     Pack years: 50.00     Types: Cigarettes    Smokeless tobacco: Current   Substance and Sexual Activity    Alcohol use: Yes     Alcohol/week: 0.8 standard drinks     Types: 1 Cans of beer per week    Drug use: No    Sexual activity: Not on file   Other Topics Concern    Not on file   Social History Narrative    Not on file     Social Determinants of Health     Financial Resource Strain: Not on file   Food Insecurity: Not on file   Transportation Needs: Not on file   Physical Activity: Not on file   Stress: Not on file   Social Connections: Not on file   Intimate Partner Violence: Not on file   Housing Stability: Not on file       No family history on file.     Current Facility-Administered Medications   Medication Dose Route Frequency    mirtazapine (REMERON) tablet 7.5 mg  7.5 mg Oral QHS    senna-docusate (PERICOLACE) 8.6-50 mg per tablet 2 Tablet  2 Tablet Oral DAILY    polyethylene glycol (MIRALAX) packet 17 g  17 g Oral DAILY PRN    acetaminophen (TYLENOL) tablet 1,000 mg  1,000 mg Oral Q8H    oxyCODONE IR (ROXICODONE) tablet 10 mg  10 mg Oral Q4H PRN    valsartan (DIOVAN) tablet 80 mg  80 mg Oral DAILY    HYDROmorphone (DILAUDID) syringe 0.5 mg  0.5 mg IntraVENous Q6H PRN    gabapentin (NEURONTIN) capsule 200 mg  200 mg Oral TID    hydrALAZINE (APRESOLINE) 20 mg/mL injection 20 mg  20 mg IntraVENous Q4H PRN    0.9% sodium chloride infusion  50 mL/hr IntraVENous CONTINUOUS    melatonin tablet 4.5 mg  4.5 mg Oral QHS    sodium chloride (NS) flush 5-40 mL  5-40 mL IntraVENous Q8H    sodium chloride (NS) flush 5-40 mL  5-40 mL IntraVENous PRN    acetaminophen (TYLENOL) tablet 650 mg  650 mg Oral Q6H PRN    Or    acetaminophen (TYLENOL) suppository 650 mg  650 mg Rectal Q6H PRN    ondansetron (ZOFRAN ODT) tablet 4 mg  4 mg Oral Q8H PRN    Or    ondansetron (ZOFRAN) injection 4 mg  4 mg IntraVENous Q6H PRN    enoxaparin (LOVENOX) injection 40 mg  40 mg SubCUTAneous DAILY    lidocaine 4 % patch 2 Patch  2 Patch TransDERmal Q24H    famotidine (PEPCID) tablet 20 mg  20 mg Oral BID    psyllium husk-aspartame (METAMUCIL FIBER) packet 1 Packet  1 Packet Oral BID       No Known Allergies    Review of Systems Provided by:  Patient  Review of Systems: A complete review of systems was obtained, reviewed. Pertinent findings reviewed above.       Physical Exam:   Visit Vitals  /68 (BP 1 Location: Right leg, BP Patient Position: At rest)   Pulse 76   Temp 98.2 °F (36.8 °C)   Resp 16   Ht 5' 7.01\" (1.702 m)   Wt 192 lb 14.4 oz (87.5 kg)   SpO2 92%   BMI 30.21 kg/m²     ECOG: 3  General: no distress  Respiratory: normal respiratory effort  CV: no peripheral edema  Skin: no rashes; no ecchymoses; no petechiae  Psych: alert, oriented, normal mood/affect     Results:     Lab Results Component Value Date/Time    WBC 9.7 01/15/2023 04:16 AM    HGB 12.1 01/15/2023 04:16 AM    HCT 37.1 01/15/2023 04:16 AM    PLATELET 988 57/65/4450 04:16 AM    MCV 87.9 01/15/2023 04:16 AM    ABS. NEUTROPHILS 7.2 01/15/2023 04:16 AM    Hemoglobin (POC) 15.6 02/23/2016 05:38 PM    Hematocrit (POC) 46 02/23/2016 05:38 PM     Lab Results   Component Value Date/Time    Sodium 141 01/16/2023 01:47 AM    Potassium 3.7 01/16/2023 01:47 AM    Chloride 110 (H) 01/16/2023 01:47 AM    CO2 23 01/16/2023 01:47 AM    Glucose 112 (H) 01/16/2023 01:47 AM    BUN 14 01/16/2023 01:47 AM    Creatinine 0.78 01/16/2023 01:47 AM    GFR est AA >60 01/28/2021 06:16 PM    GFR est non-AA >60 01/28/2021 06:16 PM    Calcium 7.9 (L) 01/16/2023 01:47 AM    Sodium (POC) 136 02/23/2016 05:38 PM    Potassium (POC) 4.0 02/23/2016 05:38 PM    Chloride (POC) 103 02/23/2016 05:38 PM    Glucose (POC) 116 (H) 01/22/2021 05:22 AM    BUN (POC) 21 (H) 02/23/2016 05:38 PM    Creatinine (POC) 0.9 02/23/2016 05:38 PM    Calcium, ionized (POC) 1.09 (L) 02/23/2016 05:38 PM     Lab Results   Component Value Date/Time    Bilirubin, total 0.6 01/16/2023 01:47 AM    ALT (SGPT) 50 01/16/2023 01:47 AM    Alk.  phosphatase 125 (H) 01/16/2023 01:47 AM    Protein, total 6.1 (L) 01/16/2023 01:47 AM    Albumin 2.4 (L) 01/16/2023 01:47 AM    Globulin 3.7 01/16/2023 01:47 AM     Lab Results   Component Value Date/Time    Ferritin 1,418 (H) 01/22/2021 03:02 AM    Vitamin B12 1,602 (H) 01/10/2023 07:01 PM     (H) 01/10/2021 11:18 PM    C-Reactive protein 1.18 (H) 01/22/2021 03:02 AM    TSH 1.35 01/10/2023 07:01 PM    M-Suleiman Not Observed 01/11/2023 05:33 PM    Lipase 80 10/04/2019 02:08 PM       Lab Results   Component Value Date/Time    INR 1.4 (H) 01/23/2021 04:49 AM    aPTT 29.2 01/23/2021 04:49 AM    D-dimer 0.59 01/23/2021 04:49 AM    Fibrinogen 456 01/23/2021 04:49 AM      Lab Results   Component Value Date/Time    Prostate Specific Ag 5.2 (H) 01/11/2023 01:17 PM  (H) 01/10/2021 11:18 PM      1/9/23 CT head wo cont  IMPRESSION  No acute intracranial process. Imaging findings consistent with minimal chronic microvascular ischemic change. There is a mild degree of cerebral atrophy. 1/10/23 XR should   IMPRESSION  1. Mottled lucencies and apparent cortical disruption at the inferior aspect of  the glenoid and posterior aspect of the acromion, not definitely seen on  1/13/2021 chest radiograph. Aggressive lytic osseous lesions +/- pathologic  fractures may have this appearance. A nonemergent CT versus contrast-enhanced  MRI of the shoulder is recommended for better characterization. 2.  Mild to moderate osteoarthritis. 1/10/23 MRI Rt Shoulder  IMPRESSION  1. Extensive bony metastatic disease to the glenoid with additional areas of  marrow replacement in the proximal humeral neck. This is compatible with  metastatic disease  2. Small high-grade partial-thickness undersurface tear anterior supraspinatus    Test results above have been reviewed. Assessment/Recommendations:     Cancer of Unknown Primary  -awaiting further pathology stains. Consistent with adenocarcinoma of unknown primary. Will want to request biomarker testing to see if immunotherapy is an option or if there is any druggable  mutation.   -concern about patient's current  performance status and poor appetite. Continue to monitor. -- discussed with patient and family preliminary pathology diagnosis. Additional testing being requested for further eval.   --follow up appt for clinic reviewed with pt and pt's family ; placed in discharge summary. 2. Hypercalcemia  -improved s/p hydration and Zometa (1/11/23)  -monitor CMP : added for today. : remains pending       3. Cancer Related Pain  -1/19/23 reviewed with Rad/Onc: pt completed sim and palliative radiation to rt shoulder and rt hip today. No plans for additional rad/onc tx at this time.        4. Bony Metastasis  -Patient with negative myeloma work-up; s/p zometa. -monitoring CMP     5. Debility  PT following : showing improvement     6. Goals of care:   Palliative  team following     Plan reviewed with Dr Wong Sheridan.

## 2023-01-19 NOTE — PROGRESS NOTES
Problem: Falls - Risk of  Goal: *Absence of Falls  Description: Document Emiliana Magallanes Fall Risk and appropriate interventions in the flowsheet. Outcome: Progressing Towards Goal  Note: Fall Risk Interventions:                                Problem: Patient Education: Go to Patient Education Activity  Goal: Patient/Family Education  Outcome: Progressing Towards Goal     Problem: Patient Education: Go to Patient Education Activity  Goal: Patient/Family Education  Outcome: Progressing Towards Goal     Problem: Pain  Goal: *Control of Pain  Outcome: Progressing Towards Goal     Problem: [address] (Adult/Pediatric)  Goal: *STG: Participates in treatment plan  Outcome: Progressing Towards Goal  Goal: *STG: Seeks staff when feelings of anxiety and fear arise  Outcome: Progressing Towards Goal  Goal: *STG: Attends activities and groups  Outcome: Progressing Towards Goal  Goal: *STG: Absence of lethality  Outcome: Progressing Towards Goal  Goal: *STG: Demonstrates ability to understand and use improved judgment in daily activities and relationships  Outcome: Progressing Towards Goal  Goal: *STG: Remains safe in hospital  Outcome: Progressing Towards Goal  Goal: *LTG: Obtains optimum level of functioning  Outcome: Progressing Towards Goal  Goal: *STG/LTG: Maintain structure for daily activities  Outcome: Progressing Towards Goal  Goal: *STG/LTG: Complies with medication therapy  Outcome: Progressing Towards Goal  Goal: Interventions  Outcome: Progressing Towards Goal     Problem: Pressure Injury - Risk of  Goal: *Prevention of pressure injury  Description: Document Adrien Scale and appropriate interventions in the flowsheet.   Outcome: Progressing Towards Goal  Note: Pressure Injury Interventions:  Sensory Interventions: Assess changes in LOC    Moisture Interventions: Absorbent underpads    Activity Interventions: PT/OT evaluation, Increase time out of bed    Mobility Interventions: PT/OT evaluation    Nutrition Interventions: Document food/fluid/supplement intake, Offer support with meals,snacks and hydration    Friction and Shear Interventions: HOB 30 degrees or less                Problem: Patient Education: Go to Patient Education Activity  Goal: Patient/Family Education  Outcome: Progressing Towards Goal

## 2023-01-20 PROCEDURE — 74011000250 HC RX REV CODE- 250: Performed by: INTERNAL MEDICINE

## 2023-01-20 PROCEDURE — 74011000250 HC RX REV CODE- 250: Performed by: NURSE PRACTITIONER

## 2023-01-20 PROCEDURE — 74011250636 HC RX REV CODE- 250/636: Performed by: INTERNAL MEDICINE

## 2023-01-20 PROCEDURE — 97116 GAIT TRAINING THERAPY: CPT

## 2023-01-20 PROCEDURE — 97530 THERAPEUTIC ACTIVITIES: CPT

## 2023-01-20 PROCEDURE — 74011250637 HC RX REV CODE- 250/637: Performed by: INTERNAL MEDICINE

## 2023-01-20 PROCEDURE — 99232 SBSQ HOSP IP/OBS MODERATE 35: CPT | Performed by: STUDENT IN AN ORGANIZED HEALTH CARE EDUCATION/TRAINING PROGRAM

## 2023-01-20 PROCEDURE — 74011250637 HC RX REV CODE- 250/637: Performed by: STUDENT IN AN ORGANIZED HEALTH CARE EDUCATION/TRAINING PROGRAM

## 2023-01-20 PROCEDURE — 65270000046 HC RM TELEMETRY

## 2023-01-20 RX ORDER — MIRTAZAPINE 15 MG/1
15 TABLET, FILM COATED ORAL
Status: DISCONTINUED | OUTPATIENT
Start: 2023-01-20 | End: 2023-01-23 | Stop reason: HOSPADM

## 2023-01-20 RX ORDER — OXYCODONE HYDROCHLORIDE 10 MG/1
10 TABLET ORAL
Qty: 45 TABLET | Refills: 0 | Status: SHIPPED | OUTPATIENT
Start: 2023-01-20 | End: 2023-02-03

## 2023-01-20 RX ORDER — MIRTAZAPINE 15 MG/1
15 TABLET, FILM COATED ORAL
Qty: 30 TABLET | Refills: 0 | Status: SHIPPED | OUTPATIENT
Start: 2023-01-20

## 2023-01-20 RX ADMIN — Medication 10 ML: at 15:18

## 2023-01-20 RX ADMIN — PSYLLIUM HUSK 1 PACKET: 3.4 POWDER ORAL at 08:11

## 2023-01-20 RX ADMIN — FAMOTIDINE 20 MG: 20 TABLET, FILM COATED ORAL at 17:33

## 2023-01-20 RX ADMIN — VALSARTAN 80 MG: 80 TABLET ORAL at 08:14

## 2023-01-20 RX ADMIN — ACETAMINOPHEN 1000 MG: 500 TABLET ORAL at 15:16

## 2023-01-20 RX ADMIN — GABAPENTIN 200 MG: 100 CAPSULE ORAL at 15:16

## 2023-01-20 RX ADMIN — SENNOSIDES AND DOCUSATE SODIUM 2 TABLET: 50; 8.6 TABLET ORAL at 08:09

## 2023-01-20 RX ADMIN — FAMOTIDINE 20 MG: 20 TABLET, FILM COATED ORAL at 08:09

## 2023-01-20 RX ADMIN — HYDRALAZINE HYDROCHLORIDE 20 MG: 20 INJECTION INTRAMUSCULAR; INTRAVENOUS at 23:23

## 2023-01-20 RX ADMIN — OXYCODONE HYDROCHLORIDE 10 MG: 5 TABLET ORAL at 15:16

## 2023-01-20 RX ADMIN — GABAPENTIN 200 MG: 100 CAPSULE ORAL at 21:06

## 2023-01-20 RX ADMIN — ACETAMINOPHEN 1000 MG: 500 TABLET ORAL at 08:09

## 2023-01-20 RX ADMIN — MIRTAZAPINE 15 MG: 15 TABLET, FILM COATED ORAL at 21:06

## 2023-01-20 RX ADMIN — ACETAMINOPHEN 1000 MG: 500 TABLET ORAL at 23:14

## 2023-01-20 RX ADMIN — OXYCODONE HYDROCHLORIDE 10 MG: 5 TABLET ORAL at 21:05

## 2023-01-20 RX ADMIN — ENOXAPARIN SODIUM 40 MG: 100 INJECTION SUBCUTANEOUS at 08:09

## 2023-01-20 RX ADMIN — GABAPENTIN 200 MG: 100 CAPSULE ORAL at 08:09

## 2023-01-20 NOTE — PROGRESS NOTES
1/20/2023 3:28 PM   Pt's wife has called [employer] and they are unable to deliver bed until Monday. CM called also and was provided Monday delivery time through [employer]. Pt's wife to decide if she wants hospital bed delivered but she will contact [facility] directly to arrange. CM will follow. 1/20/2023 11:25 AM   CM met with pt's wife regarding plan for pt's discharge. Confirmed plan for [facility] PT/OT/RN. Pt's wife reported she has spoken with Vincenzo at [facility]. Pt's wife inquired about a hospital bed for pt at home as pt's bedroom is on the 2nd floor. CM relayed insurance will not cover a hospital bed for this reason and pt's mobility does not indicate for a hospital bed. Pt's wife was agreeable to private pay hospital bed. CM called to [facility], spoke with Dariel Em who confirmed they have hospital beds in stock that can be rented private pay and they can deliver bed to pt's home on 1/21. Cost is 200$. Dariel Em reported pt's family will need to call to rent bed and put a credit card on file. CM relayed the above to pt's wife and provided her [employer] [employer] phone number([phone]). Pt's wife confirmed she will plan to call shortly. CM notified [facility] via [employer] likely discharge on Saturday vs Sunday. CM will follow. MINDA Zheng     Care Management Progress Note         ICD-10-CM ICD-9-CM     1. Altered mental status, unspecified altered mental status type  R41.82 780.97         2. CHAVA (acute kidney injury) ([facility] [address]  N17.9 584.9         3. Hypercalcemia  E83.52 275.42         4. Bony metastasis ([facility] [address] [C79.51 (ICD-10-CM)]  C79.51 198.5         5. Transient alteration of awareness [R40.4 (ICD-10-CM)]  R40.4 780.02         6.  Acute pain of right shoulder [M25.511 (ICD-10-CM)]  M25.511 719.41               RUR:  9%  Risk Level: [x]Low []Moderate []High  Value-based purchasing: [] Yes [x] No  Bundle patient: [] Yes [x] No              Specify:      Transition of care plan:  Ongoing medical management, Oncology following, pt continuing to work with PT  Home with family at discharge  Pt needing home health PT/OT/RN, [facility] has accepted  Pt's wife to rent a hospital bed private pay for pt at home through [address] follow-up.   Pt's family to transport

## 2023-01-20 NOTE — PROGRESS NOTES
Problem: Mobility Impaired (Adult and Pediatric)  Goal: *Acute Goals and Plan of Care (Insert Text)  Description: FUNCTIONAL STATUS PRIOR TO ADMISSION: Patient was independent and active without use of DME prior to R shoulder injury in November 2022. He has experienced gradual decline in function, requiring furniture support or assist for ambulation. + falls in recent months. HOME SUPPORT PRIOR TO ADMISSION: The patient lived with spouse who assists as needed. Physical Therapy Goals  Goals reviewed 1/17/23 - Continue with same goals  Initiated 1/10/2023  1. Patient will move from supine to sit and sit to supine  in bed with supervision within 7 day(s). 2.  Patient will transfer from bed to chair and chair to bed with supervision/set-up using the least restrictive device within 7 day(s). 3.  Patient will perform sit to stand with supervision/set-up within 7 day(s). 4.  Patient will ambulate with supervision/set-up for 150 feet with the least restrictive device within 7 day(s). 5.  Patient will ascend/descend 14 stairs with one handrail(s) with supervision/set-up within 7 day(s). Outcome: Progressing Towards Goal  Note:   PHYSICAL THERAPY TREATMENT  Patient: Sonya Forrester (73 y.o. male)  Date: 1/20/2023  Diagnosis: Acute metabolic encephalopathy [O83.01] Metastatic adenocarcinoma of unknown origin [facility])      Precautions: Fall (NWB R shoulder (pending MRI))  Chart, physical therapy assessment, plan of care and goals were reviewed. ASSESSMENT  Patient continues with skilled PT services and progressing towards goals. Pt refused use of gait belt or use of quad cane during session. Donned sling on RLE for pt comfort. Improved tolerance for gait training with CGA, occasional increased trunk sway, no overt LOB. Family present for stair training with sequencing for one step at a time LLE weaker than R with single handrail support.      Current Level of Function Impacting Discharge (mobility/balance): CGA    Other factors to consider for discharge:          PLAN :  Patient continues to benefit from skilled intervention to address the above impairments. Continue treatment per established plan of care. to address goals. Recommendation for discharge: (in order for the patient to meet his/her long term goals)  Physical therapy at least 2 days/week in the home AND ensure assist and/or supervision for safety with mobility    This discharge recommendation:  Has been made in collaboration with the attending provider and/or case management    IF patient discharges home will need the following DME: patient owns DME required for discharge       SUBJECTIVE:   Patient stated [place] will try the sling.     OBJECTIVE DATA SUMMARY:   Critical Behavior:  Neurologic State: Alert  Orientation Level: Oriented to person, Oriented to time  Cognition: Follows commands  Safety/Judgement: Decreased awareness of need for assistance, Decreased insight into deficits  Functional Mobility Training:  Bed Mobility:        Sit to Supine: Supervision           Transfers:  Sit to Stand: Contact guard assistance  Stand to Sit: Contact guard assistance                             Balance:  Sitting: Intact  Standing: Impaired  Standing - Static: Good  Standing - Dynamic : Fair  Ambulation/Gait Training:  Distance (ft): 120 Feet (ft)  Assistive Device:  (CGA)  Ambulation - Level of Assistance: Contact guard assistance                 Base of Support: Widened     Speed/Krystle: Pace decreased (<100 feet/min)                       Stairs:  Number of Stairs Trained: 12  Stairs - Level of Assistance: Contact guard assistance   Rail Use: Left     Therapeutic Exercises:     Pain Rating:      Activity Tolerance:   Good    After treatment patient left in no apparent distress:   Supine in bed, Call bell within reach, and Caregiver / family present    COMMUNICATION/COLLABORATION:   The patients plan of care was discussed with: Eduardo nurse.     Maria Luz Ford   Time Calculation: 35 mins

## 2023-01-20 NOTE — PROGRESS NOTES
Ultrasound IV by Antonella Chaparro RN :  Procedure Note    Patient meets criteria for US IV insertion. Ultrasound IV education provided to patient. Opportunities for questions given. Ultrasound used for PIV placement:  20gauge 1.25 inch BD Nexiva  L FA location. 2 X Attempt(s). Flushed with ease; vigorous blood return. Procedure tolerated well. Primary RN aware of IV placement and added to LDA.       Antonella Chaparro RN

## 2023-01-20 NOTE — PROGRESS NOTES
Eugene Kelly [facility] [address]  [address] [facility], [address] [place] [address]  [phone]      Hospitalist  Progress Note      NAME:         Mary Bond. :        1945  MRM:        [id number]    Date of service: 2023      Chief complaint: weakness    Interval HPI: Fu new diagnosed metastatic cancer. Endorses pain all over but jackie in right shoulder. Overall pain better controlled. Endorses diffuse weakness. Spouse at bedside. Discussed discharge planning. Objective:    Vital Signs:    Visit Vitals  /79   Pulse 87   Temp 97.3 °F (36.3 °C)   Resp 18   Ht 5' 7.01\" (1.702 m)   Wt 87.5 kg (192 lb 14.4 oz)   SpO2 92%   BMI 30.21 kg/m²        Intake/Output Summary (Last 24 hours) at 2023 1233  Last data filed at 2023 2230  Gross per 24 hour   Intake 1366.67 ml   Output --   Net 1366.67 ml          Physical Examination:    General:   Weak and ill looking, not in acute distress    Eyes:   pink conjunctivae, PERRLA with no discharge  Pulm: decreased but clear breath sounds without crackles or wheezes  Card:  has regular and normal S1, S2 without thrills, bruits   Abd:  Soft, non-tender, non-distended, normoactive bowel sounds   Musc: Generalized back, right shoulder discomfort  Neuro: Awake and alert.  Generally a non focal exam.   Psych:  Has a fair insight to his illness     Current Facility-Administered Medications   Medication Dose Route Frequency    mirtazapine (REMERON) tablet 7.5 mg  7.5 mg Oral QHS    senna-docusate (PERICOLACE) 8.6-50 mg per tablet 2 Tablet  2 Tablet Oral DAILY    polyethylene glycol (MIRALAX) packet 17 g  17 g Oral DAILY PRN    acetaminophen (TYLENOL) tablet 1,000 mg  1,000 mg Oral Q8H    oxyCODONE IR (ROXICODONE) tablet 10 mg  10 mg Oral Q4H PRN    valsartan (DIOVAN) tablet 80 mg  80 mg Oral DAILY    HYDROmorphone (DILAUDID) syringe 0.5 mg  0.5 mg IntraVENous Q6H PRN    gabapentin (NEURONTIN) capsule 200 mg  200 mg Oral TID    hydrALAZINE (APRESOLINE) 20 mg/mL injection 20 mg  20 mg IntraVENous Q4H PRN    melatonin tablet 4.5 mg  4.5 mg Oral QHS    sodium chloride (NS) flush 5-40 mL  5-40 mL IntraVENous Q8H    sodium chloride (NS) flush 5-40 mL  5-40 mL IntraVENous PRN    acetaminophen (TYLENOL) tablet 650 mg  650 mg Oral Q6H PRN    Or    acetaminophen (TYLENOL) suppository 650 mg  650 mg Rectal Q6H PRN    ondansetron (ZOFRAN ODT) tablet 4 mg  4 mg Oral Q8H PRN    Or    ondansetron (ZOFRAN) injection 4 mg  4 mg IntraVENous Q6H PRN    enoxaparin (LOVENOX) injection 40 mg  40 mg SubCUTAneous DAILY    lidocaine 4 % patch 2 Patch  2 Patch TransDERmal Q24H    famotidine (PEPCID) tablet 20 mg  20 mg Oral BID    psyllium husk-aspartame (METAMUCIL FIBER) packet 1 Packet  1 Packet Oral BID        Laboratory data and review:    Recent Labs     01/19/23  1541   WBC 13.1*   HGB 13.9   HCT 43.5          Recent Labs     01/19/23  1541      K 4.6   *   CO2 19*   *   BUN 12   CREA 0.83   CA 8.3*   ALB 2.4*   ALT 55       No components found for: [place]    Diagnostics: Imaging studies have been reviewed    Telemetry reviewed by me:   normal sinus rhythm    Assessment and Plan:    Metastatic adenocarcinoma with unknown primary origin (1/18/2023) / Bony metastasis ([facility] [address] (1/11/2023) POA: CT scan abdomen and pelvis done showed multifocal osseous metastatic disease in the thoracic spine, lumbar spine, pelvis, left ribs, and right scapula. Multiple small hepatic lesions are suspicious for metastatic disease. MRi brain as noted below. Seen by Oncology and he is sp a CT guided right iliac lesion biopsy. Path showed metastatic adenocarcinoma with oncocytic features, unknown primary. Rad-oncology did XRT to right shoulder and the right distal femur for pain control 1/19. Continue Oxycodone PRN. Continue Gabapentin, Lidoderm patch. Wean IV Dilaudid PRN.  PT, OT and CM for discharge to home with HH. Working with spouse on DC planning; difficult time given new diagnosis     Acute metabolic encephalopathy (1/22/3448) / hx Dementia POA: symptoms resolved. Likely triggered by electrolyte derangements. No focus of infection. TSH and vitamin B12 were normal. CT scan head neg and MRi brain neg for acute infarct. There was however a parasagittal right frontal enhancing intraosseous lesion with intracranial extension measuring up to 3.1 x 3.1 cm, suspicious for aggressive lesion such as metastatic or primary osseous lesion like multiple myeloma, or lymphoma. Outpatient follow up as scheduled with Oncology     Acute pain of right shoulder (1/11/2023) POA: due to osseous metastatic disease and a small high-grade partial-thickness undersurface tear anterior supraspinatus as noted on the shoulder MRi. Ortho and palliative care following. Pain control and he is sp radiation therapy as noted above     Bradycardia (1/14/2023) POA: Resolved. TSH normal. Echo done 3/2021 showed a normal LV function. Seen by cardiology. No AV clemencia blocking agents. Monitor    Elevated BP without diagnosis of hypertension (1/14/2023) POA: partly driven by pain. Continue Diovan (new). IV Hydralazine PRN. Hypercalcemia (1/11/2023) POA: Resolved. due to malignancy given diffuse osseous involvement. S/p  Zometa. Centrilobular emphysema POA: noted on CT scan.  Nebs PRN    Hypokalemia (1/14/2023): replete prn                  Care Plan discussed with: Patient, Care Manager, and Nursing Staff    Discussed:  Care Plan and D/C Planning    Prophylaxis:  H2B/PPI, enoxaparin    Anticipated Disposition:  PT, OT, RN           ___________________________________________________    Attending Physician:   Susy Sommers DO

## 2023-01-20 NOTE — PROGRESS NOTES
Bedside and Verbal shift change report given to Elvira Haro RN (oncoming nurse) by Leena Wyatt RN (offgoing nurse). Report included the following information SBAR, Kardex, MAR, and Recent Results.

## 2023-01-20 NOTE — CONSULTS
Palliative Medicine Progress Note  Jay Jay: [phone] ([phone])    Patient Name: Home Perry. YOB: 1945    Date of Initial Consult: 1/12/2023  Date of Follow-Up: 01/20/23  Reason for Consult: Care Decisions  Requesting Provider: Rajat Campos DO   Primary Care Physician: Jimbo, MD Ashley     SUMMARY:   Home Martinez is a 76 y.o. male with a past history of spinal stenosis and lumbar radiculopathy who was admitted on 1/9/2023 from home with a diagnosis of confusion and subsequently found to have widespread metastatic bony malignancy. Patient apparently having right shoulder pain for >1 month being evaluated by outside physician. Became confused previous 2 days prior to admission leading family to bring him [facility] evaluation. Current medical issues leading to Palliative Medicine involvement include: Care Decisions. Social Hx:  Lives at home with wife, Trudy Montalvo,  >20 years. 3 children, Hank and Jaime Curry with Sheela Perez (Marya's son). Able to manage own ADLs at home per family with occasional assistance from wife. Generally well functioning. Retired manufacturing representative. Interval Hx:  Patient worked with PT this afternoon, able to ambulate down hallway and navigate stairs. Pain tolerable with oxycodone, 1 dose in past 24 hours (8am-8am). No IV pain medication. No nausea or emesis. Appetite remains poor. PALLIATIVE DIAGNOSES:   Suspected Bone Malignancy  Right Shoulder Pain  Poor appetite  Insomnia  Constipation  Encephalopathy  Hypercalcemia  Goals of Care  Palliative Care Encounter     PLAN:   Goals of Care  Reviewed medical chart for history and updated clinical information  Met with patient in room, son Dar present. Patient awake and conversant. Expressing understanding of findings thus far with adenocarcinoma of unknown origin with widespread osseous mets. Plans to follow with Oncology and attempt chemo.   Pain  Suspected due to malignancy, mostly right shoulder. S/p 1 fraction radiation to right shoulder and right leg with improvement  Continue oxycodone 10mg PO every 4 hours as needed, tolerating well and CrCl intact. Would give a dose 1 hour prior to PT sessions. Continue hydromorphone 0.5mg IV every 6 hours as needed for breakthrough pain, note ~equianalgesic to PO oxycodone but would be more rapid onset in acute pain setting. Gabapentin 200mg PO three times daily  Continue scheduled tylenol 1000mg every 8 hours  Constipation  Continue senna 2 tabs daily, titrate to 1 soft bowel movement at least every other day  Hypercalcemia/Encephalopathy, improving  Possibly hypercalcemia of malignancy given bone involvement, contributing to confusion. Delirium precautions: redirect/reorient as possible, lights on during day, minimize interruptions at night, avoid restraints and offending medications like benzodiazepines, allow family presence at bedside. Medication only as needed for agitation that is not redirectable and placing patient or caregivers at risk of harm. Poor appetite and sleep  RD following  Increase Remeron to 15mg at night to help with appetite and sleep  Surrogate:  No AMD in chart. Wife is legal NOK. Code Status:  Full Code. Communicated plan of care with: Palliative IDT, [facility] Team  Palliative will continue to follow along. Referral placed to outpatient palliative clinic for patient to be seen after discharge.      GOALS OF CARE / TREATMENT PREFERENCES:     GOALS OF CARE:  Patient/Health Care Proxy Stated Goals:  (determine results of biopsy, learn about treatment options)    TREATMENT PREFERENCES:   Code Status: Full Code    Patient and family's personal goals include: determine results of biopsy, learn about treatment options    Important upcoming milestones or family events: none reported    The patient identifies the following as important for living well: none reported      Advance Care Planning:  [x] [facility] Interdisciplinary Team has updated the ACP Navigator with Health Care Decision Maker and Patient Capacity      Primary Decision Maker: Qamar Lindsay - [phone]    Secondary Decision Maker: Kurtis Curry - Son - [phone]    Secondary Decision Maker: Cristiana Pompa - Daughter - [phone]  Advance Care Planning 1/13/2023   Patient's Healthcare Decision Maker is: Legal Next of Kin   Confirm Advance Directive -   Patient Would Like to Complete Advance Directive -   Does the patient have other document types -               Other:    As far as possible, the palliative care team has discussed with patient / health care proxy about goals of care / treatment preferences for patient.      HISTORY:     History obtained from: medical chart, family    CHIEF COMPLAINT: confusion, pain    HPI/SUBJECTIVE:    The patient is:   [x] Verbal and participatory  [] Non-participatory due to: sedation post-procedure     Clinical Pain Assessment (nonverbal scale for severity on nonverbal patients):   Clinical Pain Assessment  Severity: 3  Location: right arm, right leg  Character: ache, sharp  Duration: > 1 month  Effect: limits movement and mobility  Factors: better with rest and meds, worse with movement  Frequency: ongoing, waxes/wanes     Activity (Movement): Lying quietly, normal position    Duration: for how long has pt been experiencing pain (e.g., 2 days, 1 month, years)  Frequency: how often pain is an issue (e.g., several times per day, once every few days, constant)     FUNCTIONAL ASSESSMENT:     Palliative Performance Scale (PPS):  PPS: 60       PSYCHOSOCIAL/SPIRITUAL SCREENING:     Palliative IDT has assessed this patient for cultural preferences / practices and a referral made as appropriate to needs (Cultural Services, Patient Advocacy, Ethics, etc.)    Any spiritual / Moravian concerns:  [] Yes /  [x] No   If \"Yes\" to discuss with pastoral care during IDT     Does caregiver feel burdened by caring for their loved one:   [] Yes /  [x] No /  [] No Caregiver Present/Available [] No Caregiver [] Pt Lives at Facility  If \"Yes\" to discuss with social work during IDT    Anticipatory grief assessment:   [x] Normal  / [] Maladaptive     If \"Maladaptive\" to discuss with social work during IDT    ESAS Anxiety: Anxiety: 0    ESAS Depression:          REVIEW OF SYSTEMS:     Positive and pertinent negative findings in ROS are noted above in HPI. The following systems were [x] reviewed / [] unable to be reviewed as noted in HPI  Other findings are noted below. Systems: constitutional, ears/nose/mouth/throat, respiratory, gastrointestinal, genitourinary, musculoskeletal, integumentary, neurologic, psychiatric, endocrine. Positive findings noted below. Modified ESAS Completed by: provider           Pain: 3   Anxiety: 0 Nausea: 0   Anorexia: 4 Dyspnea: 0     Constipation: No     Stool Occurrence(s): 0        PHYSICAL EXAM:     From RN flowsheet:  Wt Readings from Last 3 Encounters:   01/11/23 192 lb 14.4 oz (87.5 kg)   03/02/21 190 lb (86.2 kg)   02/15/21 190 lb (86.2 kg)     Blood pressure 126/79, pulse 87, temperature 97.3 °F (36.3 °C), resp. rate 18, height 5' 7.01\" (1.702 m), weight 192 lb 14.4 oz (87.5 kg), SpO2 92 %.     Pain Scale 1: Numeric (0 - 10)  Pain Intensity 1: 1     Pain Location 1: Shoulder  Pain Orientation 1: Right  Pain Description 1: Aching, Constant  Pain Intervention(s) 1: Repositioned  Last bowel movement, if known:     Constitutional: sitting up in bed, no distress  Eyes: pupils equal, anicteric  ENMT: no nasal discharge, moist mucous membranes  Cardiovascular: regular rate at time of exam, no edema  Respiratory: breathing not labored, symmetric chest rise  Musculoskeletal: no deformity, moves all extremities  Skin: warm, dry  Neurologic: awake and alert, communicates self, follows commands  Psychiatric: no hallucinations, no agitation       HISTORY:     Principal Problem: Metastatic adenocarcinoma of unknown origin ([facility] [address] (1/18/2023)    Active Problems:    Acute metabolic encephalopathy (4/18/7311)      Bony metastasis ([address] (1/11/2023)      Hypercalcemia (1/11/2023)      Transient alteration of awareness (1/11/2023)      Acute pain of right shoulder (1/11/2023)      Bradycardia (1/14/2023)      Elevated BP without diagnosis of hypertension (1/14/2023)      Centrilobular emphysema ([facility] [address] (1/14/2023)      Hypokalemia (1/14/2023)    Past Medical History:   Diagnosis Date    Diverticulitis     Gastrointestinal disorder     History of vascular access device 07/24/2017    [facility] VAT -  5FR triple Lumen Power PICC R Basilic  39 cm    Spinal stenosis       Past Surgical History:   Procedure Laterality Date    FLEXIBLE SIGMOIDOSCOPY N/A 7/27/2017    SIGMOIDOSCOPY FLEXIBLE performed by Cornelio Saini MD at [facility] ENDOSCOPY    IR INSERT NON TUNL CVC OVER 5 YRS  1/13/2021      No family history on file. History reviewed, no pertinent family history. Social History     Tobacco Use    Smoking status: Former     Packs/day: 1.00     Years: 50.00     Pack years: 50.00     Types: Cigarettes    Smokeless tobacco: Current   Substance Use Topics    Alcohol use:  Yes     Alcohol/week: 0.8 standard drinks     Types: 1 Cans of beer per week     No Known Allergies   Current Facility-Administered Medications   Medication Dose Route Frequency    mirtazapine (REMERON) tablet 15 mg  15 mg Oral QHS    senna-docusate (PERICOLACE) 8.6-50 mg per tablet 2 Tablet  2 Tablet Oral DAILY    polyethylene glycol (MIRALAX) packet 17 g  17 g Oral DAILY PRN    acetaminophen (TYLENOL) tablet 1,000 mg  1,000 mg Oral Q8H    oxyCODONE IR (ROXICODONE) tablet 10 mg  10 mg Oral Q4H PRN    valsartan (DIOVAN) tablet 80 mg  80 mg Oral DAILY    HYDROmorphone (DILAUDID) syringe 0.5 mg  0.5 mg IntraVENous Q6H PRN    gabapentin (NEURONTIN) capsule 200 mg  200 mg Oral TID    hydrALAZINE (APRESOLINE) 20 mg/mL injection 20 mg  20 mg IntraVENous Q4H PRN    sodium chloride (NS) flush 5-40 mL  5-40 mL IntraVENous Q8H    sodium chloride (NS) flush 5-40 mL  5-40 mL IntraVENous PRN    acetaminophen (TYLENOL) tablet 650 mg  650 mg Oral Q6H PRN    Or    acetaminophen (TYLENOL) suppository 650 mg  650 mg Rectal Q6H PRN    ondansetron (ZOFRAN ODT) tablet 4 mg  4 mg Oral Q8H PRN    Or    ondansetron (ZOFRAN) injection 4 mg  4 mg IntraVENous Q6H PRN    enoxaparin (LOVENOX) injection 40 mg  40 mg SubCUTAneous DAILY    lidocaine 4 % patch 2 Patch  2 Patch TransDERmal Q24H    famotidine (PEPCID) tablet 20 mg  20 mg Oral BID          LAB AND IMAGING FINDINGS:     Lab Results   Component Value Date/Time    WBC 13.1 (H) 01/19/2023 03:41 PM    HGB 13.9 01/19/2023 03:41 PM    PLATELET 806 38/54/6437 03:41 PM     Lab Results   Component Value Date/Time    Sodium 139 01/19/2023 03:41 PM    Potassium 4.6 01/19/2023 03:41 PM    Chloride 112 (H) 01/19/2023 03:41 PM    CO2 19 (L) 01/19/2023 03:41 PM    BUN 12 01/19/2023 03:41 PM    Creatinine 0.83 01/19/2023 03:41 PM    Calcium 8.3 (L) 01/19/2023 03:41 PM    Magnesium 1.8 01/16/2023 01:47 AM    Phosphorus 1.9 (L) 01/16/2023 01:47 AM      Lab Results   Component Value Date/Time    Alk.  phosphatase 122 (H) 01/19/2023 03:41 PM    Protein, total 6.5 01/19/2023 03:41 PM    Albumin 2.4 (L) 01/19/2023 03:41 PM    Globulin 4.1 (H) 01/19/2023 03:41 PM     Lab Results   Component Value Date/Time    INR 1.4 (H) 01/23/2021 04:49 AM    Prothrombin time 13.9 (H) 01/23/2021 04:49 AM    aPTT 29.2 01/23/2021 04:49 AM      Lab Results   Component Value Date/Time    Ferritin 1,418 (H) 01/22/2021 03:02 AM      Lab Results   Component Value Date/Time    pH 7.44 01/11/2021 05:09 AM    PCO2 31 (L) 01/11/2021 05:09 AM    PO2 69 (L) 01/11/2021 05:09 AM     No components found for: GLPOC   No results found for: CPK, CKMB             Total time:   Counseling / coordination time, spent as noted above:   > 50% counseling / coordination?: yes    Prolonged service was provided for  []30 min   []75 min in face to face time in the presence of the patient, spent as noted above. Time Start:   Time End:   Note: this can only be billed with 81455 (initial) or 51610 (follow up). If multiple start / stop times, list each separately.

## 2023-01-20 NOTE — PROGRESS NOTES
Medicare pt has received, reviewed, and signed 2nd IM letter informing them of their right to appeal the discharge. Signed copy has been placed on pt bedside chart.   Farheen Abreu CMS

## 2023-01-21 LAB
ALBUMIN SERPL-MCNC: 2.4 G/DL (ref 3.5–5)
ALBUMIN/GLOB SERPL: 0.6 (ref 1.1–2.2)
ALP SERPL-CCNC: 119 U/L (ref 45–117)
ALT SERPL-CCNC: 48 U/L (ref 12–78)
ANION GAP SERPL CALC-SCNC: 10 MMOL/L (ref 5–15)
AST SERPL-CCNC: 47 U/L (ref 15–37)
BASOPHILS # BLD: 0 K/UL (ref 0–0.1)
BASOPHILS NFR BLD: 0 % (ref 0–1)
BILIRUB SERPL-MCNC: 0.6 MG/DL (ref 0.2–1)
BUN SERPL-MCNC: 14 MG/DL (ref 6–20)
BUN/CREAT SERPL: 17 (ref 12–20)
CALCIUM SERPL-MCNC: 8.1 MG/DL (ref 8.5–10.1)
CHLORIDE SERPL-SCNC: 107 MMOL/L (ref 97–108)
CO2 SERPL-SCNC: 23 MMOL/L (ref 21–32)
CREAT SERPL-MCNC: 0.83 MG/DL (ref 0.7–1.3)
DIFFERENTIAL METHOD BLD: ABNORMAL
EOSINOPHIL # BLD: 0.1 K/UL (ref 0–0.4)
EOSINOPHIL NFR BLD: 1 % (ref 0–7)
ERYTHROCYTE [DISTWIDTH] IN BLOOD BY AUTOMATED COUNT: 14.1 % (ref 11.5–14.5)
GLOBULIN SER CALC-MCNC: 3.8 G/DL (ref 2–4)
GLUCOSE SERPL-MCNC: 108 MG/DL (ref 65–100)
HCT VFR BLD AUTO: 37.7 % (ref 36.6–50.3)
HGB BLD-MCNC: 12.8 G/DL (ref 12.1–17)
IMM GRANULOCYTES # BLD AUTO: 0.1 K/UL (ref 0–0.04)
IMM GRANULOCYTES NFR BLD AUTO: 1 % (ref 0–0.5)
LYMPHOCYTES # BLD: 1.2 K/UL (ref 0.8–3.5)
LYMPHOCYTES NFR BLD: 12 % (ref 12–49)
MAGNESIUM SERPL-MCNC: 1.9 MG/DL (ref 1.6–2.4)
MCH RBC QN AUTO: 29 PG (ref 26–34)
MCHC RBC AUTO-ENTMCNC: 34 G/DL (ref 30–36.5)
MCV RBC AUTO: 85.5 FL (ref 80–99)
MONOCYTES # BLD: 1 K/UL (ref 0–1)
MONOCYTES NFR BLD: 10 % (ref 5–13)
NEUTS SEG # BLD: 7.3 K/UL (ref 1.8–8)
NEUTS SEG NFR BLD: 76 % (ref 32–75)
NRBC # BLD: 0 K/UL (ref 0–0.01)
NRBC BLD-RTO: 0 PER 100 WBC
PHOSPHATE SERPL-MCNC: 2.2 MG/DL (ref 2.6–4.7)
PLATELET # BLD AUTO: 320 K/UL (ref 150–400)
PMV BLD AUTO: 9.5 FL (ref 8.9–12.9)
POTASSIUM SERPL-SCNC: 3 MMOL/L (ref 3.5–5.1)
PROT SERPL-MCNC: 6.2 G/DL (ref 6.4–8.2)
RBC # BLD AUTO: 4.41 M/UL (ref 4.1–5.7)
SODIUM SERPL-SCNC: 140 MMOL/L (ref 136–145)
WBC # BLD AUTO: 9.6 K/UL (ref 4.1–11.1)

## 2023-01-21 PROCEDURE — 84100 ASSAY OF PHOSPHORUS: CPT

## 2023-01-21 PROCEDURE — 85025 COMPLETE CBC W/AUTO DIFF WBC: CPT

## 2023-01-21 PROCEDURE — 36415 COLL VENOUS BLD VENIPUNCTURE: CPT

## 2023-01-21 PROCEDURE — 80053 COMPREHEN METABOLIC PANEL: CPT

## 2023-01-21 PROCEDURE — 74011000250 HC RX REV CODE- 250: Performed by: INTERNAL MEDICINE

## 2023-01-21 PROCEDURE — 74011000250 HC RX REV CODE- 250: Performed by: NURSE PRACTITIONER

## 2023-01-21 PROCEDURE — 65270000046 HC RM TELEMETRY

## 2023-01-21 PROCEDURE — 74011250637 HC RX REV CODE- 250/637: Performed by: INTERNAL MEDICINE

## 2023-01-21 PROCEDURE — 74011250636 HC RX REV CODE- 250/636: Performed by: INTERNAL MEDICINE

## 2023-01-21 PROCEDURE — 74011250637 HC RX REV CODE- 250/637: Performed by: STUDENT IN AN ORGANIZED HEALTH CARE EDUCATION/TRAINING PROGRAM

## 2023-01-21 PROCEDURE — 83735 ASSAY OF MAGNESIUM: CPT

## 2023-01-21 RX ORDER — POTASSIUM CHLORIDE 750 MG/1
40 TABLET, FILM COATED, EXTENDED RELEASE ORAL
Status: COMPLETED | OUTPATIENT
Start: 2023-01-21 | End: 2023-01-21

## 2023-01-21 RX ORDER — HYDROMORPHONE HYDROCHLORIDE 1 MG/ML
0.5 INJECTION, SOLUTION INTRAMUSCULAR; INTRAVENOUS; SUBCUTANEOUS
Status: DISCONTINUED | OUTPATIENT
Start: 2023-01-21 | End: 2023-01-23 | Stop reason: HOSPADM

## 2023-01-21 RX ADMIN — SENNOSIDES AND DOCUSATE SODIUM 2 TABLET: 50; 8.6 TABLET ORAL at 09:09

## 2023-01-21 RX ADMIN — FAMOTIDINE 20 MG: 20 TABLET, FILM COATED ORAL at 09:08

## 2023-01-21 RX ADMIN — POTASSIUM CHLORIDE 40 MEQ: 750 TABLET, FILM COATED, EXTENDED RELEASE ORAL at 13:54

## 2023-01-21 RX ADMIN — Medication 10 ML: at 21:23

## 2023-01-21 RX ADMIN — POTASSIUM CHLORIDE 40 MEQ: 750 TABLET, FILM COATED, EXTENDED RELEASE ORAL at 11:32

## 2023-01-21 RX ADMIN — VALSARTAN 80 MG: 80 TABLET ORAL at 11:33

## 2023-01-21 RX ADMIN — OXYCODONE HYDROCHLORIDE 10 MG: 5 TABLET ORAL at 09:08

## 2023-01-21 RX ADMIN — ENOXAPARIN SODIUM 40 MG: 100 INJECTION SUBCUTANEOUS at 09:08

## 2023-01-21 RX ADMIN — ACETAMINOPHEN 1000 MG: 500 TABLET ORAL at 09:09

## 2023-01-21 RX ADMIN — Medication 10 ML: at 13:55

## 2023-01-21 RX ADMIN — MIRTAZAPINE 15 MG: 15 TABLET, FILM COATED ORAL at 21:22

## 2023-01-21 RX ADMIN — ACETAMINOPHEN 1000 MG: 500 TABLET ORAL at 17:51

## 2023-01-21 RX ADMIN — OXYCODONE HYDROCHLORIDE 10 MG: 5 TABLET ORAL at 17:51

## 2023-01-21 RX ADMIN — Medication 10 ML: at 06:23

## 2023-01-21 RX ADMIN — GABAPENTIN 200 MG: 100 CAPSULE ORAL at 09:08

## 2023-01-21 RX ADMIN — GABAPENTIN 200 MG: 100 CAPSULE ORAL at 21:22

## 2023-01-21 RX ADMIN — FAMOTIDINE 20 MG: 20 TABLET, FILM COATED ORAL at 17:51

## 2023-01-21 RX ADMIN — GABAPENTIN 200 MG: 100 CAPSULE ORAL at 17:51

## 2023-01-21 NOTE — PROGRESS NOTES
Problem: Falls - Risk of  Goal: *Absence of Falls  Description: Document Jessica Parham Fall Risk and appropriate interventions in the flowsheet. Outcome: Progressing Towards Goal  Note: Fall Risk Interventions:                                Problem: Pressure Injury - Risk of  Goal: *Prevention of pressure injury  Description: Document Adrien Scale and appropriate interventions in the flowsheet.   Outcome: Progressing Towards Goal  Note: Pressure Injury Interventions:  Sensory Interventions: Assess changes in LOC, Keep linens dry and wrinkle-free, Maintain/enhance activity level, Minimize linen layers, Pad between skin to skin    Moisture Interventions: Absorbent underpads, Limit adult briefs, Maintain skin hydration (lotion/cream), Minimize layers    Activity Interventions: Increase time out of bed, PT/OT evaluation    Mobility Interventions: HOB 30 degrees or less    Nutrition Interventions: Document food/fluid/supplement intake, Discuss nutritional consult with provider, Offer support with meals,snacks and hydration    Friction and Shear Interventions: HOB 30 degrees or less, Minimize layers, Transferring/repositioning devices

## 2023-01-21 NOTE — PROGRESS NOTES
[facility] 79  380 [facility], [address] [place] [address]  [phone]      Hospitalist  Progress Note      NAME:         Leodan Davison. :        1945  MRM:        [id number]    Date of service: 2023      Chief complaint: weakness    Interval HPI: Fu newly diagnosed metastatic cancer. E Overall pain better controlled. Endorses diffuse weakness. Daughter at bedside. Discussed discharge planning. Objective:    Vital Signs:    Visit Vitals  BP (!) 152/73 (BP 1 Location: Left upper arm, BP Patient Position: At rest)   Pulse 83   Temp 98.1 °F (36.7 °C)   Resp 16   Ht 5' 7.01\" (1.702 m)   Wt 87.5 kg (192 lb 14.4 oz)   SpO2 93%   BMI 30.21 kg/m²        Intake/Output Summary (Last 24 hours) at 2023 1248  Last data filed at 2023 0908  Gross per 24 hour   Intake 1420 ml   Output --   Net 1420 ml          Physical Examination:    General:   Weak and ill looking, not in acute distress    Eyes:   pink conjunctivae, PERRLA with no discharge  Pulm: decreased but clear breath sounds without crackles or wheezes  Card:  has regular and normal S1, S2 without thrills, bruits   Abd:  Soft, non-tender, non-distended, normoactive bowel sounds   Musc: Generalized back, right shoulder discomfort  Neuro: Awake and alert.  Generally a non focal exam.   Psych:  Has a fair insight to his illness     Current Facility-Administered Medications   Medication Dose Route Frequency    potassium chloride SR (KLOR-CON 10) tablet 40 mEq  40 mEq Oral Q2H    mirtazapine (REMERON) tablet 15 mg  15 mg Oral QHS    senna-docusate (PERICOLACE) 8.6-50 mg per tablet 2 Tablet  2 Tablet Oral DAILY    polyethylene glycol (MIRALAX) packet 17 g  17 g Oral DAILY PRN    acetaminophen (TYLENOL) tablet 1,000 mg  1,000 mg Oral Q8H    oxyCODONE IR (ROXICODONE) tablet 10 mg  10 mg Oral Q4H PRN    valsartan (DIOVAN) tablet 80 mg  80 mg Oral DAILY HYDROmorphone (DILAUDID) syringe 0.5 mg  0.5 mg IntraVENous Q6H PRN    gabapentin (NEURONTIN) capsule 200 mg  200 mg Oral TID    hydrALAZINE (APRESOLINE) 20 mg/mL injection 20 mg  20 mg IntraVENous Q4H PRN    sodium chloride (NS) flush 5-40 mL  5-40 mL IntraVENous Q8H    sodium chloride (NS) flush 5-40 mL  5-40 mL IntraVENous PRN    acetaminophen (TYLENOL) tablet 650 mg  650 mg Oral Q6H PRN    Or    acetaminophen (TYLENOL) suppository 650 mg  650 mg Rectal Q6H PRN    ondansetron (ZOFRAN ODT) tablet 4 mg  4 mg Oral Q8H PRN    Or    ondansetron (ZOFRAN) injection 4 mg  4 mg IntraVENous Q6H PRN    enoxaparin (LOVENOX) injection 40 mg  40 mg SubCUTAneous DAILY    lidocaine 4 % patch 2 Patch  2 Patch TransDERmal Q24H    famotidine (PEPCID) tablet 20 mg  20 mg Oral BID        Laboratory data and review:    Recent Labs     01/21/23  0252 01/19/23  1541   WBC 9.6 13.1*   HGB 12.8 13.9   HCT 37.7 43.5    311       Recent Labs     01/21/23  0252 01/19/23  1541    139   K 3.0* 4.6    112*   CO2 23 19*   * 113*   BUN 14 12   CREA 0.83 0.83   CA 8.1* 8.3*   MG 1.9  --    ALB 2.4* 2.4*   ALT 48 55       No components found for: [place]    Diagnostics: Imaging studies have been reviewed    Telemetry reviewed by me:   normal sinus rhythm    Assessment and Plan:    Metastatic adenocarcinoma with unknown primary origin (1/18/2023) / Bony metastasis ([facility] [address] (1/11/2023) POA: CT scan abdomen and pelvis done showed multifocal osseous metastatic disease in the thoracic spine, lumbar spine, pelvis, left ribs, and right scapula. Multiple small hepatic lesions are suspicious for metastatic disease. MRi brain as noted below. Seen by Oncology and he is sp a CT guided right iliac lesion biopsy. Path showed metastatic adenocarcinoma with oncocytic features, unknown primary. Rad-oncology did XRT to right shoulder and the right distal femur for pain control 1/19. Continue Oxycodone PRN. Continue Gabapentin, Lidoderm patch. Wean IV Dilaudid PRN. PT, OT and CM for discharge to home with [facility]. Working with spouse on [address]; difficult time given new diagnosis     Acute metabolic encephalopathy (4/74/4488) / hx Dementia POA: symptoms resolved. Likely triggered by electrolyte derangements. No focus of infection. TSH and vitamin B12 were normal. CT scan head neg and MRi brain neg for acute infarct. There was however a parasagittal right frontal enhancing intraosseous lesion with intracranial extension measuring up to 3.1 x 3.1 cm, suspicious for aggressive lesion such as metastatic or primary osseous lesion like multiple myeloma, or lymphoma. Outpatient follow up as scheduled with Oncology     Acute pain of right shoulder (1/11/2023) POA: due to osseous metastatic disease and a small high-grade partial-thickness undersurface tear anterior supraspinatus as noted on the shoulder MRi. Ortho and palliative care following. Pain control and he is sp radiation therapy as noted above     Bradycardia (1/14/2023) POA: Resolved. TSH normal. Echo done 3/2021 showed a normal LV function. Seen by cardiology. No AV clemencia blocking agents. Monitor    Elevated BP without diagnosis of hypertension (1/14/2023) POA: partly driven by pain. Continue Diovan (new). IV Hydralazine PRN. Hypercalcemia (1/11/2023) POA: Resolved. due to malignancy given diffuse osseous involvement. S/p  Zometa. Centrilobular emphysema POA: noted on CT scan.  Nebs PRN    Hypokalemia (1/14/2023): replete prn                  Care Plan discussed with: Patient, Care Manager, and Nursing Staff    Discussed:  Care Plan and D/C Planning    Prophylaxis:  H2B/PPI, enoxaparin    Anticipated Disposition:  PT, OT, RN           ___________________________________________________    Attending Physician:   Arnaldo Griffiths DO

## 2023-01-21 NOTE — PROGRESS NOTES
Ultrasound IV by Kami Redding RN :  Procedure Note    Patient meets criteria for US IV insertion. Ultrasound IV education provided to patient. Opportunities for questions given. Ultrasound used for PIV placement:  20 gauge 3.45 cm BD Nexiva  Left forearm location. 1 Attempt(s). Flushed with ease; vigorous blood return. Procedure tolerated well. Primary RN aware of IV placement and added to LDA.       Kami Redding RN

## 2023-01-21 NOTE — PROGRESS NOTES
Bedside and Verbal shift change report given to [facility] Ashish RN (oncoming nurse) by Nadine Gray RN (offgoing nurse). Report included the following information SBAR, Kardex, Intake/Output, MAR, and Recent Results.

## 2023-01-22 LAB
ALBUMIN SERPL-MCNC: 2.4 G/DL (ref 3.5–5)
ALBUMIN/GLOB SERPL: 0.6 (ref 1.1–2.2)
ALP SERPL-CCNC: 121 U/L (ref 45–117)
ALT SERPL-CCNC: 44 U/L (ref 12–78)
ANION GAP SERPL CALC-SCNC: 6 MMOL/L (ref 5–15)
AST SERPL-CCNC: 44 U/L (ref 15–37)
BASOPHILS # BLD: 0.1 K/UL (ref 0–0.1)
BASOPHILS NFR BLD: 1 % (ref 0–1)
BILIRUB SERPL-MCNC: 0.3 MG/DL (ref 0.2–1)
BUN SERPL-MCNC: 16 MG/DL (ref 6–20)
BUN/CREAT SERPL: 21 (ref 12–20)
CALCIUM SERPL-MCNC: 8.5 MG/DL (ref 8.5–10.1)
CHLORIDE SERPL-SCNC: 110 MMOL/L (ref 97–108)
CO2 SERPL-SCNC: 24 MMOL/L (ref 21–32)
CREAT SERPL-MCNC: 0.78 MG/DL (ref 0.7–1.3)
DIFFERENTIAL METHOD BLD: ABNORMAL
EOSINOPHIL # BLD: 0.1 K/UL (ref 0–0.4)
EOSINOPHIL NFR BLD: 1 % (ref 0–7)
ERYTHROCYTE [DISTWIDTH] IN BLOOD BY AUTOMATED COUNT: 14.1 % (ref 11.5–14.5)
GLOBULIN SER CALC-MCNC: 3.8 G/DL (ref 2–4)
GLUCOSE BLD STRIP.AUTO-MCNC: 103 MG/DL (ref 65–117)
GLUCOSE SERPL-MCNC: 113 MG/DL (ref 65–100)
HCT VFR BLD AUTO: 39 % (ref 36.6–50.3)
HGB BLD-MCNC: 12.9 G/DL (ref 12.1–17)
IMM GRANULOCYTES # BLD AUTO: 0.1 K/UL (ref 0–0.04)
IMM GRANULOCYTES NFR BLD AUTO: 1 % (ref 0–0.5)
LYMPHOCYTES # BLD: 1.3 K/UL (ref 0.8–3.5)
LYMPHOCYTES NFR BLD: 14 % (ref 12–49)
MCH RBC QN AUTO: 28.8 PG (ref 26–34)
MCHC RBC AUTO-ENTMCNC: 33.1 G/DL (ref 30–36.5)
MCV RBC AUTO: 87.1 FL (ref 80–99)
MONOCYTES # BLD: 0.9 K/UL (ref 0–1)
MONOCYTES NFR BLD: 10 % (ref 5–13)
NEUTS SEG # BLD: 6.6 K/UL (ref 1.8–8)
NEUTS SEG NFR BLD: 73 % (ref 32–75)
NRBC # BLD: 0 K/UL (ref 0–0.01)
NRBC BLD-RTO: 0 PER 100 WBC
PHOSPHATE SERPL-MCNC: 2.2 MG/DL (ref 2.6–4.7)
PLATELET # BLD AUTO: 340 K/UL (ref 150–400)
PMV BLD AUTO: 9.6 FL (ref 8.9–12.9)
POTASSIUM SERPL-SCNC: 3.8 MMOL/L (ref 3.5–5.1)
PROT SERPL-MCNC: 6.2 G/DL (ref 6.4–8.2)
RBC # BLD AUTO: 4.48 M/UL (ref 4.1–5.7)
SERVICE CMNT-IMP: NORMAL
SODIUM SERPL-SCNC: 140 MMOL/L (ref 136–145)
WBC # BLD AUTO: 9 K/UL (ref 4.1–11.1)

## 2023-01-22 PROCEDURE — 74011000250 HC RX REV CODE- 250: Performed by: NURSE PRACTITIONER

## 2023-01-22 PROCEDURE — 74011250637 HC RX REV CODE- 250/637: Performed by: STUDENT IN AN ORGANIZED HEALTH CARE EDUCATION/TRAINING PROGRAM

## 2023-01-22 PROCEDURE — 74011000250 HC RX REV CODE- 250: Performed by: INTERNAL MEDICINE

## 2023-01-22 PROCEDURE — 74011250637 HC RX REV CODE- 250/637: Performed by: INTERNAL MEDICINE

## 2023-01-22 PROCEDURE — 74011250636 HC RX REV CODE- 250/636: Performed by: INTERNAL MEDICINE

## 2023-01-22 PROCEDURE — 80053 COMPREHEN METABOLIC PANEL: CPT

## 2023-01-22 PROCEDURE — 65270000046 HC RM TELEMETRY

## 2023-01-22 PROCEDURE — 36415 COLL VENOUS BLD VENIPUNCTURE: CPT

## 2023-01-22 PROCEDURE — 84100 ASSAY OF PHOSPHORUS: CPT

## 2023-01-22 PROCEDURE — 82962 GLUCOSE BLOOD TEST: CPT

## 2023-01-22 PROCEDURE — 85025 COMPLETE CBC W/AUTO DIFF WBC: CPT

## 2023-01-22 RX ADMIN — ACETAMINOPHEN 1000 MG: 500 TABLET ORAL at 18:11

## 2023-01-22 RX ADMIN — OXYCODONE HYDROCHLORIDE 10 MG: 5 TABLET ORAL at 21:01

## 2023-01-22 RX ADMIN — SENNOSIDES AND DOCUSATE SODIUM 2 TABLET: 50; 8.6 TABLET ORAL at 10:20

## 2023-01-22 RX ADMIN — MIRTAZAPINE 15 MG: 15 TABLET, FILM COATED ORAL at 21:00

## 2023-01-22 RX ADMIN — GABAPENTIN 200 MG: 100 CAPSULE ORAL at 18:11

## 2023-01-22 RX ADMIN — GABAPENTIN 200 MG: 100 CAPSULE ORAL at 21:01

## 2023-01-22 RX ADMIN — GABAPENTIN 200 MG: 100 CAPSULE ORAL at 10:21

## 2023-01-22 RX ADMIN — OXYCODONE HYDROCHLORIDE 10 MG: 5 TABLET ORAL at 07:57

## 2023-01-22 RX ADMIN — OXYCODONE HYDROCHLORIDE 10 MG: 5 TABLET ORAL at 13:50

## 2023-01-22 RX ADMIN — Medication 10 ML: at 05:18

## 2023-01-22 RX ADMIN — FAMOTIDINE 20 MG: 20 TABLET, FILM COATED ORAL at 10:20

## 2023-01-22 RX ADMIN — FAMOTIDINE 20 MG: 20 TABLET, FILM COATED ORAL at 18:12

## 2023-01-22 RX ADMIN — Medication 10 ML: at 21:05

## 2023-01-22 RX ADMIN — ACETAMINOPHEN 1000 MG: 500 TABLET ORAL at 00:05

## 2023-01-22 RX ADMIN — Medication 10 ML: at 14:00

## 2023-01-22 RX ADMIN — ENOXAPARIN SODIUM 40 MG: 100 INJECTION SUBCUTANEOUS at 10:21

## 2023-01-22 RX ADMIN — ACETAMINOPHEN 1000 MG: 500 TABLET ORAL at 10:20

## 2023-01-22 RX ADMIN — VALSARTAN 80 MG: 80 TABLET ORAL at 10:21

## 2023-01-22 RX ADMIN — HYDROMORPHONE HYDROCHLORIDE 0.5 MG: 1 INJECTION, SOLUTION INTRAMUSCULAR; INTRAVENOUS; SUBCUTANEOUS at 13:50

## 2023-01-22 NOTE — PROGRESS NOTES
Bedside shift change report given to HANNAH Root (oncoming nurse) by Sakshi Bower (offgoing nurse). Report included the following information SBAR, Kardex, and MAR.

## 2023-01-22 NOTE — PROGRESS NOTES
Problem: Falls - Risk of  Goal: *Absence of Falls  Description: Document Macario Grijalva Fall Risk and appropriate interventions in the flowsheet.   Outcome: Progressing Towards Goal  Note: Fall Risk Interventions:                                Problem: Pain  Goal: *Control of Pain  Outcome: Progressing Towards Goal

## 2023-01-22 NOTE — PROGRESS NOTES
[facility] 79  380 [facility], [address] [place] [address]  [phone]      Hospitalist  Progress Note      NAME:         Claudene Norfolk. :        1945  MRM:        [id number]    Date of service: 2023      Chief complaint: weakness    Interval HPI: Fu newly diagnosed metastatic cancer. Overall pain better controlled. Endorses diffuse weakness. Son at bedside. Discussed discharge planning. Objective:    Vital Signs:    Visit Vitals  BP (!) 147/81 (BP 1 Location: Left upper arm)   Pulse 90   Temp 98.8 °F (37.1 °C)   Resp 18   Ht 5' 7.01\" (1.702 m)   Wt 87.5 kg (192 lb 14.4 oz)   SpO2 92%   BMI 30.21 kg/m²        Intake/Output Summary (Last 24 hours) at 2023 1045  Last data filed at 2023 1548  Gross per 24 hour   Intake 240 ml   Output --   Net 240 ml          Physical Examination:    General:   Weak and ill looking, not in acute distress    Eyes:   pink conjunctivae, PERRLA with no discharge  Pulm: decreased but clear breath sounds without crackles or wheezes  Card:  has regular and normal S1, S2 without thrills, bruits   Abd:  Soft, non-tender, non-distended, normoactive bowel sounds   Musc: Generalized back, right shoulder discomfort  Neuro: Awake and alert.  Generally a non focal exam.   Psych:  Has a fair insight to his illness     Current Facility-Administered Medications   Medication Dose Route Frequency    HYDROmorphone (DILAUDID) syringe 0.5 mg  0.5 mg IntraVENous Q8H PRN    mirtazapine (REMERON) tablet 15 mg  15 mg Oral QHS    senna-docusate (PERICOLACE) 8.6-50 mg per tablet 2 Tablet  2 Tablet Oral DAILY    polyethylene glycol (MIRALAX) packet 17 g  17 g Oral DAILY PRN    acetaminophen (TYLENOL) tablet 1,000 mg  1,000 mg Oral Q8H    oxyCODONE IR (ROXICODONE) tablet 10 mg  10 mg Oral Q4H PRN    valsartan (DIOVAN) tablet 80 mg  80 mg Oral DAILY    gabapentin (NEURONTIN) capsule 200 mg 200 mg Oral TID    hydrALAZINE (APRESOLINE) 20 mg/mL injection 20 mg  20 mg IntraVENous Q4H PRN    sodium chloride (NS) flush 5-40 mL  5-40 mL IntraVENous Q8H    sodium chloride (NS) flush 5-40 mL  5-40 mL IntraVENous PRN    acetaminophen (TYLENOL) tablet 650 mg  650 mg Oral Q6H PRN    Or    acetaminophen (TYLENOL) suppository 650 mg  650 mg Rectal Q6H PRN    ondansetron (ZOFRAN ODT) tablet 4 mg  4 mg Oral Q8H PRN    Or    ondansetron (ZOFRAN) injection 4 mg  4 mg IntraVENous Q6H PRN    enoxaparin (LOVENOX) injection 40 mg  40 mg SubCUTAneous DAILY    lidocaine 4 % patch 2 Patch  2 Patch TransDERmal Q24H    famotidine (PEPCID) tablet 20 mg  20 mg Oral BID        Laboratory data and review:    Recent Labs     01/22/23  0316 01/21/23  0252 01/19/23  1541   WBC 9.0 9.6 13.1*   HGB 12.9 12.8 13.9   HCT 39.0 37.7 43.5    320 311       Recent Labs     01/22/23  0316 01/21/23  0252 01/19/23  1541    140 139   K 3.8 3.0* 4.6   * 107 112*   CO2 24 23 19*   * 108* 113*   BUN 16 14 12   CREA 0.78 0.83 0.83   CA 8.5 8.1* 8.3*   MG  --  1.9  --    PHOS 2.2* 2.2*  --    ALB 2.4* 2.4* 2.4*   ALT 44 48 55       No components found for: [place]    Diagnostics: Imaging studies have been reviewed    Telemetry reviewed by me:   normal sinus rhythm    Assessment and Plan:    Metastatic adenocarcinoma with unknown primary origin (1/18/2023) / Bony metastasis ([address] (1/11/2023) POA: CT scan abdomen and pelvis done showed multifocal osseous metastatic disease in the thoracic spine, lumbar spine, pelvis, left ribs, and right scapula. Multiple small hepatic lesions are suspicious for metastatic disease. MRi brain as noted below. Seen by Oncology and he is sp a CT guided right iliac lesion biopsy. Path showed metastatic adenocarcinoma with oncocytic features, unknown primary. Rad-oncology did XRT to right shoulder and the right distal femur for pain control 1/19. Continue Oxycodone PRN.  Continue Gabapentin, Lidoderm patch. Wean IV Dilaudid PRN. PT, OT and CM for discharge to home with [facility]. Working with spouse on [address]; difficult time given new diagnosis     Acute metabolic encephalopathy (3/20/7347) / hx Dementia POA: symptoms resolved. Likely triggered by electrolyte derangements. No focus of infection. TSH and vitamin B12 were normal. CT scan head neg and MRi brain neg for acute infarct. There was however a parasagittal right frontal enhancing intraosseous lesion with intracranial extension measuring up to 3.1 x 3.1 cm, suspicious for aggressive lesion such as metastatic or primary osseous lesion like multiple myeloma, or lymphoma. Outpatient follow up as scheduled with Oncology     Acute pain of right shoulder (1/11/2023) POA: due to osseous metastatic disease and a small high-grade partial-thickness undersurface tear anterior supraspinatus as noted on the shoulder MRi. Ortho and palliative care following. Pain control and he is sp radiation therapy as noted above     Bradycardia (1/14/2023) POA: Resolved. TSH normal. Echo done 3/2021 showed a normal LV function. Seen by cardiology. No AV clemencia blocking agents. Monitor    Elevated BP without diagnosis of hypertension (1/14/2023) POA: partly driven by pain. Continue Diovan (new). IV Hydralazine PRN. Hypercalcemia (1/11/2023) POA: Resolved. due to malignancy given diffuse osseous involvement. S/p  Zometa. Centrilobular emphysema POA: noted on CT scan.  Nebs PRN    Hypokalemia (1/14/2023): replete prn                  Care Plan discussed with: Patient, Care Manager, and Nursing Staff    Discussed:  Care Plan and D/C Planning    Prophylaxis:  H2B/PPI, enoxaparin    Anticipated Disposition:  PT, OT, RN           ___________________________________________________    Attending Physician:   Migel Oates DO

## 2023-01-23 VITALS
OXYGEN SATURATION: 93 % | BODY MASS INDEX: 30.28 KG/M2 | TEMPERATURE: 99 F | RESPIRATION RATE: 18 BRPM | HEART RATE: 87 BPM | SYSTOLIC BLOOD PRESSURE: 157 MMHG | WEIGHT: 192.9 LBS | HEIGHT: 67 IN | DIASTOLIC BLOOD PRESSURE: 63 MMHG

## 2023-01-23 LAB
ALBUMIN SERPL-MCNC: 2.4 G/DL (ref 3.5–5)
ALBUMIN/GLOB SERPL: 0.6 (ref 1.1–2.2)
ALP SERPL-CCNC: 127 U/L (ref 45–117)
ALT SERPL-CCNC: 38 U/L (ref 12–78)
ANION GAP SERPL CALC-SCNC: 6 MMOL/L (ref 5–15)
AST SERPL-CCNC: 44 U/L (ref 15–37)
BASOPHILS # BLD: 0.1 K/UL (ref 0–0.1)
BASOPHILS NFR BLD: 1 % (ref 0–1)
BILIRUB SERPL-MCNC: 0.4 MG/DL (ref 0.2–1)
BUN SERPL-MCNC: 14 MG/DL (ref 6–20)
BUN/CREAT SERPL: 18 (ref 12–20)
CALCIUM SERPL-MCNC: 8.3 MG/DL (ref 8.5–10.1)
CHLORIDE SERPL-SCNC: 107 MMOL/L (ref 97–108)
CO2 SERPL-SCNC: 25 MMOL/L (ref 21–32)
CREAT SERPL-MCNC: 0.77 MG/DL (ref 0.7–1.3)
DIFFERENTIAL METHOD BLD: ABNORMAL
EOSINOPHIL # BLD: 0.1 K/UL (ref 0–0.4)
EOSINOPHIL NFR BLD: 1 % (ref 0–7)
ERYTHROCYTE [DISTWIDTH] IN BLOOD BY AUTOMATED COUNT: 14 % (ref 11.5–14.5)
GLOBULIN SER CALC-MCNC: 3.9 G/DL (ref 2–4)
GLUCOSE SERPL-MCNC: 114 MG/DL (ref 65–100)
HCT VFR BLD AUTO: 40.1 % (ref 36.6–50.3)
HGB BLD-MCNC: 13.2 G/DL (ref 12.1–17)
IMM GRANULOCYTES # BLD AUTO: 0.1 K/UL (ref 0–0.04)
IMM GRANULOCYTES NFR BLD AUTO: 1 % (ref 0–0.5)
LYMPHOCYTES # BLD: 1.1 K/UL (ref 0.8–3.5)
LYMPHOCYTES NFR BLD: 12 % (ref 12–49)
MCH RBC QN AUTO: 28.6 PG (ref 26–34)
MCHC RBC AUTO-ENTMCNC: 32.9 G/DL (ref 30–36.5)
MCV RBC AUTO: 87 FL (ref 80–99)
MONOCYTES # BLD: 0.9 K/UL (ref 0–1)
MONOCYTES NFR BLD: 9 % (ref 5–13)
NEUTS SEG # BLD: 7.2 K/UL (ref 1.8–8)
NEUTS SEG NFR BLD: 76 % (ref 32–75)
NRBC # BLD: 0 K/UL (ref 0–0.01)
NRBC BLD-RTO: 0 PER 100 WBC
PHOSPHATE SERPL-MCNC: 2.4 MG/DL (ref 2.6–4.7)
PLATELET # BLD AUTO: 314 K/UL (ref 150–400)
PMV BLD AUTO: 9.4 FL (ref 8.9–12.9)
POTASSIUM SERPL-SCNC: 3.9 MMOL/L (ref 3.5–5.1)
PROT SERPL-MCNC: 6.3 G/DL (ref 6.4–8.2)
RBC # BLD AUTO: 4.61 M/UL (ref 4.1–5.7)
SODIUM SERPL-SCNC: 138 MMOL/L (ref 136–145)
WBC # BLD AUTO: 9.4 K/UL (ref 4.1–11.1)

## 2023-01-23 PROCEDURE — 36415 COLL VENOUS BLD VENIPUNCTURE: CPT

## 2023-01-23 PROCEDURE — 74011250637 HC RX REV CODE- 250/637: Performed by: STUDENT IN AN ORGANIZED HEALTH CARE EDUCATION/TRAINING PROGRAM

## 2023-01-23 PROCEDURE — 74011250636 HC RX REV CODE- 250/636: Performed by: INTERNAL MEDICINE

## 2023-01-23 PROCEDURE — 94761 N-INVAS EAR/PLS OXIMETRY MLT: CPT

## 2023-01-23 PROCEDURE — 85025 COMPLETE CBC W/AUTO DIFF WBC: CPT

## 2023-01-23 PROCEDURE — 74011250637 HC RX REV CODE- 250/637: Performed by: INTERNAL MEDICINE

## 2023-01-23 PROCEDURE — 97530 THERAPEUTIC ACTIVITIES: CPT

## 2023-01-23 PROCEDURE — 80053 COMPREHEN METABOLIC PANEL: CPT

## 2023-01-23 PROCEDURE — 84100 ASSAY OF PHOSPHORUS: CPT

## 2023-01-23 PROCEDURE — 74011000250 HC RX REV CODE- 250: Performed by: INTERNAL MEDICINE

## 2023-01-23 PROCEDURE — 97116 GAIT TRAINING THERAPY: CPT

## 2023-01-23 RX ORDER — AMOXICILLIN 250 MG
2 CAPSULE ORAL DAILY
Qty: 60 TABLET | Refills: 0 | Status: SHIPPED | OUTPATIENT
Start: 2023-01-24 | End: 2023-02-23

## 2023-01-23 RX ORDER — FAMOTIDINE 20 MG/1
20 TABLET, FILM COATED ORAL 2 TIMES DAILY
Qty: 60 TABLET | Refills: 0 | Status: SHIPPED | OUTPATIENT
Start: 2023-01-23

## 2023-01-23 RX ORDER — POLYETHYLENE GLYCOL 3350 17 G/17G
17 POWDER, FOR SOLUTION ORAL
Qty: 30 PACKET | Refills: 0 | Status: SHIPPED | OUTPATIENT
Start: 2023-01-23

## 2023-01-23 RX ORDER — VALSARTAN 80 MG/1
80 TABLET ORAL DAILY
Qty: 30 TABLET | Refills: 0 | Status: SHIPPED | OUTPATIENT
Start: 2023-01-24

## 2023-01-23 RX ORDER — LIDOCAINE 4 G/100G
PATCH TOPICAL
Qty: 20 PATCH | Refills: 0 | Status: SHIPPED | OUTPATIENT
Start: 2023-01-23

## 2023-01-23 RX ORDER — NALOXONE HYDROCHLORIDE 4 MG/.1ML
SPRAY NASAL
Qty: 2 EACH | Refills: 0 | Status: SHIPPED | OUTPATIENT
Start: 2023-01-23

## 2023-01-23 RX ADMIN — HYDROMORPHONE HYDROCHLORIDE 0.5 MG: 1 INJECTION, SOLUTION INTRAMUSCULAR; INTRAVENOUS; SUBCUTANEOUS at 09:23

## 2023-01-23 RX ADMIN — Medication 10 ML: at 05:45

## 2023-01-23 RX ADMIN — SENNOSIDES AND DOCUSATE SODIUM 2 TABLET: 50; 8.6 TABLET ORAL at 09:11

## 2023-01-23 RX ADMIN — OXYCODONE HYDROCHLORIDE 10 MG: 5 TABLET ORAL at 06:15

## 2023-01-23 RX ADMIN — OXYCODONE HYDROCHLORIDE 10 MG: 5 TABLET ORAL at 10:49

## 2023-01-23 RX ADMIN — ACETAMINOPHEN 1000 MG: 500 TABLET ORAL at 00:07

## 2023-01-23 RX ADMIN — VALSARTAN 80 MG: 80 TABLET ORAL at 09:10

## 2023-01-23 RX ADMIN — ACETAMINOPHEN 1000 MG: 500 TABLET ORAL at 09:10

## 2023-01-23 RX ADMIN — HYDROMORPHONE HYDROCHLORIDE 0.5 MG: 1 INJECTION, SOLUTION INTRAMUSCULAR; INTRAVENOUS; SUBCUTANEOUS at 00:07

## 2023-01-23 RX ADMIN — FAMOTIDINE 20 MG: 20 TABLET, FILM COATED ORAL at 09:10

## 2023-01-23 RX ADMIN — GABAPENTIN 200 MG: 100 CAPSULE ORAL at 09:10

## 2023-01-23 RX ADMIN — ENOXAPARIN SODIUM 40 MG: 100 INJECTION SUBCUTANEOUS at 09:11

## 2023-01-23 NOTE — DISCHARGE SUMMARY
[facility] 79  [address] [facility], [address] [place] [address]  [phone]    Physician Discharge Summary     Patient ID:  Gustavo Hylton  [id number]  76 y.o.  1945    Admit date: 1/9/2023    Discharge date and time: 1/23/2023 3:24 PM    Admission Diagnoses: Acute metabolic encephalopathy [P84.06]    Discharge Diagnoses:  Principal Diagnosis Metastatic adenocarcinoma of unknown origin [facility])                                            Principal Problem:    Metastatic adenocarcinoma of unknown origin ([address] (1/18/2023)    Active Problems:    Acute metabolic encephalopathy (3/58/2331)      Bony metastasis ([address] (1/11/2023)      Hypercalcemia (1/11/2023)      Transient alteration of awareness (1/11/2023)      Acute pain of right shoulder (1/11/2023)      Bradycardia (1/14/2023)      Elevated BP without diagnosis of hypertension (1/14/2023)      Centrilobular emphysema ([address] (1/14/2023)      Hypokalemia (1/14/2023)         Hospital Course:     Metastatic adenocarcinoma with unknown primary origin (1/18/2023) / Bony metastasis ([address] (1/11/2023) POA: CT scan abdomen and pelvis done showed multifocal osseous metastatic disease in the thoracic spine, lumbar spine, pelvis, left ribs, and right scapula. Multiple small hepatic lesions are suspicious for metastatic disease. MRi brain as noted below. Seen by Oncology and he is sp a CT guided right iliac lesion biopsy. Path showed metastatic adenocarcinoma with oncocytic features, unknown primary. Rad-oncology did XRT to right shoulder and the right distal femur for pain control 1/19. Continue Oxycodone, continue Gabapentin and Lidoderm patch. Remeron started. Fu PCP, oncology OP      Acute metabolic encephalopathy (1/79/0466) / hx Dementia POA: symptoms resolved. Likely triggered by electrolyte derangements. No focus of infection. TSH and vitamin B12 were normal. CT scan head neg and MRi brain neg for acute infarct.  There was however a parasagittal right frontal enhancing intraosseous lesion with intracranial extension measuring up to 3.1 x 3.1 cm, suspicious for aggressive lesion such as metastatic or primary osseous lesion like multiple myeloma, or lymphoma. Outpatient follow up as scheduled with Oncology. FU neuro op      Acute pain of right shoulder (1/11/2023) POA: due to osseous metastatic disease and a small high-grade partial-thickness undersurface tear anterior supraspinatus as noted on the shoulder MRi. Ortho and palliative care following. Pain control and he is sp radiation therapy as noted above. HH> FU pcp, ortho and oncology OP      Bradycardia (1/14/2023) POA: Resolved. TSH normal. Echo done 3/2021 showed a normal LV function. Seen by cardiology. No AV clemencia blocking agents. Fu PCP OP      Elevated BP without diagnosis of hypertension (1/14/2023) POA: partly driven by pain. Continue Diovan (new). Fu PCP OP      Hypercalcemia (1/11/2023) POA: Resolved. due to malignancy given diffuse osseous involvement. S/p  Zometa. Centrilobular emphysema/ Chronic resp failure post COVID POA: noted on CT scan. Has home 02 already; can use with ambulation as needed. Inhaler prn on DC. Fu PCP OP and Pulm OP       PCP: Other, Phys, MD     Consults: Cardiology, Hematology/Oncology, Orthopedic Surgery, and Palliative Care    Significant Diagnostic Studies:     Mri brain   IMPRESSION  Parasagittal right frontal enhancing intraosseous lesion with intracranial  extension measuring up to 3.1 x 3.1 cm, suspicious for aggressive lesion such as  metastatic or primary osseous lesion like multiple myeloma, or lymphoma. Clinical correlation is advised. This may be an amenable to percutaneous biopsy  for tissue diagnosis. Underlying associated diffuse bilateral enhancing dural thickening which may  suggest meningitis versus carcinomatosis. Intracranial hypertension is in the  differential diagnostic considerations.      No acute infarct, intracranial hemorrhage or brain mass lesion. Discharge Exam:  Physical Exam:    General:   Weak and ill looking, not in acute distress    Eyes:   pink conjunctivae, PERRLA with no discharge  Pulm: decreased but clear breath sounds without crackles or wheezes  Card:  has regular and normal S1, S2 without thrills, bruits   Abd:  Soft, non-tender, non-distended, normoactive bowel sounds   Musc: Generalized back, right shoulder discomfort  Neuro: Awake and alert. Generally a non focal exam.   Psych:  Has a fair insight to his illness     Disposition: home  Discharge Condition: Stable    Patient Instructions:   Current Discharge Medication List        START taking these medications    Details   valsartan (DIOVAN) 80 mg tablet Take 1 Tablet by mouth daily. Qty: 30 Tablet, Refills: 0      senna-docusate (PERICOLACE) 8.6-50 mg per tablet Take 2 Tablets by mouth daily for 30 days. Qty: 60 Tablet, Refills: 0      polyethylene glycol (MIRALAX) 17 gram packet Take 1 Packet by mouth daily as needed for Constipation (give once per day if no BM in past 48 hours). Qty: 30 Packet, Refills: 0      lidocaine 4 % patch Apply patch to back and R shoulder. Apply patch to the affected area for 12 hours a day and remove for 12 hours a day. Qty: 20 Patch, Refills: 0      naloxone (Narcan) 4 mg/actuation nasal spray Use 1 spray intranasally, then discard. Repeat with new spray every 2 min as needed for opioid overdose symptoms, alternating nostrils. Qty: 2 Each, Refills: 0      oxyCODONE IR (ROXICODONE) 10 mg tab immediate release tablet Take 1 Tablet by mouth every four (4) hours as needed for Pain for up to 14 days. Max Daily Amount: 60 mg.  Qty: 45 Tablet, Refills: 0    Associated Diagnoses: Cancer related pain; Metastatic adenocarcinoma of unknown origin ([facility] [address] Bony metastasis (HCC)      mirtazapine (REMERON) 15 mg tablet Take 1 Tablet by mouth nightly.   Qty: 30 Tablet, Refills: 0           CONTINUE these medications which have CHANGED Details   ipratropium-albuteroL (COMBIVENT RESPIMAT)  mcg/actuation inhaler Take 1 Puff by inhalation every six (6) hours as needed for Wheezing, Shortness of Breath or Respiratory Distress. Qty: 1 Each, Refills: 0      famotidine (PEPCID) 20 mg tablet Take 1 Tablet by mouth two (2) times a day. Qty: 60 Tablet, Refills: 0           CONTINUE these medications which have NOT CHANGED    Details   gabapentin (NEURONTIN) 600 mg tablet Take 300 mg by mouth three (3) times daily. psyllium (METAMUCIL) packet Take 1 Packet by mouth two (2) times a day. melatonin, rapid dissolve, 5 mg TbDi tablet Take 1 Tab by mouth nightly as needed for Other (insomnia). Qty: 30 Tab, Refills: 0      acetaminophen (TYLENOL) 325 mg tablet Take 2 Tabs by mouth every six (6) hours as needed for Pain. Qty: 30 Tab, Refills: 0      dicyclomine (BENTYL) 20 mg tablet Take 20 mg by mouth every six (6) hours. Indications: irritable colon           STOP taking these medications       zinc gluconate 50 mg tablet Comments:   Reason for Stopping:         ascorbic acid, vitamin C, (VITAMIN C) 500 mg tablet Comments:   Reason for Stopping:             Activity: per PT/OT  Diet: Cardiac Diet  Wound Care: None needed    Follow-up with  Follow-up Information       Follow up With Specialties Details Why [facility] Follow up [address] [facility], If you haven't recieved a phone call within 24, hours. Please contact the agency directly.  [address] [facility], [address] [place] [address]    Sarah Royal MD Hematology and Oncology Go on 1/26/2023 appt 2 pm [address]  [phone]      Primary Care  Follow up in 1 week(s) Hospital Follow Up within 1 week     Bong Noonan MD Palliative Medicine Schedule an appointment as soon as possible for a visit in 1 week(s) Hospital Follow Up [address] [facility] [address]  [place] [address] [address]  [phone]      Naseem Tuttle MD Orthopedic Surgery Schedule an appointment as soon as possible for a visit in 2 week(s) Hospital Follow Up [address] [facility] [address]  [address] [place] [address]  Schedule an appointment as soon as possible for a visit  [address] [facility] [address]    [facility]  Schedule an appointment as soon as possible for a visit Hospital Follow Up [address]  [address]  [phone]            Follow-up tests/labs as abover.      Signed:  Anthony Suarez DO  1/23/2023  3:24 PM  **I personally spent 35 min on discharge**

## 2023-01-23 NOTE — PROGRESS NOTES
Problem: Mobility Impaired (Adult and Pediatric)  Goal: *Acute Goals and Plan of Care (Insert Text)  Description: FUNCTIONAL STATUS PRIOR TO ADMISSION: Patient was independent and active without use of DME prior to R shoulder injury in November 2022. He has experienced gradual decline in function, requiring furniture support or assist for ambulation. + falls in recent months. HOME SUPPORT PRIOR TO ADMISSION: The patient lived with spouse who assists as needed. Physical Therapy Goals  Goals reviewed 1/17/23 - Continue with same goals  Initiated 1/10/2023  1. Patient will move from supine to sit and sit to supine  in bed with supervision within 7 day(s). 2.  Patient will transfer from bed to chair and chair to bed with supervision/set-up using the least restrictive device within 7 day(s). 3.  Patient will perform sit to stand with supervision/set-up within 7 day(s). 4.  Patient will ambulate with supervision/set-up for 150 feet with the least restrictive device within 7 day(s). 5.  Patient will ascend/descend 14 stairs with one handrail(s) with supervision/set-up within 7 day(s). Outcome: Progressing Towards Goal   PHYSICAL THERAPY TREATMENT  Patient: Mai Bain (73 y.o. male)  Date: 1/23/2023  Diagnosis: Acute metabolic encephalopathy [M37.39] Metastatic adenocarcinoma of unknown origin [facility])      Precautions: Fall (NWB R shoulder (pending MRI))  Chart, physical therapy assessment, plan of care and goals were reviewed. ASSESSMENT  Patient continues with skilled PT services and is progressing towards goals. Pt received supine in bed and agreeable to working with therapy after finishing breakfast. Pt c/o R shoulder pain when in dependent position though also declines sling for comfort. Pt given PTs hand to pull through to achieve sitting EOB. Intact balance in sitting and assist with donning pants though requires assistance to fully adjust once in standing d/t decreased use of RUE.  Pt again declines use of quad cane. Ambulates 10ft prior to resting against wall railing(rest d/t shoulder pain). Completed an additional 30ft prior to resting at end of hallway prior to completing distance back to room. Pt reports no WINSTON and only briefly desaturates to 87% <10 seconds when sitting after ambulation. Pt's wife reports having O2 at home(inogen) however pt states he would \"likely not use it anyway. \" SpO2 92% sitting in chair at end of session. Pulse Oximetry Assessment    92% at rest on room air  88% while ambulating on room air  95% at rest on 2LPM  DNT% while ambulating on LPM      Current Level of Function Impacting Discharge (mobility/balance): needs supervision d/t high fall risk    Other factors to consider for discharge: new diagnosis of metastatic disease         PLAN :  Patient continues to benefit from skilled intervention to address the above impairments. Continue treatment per established plan of care. to address goals. Recommendation for discharge: (in order for the patient to meet his/her long term goals)  Physical therapy at least 2 days/week in the home     This discharge recommendation:  Has been made in collaboration with the attending provider and/or case management    IF patient discharges home will need the following DME: patient owns DME required for discharge       SUBJECTIVE:   Patient stated I like [place] football more.     OBJECTIVE DATA SUMMARY:   Critical Behavior:  Neurologic State: Alert, Confused  Orientation Level: Oriented to person, Oriented to time, Oriented to place, Disoriented to situation  Cognition: Follows commands  Safety/Judgement: Decreased awareness of need for assistance, Decreased insight into deficits  Functional Mobility Training:  Bed Mobility:     Supine to Sit: Minimum assistance              Transfers:  Sit to Stand: Contact guard assistance  Stand to Sit: Contact guard assistance                             Balance:  Sitting: Intact  Standing: Impaired  Standing - Static: Good  Standing - Dynamic : Fair  Ambulation/Gait Training:  Distance (ft): 85 Feet (ft)     Ambulation - Level of Assistance: Stand-by assistance        Gait Abnormalities: Altered arm swing;Decreased step clearance; Path deviations        Base of Support: Widened     Speed/Krystle: Pace decreased (<100 feet/min)  Step Length: Left shortened;Right shortened            Activity Tolerance:   Good and requires rest breaks    After treatment patient left in no apparent distress:   Sitting in chair, Call bell within reach, and Caregiver / family present    COMMUNICATION/COLLABORATION:   The patients plan of care was discussed with: Registered nurse.      Melania Nowak, PT   Time Calculation: 29 mins

## 2023-01-23 NOTE — PALLIATIVE CARE DISCHARGE
The Palliative Medicine team was consulted as part of your / your loved one's care in the hospital. Our team is a supportive service; we strive to relieve suffering and improve quality of life. You identified the following goal(s) as your main focus for healthcare: Patient/Health Care Proxy Stated Goals: You are hoping to build up your strength in order to be a candidate for cancer treatment. We reviewed advance care planning information, which includes the following:  Advance Care Planning 1/13/2023   Patient's Healthcare Decision Maker is: Legal Next of [address] [place] [address] Name:  Goldie Crandall (wife)   Confirm Advance Directive Not on file   Does the patient have other document types Not on file     We reviewed / discussed your code status as: Full Code     Full Code means perform CPR in the event of cardiac arrest     [facility] means do NOT perform CPR in the event of cardiac arrest     Partial Code means you have specific preferences, please discuss with your health care team     No Order means this issue was not addressed / resolved during your stay    Because of the importance of this information, we are providing you with a printed copy to share with other healthcare providers after this hospitalization is complete. If any of the above information is incomplete or incorrect, please contact the Palliative Medicine team at [phone].

## 2023-01-23 NOTE — PROGRESS NOTES
Problem: Falls - Risk of  Goal: *Absence of Falls  Description: Document [place] Fall Risk and appropriate interventions in the flowsheet. Outcome: Progressing Towards Goal  Note: Fall Risk Interventions:                                Problem: Patient Education: Go to Patient Education Activity  Goal: Patient/Family Education  Outcome: Progressing Towards Goal     Problem: Patient Education: Go to Patient Education Activity  Goal: Patient/Family Education  Outcome: Progressing Towards Goal     Problem: Pain  Goal: *Control of Pain  Outcome: Progressing Towards Goal     Problem: [address] (Adult/Pediatric)  Goal: *STG: Participates in treatment plan  Outcome: Progressing Towards Goal  Goal: *STG: Seeks staff when feelings of anxiety and fear arise  Outcome: Progressing Towards Goal  Goal: *STG: Attends activities and groups  Outcome: Progressing Towards Goal  Goal: *STG: Absence of lethality  Outcome: Progressing Towards Goal  Goal: *STG: Demonstrates ability to understand and use improved judgment in daily activities and relationships  Outcome: Progressing Towards Goal  Goal: *STG: Remains safe in hospital  Outcome: Progressing Towards Goal  Goal: *LTG: Obtains optimum level of functioning  Outcome: Progressing Towards Goal  Goal: *STG/LTG: Maintain structure for daily activities  Outcome: Progressing Towards Goal  Goal: *STG/LTG: Complies with medication therapy  Outcome: Progressing Towards Goal  Goal: Interventions  Outcome: Progressing Towards Goal     Problem: Pressure Injury - Risk of  Goal: *Prevention of pressure injury  Description: Document Adrien Scale and appropriate interventions in the flowsheet.   Outcome: Progressing Towards Goal  Note: Pressure Injury Interventions:  Sensory Interventions: Assess changes in LOC, Keep linens dry and wrinkle-free, Maintain/enhance activity level, Minimize linen layers, Monitor skin under medical devices    Moisture Interventions: Absorbent underpads    Activity Interventions: PT/OT evaluation, Increase time out of bed    Mobility Interventions: PT/OT evaluation, Assess need for specialty bed, Turn and reposition approx.  every two hours(pillow and wedges)    Nutrition Interventions: Document food/fluid/supplement intake    Friction and Shear Interventions: HOB 30 degrees or less, Sit at 90-degree angle                Problem: Patient Education: Go to Patient Education Activity  Goal: Patient/Family Education  Outcome: Progressing Towards Goal     Problem: Nutrition Deficit  Goal: *Optimize nutritional status  Outcome: Progressing Towards Goal

## 2023-01-23 NOTE — DISCHARGE INSTRUCTIONS
HOSPITALIST DISCHARGE INSTRUCTIONS  NAME: Renita Kaiser. :  1945   MRN:  [id number]     Date/Time:  2023 12:51 PM    ADMIT DATE: 2023     DISCHARGE DATE: 2023     ADMITTING DIAGNOSIS:  Shoulder Pain     DISCHARGE DIAGNOSIS:  Metastatic adenocarcinoma with unknown primary origin  Bony metastasis     MEDICATIONS:    It is important that you take the medication exactly as they are prescribed. Keep your medication in the bottles provided by the pharmacist and keep a list of the medication names, dosages, and times to be taken in your wallet. Do not take other medications without consulting your doctor     Pain Management: per above medications    What to do at Home    Recommended diet:  Regular Diet    Recommended activity: per PT/OT     1) Return to the hospital if you feel worse    2) If you experience any of the following symptoms then please call your primary care physician or return to the emergency room if you cannot get hold of your doctor:  Fever, chills, nausea, vomiting, diarrhea, change in mentation, falling, bleeding, shortness of breath, chest pain, severe headache, severe abdominal pain. Follow Up:  [facility], [facility]  [phone]  Follow up  [address] [facility], If you haven't recieved a phone call within 24, hours. Please contact the agency directly.     Anna Oni, [address]  [phone]  Go on 2023  appt 2 pm    Primary Care  Follow up in 1 week(s)  Hospital Follow Up within 1 week    Yates Lesch, MD  [address] [facility] [address]  [phone]  Schedule an appointment as soon as possible for a visit in 1 week(s)  Hospital Follow Up    Forrest Bedolla, [address] [facility] [address] [facility]  [phone]  Schedule an appointment as soon as possible for a visit in 2 week(s)  Hospital Follow Up    [address]  [address] [facility] 1  Schedule an appointment as soon as possible for a visit    [facility] [address]  [address]  [phone]  Schedule an appointment as soon as possible for a visit  Hospital Follow Up  . [facility]/ED Visit Follow-Up Instructions/Information    You may have an in home follow up visit set up with [facility] or may wish to contact [facility] to set-up a visit:    What are we? [facility] is an in-home urgent care service staffed with emergency trained medical teams. We come to your home in a vehicle stocked with medical supplies and technology. An ER physician is always available if needed. When? As a part of your hospital follow-up, an appointment has been/ or can be set up for us to come see you. Usually, this will be 24-72 hours after you leave the hospital or as needed. [employer] is open 7am-9pm, 7 days a week, 365 days a year, including holidays. Why? We know that you cannot always get to your doctor after being in the hospital and that your doctor is not always available when you need them. Once your workup is complete, we'll call in your prescriptions, update your family doctor, and handle billing with your insurance so you can focus on feeling better, faster without leaving home. How much? We accept most major health insurance plans, including Medicaid, Medicare, and Medicare Advantage [facility] [address], [facility], and [employer] limited. We also accept: credit, debit, health savings account (HSA), health reimbursement account (HRA) and flexible spending account (FSA) payments. [facility] [employer]s prices compare to conventional urgent care facilities, but we bring the care to you. How to reach us?   Getting care is easy- use our mobile nasima ([employer]), website ([employer]) or call us [phone]. Information obtained by :  I understand that if any problems occur once I am at home I am to contact my physician. I understand and acknowledge receipt of the instructions indicated above.                                                                                                                                            Physician's or R.N.'s Signature                                                                  Date/Time                                                                                                                                              Patient or Representative Signature                                                          Date/Time

## 2023-01-23 NOTE — PROGRESS NOTES
1/23/2023 3:28 PM CM met with pt and pt's wife. Pt's wife confirmed pt has a portable O2 concentrator at home with [employer] they pt can use at home. Pt's wife also reported she plans to schedule pt a PCP appt at her own PCP office. [facility] information added to pt's avs.   CM also called and attempted to schedule pt a follow up appt with Palliative MD outpatient, had to [facility]. Requested they contact pt's wife directly to schedule appt as pt is discharging from [facility] today. Pt has a follow up appt with Dr. Wong Sheridan on 1/26. [address] was sent pt's discharge clinicals and notified of pt's discharge home today via [employer]. 1/23/2023 12:57 PM Discharge home today. CM spoke with pt's wife and confirmed plan. CM spoke with Roni Kim with [address] and relayed plan for discharge home today. Pt's wife confirmed pt's hospital bed to be delivered by [employer] today. Pt's wife to transport pt home when ready. Medicare pt has received, reviewed, and signed 2nd IM letter informing them of their right to appeal the discharge. Signed copy has been placed on pt's hard chart. Pt was provided a copy for their records. CM requested discharge orders from pt's Attending. MINDA Ron    Care Management Interventions  PCP Verified by CM:  Yes (Uses a PCP with [facility])  Transition of Care Consult (CM Consult): [address]: No  Reason Outside [place]: Patient already serviced by other home care/hospice agency  MyChart Signup: No  Discharge Durable Medical Equipment: No  Physical Therapy Consult: Yes  Occupational Therapy Consult: No  Speech Therapy Consult: No  Support Systems: Spouse/Significant Other, Child(ivon)  The Plan for Transition of Care is Related to the Following Treatment Goals : home health  The Patient and/or Patient Representative was Provided with a Choice of Provider and Agrees with the Discharge Plan?: Yes  Freedom of Choice List was Provided with Basic Dialogue that Supports the Patient's Individualized Plan of Care/Goals, Treatment Preferences and Shares the Quality Data Associated with the Providers?: Yes  Discharge Location  Patient Expects to be Discharged to[de-identified] Home with home health

## 2023-01-23 NOTE — PROGRESS NOTES
Problem: Falls - Risk of  Goal: *Absence of Falls  Description: Document [place] Fall Risk and appropriate interventions in the flowsheet. Outcome: Progressing Towards Goal  Note: Fall Risk Interventions:                                Problem: Pain  Goal: *Control of Pain  Outcome: Progressing Towards Goal     Problem: Pressure Injury - Risk of  Goal: *Prevention of pressure injury  Description: Document Adrien Scale and appropriate interventions in the flowsheet.   Outcome: Progressing Towards Goal  Note: Pressure Injury Interventions:  Sensory Interventions: Assess changes in LOC, Keep linens dry and wrinkle-free, Maintain/enhance activity level, Minimize linen layers, Monitor skin under medical devices    Moisture Interventions: Absorbent underpads    Activity Interventions: Increase time out of bed    Mobility Interventions: PT/OT evaluation, Assess need for specialty bed    Nutrition Interventions: Document food/fluid/supplement intake    Friction and Shear Interventions: HOB 30 degrees or less, Sit at 90-degree angle

## 2023-01-23 NOTE — PROGRESS NOTES
Nurse handed patient a copy of discharge instructions which have been read and explained to patient and spouse. New medications were read and explained to patient, patient verbalized understanding. Patient aware that prescriptions have been electronically sent to their pharmacy. Opportunity for questions and clarification offered. Removed patient's IV access with no complications. Vital signs stable. Patient sent with all belongings.

## 2023-01-24 ENCOUNTER — HOSPITAL ENCOUNTER (INPATIENT)
Age: 78
LOS: 4 days | Discharge: HOSPICE | DRG: 064 | End: 2023-01-29
Attending: STUDENT IN AN ORGANIZED HEALTH CARE EDUCATION/TRAINING PROGRAM | Admitting: INTERNAL MEDICINE
Payer: MEDICARE

## 2023-01-24 ENCOUNTER — APPOINTMENT (OUTPATIENT)
Dept: CT IMAGING | Age: 78
DRG: 064 | End: 2023-01-24
Attending: STUDENT IN AN ORGANIZED HEALTH CARE EDUCATION/TRAINING PROGRAM
Payer: MEDICARE

## 2023-01-24 ENCOUNTER — TELEPHONE (OUTPATIENT)
Dept: PALLATIVE CARE | Age: 78
End: 2023-01-24

## 2023-01-24 DIAGNOSIS — C71.9 MALIGNANT NEOPLASM OF BRAIN, UNSPECIFIED LOCATION (HCC): ICD-10-CM

## 2023-01-24 DIAGNOSIS — G89.3 CANCER RELATED PAIN: ICD-10-CM

## 2023-01-24 DIAGNOSIS — C79.9 METASTATIC ADENOCARCINOMA OF UNKNOWN ORIGIN (HCC): ICD-10-CM

## 2023-01-24 DIAGNOSIS — R56.9 SEIZURE (HCC): Primary | ICD-10-CM

## 2023-01-24 DIAGNOSIS — C80.1 CARCINOMA METASTATIC TO BONE WITH UNKNOWN PRIMARY SITE (HCC): Primary | ICD-10-CM

## 2023-01-24 DIAGNOSIS — R41.0 DISORIENTATION: ICD-10-CM

## 2023-01-24 DIAGNOSIS — C79.51 CARCINOMA METASTATIC TO BONE WITH UNKNOWN PRIMARY SITE (HCC): Primary | ICD-10-CM

## 2023-01-24 DIAGNOSIS — C79.51 BONY METASTASIS: ICD-10-CM

## 2023-01-24 DIAGNOSIS — Z86.39 HISTORY OF HYPERCALCEMIA: ICD-10-CM

## 2023-01-24 DIAGNOSIS — I63.9 CEREBROVASCULAR ACCIDENT (CVA), UNSPECIFIED MECHANISM (HCC): ICD-10-CM

## 2023-01-24 DIAGNOSIS — C79.51 BONY METASTASIS (HCC): ICD-10-CM

## 2023-01-24 LAB
ALBUMIN SERPL-MCNC: 3 G/DL (ref 3.5–5)
ALBUMIN/GLOB SERPL: 0.6 (ref 1.1–2.2)
ALP SERPL-CCNC: 166 U/L (ref 45–117)
ALT SERPL-CCNC: 51 U/L (ref 12–78)
ANION GAP SERPL CALC-SCNC: 6 MMOL/L (ref 5–15)
AST SERPL-CCNC: 62 U/L (ref 15–37)
BASOPHILS # BLD: 0.1 K/UL (ref 0–0.1)
BASOPHILS NFR BLD: 1 % (ref 0–1)
BILIRUB SERPL-MCNC: 0.4 MG/DL (ref 0.2–1)
BUN SERPL-MCNC: 25 MG/DL (ref 6–20)
BUN/CREAT SERPL: 16 (ref 12–20)
CALCIUM SERPL-MCNC: 9.1 MG/DL (ref 8.5–10.1)
CHLORIDE SERPL-SCNC: 102 MMOL/L (ref 97–108)
CO2 SERPL-SCNC: 28 MMOL/L (ref 21–32)
COMMENT, HOLDF: NORMAL
CREAT SERPL-MCNC: 1.52 MG/DL (ref 0.7–1.3)
DIFFERENTIAL METHOD BLD: ABNORMAL
EOSINOPHIL # BLD: 0.1 K/UL (ref 0–0.4)
EOSINOPHIL NFR BLD: 2 % (ref 0–7)
ERYTHROCYTE [DISTWIDTH] IN BLOOD BY AUTOMATED COUNT: 14.1 % (ref 11.5–14.5)
GLOBULIN SER CALC-MCNC: 5 G/DL (ref 2–4)
GLUCOSE SERPL-MCNC: 109 MG/DL (ref 65–100)
HCT VFR BLD AUTO: 45.3 % (ref 36.6–50.3)
HGB BLD-MCNC: 14.7 G/DL (ref 12.1–17)
IMM GRANULOCYTES # BLD AUTO: 0.1 K/UL (ref 0–0.04)
IMM GRANULOCYTES NFR BLD AUTO: 1 % (ref 0–0.5)
INR PPP: 1.1 (ref 0.9–1.1)
LYMPHOCYTES # BLD: 1.4 K/UL (ref 0.8–3.5)
LYMPHOCYTES NFR BLD: 15 % (ref 12–49)
MCH RBC QN AUTO: 28.7 PG (ref 26–34)
MCHC RBC AUTO-ENTMCNC: 32.5 G/DL (ref 30–36.5)
MCV RBC AUTO: 88.3 FL (ref 80–99)
MONOCYTES # BLD: 0.8 K/UL (ref 0–1)
MONOCYTES NFR BLD: 8 % (ref 5–13)
NEUTS SEG # BLD: 6.6 K/UL (ref 1.8–8)
NEUTS SEG NFR BLD: 73 % (ref 32–75)
NRBC # BLD: 0 K/UL (ref 0–0.01)
NRBC BLD-RTO: 0 PER 100 WBC
PLATELET # BLD AUTO: 393 K/UL (ref 150–400)
PMV BLD AUTO: 9.5 FL (ref 8.9–12.9)
POTASSIUM SERPL-SCNC: 4 MMOL/L (ref 3.5–5.1)
PROT SERPL-MCNC: 8 G/DL (ref 6.4–8.2)
PROTHROMBIN TIME: 11 SEC (ref 9–11.1)
RBC # BLD AUTO: 5.13 M/UL (ref 4.1–5.7)
SAMPLES BEING HELD,HOLD: NORMAL
SODIUM SERPL-SCNC: 136 MMOL/L (ref 136–145)
WBC # BLD AUTO: 9 K/UL (ref 4.1–11.1)

## 2023-01-24 PROCEDURE — 96374 THER/PROPH/DIAG INJ IV PUSH: CPT

## 2023-01-24 PROCEDURE — 85610 PROTHROMBIN TIME: CPT

## 2023-01-24 PROCEDURE — 99285 EMERGENCY DEPT VISIT HI MDM: CPT

## 2023-01-24 PROCEDURE — 74011000636 HC RX REV CODE- 636: Performed by: STUDENT IN AN ORGANIZED HEALTH CARE EDUCATION/TRAINING PROGRAM

## 2023-01-24 PROCEDURE — 74011250636 HC RX REV CODE- 250/636: Performed by: STUDENT IN AN ORGANIZED HEALTH CARE EDUCATION/TRAINING PROGRAM

## 2023-01-24 PROCEDURE — 96361 HYDRATE IV INFUSION ADD-ON: CPT

## 2023-01-24 PROCEDURE — 4A03X5D MEASUREMENT OF ARTERIAL FLOW, INTRACRANIAL, EXTERNAL APPROACH: ICD-10-PCS | Performed by: RADIOLOGY

## 2023-01-24 PROCEDURE — 85025 COMPLETE CBC W/AUTO DIFF WBC: CPT

## 2023-01-24 PROCEDURE — 70496 CT ANGIOGRAPHY HEAD: CPT

## 2023-01-24 PROCEDURE — 70450 CT HEAD/BRAIN W/O DYE: CPT

## 2023-01-24 PROCEDURE — 0042T CT CODE NEURO PERF W CBF: CPT

## 2023-01-24 PROCEDURE — 80053 COMPREHEN METABOLIC PANEL: CPT

## 2023-01-24 PROCEDURE — 36415 COLL VENOUS BLD VENIPUNCTURE: CPT

## 2023-01-24 RX ORDER — LEVETIRACETAM 500 MG/5ML
1000 INJECTION, SOLUTION, CONCENTRATE INTRAVENOUS
Status: COMPLETED | OUTPATIENT
Start: 2023-01-24 | End: 2023-01-24

## 2023-01-24 RX ADMIN — LEVETIRACETAM 1000 MG: 100 INJECTION INTRAVENOUS at 23:23

## 2023-01-24 RX ADMIN — IOPAMIDOL 140 ML: 755 INJECTION, SOLUTION INTRAVENOUS at 22:06

## 2023-01-24 RX ADMIN — SODIUM CHLORIDE 1000 ML: 9 INJECTION, SOLUTION INTRAVENOUS at 23:23

## 2023-01-25 PROBLEM — G89.3 CANCER ASSOCIATED PAIN: Status: ACTIVE | Noted: 2023-01-25

## 2023-01-25 PROBLEM — R90.89 ABNORMAL BRAIN MRI: Status: ACTIVE | Noted: 2023-01-25

## 2023-01-25 PROBLEM — Z51.5 PALLIATIVE CARE ENCOUNTER: Status: ACTIVE | Noted: 2023-01-25

## 2023-01-25 PROBLEM — R63.0 DECREASE IN APPETITE: Status: ACTIVE | Noted: 2023-01-25

## 2023-01-25 PROBLEM — G40.919 BREAKTHROUGH SEIZURE (HCC): Status: ACTIVE | Noted: 2023-01-25

## 2023-01-25 LAB
AMMONIA PLAS-SCNC: <10 UMOL/L
ANION GAP SERPL CALC-SCNC: 7 MMOL/L (ref 5–15)
BUN SERPL-MCNC: 21 MG/DL (ref 6–20)
BUN/CREAT SERPL: 20 (ref 12–20)
CALCIUM SERPL-MCNC: 8.5 MG/DL (ref 8.5–10.1)
CHLORIDE SERPL-SCNC: 105 MMOL/L (ref 97–108)
CO2 SERPL-SCNC: 26 MMOL/L (ref 21–32)
CREAT SERPL-MCNC: 1.04 MG/DL (ref 0.7–1.3)
GLUCOSE BLD STRIP.AUTO-MCNC: 100 MG/DL (ref 65–117)
GLUCOSE SERPL-MCNC: 109 MG/DL (ref 65–100)
POTASSIUM SERPL-SCNC: 3.9 MMOL/L (ref 3.5–5.1)
SERVICE CMNT-IMP: NORMAL
SODIUM SERPL-SCNC: 138 MMOL/L (ref 136–145)

## 2023-01-25 PROCEDURE — 74011250637 HC RX REV CODE- 250/637: Performed by: INTERNAL MEDICINE

## 2023-01-25 PROCEDURE — 82140 ASSAY OF AMMONIA: CPT

## 2023-01-25 PROCEDURE — 95816 EEG AWAKE AND DROWSY: CPT | Performed by: INTERNAL MEDICINE

## 2023-01-25 PROCEDURE — 94761 N-INVAS EAR/PLS OXIMETRY MLT: CPT

## 2023-01-25 PROCEDURE — 74011000250 HC RX REV CODE- 250: Performed by: INTERNAL MEDICINE

## 2023-01-25 PROCEDURE — 65270000029 HC RM PRIVATE

## 2023-01-25 PROCEDURE — 74011250636 HC RX REV CODE- 250/636: Performed by: INTERNAL MEDICINE

## 2023-01-25 PROCEDURE — 77010033678 HC OXYGEN DAILY

## 2023-01-25 PROCEDURE — 74011250637 HC RX REV CODE- 250/637: Performed by: STUDENT IN AN ORGANIZED HEALTH CARE EDUCATION/TRAINING PROGRAM

## 2023-01-25 PROCEDURE — 82962 GLUCOSE BLOOD TEST: CPT

## 2023-01-25 PROCEDURE — 99222 1ST HOSP IP/OBS MODERATE 55: CPT | Performed by: STUDENT IN AN ORGANIZED HEALTH CARE EDUCATION/TRAINING PROGRAM

## 2023-01-25 PROCEDURE — 99223 1ST HOSP IP/OBS HIGH 75: CPT | Performed by: PSYCHIATRY & NEUROLOGY

## 2023-01-25 PROCEDURE — 36415 COLL VENOUS BLD VENIPUNCTURE: CPT

## 2023-01-25 PROCEDURE — 95816 EEG AWAKE AND DROWSY: CPT | Performed by: PSYCHIATRY & NEUROLOGY

## 2023-01-25 PROCEDURE — 80048 BASIC METABOLIC PNL TOTAL CA: CPT

## 2023-01-25 PROCEDURE — 99222 1ST HOSP IP/OBS MODERATE 55: CPT | Performed by: INTERNAL MEDICINE

## 2023-01-25 RX ORDER — IPRATROPIUM BROMIDE AND ALBUTEROL SULFATE 2.5; .5 MG/3ML; MG/3ML
3 SOLUTION RESPIRATORY (INHALATION)
Status: DISCONTINUED | OUTPATIENT
Start: 2023-01-25 | End: 2023-01-29 | Stop reason: HOSPADM

## 2023-01-25 RX ORDER — LEVETIRACETAM 500 MG/1
1000 TABLET ORAL 2 TIMES DAILY
Status: DISCONTINUED | OUTPATIENT
Start: 2023-01-25 | End: 2023-01-29 | Stop reason: HOSPADM

## 2023-01-25 RX ORDER — OXYCODONE HYDROCHLORIDE 5 MG/1
10 TABLET ORAL
Status: DISCONTINUED | OUTPATIENT
Start: 2023-01-25 | End: 2023-01-29 | Stop reason: HOSPADM

## 2023-01-25 RX ORDER — ACETAMINOPHEN 325 MG/1
650 TABLET ORAL
Status: DISCONTINUED | OUTPATIENT
Start: 2023-01-25 | End: 2023-01-29 | Stop reason: HOSPADM

## 2023-01-25 RX ORDER — VALSARTAN 80 MG/1
80 TABLET ORAL DAILY
Status: DISCONTINUED | OUTPATIENT
Start: 2023-01-25 | End: 2023-01-29 | Stop reason: HOSPADM

## 2023-01-25 RX ORDER — LIDOCAINE 4 G/100G
1 PATCH TOPICAL EVERY 24 HOURS
Status: DISCONTINUED | OUTPATIENT
Start: 2023-01-25 | End: 2023-01-29 | Stop reason: HOSPADM

## 2023-01-25 RX ORDER — FAMOTIDINE 20 MG/1
20 TABLET, FILM COATED ORAL 2 TIMES DAILY
Status: DISCONTINUED | OUTPATIENT
Start: 2023-01-25 | End: 2023-01-29 | Stop reason: HOSPADM

## 2023-01-25 RX ORDER — SODIUM CHLORIDE 0.9 % (FLUSH) 0.9 %
5-40 SYRINGE (ML) INJECTION EVERY 8 HOURS
Status: DISCONTINUED | OUTPATIENT
Start: 2023-01-25 | End: 2023-01-29 | Stop reason: HOSPADM

## 2023-01-25 RX ORDER — AMOXICILLIN 250 MG
2 CAPSULE ORAL DAILY
Status: DISCONTINUED | OUTPATIENT
Start: 2023-01-25 | End: 2023-01-29 | Stop reason: HOSPADM

## 2023-01-25 RX ORDER — POLYETHYLENE GLYCOL 3350 17 G/17G
17 POWDER, FOR SOLUTION ORAL DAILY PRN
Status: DISCONTINUED | OUTPATIENT
Start: 2023-01-25 | End: 2023-01-29 | Stop reason: HOSPADM

## 2023-01-25 RX ORDER — SODIUM CHLORIDE 0.9 % (FLUSH) 0.9 %
5-40 SYRINGE (ML) INJECTION AS NEEDED
Status: DISCONTINUED | OUTPATIENT
Start: 2023-01-25 | End: 2023-01-29 | Stop reason: HOSPADM

## 2023-01-25 RX ORDER — GABAPENTIN 300 MG/1
600 CAPSULE ORAL 3 TIMES DAILY
Status: DISCONTINUED | OUTPATIENT
Start: 2023-01-25 | End: 2023-01-29 | Stop reason: HOSPADM

## 2023-01-25 RX ORDER — GUAIFENESIN 100 MG/5ML
81 LIQUID (ML) ORAL DAILY
Status: DISCONTINUED | OUTPATIENT
Start: 2023-01-25 | End: 2023-01-29 | Stop reason: HOSPADM

## 2023-01-25 RX ORDER — ACETAMINOPHEN 500 MG
1000 TABLET ORAL 3 TIMES DAILY
Status: DISCONTINUED | OUTPATIENT
Start: 2023-01-25 | End: 2023-01-29 | Stop reason: HOSPADM

## 2023-01-25 RX ORDER — POLYETHYLENE GLYCOL 3350 17 G/17G
17 POWDER, FOR SOLUTION ORAL
Status: DISCONTINUED | OUTPATIENT
Start: 2023-01-25 | End: 2023-01-29 | Stop reason: HOSPADM

## 2023-01-25 RX ORDER — SODIUM CHLORIDE, SODIUM LACTATE, POTASSIUM CHLORIDE, CALCIUM CHLORIDE 600; 310; 30; 20 MG/100ML; MG/100ML; MG/100ML; MG/100ML
125 INJECTION, SOLUTION INTRAVENOUS CONTINUOUS
Status: DISPENSED | OUTPATIENT
Start: 2023-01-25 | End: 2023-01-26

## 2023-01-25 RX ORDER — MIRTAZAPINE 15 MG/1
15 TABLET, FILM COATED ORAL
Status: DISCONTINUED | OUTPATIENT
Start: 2023-01-25 | End: 2023-01-29 | Stop reason: HOSPADM

## 2023-01-25 RX ORDER — ONDANSETRON 4 MG/1
4 TABLET, ORALLY DISINTEGRATING ORAL
Status: DISCONTINUED | OUTPATIENT
Start: 2023-01-25 | End: 2023-01-29 | Stop reason: HOSPADM

## 2023-01-25 RX ORDER — ACETAMINOPHEN 650 MG/1
650 SUPPOSITORY RECTAL
Status: DISCONTINUED | OUTPATIENT
Start: 2023-01-25 | End: 2023-01-29 | Stop reason: HOSPADM

## 2023-01-25 RX ORDER — ONDANSETRON 2 MG/ML
4 INJECTION INTRAMUSCULAR; INTRAVENOUS
Status: DISCONTINUED | OUTPATIENT
Start: 2023-01-25 | End: 2023-01-29 | Stop reason: HOSPADM

## 2023-01-25 RX ORDER — ENOXAPARIN SODIUM 100 MG/ML
40 INJECTION SUBCUTANEOUS DAILY
Status: DISCONTINUED | OUTPATIENT
Start: 2023-01-25 | End: 2023-01-29 | Stop reason: HOSPADM

## 2023-01-25 RX ADMIN — MIRTAZAPINE 15 MG: 15 TABLET, FILM COATED ORAL at 03:59

## 2023-01-25 RX ADMIN — LEVETIRACETAM 1000 MG: 500 TABLET, FILM COATED ORAL at 08:37

## 2023-01-25 RX ADMIN — SODIUM CHLORIDE, PRESERVATIVE FREE 10 ML: 5 INJECTION INTRAVENOUS at 15:16

## 2023-01-25 RX ADMIN — ACETAMINOPHEN 1000 MG: 500 TABLET ORAL at 22:06

## 2023-01-25 RX ADMIN — PSYLLIUM HUSK 1 PACKET: 3.4 POWDER ORAL at 18:30

## 2023-01-25 RX ADMIN — GABAPENTIN 600 MG: 300 CAPSULE ORAL at 22:06

## 2023-01-25 RX ADMIN — SENNOSIDES AND DOCUSATE SODIUM 2 TABLET: 50; 8.6 TABLET ORAL at 08:37

## 2023-01-25 RX ADMIN — GABAPENTIN 600 MG: 300 CAPSULE ORAL at 08:37

## 2023-01-25 RX ADMIN — SODIUM CHLORIDE, PRESERVATIVE FREE 10 ML: 5 INJECTION INTRAVENOUS at 22:16

## 2023-01-25 RX ADMIN — OXYCODONE HYDROCHLORIDE 10 MG: 5 TABLET ORAL at 11:07

## 2023-01-25 RX ADMIN — FAMOTIDINE 20 MG: 20 TABLET, FILM COATED ORAL at 08:37

## 2023-01-25 RX ADMIN — PSYLLIUM HUSK 1 PACKET: 3.4 POWDER ORAL at 11:07

## 2023-01-25 RX ADMIN — SODIUM CHLORIDE, PRESERVATIVE FREE 10 ML: 5 INJECTION INTRAVENOUS at 04:00

## 2023-01-25 RX ADMIN — SODIUM CHLORIDE, POTASSIUM CHLORIDE, SODIUM LACTATE AND CALCIUM CHLORIDE 125 ML/HR: 600; 310; 30; 20 INJECTION, SOLUTION INTRAVENOUS at 04:00

## 2023-01-25 RX ADMIN — VALSARTAN 80 MG: 80 TABLET ORAL at 08:37

## 2023-01-25 RX ADMIN — GABAPENTIN 600 MG: 300 CAPSULE ORAL at 18:23

## 2023-01-25 RX ADMIN — MIRTAZAPINE 15 MG: 15 TABLET, FILM COATED ORAL at 22:06

## 2023-01-25 RX ADMIN — LEVETIRACETAM 1000 MG: 500 TABLET, FILM COATED ORAL at 18:24

## 2023-01-25 RX ADMIN — ASPIRIN 81 MG: 81 TABLET, CHEWABLE ORAL at 08:37

## 2023-01-25 RX ADMIN — OXYCODONE HYDROCHLORIDE 10 MG: 5 TABLET ORAL at 22:06

## 2023-01-25 RX ADMIN — OXYCODONE HYDROCHLORIDE 10 MG: 5 TABLET ORAL at 03:59

## 2023-01-25 RX ADMIN — SODIUM CHLORIDE, POTASSIUM CHLORIDE, SODIUM LACTATE AND CALCIUM CHLORIDE 125 ML/HR: 600; 310; 30; 20 INJECTION, SOLUTION INTRAVENOUS at 18:29

## 2023-01-25 RX ADMIN — ENOXAPARIN SODIUM 40 MG: 100 INJECTION SUBCUTANEOUS at 08:37

## 2023-01-25 RX ADMIN — FAMOTIDINE 20 MG: 20 TABLET, FILM COATED ORAL at 18:24

## 2023-01-25 RX ADMIN — OXYCODONE HYDROCHLORIDE 10 MG: 5 TABLET ORAL at 15:15

## 2023-01-25 NOTE — CONSULTS
Palliative Medicine Consult  [place]: [phone] ([phone])    Patient Name: Carmen Lim. YOB: 1945    Date of Initial Consult: 1/25/2023  Reason for Consult: Care Decisions  Requesting Provider: Samantha Hopson NP   Primary Care Physician: Jimbo, MD Ashley     SUMMARY:   Carmen Gomez is a 76 y.o. male with a past history of spinal stenosis and lumbar radiculopathy and recently diagnosed adenocarcinoma of unknown origin (diagnosed recent hospitalization earlier this month) who was admitted on 1/24/2023 from home with concern for acute confusion and staring spell lasting a few minutes that was concerning for possible seizure(?). Has been seen by Neurology. EEG consistent with encephalopathy. Started on [address]. Mental status improved and patient more conversant after admission. Recent admission to [facility] from for confusion and subsequently found to have widespread bony metastatic disease. He had been having right shoulder pain for >1 month being evaluated by outside physician. Became confused previous 2 days prior to that admission leading family to bring him to [facility] for evaluation. Adenocarcinoma diagnosis made and he received palliative radiation x1 fraction to right shoulder and thigh for bone pain. Plan was to follow up outpatient with Oncology but seizure-like episode led to current presentation and hospitalization. Current medical issues leading to Palliative Medicine involvement include: Care Decisions. Social Hx:  Lives at home with wife, Satya Sheriff,  >20 years. 3 children, [place] and Joaquim Kotyk with Rosa Elena [place]wood (Marya's son). Able to manage own ADLs at home per family with occasional assistance from wife. Generally well functioning. Retired manufacturing representative.      PALLIATIVE DIAGNOSES:   Adenocarcinoma, unk primary with diffuse bone mets  Cancer pain  Poor appetite  Constipation  Encephalopathy  Goals of Care  Palliative Care Encounter     PLAN:   Goals  Met with patient and son Nate Beavers at bedside. Patient awake and alert though seems confused at times, some trouble with word-finding, and unable to recall reason for presentation  Plan to meet with family tomorrow at 1pm to further discuss next steps. Plan had been to follow with Onc outpatient and start treatment but this may no longer be an option if functional status not improving. Cancer Pain  Mostly right shoulder and leg, not significant at rest but worse with movement. Continue oxycodone IR 10mg PO every 4 hours as needed, tolerating well. Would give dose ~1 hour prior to planned PT/OT sessions.   Pain seems incidental at present so would not start long-acting yet as little to no pain at rest.  Start scheduled Tylenol 1000mg PO every 8 hours  Continue gabapentin 600mg PO three times daily  Lidocaine patch  Poor Appetite  Continue remeron 15mg at bedtime  Consult RD  Surrogate  Wife, Michael Franz is legal NOK  Code Status  Full Code  Initial consult note routed to primary continuity provider and/or primary health care team members  Communicated plan of care with: [facility] Team     GOALS OF CARE / TREATMENT PREFERENCES:     GOALS OF CARE:  Patient/Health Care Proxy Stated Goals:  (to be discussed)    TREATMENT PREFERENCES:   Code Status: Full Code    Patient and family's personal goals include: to be discussed      Advance Care Planning:  [x] [facility] Interdisciplinary Team has updated the ACP Navigator with Health Care Decision Maker and Patient Capacity      Primary Decision Maker: Kristin Newsome - [phone]    Secondary Decision Maker: Krista Mcleod - Daughter - [phone]    Secondary Decision Maker: Carlos Fournier - [phone]  Advance Care Planning 1/13/2023   Patient's [address] is: Legal Next [facility] Directive -   Patient Would Like to Complete Advance Directive -   Does the patient have other document types -               Other:    As far as possible, the palliative care team has discussed with patient / health care proxy about goals of care / treatment preferences for patient. HISTORY:     History obtained from: medical chart, patient, son Layla Mcleod: weakness, confusion    HPI/SUBJECTIVE:    The patient is:   [x] Verbal and participatory though limited by confusion  [] Non-participatory due to:      Recently discharged from [facility] on 1/23. Did okay first night home and worked with therapy who came out day of presentation, able to walk up and down the stairs a few times. Later that night had staring spell and seemed more confused, not able to speak, leading family to brig to ER. Pain has been stable. Appetite remains poor. No shortness of breath at rest.  No anxiety or nausea.     Clinical Pain Assessment (nonverbal scale for severity on nonverbal patients):   Clinical Pain Assessment  Severity: 0          Duration: for how long has pt been experiencing pain (e.g., 2 days, 1 month, years)  Frequency: how often pain is an issue (e.g., several times per day, once every few days, constant)     FUNCTIONAL ASSESSMENT:     Palliative Performance Scale (PPS):  PPS: 50       PSYCHOSOCIAL/SPIRITUAL SCREENING:     Palliative IDT has assessed this patient for cultural preferences / practices and a referral made as appropriate to needs (Cultural Services, Patient Advocacy, Ethics, etc.)    Any spiritual / Moravian concerns:  [] Yes /  [x] No   If \"Yes\" to discuss with pastoral care during IDT     Does caregiver feel burdened by caring for their loved one:   [] Yes /  [x] No /  [] No Caregiver Present/Available [] No Caregiver [] Pt Lives at Facility  If \"Yes\" to discuss with social work during IDT    Anticipatory grief assessment:   [x] Normal  / [] Maladaptive     If \"Maladaptive\" to discuss with social work during IDT    ESAS Anxiety: Anxiety: 0    ESAS Depression: REVIEW OF SYSTEMS:     Positive and pertinent negative findings in ROS are noted above in HPI. The following systems were [x] reviewed / [] unable to be reviewed as noted in HPI  Other findings are noted below. Systems: constitutional, ears/nose/mouth/throat, respiratory, gastrointestinal, genitourinary, musculoskeletal, integumentary, neurologic, psychiatric, endocrine. Positive findings noted below. Modified ESAS Completed by: provider           Pain: 0   Anxiety: 0 Nausea: 0   Anorexia: 5 Dyspnea: 0     Constipation: No              PHYSICAL EXAM:     From RN flowsheet:  Wt Readings from Last 3 Encounters:   01/24/23 192 lb (87.1 kg)   01/11/23 192 lb 14.4 oz (87.5 kg)   03/02/21 190 lb (86.2 kg)     Blood pressure (!) 143/63, pulse 83, temperature 97.8 °F (36.6 °C), resp. rate 15, height 5' 10\" (1.778 m), weight 192 lb (87.1 kg), SpO2 96 %.     Pain Scale 1: Numeric (0 - 10)  Pain Intensity 1: 3     Pain Location 1: Generalized  Pain Orientation 1: Right  Pain Description 1: Aching, Sore  Pain Intervention(s) 1: Medication (see MAR)  Last bowel movement, if known:     Constitutional: lying in bed, no distress  Eyes: pupils equal, anicteric  ENMT: no nasal discharge, moist mucous membranes  Cardiovascular: regular rate, distal pulses intact  Respiratory: breathing not labored, symmetric chest rise  Gastrointestinal: soft, non-tender, +bowel sounds  Musculoskeletal: no deformity, no tenderness to palpation  Skin: warm, dry  Neurologic: awake and alert, oriented to self and place but not situation, following commands, moving all extremities  Psychiatric: full affect, no hallucinations, no agitation     HISTORY:     Active Problems:    Acute metabolic encephalopathy (9/44/7870)      Bony metastasis ([address] (1/11/2023)      Centrilobular emphysema ([address] (1/14/2023)      Metastatic adenocarcinoma of unknown origin ([address] (1/18/2023)      Abnormal brain MRI (1/25/2023)    Past Medical History:   Diagnosis Date Diverticulitis     Gastrointestinal disorder     History of vascular access device 07/24/2017    [facility] VAT -  5FR triple Lumen Power PICC R Basilic  39 cm    Spinal stenosis       Past Surgical History:   Procedure Laterality Date    FLEXIBLE SIGMOIDOSCOPY N/A 7/27/2017    SIGMOIDOSCOPY FLEXIBLE performed by Марина Allen MD at [facility] ENDOSCOPY    IR INSERT NON TUNL CVC OVER 5 YRS  1/13/2021      No family history on file. History reviewed, no pertinent family history. Social History     Tobacco Use    Smoking status: Former     Packs/day: 1.00     Years: 50.00     Pack years: 50.00     Types: Cigarettes    Smokeless tobacco: Current   Substance Use Topics    Alcohol use:  Yes     Alcohol/week: 0.8 standard drinks     Types: 1 Cans of beer per week     No Known Allergies   Current Facility-Administered Medications   Medication Dose Route Frequency    famotidine (PEPCID) tablet 20 mg  20 mg Oral BID    gabapentin (NEURONTIN) capsule 600 mg  600 mg Oral TID    albuterol-ipratropium (DUO-NEB) 2.5 MG-0.5 MG/3 ML  3 mL Nebulization Q4H PRN    lidocaine 4 % patch 1 Patch  1 Patch TransDERmal Q24H    mirtazapine (REMERON) tablet 15 mg  15 mg Oral QHS    oxyCODONE IR (ROXICODONE) tablet 10 mg  10 mg Oral Q4H PRN    polyethylene glycol (MIRALAX) packet 17 g  17 g Oral DAILY PRN    psyllium husk-aspartame (METAMUCIL FIBER) packet 1 Packet  1 Packet Oral BID    senna-docusate (PERICOLACE) 8.6-50 mg per tablet 2 Tablet  2 Tablet Oral DAILY    valsartan (DIOVAN) tablet 80 mg  80 mg Oral DAILY    sodium chloride (NS) flush 5-40 mL  5-40 mL IntraVENous Q8H    sodium chloride (NS) flush 5-40 mL  5-40 mL IntraVENous PRN    acetaminophen (TYLENOL) tablet 650 mg  650 mg Oral Q6H PRN    Or    acetaminophen (TYLENOL) suppository 650 mg  650 mg Rectal Q6H PRN    polyethylene glycol (MIRALAX) packet 17 g  17 g Oral DAILY PRN    ondansetron (ZOFRAN ODT) tablet 4 mg  4 mg Oral Q8H PRN    Or    ondansetron (ZOFRAN) injection 4 mg  4 mg IntraVENous Q6H PRN    enoxaparin (LOVENOX) injection 40 mg  40 mg SubCUTAneous DAILY    levETIRAcetam (KEPPRA) tablet 1,000 mg  1,000 mg Oral BID    lactated Ringers infusion  125 mL/hr IntraVENous CONTINUOUS    aspirin chewable tablet 81 mg  81 mg Oral DAILY    acetaminophen (TYLENOL) tablet 1,000 mg  1,000 mg Oral TID     Current Outpatient Medications   Medication Sig    ipratropium-albuteroL (COMBIVENT RESPIMAT)  mcg/actuation inhaler Take 1 Puff by inhalation every six (6) hours as needed for Wheezing, Shortness of Breath or Respiratory Distress. famotidine (PEPCID) 20 mg tablet Take 1 Tablet by mouth two (2) times a day. valsartan (DIOVAN) 80 mg tablet Take 1 Tablet by mouth daily. senna-docusate (PERICOLACE) 8.6-50 mg per tablet Take 2 Tablets by mouth daily for 30 days. polyethylene glycol (MIRALAX) 17 gram packet Take 1 Packet by mouth daily as needed for Constipation (give once per day if no BM in past 48 hours). lidocaine 4 % patch Apply patch to back and R shoulder. Apply patch to the affected area for 12 hours a day and remove for 12 hours a day.    naloxone (Narcan) 4 mg/actuation nasal spray Use 1 spray intranasally, then discard. Repeat with new spray every 2 min as needed for opioid overdose symptoms, alternating nostrils. oxyCODONE IR (ROXICODONE) 10 mg tab immediate release tablet Take 1 Tablet by mouth every four (4) hours as needed for Pain for up to 14 days. Max Daily Amount: 60 mg.    mirtazapine (REMERON) 15 mg tablet Take 1 Tablet by mouth nightly. melatonin, rapid dissolve, 5 mg TbDi tablet Take 1 Tab by mouth nightly as needed for Other (insomnia). (Patient not taking: Reported on 1/11/2023)    acetaminophen (TYLENOL) 325 mg tablet Take 2 Tabs by mouth every six (6) hours as needed for Pain. dicyclomine (BENTYL) 20 mg tablet Take 20 mg by mouth every six (6) hours.  Indications: irritable colon (Patient not taking: Reported on 1/11/2023)    gabapentin (NEURONTIN) 600 mg tablet Take 300 mg by mouth three (3) times daily. psyllium (METAMUCIL) packet Take 1 Packet by mouth two (2) times a day. LAB AND IMAGING FINDINGS:     Lab Results   Component Value Date/Time    WBC 9.0 01/24/2023 09:16 PM    HGB 14.7 01/24/2023 09:16 PM    PLATELET 398 31/60/1902 09:16 PM     Lab Results   Component Value Date/Time    Sodium 138 01/25/2023 03:55 AM    Potassium 3.9 01/25/2023 03:55 AM    Chloride 105 01/25/2023 03:55 AM    CO2 26 01/25/2023 03:55 AM    BUN 21 (H) 01/25/2023 03:55 AM    Creatinine 1.04 01/25/2023 03:55 AM    Calcium 8.5 01/25/2023 03:55 AM    Magnesium 1.9 01/21/2023 02:52 AM    Phosphorus 2.4 (L) 01/23/2023 03:13 AM      Lab Results   Component Value Date/Time    Alk. phosphatase 166 (H) 01/24/2023 09:16 PM    Protein, total 8.0 01/24/2023 09:16 PM    Albumin 3.0 (L) 01/24/2023 09:16 PM    Globulin 5.0 (H) 01/24/2023 09:16 PM     Lab Results   Component Value Date/Time    INR 1.1 01/24/2023 09:16 PM    Prothrombin time 11.0 01/24/2023 09:16 PM    aPTT 29.2 01/23/2021 04:49 AM      Lab Results   Component Value Date/Time    Ferritin 1,418 (H) 01/22/2021 03:02 AM      Lab Results   Component Value Date/Time    pH 7.44 01/11/2021 05:09 AM    PCO2 31 (L) 01/11/2021 05:09 AM    PO2 69 (L) 01/11/2021 05:09 AM     No components found for: GLPOC   No results found for: CPK, CKMB             Total time: 55 minutes  Counseling / coordination time, spent as noted above: 35 minutes  > 50% counseling / coordination?: yes    Prolonged service was provided for  []30 min   []75 min in face to face time in the presence of the patient, spent as noted above. Time Start:   Time End:   Note: this can only be billed with 02481 (initial) or 35304 (follow up). If multiple start / stop times, list each separately.

## 2023-01-25 NOTE — CONSULTS
NEUROLOGY IN-PATIENT NEW CONSULTATION      1/25/2023    RE: Prince Garcia.         1945      REFERRED BY:  Other, MD Ashley      CHIEF COMPLAINT:  This is Prince Garcia. is a 76 y.o. male  who had concerns including Altered mental status. HPI:     Patient comes in for acute episode of confusion and unable to answer questions with blank stare lasting less than 30 secs. Unable to hol up his upper extremities. EMS called. Patient was started on Keppra 1000 mg BID.    (-) chest pain  (-) palpitation  (-) dyspnea  (+) smoker  (+) alcohol    Recently diagnosed with Metastatic adenocarcinoma of unknown origin ([facility] [address] (1/18/2023) POA: with mets to bone    ROS  (-) fever  (-) rash  All other systems reviewed and are negative    Past Medical Hx  Past Medical History:   Diagnosis Date    Diverticulitis     Gastrointestinal disorder     History of vascular access device 07/24/2017    [facility] VAT -  5FR triple Lumen Power PICC R Basilic  39 cm    Spinal stenosis        Social Hx  Social History     Socioeconomic History    Marital status:    Tobacco Use    Smoking status: Former     Packs/day: 1.00     Years: 50.00     Pack years: 50.00     Types: Cigarettes    Smokeless tobacco: Current   Substance and Sexual Activity    Alcohol use: Yes     Alcohol/week: 0.8 standard drinks     Types: 1 Cans of beer per week    Drug use: No       Family Hx  No family history on file.     ALLERGIES  No Known Allergies    CURRENT MEDS  Current Facility-Administered Medications   Medication Dose Route Frequency Provider Last Rate Last Admin    famotidine (PEPCID) tablet 20 mg  20 mg Oral BID Buster Rojo MD   20 mg at 01/25/23 0837    gabapentin (NEURONTIN) capsule 600 mg  600 mg Oral TID Buster Rojo MD   600 mg at 01/25/23 0837    albuterol-ipratropium (DUO-NEB) 2.5 MG-0.5 MG/3 ML  3 mL Nebulization Q4H PRN Buster Rojo MD        lidocaine 4 % patch 1 Patch  1 Patch TransDERmal Q24H Buster Rojo MD        mirtazapine (REMERON) tablet 15 mg  15 mg Oral QHS Rakel Ly MD   15 mg at 01/25/23 0359    oxyCODONE IR (ROXICODONE) tablet 10 mg  10 mg Oral Q4H PRN Rakel Ly MD   10 mg at 01/25/23 1107    polyethylene glycol (MIRALAX) packet 17 g  17 g Oral DAILY PRN Rakel Ly MD        psyllium husk-aspartame (METAMUCIL FIBER) packet 1 Packet  1 Packet Oral BID Rakel Ly MD   1 Packet at 01/25/23 1107    senna-docusate (PERICOLACE) 8.6-50 mg per tablet 2 Tablet  2 Tablet Oral DAILY Rakel Ly MD   2 Tablet at 01/25/23 0837    valsartan (DIOVAN) tablet 80 mg  80 mg Oral DAILY Rakel Ly MD   80 mg at 01/25/23 0592    sodium chloride (NS) flush 5-40 mL  5-40 mL IntraVENous Q8H Rakel Ly MD   10 mL at 01/25/23 0400    sodium chloride (NS) flush 5-40 mL  5-40 mL IntraVENous PRN Rakel Ly MD        acetaminophen (TYLENOL) tablet 650 mg  650 mg Oral Q6H PRN Rakel Ly MD        Or    acetaminophen (TYLENOL) suppository 650 mg  650 mg Rectal Q6H PRN Rakel Ly MD        polyethylene glycol (MIRALAX) packet 17 g  17 g Oral DAILY PRN Rakel Ly MD        ondansetron (ZOFRAN ODT) tablet 4 mg  4 mg Oral Q8H PRN Rakel Ly MD        Or    ondansetron [facility]) injection 4 mg  4 mg IntraVENous Q6H PRN Rakel Ly MD        enoxaparin (LOVENOX) injection 40 mg  40 mg SubCUTAneous DAILY Rakel Ly MD   40 mg at 01/25/23 0837    levETIRAcetam (KEPPRA) tablet 1,000 mg  1,000 mg Oral BID Rakel Ly MD   1,000 mg at 01/25/23 8778    lactated Ringers infusion  125 mL/hr IntraVENous CONTINUOUS Rakel Ly  mL/hr at 01/25/23 0400 125 mL/hr at 01/25/23 0400    aspirin chewable tablet 81 mg  81 mg Oral DAILY Serge Tristan MD   81 mg at 01/25/23 1869     Current Outpatient Medications   Medication Sig Dispense Refill    ipratropium-albuteroL (COMBIVENT RESPIMAT)  mcg/actuation inhaler Take 1 Puff by inhalation every six (6) hours as needed for Wheezing, Shortness of Breath or Respiratory Distress.  1 Each 0 famotidine (PEPCID) 20 mg tablet Take 1 Tablet by mouth two (2) times a day. 60 Tablet 0    valsartan (DIOVAN) 80 mg tablet Take 1 Tablet by mouth daily. 30 Tablet 0    senna-docusate (PERICOLACE) 8.6-50 mg per tablet Take 2 Tablets by mouth daily for 30 days. 60 Tablet 0    polyethylene glycol (MIRALAX) 17 gram packet Take 1 Packet by mouth daily as needed for Constipation (give once per day if no BM in past 48 hours). 30 Packet 0    lidocaine 4 % patch Apply patch to back and R shoulder. Apply patch to the affected area for 12 hours a day and remove for 12 hours a day. 20 Patch 0    naloxone (Narcan) 4 mg/actuation nasal spray Use 1 spray intranasally, then discard. Repeat with new spray every 2 min as needed for opioid overdose symptoms, alternating nostrils. 2 Each 0    oxyCODONE IR (ROXICODONE) 10 mg tab immediate release tablet Take 1 Tablet by mouth every four (4) hours as needed for Pain for up to 14 days. Max Daily Amount: 60 mg. 45 Tablet 0    mirtazapine (REMERON) 15 mg tablet Take 1 Tablet by mouth nightly. 30 Tablet 0    melatonin, rapid dissolve, 5 mg TbDi tablet Take 1 Tab by mouth nightly as needed for Other (insomnia). (Patient not taking: Reported on 1/11/2023) 30 Tab 0    acetaminophen (TYLENOL) 325 mg tablet Take 2 Tabs by mouth every six (6) hours as needed for Pain. 30 Tab 0    dicyclomine (BENTYL) 20 mg tablet Take 20 mg by mouth every six (6) hours. Indications: irritable colon (Patient not taking: Reported on 1/11/2023)      gabapentin (NEURONTIN) 600 mg tablet Take 300 mg by mouth three (3) times daily. psyllium (METAMUCIL) packet Take 1 Packet by mouth two (2) times a day.              PREVIOUS WORKUP: (reviewed)  IMAGING:    CT Results (recent):  Results from Hospital Encounter encounter on 01/24/23    CT CODE NEURO PERF W CBF    Narrative  Clinical history: Code Stroke  INDICATION:   Code Stroke    COMPARISON:  1/24/2023  CONTRAST: 100ml Isovue 370  TECHNIQUE:  CT dose reduction was achieved through use of a standardized protocol tailored  for this examination and automatic exposure control for dose modulation. Following the uneventful administration of Isovue-370 contrast material, axial  CT angiography of the head and neck was performed. Coronal and sagittal  reconstructions were obtained. 3D MAXIMAL INTENSITY PROJECTION reconstructed imaging of the cranial vasculature  in both the coronal and sagittal plane was performed. CTA perfusion imaging was also obtained. Reconstructions were created with color  coding of axial time to peak, axial blood volume, axial blood flow, axial mean  transit time and axial T Max. The study was analyzed by the Kasey Limon. Algorithm  FINDINGS:  There is moderate to severe centrilobular emphysematous change. Aortic  atherosclerotic change. Right vertebral artery slightly larger than the left  vertebral artery. .  No large thyroid lesion. No acute cervical process. No  evidence of acute intracranial hemorrhage. . The paranasal sinuses are clear. Multilevel canal and foraminal stenosis of the cervical spine  CTA NECK:  There is conventional three vessel arch anatomy. Atherosclerotic changes the  origin of the ICAs on the right and on the left. .  There is  less than 20%stenosis in the right internal carotid artery utilizing  NASCET criteria. There is  0%stenosis in the left internal carotid artery utilizing NASCET  criteria. CTA HEAD:  The vertebral arteries are codominant. The basilar artery and its branches are  normal. M1 segments are patent. There are A1 segments bilaterally. A2 and A3  segments are patent. Symmetric arborization of M2 vessels. . There is no  flow-limiting intracranial stenosis. There is no aneurysm. There are no sizable  posterior communicating arteries. Lytic osseous lesion at the right frontal bone is permeative and mildly  expansile. CT PERFUSION: No evidence of perfusion mismatch. No evidence of reversible  ischemia.   R CBF<30% :0  Tmax >6s: 0    Impression  Lytic/permeative osseous lesion at the right frontal bone is redemonstrated  measuring approximately 3 x 3 cm in size, representing metastatic or primary  osseous lesion like multiple myeloma/lymphoma. Mild chronic microvascular ischemic change and mild cerebral atrophy. There is no major vessel occlusion. There is no hemodynamically significant stenosis, aneurysm or dissection  identified. Kavon Shannon MRI Results (recent):  Results from [place] encounter on 01/09/23    MRI BRAIN W WO CONT    Narrative  EXAM:  MRI BRAIN W WO CONT    INDICATION:    77-year-old male with altered mental status and baseline  dementia, presenting with concern for metastatic disease. COMPARISON:  1/9/2023 head CT. CONTRAST: 17 ml ProHance. TECHNIQUE:  Multiplanar multisequence acquisition without and with contrast of the brain. FINDINGS:  There is bilaterally symmetric mild diffuse enhancing dural thickening,  suboptimally evaluated on prior study. Basal CSF cisterns are patent. There is  no cerebellar tonsillar herniation. Diffuse parenchymal volume loss with  diffusely prominent ventricles and extraventricular CSF spaces. Patchy  periventricular and deep white matter T2/FLAIR hyperintensity, nonspecific and  likely microangiopathic ischemic changes. There is no acute infarct, hemorrhage,  extra-axial fluid collection, or mass effect. Expected arterial flow-voids are  present. There is no obvious enhancing mass lesion. Moderate diffuse mucosal thickening of the paranasal sinuses. The mastoids are  free of fluid bilaterally. Status post left lense replacement. The orbits are  otherwise unremarkable.   Parasagittal right frontal enhancing infiltrative intraosseous measuring up to  3.1 x 3.1 x 1.5 cm with demonstratable intracranial extension along the  parasagittal right frontal region (image 116 of series 1200), corresponding to  ill-defined salt-and-pepper lytic lesion seen on prior head CT. Impression  Parasagittal right frontal enhancing intraosseous lesion with intracranial  extension measuring up to 3.1 x 3.1 cm, suspicious for aggressive lesion such as  metastatic or primary osseous lesion like multiple myeloma, or lymphoma. Clinical correlation is advised. This may be an amenable to percutaneous biopsy  for tissue diagnosis. Underlying associated diffuse bilateral enhancing dural thickening which may  suggest meningitis versus carcinomatosis. Intracranial hypertension is in the  differential diagnostic considerations. No acute infarct, intracranial hemorrhage or brain mass lesion. IR Results (recent):  Results from [place] encounter on 01/10/21    IR INSERT NON TUNL CVC OVER 5 YRS    Narrative  INDICATION: Central venous access needed. TECHNIQUE: Patient was unable to consent for himself secondary to altered mental  status. The risks and benefits of the procedure were discussed with the  patient's wife by telephone. Verbal consent was obtained. Preliminary ultrasound  imaging of the right neck demonstrated a patent internal jugular vein. Maximal  sterile barrier technique (cap and mask and sterile gown and sterile gloves and  a large sterile drape and hand hygiene and cutaneous antisepsis) was utilized  for this procedure. 1% lidocaine was injected locally. A micropuncture needle  was advanced into the internal jugular vein under ultrasound guidance. A  micropuncture wire was then advanced to the superior vena cava. A dermatotomy  was made, and a micropuncture sheath was inserted. A 0.035 inch guidewire was  then advanced through the sheath. Dilatation of the tract was performed. A  triple lumen central venous catheter was then inserted over the wire. The wire  was then removed. The catheter was sutured to the skin and dressed  appropriately. The patient tolerated the procedure well. There were no immediate  complications.  The catheter demonstrates appropriate blood flow. ESTIMATED BLOOD LOSS: Less than 5 ml    Impression  IMPRESSION: Successful right internal jugular central venous catheter  placement  as described above. The catheter is functioning appropriately. Subsequent chest  radiograph demonstrated satisfactory catheter positioning. The line is ready for  use. VAS/US Results (recent):  No results found for this or any previous visit. LABS (reviewed)  Results for orders placed or performed during the hospital encounter of 01/24/23   CBC WITH AUTOMATED DIFF   Result Value Ref Range    WBC 9.0 4.1 - 11.1 K/uL    RBC 5.13 4.10 - 5.70 M/uL    HGB 14.7 12.1 - 17.0 g/dL    HCT 45.3 36.6 - 50.3 %    MCV 88.3 80.0 - 99.0 FL    MCH 28.7 26.0 - 34.0 PG    MCHC 32.5 30.0 - 36.5 g/dL    RDW 14.1 11.5 - 14.5 %    PLATELET 368 273 - 711 K/uL    MPV 9.5 8.9 - 12.9 FL    NRBC 0.0 0  WBC    ABSOLUTE NRBC 0.00 0.00 - 0.01 K/uL    NEUTROPHILS 73 32 - 75 %    LYMPHOCYTES 15 12 - 49 %    MONOCYTES 8 5 - 13 %    EOSINOPHILS 2 0 - 7 %    BASOPHILS 1 0 - 1 %    IMMATURE GRANULOCYTES 1 (H) 0.0 - 0.5 %    ABS. NEUTROPHILS 6.6 1.8 - 8.0 K/UL    ABS. LYMPHOCYTES 1.4 0.8 - 3.5 K/UL    ABS. MONOCYTES 0.8 0.0 - 1.0 K/UL    ABS. EOSINOPHILS 0.1 0.0 - 0.4 K/UL    ABS. BASOPHILS 0.1 0.0 - 0.1 K/UL    ABS. IMM. GRANS. 0.1 (H) 0.00 - 0.04 K/UL    DF AUTOMATED     METABOLIC PANEL, COMPREHENSIVE   Result Value Ref Range    Sodium 136 136 - 145 mmol/L    Potassium 4.0 3.5 - 5.1 mmol/L    Chloride 102 97 - 108 mmol/L    CO2 28 21 - 32 mmol/L    Anion gap 6 5 - 15 mmol/L    Glucose 109 (H) 65 - 100 mg/dL    BUN 25 (H) 6 - 20 MG/DL    Creatinine 1.52 (H) 0.70 - 1.30 MG/DL    BUN/Creatinine ratio 16 12 - 20      eGFR 46 (L) >60 ml/min/1.73m2    Calcium 9.1 8.5 - 10.1 MG/DL    Bilirubin, total 0.4 0.2 - 1.0 MG/DL    ALT (SGPT) 51 12 - 78 U/L    AST (SGOT) 62 (H) 15 - 37 U/L    Alk.  phosphatase 166 (H) 45 - 117 U/L    Protein, total 8.0 6.4 - 8.2 g/dL Albumin 3.0 (L) 3.5 - 5.0 g/dL    Globulin 5.0 (H) 2.0 - 4.0 g/dL    A-G Ratio 0.6 (L) 1.1 - 2.2     SAMPLES BEING HELD   Result Value Ref Range    SAMPLES BEING HELD  1SST, 1GRN, 1BLU, 1RED     COMMENT        Add-on orders for these samples will be processed based on acceptable specimen integrity and analyte stability, which may vary by analyte. PROTHROMBIN TIME + INR   Result Value Ref Range    INR 1.1 0.9 - 1.1      Prothrombin time 11.0 9.0 - 11.1 sec   AMMONIA   Result Value Ref Range    Ammonia, plasma <10 <59 UMOL/L   METABOLIC PANEL, BASIC   Result Value Ref Range    Sodium 138 136 - 145 mmol/L    Potassium 3.9 3.5 - 5.1 mmol/L    Chloride 105 97 - 108 mmol/L    CO2 26 21 - 32 mmol/L    Anion gap 7 5 - 15 mmol/L    Glucose 109 (H) 65 - 100 mg/dL    BUN 21 (H) 6 - 20 MG/DL    Creatinine 1.04 0.70 - 1.30 MG/DL    BUN/Creatinine ratio 20 12 - 20      eGFR >60 >60 ml/min/1.73m2    Calcium 8.5 8.5 - 10.1 MG/DL       Physical Exam:   Visit Vitals  /63 (BP 1 Location: Right upper arm, BP Patient Position: At rest)   Pulse 88   Temp 98 °F (36.7 °C)   Resp 15   Ht 5' 10\" (1.778 m)   Wt 87.1 kg (192 lb)   SpO2 96%   BMI 27.55 kg/m²     General:  Drowsy   Head:  Normocephalic, without obvious abnormality, atraumatic. Eyes:  Conjunctivae/corneas clear. Lungs:  Heart:   Non labored breathing  Regular rate and rhythm, no carotid bruits   Abdomen:   Soft, non-distended   Extremities: Extremities normal, atraumatic, no cyanosis or edema. Pulses: 2+ and symmetric all extremities. Skin: Skin color, texture, turgor normal. No rashes or lesions.   Neurologic Exam     Gen: Drowsy             Language: naming, repetition, fluency normal             Memory: intact recent and remote memory  Cranial Nerves:  I: smell Not tested   II: visual fields Full to confrontation   II: pupils Equal, round, reactive to light   II: optic disc No papilledema   III,VII: ptosis none   III,IV,VI: extraocular muscles  Full ROM   V: mastication normal   V: facial light touch sensation  normal   VII: facial muscle function   symmetric   VIII: hearing symmetric   IX: soft palate elevation  normal   XI: trapezius strength  5/5   XI: sternocleidomastoid strength 5/5   XI: neck flexion strength  5/5   XII: tongue  midline     Motor: normal bulk and tone, no tremor              Strength: 5/5 all four extremities  Sensory: intact to LT, PP, vibration, and temperature  Reflexes: 1+ throughout; Down going toes  Coordination: Good FTN and HTS  Gait: deferred           Impression:     Татьяна Perez is a 76 y.o. male who  has a past medical history of Diverticulitis, Gastrointestinal disorder, History of vascular access device (07/24/2017), and Spinal stenosis who was recently diagnosed with Metastatic adenocarcinoma of unknown origin ([facility] [address] (1/18/2023) POA: with mets to bone  Patient comes in for acute episode of confusion and unable to answer questions with blank stare lasting less than 30 secs. Unable to hol up his upper extremities. EMS called. Patient was started on Keppra 1000 mg BID. Considerations include seizure event. MRI brain: Parasagittal right frontal enhancing intraosseous lesion with intracranial  extension measuring up to 3.1 x 3.1 cm, suspicious for aggressive lesion such as  metastatic or primary osseous lesion like multiple myeloma, or lymphoma. Underlying associated diffuse bilateral enhancing dural thickening which may  suggest meningitis versus carcinomatosis    EEG: This EEG recorded during a time where the patient is in apparent wakefulness drowsiness and sleep is abnormal secondary to diffuse slowing and disorganization of the background rhythms indicative of a  moderate degree of bihemispheric dysfunction as is commonly seen with an encephalopathy which may have contributions from toxic, metabolic, diffuse structural, and or pharmacologic effects and clinical correlation is recommended.   These patterns may also be representative of excessive drowsiness and clinical correlation is recommended. No epileptiform abnormalities are seen. RECOMMENDATIONS  1. I had a long discussion with patient and wife. Discussed diagnosis, prognosis, pathophysiology and available treatment. Reviewed test results. All questions were answered. 2. Discussed MRI brain and EEG results  3. Agree with Keppra 1000 mg BID. Also on Gabapentin 600 mg TID  4. Seizure precaution  5.  Defer to oncology regarding further management of brain mass lesion and mets (I.e. chemo vs radiation)    Please call for questions        Thank you for the consultation      Reynolds Cooks, MD  Diplomate, American Board of Psychiatry and Neurology  Diplomate, Neuromuscular Medicine  Diplomate, American Board of Electrodiagnostic Medicine    Greater than 50% of time spent counseling patient        CC: Other, MD Ashley  Fax: None

## 2023-01-25 NOTE — PROCEDURES
[address]   Electroencephalogram  Report    Procedure ID: [id number] Procedure Date: 1/25/2023   Patient Name: Ned Ludwig. YOB: 1945   Procedure Type: Routine Medical Record No: [id number]       An EEG is requested in this 61-year-old man to evaluate for epileptiform abnormalities. Medications are listed as Pepcid, Diovan, Narcan, oxycodone, Neurontin, Bentyl    This tracing is obtained while the patient is noted to be awake drowsy and asleep. During this state the background consists of diffuse mixed theta and delta range activities. Hyperventilation is not performed    Intermittent photic stimulation is not performed    Light sleep is attained but stage II sleep is not    Interpretation  This EEG recorded during a time where the patient is in apparent wakefulness drowsiness and sleep is abnormal secondary to diffuse slowing and disorganization of the background rhythms indicative of a  moderate degree of bihemispheric dysfunction as is commonly seen with an encephalopathy which may have contributions from toxic, metabolic, diffuse structural, and or pharmacologic effects and clinical correlation is recommended. These patterns may also be representative of excessive drowsiness and clinical correlation is recommended. No epileptiform abnormalities are seen.         Priti Olivarez MD

## 2023-01-25 NOTE — ED PROVIDER NOTES
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 Patient has had significant change in his mental status. Last known normal was around 5 PM.  When he awoke from his nap he was able to follow commands, was confused. Patient was unable to hold his hands up. However on arrival with EMS his focal neurologic/strength/weakness resolved. Patient still having confusion. Patient denies any pain at this time however his wife said he was in the worst pain of his life last night at 3 AM.      Altered mental status   Functional status baseline:  [EPIC#1537^NOTE}      Past Medical History:   Diagnosis Date    Diverticulitis     Gastrointestinal disorder     History of vascular access device 07/24/2017    [facility] VAT -  5FR triple Lumen Power PICC R Basilic  39 cm    Spinal stenosis        Past Surgical History:   Procedure Laterality Date    FLEXIBLE SIGMOIDOSCOPY N/A 7/27/2017    SIGMOIDOSCOPY FLEXIBLE performed by Eagle Romo MD at [facility] ENDOSCOPY    IR INSERT NON TUNL CVC OVER 5 YRS  1/13/2021         No family history on file. Social History     Socioeconomic History    Marital status:      Spouse name: Not on file    Number of children: Not on file    Years of education: Not on file    Highest education level: Not on file   Occupational History    Not on file   Tobacco Use    Smoking status: Former     Packs/day: 1.00     Years: 50.00     Pack years: 50.00     Types: Cigarettes    Smokeless tobacco: Current   Substance and Sexual Activity    Alcohol use:  Yes     Alcohol/week: 0.8 standard drinks     Types: 1 Cans of beer per week    Drug use: No    Sexual activity: Not on file   Other Topics Concern    Not on file   Social History Narrative    Not on file     Social Determinants of Health     Financial Resource Strain: Not on file   Food Insecurity: Not on file   Transportation Needs: Not on file   Physical Activity: Not on file   Stress: Not on file   Social Connections: Not on file   Intimate Partner Violence: Not on file   Housing Stability: Not on file         ALLERGIES: Patient has no known allergies. Review of Systems    Vitals:    01/24/23 2110 01/24/23 2252   BP: (!) 105/51    Pulse: 81 75   Resp: 16    Temp: 98.7 °F (37.1 °C)    SpO2: 91%    Weight: 87.1 kg (192 lb)    Height: 5' 10\" (1.778 m)             Physical Exam     Medical Decision Making  Amount and/or Complexity of Data Reviewed  Independent Historian: caregiver, spouse and EMS  Labs: ordered. Decision-making details documented in ED Course. Radiology: ordered and independent interpretation performed. ECG/medicine tests: ordered. Discussion of management or test interpretation with external provider(s): I discussed patient's case with teleneurology. Risk  Prescription drug management. Decision regarding hospitalization. ED Course as of 01/24/23 2329 Tue Jan 24, 2023 2144 2g keppra, then 750g q12. Watch for polypharmacy. Permissive HTN. Hold AC.  [AL]   2152 Possible new left brainstem infarct. CTA to confirm pending.  [AL]   2154 Creatinine(!): 1.52 [AL]      ED Course User Index  [AL] Perlita Fuentes MD       LABORATORY RESULTS:  Labs Reviewed   CBC WITH AUTOMATED DIFF - Abnormal; Notable for the following components:       Result Value    IMMATURE GRANULOCYTES 1 (*)     ABS. IMM. GRANS. 0.1 (*)     All other components within normal limits   METABOLIC PANEL, COMPREHENSIVE - Abnormal; Notable for the following components:    Glucose 109 (*)     BUN 25 (*)     Creatinine 1.52 (*)     eGFR 46 (*)     AST (SGOT) 62 (*)     Alk.  phosphatase 166 (*)     Albumin 3.0 (*)     Globulin 5.0 (*)     A-G Ratio 0.6 (*)     All other components within normal limits   SAMPLES BEING HELD   PROTHROMBIN TIME + INR   AMMONIA       IMAGING RESULTS:  CTA CODE NEURO HEAD AND NECK W CONT   Final Result   Lytic/permeative osseous lesion at the right frontal bone is redemonstrated   measuring approximately 3 x 3 cm in size, representing metastatic or primary   osseous lesion like multiple myeloma/lymphoma. Mild chronic microvascular ischemic change and mild cerebral atrophy. There is no major vessel occlusion. There is no hemodynamically significant stenosis, aneurysm or dissection   identified. .             CT CODE NEURO PERF W CBF   Final Result   Lytic/permeative osseous lesion at the right frontal bone is redemonstrated   measuring approximately 3 x 3 cm in size, representing metastatic or primary   osseous lesion like multiple myeloma/lymphoma. Mild chronic microvascular ischemic change and mild cerebral atrophy. There is no major vessel occlusion. There is no hemodynamically significant stenosis, aneurysm or dissection   identified. .             CT CODE NEURO HEAD WO CONTRAST   Final Result   Possible left brainstem infarct   Right frontal lytic defect redemonstrated      This result was verbally relayed by me at 2152 hours to Dr Frankie Blancas   [address]:  Medications   sodium chloride 0.9 % bolus infusion 1,000 mL (1,000 mL IntraVENous New Bag 1/24/23 2323)   iopamidoL (ISOVUE-370) 370 mg iodine /mL (76 %) injection 140 mL (140 mL IntraVENous Given 1/24/23 2206)   levETIRAcetam (KEPPRA) injection 1,000 mg (1,000 mg IntraVENous Given 1/24/23 2323)       Differential diagnosis: Stroke, seizure, worsening brain mets, midline shift, edema    ED physician interpretation of imaging: CT head without acute bleeds  ED physician interpretation of laboratory results: Lab work with mild elevation in serum creatinine. IV fluids given. Mild hyperglycemia at 109. MDM: Patient is a 73-year-old male who was discharged yesterday after a long hospital stay for new cancer diagnosis with unknown primary but cancerous lesions in the bones and brain.   Patient had an episode of unresponsiveness. There was concern for stroke so code stroke was called. No acute obstruction on CT perfusion, CTA. Most likely patient had a seizure due to his brain mass. Keppra given. Initially considered 2 g of Keppra however with patient's worsening kidney function 1 g was given. Patient took some time to return to baseline however based on patient's new seizure disorder as well as new cancer diagnosis hospitalization is indicated for stabilization and medication management. DISPOSITION: Admitted    [facility] for Admission  11:15 PM    ED Room Number: ER18/18  Patient Name and age: Kedar Valentine. 76 y.o.  male  Working Diagnosis:   1. Seizure ([facility]    2. Malignant neoplasm of brain, unspecified location ([facility]        COVID-19 Suspicion:  no  Sepsis present:  no  Reassessment needed: no  Code Status:  Full Code  Readmission: yes  Isolation Requirements:  no  Recommended Level of Care:  med/surg  Department: [facility] ED - [phone]    Other: Patient presented to the ED after an episode of unresponsiveness. Patient had some possible neuro findings initially with family and therefore a code stroke was called, level 2. However after CT scans and neuro evaluation this is most likely new onset seizures due to brain cancer. Based on patient's extended period of postictal state as well as high risk factors IV Keppra given and hospitalization is indicated for further treatment and evaluation. Jacqueline Medrano.  Sondra Sanchez MD      Procedures

## 2023-01-25 NOTE — ED NOTES
Stroke Education provided to patient, spouse/SO, and relative(s) and the following topics were discussed    1. Patients personal risk factors for stroke are hypertension    2. Warning signs of Stroke:        * Sudden numbness or weakness of the face, arm or leg, especially on one side of          The body            * Sudden confusion, trouble speaking or understanding        * Sudden trouble seeing in one or both eyes        * Sudden trouble walking, dizziness, loss of balance or coordination        * Sudden severe headache with no known cause      3. Importance of activation Emergency Medical Services ( 9-1-1 ) immediately if experience any warning signs of stroke. 4. Be sure and schedule a follow-up appointment with your primary care doctor or any specialists as instructed. 5. You must take medicine every day to treat your risk factors for stroke. Be sure to take your medicines exactly as your doctor tells you: no more, no less. Know what your medicines are for , what they do. Anti-thrombotics /anticoagulants can help prevent strokes. You are taking the following medicine(s)  Lovenox     6. Smoking and second-hand smoke greatly increase your risk of stroke, cardiovascular disease and death. Smoking history never    7. Information provided was Verbal Education    8. Documentation of teaching completed in Patient Education Activity and on Care Plan with teaching response noted?   no

## 2023-01-25 NOTE — ED NOTES
Ultrasound IV by ED Staff Procedure Note    Patient meets criteria for US IV insertion. Ultrasound IV verbal education provided to patient. Opportunities for questions given. IV ultrasound technique used for PIV placement:  18 gauge Arrow Endurance Extended Dwell(may stay in place for 29 days)  R Basilic location. 1 X Attempt(s). Procedure tolerated well. Primary RN aware of IV placement and added to LDA.                                 Charmaine Filter

## 2023-01-25 NOTE — ED TRIAGE NOTES
Pt moved off EMS stretcher w/ family complaints of increased confusion. Pt is alert, able to recall familiar faces and his home address, but disoriented to time. Recently dx w/ metastatic bone CA.

## 2023-01-25 NOTE — PROGRESS NOTES
[facility]  [address] [facility], [place], [id number] [place] [address]  [phone]      Hospitalist  Progress Note      NAME:         Cony Seymour. :        1945  MRM:        [id number]    Date of service: 2023      Chief complaint: Confusion    Interval HPI: Patient admitted with confusion concerning for a seizure. Discussed with his spouse at bedside. She describes him having been unresponsive with a blank stare for minutes. Now fully awake and alert      Objective:    Vital Signs:    Visit Vitals  /64   Pulse 90   Temp 98.8 °F (37.1 °C)   Resp 14   Ht 5' 10\" (1.778 m)   Wt 87.1 kg (192 lb)   SpO2 (!) 81%   BMI 27.55 kg/m²      No intake or output data in the 24 hours ending 23 0731     Physical Examination:    General:   Weak and ill looking but not in distress   Eyes:   pink conjunctivae, PERRLA with no discharge. ENT:   no ottorrhea or rhinorrhea with dry mucous membranes  Neck: no masses, thyroid non-tender and trachea central.  Pulm:  no accessory muscle use, decreased breath sounds without crackles or wheezes  Card:  no JVD or murmurs, has regular and normal S1, S2 without thrills, bruits or peripheral edema  Abd:  Soft, non-tender, non-distended, normoactive bowel sounds with no palpable organomegaly  Musc:  No cyanosis, clubbing, atrophy or deformities. Skin:  No rashes, bruising or ulcers. Neuro: Awake and alert. Slight confusion.  Generally a non focal exam. y  Psych:  Has a fair insight to his illness     Current Facility-Administered Medications   Medication Dose Route Frequency    famotidine (PEPCID) tablet 20 mg  20 mg Oral BID    gabapentin (NEURONTIN) capsule 600 mg  600 mg Oral TID    albuterol-ipratropium (DUO-NEB) 2.5 MG-0.5 MG/3 ML  3 mL Nebulization Q4H PRN    lidocaine 4 % patch 1 Patch  1 Patch TransDERmal Q24H    mirtazapine (REMERON) tablet 15 mg  15 mg Oral QHS    oxyCODONE IR (ROXICODONE) tablet 10 mg  10 mg Oral Q4H PRN    polyethylene glycol (MIRALAX) packet 17 g  17 g Oral DAILY PRN    psyllium husk-aspartame (METAMUCIL FIBER) packet 1 Packet  1 Packet Oral BID    senna-docusate (PERICOLACE) 8.6-50 mg per tablet 2 Tablet  2 Tablet Oral DAILY    valsartan (DIOVAN) tablet 80 mg  80 mg Oral DAILY    sodium chloride (NS) flush 5-40 mL  5-40 mL IntraVENous Q8H    sodium chloride (NS) flush 5-40 mL  5-40 mL IntraVENous PRN    acetaminophen (TYLENOL) tablet 650 mg  650 mg Oral Q6H PRN    Or    acetaminophen (TYLENOL) suppository 650 mg  650 mg Rectal Q6H PRN    polyethylene glycol (MIRALAX) packet 17 g  17 g Oral DAILY PRN    ondansetron (ZOFRAN ODT) tablet 4 mg  4 mg Oral Q8H PRN    Or    ondansetron (ZOFRAN) injection 4 mg  4 mg IntraVENous Q6H PRN    enoxaparin (LOVENOX) injection 40 mg  40 mg SubCUTAneous DAILY    levETIRAcetam (KEPPRA) tablet 1,000 mg  1,000 mg Oral BID    lactated Ringers infusion  125 mL/hr IntraVENous CONTINUOUS     Current Outpatient Medications   Medication Sig    ipratropium-albuteroL (COMBIVENT RESPIMAT)  mcg/actuation inhaler Take 1 Puff by inhalation every six (6) hours as needed for Wheezing, Shortness of Breath or Respiratory Distress. famotidine (PEPCID) 20 mg tablet Take 1 Tablet by mouth two (2) times a day. valsartan (DIOVAN) 80 mg tablet Take 1 Tablet by mouth daily. senna-docusate (PERICOLACE) 8.6-50 mg per tablet Take 2 Tablets by mouth daily for 30 days. polyethylene glycol (MIRALAX) 17 gram packet Take 1 Packet by mouth daily as needed for Constipation (give once per day if no BM in past 48 hours). lidocaine 4 % patch Apply patch to back and R shoulder. Apply patch to the affected area for 12 hours a day and remove for 12 hours a day.    naloxone (Narcan) 4 mg/actuation nasal spray Use 1 spray intranasally, then discard. Repeat with new spray every 2 min as needed for opioid overdose symptoms, alternating nostrils. oxyCODONE IR (ROXICODONE) 10 mg tab immediate release tablet Take 1 Tablet by mouth every four (4) hours as needed for Pain for up to 14 days. Max Daily Amount: 60 mg.    mirtazapine (REMERON) 15 mg tablet Take 1 Tablet by mouth nightly. melatonin, rapid dissolve, 5 mg TbDi tablet Take 1 Tab by mouth nightly as needed for Other (insomnia). (Patient not taking: Reported on 1/11/2023)    acetaminophen (TYLENOL) 325 mg tablet Take 2 Tabs by mouth every six (6) hours as needed for Pain. dicyclomine (BENTYL) 20 mg tablet Take 20 mg by mouth every six (6) hours. Indications: irritable colon (Patient not taking: Reported on 1/11/2023)    gabapentin (NEURONTIN) 600 mg tablet Take 300 mg by mouth three (3) times daily. psyllium (METAMUCIL) packet Take 1 Packet by mouth two (2) times a day. Laboratory data and review:    Recent Labs     01/24/23 2116 01/23/23  0313   WBC 9.0 9.4   HGB 14.7 13.2   HCT 45.3 40.1    314     Recent Labs     01/25/23  0355 01/24/23 2116 01/23/23  0313    136 138   K 3.9 4.0 3.9    102 107   CO2 26 28 25   * 109* 114*   BUN 21* 25* 14   CREA 1.04 1.52* 0.77   CA 8.5 9.1 8.3*   PHOS  --   --  2.4*   ALB  --  3.0* 2.4*   ALT  --  51 38   INR  --  1.1  --      No components found for: [place]    Diagnostics: Imaging studies have been reviewed    Assessment and Plan:    Acute encephalopathy / Confusion / hx dementia POA: unclear etiology but there was concern for acute CVA vs seizure activity given recent MRi brain showed a parasagittal right frontal enhancing intraosseous lesion with intracranial extension measuring up to 3.1 x 3.1 cm, suspicious for aggressive lesion. CT scan head showed a possible left brainstem infarct. Consult neurology. Get an EEG, lipid panel and consult both neurology and oncology. Continue IV Keppra. Start Asprin. PT, OT.  Defer repeat MRi to neurology     Metastatic adenocarcinoma of unknown origin ([facility] (1/18/2023) POA: with mets to bone. During his recent admission, CT scan abdomen and pelvis done showed multifocal osseous metastatic disease in the thoracic spine, lumbar spine, pelvis, left ribs, and right scapula. Potential biopsy sites including anterior left iliac bone and superior right scapula. Multiple small hepatic lesions are suspicious for metastatic disease. He had a CT guided right iliac lesion biopsy and path showed metastatic adenocarcinoma with oncocytic features, unknown primary. He has been reviewed by rad-oncology who did XRT right shoulder and the right distal femur for pain control 1/19. Consult oncology. Continue Oxycodone, Gabapentin     Centrilobular emphysema ([address] (1/14/2023) POA: not wheezing. Duoneb PRN    Hypertension benign POA: continue Diovan    I personally reviewed chart, notes, data and current medications in the medical record. I have personally examined and treated the patient at bedside during this period. To assist coordination of care and communication with nursing and staff, this note may be preliminary early in the day, but finalized by end of the day.                  Care Plan discussed with: Patient, Care Manager, and Nursing Staff    Discussed:  Care Plan and D/C Planning    Prophylaxis:  Enoxaparin     Anticipated Disposition: [facility] PT, OT, RN           ___________________________________________________    Attending Physician:   Corine Azul MD

## 2023-01-25 NOTE — H&P
Hospitalist Admission Note    NAME:  Fantasma Valles. :  1945   MRN:  [id number]     Date/Time:  2023 1:23 AM    Patient PCP: Ashley Choi MD  ________________________________________________________________________    Given the patient's current clinical presentation, I have a high level of concern for decompensation if discharged from the emergency department. Complex decision making was performed, which includes reviewing the patient's available past medical records, laboratory results, and x-ray films. My assessment of this patient's clinical condition and my plan of care is as follows. Assessment / Plan:    Confusion:    The patient is brought in due to acute confusion w/ concerns for stroke    VSS  WBC normal, Hg 14.7  BUN 25, Cr 1.52 ( Cr 0.77)  CTA Head/Neck - primary lesion at the right frontal bone - chronic microvascular changes. Mild cerebral atrophy    Admit and cont to monitor. Concerns that his confusion maybe due to seizure secondary to neoplasm  Cont w/ IV Keppra 1000mg bid  Follow up w/ Neuro recommendations    2. Metastatic Adenocarcinoma of unknown significance:    fine needle bx (23)- +immunohistochemcial staining unclear source consider GI  S/p XRT of R shoulder and right distal femur for pain control  Follow up w/ Oncology    3. HTN:    Cont w/ Valsartan 80mg daily    4. Chronic Pain:    Cont w/ Lidocaine patch  Cont w/ Roxicodone 10mg q4h, prn  Cont w/ Neurontin 300mg tid    5. CHAVA:    Cont w/ IVF and repeat BMP    Body mass index is 27.55 kg/m².:  25.0 - 29.9:  Overweight      I have personally reviewed the radiographs, laboratory data in Epic and decisions and statements above are based partially on this personal interpretation.     Code Status: Full Code  Surrogate Decision Maker  Caro Burgos (wife)  [phone]  Kaden Bonner (son)  [phone]  Prophylaxis:  Lovenox SQ     Subjective:   CHIEF COMPLAINT: confusion    HISTORY OF PRESENT ILLNESS:       The patient is a 77 y/o C M w/ PMH unknown primary neoplasm and is brought in due to acute episode of confusion which began at 7:30. His son was concerned for stroke like symptoms w/ acute onset of confusion and unresponsiveness to questions. He would not hold up his upper extremities. His son called EMS and on their evaluation he regained his strength. On my exam the history is from the son as the patient is confused. He denies any fever, chills or night sweats. He has no chest pain, palpitations, coughing, wheezing or dyspnea. Past Medical History:   Diagnosis Date    Diverticulitis     Gastrointestinal disorder     History of vascular access device 07/24/2017    [facility] VAT -  5FR triple Lumen Power PICC R Basilic  39 cm    Spinal stenosis       Past Surgical History:   Procedure Laterality Date    FLEXIBLE SIGMOIDOSCOPY N/A 7/27/2017    SIGMOIDOSCOPY FLEXIBLE performed by Lorna Ortega MD at [facility] ENDOSCOPY    IR INSERT NON TUNL CVC OVER 5 YRS  1/13/2021     Social History     Tobacco Use    Smoking status: Former     Packs/day: 1.00     Years: 50.00     Pack years: 50.00     Types: Cigarettes    Smokeless tobacco: Current   Substance Use Topics    Alcohol use: Yes     Alcohol/week: 0.8 standard drinks     Types: 1 Cans of beer per week      FH:    No FH HTN, HLD, DM2 in M/F    No Known Allergies     Prior to Admission medications    Medication Sig Start Date End Date Taking? Authorizing Provider   ipratropium-albuteroL (COMBIVENT RESPIMAT)  mcg/actuation inhaler Take 1 Puff by inhalation every six (6) hours as needed for Wheezing, Shortness of Breath or Respiratory Distress. 1/23/23   Milena Pulling, DO   famotidine (PEPCID) 20 mg tablet Take 1 Tablet by mouth two (2) times a day. 1/23/23   Milena Pulling, DO   valsartan (DIOVAN) 80 mg tablet Take 1 Tablet by mouth daily. 1/24/23   Milena Pulling, DO   senna-docusate (PERICOLACE) 8.6-50 mg per tablet Take 2 Tablets by mouth daily for 30 days. 1/24/23 2/23/23  Bartolo Marie DO   polyethylene glycol (MIRALAX) 17 gram packet Take 1 Packet by mouth daily as needed for Constipation (give once per day if no BM in past 48 hours). 1/23/23   Bartolo Marie DO   lidocaine 4 % patch Apply patch to back and R shoulder. Apply patch to the affected area for 12 hours a day and remove for 12 hours a day. 1/23/23   Bartolo Marie DO   naloxone (Narcan) 4 mg/actuation nasal spray Use 1 spray intranasally, then discard. Repeat with new spray every 2 min as needed for opioid overdose symptoms, alternating nostrils. 1/23/23   Bartolo Marie DO   oxyCODONE IR (ROXICODONE) 10 mg tab immediate release tablet Take 1 Tablet by mouth every four (4) hours as needed for Pain for up to 14 days. Max Daily Amount: 60 mg. 1/20/23 2/3/23  Palak Morales MD   mirtazapine (REMERON) 15 mg tablet Take 1 Tablet by mouth nightly. 1/20/23   Palak Morales MD   melatonin, rapid dissolve, 5 mg TbDi tablet Take 1 Tab by mouth nightly as needed for Other (insomnia). Patient not taking: Reported on 1/11/2023 1/28/21   Bartolo Marie DO   acetaminophen (TYLENOL) 325 mg tablet Take 2 Tabs by mouth every six (6) hours as needed for Pain. 1/28/21   Bartolo Marie DO   dicyclomine (BENTYL) 20 mg tablet Take 20 mg by mouth every six (6) hours. Indications: irritable colon  Patient not taking: Reported on 1/11/2023    Provider, Historical   gabapentin (NEURONTIN) 600 mg tablet Take 300 mg by mouth three (3) times daily. Provider, Historical   psyllium (METAMUCIL) packet Take 1 Packet by mouth two (2) times a day.     Provider, Historical       REVIEW OF SYSTEMS:  See HPI for details  General: negative for fever, chills, sweats, weakness, weight loss  Eyes: negative for blurred vision, eye pain, loss of vision, diplopia  Ear Nose and Throat: negative for rhinorrhea, pharyngitis, otalgia, tinnitus, speech or swallowing difficulties  Respiratory:  negative for pleuritic pain, cough, sputum production, wheezing, SOB, WINSTON  Cardiology:  negative for chest pain, palpitations, orthopnea, PND, edema, syncope   Gastrointestinal: negative for abdominal pain, N/V, dysphagia, change in bowel habits, bleeding  Genitourinary: negative for frequency, urgency, dysuria, hematuria, incontinence  Muskuloskeletal : negative for arthralgia, myalgia  Hematology: negative for easy bruising, bleeding, lymphadenopathy  Dermatological: negative for rash, ulceration, mole change, new lesion  Endocrine: negative for hot flashes or polydipsia  Neurological: negative for headache, dizziness, confusion, focal weakness, paresthesia, memory loss, gait disturbance  Psychological: negative for anxiety, depression, agitation      Objective:   VITALS:    Visit Vitals  /64   Pulse 83   Temp 98.7 °F (37.1 °C)   Resp 13   Ht 5' 10\" (1.778 m)   Wt 87.1 kg (192 lb)   SpO2 92%   BMI 27.55 kg/m²     PHYSICAL EXAM:    Physical Exam:    Gen: Well-developed, well-nourished, in no acute distress  HEENT:  Pink conjunctivae, PERRL, hearing intact to voice, moist mucous membranes  Neck: Supple, without masses, thyroid non-tender  Resp: No accessory muscle use, clear breath sounds without wheezes rales or rhonchi  Card: No murmurs, normal S1, S2 without thrills, bruits or peripheral edema  Abd:  Soft, non-tender, non-distended, normoactive bowel sounds are present, no palpable organomegaly and no detectable hernias  Lymph:  No cervical or inguinal adenopathy  Musc: No cyanosis or clubbing  Skin: No rashes or ulcers, skin turgor is good  Neuro:  Cranial nerves are grossly intact, no focal motor weakness, intermittently follows commands appropriately  Psych:  oriented to person, place and time, +confused  _______________________________________________________________________  Care Plan discussed with:  Pt's condition, Imaging findings, Lab findings, Assessment, and Care Plan discussed with: Patient  _______________________________________________________________________    Probable disposition:  Home  ________________________________________________________________________    Comments   >50% of visit spent in counseling and coordination of care  Chart reviewed  Discussion with patient and/or family and questions answered     ________________________________________________________________________  Signed: Priti Martinez MD        Procedures: see electronic medical records for all procedures/Xrays and details which were not copied into this note but were reviewed prior to creation of Plan. LAB DATA REVIEWED:    Recent Results (from the past 24 hour(s))   CBC WITH AUTOMATED DIFF    Collection Time: 01/24/23  9:16 PM   Result Value Ref Range    WBC 9.0 4.1 - 11.1 K/uL    RBC 5.13 4.10 - 5.70 M/uL    HGB 14.7 12.1 - 17.0 g/dL    HCT 45.3 36.6 - 50.3 %    MCV 88.3 80.0 - 99.0 FL    MCH 28.7 26.0 - 34.0 PG    MCHC 32.5 30.0 - 36.5 g/dL    RDW 14.1 11.5 - 14.5 %    PLATELET 205 286 - 069 K/uL    MPV 9.5 8.9 - 12.9 FL    NRBC 0.0 0  WBC    ABSOLUTE NRBC 0.00 0.00 - 0.01 K/uL    NEUTROPHILS 73 32 - 75 %    LYMPHOCYTES 15 12 - 49 %    MONOCYTES 8 5 - 13 %    EOSINOPHILS 2 0 - 7 %    BASOPHILS 1 0 - 1 %    IMMATURE GRANULOCYTES 1 (H) 0.0 - 0.5 %    ABS. NEUTROPHILS 6.6 1.8 - 8.0 K/UL    ABS. LYMPHOCYTES 1.4 0.8 - 3.5 K/UL    ABS. MONOCYTES 0.8 0.0 - 1.0 K/UL    ABS. EOSINOPHILS 0.1 0.0 - 0.4 K/UL    ABS. BASOPHILS 0.1 0.0 - 0.1 K/UL    ABS. IMM.  GRANS. 0.1 (H) 0.00 - 0.04 K/UL    DF AUTOMATED     METABOLIC PANEL, COMPREHENSIVE    Collection Time: 01/24/23  9:16 PM   Result Value Ref Range    Sodium 136 136 - 145 mmol/L    Potassium 4.0 3.5 - 5.1 mmol/L    Chloride 102 97 - 108 mmol/L    CO2 28 21 - 32 mmol/L    Anion gap 6 5 - 15 mmol/L    Glucose 109 (H) 65 - 100 mg/dL    BUN 25 (H) 6 - 20 MG/DL    Creatinine 1.52 (H) 0.70 - 1.30 MG/DL    BUN/Creatinine ratio 16 12 - 20      eGFR 46 (L) >60 ml/min/1.73m2 Calcium 9.1 8.5 - 10.1 MG/DL    Bilirubin, total 0.4 0.2 - 1.0 MG/DL    ALT (SGPT) 51 12 - 78 U/L    AST (SGOT) 62 (H) 15 - 37 U/L    Alk. phosphatase 166 (H) 45 - 117 U/L    Protein, total 8.0 6.4 - 8.2 g/dL    Albumin 3.0 (L) 3.5 - 5.0 g/dL    Globulin 5.0 (H) 2.0 - 4.0 g/dL    A-G Ratio 0.6 (L) 1.1 - 2.2     SAMPLES BEING HELD    Collection Time: 01/24/23  9:16 PM   Result Value Ref Range    SAMPLES BEING HELD  1SST, 1GRN, 1BLU, 1RED     COMMENT        Add-on orders for these samples will be processed based on acceptable specimen integrity and analyte stability, which may vary by analyte.    PROTHROMBIN TIME + INR    Collection Time: 01/24/23  9:16 PM   Result Value Ref Range    INR 1.1 0.9 - 1.1      Prothrombin time 11.0 9.0 - 11.1 sec   AMMONIA    Collection Time: 01/25/23 12:34 AM   Result Value Ref Range    Ammonia, plasma <10 <32 UMOL/L

## 2023-01-25 NOTE — CONSULTS
[facility] [place] [facility] [place] [address] [address] [facility]  W: [phone]  F: [phone]      Reason for Visit:   Danielle Griggs is a 76 y.o. male who is seen for evaluation of confusion, met adenocarcinoma; review  given deterioration. Hematology/Oncology Treatment History:     <>PATHOLOGY<>Specimen #:[id number]  1/12/2023  Bone, right iliac, Core biopsy with touch interpretation: Metastatic adenocarcinoma with oncocytic features (see Comment). CANCER OF UNKNOWN PRIMARY  <>STAGING<>Cancer Staging  Bony metastasis ([facility] [address]  Staging form: Bone - Appendicular Skeleton, Trunk, Skull, and Facial Bones, AJCC 8th Edition  - Clinical: Stage IVB (cT0, cNX, pM1b) - Signed by Jaymie Frazier MD on 1/17/2023  MRI SHOULDER RT WO CONT  1/10/2023 1. Extensive bony metastatic disease to the glenoid with additional areas of marrow replacement in the proximal humeral neck. This is compatible with metastatic disease 2. Small high-grade partial-thickness undersurface tear anterior supraspinatus   CT CHEST ABD PELV W CONT 1/11/2023 1. Multifocal osseous metastatic disease in the thoracic spine, lumbar spine, pelvis, left ribs, and right scapula. Potential biopsy sites including anterior left iliac bone and superior right scapula. 2. Multiple small hepatic lesions are suspicious for metastatic disease in this clinical scenario. 3. Moderate centrilobular emphysema. No pulmonary nodule. 4. Mediastinal AP window lymphadenopathy. 5. All findings suspicious for malignancy are new since 2021. Recommendation: Check PSA. Review result of any recent colonoscopy. Consider bone scan to establish baseline. MRI BRAIN W WO CONT 1/11/2023 Parasagittal right frontal enhancing intraosseous lesion with intracranial extension measuring up to 3.1 x 3.1 cm, suspicious for aggressive lesion such as metastatic or primary osseous lesion like multiple myeloma, or lymphoma.  Clinical correlation is advised. This may be an amenable to percutaneous biopsy for tissue diagnosis. Underlying associated diffuse bilateral enhancing dural thickening which may suggest meningitis versus carcinomatosis. Intracranial hypertension is in the differential diagnostic considerations. No acute infarct, intracranial hemorrhage or brain mass lesion. <>GOALS OF CARE<>PALLIATIVE INTENT    History of Present Illness:   Jimy Hoang is a pleasant 76 y.o. male who was admitted on 23 with increased confusion. Wife reports got home and did well overnight . Then last night had episode of a 'blank stare\" and verbally not responding. Step-son shares pt could not hold arms up on his own, speech was garbled and at 1 point did \"T-citlali \" position with arms. After arriving at hospital pt returned to baseline. Today pt does not recall event . Able to correctly state name and  ; states yr as \"4731\". Reports pain as improving. Denies SOB or HA. Recent hosp admission with dx of metastatic cancer to bone of unknown primary. Past Medical History:   Diagnosis Date    Diverticulitis     Gastrointestinal disorder     History of vascular access device 2017    [facility] VAT -  5FR triple Lumen Power PICC R Basilic  39 cm    Spinal stenosis        Past Surgical History:   Procedure Laterality Date    FLEXIBLE SIGMOIDOSCOPY N/A 2017    SIGMOIDOSCOPY FLEXIBLE performed by Lorna Ortega MD at [facility] ENDOSCOPY    IR INSERT NON TUNL CVC OVER 5 YRS  2021       Social History     Socioeconomic History    Marital status:      Spouse name: Not on file    Number of children: Not on file    Years of education: Not on file    Highest education level: Not on file   Occupational History    Not on file   Tobacco Use    Smoking status: Former     Packs/day: 1.00     Years: 50.00     Pack years: 50.00     Types: Cigarettes    Smokeless tobacco: Current   Substance and Sexual Activity    Alcohol use:  Yes     Alcohol/week: 0.8 standard drinks     Types: 1 Cans of beer per week    Drug use: No    Sexual activity: Not on file   Other Topics Concern    Not on file   Social History Narrative    Not on file     Social Determinants of Health     Financial Resource Strain: Not on file   Food Insecurity: Not on file   Transportation Needs: Not on file   Physical Activity: Not on file   Stress: Not on file   Social Connections: Not on file   Intimate Partner Violence: Not on file   Housing Stability: Not on file       No family history on file.     Current Facility-Administered Medications   Medication Dose Route Frequency    famotidine (PEPCID) tablet 20 mg  20 mg Oral BID    gabapentin (NEURONTIN) capsule 600 mg  600 mg Oral TID    albuterol-ipratropium (DUO-NEB) 2.5 MG-0.5 MG/3 ML  3 mL Nebulization Q4H PRN    lidocaine 4 % patch 1 Patch  1 Patch TransDERmal Q24H    mirtazapine (REMERON) tablet 15 mg  15 mg Oral QHS    oxyCODONE IR (ROXICODONE) tablet 10 mg  10 mg Oral Q4H PRN    polyethylene glycol (MIRALAX) packet 17 g  17 g Oral DAILY PRN    psyllium husk-aspartame (METAMUCIL FIBER) packet 1 Packet  1 Packet Oral BID    senna-docusate (PERICOLACE) 8.6-50 mg per tablet 2 Tablet  2 Tablet Oral DAILY    valsartan (DIOVAN) tablet 80 mg  80 mg Oral DAILY    sodium chloride (NS) flush 5-40 mL  5-40 mL IntraVENous Q8H    sodium chloride (NS) flush 5-40 mL  5-40 mL IntraVENous PRN    acetaminophen (TYLENOL) tablet 650 mg  650 mg Oral Q6H PRN    Or    acetaminophen (TYLENOL) suppository 650 mg  650 mg Rectal Q6H PRN    polyethylene glycol (MIRALAX) packet 17 g  17 g Oral DAILY PRN    ondansetron (ZOFRAN ODT) tablet 4 mg  4 mg Oral Q8H PRN    Or    ondansetron (ZOFRAN) injection 4 mg  4 mg IntraVENous Q6H PRN    enoxaparin (LOVENOX) injection 40 mg  40 mg SubCUTAneous DAILY    levETIRAcetam (KEPPRA) tablet 1,000 mg  1,000 mg Oral BID    lactated Ringers infusion  125 mL/hr IntraVENous CONTINUOUS    aspirin chewable tablet 81 mg  81 mg Oral DAILY     Current Outpatient Medications   Medication Sig    ipratropium-albuteroL (COMBIVENT RESPIMAT)  mcg/actuation inhaler Take 1 Puff by inhalation every six (6) hours as needed for Wheezing, Shortness of Breath or Respiratory Distress. famotidine (PEPCID) 20 mg tablet Take 1 Tablet by mouth two (2) times a day. valsartan (DIOVAN) 80 mg tablet Take 1 Tablet by mouth daily. senna-docusate (PERICOLACE) 8.6-50 mg per tablet Take 2 Tablets by mouth daily for 30 days. polyethylene glycol (MIRALAX) 17 gram packet Take 1 Packet by mouth daily as needed for Constipation (give once per day if no BM in past 48 hours). lidocaine 4 % patch Apply patch to back and R shoulder. Apply patch to the affected area for 12 hours a day and remove for 12 hours a day.    naloxone (Narcan) 4 mg/actuation nasal spray Use 1 spray intranasally, then discard. Repeat with new spray every 2 min as needed for opioid overdose symptoms, alternating nostrils. oxyCODONE IR (ROXICODONE) 10 mg tab immediate release tablet Take 1 Tablet by mouth every four (4) hours as needed for Pain for up to 14 days. Max Daily Amount: 60 mg.    mirtazapine (REMERON) 15 mg tablet Take 1 Tablet by mouth nightly. melatonin, rapid dissolve, 5 mg TbDi tablet Take 1 Tab by mouth nightly as needed for Other (insomnia). (Patient not taking: Reported on 1/11/2023)    acetaminophen (TYLENOL) 325 mg tablet Take 2 Tabs by mouth every six (6) hours as needed for Pain. dicyclomine (BENTYL) 20 mg tablet Take 20 mg by mouth every six (6) hours. Indications: irritable colon (Patient not taking: Reported on 1/11/2023)    gabapentin (NEURONTIN) 600 mg tablet Take 300 mg by mouth three (3) times daily. psyllium (METAMUCIL) packet Take 1 Packet by mouth two (2) times a day. No Known Allergies     Review of Systems: A complete review of systems was obtained, reviewed. Pertinent findings reviewed above.     Physical Exam:   Visit Vitals  BP (!) 144/65 (BP 1 Location: Left upper arm, BP Patient Position: At rest)   Pulse 85   Temp 98 °F (36.7 °C)   Resp 18   Ht 5' 10\" (1.778 m)   Wt 192 lb (87.1 kg)   SpO2 92%   BMI 27.55 kg/m²     ECOG PS: 3-4  General: disheveled; no distress  Eyes: PERRLA, anicteric sclerae  HENT: atraumatic, oropharynx clear  Neck: supple  Lymphatic: no cervical, supraclavicular, or inguinal adenopathy  Respiratory: CTAB, normal respiratory effort; O2 in use  CV: normal rate, regular rhythm, no murmurs, no peripheral edema  GI: soft, nontender, nondistended, no masses, no hepatomegaly, no splenomegaly  MS: digits without clubbing or cyanosis. Skin: no rashes, no ecchymoses, no petechia. normal temperature, turgor, and texture. Psych: alert, oriented to self, location,  appropriate affect, fair judgment/insight    Results:     Lab Results   Component Value Date/Time    WBC 9.0 01/24/2023 09:16 PM    HGB 14.7 01/24/2023 09:16 PM    HCT 45.3 01/24/2023 09:16 PM    PLATELET 930 58/60/3562 09:16 PM    MCV 88.3 01/24/2023 09:16 PM    ABS.  NEUTROPHILS 6.6 01/24/2023 09:16 PM    Hemoglobin (POC) 15.6 02/23/2016 05:38 PM    Hematocrit (POC) 46 02/23/2016 05:38 PM     Lab Results   Component Value Date/Time    Sodium 138 01/25/2023 03:55 AM    Potassium 3.9 01/25/2023 03:55 AM    Chloride 105 01/25/2023 03:55 AM    CO2 26 01/25/2023 03:55 AM    Glucose 109 (H) 01/25/2023 03:55 AM    BUN 21 (H) 01/25/2023 03:55 AM    Creatinine 1.04 01/25/2023 03:55 AM    GFR est AA >60 01/28/2021 06:16 PM    GFR est non-AA >60 01/28/2021 06:16 PM    Calcium 8.5 01/25/2023 03:55 AM    Sodium (POC) 136 02/23/2016 05:38 PM    Potassium (POC) 4.0 02/23/2016 05:38 PM    Chloride (POC) 103 02/23/2016 05:38 PM    Glucose (POC) 103 01/22/2023 08:01 AM    BUN (POC) 21 (H) 02/23/2016 05:38 PM    Creatinine (POC) 0.9 02/23/2016 05:38 PM    Calcium, ionized (POC) 1.09 (L) 02/23/2016 05:38 PM     Lab Results   Component Value Date/Time    Bilirubin, total 0.4 01/24/2023 09:16 PM    ALT (SGPT) 51 01/24/2023 09:16 PM    Alk. phosphatase 166 (H) 01/24/2023 09:16 PM    Protein, total 8.0 01/24/2023 09:16 PM    Albumin 3.0 (L) 01/24/2023 09:16 PM    Globulin 5.0 (H) 01/24/2023 09:16 PM     Lab Results   Component Value Date/Time    Ferritin 1,418 (H) 01/22/2021 03:02 AM    Vitamin B12 1,602 (H) 01/10/2023 07:01 PM     (H) 01/10/2021 11:18 PM    C-Reactive protein 1.18 (H) 01/22/2021 03:02 AM    TSH 1.35 01/10/2023 07:01 PM    M-Suleiman Not Observed 01/11/2023 05:33 PM    Lipase 80 10/04/2019 02:08 PM       Lab Results   Component Value Date/Time    INR 1.1 01/24/2023 09:16 PM    aPTT 29.2 01/23/2021 04:49 AM    D-dimer 0.59 01/23/2021 04:49 AM    Fibrinogen 456 01/23/2021 04:49 AM      Lab Results   Component Value Date/Time    Prostate Specific Ag 5.2 (H) 01/11/2023 01:17 PM     (H) 01/10/2021 11:18 PM        Test results above have been reviewed. Assessment/Recommendations:     AMS/ ? Seizure  Neurology following  Per neurology: EEG No epileptiform abnormalities seen  -initiated Keppra and Gabapentin      2. Cancer of Unknown Primary  -awaiting further pathology stains. Consistent with adenocarcinoma of unknown primary. Will want to request biomarker testing to see if immunotherapy is an option or if there is any druggable  mutation.   -concern about patient's current  performance status and ability to receive treatment. Continue to monitor. 3. Cancer Related Pain  -1/19/23 reviewed with Rad/Onc: pt completed sim and palliative radiation to rt shoulder and rt hip . No plans for additional rad/onc tx at this time. 4. Hypercalcemia  Seen in previous hospitalization; received Zometa (1/11/23)  Ca 8.5 today.   -continue to monitor CMP     =====================================  I personally saw and evaluated the patient and performed the key components of medical decision making. The history, physical exam, and documentation were performed by Debora Bowens NP.   I reviewed and verified the above documentation and modified it as needed. S:  Patient with altered mental status at home reportedly now improving after presented to ER yesterday via EMS. Patient's son at bedside and provides history. Initial concern for possible brain stem stroke. O:  Patient Vitals for the past 12 hrs:   Temp Pulse Resp BP SpO2   01/25/23 1514 97.8 °F (36.6 °C) 83 15 (!) 143/63 96 %   01/25/23 1500 -- 82 -- -- --   01/25/23 1113 98 °F (36.7 °C) 88 15 138/63 96 %   01/25/23 0835 98 °F (36.7 °C) 85 18 (!) 144/65 92 %   01/25/23 0700 -- 85 -- -- --   01/25/23 0600 -- 90 14 122/64 (!) 81 %   Physical Exam  Constitutional: No acute distress. and Non-toxic appearance. HENT: Normocephalic and atraumatic head. Eyes: Normal Conjunctivae. Cardiovascular: S1,S2 auscultated. No pitting edema. Pulmonary: Normal Respiratory Effort. No wheezing. No rhonchi. No rales. Abdominal: Normal bowel sounds. Soft Abdomen to palpation. No abdominal tenderness. Skin: No jaundice. No petechiae. Musculoskeletal: No muscle pain on palpation. No temporal muscle wasting on inspection. Neurological: Alert and oriented. No tremor on inspection. Normal Gait. Bilateral finger  remains intact. Psychiatric: mood normal. normal speech rate. normal affect. Reviewed Neurology note: \"No epileptiform abnormalities are seen. \"    Reviewed CT head report below:  CT Results (most recent):  Results from Hospital Encounter encounter on 01/24/23    CT CODE NEURO PERF W CBF    Narrative  Clinical history: Code Stroke  INDICATION:   Code Stroke    COMPARISON:  1/24/2023  CONTRAST: 100ml Isovue 370  TECHNIQUE:  CT dose reduction was achieved through use of a standardized protocol tailored  for this examination and automatic exposure control for dose modulation. Following the uneventful administration of Isovue-370 contrast material, axial  CT angiography of the head and neck was performed.   Coronal and sagittal  reconstructions were obtained. 3D MAXIMAL INTENSITY PROJECTION reconstructed imaging of the cranial vasculature  in both the coronal and sagittal plane was performed. CTA perfusion imaging was also obtained. Reconstructions were created with color  coding of axial time to peak, axial blood volume, axial blood flow, axial mean  transit time and axial T Max. The study was analyzed by the Ksaey Limon. Algorithm  FINDINGS:  There is moderate to severe centrilobular emphysematous change. Aortic  atherosclerotic change. Right vertebral artery slightly larger than the left  vertebral artery. .  No large thyroid lesion. No acute cervical process. No  evidence of acute intracranial hemorrhage. . The paranasal sinuses are clear. Multilevel canal and foraminal stenosis of the cervical spine  CTA NECK:  There is conventional three vessel arch anatomy. Atherosclerotic changes the  origin of the ICAs on the right and on the left. .  There is  less than 20%stenosis in the right internal carotid artery utilizing  NASCET criteria. There is  0%stenosis in the left internal carotid artery utilizing NASCET  criteria. CTA HEAD:  The vertebral arteries are codominant. The basilar artery and its branches are  normal. M1 segments are patent. There are A1 segments bilaterally. A2 and A3  segments are patent. Symmetric arborization of M2 vessels. . There is no  flow-limiting intracranial stenosis. There is no aneurysm. There are no sizable  posterior communicating arteries. Lytic osseous lesion at the right frontal bone is permeative and mildly  expansile. CT PERFUSION: No evidence of perfusion mismatch. No evidence of reversible  ischemia. R CBF<30% :0  Tmax >6s: 0    Impression  Lytic/permeative osseous lesion at the right frontal bone is redemonstrated  measuring approximately 3 x 3 cm in size, representing metastatic or primary  osseous lesion like multiple myeloma/lymphoma.       Mild chronic microvascular ischemic change and mild cerebral atrophy. There is no major vessel occlusion. There is no hemodynamically significant stenosis, aneurysm or dissection  identified. .    A/P:  1 Altered Mental Status- agree with a neurology consultation. Defer to Neuro if patient needs repeat Brain MRI. Question of seizure. Repeats that it is \"1368\"  2 Cancer of Unknown Primary-patient will need outpatient follow-up to evaluate for any improvement in performance status. 3 History of Hypercalcemia- calcium continues to be controlled; received Zometa earlier this month. 4 Cancer Related Pain- shoulder still painful but right knee improved s/p radiation. No radiation planned at this time. Follow-up in medical oncology clinic to look at any therapy options vs. Home hospice. If performance status does not improve then home hospice may be indicated. Follow-Up:  -move tomorrow's appointment with me to next week. Please contact us if any new questions or concerns. Non-Urgent Matters: Call our clinic at [phone] during open clinic hours. Please note that Appsfirehart Messages may not be immediately returned. Urgent Matters: Call hospital  ([address], or [facility]) at night or on weekends for questions that cannot wait until clinic opens. Emergency: Call 9-1-1.     Terrie Catherine MD  Hematology/Medical Oncology Physician  [facility]  P: [phone]

## 2023-01-25 NOTE — PROGRESS NOTES
1/25/23  1:53 PM    Care Management Initial Evaluation:    Reason for Readmission:       1. Seizure ([facility] [address]    2. Malignant neoplasm of brain, unspecified location ([facility] [address]          RUR Score/Risk Level:     17%  Level: Low    PCP: First and Last name:  Uses [facility]- spouse planning to set up with her PCP   Name of Practice:    Are you a current patient: Yes/No:    Approximate date of last visit:    Can you participate in a virtual visit with your PCP:     Is a Care Conference indicated:   Not at this time    Did you attend your follow up appointment (s): If not, why not:  No, it is on 1/26/23       Resources/supports as identified by patient/family:   Has supportive spouse and son who live in the home with him       Top Challenges facing patient (as identified by patient/family and CM): Finances/Medication cost?     No concerns  Transportation     family transports   Support system or lack thereof? Good family support     Living arrangements? 2 story home, 3 entry steps 14 to second floor     Self-care/ADLs/Cognition? Independent with ADL's prior to hospitalization- since recent dc on 1/23/23- has needed assistance  DME: [facility], raised toilet seat, [place]       Current Advanced Directive/Advance Care Plan:  Full code           Plan for utilizing home health:   Patient is open with [facility] home health- they saw him on 1/24/23-- will need a resumption order             Transition of Care Plan:    Based on readmission, the patient's previous Plan of Care   has been evaluated and/or modified. The current Transition of Care Plan is:          1). Patient admitted for emergent care   2). Oncology consulted  3). Family to transport at dc  4). CM will follow for dc needs    [facility] Management Interventions  PCP Verified by CM: Yes  Mode of Transport at Discharge:  Other (see comment)  Transition of Care Consult (CM Consult): Discharge Planning  Discharge Durable Medical Equipment: No  Physical Therapy Consult: No  Occupational Therapy Consult: No  Speech Therapy Consult: No  Support Systems: Child(ivon), Spouse/Significant Other  Confirm Follow Up Transport: Family  Discharge Location  Patient Expects to be Discharged to[de-identified] Home with home health      Readmission Assessment  Number of days since last admission?: 1-7 days  Previous disposition: Home with Home Health  Who is being interviewed?: Caregiver  What was the patient's/caregiver's perception as to why they think they needed to return back to the hospital?: Other (Comment)  Did you visit your Primary Care Physician after you left the hospital, before you returned this time?: No  Why weren't you able to visit your PCP?: Other (Comment)  Did you see a specialist, such as Cardiac, Pulmonary, Orthopedic Physician, etc. after you left the hospital?: No (Has an appointment 1/26/23)  Who advised the patient to return to the hospital?: Caregiver  Does the patient report anything that got in the way of taking their medications?: No  In our efforts to provide the best possible care to you and others like you, can you think of anything that we could have done to help you after you left the hospital the first time, so that you might not have needed to return so soon?: Other (Comment)

## 2023-01-25 NOTE — ED NOTES
Signs and symptoms: increased confusion when waking up from nap   Code Stroke activation time: 2128  Provider at bedside time:  2110  VAN score: Negative  Last Known Well (Time): 5445  Blood Glucose Result/Time: 120   Blood Pressure: 105/51  Anticoagulants (List medications): Lovenox

## 2023-01-25 NOTE — ED NOTES
Verbal shift change report given to [employer] (oncoming nurse) by Jesica Padilla (offgoing nurse). Report included the following information SBAR, Kardex, ED Summary, MAR, and Recent Results.

## 2023-01-26 ENCOUNTER — HOSPITAL ENCOUNTER (INPATIENT)
Dept: MRI IMAGING | Age: 78
Discharge: HOME | DRG: 064 | End: 2023-01-26
Attending: NURSE PRACTITIONER
Payer: MEDICARE

## 2023-01-26 PROCEDURE — 70553 MRI BRAIN STEM W/O & W/DYE: CPT

## 2023-01-26 PROCEDURE — 74011250636 HC RX REV CODE- 250/636: Performed by: INTERNAL MEDICINE

## 2023-01-26 PROCEDURE — 74011250637 HC RX REV CODE- 250/637: Performed by: INTERNAL MEDICINE

## 2023-01-26 PROCEDURE — 74011250636 HC RX REV CODE- 250/636: Performed by: RADIOLOGY

## 2023-01-26 PROCEDURE — 65270000029 HC RM PRIVATE

## 2023-01-26 PROCEDURE — 97165 OT EVAL LOW COMPLEX 30 MIN: CPT

## 2023-01-26 PROCEDURE — 97161 PT EVAL LOW COMPLEX 20 MIN: CPT

## 2023-01-26 PROCEDURE — 77010033678 HC OXYGEN DAILY

## 2023-01-26 PROCEDURE — 99233 SBSQ HOSP IP/OBS HIGH 50: CPT | Performed by: STUDENT IN AN ORGANIZED HEALTH CARE EDUCATION/TRAINING PROGRAM

## 2023-01-26 PROCEDURE — 97530 THERAPEUTIC ACTIVITIES: CPT

## 2023-01-26 PROCEDURE — 97535 SELF CARE MNGMENT TRAINING: CPT

## 2023-01-26 PROCEDURE — A9576 INJ PROHANCE MULTIPACK: HCPCS | Performed by: RADIOLOGY

## 2023-01-26 PROCEDURE — 74011000250 HC RX REV CODE- 250: Performed by: INTERNAL MEDICINE

## 2023-01-26 PROCEDURE — 74011250637 HC RX REV CODE- 250/637: Performed by: STUDENT IN AN ORGANIZED HEALTH CARE EDUCATION/TRAINING PROGRAM

## 2023-01-26 RX ADMIN — ACETAMINOPHEN 1000 MG: 500 TABLET ORAL at 09:02

## 2023-01-26 RX ADMIN — ACETAMINOPHEN 1000 MG: 500 TABLET ORAL at 18:55

## 2023-01-26 RX ADMIN — GADOTERIDOL 15 ML: 279.3 INJECTION, SOLUTION INTRAVENOUS at 17:25

## 2023-01-26 RX ADMIN — ENOXAPARIN SODIUM 40 MG: 100 INJECTION SUBCUTANEOUS at 09:02

## 2023-01-26 RX ADMIN — SENNOSIDES AND DOCUSATE SODIUM 2 TABLET: 50; 8.6 TABLET ORAL at 09:02

## 2023-01-26 RX ADMIN — VALSARTAN 80 MG: 80 TABLET ORAL at 09:02

## 2023-01-26 RX ADMIN — ACETAMINOPHEN 1000 MG: 500 TABLET ORAL at 21:21

## 2023-01-26 RX ADMIN — FAMOTIDINE 20 MG: 20 TABLET, FILM COATED ORAL at 09:02

## 2023-01-26 RX ADMIN — GABAPENTIN 600 MG: 300 CAPSULE ORAL at 18:54

## 2023-01-26 RX ADMIN — OXYCODONE HYDROCHLORIDE 10 MG: 5 TABLET ORAL at 21:21

## 2023-01-26 RX ADMIN — LEVETIRACETAM 1000 MG: 500 TABLET, FILM COATED ORAL at 18:55

## 2023-01-26 RX ADMIN — FAMOTIDINE 20 MG: 20 TABLET, FILM COATED ORAL at 18:54

## 2023-01-26 RX ADMIN — SODIUM CHLORIDE, PRESERVATIVE FREE 10 ML: 5 INJECTION INTRAVENOUS at 21:23

## 2023-01-26 RX ADMIN — LEVETIRACETAM 1000 MG: 500 TABLET, FILM COATED ORAL at 09:02

## 2023-01-26 RX ADMIN — SODIUM CHLORIDE, PRESERVATIVE FREE 10 ML: 5 INJECTION INTRAVENOUS at 18:55

## 2023-01-26 RX ADMIN — PSYLLIUM HUSK 1 PACKET: 3.4 POWDER ORAL at 09:02

## 2023-01-26 RX ADMIN — GABAPENTIN 600 MG: 300 CAPSULE ORAL at 09:02

## 2023-01-26 RX ADMIN — ASPIRIN 81 MG: 81 TABLET, CHEWABLE ORAL at 09:02

## 2023-01-26 RX ADMIN — MIRTAZAPINE 15 MG: 15 TABLET, FILM COATED ORAL at 21:21

## 2023-01-26 RX ADMIN — GABAPENTIN 600 MG: 300 CAPSULE ORAL at 21:21

## 2023-01-26 NOTE — PROGRESS NOTES
End of Shift Note    Bedside shift change report given to HANNAH Alexandra (oncoming nurse) by Epi Hernandez RN (offgoing nurse). Report included the following information SBAR, Kardex, Intake/Output, MAR, and Recent Results    Shift worked:  5629-9136     Shift summary and any significant changes:     Pt medicated for pain once during night, otherwise slept well. Pt is more confused this morning. Wife at bedside.       Concerns for physician to address:  None     Zone phone for oncoming shift:   N/A       Epi Hernandez RN

## 2023-01-26 NOTE — PROGRESS NOTES
followup visit for the patient on Med Surg with a palliative care consult. Patient, patient's wife, Hannah Chowdhury, daughter, Dwayne Woodruff, and sons Brent Alfonso and Vahe Fowler were present. Patient and Vahe Fowler requested a Bible at the earlier visit. Dropped off a New Testament/Psalms and a Bible with Old and New Testaments. Provided a ministry of presence. Advised of  availability. Please contact Spiritual Care for further referrals.     [address], [address], [address], [facility]  Staff   Paging service: [phone] ([place])

## 2023-01-26 NOTE — CONSULTS
Palliative Medicine Consult  Goyal: [phone] ([phone])    Patient Name: Jimy Hoang YOB: 1945    Date of Initial Consult: 1/25/2023  Date of follow-up: 01/26/23  Reason for Consult: Care Decisions  Requesting Provider: Patria Telles NP   Primary Care Physician: Jimbo, MD Ashley     SUMMARY:   Jimy Hoang is a 76 y.o. male with a past history of spinal stenosis and lumbar radiculopathy and recently diagnosed adenocarcinoma of unknown origin (diagnosed recent hospitalization earlier this month) who was admitted on 1/24/2023 from home with concern for acute confusion and staring spell lasting a few minutes that was concerning for possible seizure(?). Has been seen by Neurology. EEG consistent with encephalopathy. Started on [address]. Mental status improved and patient more conversant after admission. Recent admission to [facility] from for confusion and subsequently found to have widespread bony metastatic disease. He had been having right shoulder pain for >1 month being evaluated by outside physician. Became confused previous 2 days prior to that admission leading family to bring him to [facility] for evaluation. Adenocarcinoma diagnosis made and he received palliative radiation x1 fraction to right shoulder and thigh for bone pain. Plan was to follow up outpatient with Oncology but seizure-like episode led to current presentation and hospitalization. Current medical issues leading to Palliative Medicine involvement include: Care Decisions. Social Hx:  Lives at home with wife, Stevenson Cabrales,  >20 years. 3 children, [place] and Keri Rounds with Carmel Champion (Marya's son). Able to manage own ADLs at home per family with occasional assistance from wife. Generally well functioning. Retired manufacturing representative. Described as a \"philosopher and jokester. \"    Interval Hx:   Pain and appetite improved this morning. Ambulating with PT.      PALLIATIVE DIAGNOSES: Adenocarcinoma, unk primary with diffuse bone mets  Cancer pain  Poor appetite  Constipation  Encephalopathy  Goals of Care  Palliative Care Encounter     PLAN:   Goals  Met with patient and family at bedside today. At present patient wishes to attempt physical therapy to see if he can get stronger, meet with oncology outpatient, and see what options are available for treatment, hoping that treatment may allow for more time with family. It appears he had successful visit with PT at home though only home ~48 hours before presenting with possible seizure. We additionally discussed that should what he hopes for not succeed (PT, chemo), support of hospice could be available to focus on quality of life that remains, however long that may be. Patient and family appreciative of information but not ready at this time. Cancer Pain  Mostly right shoulder and leg, not significant at rest but worse with movement. Continue oxycodone IR 10mg PO every 4 hours as needed, tolerating well. Would give dose ~1 hour prior to planned PT/OT sessions. Pain seems incidental at present so would not start long-acting yet as little to no pain at rest.  Continue Tylenol 1000mg PO every 8 hours  Continue gabapentin 600mg PO three times daily  Lidocaine patch  Poor Appetite  Continue remeron 15mg at bedtime  Consult RD  Surrogate  Wife, Garrett Bennett, is legal NOK  Code Status  Full Code  Discussed with family and patient. Recommend DNR given advanced malignancy. Patient wished to think about this more.     Communicated plan of care with: Palliative [facility] Team     GOALS OF CARE / TREATMENT PREFERENCES:     GOALS OF CARE:  Patient/Health Care Proxy Stated Goals:  (to be discussed)    TREATMENT PREFERENCES:   Code Status: Full Code    Patient and family's personal goals include: to be discussed      Advance Care Planning:  [x] [facility] Interdisciplinary Team has updated the ACP Navigator with Health Care Decision Maker and Patient Capacity      Primary Decision Maker: Griffin Caal - [phone]    Secondary Decision Maker: Niraj Jasso - Matthew - [phone]    Secondary Decision Maker: Yenifer Quiroga - [phone]  Advance Care Planning 1/26/2023   Patient's Healthcare Decision Maker is: Legal Next of Kin   Confirm Advance Directive None   Patient Would Like to Complete Advance Directive No   Does the patient have other document types -               Other:    As far as possible, the palliative care team has discussed with patient / health care proxy about goals of care / treatment preferences for patient. HISTORY:     History obtained from: medical chart, patient, son Gavino Sidhu: weakness, confusion    HPI/SUBJECTIVE:    The patient is:   [x] Verbal and participatory though limited by confusion  [] Non-participatory due to:      Recently discharged from [facility] on 1/23. Did okay first night home and worked with therapy who came out day of presentation, able to walk up and down the stairs a few times. Later that night had staring spell and seemed more confused, not able to speak, leading family to brig to ER. Pain has been stable. Appetite remains poor. No shortness of breath at rest.  No anxiety or nausea.     Clinical Pain Assessment (nonverbal scale for severity on nonverbal patients):   Clinical Pain Assessment  Severity: 0          Duration: for how long has pt been experiencing pain (e.g., 2 days, 1 month, years)  Frequency: how often pain is an issue (e.g., several times per day, once every few days, constant)     FUNCTIONAL ASSESSMENT:     Palliative Performance Scale (PPS):  PPS: 50       PSYCHOSOCIAL/SPIRITUAL SCREENING:     Palliative IDT has assessed this patient for cultural preferences / practices and a referral made as appropriate to needs (Cultural Services, Patient Advocacy, Ethics, etc.)    Any spiritual / Orthodox concerns:  [] Yes /  [x] No   If \"Yes\" to discuss with pastoral care during IDT     Does caregiver feel burdened by caring for their loved one:   [] Yes /  [x] No /  [] No Caregiver Present/Available [] No Caregiver [] Pt Lives at Facility  If \"Yes\" to discuss with social work during IDT    Anticipatory grief assessment:   [x] Normal  / [] Maladaptive     If \"Maladaptive\" to discuss with social work during IDT    ESAS Anxiety: Anxiety: 0    ESAS Depression:          REVIEW OF SYSTEMS:     Positive and pertinent negative findings in ROS are noted above in HPI. The following systems were [x] reviewed / [] unable to be reviewed as noted in HPI  Other findings are noted below. Systems: constitutional, ears/nose/mouth/throat, respiratory, gastrointestinal, genitourinary, musculoskeletal, integumentary, neurologic, psychiatric, endocrine. Positive findings noted below. Modified ESAS Completed by: provider           Pain: 0   Anxiety: 0 Nausea: 0   Anorexia: 5 Dyspnea: 0     Constipation: No     Stool Occurrence(s): 1        PHYSICAL EXAM:     From RN flowsheet:  Wt Readings from Last 3 Encounters:   01/26/23 181 lb 7 oz (82.3 kg)   01/11/23 192 lb 14.4 oz (87.5 kg)   03/02/21 190 lb (86.2 kg)     Blood pressure 110/71, pulse 84, temperature 99.1 °F (37.3 °C), resp. rate 18, height 5' 10\" (1.778 m), weight 181 lb 7 oz (82.3 kg), SpO2 94 %.     Pain Scale 1: Numeric (0 - 10)  Pain Intensity 1: 0  Pain Onset 1: chronic  Pain Location 1: Shoulder  Pain Orientation 1: Right  Pain Description 1: Aching  Pain Intervention(s) 1: Declines  Last bowel movement, if known:     Constitutional: lying in bed, no distress  Eyes: pupils equal, anicteric  ENMT: no nasal discharge, moist mucous membranes  Cardiovascular: regular rate, distal pulses intact  Respiratory: breathing not labored, symmetric chest rise  Gastrointestinal: soft, non-tender, +bowel sounds  Musculoskeletal: no deformity, no tenderness to palpation  Skin: warm, dry  Neurologic: awake and alert, more conversant today, thought content generally appropriate with some tangential thinking, following commands, moving all extremities  Psychiatric: full affect, no hallucinations, no agitation     HISTORY:     Active Problems:    Acute metabolic encephalopathy (3/63/7146)      Bony metastasis ([facility] [address] (1/11/2023)      Centrilobular emphysema ([facility] [address] (1/14/2023)      Metastatic adenocarcinoma of unknown origin ([facility] [address] (1/18/2023)      Abnormal brain MRI (1/25/2023)      Decrease in appetite (1/25/2023)      Cancer associated pain (1/25/2023)      Palliative care encounter (1/25/2023)    Past Medical History:   Diagnosis Date    Diverticulitis     Gastrointestinal disorder     History of vascular access device 07/24/2017    [facility] VAT -  5FR triple Lumen Power PICC R Basilic  39 cm    Spinal stenosis       Past Surgical History:   Procedure Laterality Date    FLEXIBLE SIGMOIDOSCOPY N/A 7/27/2017    SIGMOIDOSCOPY FLEXIBLE performed by Josh Dejesus MD at [facility] ENDOSCOPY    IR INSERT NON TUNL CVC OVER 5 YRS  1/13/2021      No family history on file. History reviewed, no pertinent family history. Social History     Tobacco Use    Smoking status: Former     Packs/day: 1.00     Years: 50.00     Pack years: 50.00     Types: Cigarettes    Smokeless tobacco: Current   Substance Use Topics    Alcohol use:  Yes     Alcohol/week: 0.8 standard drinks     Types: 1 Cans of beer per week     No Known Allergies   Current Facility-Administered Medications   Medication Dose Route Frequency    famotidine (PEPCID) tablet 20 mg  20 mg Oral BID    gabapentin (NEURONTIN) capsule 600 mg  600 mg Oral TID    albuterol-ipratropium (DUO-NEB) 2.5 MG-0.5 MG/3 ML  3 mL Nebulization Q4H PRN    lidocaine 4 % patch 1 Patch  1 Patch TransDERmal Q24H    mirtazapine (REMERON) tablet 15 mg  15 mg Oral QHS    oxyCODONE IR (ROXICODONE) tablet 10 mg  10 mg Oral Q4H PRN    polyethylene glycol (MIRALAX) packet 17 g  17 g Oral DAILY PRN    psyllium husk-aspartame (METAMUCIL FIBER) packet 1 Packet  1 Packet Oral BID    senna-docusate (PERICOLACE) 8.6-50 mg per tablet 2 Tablet  2 Tablet Oral DAILY    valsartan (DIOVAN) tablet 80 mg  80 mg Oral DAILY    sodium chloride (NS) flush 5-40 mL  5-40 mL IntraVENous Q8H    sodium chloride (NS) flush 5-40 mL  5-40 mL IntraVENous PRN    acetaminophen (TYLENOL) tablet 650 mg  650 mg Oral Q6H PRN    Or    acetaminophen (TYLENOL) suppository 650 mg  650 mg Rectal Q6H PRN    polyethylene glycol (MIRALAX) packet 17 g  17 g Oral DAILY PRN    ondansetron (ZOFRAN ODT) tablet 4 mg  4 mg Oral Q8H PRN    Or    ondansetron (ZOFRAN) injection 4 mg  4 mg IntraVENous Q6H PRN    enoxaparin (LOVENOX) injection 40 mg  40 mg SubCUTAneous DAILY    levETIRAcetam (KEPPRA) tablet 1,000 mg  1,000 mg Oral BID    lactated Ringers infusion  125 mL/hr IntraVENous CONTINUOUS    aspirin chewable tablet 81 mg  81 mg Oral DAILY    acetaminophen (TYLENOL) tablet 1,000 mg  1,000 mg Oral TID          LAB AND IMAGING FINDINGS:     Lab Results   Component Value Date/Time    WBC 9.0 01/24/2023 09:16 PM    HGB 14.7 01/24/2023 09:16 PM    PLATELET 676 83/28/7714 09:16 PM     Lab Results   Component Value Date/Time    Sodium 138 01/25/2023 03:55 AM    Potassium 3.9 01/25/2023 03:55 AM    Chloride 105 01/25/2023 03:55 AM    CO2 26 01/25/2023 03:55 AM    BUN 21 (H) 01/25/2023 03:55 AM    Creatinine 1.04 01/25/2023 03:55 AM    Calcium 8.5 01/25/2023 03:55 AM    Magnesium 1.9 01/21/2023 02:52 AM    Phosphorus 2.4 (L) 01/23/2023 03:13 AM      Lab Results   Component Value Date/Time    Alk.  phosphatase 166 (H) 01/24/2023 09:16 PM    Protein, total 8.0 01/24/2023 09:16 PM    Albumin 3.0 (L) 01/24/2023 09:16 PM    Globulin 5.0 (H) 01/24/2023 09:16 PM     Lab Results   Component Value Date/Time    INR 1.1 01/24/2023 09:16 PM    Prothrombin time 11.0 01/24/2023 09:16 PM    aPTT 29.2 01/23/2021 04:49 AM      Lab Results   Component Value Date/Time    Ferritin 1,418 (H) 01/22/2021 03:02 AM      Lab Results   Component Value Date/Time    pH 7.44 01/11/2021 05:09 AM    PCO2 31 (L) 01/11/2021 05:09 AM    PO2 69 (L) 01/11/2021 05:09 AM     No components found for: GLPOC   No results found for: CPK, CKMB             Total time: 60 minutes  Counseling / coordination time, spent as noted above: 35 minutes  > 50% counseling / coordination?: yes    Prolonged service was provided for  []30 min   []75 min in face to face time in the presence of the patient, spent as noted above. Time Start:   Time End:   Note: this can only be billed with 30043 (initial) or 78298 (follow up). If multiple start / stop times, list each separately.

## 2023-01-26 NOTE — CONSULTS
Comprehensive Nutrition Assessment    Type and Reason for Visit: Initial, Consult    Nutrition Recommendations/Plan:   Continue regular diet order. Provide Ensure High Protein BID (320 kcal, 38 g carbs, 32 g protein)     Obtain newly measured bed scale weight. Document PO intake daily in flowsheets. Malnutrition Assessment:  Malnutrition Status:  Mild malnutrition (01/26/23 1027)    Context:  Chronic illness     Findings of the 6 clinical characteristics of malnutrition:   Energy Intake:  75% or less est energy requirements for 1 month or longer  Weight Loss:  Unable to assess     Body Fat Loss:  No significant body fat loss,     Muscle Mass Loss:  No significant muscle mass loss,    Fluid Accumulation:  No significant fluid accumulation,     Strength:  Not performed      Nutrition Assessment:     Pt is a 76year old male admitted with Breakthrough seizure ([facility] [address] [facility]. He  has a past medical history of Diverticulitis, Gastrointestinal disorder, History of vascular access device (07/24/2017), and Spinal stenosis. RD consulted for poor intake/appetite. Intern familiar with patient from last admission on 1/9, discharged 1/23, readmitted 1/24. Patient reported poor appetite for the past week - 4 weeks including last admission. Patient sitting in bedside chair with breakfast tray and [employer] but had not touched. Patient voiced acceptance of ONS. Patient unsure of UBW. Per chart review, reported UBW of 220# during last admission. If accurate, patient has lost 28# (13%) in 4 weeks, clinically significant for time frame. Unsure of accuracy, lack of weight history since 2021. NKFA. Denies any nausea, vomiting or diarrhea. No difficulty chewing/swallowing. Last BM was today.      Wt Readings from Last 10 Encounters:   01/24/23 87.1 kg (192 lb)   01/11/23 87.5 kg (192 lb 14.4 oz)   03/02/21 86.2 kg (190 lb)   02/15/21 86.2 kg (190 lb)   01/18/21 82.7 kg (182 lb 6.4 oz)   10/04/19 97.5 kg (215 lb)   08/05/17 87.1 kg (192 lb)   08/04/17 87.1 kg (192 lb 0.3 oz)   07/19/17 87.1 kg (192 lb)   07/18/17 87.1 kg (192 lb)       Nutrition Related Findings:      Wound Type: None  Last Bowel Movement Date: 01/26/23  Edema:No data recorded    Nutr. Labs:  Lab Results   Component Value Date/Time    GFR est AA >60 01/28/2021 06:16 PM    GFR est non-AA >60 01/28/2021 06:16 PM    Creatinine (POC) 0.9 02/23/2016 05:38 PM    Creatinine 1.04 01/25/2023 03:55 AM    BUN 21 (H) 01/25/2023 03:55 AM    BUN (POC) 21 (H) 02/23/2016 05:38 PM    Sodium (POC) 136 02/23/2016 05:38 PM    Sodium 138 01/25/2023 03:55 AM    Potassium 3.9 01/25/2023 03:55 AM    Potassium (POC) 4.0 02/23/2016 05:38 PM    Chloride (POC) 103 02/23/2016 05:38 PM    Chloride 105 01/25/2023 03:55 AM    CO2 26 01/25/2023 03:55 AM       Lab Results   Component Value Date/Time    Glucose 109 (H) 01/25/2023 03:55 AM    Glucose (POC) 100 01/25/2023 08:26 PM       No results found for: HBA1C, GQD3BSWO, TNZ3LKEC, TWC1HWIF    Nutr. Meds:  Lovenox, pepcid, LR @125, remeron, zofran PRN, miralax PRN, metamucil, pericolace, valsartan    Current Nutrition Intake & Therapies:  Average Meal Intake: 0%  Average Supplement Intake: None ordered  ADULT DIET Regular    Anthropometric Measures:  Height: 5' 10\" (177.8 cm)  Ideal Body Weight (IBW): 166 lbs (75 kg)     Current Body Wt:  87.1 kg (192 lb 0.3 oz), 115.7 % IBW. Stated  Current BMI (kg/m2): 27.6      Weight Adjustment: No adjustment         BMI Category: Overweight (BMI 25.0-29. 9)    Estimated Daily Nutrient Needs:  Energy Requirements Based On: Formula  Weight Used for Energy Requirements: Current  Energy (kcal/day): 1543-8627 (MSJ x 1.2-1.3)  Weight Used for Protein Requirements: Current  Protein (g/day): 87--105 (1-1.2g/kg)  Method Used for Fluid Requirements: 1 ml/kcal  Fluid (ml/day): 0739-8750 (1ml/kcal)    Nutrition Diagnosis:   Inadequate protein-energy intake related to catabolic illness as evidenced by poor intake prior to admission, intake 0-25% (Cancer)    Nutrition Interventions:   Food and/or Nutrient Delivery: Continue current diet, Start oral nutrition supplement  Nutrition Education/Counseling: No recommendations at this time  Coordination of Nutrition Care: Continue to monitor while inpatient, Interdisciplinary rounds  Plan of Care discussed with: Patient    Goals:     Goals: PO intake 50% or greater, by next RD assessment       Nutrition Monitoring and Evaluation:   Behavioral-Environmental Outcomes: None identified  Food/Nutrient Intake Outcomes: Food and nutrient intake, Supplement intake  Physical Signs/Symptoms Outcomes: Biochemical data, Weight    Discharge Planning:    Continue oral nutrition supplement, Continue current diet      Contact: Marion Patterson  RD Intern  RD office: [phone]

## 2023-01-26 NOTE — PROGRESS NOTES
1/26/2023  Case Management Progress Note    10:20 AM  Patient is 76year old male admitted 1/25 with breakthrough seizure  Patient's RUR is 15% yellow/moderate risk for readmission  Covid test: none this admission  Chart reviewed  Per chart patient is   Still being worked up. At baseline patien tis independent with ADLs and is open to [facility] and will need a resumption order. He does have supportive family at home. Will continue to follow and assist with discharge planning as clinical course continues.      Transition of Care Plan   Continue medical management/treatment  Home with family assistance and resumption of HH   Family will transport at [address]  Follow up outpatient as needed  CM will continue to follow    RITCHIE Gamez

## 2023-01-26 NOTE — PROGRESS NOTES
[facility] [place] [facility]  [address] [facility]  W: [phone]  F: [phone]      Reason for Visit:   Dewayne Cervantes is a 76 y.o. male who is seen for evaluation of confusion, met adenocarcinoma; review  given deterioration. Hematology/Oncology Treatment History:     <>PATHOLOGY<>Specimen #:[id number]  1/12/2023  Bone, right iliac, Core biopsy with touch interpretation: Metastatic adenocarcinoma with oncocytic features (see Comment). CANCER OF UNKNOWN PRIMARY  <>STAGING<>Cancer Staging  Bony metastasis ([facility] [address]  Staging form: Bone - Appendicular Skeleton, Trunk, Skull, and Facial Bones, AJCC 8th Edition  - Clinical: Stage IVB (cT0, cNX, pM1b) - Signed by Kathy Em MD on 1/17/2023  MRI SHOULDER RT WO CONT  1/10/2023 1. Extensive bony metastatic disease to the glenoid with additional areas of marrow replacement in the proximal humeral neck. This is compatible with metastatic disease 2. Small high-grade partial-thickness undersurface tear anterior supraspinatus   CT CHEST ABD PELV W CONT 1/11/2023 1. Multifocal osseous metastatic disease in the thoracic spine, lumbar spine, pelvis, left ribs, and right scapula. Potential biopsy sites including anterior left iliac bone and superior right scapula. 2. Multiple small hepatic lesions are suspicious for metastatic disease in this clinical scenario. 3. Moderate centrilobular emphysema. No pulmonary nodule. 4. Mediastinal AP window lymphadenopathy. 5. All findings suspicious for malignancy are new since 2021. Recommendation: Check PSA. Review result of any recent colonoscopy. Consider bone scan to establish baseline. MRI BRAIN W WO CONT 1/11/2023 Parasagittal right frontal enhancing intraosseous lesion with intracranial extension measuring up to 3.1 x 3.1 cm, suspicious for aggressive lesion such as metastatic or primary osseous lesion like multiple myeloma, or lymphoma.  Clinical correlation is advised. This may be an amenable to percutaneous biopsy for tissue diagnosis. Underlying associated diffuse bilateral enhancing dural thickening which may suggest meningitis versus carcinomatosis. Intracranial hypertension is in the differential diagnostic considerations. No acute infarct, intracranial hemorrhage or brain mass lesion. <>GOALS OF CARE<>PALLIATIVE INTENT    History of Present Illness:   Iggy Gibbs is a pleasant 76 y.o. male who was admitted on 23 with increased confusion. Wife reports got home and did well overnight . Then last night had episode of a 'blank stare\" and verbally not responding. Step-son shares pt could not hold arms up on his own, speech was garbled and at 1 point did \"T-citlali \" position with arms. After arriving at hospital pt returned to baseline. Today pt does not recall event . Able to correctly state name and  ; states yr as \"[birth year]\". Reports pain as improving. Denies SOB or HA. Recent hosp admission with dx of metastatic cancer to bone of unknown primary. Interval History:   Sitting up in chair at bedside. Unsure why he is here; doesn't recall any of the events that led to his admission. Son, Ac Wilson at bedside.      Past Medical History:   Diagnosis Date    Diverticulitis     Gastrointestinal disorder     History of vascular access device 2017    [facility] VAT -  5FR triple Lumen Power PICC R Basilic  39 cm    Spinal stenosis        Past Surgical History:   Procedure Laterality Date    FLEXIBLE SIGMOIDOSCOPY N/A 2017    SIGMOIDOSCOPY FLEXIBLE performed by Lionel Balderrama MD at [facility] ENDOSCOPY    IR INSERT NON TUNL CVC OVER 5 YRS  2021       Social History     Socioeconomic History    Marital status:      Spouse name: Not on file    Number of children: Not on file    Years of education: Not on file    Highest education level: Not on file   Occupational History    Not on file   Tobacco Use    Smoking status: Former     Packs/day: 1.00 Years: 50.00     Pack years: 50.00     Types: Cigarettes    Smokeless tobacco: Current   Substance and Sexual Activity    Alcohol use: Yes     Alcohol/week: 0.8 standard drinks     Types: 1 Cans of beer per week    Drug use: No    Sexual activity: Not on file   Other Topics Concern    Not on file   Social History Narrative    Not on file     Social Determinants of Health     Financial Resource Strain: Not on file   Food Insecurity: Not on file   Transportation Needs: Not on file   Physical Activity: Not on file   Stress: Not on file   Social Connections: Not on file   Intimate Partner Violence: Not on file   Housing Stability: Not on file       No family history on file.     Current Facility-Administered Medications   Medication Dose Route Frequency    famotidine (PEPCID) tablet 20 mg  20 mg Oral BID    gabapentin (NEURONTIN) capsule 600 mg  600 mg Oral TID    albuterol-ipratropium (DUO-NEB) 2.5 MG-0.5 MG/3 ML  3 mL Nebulization Q4H PRN    lidocaine 4 % patch 1 Patch  1 Patch TransDERmal Q24H    mirtazapine (REMERON) tablet 15 mg  15 mg Oral QHS    oxyCODONE IR (ROXICODONE) tablet 10 mg  10 mg Oral Q4H PRN    polyethylene glycol (MIRALAX) packet 17 g  17 g Oral DAILY PRN    psyllium husk-aspartame (METAMUCIL FIBER) packet 1 Packet  1 Packet Oral BID    senna-docusate (PERICOLACE) 8.6-50 mg per tablet 2 Tablet  2 Tablet Oral DAILY    valsartan (DIOVAN) tablet 80 mg  80 mg Oral DAILY    sodium chloride (NS) flush 5-40 mL  5-40 mL IntraVENous Q8H    sodium chloride (NS) flush 5-40 mL  5-40 mL IntraVENous PRN    acetaminophen (TYLENOL) tablet 650 mg  650 mg Oral Q6H PRN    Or    acetaminophen (TYLENOL) suppository 650 mg  650 mg Rectal Q6H PRN    polyethylene glycol (MIRALAX) packet 17 g  17 g Oral DAILY PRN    ondansetron (ZOFRAN ODT) tablet 4 mg  4 mg Oral Q8H PRN    Or    ondansetron (ZOFRAN) injection 4 mg  4 mg IntraVENous Q6H PRN    enoxaparin (LOVENOX) injection 40 mg  40 mg SubCUTAneous DAILY    levETIRAcetam (KEPPRA) tablet 1,000 mg  1,000 mg Oral BID    lactated Ringers infusion  125 mL/hr IntraVENous CONTINUOUS    aspirin chewable tablet 81 mg  81 mg Oral DAILY    acetaminophen (TYLENOL) tablet 1,000 mg  1,000 mg Oral TID       No Known Allergies     Review of Systems: A complete review of systems was obtained, reviewed. Pertinent findings reviewed above. Physical Exam:   Visit Vitals  /71 (BP 1 Location: Right upper arm, BP Patient Position: Sitting)   Pulse 84   Temp 99.1 °F (37.3 °C)   Resp 18   Ht 5' 10\" (1.778 m)   Wt 181 lb 7 oz (82.3 kg)   SpO2 94%   BMI 26.03 kg/m²     ECOG PS: 3  General: no distress  Respiratory: normal respiratory effort  CV: no peripheral edema  Skin: no rashes; no ecchymoses; no petechiae  Psych: alert, oriented, normal mood/affect     Results:     Lab Results   Component Value Date/Time    WBC 9.0 01/24/2023 09:16 PM    HGB 14.7 01/24/2023 09:16 PM    HCT 45.3 01/24/2023 09:16 PM    PLATELET 351 65/48/1138 09:16 PM    MCV 88.3 01/24/2023 09:16 PM    ABS.  NEUTROPHILS 6.6 01/24/2023 09:16 PM    Hemoglobin (POC) 15.6 02/23/2016 05:38 PM    Hematocrit (POC) 46 02/23/2016 05:38 PM     Lab Results   Component Value Date/Time    Sodium 138 01/25/2023 03:55 AM    Potassium 3.9 01/25/2023 03:55 AM    Chloride 105 01/25/2023 03:55 AM    CO2 26 01/25/2023 03:55 AM    Glucose 109 (H) 01/25/2023 03:55 AM    BUN 21 (H) 01/25/2023 03:55 AM    Creatinine 1.04 01/25/2023 03:55 AM    GFR est AA >60 01/28/2021 06:16 PM    GFR est non-AA >60 01/28/2021 06:16 PM    Calcium 8.5 01/25/2023 03:55 AM    Sodium (POC) 136 02/23/2016 05:38 PM    Potassium (POC) 4.0 02/23/2016 05:38 PM    Chloride (POC) 103 02/23/2016 05:38 PM    Glucose (POC) 100 01/25/2023 08:26 PM    BUN (POC) 21 (H) 02/23/2016 05:38 PM    Creatinine (POC) 0.9 02/23/2016 05:38 PM    Calcium, ionized (POC) 1.09 (L) 02/23/2016 05:38 PM     Lab Results   Component Value Date/Time    Bilirubin, total 0.4 01/24/2023 09:16 PM    ALT (SGPT) 51 01/24/2023 09:16 PM    Alk. phosphatase 166 (H) 01/24/2023 09:16 PM    Protein, total 8.0 01/24/2023 09:16 PM    Albumin 3.0 (L) 01/24/2023 09:16 PM    Globulin 5.0 (H) 01/24/2023 09:16 PM     Lab Results   Component Value Date/Time    Ferritin 1,418 (H) 01/22/2021 03:02 AM    Vitamin B12 1,602 (H) 01/10/2023 07:01 PM     (H) 01/10/2021 11:18 PM    C-Reactive protein 1.18 (H) 01/22/2021 03:02 AM    TSH 1.35 01/10/2023 07:01 PM    M-Suleiman Not Observed 01/11/2023 05:33 PM    Lipase 80 10/04/2019 02:08 PM       Lab Results   Component Value Date/Time    INR 1.1 01/24/2023 09:16 PM    aPTT 29.2 01/23/2021 04:49 AM    D-dimer 0.59 01/23/2021 04:49 AM    Fibrinogen 456 01/23/2021 04:49 AM      Lab Results   Component Value Date/Time    Prostate Specific Ag 5.2 (H) 01/11/2023 01:17 PM     (H) 01/10/2021 11:18 PM        Test results above have been reviewed. Assessment/Recommendations:     AMS/ ? Seizure  Neurology following  Per neurology: EEG No epileptiform abnormalities seen  -initiated Keppra and Gabapentin  -    2. Rt frontal enhancing intraosseous lesion  --consulted Rad/Onc to eval ; discussed with Dr Nelida Donaldson: will obtain MRI brain to assess for any changes       3. Cancer of Unknown Primary  -awaiting further pathology stains. Consistent with adenocarcinoma of unknown primary. Will want to request biomarker testing to see if immunotherapy is an option or if there is any druggable  mutation.   -concern about patient's current  performance status and ability to receive treatment. Continue to monitor. 4. Cancer Related Pain  -1/19/23 reviewed with Rad/Onc: pt completed sim and palliative radiation to rt shoulder and rt hip . No plans for additional rad/onc tx at this time. 5. Hypercalcemia  Seen in previous hospitalization; received Zometa (1/11/23)  Ca 8.5 on admission  -continue to monitor [address] reviewed with Dr Desean Cano.

## 2023-01-26 NOTE — PROGRESS NOTES
Problem: Pressure Injury - Risk of  Goal: *Prevention of pressure injury  Description: Document Adrien Scale and appropriate interventions in the flowsheet. Outcome: Progressing Towards Goal  Note: Pressure Injury Interventions:             Activity Interventions: Increase time out of bed    Mobility Interventions: Pressure redistribution bed/mattress (bed type)    Nutrition Interventions: Document food/fluid/supplement intake    Friction and Shear Interventions: Minimize layers                Problem: Patient Education: Go to Patient Education Activity  Goal: Patient/Family Education  Outcome: Progressing Towards Goal

## 2023-01-26 NOTE — PROGRESS NOTES
Problem: Mobility Impaired (Adult and Pediatric)  Goal: *Acute Goals and Plan of Care (Insert Text)  Description: FUNCTIONAL STATUS PRIOR TO ADMISSION: Patient was independent and active without use of DME prior to R shoulder injury in November 2022. He has experienced gradual decline in function, requiring furniture support or assist for ambulation. HOME SUPPORT PRIOR TO ADMISSION: The patient lived with spouse who assists as needed. Physical Therapy Goals  Initiated 1/26/2023  1. Patient will move from supine to sit and sit to supine  in bed with independence within 7 day(s). 2.  Patient will transfer from bed to chair and chair to bed with supervision/set-up using the least restrictive device within 7 day(s). 3.  Patient will perform sit to stand with supervision/set-up within 7 day(s). 4.  Patient will ambulate with supervision/set-up for 100 feet with the least restrictive device within 7 day(s). 5.  Patient will ascend/descend 14 stairs with one handrail(s) with supervision/set-up within 7 day(s). Outcome: Not Met    PHYSICAL THERAPY EVALUATION  Patient: Hetal Ibrahim (73 y.o. male)  Date: 1/26/2023  Primary Diagnosis: Breakthrough seizure ([facility] [address] [G40.919]       Precautions: falls       ASSESSMENT  Pt is a 77 y/o male with PMH of Metastatic adenocarcinoma of unknown origin, came to [facility] ED with c/o confusion, unresponsive with a blank stare for minutes. MRI brain showed a parasagittal right frontal enhancing intraosseous lesion with intracranial extension, suspicious for aggressive lesion. CT scan head shows a possible left brainstem infarct. CT scan abdomen and pelvis shows multifocal osseous metastatic disease in the thoracic spine, lumbar spine, pelvis, left ribs, and right scapula.      Based on the objective data described below, the patient presents with pain R shoulder - 10/10, generalized weakness, dynamic standing balance, functional activity tolerance and cognition/confusion impacting overall performance of functional transfers/mobility. Pt needs min/modA for bed mobility, demonstrates intact static sitting balance, requires SBA for sit <>stand, SBA/CGA for bed<>chair transfers with cues for hand placement, demonstrates fair standing  standing balance, is able to ambulate - 10' with no AD, CGA with slow jennifer , decreased step length and decreased foot clearance b/l Le. Recommend d/c to home with HHPT and family care when medically appropriate . Current Level of Function Impacting Discharge (mobility/balance): Pt requires SBA/CGA for transfers/mobility    Functional Outcome Measure: The patient scored 22 on the Tinetti outcome measure which is indicative of moderate risk of falls. Other factors to consider for discharge: good family support     Patient will benefit from skilled therapy intervention to address the above noted impairments. PLAN :  Recommendations and Planned Interventions: bed mobility training, transfer training, gait training, therapeutic exercises, neuromuscular re-education, patient and family training/education, and therapeutic activities      Frequency/Duration: Patient will be followed by physical therapy:  5 times a week to address goals.     Recommendation for discharge: (in order for the patient to meet his/her long term goals)  Physical therapy at least 2 days/week in the home AND ensure assist and/or supervision for safety with transfers/mobility    This discharge recommendation:  Has been made in collaboration with the attending provider and/or case management    IF patient discharges home will need the following DME: to be determined (TBD)         SUBJECTIVE:   Patient stated  My R shoulder hurts     OBJECTIVE DATA SUMMARY:   HISTORY:    Past Medical History:   Diagnosis Date    Diverticulitis     Gastrointestinal disorder     History of vascular access device 07/24/2017    [facility] VAT -  5FR triple Lumen Power PICC R Basilic  39 cm    Spinal stenosis      Past Surgical History:   Procedure Laterality Date    FLEXIBLE SIGMOIDOSCOPY N/A 7/27/2017    SIGMOIDOSCOPY FLEXIBLE performed by Jolie Quach MD at [facility] ENDOSCOPY    IR INSERT NON TUNL CVC OVER 5 YRS  1/13/2021       Personal factors and/or comorbidities impacting plan of care:     Home Situation  Home Environment: Private residence  # Steps to Enter: 5  Rails to Enter: No  One/Two Story Residence: Two story  # of Interior Steps: 15  Interior Rails: Left  Living Alone: No  Support Systems: Spouse/Significant Other  Patient Expects to be Discharged to[de-identified] Home  Current DME Used/Available at Home: None  Tub or Shower Type: Tub/Shower combination    EXAMINATION/PRESENTATION/DECISION MAKING:   Critical Behavior:  Neurologic State: Drowsy  Orientation Level: Oriented to person, Oriented to place  Cognition: Follows commands     Hearing: Auditory  Auditory Impairment: None  Skin:  intact where exposed    Range Of Motion:  AROM: Within functional limits    Strength:    Strength: Generally decreased, functional    Tone & Sensation:   Tone: Normal    Functional Mobility:  Bed Mobility:  Rolling: Minimum assistance; Moderate assistance  Supine to Sit: Moderate assistance     Scooting: Stand-by assistance  Transfers:  Sit to Stand: Stand-by assistance  Stand to Sit: Stand-by assistance        Bed to Chair: Stand-by assistance;Contact guard assistance    Balance:   Sitting: Intact; Without support  Standing: Impaired; Without support  Standing - Static: Good  Standing - Dynamic : Fair  Ambulation/Gait Training:  Distance (ft): 10 Feet (ft)  Assistive Device: Gait belt  Ambulation - Level of Assistance: Contact guard assistance    Speed/Krystle: Pace decreased (<100 feet/min)    Functional Measure:  Tinetti test:    Sitting Balance: 1  Arises: 1  Attempts to Rise: 2  Immediate Standing Balance: 2  Standing Balance: 2  Nudged: 1  Eyes Closed: 1  Turn 360 Degrees - Continuous/Discontinuous: 1  Turn 360 Degrees - Steady/Unsteady: 1  Sitting Down: 1  Balance Score: 13 Balance total score  Indication of Gait: 1  R Step Length/Height: 1  L Step Length/Height: 1  R Foot Clearance: 1  L Foot Clearance: 1  Step Symmetry: 1  Step Continuity: 1  Path: 1  Trunk: 1  Walking Time: 0  Gait Score: 9 Gait total score  Total Score: 22/28 Overall total score         Tinetti Tool Score Risk of Falls  <19 = High Fall Risk  19-24 = Moderate Fall Risk  25-28 = Low Fall Risk  Tinetti ME. Performance-Oriented Assessment of Mobility Problems in Elderly Patients. [address]; [id number]. (Scoring Description: PT Bulletin Feb. 10, 1993)    Older adults: [place] Dawna et al, 2009; n = 1000 [facility] elderly evaluated with ABC, RAMYA, ADL, and IADL)  · Mean RAMYA score for males aged 69-68 years = 26.21(3.40)  · Mean RAMYA score for females age 69-68 years = 25.16(4.30)  · Mean RAMYA score for males over 80 years = 23.29(6.02)  · Mean RAMYA score for females over 80 years = 17.20(8.32)           Physical Therapy Evaluation Charge Determination   History Examination Presentation Decision-Making   MEDIUM  Complexity : 1-2 comorbidities / personal factors will impact the outcome/ POC  MEDIUM Complexity : 3 Standardized tests and measures addressing body structure, function, activity limitation and / or participation in recreation  LOW Complexity : Stable, uncomplicated  Other outcome measures Tinetti  MEDIUM      Based on the above components, the patient evaluation is determined to be of the following complexity level: LOW     Pain Rating:  R shoulder 10/10    Activity Tolerance:   Fair    After treatment patient left in no apparent distress:   Sitting in chair, Call bell within reach, and Bed / chair alarm activated    COMMUNICATION/EDUCATION:   The patients plan of care was discussed with: Occupational therapist and Registered nurse. Fall prevention education was provided and the patient/caregiver indicated understanding.  and Patient/family agree to work toward stated goals and plan of care. PT/OT session occurred together for increased pt's safety and maximum functional outcome.    Thank you for this referral.  Rachel Abel   Time Calculation: 29 mins

## 2023-01-26 NOTE — PROGRESS NOTES
Bedside shift change report given to HANNAH Wilks (oncoming nurse) by Tessa Glover (offgoing nurse). Report included the following information SBAR, Kardex, and MAR.

## 2023-01-26 NOTE — ACP (ADVANCE CARE PLANNING)
Advance Care Planning 1/26/2023   Patient's Healthcare Decision Maker is: Legal Next of Kin: Wife Ellen Fitch   Confirm Advance Directive None   Patient Would Like to Complete Advance Directive No   Does the patient have other document types Not on file      Pt does not have AMD on file. In absence of verified Medical POA, wife Ellen Fitch is legal NOK and surrogate decision maker if pt is unable to speak for himself.

## 2023-01-26 NOTE — PROGRESS NOTES
TRANSFER - OUT REPORT:    Verbal report given to Anne RN(name) on Michael Quintero.  being transferred to [facility] (unit) for routine progression of care       Report consisted of patients Situation, Background, Assessment and   Recommendations(SBAR). Information from the following report(s) SBAR was reviewed with the receiving nurse. Lines:   Peripheral IV 01/24/23 Distal;Right Basilic (Active)   Site Assessment Clean, dry, & intact 01/25/23 1610   Phlebitis Assessment 0 01/25/23 1610   Infiltration Assessment 0 01/25/23 1610   Dressing Status Clean, dry, & intact 01/25/23 1610   Dressing Type Transparent;Tape 01/25/23 1610   Hub Color/Line Status Green;Flushed; Infusing 01/25/23 1610        Opportunity for questions and clarification was provided.       Patient transported with:   Monitor, 2L, Tech

## 2023-01-26 NOTE — PROGRESS NOTES
[facility] [place] [address]  [address] [facility], [address] [place] [address]  [phone]      Hospitalist  Progress Note      NAME:         Mark Varma. :        1945  MRM:        [id number]    Date of service: 2023      Chief complaint: Confusion    Interval HPI: Patient admitted with confusion concerning for a seizure. Discussed with his spouse and nurse. He remains a little confused but more alert. Very forgetful. Objective:    Vital Signs:    Visit Vitals  /71 (BP 1 Location: Right upper arm, BP Patient Position: Sitting)   Pulse 84   Temp 99.1 °F (37.3 °C)   Resp 18   Ht 5' 10\" (1.778 m)   Wt 82.3 kg (181 lb 7 oz)   SpO2 94%   BMI 26.03 kg/m²        Intake/Output Summary (Last 24 hours) at 2023 1212  Last data filed at 2023 1006  Gross per 24 hour   Intake 3481.66 ml   Output 400 ml   Net 3081.66 ml          Physical Examination:    General:   Weak and ill looking but not in distress   Eyes:   pink conjunctivae, PERRLA with no discharge. ENT:   no ottorrhea or rhinorrhea with dry mucous membranes  Neck: no masses, thyroid non-tender and trachea central.  Pulm:  decreased breath sounds without crackles or wheezes  Card:  no JVD or murmurs, has regular and normal S1, S2 without thrills, bruits or peripheral edema  Abd:  Soft, non-tender, non-distended, normoactive bowel sounds   Musc:  No cyanosis, clubbing, atrophy or deformities. Skin:  No rashes, bruising or ulcers. Neuro: Awake and alert. Confused and forgetful.  Generally a non focal exam.   Psych:  Has a fair insight to his illness     Current Facility-Administered Medications   Medication Dose Route Frequency    famotidine (PEPCID) tablet 20 mg  20 mg Oral BID    gabapentin (NEURONTIN) capsule 600 mg  600 mg Oral TID    albuterol-ipratropium (DUO-NEB) 2.5 MG-0.5 MG/3 ML  3 mL Nebulization Q4H PRN    lidocaine 4 % patch 1 Patch  1 Patch TransDERmal Q24H    mirtazapine (REMERON) tablet 15 mg  15 mg Oral QHS    oxyCODONE IR (ROXICODONE) tablet 10 mg  10 mg Oral Q4H PRN    polyethylene glycol (MIRALAX) packet 17 g  17 g Oral DAILY PRN    psyllium husk-aspartame (METAMUCIL FIBER) packet 1 Packet  1 Packet Oral BID    senna-docusate (PERICOLACE) 8.6-50 mg per tablet 2 Tablet  2 Tablet Oral DAILY    valsartan (DIOVAN) tablet 80 mg  80 mg Oral DAILY    sodium chloride (NS) flush 5-40 mL  5-40 mL IntraVENous Q8H    sodium chloride (NS) flush 5-40 mL  5-40 mL IntraVENous PRN    acetaminophen (TYLENOL) tablet 650 mg  650 mg Oral Q6H PRN    Or    acetaminophen (TYLENOL) suppository 650 mg  650 mg Rectal Q6H PRN    polyethylene glycol (MIRALAX) packet 17 g  17 g Oral DAILY PRN    ondansetron (ZOFRAN ODT) tablet 4 mg  4 mg Oral Q8H PRN    Or    ondansetron (ZOFRAN) injection 4 mg  4 mg IntraVENous Q6H PRN    enoxaparin (LOVENOX) injection 40 mg  40 mg SubCUTAneous DAILY    levETIRAcetam (KEPPRA) tablet 1,000 mg  1,000 mg Oral BID    lactated Ringers infusion  125 mL/hr IntraVENous CONTINUOUS    aspirin chewable tablet 81 mg  81 mg Oral DAILY    acetaminophen (TYLENOL) tablet 1,000 mg  1,000 mg Oral TID        Laboratory data and review:    Recent Labs     01/24/23  2116   WBC 9.0   HGB 14.7   HCT 45.3          Recent Labs     01/25/23  0355 01/24/23  2116    136   K 3.9 4.0    102   CO2 26 28   * 109*   BUN 21* 25*   CREA 1.04 1.52*   CA 8.5 9.1   ALB  --  3.0*   ALT  --  51   INR  --  1.1       No components found for: [place]    Diagnostics: Imaging studies have been reviewed    Assessment and Plan:    Acute encephalopathy / Confusion / hx dementia POA: unclear etiology but there was concern for acute CVA vs seizure activity given recent MRi brain showed a parasagittal right frontal enhancing intraosseous lesion with intracranial extension measuring up to 3.1 x 3.1 cm, suspicious for aggressive lesion.  CT scan head showed a possible left brainstem infarct. Seen by neurology. EEG did not show any seizure activity. Discussed with Oncology. Will obtain a repeat MRi today. They will reach out to rad-oncology. Continue Keppra, Asprin in the meantime. Continue PT, OT as tolerated. CM for discharge planning     Metastatic adenocarcinoma of unknown origin ([facility] (1/18/2023) POA: with mets to bone. During his recent admission, CT scan abdomen and pelvis done showed multifocal osseous metastatic disease in the thoracic spine, lumbar spine, pelvis, left ribs, and right scapula. Potential biopsy sites including anterior left iliac bone and superior right scapula. Multiple small hepatic lesions are suspicious for metastatic disease. He had a CT guided right iliac lesion biopsy and path showed metastatic adenocarcinoma with oncocytic features, unknown primary. He has been reviewed by rad-oncology who did XRT right shoulder and the right distal femur for pain control 1/19. Oncology and palliative care following. Continue Oxycodone, Gabapentin     Centrilobular emphysema ([facility] [address] (1/14/2023) POA: not wheezing. Not hypoxic. Duoneb PRN    Hypertension benign POA: continue Diovan    I personally reviewed chart, notes, data and current medications in the medical record. I have personally examined and treated the patient at bedside during this period. To assist coordination of care and communication with nursing and staff, this note may be preliminary early in the day, but finalized by end of the day.                  Care Plan discussed with: Patient, Care Manager, and Nursing Staff    Discussed:  Care Plan and D/C Planning    Prophylaxis:  Enoxaparin     Anticipated Disposition: likely SNF           ___________________________________________________    Attending Physician:   Johnathan Quiros MD

## 2023-01-26 NOTE — PROGRESS NOTES
Problem: Self Care Deficits Care Plan (Adult)  Goal: *Acute Goals and Plan of Care (Insert Text)  Description: FUNCTIONAL STATUS PRIOR TO ADMISSION: Patient was independent and active without use of DME prior to R shoulder injury in November 2022. Patient with gradual decline in function. HOME SUPPORT: The patient lived with spouse who assisted patient as needed. Occupational Therapy Goals  Initiated 1/26/2023  1. Patient will perform lower body dressing with supervision/set-up within 7 day(s). 2.  Patient will perform grooming tasks, at sink, with supervision/set-up within 7 day(s). 3.  Patient will perform toilet transfers with supervision/set-up within 7 day(s). 4.  Patient will perform all aspects of toileting with supervision/set-up within 7 day(s). 5.  Patient will participate in upper extremity therapeutic exercise/activities with supervision/set-up for 10 minutes within 7 day(s). 1/26/2023 1419 by Lila Steele OT  Outcome: Progressing Towards Goal    OCCUPATIONAL THERAPY EVALUATION  Patient: Ned Escalante (73 y.o. male)  Date: 1/26/2023  Primary Diagnosis: Breakthrough seizure ([facility] [G40.919]       Precautions: fall         ASSESSMENT  Based on the objective data described below, the patient presents with hospital admission secondary to confusion after episode of unresponsiveness with blank stare for minutes prior. Patient with metastatic disease in the thoracic spine, lumbar spine, pelvis, left ribs and right scapula. Patient received in bed, drowsy but does not object to activity. Patient demonstrates overall weakness and complaint of pain to R shoulder. He tolerates transfer to chair with CGA. He requires up to min assist for ADL tasks patient left in chair in NAD. Recommend patient return home with [facility] services and family assist/support . Current Level of Function Impacting Discharge (ADLs/self-care): CGA/Min A    Functional Outcome Measure:   The patient scored 55/100 on the Barthel Index  outcome measure. Other factors to consider for discharge: lives with family, recent decline in function     Patient will benefit from skilled therapy intervention to address the above noted impairments. PLAN :  Recommendations and Planned Interventions: self care training, functional mobility training, therapeutic exercise, balance training, therapeutic activities, endurance activities, patient education, home safety training, and family training/education    Frequency/Duration: Patient will be followed by occupational therapy 3 times a week to address goals. Recommendation for discharge: (in order for the patient to meet his/her long term goals)  Occupational therapy at least 2 days/week in the home AND ensure assist and/or supervision for safety with ADLs and transfers    This discharge recommendation:  Has been made in collaboration with the attending provider and/or case management    IF patient discharges home will need the following DME: TBD       SUBJECTIVE:   Patient stated Its a 10.   R shoulder pain  OBJECTIVE DATA SUMMARY:   HISTORY:   Past Medical History:   Diagnosis Date    Diverticulitis     Gastrointestinal disorder     History of vascular access device 07/24/2017    [facility] VAT -  5FR triple Lumen Power PICC R Basilic  39 cm    Spinal stenosis      Past Surgical History:   Procedure Laterality Date    FLEXIBLE SIGMOIDOSCOPY N/A 7/27/2017    SIGMOIDOSCOPY FLEXIBLE performed by Lena Mejia MD at [facility] ENDOSCOPY    IR INSERT NON TUNL CVC OVER 5 YRS  1/13/2021       Expanded or extensive additional review of patient history:     Home Situation  Home Environment: Private residence  # Steps to Enter: 5  Rails to Enter: No  One/Two Story Residence: Two story  # of Interior Steps: 15  Interior Rails: Left  Living Alone: No  Support Systems: Spouse/Significant Other  Patient Expects to be Discharged to[de-identified] Home  Current DME Used/Available at Home: None  Tub or [employer] Type: Tub/Shower combination    Hand dominance: Right    EXAMINATION OF PERFORMANCE DEFICITS:  Cognitive/Behavioral Status:  Neurologic State: Drowsy  Orientation Level: Oriented to person;Oriented to place  Cognition: Follows commands             Skin: intact as seen    Edema: none noted     Hearing: Auditory  Auditory Impairment: None    Vision/Perceptual:                                     Range of Motion:    AROM: Within functional limits                         Strength:    Strength: Generally decreased, functional                Coordination: Tone & Sensation:    Tone: Normal                         Balance:  Sitting: Intact; Without support  Standing: Impaired; Without support  Standing - Static: Good  Standing - Dynamic : Fair    Functional Mobility and Transfers for ADLs:  Bed Mobility:  Rolling: Minimum assistance; Moderate assistance  Supine to Sit: Moderate assistance  Scooting: Stand-by assistance    Transfers:  Sit to Stand: Stand-by assistance  Stand to Sit: Stand-by assistance  Bed to Chair: Stand-by assistance;Contact guard assistance  Toilet Transfer : Contact guard assistance    ADL Assessment:  Feeding: Independent (not currently interested in eating after meal set up)    Oral Facial Hygiene/Grooming: Contact guard assistance (R shoulder pain)    Bathing: Minimum assistance (R shoulder pain)         Upper Body Dressing: Minimum assistance (shoulder pain)    Lower Body Dressing: Contact guard assistance    Toileting: Contact guard assistance;Stand by assistance                  ADL Intervention and task modifications:                                                 Therapeutic Exercise:     Functional Measure:    Barthel Index:  Bathin  Bladder: 10  Bowels: 10  Groomin  Dressin  Feeding: 10  Mobility: 0  Stairs: 5  Toilet Use: 5  Transfer (Bed to Chair and Back): 10  Total: 55/100      The Barthel ADL Index: Guidelines  1.  The index should be used as a record of what a patient does, not as a record of what a patient could do. 2. The main aim is to establish degree of independence from any help, physical or verbal, however minor and for whatever reason. 3. The need for supervision renders the patient not independent. 4. A patient's performance should be established using the best available evidence. Asking the patient, friends/relatives and nurses are the usual sources, but direct observation and common sense are also important. However direct testing is not needed. 5. Usually the patient's performance over the preceding 24-48 hours is important, but occasionally longer periods will be relevant. 6. Middle categories imply that the patient supplies over 50 per cent of the effort. 7. Use of aids to be independent is allowed. Score Interpretation (from [facility])    Independent   60-79 Minimally independent   40-59 Partially dependent   20-39 Very dependent   <20 Totally dependent     -Stephane Galvan., BarthelTIFFANY. (1965). Functional evaluation: the Barthel Index. [facility] ([address] (1997). The Barthel activities of daily living index: self-reporting versus actual performance in the old (> or = 75 years). Journal of [address] [place] [address] 457), [facility], J.ELVINF, Kathi Bowling., Emery Tuttle (1999). Measuring the change in disability after inpatient rehabilitation; comparison of the responsiveness of the Barthel Index and Functional [place] Measure. Journal of Neurology, Neurosurgery, and Psychiatry, 66(4), 727-721. Omayra Handley, N.J.A, JESE Hendricks, & Velia Almeida M.A. (2004) Assessment of post-stroke quality of life in cost-effectiveness studies: The usefulness of the Barthel Index and the EuroQoL-5D.  Quality of Life Research, 15, 394-60         Occupational Therapy Evaluation Charge Determination   History Examination Decision-Making   MEDIUM Complexity : Expanded review of history including physical, cognitive and psychosocial  history  LOW Complexity : 1-3 performance deficits relating to physical, cognitive , or psychosocial skils that result in activity limitations and / or participation restrictions  LOW Complexity : No comorbidities that affect functional and no verbal or physical assistance needed to complete eval tasks       Based on the above components, the patient evaluation is determined to be of the following complexity level: LOW   Pain Rating:  R shoulder, back    Activity Tolerance:   requires rest breaks    After treatment patient left in no apparent distress:    Sitting in chair, Call bell within reach, and Bed / chair alarm activated    COMMUNICATION/EDUCATION:   The patients plan of care was discussed with: Physical therapist and Registered nurse. Home safety education was provided and the patient/caregiver indicated understanding., Patient/family have participated as able in goal setting and plan of care. , and Patient/family agree to work toward stated goals and plan of care. This patients plan of care is appropriate for delegation to [facility].     Thank you for this referral.  Meme Vargas, OTR/L  Time Calculation: 19 mins

## 2023-01-26 NOTE — PROGRESS NOTES
Patient's wife called out stating patient wants pain medication. Writer brought medication into room and asked patient to rate pain on scale of 1-10. Patient would not answer with eyes closed. Writer and wife had to ask pt multiple times to rate pain. Eventually he rated pain an 8/10. Once given medication in cup, pt closed eyes and seemed asleep. Writer asked patient twice if he wanted the medication and he stated he cannot take it right now. Medication will be returned to [facility].

## 2023-01-26 NOTE — PROGRESS NOTES
Spiritual Care Assessment/Progress Note  [address]      NAME: Hever Parham MRN: [id number]  AGE: 76 y.o.  SEX: male  Yarsanism Affiliation: Latter-day   Language: English     1/26/2023     Total Time (in minutes): 54     Spiritual Assessment begun in [facility] 5M1 MED SURG 1 through conversation with:         [x]Patient        [x] Family    [] Friend(s)        Reason for Consult: Palliative Care, Initial/Spiritual Assessment     Spiritual beliefs: (Please include comment if needed)     [x] Identifies with a shaila tradition: Religious        [x] Supported by a shaila community:  [facility] [place]          [] Claims no spiritual orientation:           [] Seeking spiritual identity:                [] Adheres to an individual form of spirituality:           [] Not able to assess:                           Identified resources for coping:      [] Prayer                               [] Music                  [] Guided Imagery     [x] Family/friends                 [] Pet visits     [x] Devotional reading                         [] Unknown     [] Other:                                               Interventions offered during this visit: (See comments for more details)    Patient Interventions: Affirmation of emotions/emotional suffering, Affirmation of shaila, Catharsis/review of pertinent events in supportive environment, Coping skills reviewed/reinforced, Normalization of emotional/spiritual concerns, Iconic (affirming the presence of God/Higher Power), Yarsanism beliefs/image of God discussed, Prayer (assurance of), Bible or other spiritual literature provided     Family/Friend(s): Prayer (assurance of), Affirmation of emotions/emotional suffering, Affirmation of shaila, Yarsanism beliefs/image of God discussed     Plan of Care:     [] Support spiritual and/or cultural needs    [] Support AMD and/or advance care planning process      [] Support grieving process   [] Coordinate Rites and/or Rituals    [] Coordination with community clergy   [] No spiritual needs identified at this time   [] Detailed Plan of Care below (See Comments)  [] Make referral to Music Therapy  [] Make referral to Pet Therapy     [] Make referral to Addiction services  [] Make referral to [facility]  [] Make referral to Spiritual Care Partner  [] No future visits requested        [x] Contact Spiritual Care for further referrals     Comments:  visit for the patient on Med Surg with a palliative care consult. Reviewed patient's chart and spoke with patient's nurse. Pt and his son, Cassidy Cabrales, were present. Introduced self and Spiritual Care services. 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 Pt shared he finds meaning from the work he does and his family. Pt shared he finds strength from his shaila in God. Pt shared he struggles because he believes he hasn't read the Bible enough, but decided it's how we treat people that really matter. Pt takes issue with some Taoism decisions, but still finds comfort in his Latter-day. Pt is concerned about the state of the world, but finds hope in young people like his son Cassidy Cabrales. Pt shared he is concerned about an upcoming meeting, he doesn't want to be squished.  expressed that [employer] \"good help,\" and the hospital wants to be good help to him and his family. Pt expressed thanks. Pt shared he wants to shower and shave before the meeting.  advised of  availability. Cultivated a relationship of support and trust. Facilitated storytelling. Provided an empathetic presence. Listened attentively. Reflected value statements. Reviewed coping. Affirmed pt's shaila. Please contact Spiritual Care for further referrals.     [address], [address], [address], [facility]  Staff   Paging service: [phone] (ROBERT)

## 2023-01-27 ENCOUNTER — TELEPHONE (OUTPATIENT)
Dept: PALLATIVE CARE | Age: 78
End: 2023-01-27

## 2023-01-27 PROCEDURE — 74011250636 HC RX REV CODE- 250/636: Performed by: INTERNAL MEDICINE

## 2023-01-27 PROCEDURE — 99233 SBSQ HOSP IP/OBS HIGH 50: CPT | Performed by: PSYCHIATRY & NEUROLOGY

## 2023-01-27 PROCEDURE — 94761 N-INVAS EAR/PLS OXIMETRY MLT: CPT

## 2023-01-27 PROCEDURE — 99232 SBSQ HOSP IP/OBS MODERATE 35: CPT | Performed by: STUDENT IN AN ORGANIZED HEALTH CARE EDUCATION/TRAINING PROGRAM

## 2023-01-27 PROCEDURE — 74011250637 HC RX REV CODE- 250/637: Performed by: INTERNAL MEDICINE

## 2023-01-27 PROCEDURE — 74011000250 HC RX REV CODE- 250: Performed by: INTERNAL MEDICINE

## 2023-01-27 PROCEDURE — 74011250637 HC RX REV CODE- 250/637: Performed by: STUDENT IN AN ORGANIZED HEALTH CARE EDUCATION/TRAINING PROGRAM

## 2023-01-27 PROCEDURE — 65270000029 HC RM PRIVATE

## 2023-01-27 RX ADMIN — SODIUM CHLORIDE, PRESERVATIVE FREE 10 ML: 5 INJECTION INTRAVENOUS at 15:22

## 2023-01-27 RX ADMIN — PSYLLIUM HUSK 1 PACKET: 3.4 POWDER ORAL at 09:08

## 2023-01-27 RX ADMIN — GABAPENTIN 600 MG: 300 CAPSULE ORAL at 15:22

## 2023-01-27 RX ADMIN — ASPIRIN 81 MG: 81 TABLET, CHEWABLE ORAL at 09:09

## 2023-01-27 RX ADMIN — ACETAMINOPHEN 1000 MG: 500 TABLET ORAL at 21:32

## 2023-01-27 RX ADMIN — FAMOTIDINE 20 MG: 20 TABLET, FILM COATED ORAL at 17:26

## 2023-01-27 RX ADMIN — OXYCODONE HYDROCHLORIDE 10 MG: 5 TABLET ORAL at 22:17

## 2023-01-27 RX ADMIN — SENNOSIDES AND DOCUSATE SODIUM 2 TABLET: 50; 8.6 TABLET ORAL at 09:09

## 2023-01-27 RX ADMIN — ACETAMINOPHEN 1000 MG: 500 TABLET ORAL at 09:08

## 2023-01-27 RX ADMIN — VALSARTAN 80 MG: 80 TABLET ORAL at 09:09

## 2023-01-27 RX ADMIN — ACETAMINOPHEN 1000 MG: 500 TABLET ORAL at 15:22

## 2023-01-27 RX ADMIN — MIRTAZAPINE 15 MG: 15 TABLET, FILM COATED ORAL at 21:32

## 2023-01-27 RX ADMIN — SODIUM CHLORIDE, PRESERVATIVE FREE 5 ML: 5 INJECTION INTRAVENOUS at 21:36

## 2023-01-27 RX ADMIN — LEVETIRACETAM 1000 MG: 500 TABLET, FILM COATED ORAL at 17:26

## 2023-01-27 RX ADMIN — GABAPENTIN 600 MG: 300 CAPSULE ORAL at 09:08

## 2023-01-27 RX ADMIN — ENOXAPARIN SODIUM 40 MG: 100 INJECTION SUBCUTANEOUS at 09:09

## 2023-01-27 RX ADMIN — GABAPENTIN 600 MG: 300 CAPSULE ORAL at 21:32

## 2023-01-27 RX ADMIN — FAMOTIDINE 20 MG: 20 TABLET, FILM COATED ORAL at 09:09

## 2023-01-27 RX ADMIN — SODIUM CHLORIDE, PRESERVATIVE FREE 10 ML: 5 INJECTION INTRAVENOUS at 05:31

## 2023-01-27 RX ADMIN — OXYCODONE HYDROCHLORIDE 10 MG: 5 TABLET ORAL at 09:24

## 2023-01-27 NOTE — PROGRESS NOTES
1/27/2023  Case Management Progress Note    11:33 AM  Noted consult for hospice info session--sent to [facility] as patient is already open to the [facility] side. Rowan Oppenheim, MSW    10:00 AM  Patient is 76year old male admitted 1/25 with breakthrough seizure  Patient's RUR is 16% yellow/moderate risk for readmission  Covid test: none this admission  Chart reviewed  Per chart patient was seen by physical and occupational therapy yesterday and they recommended home health. Patient is already open to [facility] and I have sent them resumption orders. Palliative team notes make it clear patient would like to get stronger at home and follow up outpatient with oncology, and family would like for him to be at home as well. Will continue to follow and assist with discharge planning as needed.      Transition of Care Plan   Continue medical management/treatment  Home with family assistance and  support   Resumption order sent to [address] will transport at discharge  CM will continue to follow    Rowan Oppenheim, MSW

## 2023-01-27 NOTE — PROGRESS NOTES
Accessed chart for completion of stroke education. Stroke Education provided to patient and relative(s) and the following topics were discussed    1. Patients personal risk factors for stroke are smoking    2. Warning signs of Stroke:        * Sudden numbness or weakness of the face, arm or leg, especially on one side of          The body            * Sudden confusion, trouble speaking or understanding        * Sudden trouble seeing in one or both eyes        * Sudden trouble walking, dizziness, loss of balance or coordination        * Sudden severe headache with no known cause      3. Importance of activation Emergency Medical Services ( 9-1-1 ) immediately if experience any warning signs of stroke. 4. Be sure and schedule a follow-up appointment with your primary care doctor or any specialists as instructed. 5. You must take medicine every day to treat your risk factors for stroke. Be sure to take your medicines exactly as your doctor tells you: no more, no less. Know what your medicines are for , what they do. Anti-thrombotics /anticoagulants can help prevent strokes. You are taking the following medicine(s)  Aspirin     6. Smoking and second-hand smoke greatly increase your risk of stroke, cardiovascular disease and death. Smoking history ended year 2015    7. Information provided was [facility] Stroke Education Binder    8. Documentation of teaching completed in Patient Education Activity and on Care Plan with teaching response noted?   yes

## 2023-01-27 NOTE — PROGRESS NOTES
Eugene Kelly lexie Alpine 77  [address] [facility], [address] [place] [address]  [phone]      Hospitalist  Progress Note      NAME:         Fabiola Satniago. :        1945  MRM:        [id number]    Date of service: 2023      Chief complaint: Confusion    Interval HPI: Patient admitted with confusion concerning for a seizure. Discussed with his spouse and nurse. He remains a little confused but more alert. Very forgetful. Objective:    Vital Signs:    Visit Vitals  BP (!) 157/73 (BP 1 Location: Left lower arm, BP Patient Position: At rest) Comment: notified primary rn   Pulse 77   Temp 98.1 °F (36.7 °C)   Resp 18   Ht 5' 10\" (1.778 m)   Wt 82.3 kg (181 lb 7 oz)   SpO2 93%   BMI 26.03 kg/m²        Intake/Output Summary (Last 24 hours) at 2023 0805  Last data filed at 2023 1850  Gross per 24 hour   Intake 3493.33 ml   Output --   Net 3493.33 ml          Physical Examination:    General:   Weak and ill looking but not in distress   Eyes:   pink conjunctivae, PERRLA with no discharge. ENT:   no ottorrhea or rhinorrhea with dry mucous membranes  Pulm:  decreased breath sounds without crackles or wheezes  Card:  no JVD or murmurs, has regular and normal S1, S2 without thrills, bruits   Abd:  Soft, non-tender, non-distended, normoactive bowel sounds   Musc:  No cyanosis, clubbing, atrophy or deformities. Skin:  No rashes, bruising or ulcers. Neuro: Awake and alert. Confused and forgetful.  Non focal.   Psych:  Has a fair insight to his illness     Current Facility-Administered Medications   Medication Dose Route Frequency    famotidine (PEPCID) tablet 20 mg  20 mg Oral BID    gabapentin (NEURONTIN) capsule 600 mg  600 mg Oral TID    albuterol-ipratropium (DUO-NEB) 2.5 MG-0.5 MG/3 ML  3 mL Nebulization Q4H PRN    lidocaine 4 % patch 1 Patch  1 Patch TransDERmal Q24H    mirtazapine (REMERON) tablet 15 mg  15 mg Oral QHS    oxyCODONE IR (ROXICODONE) tablet 10 mg  10 mg Oral Q4H PRN    polyethylene glycol (MIRALAX) packet 17 g  17 g Oral DAILY PRN    psyllium husk-aspartame (METAMUCIL FIBER) packet 1 Packet  1 Packet Oral BID    senna-docusate (PERICOLACE) 8.6-50 mg per tablet 2 Tablet  2 Tablet Oral DAILY    valsartan (DIOVAN) tablet 80 mg  80 mg Oral DAILY    sodium chloride (NS) flush 5-40 mL  5-40 mL IntraVENous Q8H    sodium chloride (NS) flush 5-40 mL  5-40 mL IntraVENous PRN    acetaminophen (TYLENOL) tablet 650 mg  650 mg Oral Q6H PRN    Or    acetaminophen (TYLENOL) suppository 650 mg  650 mg Rectal Q6H PRN    polyethylene glycol (MIRALAX) packet 17 g  17 g Oral DAILY PRN    ondansetron (ZOFRAN ODT) tablet 4 mg  4 mg Oral Q8H PRN    Or    ondansetron (ZOFRAN) injection 4 mg  4 mg IntraVENous Q6H PRN    enoxaparin (LOVENOX) injection 40 mg  40 mg SubCUTAneous DAILY    levETIRAcetam (KEPPRA) tablet 1,000 mg  1,000 mg Oral BID    aspirin chewable tablet 81 mg  81 mg Oral DAILY    acetaminophen (TYLENOL) tablet 1,000 mg  1,000 mg Oral TID        Laboratory data and review:    Recent Labs     01/24/23  2116   WBC 9.0   HGB 14.7   HCT 45.3          Recent Labs     01/25/23  0355 01/24/23  2116    136   K 3.9 4.0    102   CO2 26 28   * 109*   BUN 21* 25*   CREA 1.04 1.52*   CA 8.5 9.1   ALB  --  3.0*   ALT  --  51   INR  --  1.1       No components found for: [place]    Diagnostics: Imaging studies have been reviewed    Assessment and Plan:    Acute CVA POA: new. Repeat MRi study done this admission showed an interval development of multiple small foci of diffusion restriction in the bilateral cerebellar hemispheres as well as within the left parietal lobe consistent with small foci of acute ischemia likely reflecting an embolic origin as well as small foci of enhancement in the occipital lobe. CTA head and neck showed no major vessel occlusion. Lipid panel pending.  Get an Echocardiogram. Consult neurology. May need a holter monitor    Acute encephalopathy / Confusion / hx dementia POA: likely due to the acute CVA seizure activity given recent MRi brain showed a parasagittal right frontal enhancing intraosseous lesion with intracranial extension measuring up to 3.1 x 3.1 cm, suspicious for aggressive lesion. CT scan head showed a possible left brainstem infarct. Seen by neurology. EEG did not show any seizure activity. Discussed with Oncology. Continue Keppra, Asprin. Continue PT, OT as tolerated. Metastatic adenocarcinoma of unknown origin ([facility] [address] (1/18/2023) POA: with mets to bone. During his recent admission, CT scan abdomen and pelvis done showed multifocal osseous metastatic disease in the thoracic spine, lumbar spine, pelvis, left ribs, and right scapula. Potential biopsy sites including anterior left iliac bone and superior right scapula. Multiple small hepatic lesions are suspicious for metastatic disease. He had a CT guided right iliac lesion biopsy and path showed metastatic adenocarcinoma with oncocytic features, unknown primary. He has been reviewed by rad-oncology who did XRT right shoulder and the right distal femur for pain control 1/19. Oncology and palliative care following. Hospice consulted for an information session. Continue Oxycodone, Gabapentin. Centrilobular emphysema ([facility] [address] (1/14/2023) POA: not wheezing. Not hypoxic. Duoneb PRN    Hypertension benign POA: continue Diovan    I personally reviewed chart, notes, data and current medications in the medical record. I have personally examined and treated the patient at bedside during this period. To assist coordination of care and communication with nursing and staff, this note may be preliminary early in the day, but finalized by end of the day.                  Care Plan discussed with: Patient, Care Manager, and Nursing Staff    Discussed:  Care Plan and D/C Planning    Prophylaxis:  Enoxaparin     Anticipated Disposition: likely SNF vs Hospice           ___________________________________________________    Attending Physician:   Alba Sutton MD

## 2023-01-27 NOTE — PROGRESS NOTES
[facility] [place] [facility]  [address] [facility], [address] [facility] [address] [facility]  W: [phone]  F: [phone]      Reason for Visit:   Chelsea Bravo is a 76 y.o. male who is seen for evaluation of confusion, met adenocarcinoma; review  given deterioration. Hematology/Oncology Treatment History:     <>PATHOLOGY<>Specimen #:[id number]  1/12/2023  Bone, right iliac, Core biopsy with touch interpretation: Metastatic adenocarcinoma with oncocytic features (see Comment). CANCER OF UNKNOWN PRIMARY  <>STAGING<>Cancer Staging  Bony metastasis ([facility] [address]  Staging form: Bone - Appendicular Skeleton, Trunk, Skull, and Facial Bones, AJCC 8th Edition  - Clinical: Stage IVB (cT0, cNX, pM1b) - Signed by Tawny Knott MD on 1/17/2023  MRI SHOULDER RT WO CONT  1/10/2023 1. Extensive bony metastatic disease to the glenoid with additional areas of marrow replacement in the proximal humeral neck. This is compatible with metastatic disease 2. Small high-grade partial-thickness undersurface tear anterior supraspinatus   CT CHEST ABD PELV W CONT 1/11/2023 1. Multifocal osseous metastatic disease in the thoracic spine, lumbar spine, pelvis, left ribs, and right scapula. Potential biopsy sites including anterior left iliac bone and superior right scapula. 2. Multiple small hepatic lesions are suspicious for metastatic disease in this clinical scenario. 3. Moderate centrilobular emphysema. No pulmonary nodule. 4. Mediastinal AP window lymphadenopathy. 5. All findings suspicious for malignancy are new since 2021. Recommendation: Check PSA. Review result of any recent colonoscopy. Consider bone scan to establish baseline. MRI BRAIN W WO CONT 1/11/2023 Parasagittal right frontal enhancing intraosseous lesion with intracranial extension measuring up to 3.1 x 3.1 cm, suspicious for aggressive lesion such as metastatic or primary osseous lesion like multiple myeloma, or lymphoma.  Clinical correlation is advised. This may be an amenable to percutaneous biopsy for tissue diagnosis. Underlying associated diffuse bilateral enhancing dural thickening which may suggest meningitis versus carcinomatosis. Intracranial hypertension is in the differential diagnostic considerations. No acute infarct, intracranial hemorrhage or brain mass lesion. <>GOALS OF CARE<>PALLIATIVE INTENT    History of Present Illness:   Татьяна Nicholas. is a pleasant 76 y.o. male who was admitted on 23 with increased confusion. Wife reports got home and did well overnight . Then last night had episode of a 'blank stare\" and verbally not responding. Step-son shares pt could not hold arms up on his own, speech was garbled and at 1 point did \"T-citlali \" position with arms. After arriving at hospital pt returned to baseline. Today pt does not recall event . Able to correctly state name and  ; states yr as \"8295\". Reports pain as improving. Denies SOB or HA. Recent hosp admission with dx of metastatic cancer to bone of unknown primary. Interval History:     Pt denies any pain at present. Reflecting on the good life he has had with travel and friends. Acted surprised about disease being in his bones. Correctly able to state name,   unable to state month or yr; \"\"     Son, Bobby Preston at bedside. Reminded his dad that they were just taking about his disease being in his bones.      Past Medical History:   Diagnosis Date    Diverticulitis     Gastrointestinal disorder     History of vascular access device 2017    [facility] VAT -  5FR triple Lumen Power PICC R Basilic  39 cm    Spinal stenosis        Past Surgical History:   Procedure Laterality Date    FLEXIBLE SIGMOIDOSCOPY N/A 2017    SIGMOIDOSCOPY FLEXIBLE performed by Sonia Calderón MD at [facility] ENDOSCOPY    IR INSERT NON TUNL CVC OVER 5 YRS  2021       Social History     Socioeconomic History    Marital status:      Spouse name: Not on file    Number of children: Not on file    Years of education: Not on file    Highest education level: Not on file   Occupational History    Not on file   Tobacco Use    Smoking status: Former     Packs/day: 1.00     Years: 50.00     Pack years: 50.00     Types: Cigarettes    Smokeless tobacco: Current   Substance and Sexual Activity    Alcohol use: Yes     Alcohol/week: 0.8 standard drinks     Types: 1 Cans of beer per week    Drug use: No    Sexual activity: Not on file   Other Topics Concern    Not on file   Social History Narrative    Not on file     Social Determinants of Health     Financial Resource Strain: Not on file   Food Insecurity: Not on file   Transportation Needs: Not on file   Physical Activity: Not on file   Stress: Not on file   Social Connections: Not on file   Intimate Partner Violence: Not on file   Housing Stability: Not on file       No family history on file.     Current Facility-Administered Medications   Medication Dose Route Frequency    famotidine (PEPCID) tablet 20 mg  20 mg Oral BID    gabapentin (NEURONTIN) capsule 600 mg  600 mg Oral TID    albuterol-ipratropium (DUO-NEB) 2.5 MG-0.5 MG/3 ML  3 mL Nebulization Q4H PRN    lidocaine 4 % patch 1 Patch  1 Patch TransDERmal Q24H    mirtazapine (REMERON) tablet 15 mg  15 mg Oral QHS    oxyCODONE IR (ROXICODONE) tablet 10 mg  10 mg Oral Q4H PRN    polyethylene glycol (MIRALAX) packet 17 g  17 g Oral DAILY PRN    psyllium husk-aspartame (METAMUCIL FIBER) packet 1 Packet  1 Packet Oral BID    senna-docusate (PERICOLACE) 8.6-50 mg per tablet 2 Tablet  2 Tablet Oral DAILY    valsartan (DIOVAN) tablet 80 mg  80 mg Oral DAILY    sodium chloride (NS) flush 5-40 mL  5-40 mL IntraVENous Q8H    sodium chloride (NS) flush 5-40 mL  5-40 mL IntraVENous PRN    acetaminophen (TYLENOL) tablet 650 mg  650 mg Oral Q6H PRN    Or    acetaminophen (TYLENOL) suppository 650 mg  650 mg Rectal Q6H PRN    polyethylene glycol (MIRALAX) packet 17 g  17 g Oral DAILY PRN    ondansetron (ZOFRAN ODT) tablet 4 mg  4 mg Oral Q8H PRN    Or    ondansetron (ZOFRAN) injection 4 mg  4 mg IntraVENous Q6H PRN    enoxaparin (LOVENOX) injection 40 mg  40 mg SubCUTAneous DAILY    levETIRAcetam (KEPPRA) tablet 1,000 mg  1,000 mg Oral BID    aspirin chewable tablet 81 mg  81 mg Oral DAILY    acetaminophen (TYLENOL) tablet 1,000 mg  1,000 mg Oral TID       No Known Allergies     Review of Systems: A complete review of systems was obtained, reviewed. Pertinent findings reviewed above. Physical Exam:   Visit Vitals  BP (!) 157/73 (BP 1 Location: Left lower arm, BP Patient Position: At rest) Comment: notified primary rn   Pulse 77   Temp 98.1 °F (36.7 °C)   Resp 18   Ht 5' 10\" (1.778 m)   Wt 181 lb 7 oz (82.3 kg)   SpO2 93%   BMI 26.03 kg/m²     ECOG PS: 3  General: no distress  Respiratory: normal respiratory effort  CV: no peripheral edema  Skin: no rashes; no ecchymoses; no petechiae  Psych: alert, oriented to self and location; unable to state year or month. , normal mood/affect     Results:     Lab Results   Component Value Date/Time    WBC 9.0 01/24/2023 09:16 PM    HGB 14.7 01/24/2023 09:16 PM    HCT 45.3 01/24/2023 09:16 PM    PLATELET 055 19/65/2875 09:16 PM    MCV 88.3 01/24/2023 09:16 PM    ABS.  NEUTROPHILS 6.6 01/24/2023 09:16 PM    Hemoglobin (POC) 15.6 02/23/2016 05:38 PM    Hematocrit (POC) 46 02/23/2016 05:38 PM     Lab Results   Component Value Date/Time    Sodium 138 01/25/2023 03:55 AM    Potassium 3.9 01/25/2023 03:55 AM    Chloride 105 01/25/2023 03:55 AM    CO2 26 01/25/2023 03:55 AM    Glucose 109 (H) 01/25/2023 03:55 AM    BUN 21 (H) 01/25/2023 03:55 AM    Creatinine 1.04 01/25/2023 03:55 AM    GFR est AA >60 01/28/2021 06:16 PM    GFR est non-AA >60 01/28/2021 06:16 PM    Calcium 8.5 01/25/2023 03:55 AM    Sodium (POC) 136 02/23/2016 05:38 PM    Potassium (POC) 4.0 02/23/2016 05:38 PM    Chloride (POC) 103 02/23/2016 05:38 PM    Glucose (POC) 100 01/25/2023 08:26 PM    BUN (POC) 21 (H) 02/23/2016 05:38 PM Creatinine (POC) 0.9 02/23/2016 05:38 PM    Calcium, ionized (POC) 1.09 (L) 02/23/2016 05:38 PM     Lab Results   Component Value Date/Time    Bilirubin, total 0.4 01/24/2023 09:16 PM    ALT (SGPT) 51 01/24/2023 09:16 PM    Alk. phosphatase 166 (H) 01/24/2023 09:16 PM    Protein, total 8.0 01/24/2023 09:16 PM    Albumin 3.0 (L) 01/24/2023 09:16 PM    Globulin 5.0 (H) 01/24/2023 09:16 PM     Lab Results   Component Value Date/Time    Ferritin 1,418 (H) 01/22/2021 03:02 AM    Vitamin B12 1,602 (H) 01/10/2023 07:01 PM     (H) 01/10/2021 11:18 PM    C-Reactive protein 1.18 (H) 01/22/2021 03:02 AM    TSH 1.35 01/10/2023 07:01 PM    M-Suleiman Not Observed 01/11/2023 05:33 PM    Lipase 80 10/04/2019 02:08 PM       Lab Results   Component Value Date/Time    INR 1.1 01/24/2023 09:16 PM    aPTT 29.2 01/23/2021 04:49 AM    D-dimer 0.59 01/23/2021 04:49 AM    Fibrinogen 456 01/23/2021 04:49 AM      Lab Results   Component Value Date/Time    Prostate Specific Ag 5.2 (H) 01/11/2023 01:17 PM     (H) 01/10/2021 11:18 PM      1/26/23 MRI Brain  IMPRESSION  1. Interval development of multiple small foci of diffusion restriction in the  bilateral cerebellar hemispheres as well as within the left parietal lobe  consistent with small foci of acute ischemia likely reflecting an embolic  origin. 2 small foci of enhancement in the occipital lobe are present no  definite seen on the prior study. These may represent foci of ischemia as well  or possibly metastasis. 2.  Enhancing mass in the high right frontal bone is again noted with probable  intracranial extension and dural involvement. Thickened dura with enhancement is  again noted. 3.  Increased signal within the periaqueductal gray matter which is nonspecific  but can be seen with paraneoplastic encephalopathy or Wernicke's encephalopathy  amongst other etiologies. .     4.  Findings were relayed to the referring team at 9:50 PM on 1/26/2023    Test results above have been reviewed. Assessment/Recommendations:     AMS/ ? Seizure  Neurology following  Per neurology: EEG No epileptiform abnormalities seen  -initiated Keppra and Gabapentin  -1/95 Brain MRI: embolic stroke; recommending addition of anticoagulation ; cardiac work up  --ECHO pending     2. Rt frontal enhancing intraosseous lesion  --consulted Rad/Onc to eval ; reviewed brain MRI findings with Dr Katherine Rosario: holding on RT at this time       3. Cancer of Unknown Primary  -awaiting further pathology stains. Consistent with adenocarcinoma of unknown primary. Will want to request biomarker testing to see if immunotherapy is an option or if there is any druggable  mutation.   -concern about patient's current  performance status and ability to receive treatment. Continue to monitor.   -now with new findings on brain MRI and pt's continued decline in performance status and poor nutriton;  family requesting hospice info session . Order placed and reviewed with CM. 4. Cancer Related Pain  -1/19/23 reviewed with Rad/Onc: pt completed sim and palliative radiation to rt shoulder and rt hip . No plans for additional rad/onc tx at this time. 5. Hypercalcemia  Seen in previous hospitalization; received Zometa (1/11/23)  Ca 8.5 on admission  -continue to monitor [address] reviewed with Dr Rush Anderson. Patient and family decided on hospice. Family noted that patient was resting so we deferred patient evaluation today and held a brief family meeting letting them know that our cancer center has supportive services if needed.     NON-BILLING NOTE

## 2023-01-27 NOTE — PROGRESS NOTES
Occupational Therapy Note:  Chart reviewed and pt cleared for therapy by RN. Pt politely declined OT stating he was tired and wasn't feeling it today. Spoke with  and she stated pt and family just decided on hospice. Will follow up next week if pt still hospitalized. Pt ok to be up with family's assist for safety.   Mary Adhikari, OTR/L, CBIS

## 2023-01-27 NOTE — PROGRESS NOTES
Shaista Miles NP replied back with new order to hold am dose of Keppra and she will contact MD regarding MRI results. Nsg will continue to assess.

## 2023-01-27 NOTE — TELEPHONE ENCOUNTER
Called patient to advise/confirm upcoming appt with Dr. Elpidio Valladares on 1/30/23  at 2:30 at [facility]. Spoke with Lucía Roger and he cancelled the appt. The patient has been admitted back to the hospital. They will call and reschedule.

## 2023-01-27 NOTE — PROGRESS NOTES
Bedside and Verbal shift change report given to Geovanna Alberto RN (oncoming nurse) by Qiana Arnold RN (offgoing nurse). Report included the following information SBAR, Kardex, Recent Results, and Med Rec Status.

## 2023-01-27 NOTE — PROGRESS NOTES
Radiologist notified this writer regarding Head MRI results. Notified Kostas Escobedo NP and awaiting response. Nsg will continue to assess.

## 2023-01-27 NOTE — PROGRESS NOTES
[facility]urology Hospital Progress Note    Patient ID:  Francisco Mendoza  [id number]  76 y.o.  1945      Subjective:   History:  Francisco Maxwell is a 76 y.o. male who  has a past medical history of Diverticulitis, Gastrointestinal disorder, History of vascular access device (07/24/2017), and Spinal stenosis who was recently diagnosed with Metastatic adenocarcinoma of unknown origin ([facility] [address] (1/18/2023) POA: with mets to bone. Patient comes in for acute episode of confusion and unable to answer questions with blank stare lasting less than 30 secs. Unable to hol up his upper extremities. EMS called. Patient was started on Keppra 1000 mg BID. MRI brain repeated which showed embolic stroke. Currently on ASA 81. Feels okay today with just R shoulder pain and Iimitation         ROS:  Per HPI-  Otherwise the remainder of the review of system was negative      Social Hx:  Social History     Socioeconomic History    Marital status:    Tobacco Use    Smoking status: Former     Packs/day: 1.00     Years: 50.00     Pack years: 50.00     Types: Cigarettes    Smokeless tobacco: Current   Substance and Sexual Activity    Alcohol use: Yes     Alcohol/week: 0.8 standard drinks     Types: 1 Cans of beer per week    Drug use: No       Meds:  No current facility-administered medications on file prior to encounter. Current Outpatient Medications on File Prior to Encounter   Medication Sig Dispense Refill    ipratropium-albuteroL (COMBIVENT RESPIMAT)  mcg/actuation inhaler Take 1 Puff by inhalation every six (6) hours as needed for Wheezing, Shortness of Breath or Respiratory Distress. 1 Each 0    famotidine (PEPCID) 20 mg tablet Take 1 Tablet by mouth two (2) times a day. 60 Tablet 0    valsartan (DIOVAN) 80 mg tablet Take 1 Tablet by mouth daily. 30 Tablet 0    senna-docusate (PERICOLACE) 8.6-50 mg per tablet Take 2 Tablets by mouth daily for 30 days.  60 Tablet 0    polyethylene glycol (MIRALAX) 17 gram packet Take 1 Packet by mouth daily as needed for Constipation (give once per day if no BM in past 48 hours). 30 Packet 0    lidocaine 4 % patch Apply patch to back and R shoulder. Apply patch to the affected area for 12 hours a day and remove for 12 hours a day. 20 Patch 0    naloxone (Narcan) 4 mg/actuation nasal spray Use 1 spray intranasally, then discard. Repeat with new spray every 2 min as needed for opioid overdose symptoms, alternating nostrils. 2 Each 0    oxyCODONE IR (ROXICODONE) 10 mg tab immediate release tablet Take 1 Tablet by mouth every four (4) hours as needed for Pain for up to 14 days. Max Daily Amount: 60 mg. 45 Tablet 0    mirtazapine (REMERON) 15 mg tablet Take 1 Tablet by mouth nightly. 30 Tablet 0    acetaminophen (TYLENOL) 325 mg tablet Take 2 Tabs by mouth every six (6) hours as needed for Pain. 30 Tab 0    gabapentin (NEURONTIN) 600 mg tablet Take 300 mg by mouth three (3) times daily. psyllium (METAMUCIL) packet Take 1 Packet by mouth two (2) times a day. melatonin, rapid dissolve, 5 mg TbDi tablet Take 1 Tab by mouth nightly as needed for Other (insomnia). (Patient not taking: No sig reported) 30 Tab 0    dicyclomine (BENTYL) 20 mg tablet Take 20 mg by mouth every six (6) hours. Indications: irritable colon (Patient not taking: No sig reported)         Imaging:    CT Results (recent):  Results from Hospital Encounter encounter on 01/24/23    CT CODE NEURO PERF W CBF    Narrative  Clinical history: Code Stroke  INDICATION:   Code Stroke    COMPARISON:  1/24/2023  CONTRAST: 100ml Isovue 370  TECHNIQUE:  CT dose reduction was achieved through use of a standardized protocol tailored  for this examination and automatic exposure control for dose modulation. Following the uneventful administration of Isovue-370 contrast material, axial  CT angiography of the head and neck was performed. Coronal and sagittal  reconstructions were obtained.   3D MAXIMAL INTENSITY PROJECTION reconstructed imaging of the cranial vasculature  in both the coronal and sagittal plane was performed. CTA perfusion imaging was also obtained. Reconstructions were created with color  coding of axial time to peak, axial blood volume, axial blood flow, axial mean  transit time and axial T Max. The study was analyzed by the Kasey Limon. Algorithm  FINDINGS:  There is moderate to severe centrilobular emphysematous change. Aortic  atherosclerotic change. Right vertebral artery slightly larger than the left  vertebral artery. .  No large thyroid lesion. No acute cervical process. No  evidence of acute intracranial hemorrhage. . The paranasal sinuses are clear. Multilevel canal and foraminal stenosis of the cervical spine  CTA NECK:  There is conventional three vessel arch anatomy. Atherosclerotic changes the  origin of the ICAs on the right and on the left. .  There is  less than 20%stenosis in the right internal carotid artery utilizing  NASCET criteria. There is  0%stenosis in the left internal carotid artery utilizing NASCET  criteria. CTA HEAD:  The vertebral arteries are codominant. The basilar artery and its branches are  normal. M1 segments are patent. There are A1 segments bilaterally. A2 and A3  segments are patent. Symmetric arborization of M2 vessels. . There is no  flow-limiting intracranial stenosis. There is no aneurysm. There are no sizable  posterior communicating arteries. Lytic osseous lesion at the right frontal bone is permeative and mildly  expansile. CT PERFUSION: No evidence of perfusion mismatch. No evidence of reversible  ischemia. R CBF<30% :0  Tmax >6s: 0    Impression  Lytic/permeative osseous lesion at the right frontal bone is redemonstrated  measuring approximately 3 x 3 cm in size, representing metastatic or primary  osseous lesion like multiple myeloma/lymphoma. Mild chronic microvascular ischemic change and mild cerebral atrophy. There is no major vessel occlusion.   There is no hemodynamically significant stenosis, aneurysm or dissection  identified. Leslee Pena MRI Results (recent):  [facility]    MRI BRAIN W WO CONT    Narrative  EXAM:  MRI BRAIN W WO CONT    INDICATION:    eval for changes with new admission with ? neurologic changes    COMPARISON:  MRI performed on 1/11/2023. CONTRAST: 16 ml of ProHance    TECHNIQUE:  Multiplanar multisequence acquisition without and with contrast of the brain. FINDINGS:  The ventricles are stable in size. Multiple scattered subcortical deep white  matter T2 hyperintense foci are present. Increased signal is also present within  the pancreatic ductal gray matter which is somewhat increased from the prior  study There are multiple small scattered foci of diffusion restriction including  the left parietal lobe, bilateral occipital lobes and within the bilateral  cerebellar hemispheres. 2 small foci of enhancement are present in the occipital  lobes bilaterally. Thickened dura with enhancement is again noted. There is an  enhancing mass in the high right frontal bone similar to the prior study with  possible intracranial extension. Paranasal sinuses and orbits are unremarkable. Impression  1. Interval development of multiple small foci of diffusion restriction in the  bilateral cerebellar hemispheres as well as within the left parietal lobe  consistent with small foci of acute ischemia likely reflecting an embolic  origin. 2 small foci of enhancement in the occipital lobe are present no  definite seen on the prior study. These may represent foci of ischemia as well  or possibly metastasis. 2.  Enhancing mass in the high right frontal bone is again noted with probable  intracranial extension and dural involvement. Thickened dura with enhancement is  again noted.     3.  Increased signal within the periaqueductal gray matter which is nonspecific  but can be seen with paraneoplastic encephalopathy or Wernicke's encephalopathy  amongst other etiologies. .    4.  Findings were relayed to the referring team at 9:50 PM on 1/26/2023      [facility] (recent):  [facility]    IR INSERT NON TUNL CVC OVER 5 YRS    Narrative  INDICATION: Central venous access needed. TECHNIQUE: Patient was unable to consent for himself secondary to altered mental  status. The risks and benefits of the procedure were discussed with the  patient's wife by telephone. Verbal consent was obtained. Preliminary ultrasound  imaging of the right neck demonstrated a patent internal jugular vein. Maximal  sterile barrier technique (cap and mask and sterile gown and sterile gloves and  a large sterile drape and hand hygiene and cutaneous antisepsis) was utilized  for this procedure. 1% lidocaine was injected locally. A micropuncture needle  was advanced into the internal jugular vein under ultrasound guidance. A  micropuncture wire was then advanced to the superior vena cava. A dermatotomy  was made, and a micropuncture sheath was inserted. A 0.035 inch guidewire was  then advanced through the sheath. Dilatation of the tract was performed. A  triple lumen central venous catheter was then inserted over the wire. The wire  was then removed. The catheter was sutured to the skin and dressed  appropriately. The patient tolerated the procedure well. There were no immediate  complications. The catheter demonstrates appropriate blood flow. ESTIMATED BLOOD LOSS: Less than 5 ml    Impression  IMPRESSION: Successful right internal jugular central venous catheter  placement  as described above. The catheter is functioning appropriately. Subsequent chest  radiograph demonstrated satisfactory catheter positioning. The line is ready for  use. VAS/US Results (recent):  No results found for this or any previous visit.       Reviewed records in [facility] and [facility] tab today    Lab Review Admission on 01/24/2023   Component Date Value Ref Range Status    WBC 01/24/2023 9.0  4.1 - 11.1 K/uL Final    RBC 01/24/2023 5.13  4.10 - 5.70 M/uL Final    HGB 01/24/2023 14.7  12.1 - 17.0 g/dL Final    HCT 01/24/2023 45.3  36.6 - 50.3 % Final    MCV 01/24/2023 88.3  80.0 - 99.0 FL Final    MCH 01/24/2023 28.7  26.0 - 34.0 PG Final    MCHC 01/24/2023 32.5  30.0 - 36.5 g/dL Final    RDW 01/24/2023 14.1  11.5 - 14.5 % Final    PLATELET 72/86/1484 574  150 - 400 K/uL Final    MPV 01/24/2023 9.5  8.9 - 12.9 FL Final    NRBC 01/24/2023 0.0  0  WBC Final    ABSOLUTE NRBC 01/24/2023 0.00  0.00 - 0.01 K/uL Final    NEUTROPHILS 01/24/2023 73  32 - 75 % Final    LYMPHOCYTES 01/24/2023 15  12 - 49 % Final    MONOCYTES 01/24/2023 8  5 - 13 % Final    EOSINOPHILS 01/24/2023 2  0 - 7 % Final    BASOPHILS 01/24/2023 1  0 - 1 % Final    IMMATURE GRANULOCYTES 01/24/2023 1 (A)  0.0 - 0.5 % Final    ABS. NEUTROPHILS 01/24/2023 6.6  1.8 - 8.0 K/UL Final    ABS. LYMPHOCYTES 01/24/2023 1.4  0.8 - 3.5 K/UL Final    ABS. MONOCYTES 01/24/2023 0.8  0.0 - 1.0 K/UL Final    ABS. EOSINOPHILS 01/24/2023 0.1  0.0 - 0.4 K/UL Final    ABS. BASOPHILS 01/24/2023 0.1  0.0 - 0.1 K/UL Final    ABS. IMM. GRANS. 01/24/2023 0.1 (A)  0.00 - 0.04 K/UL Final    DF 01/24/2023 AUTOMATED    Final    Sodium 01/24/2023 136  136 - 145 mmol/L Final    Potassium 01/24/2023 4.0  3.5 - 5.1 mmol/L Final    Chloride 01/24/2023 102  97 - 108 mmol/L Final    CO2 01/24/2023 28  21 - 32 mmol/L Final    Anion gap 01/24/2023 6  5 - 15 mmol/L Final    Glucose 01/24/2023 109 (A)  65 - 100 mg/dL Final    BUN 01/24/2023 25 (A)  6 - 20 MG/DL Final    Creatinine 01/24/2023 1.52 (A)  0.70 - 1.30 MG/DL Final    BUN/Creatinine ratio 01/24/2023 16  12 - 20   Final    eGFR 01/24/2023 46 (A)  >60 ml/min/1.73m2 Final    Comment:      Pediatric calculator link: Nhung.at. org/professionals/kdoqi/gfr_calculatorped       These results are not intended for use in patients <25years of age. eGFR results are calculated without a race factor using  the 2021 CKD-EPI equation. Careful clinical correlation is recommended, particularly when comparing to results calculated using previous equations. The CKD-EPI equation is less accurate in patients with extremes of muscle mass, extra-renal metabolism of creatinine, excessive creatine ingestion, or following therapy that affects renal tubular secretion. Calcium 01/24/2023 9.1  8.5 - 10.1 MG/DL Final    Bilirubin, total 01/24/2023 0.4  0.2 - 1.0 MG/DL Final    ALT (SGPT) 01/24/2023 51  12 - 78 U/L Final    AST (SGOT) 01/24/2023 62 (A)  15 - 37 U/L Final    Alk. phosphatase 01/24/2023 166 (A)  45 - 117 U/L Final    Protein, total 01/24/2023 8.0  6.4 - 8.2 g/dL Final    Albumin 01/24/2023 3.0 (A)  3.5 - 5.0 g/dL Final    Globulin 01/24/2023 5.0 (A)  2.0 - 4.0 g/dL Final    A-G Ratio 01/24/2023 0.6 (A)  1.1 - 2.2   Final    SAMPLES BEING HELD 01/24/2023  1SST, 1GRN, 1BLU, 1RED   Final    COMMENT 01/24/2023 Add-on orders for these samples will be processed based on acceptable specimen integrity and analyte stability, which may vary by analyte. Final    INR 01/24/2023 1.1  0.9 - 1.1   Final    A single therapeutic range for Vit K antagonists may not be optimal for all indications - see June, 2008 issue of Chest, American College of Chest Physicians Evidence-Based Clinical Practice Guidelines, 8th Edition. Prothrombin time 01/24/2023 11.0  9.0 - 11.1 sec Final    Ammonia, plasma 01/25/2023 <10  <32 UMOL/L Final    Ammonia determinations are subject to marked lability. Upon standing, ammonia levels increase rapidly due to red cell metabolism. Concentrations may more than double in plasma if sample is stored at room temperature for 6 hours.     Sodium 01/25/2023 138  136 - 145 mmol/L Final    Potassium 01/25/2023 3.9  3.5 - 5.1 mmol/L Final    Chloride 01/25/2023 105  97 - 108 mmol/L Final    CO2 01/25/2023 26  21 - 32 mmol/L Final Anion gap 01/25/2023 7  5 - 15 mmol/L Final    Glucose 01/25/2023 109 (A)  65 - 100 mg/dL Final    BUN 01/25/2023 21 (A)  6 - 20 MG/DL Final    Creatinine 01/25/2023 1.04  0.70 - 1.30 MG/DL Final    BUN/Creatinine ratio 01/25/2023 20  12 - 20   Final    eGFR 01/25/2023 >60  >60 ml/min/1.73m2 Final    Comment:      Pediatric calculator link: [email or web address]. org/professionals/kdoqi/gfr_calculatorped       These results are not intended for use in patients <25years of age. eGFR results are calculated without a race factor using  the 2021 CKD-EPI equation. Careful clinical correlation is recommended, particularly when comparing to results calculated using previous equations. The CKD-EPI equation is less accurate in patients with extremes of muscle mass, extra-renal metabolism of creatinine, excessive creatine ingestion, or following therapy that affects renal tubular secretion. Calcium 01/25/2023 8.5  8.5 - 10.1 MG/DL Final    Glucose (POC) 01/25/2023 100  65 - 117 mg/dL Final    Comment: (NOTE)  The FDA has indicated that no capillary point of care blood glucose  monitoring systems are approved for use in \"critically ill\" patients,  however they have not defined this population. The College of  American Pathologists has recommended that these devices should not  be used in cases such as severe hypotension, dehydration, shock, and  hyperglycemic-hyperosmolar state, amongst others. Venous or arterial  collection is the recommended specimen for testing these patients. Performed by 01/25/2023 Maranda Hernandez   Final   No results displayed because visit has over 200 results.             Objective:       Exam:  Visit Vitals  BP (!) 157/73 (BP 1 Location: Left lower arm, BP Patient Position: At rest)   Pulse 77   Temp 98.1 °F (36.7 °C)   Resp 18   Ht 5' 10\" (1.778 m)   Wt 82.3 kg (181 lb 7 oz)   SpO2 93%   BMI 26.03 kg/m²     Gen: Awake, alert, follows commands  Appropriate appearance, normal speech. Oriented to all spheres. No visual field defect on confrontation exam.  Full eyes movement, with no nystagmus, no diplopia, no ptosis. Normal gag and swallow. All remaining cranial nerves were normal  Motor function: 5/5 in all extremities  R shoulder limitation  Sensory: intact to LT, PP and JPS  DTRs ++ in all extremities, (-) Babinski  Good FTN and HTS   Gait: Deferred    Assessment:     1. Seizure ([address]    2. Malignant neoplasm of brain, unspecified location ([address]    3. Metastatic adenocarcinoma of unknown origin ([address] [C79.9 (ICD-10-CM)]    4. Disorientation [R41.0 (ICD-10-CM)]    5. History of hypercalcemia [Z86.39 (ICD-10-CM)]    6. Cancer related pain [G89.3 (ICD-10-CM)]      Repeat MRI brain:  Interval development of multiple small foci of stroke  bilateral cerebellar hemispheres as well as within the left parietal lobe   likely reflecting an embolic origin. Small foci of enhancement in the occipital lobe are present no definite seen on the prior study. These may represent foci of ischemia as well or possibly metastasis. 2.  Enhancing mass in the high right frontal bone is again noted with probable  intracranial extension and dural involvement. Thickened dura with enhancement is  again noted. 3.  Increased signal within the periaqueductal gray matter which is nonspecific  but can be seen with paraneoplastic encephalopathy or Wernicke's encephalopathy  amongst other etiologies. Marichuy Stands CTA head and neck: There is no major vessel occlusion. There is no hemodynamically significant stenosis, aneurysm or dissection  identified.          EEG: This EEG recorded during a time where the patient is in apparent wakefulness drowsiness and sleep is abnormal secondary to diffuse slowing and disorganization of the background rhythms indicative of a  moderate degree of bihemispheric dysfunction as is commonly seen with an encephalopathy which may have contributions from toxic, metabolic, diffuse structural, and or pharmacologic effects and clinical correlation is recommended. These patterns may also be representative of excessive drowsiness and clinical correlation is recommended. No epileptiform abnormalities are seen. Plan:   1. I had a long discussion with patient and wife. Discussed diagnosis, prognosis, pathophysiology and available treatment. Reviewed test results. All questions were answered. 2. Discussed MRI brain and EEG results. Reviewed films  3. Continue Keppra 1000 mg BID. Also on Gabapentin 600 mg TID  4. Seizure precaution  5. Defer to oncology regarding further management of brain mass lesion and mets (I.e. chemo vs radiation)  6. Already on ASA 81 mg every day. Will need anticoagulation for embolic phenomenon (I.e. Eliquis) if no contraindication  7. Lipid panel and start on high intensity statin if LDL > 70  8. Echo  9. Recommend prolonged cardiac monitoring to look for paroxysmal afib  10. Stroke education  6. Advise not to drive as per [facility] policy for 6 months from last event of stroke and seizure event    Please call for questions    Dr Ronda Cedeño is taking over neurology service this afternoon. 50 mins of time spent, 50% of which was spent on counseling and coordination of care.       Deana Her MD  Diplomate, American Board of Psychiatry and Neurology  Diplomate, Neuromuscular Medicine  Diplomate, American Board of Electrodiagnostic Medicine

## 2023-01-27 NOTE — TELEPHONE ENCOUNTER
Inpatient palliative medicine team is involved with inpatient care.      Mami Monreal RN  Palliative Medicine

## 2023-01-27 NOTE — PROGRESS NOTES
Patient with Poor PO intake, family concerned about dehydration. Dr. Hazel Hwang notified and stated concern for/might overload patient- patient was on LR @ 125mL/hr, will reassess sasha AM per MD.      Bedside shift change report given to Paulina (oncoming nurse) by Hillary Mott RN (offgoing nurse). Report included the following information SBAR, Kardex, Intake/Output, and Recent Results.

## 2023-01-27 NOTE — PROGRESS NOTES
Problem: Pressure Injury - Risk of  Goal: *Prevention of pressure injury  Description: Document Adrien Scale and appropriate interventions in the flowsheet. Outcome: Progressing Towards Goal  Note: Pressure Injury Interventions: Activity Interventions: Assess need for specialty bed, Chair cushion, Increase time out of bed, Pressure redistribution bed/mattress(bed type), PT/OT evaluation    Mobility Interventions: Assess need for specialty bed, Chair cushion, Float heels, HOB 30 degrees or less, Pressure redistribution bed/mattress (bed type), PT/OT evaluation, Turn and reposition approx. every two hours(pillow and wedges)    Nutrition Interventions: Document food/fluid/supplement intake, Offer support with meals,snacks and hydration    Friction and Shear Interventions: Minimize layers, Transferring/repositioning devices                Problem: Falls - Risk of  Goal: *Absence of Falls  Description: Document Duyen Fall Risk and appropriate interventions in the flowsheet.   Outcome: Progressing Towards Goal  Note: Fall Risk Interventions:                                Problem: General Medical Care Plan  Goal: *Vital signs within specified parameters  Outcome: Progressing Towards Goal  Goal: *Labs within defined limits  Outcome: Progressing Towards Goal  Goal: *Absence of infection signs and symptoms  Outcome: Progressing Towards Goal  Goal: *Optimal pain control at patient's stated goal  Outcome: Progressing Towards Goal  Goal: *Skin integrity maintained  Outcome: Progressing Towards Goal  Goal: *Fluid volume balance  Outcome: Progressing Towards Goal  Goal: *Optimize nutritional status  Outcome: Progressing Towards Goal  Goal: *Anxiety reduced or absent  Outcome: Progressing Towards Goal  Goal: *Progressive mobility and function (eg: ADL's)  Outcome: Progressing Towards Goal

## 2023-01-27 NOTE — CONSULTS
Palliative Medicine Consult  [place]: [phone] ([phone])    Patient Name: Fabiola Santiago. YOB: 1945    Date of Initial Consult: 1/25/2023  Date of follow-up: 01/27/23  Reason for Consult: Care Decisions  Requesting Provider: Laura Abreu NP   Primary Care Physician: Other, MD Ashley     SUMMARY:   Fabiola Orta is a 76 y.o. male with a past history of spinal stenosis and lumbar radiculopathy and recently diagnosed adenocarcinoma of unknown origin (diagnosed recent hospitalization earlier this month) who was admitted on 1/24/2023 from home with concern for acute confusion and staring spell lasting a few minutes that was concerning for possible seizure(?). Has been seen by Neurology. EEG consistent with encephalopathy. Started on [address]. Mental status improved and patient more conversant after admission. Recent admission to [facility] from for confusion and subsequently found to have widespread bony metastatic disease. He had been having right shoulder pain for >1 month being evaluated by outside physician. Became confused previous 2 days prior to that admission leading family to bring him to [facility] for evaluation. Adenocarcinoma diagnosis made and he received palliative radiation x1 fraction to right shoulder and thigh for bone pain. Plan was to follow up outpatient with Oncology but seizure-like episode led to current presentation and hospitalization. Current medical issues leading to Palliative Medicine involvement include: Care Decisions. Social Hx:  Lives at home with wife, Darwin Everett,  >20 years. 3 children, Hank and Lydia Butts with Yazmin Chery (Marya's son). Able to manage own ADLs at home per family with occasional assistance from wife. Generally well functioning. Retired manufacturing representative. Described as a \"philosopher and jokester. \"    Interval Hx:   Pain and appetite stable this morning.     MRI Brain yesterday revealed embolic stroke and re demonstrated lesions to right frontal bone with intracranial extension   PALLIATIVE DIAGNOSES:   Adenocarcinoma, unk primary with diffuse bone/hepatic mets  Cancer pain  Poor appetite  Constipation  Encephalopathy  Goals of Care  Palliative Care Encounter     PLAN:   Goals  Met with patient and step-son Annalee Sanchez at bedside. They discussed MRI finding with previous providers, recommendation for stroke workup and outpatient cardiac monitoring. Previous stated wishes were to defer hospice and try home PT with possible chemo in future. However, with increasing health conditions (metastatic malignancy, weakness, and now with stroke), leaning much more in favor of hospice and just want to be home. Hospice consult placed by Oncology for information session - this is very appropriate. Cancer Pain  Mostly right shoulder and leg, not significant at rest but worse with movement. Continue oxycodone IR 10mg PO every 4 hours as needed, tolerating well. Would give dose ~1 hour prior to planned PT/OT sessions. Pain seems incidental at present so would not start long-acting yet as little to no pain at rest.  Continue Tylenol 1000mg PO every 8 hours  Continue gabapentin 600mg PO three times daily  Lidocaine patch  Encephalopathy  Suspect possibly paraneoplastic. Patient with poor insight into present illness despite repeat education this and last admission. Intermittently confused and perseverative. Would recommend all major medical decisions be made in conjunction with family.   Poor Appetite  Continue remeron 15mg at bedtime  Consult RD  Surrogate  Wife, Caro Burgos, is legal NOK  Code Status  Full Code   Communicated plan of care with: [facility] Team     GOALS OF CARE / TREATMENT PREFERENCES:     GOALS OF CARE:  Patient/Health Care Proxy Stated Goals:  (to be discussed)    TREATMENT PREFERENCES:   Code Status: Full Code    Patient and family's personal goals include: to be discussed      Advance Care Planning:  [x] The [facility] Interdisciplinary Team has updated the ACP Navigator with Health Care Decision Maker and Patient Capacity      Primary Decision Maker: Leila Lepe - [phone]    Secondary Decision Maker: Lizzy Ruiz - Daughter - [phone]    Secondary Decision Maker: Rohan Mcghee - [phone]  Advance Care Planning 1/26/2023   Patient's Healthcare Decision Maker is: Legal Next of Kin   Confirm Advance Directive None   Patient Would Like to Complete Advance Directive No   Does the patient have other document types -         Other:    As far as possible, the palliative care team has discussed with patient / health care proxy about goals of care / treatment preferences for patient. HISTORY:     History obtained from: medical chart, patient, son Hermes Shore: weakness, confusion    HPI/SUBJECTIVE:    The patient is:   [x] Verbal and participatory though limited by confusion  [] Non-participatory due to:      Recently discharged from [facility] on 1/23. Did okay first night home and worked with therapy who came out day of presentation, able to walk up and down the stairs a few times. Later that night had staring spell and seemed more confused, not able to speak, leading family to brig to ER. Pain has been stable. Appetite remains poor. No shortness of breath at rest.  No anxiety or nausea.     Clinical Pain Assessment (nonverbal scale for severity on nonverbal patients):   Clinical Pain Assessment  Severity: 0          Duration: for how long has pt been experiencing pain (e.g., 2 days, 1 month, years)  Frequency: how often pain is an issue (e.g., several times per day, once every few days, constant)     FUNCTIONAL ASSESSMENT:     Palliative Performance Scale (PPS):  PPS: 50       PSYCHOSOCIAL/SPIRITUAL SCREENING:     Palliative IDT has assessed this patient for cultural preferences / practices and a referral made as appropriate to needs (Cultural Services, Patient Advocacy, Ethics, etc.)    Any spiritual / Taoist concerns:  [] Yes /  [x] No   If \"Yes\" to discuss with pastoral care during IDT     Does caregiver feel burdened by caring for their loved one:   [] Yes /  [x] No /  [] No Caregiver Present/Available [] No Caregiver [] Pt Lives at [facility]  If \"Yes\" to discuss with social work during IDT    Anticipatory grief assessment:   [x] Normal  / [] Maladaptive     If \"Maladaptive\" to discuss with social work during IDT    ESAS Anxiety: Anxiety: 0    ESAS Depression:          REVIEW OF SYSTEMS:     Positive and pertinent negative findings in ROS are noted above in HPI. The following systems were [x] reviewed / [] unable to be reviewed as noted in HPI  Other findings are noted below. Systems: constitutional, ears/nose/mouth/throat, respiratory, gastrointestinal, genitourinary, musculoskeletal, integumentary, neurologic, psychiatric, endocrine. Positive findings noted below. Modified ESAS Completed by: provider           Pain: 0   Anxiety: 0 Nausea: 0   Anorexia: 5 Dyspnea: 0     Constipation: No     Stool Occurrence(s): 0        PHYSICAL EXAM:     From RN flowsheet:  Wt Readings from Last 3 Encounters:   01/26/23 181 lb 7 oz (82.3 kg)   01/11/23 192 lb 14.4 oz (87.5 kg)   03/02/21 190 lb (86.2 kg)     Blood pressure (!) 157/73, pulse 77, temperature 98.1 °F (36.7 °C), resp. rate 18, height 5' 10\" (1.778 m), weight 181 lb 7 oz (82.3 kg), SpO2 93 %.     Pain Scale 1: Numeric (0 - 10)  Pain Intensity 1: 6  Pain Onset 1: chronic  Pain Location 1: Back  Pain Orientation 1: Right, Left  Pain Description 1: Sharp  Pain Intervention(s) 1: Medication (see MAR)  Last bowel movement, if known:     Constitutional: lying in bed, no distress  Eyes: pupils equal, anicteric  ENMT: no nasal discharge, moist mucous membranes  Cardiovascular: regular rate, distal pulses intact  Respiratory: breathing not labored, symmetric chest rise  Gastrointestinal: soft, non-tender, +bowel sounds  Musculoskeletal: no deformity, no tenderness to palpation  Skin: warm, dry  Neurologic: awake and alert, conversant today, perseverating, tangential thinking, following commands, moving all extremities  Psychiatric: full affect, no hallucinations, no agitation     HISTORY:     Active Problems:    Acute metabolic encephalopathy (3/95/3275)      Bony metastasis ([address] (1/11/2023)      Centrilobular emphysema ([address] (1/14/2023)      Metastatic adenocarcinoma of unknown origin [facility] (1/18/2023)      Abnormal brain MRI (1/25/2023)      Decrease in appetite (1/25/2023)      Cancer associated pain (1/25/2023)      Palliative care encounter (1/25/2023)    Past Medical History:   Diagnosis Date    Diverticulitis     Gastrointestinal disorder     History of vascular access device 07/24/2017    [facility] VAT -  5FR triple Lumen Power PICC R Basilic  39 cm    Spinal stenosis       Past Surgical History:   Procedure Laterality Date    FLEXIBLE SIGMOIDOSCOPY N/A 7/27/2017    SIGMOIDOSCOPY FLEXIBLE performed by Lesley Abdalla MD at [facility] ENDOSCOPY    IR INSERT NON TUNL CVC OVER 5 YRS  1/13/2021      No family history on file. History reviewed, no pertinent family history. Social History     Tobacco Use    Smoking status: Former     Packs/day: 1.00     Years: 50.00     Pack years: 50.00     Types: Cigarettes    Smokeless tobacco: Current   Substance Use Topics    Alcohol use:  Yes     Alcohol/week: 0.8 standard drinks     Types: 1 Cans of beer per week     No Known Allergies   Current Facility-Administered Medications   Medication Dose Route Frequency    famotidine (PEPCID) tablet 20 mg  20 mg Oral BID    gabapentin (NEURONTIN) capsule 600 mg  600 mg Oral TID    albuterol-ipratropium (DUO-NEB) 2.5 MG-0.5 MG/3 ML  3 mL Nebulization Q4H PRN    lidocaine 4 % patch 1 Patch  1 Patch TransDERmal Q24H    mirtazapine (REMERON) tablet 15 mg  15 mg Oral QHS    oxyCODONE IR (ROXICODONE) tablet 10 mg  10 mg Oral Q4H PRN polyethylene glycol (MIRALAX) packet 17 g  17 g Oral DAILY PRN    psyllium husk-aspartame (METAMUCIL FIBER) packet 1 Packet  1 Packet Oral BID    senna-docusate (PERICOLACE) 8.6-50 mg per tablet 2 Tablet  2 Tablet Oral DAILY    valsartan (DIOVAN) tablet 80 mg  80 mg Oral DAILY    sodium chloride (NS) flush 5-40 mL  5-40 mL IntraVENous Q8H    sodium chloride (NS) flush 5-40 mL  5-40 mL IntraVENous PRN    acetaminophen (TYLENOL) tablet 650 mg  650 mg Oral Q6H PRN    Or    acetaminophen (TYLENOL) suppository 650 mg  650 mg Rectal Q6H PRN    polyethylene glycol (MIRALAX) packet 17 g  17 g Oral DAILY PRN    ondansetron (ZOFRAN ODT) tablet 4 mg  4 mg Oral Q8H PRN    Or    ondansetron (ZOFRAN) injection 4 mg  4 mg IntraVENous Q6H PRN    enoxaparin (LOVENOX) injection 40 mg  40 mg SubCUTAneous DAILY    levETIRAcetam (KEPPRA) tablet 1,000 mg  1,000 mg Oral BID    aspirin chewable tablet 81 mg  81 mg Oral DAILY    acetaminophen (TYLENOL) tablet 1,000 mg  1,000 mg Oral TID          LAB AND IMAGING FINDINGS:     Lab Results   Component Value Date/Time    WBC 9.0 01/24/2023 09:16 PM    HGB 14.7 01/24/2023 09:16 PM    PLATELET 872 17/64/4904 09:16 PM     Lab Results   Component Value Date/Time    Sodium 138 01/25/2023 03:55 AM    Potassium 3.9 01/25/2023 03:55 AM    Chloride 105 01/25/2023 03:55 AM    CO2 26 01/25/2023 03:55 AM    BUN 21 (H) 01/25/2023 03:55 AM    Creatinine 1.04 01/25/2023 03:55 AM    Calcium 8.5 01/25/2023 03:55 AM    Magnesium 1.9 01/21/2023 02:52 AM    Phosphorus 2.4 (L) 01/23/2023 03:13 AM      Lab Results   Component Value Date/Time    Alk.  phosphatase 166 (H) 01/24/2023 09:16 PM    Protein, total 8.0 01/24/2023 09:16 PM    Albumin 3.0 (L) 01/24/2023 09:16 PM    Globulin 5.0 (H) 01/24/2023 09:16 PM     Lab Results   Component Value Date/Time    INR 1.1 01/24/2023 09:16 PM    Prothrombin time 11.0 01/24/2023 09:16 PM    aPTT 29.2 01/23/2021 04:49 AM      Lab Results   Component Value Date/Time    Ferritin 1,418 (H) 01/22/2021 03:02 AM      Lab Results   Component Value Date/Time    pH 7.44 01/11/2021 05:09 AM    PCO2 31 (L) 01/11/2021 05:09 AM    PO2 69 (L) 01/11/2021 05:09 AM     No components found for: GLPOC   No results found for: CPK, CKMB             Total time: 40 minutes  Counseling / coordination time, spent as noted above: 25 minutes  > 50% counseling / coordination?: yes    Prolonged service was provided for  []30 min   []75 min in face to face time in the presence of the patient, spent as noted above. Time Start:   Time End:   Note: this can only be billed with 14281 (initial) or 56981 (follow up). If multiple start / stop times, list each separately.

## 2023-01-28 ENCOUNTER — APPOINTMENT (OUTPATIENT)
Dept: NON INVASIVE DIAGNOSTICS | Age: 78
DRG: 064 | End: 2023-01-28
Attending: PSYCHIATRY & NEUROLOGY
Payer: MEDICARE

## 2023-01-28 LAB
CHOLEST SERPL-MCNC: 140 MG/DL
ECHO AV AREA PEAK VELOCITY: 3 CM2
ECHO AV AREA PEAK VELOCITY: 3.3 CM2
ECHO AV AREA VTI: 2.5 CM2
ECHO AV AREA/BSA VTI: 1.3 CM2/M2
ECHO AV MEAN GRADIENT: 4 MMHG
ECHO AV MEAN VELOCITY: 1 M/S
ECHO AV PEAK GRADIENT: 7 MMHG
ECHO AV PEAK GRADIENT: 8 MMHG
ECHO AV PEAK VELOCITY: 1.3 M/S
ECHO AV PEAK VELOCITY: 1.4 M/S
ECHO AV VTI: 27.4 CM
ECHO LA DIAMETER INDEX: 1.35 CM/M2
ECHO LA DIAMETER: 2.7 CM
ECHO LV FRACTIONAL SHORTENING: 41 % (ref 28–44)
ECHO LV INTERNAL DIMENSION DIASTOLE INDEX: 1.95 CM/M2
ECHO LV INTERNAL DIMENSION DIASTOLIC: 3.9 CM (ref 4.2–5.9)
ECHO LV INTERNAL DIMENSION SYSTOLIC INDEX: 1.15 CM/M2
ECHO LV INTERNAL DIMENSION SYSTOLIC: 2.3 CM
ECHO LV IVSD: 1 CM (ref 0.6–1)
ECHO LV MASS 2D: 122.1 G (ref 88–224)
ECHO LV MASS INDEX 2D: 61.1 G/M2 (ref 49–115)
ECHO LV POSTERIOR WALL DIASTOLIC: 1 CM (ref 0.6–1)
ECHO LV RELATIVE WALL THICKNESS RATIO: 0.51
ECHO LVOT AREA: 2.8 CM2
ECHO LVOT AV VTI INDEX: 0.88
ECHO LVOT DIAM: 1.9 CM
ECHO LVOT MEAN GRADIENT: 4 MMHG
ECHO LVOT PEAK GRADIENT: 9 MMHG
ECHO LVOT PEAK VELOCITY: 1.5 M/S
ECHO LVOT STROKE VOLUME INDEX: 34 ML/M2
ECHO LVOT SV: 68 ML
ECHO LVOT VTI: 24 CM
HDLC SERPL-MCNC: 40 MG/DL
HDLC SERPL: 3.5 (ref 0–5)
LDLC SERPL CALC-MCNC: 58.6 MG/DL (ref 0–100)
TRIGL SERPL-MCNC: 207 MG/DL (ref ?–150)
VLDLC SERPL CALC-MCNC: 41.4 MG/DL

## 2023-01-28 PROCEDURE — 74011000250 HC RX REV CODE- 250: Performed by: INTERNAL MEDICINE

## 2023-01-28 PROCEDURE — 94761 N-INVAS EAR/PLS OXIMETRY MLT: CPT

## 2023-01-28 PROCEDURE — 74011250637 HC RX REV CODE- 250/637: Performed by: INTERNAL MEDICINE

## 2023-01-28 PROCEDURE — 93308 TTE F-UP OR LMTD: CPT

## 2023-01-28 PROCEDURE — 93325 DOPPLER ECHO COLOR FLOW MAPG: CPT | Performed by: SPECIALIST

## 2023-01-28 PROCEDURE — 93321 DOPPLER ECHO F-UP/LMTD STD: CPT | Performed by: SPECIALIST

## 2023-01-28 PROCEDURE — 36415 COLL VENOUS BLD VENIPUNCTURE: CPT

## 2023-01-28 PROCEDURE — 74011250637 HC RX REV CODE- 250/637: Performed by: STUDENT IN AN ORGANIZED HEALTH CARE EDUCATION/TRAINING PROGRAM

## 2023-01-28 PROCEDURE — 80061 LIPID PANEL: CPT

## 2023-01-28 PROCEDURE — 74011250636 HC RX REV CODE- 250/636: Performed by: INTERNAL MEDICINE

## 2023-01-28 PROCEDURE — 65270000029 HC RM PRIVATE

## 2023-01-28 PROCEDURE — 93308 TTE F-UP OR LMTD: CPT | Performed by: SPECIALIST

## 2023-01-28 RX ORDER — MORPHINE SULFATE 2 MG/ML
2 INJECTION, SOLUTION INTRAMUSCULAR; INTRAVENOUS
Status: DISCONTINUED | OUTPATIENT
Start: 2023-01-28 | End: 2023-01-29 | Stop reason: HOSPADM

## 2023-01-28 RX ADMIN — ACETAMINOPHEN 1000 MG: 500 TABLET ORAL at 22:11

## 2023-01-28 RX ADMIN — PSYLLIUM HUSK 1 PACKET: 3.4 POWDER ORAL at 08:30

## 2023-01-28 RX ADMIN — SODIUM CHLORIDE, PRESERVATIVE FREE 5 ML: 5 INJECTION INTRAVENOUS at 05:25

## 2023-01-28 RX ADMIN — PSYLLIUM HUSK 1 PACKET: 3.4 POWDER ORAL at 18:56

## 2023-01-28 RX ADMIN — ACETAMINOPHEN 1000 MG: 500 TABLET ORAL at 18:57

## 2023-01-28 RX ADMIN — LEVETIRACETAM 1000 MG: 500 TABLET, FILM COATED ORAL at 18:57

## 2023-01-28 RX ADMIN — MORPHINE SULFATE 2 MG: 2 INJECTION, SOLUTION INTRAMUSCULAR; INTRAVENOUS at 16:23

## 2023-01-28 RX ADMIN — ASPIRIN 81 MG: 81 TABLET, CHEWABLE ORAL at 08:31

## 2023-01-28 RX ADMIN — FAMOTIDINE 20 MG: 20 TABLET, FILM COATED ORAL at 09:00

## 2023-01-28 RX ADMIN — ENOXAPARIN SODIUM 40 MG: 100 INJECTION SUBCUTANEOUS at 08:30

## 2023-01-28 RX ADMIN — OXYCODONE HYDROCHLORIDE 10 MG: 5 TABLET ORAL at 18:57

## 2023-01-28 RX ADMIN — OXYCODONE HYDROCHLORIDE 10 MG: 5 TABLET ORAL at 07:04

## 2023-01-28 RX ADMIN — FAMOTIDINE 20 MG: 20 TABLET, FILM COATED ORAL at 18:57

## 2023-01-28 RX ADMIN — SODIUM CHLORIDE, PRESERVATIVE FREE 5 ML: 5 INJECTION INTRAVENOUS at 22:15

## 2023-01-28 RX ADMIN — SENNOSIDES AND DOCUSATE SODIUM 2 TABLET: 50; 8.6 TABLET ORAL at 08:31

## 2023-01-28 RX ADMIN — ACETAMINOPHEN 1000 MG: 500 TABLET ORAL at 08:31

## 2023-01-28 RX ADMIN — LEVETIRACETAM 1000 MG: 500 TABLET, FILM COATED ORAL at 08:31

## 2023-01-28 RX ADMIN — MIRTAZAPINE 15 MG: 15 TABLET, FILM COATED ORAL at 22:11

## 2023-01-28 RX ADMIN — GABAPENTIN 600 MG: 300 CAPSULE ORAL at 22:11

## 2023-01-28 RX ADMIN — GABAPENTIN 600 MG: 300 CAPSULE ORAL at 18:57

## 2023-01-28 RX ADMIN — GABAPENTIN 600 MG: 300 CAPSULE ORAL at 08:31

## 2023-01-28 RX ADMIN — VALSARTAN 80 MG: 80 TABLET ORAL at 08:31

## 2023-01-28 RX ADMIN — MORPHINE SULFATE 2 MG: 2 INJECTION, SOLUTION INTRAMUSCULAR; INTRAVENOUS at 11:13

## 2023-01-28 NOTE — PROGRESS NOTES
4:31 PM    Transport confirmed for 2:00 PM with [facility] on 1/29/23. Maple Blizzard    1/28/23  3:39 PM      CM spoke to MD, plan is for patient to discharge home with hospice on Sunday, 1/29/23. CM spoke to Warner with [facility] and confirmed admittance on 1/29/23. CM spoke to patients spouse and she would like patient transported via stretcher due to his pain level. CM requesting 2:00 PM on 1/29/23 for transport with [facility]. Sent request via [employer].  Maple Blizzard

## 2023-01-28 NOTE — PROGRESS NOTES
Eugene Kelly [facility]  [address] [facility], [address] [place] [address]  [phone]      Hospitalist  Progress Note      NAME:         Danielle Segura. :        1945  MRM:        [id number]    Date of service: 2023      Chief complaint: Confusion, weakness    Interval HPI: Patient admitted with confusion concerning for a seizure. He remains weak. Sleepy. Discussed with his spouse and nurse. Objective:    Vital Signs:    Visit Vitals  /71   Pulse 76   Temp 98.6 °F (37 °C)   Resp 18   Ht 5' 10\" (1.778 m)   Wt 82.3 kg (181 lb 7 oz)   SpO2 91%   BMI 26.03 kg/m²        Intake/Output Summary (Last 24 hours) at 2023 0830  Last data filed at 2023 1754  Gross per 24 hour   Intake 270 ml   Output --   Net 270 ml          Physical Examination:    General:   Weak and ill looking but not in distress   ENT:   no ottorrhea or rhinorrhea with dry mucous membranes  Pulm:  decreased but clear breath sounds without crackles or wheezes  Card:  no JVD or murmurs, has regular and normal S1, S2 without thrills, bruits   Abd:  Soft, non-tender, non-distended, normoactive bowel sounds   Musc:  No cyanosis, clubbing, atrophy or deformities. Neuro: Awake and alert. Confused and forgetful. Lapses to sleep.     Psych:  Has a fair insight to his illness     Current Facility-Administered Medications   Medication Dose Route Frequency    famotidine (PEPCID) tablet 20 mg  20 mg Oral BID    gabapentin (NEURONTIN) capsule 600 mg  600 mg Oral TID    albuterol-ipratropium (DUO-NEB) 2.5 MG-0.5 MG/3 ML  3 mL Nebulization Q4H PRN    lidocaine 4 % patch 1 Patch  1 Patch TransDERmal Q24H    mirtazapine (REMERON) tablet 15 mg  15 mg Oral QHS    oxyCODONE IR (ROXICODONE) tablet 10 mg  10 mg Oral Q4H PRN    polyethylene glycol (MIRALAX) packet 17 g  17 g Oral DAILY PRN    psyllium husk-aspartame (METAMUCIL FIBER) packet 1 Packet  1 Packet Oral BID    senna-docusate (PERICOLACE) 8.6-50 mg per tablet 2 Tablet  2 Tablet Oral DAILY    valsartan (DIOVAN) tablet 80 mg  80 mg Oral DAILY    sodium chloride (NS) flush 5-40 mL  5-40 mL IntraVENous Q8H    sodium chloride (NS) flush 5-40 mL  5-40 mL IntraVENous PRN    acetaminophen (TYLENOL) tablet 650 mg  650 mg Oral Q6H PRN    Or    acetaminophen (TYLENOL) suppository 650 mg  650 mg Rectal Q6H PRN    polyethylene glycol (MIRALAX) packet 17 g  17 g Oral DAILY PRN    ondansetron (ZOFRAN ODT) tablet 4 mg  4 mg Oral Q8H PRN    Or    ondansetron (ZOFRAN) injection 4 mg  4 mg IntraVENous Q6H PRN    enoxaparin (LOVENOX) injection 40 mg  40 mg SubCUTAneous DAILY    levETIRAcetam (KEPPRA) tablet 1,000 mg  1,000 mg Oral BID    aspirin chewable tablet 81 mg  81 mg Oral DAILY    acetaminophen (TYLENOL) tablet 1,000 mg  1,000 mg Oral TID        Laboratory data and review:    No results for input(s): WBC, HGB, HCT, PLT, HGBEXT, HCTEXT, PLTEXT, HGBEXT, HCTEXT, PLTEXT in the last 72 hours. No results for input(s): NA, K, CL, CO2, GLU, BUN, CREA, CA, MG, PHOS, ALB, TBIL, ALT, INR, INREXT, INREXT in the last 72 hours. No lab exists for component: SGOT    No components found for: [place]    Diagnostics: Imaging studies have been reviewed    Assessment and Plan:    Metastatic adenocarcinoma of unknown origin ([facility] (1/18/2023) POA: with mets to bone. During his recent admission, CT scan abdomen and pelvis done showed multifocal osseous metastatic disease in the thoracic spine, lumbar spine, pelvis, left ribs, and right scapula. Potential biopsy sites including anterior left iliac bone and superior right scapula. Multiple small hepatic lesions are suspicious for metastatic disease. He had a CT guided right iliac lesion biopsy and path showed metastatic adenocarcinoma with oncocytic features, unknown primary. He has been reviewed by rad-oncology who did XRT right shoulder and the right distal femur for pain control 1/19.  Seen and followed by oncology and palliative care following. Continue Oxycodone, Gabapentin. IV Morphine PRN. Plan is for home hospice with [facility] once arrangements have been made and completed    Acute CVA POA: new. Repeat MRi study done this admission showed an interval development of multiple small foci of diffusion restriction in the bilateral cerebellar hemispheres as well as within the left parietal lobe consistent with small foci of acute ischemia likely reflecting an embolic origin as well as small foci of enhancement in the occipital lobe. CTA head and neck showed no major vessel occlusion. Lipid panel pending. Echocardiogram pending. Seen by neurology who recommended anticoagulation. Given he is going to hospice with the brain lesions, there is risk for bleeding. Continue Asprin for now. No holter monitor    Acute encephalopathy / Confusion / hx dementia POA: likely due to the acute CVA seizure activity given recent MRi brain showed a parasagittal right frontal enhancing intraosseous lesion with intracranial extension measuring up to 3.1 x 3.1 cm, suspicious for aggressive lesion. CT scan head showed a possible left brainstem infarct. Seen by neurology. EEG did not show any seizure activity. Continue Keppra, Asprin. Will be discharged home with hospice. Centrilobular emphysema ([address] (1/14/2023) POA: not wheezing. Not hypoxic. Duoneb PRN    Hypertension benign POA: continue Diovan    I personally reviewed chart, notes, data and current medications in the medical record. I have personally examined and treated the patient at bedside during this period. To assist coordination of care and communication with nursing and staff, this note may be preliminary early in the day, but finalized by end of the day.                  Care Plan discussed with: Patient, Care Manager, and Nursing Staff    Discussed:  Care Plan and D/C Planning    Prophylaxis:  Enoxaparin     Anticipated Disposition: home with Hospice           ___________________________________________________    Attending Physician:   Kaela Jones MD

## 2023-01-28 NOTE — PROGRESS NOTES
Problem: Pressure Injury - Risk of  Goal: *Prevention of pressure injury  Description: Document Adrien Scale and appropriate interventions in the flowsheet. Outcome: Progressing Towards Goal  Note: Pressure Injury Interventions: Activity Interventions: Assess need for specialty bed, Increase time out of bed    Mobility Interventions: Assess need for specialty bed    Nutrition Interventions: Document food/fluid/supplement intake    Friction and Shear Interventions: Apply protective barrier, creams and emollients, Lift sheet, Minimize layers                Problem: Falls - Risk of  Goal: *Absence of Falls  Description: Document Mariella Don Fall Risk and appropriate interventions in the flowsheet.   Outcome: Progressing Towards Goal  Note: Fall Risk Interventions:

## 2023-01-28 NOTE — PROGRESS NOTES
Bedside and Verbal shift change report given to Tal Conley (oncoming nurse) by Popeye Martinez RN (offgoing nurse). Report included the following information SBAR, Kardex, Intake/Output, MAR, Recent Results, and Med Rec Status.

## 2023-01-28 NOTE — PROGRESS NOTES
Bedside shift change report given to Marilu (oncoming nurse) by Romario Hernandez (offgoing nurse). Report included the following information SBAR, Kardex, Intake/Output, MAR, and Recent Results.

## 2023-01-29 VITALS
TEMPERATURE: 98.7 F | OXYGEN SATURATION: 92 % | SYSTOLIC BLOOD PRESSURE: 128 MMHG | DIASTOLIC BLOOD PRESSURE: 74 MMHG | BODY MASS INDEX: 25.98 KG/M2 | HEIGHT: 70 IN | HEART RATE: 95 BPM | RESPIRATION RATE: 17 BRPM | WEIGHT: 181.44 LBS

## 2023-01-29 PROCEDURE — 74011250637 HC RX REV CODE- 250/637: Performed by: STUDENT IN AN ORGANIZED HEALTH CARE EDUCATION/TRAINING PROGRAM

## 2023-01-29 PROCEDURE — 74011250636 HC RX REV CODE- 250/636: Performed by: INTERNAL MEDICINE

## 2023-01-29 PROCEDURE — 74011250637 HC RX REV CODE- 250/637: Performed by: INTERNAL MEDICINE

## 2023-01-29 PROCEDURE — 74011000250 HC RX REV CODE- 250: Performed by: INTERNAL MEDICINE

## 2023-01-29 PROCEDURE — 94761 N-INVAS EAR/PLS OXIMETRY MLT: CPT

## 2023-01-29 RX ORDER — LEVETIRACETAM 1000 MG/1
1000 TABLET ORAL 2 TIMES DAILY
Qty: 60 TABLET | Refills: 0 | Status: SHIPPED | OUTPATIENT
Start: 2023-01-29

## 2023-01-29 RX ORDER — GUAIFENESIN 100 MG/5ML
81 LIQUID (ML) ORAL DAILY
Qty: 60 TABLET | Refills: 0 | Status: SHIPPED | OUTPATIENT
Start: 2023-01-30

## 2023-01-29 RX ADMIN — MORPHINE SULFATE 2 MG: 2 INJECTION, SOLUTION INTRAMUSCULAR; INTRAVENOUS at 02:51

## 2023-01-29 RX ADMIN — OXYCODONE HYDROCHLORIDE 10 MG: 5 TABLET ORAL at 05:03

## 2023-01-29 RX ADMIN — FAMOTIDINE 20 MG: 20 TABLET, FILM COATED ORAL at 08:21

## 2023-01-29 RX ADMIN — MORPHINE SULFATE 2 MG: 2 INJECTION, SOLUTION INTRAMUSCULAR; INTRAVENOUS at 08:21

## 2023-01-29 RX ADMIN — ACETAMINOPHEN 1000 MG: 500 TABLET ORAL at 08:21

## 2023-01-29 RX ADMIN — GABAPENTIN 600 MG: 300 CAPSULE ORAL at 08:21

## 2023-01-29 RX ADMIN — ASPIRIN 81 MG: 81 TABLET, CHEWABLE ORAL at 08:21

## 2023-01-29 RX ADMIN — ENOXAPARIN SODIUM 40 MG: 100 INJECTION SUBCUTANEOUS at 08:21

## 2023-01-29 RX ADMIN — PSYLLIUM HUSK 1 PACKET: 3.4 POWDER ORAL at 08:21

## 2023-01-29 RX ADMIN — LEVETIRACETAM 1000 MG: 500 TABLET, FILM COATED ORAL at 08:21

## 2023-01-29 RX ADMIN — SENNOSIDES AND DOCUSATE SODIUM 2 TABLET: 50; 8.6 TABLET ORAL at 08:21

## 2023-01-29 RX ADMIN — OXYCODONE HYDROCHLORIDE 10 MG: 5 TABLET ORAL at 10:42

## 2023-01-29 RX ADMIN — VALSARTAN 80 MG: 80 TABLET ORAL at 08:26

## 2023-01-29 RX ADMIN — SODIUM CHLORIDE, PRESERVATIVE FREE 5 ML: 5 INJECTION INTRAVENOUS at 05:04

## 2023-01-29 NOTE — PROGRESS NOTES
Bedside and Verbal shift change report given to HANNAH Michel (oncoming nurse) by Gretchen Baumgarten, RN (offgoing nurse). Report included the following information SBAR, Kardex, Intake/Output, MAR, Accordion, and Recent Results.

## 2023-01-29 NOTE — DISCHARGE INSTRUCTIONS
Patient Discharge Instructions    Leila Eagle / [id number] : 1945    Admitted 2023 Discharged: 2023       Discharge Medications: It is important that you take the medication exactly as they are prescribed. Keep your medication in the bottles provided by the pharmacist and keep a list of the medication names, dosages, and times to be taken in your wallet. Do not take other medications without consulting your doctor. Call your PCP for medication refills      What to do at Home    Take medications as prescribed and follow up with  [facility] . Call your PCP for refills of your medications. Recommended Diet: Regular Diet ADULT DIET Regular  ADULT ORAL NUTRITION SUPPLEMENT Lunch, Dinner;  Low Calorie/High Protein       Recommended Activity: Activity as tolerated           Bennie Obrien MD     2023

## 2023-01-29 NOTE — PROGRESS NOTES
10:25 AM  Updated nursing of time change for [facility]. Provided 2nd Im letter to family    Medicare pt has received, reviewed, and signed 2nd IM letter informing them of their right to appeal the discharge. Signed copy has been placed on pt bedside chart. [address] Manager    1/29/23  10:08 AM    CM spoke to patients family and they are requesting an earlier discharge time. CM called [facility] to change the time, was able to secure 11:00 AM. CM called and left a VM with the [facility] liaison and sent a message through [employer] to update on the dc time. [id number] [facility] [employer] Management Interventions  PCP Verified by CM: Yes  Mode of Transport at Discharge:  Other (see comment)  Transition of Care Consult (CM Consult): Discharge Planning  Discharge Durable Medical Equipment: No  Physical Therapy Consult: No  Occupational Therapy Consult: No  Speech Therapy Consult: No  Support Systems: Spouse/Significant Other  Confirm Follow Up Transport: Family  Discharge Location  Patient Expects to be Discharged to[de-identified] Home with home health

## 2023-01-29 NOTE — PROGRESS NOTES
Problem: Pressure Injury - Risk of  Goal: *Prevention of pressure injury  Description: Document Adrien Scale and appropriate interventions in the flowsheet. Outcome: Progressing Towards Goal  Note: Pressure Injury Interventions: Activity Interventions: Assess need for specialty bed, Increase time out of bed    Mobility Interventions: Assess need for specialty bed, Float heels    Nutrition Interventions: Document food/fluid/supplement intake    Friction and Shear Interventions: Apply protective barrier, creams and emollients, Lift sheet, Minimize layers                Problem: Falls - Risk of  Goal: *Absence of Falls  Description: Document Wadell Beer Fall Risk and appropriate interventions in the flowsheet.   Outcome: Progressing Towards Goal  Note: Fall Risk Interventions:

## 2023-01-29 NOTE — DISCHARGE SUMMARY
[facility] [address]  [address] [facility] 19  [phone] SUMMARY        Name:  Michael Burgos, 76 y.o.  :    1945  MRN:    [id number]  PCP:    Robles Frias MD    Code: Full Code    Date of Admission: 2023  Date of Discharge:   2023 10:11 AM  Disposition:  Home hospice  Condition:  improved and stable    Consultations:  IP CONSULT TO NEUROLOGY  IP CONSULT TO ONCOLOGY  IP CONSULT TO PALLIATIVE CARE - PROVIDER  IP CONSULT TO RADIATION ONCOLOGY  IP CONSULT TO NEUROLOGY    Reason for Admission:   Breakthrough seizure ([address] [G40.919]    Discharge Diagnoses: Active Problems:    Acute metabolic encephalopathy (3/13/3790)      Bony metastasis ([address] (2023)      Centrilobular emphysema ([address] (2023)      Metastatic adenocarcinoma of unknown origin ([address] (2023)      Abnormal brain MRI (2023)      Decrease in appetite (2023)      Cancer associated pain (2023)      Palliative care encounter (2023)          Excerpted HPI from H&P of Lawson Garrison MD:  The patient is a 77 y/o C M w/ PMH unknown primary neoplasm and is brought in due to acute episode of confusion which began at 7:30. His son was concerned for stroke like symptoms w/ acute onset of confusion and unresponsiveness to questions. He would not hold up his upper extremities. His son called EMS and on their evaluation he regained his strength. On my exam the history is from the son as the patient is confused. Brief Hospital Course:  Admitted treated started on keppra. Decision for home hospice made and will be DC today. Metastatic adenocarcinoma of unknown origin ([address] (2023) POA: with mets to bone. During his recent admission, CT scan abdomen and pelvis done showed multifocal osseous metastatic disease in the thoracic spine, lumbar spine, pelvis, left ribs, and right scapula.  Potential biopsy sites including anterior left iliac bone and superior right scapula. Multiple small hepatic lesions are suspicious for metastatic disease. He had a CT guided right iliac lesion biopsy and path showed metastatic adenocarcinoma with oncocytic features, unknown primary. He has been reviewed by rad-oncology who did XRT right shoulder and the right distal femur for pain control 1/19. Seen and followed by oncology and palliative care following. Continue Oxycodone, Gabapentin. IV Morphine PRN. Plan is for home hospice with [facility] once arrangements have been made and completed     Acute CVA POA: new. Repeat MRi study done this admission showed an interval development of multiple small foci of diffusion restriction in the bilateral cerebellar hemispheres as well as within the left parietal lobe consistent with small foci of acute ischemia likely reflecting an embolic origin as well as small foci of enhancement in the occipital lobe. CTA head and neck showed no major vessel occlusion. Lipid panel pending. Echocardiogram pending. Seen by neurology who recommended anticoagulation. Given he is going to hospice with the brain lesions, there is risk for bleeding. Continue Asprin for now. No holter monitor     Acute encephalopathy / Confusion / hx dementia POA: likely due to the acute CVA seizure activity given recent MRi brain showed a parasagittal right frontal enhancing intraosseous lesion with intracranial extension measuring up to 3.1 x 3.1 cm, suspicious for aggressive lesion. CT scan head showed a possible left brainstem infarct. Seen by neurology. EEG did not show any seizure activity. Continue Keppra, Asprin. Will be discharged home with hospice. Centrilobular emphysema ([address] (1/14/2023) POA: not wheezing. Not hypoxic.  Duoneb PRN     Hypertension benign POA: continue Diovan    Visit Vitals  /74 (BP 1 Location: Left upper arm, BP Patient Position: At rest;Sitting)   Pulse 95   Temp 98.7 °F (37.1 °C)   Resp 17   Ht 5' 10\" (1.778 m)   Wt 82.3 kg (181 lb 7 oz)   SpO2 92%   BMI 26.03 kg/m²       Physical Exam:  General: no distress  Head: Normocephalic, without obvious abnormality, atraumatic  Eyes: PERRL, EOMI, anicteric sclerae, and conjuntiva clear  ENT: lips, mucosa, and tongue normal  Neck: normal, supple, and no tenderness  Lungs: clear to auscultation with good breath sounds and normal respiratory effort  Heart: S1, S2 normal, regular rate, and regular rhythm  Abd: not distended, soft, nontender, BS present and normactive  Ext: no cyanosis and no edema  Skin: normal skin color, no rashes, and texture normal  Neuro:  alert, oriented x 3, no defects noted in general exam.  Psych: not anxious, cooperative, appropriate affect      Labs:  No results for input(s): WBC, HGB, HCT, PLT, HGBEXT, HCTEXT, PLTEXT in the last 72 hours. No results for input(s): NA, K, CL, CO2, GLU, BUN, CREA, CA, MG, PHOS, ALB, TBIL, TBILI, ALT in the last 72 hours. No lab exists for component: SGOT  No results found. Immunizations Given During Admission:           PATIENT INSTRUCTIONS       Activity: Activity as tolerated  Diet: Regular Diet ADULT DIET Regular  ADULT ORAL NUTRITION SUPPLEMENT Lunch, Dinner; Low Calorie/High Protein   Wound Care:  None  Follow-up(s): Follow-up Information       Follow up With Specialties Details Why Contact Info    Troy Negron MD Hematology and Oncology Go on 2/10/2023 appt at 11:30 am [address]  [phone]      [facility]  [facility] [place]            Current Discharge Medication List        START taking these medications    Details   levETIRAcetam 1,000 mg tablet Take 1 Tablet by mouth two (2) times a day. Qty: 60 Tablet, Refills: 0      aspirin 81 mg chewable tablet Take 1 Tablet by mouth daily.   Qty: 60 Tablet, Refills: 0           CONTINUE these medications which have NOT CHANGED    Details   ipratropium-albuteroL (COMBIVENT RESPIMAT)  mcg/actuation inhaler Take 1 Puff by inhalation every six (6) hours as needed for Wheezing, Shortness of Breath or Respiratory Distress. Qty: 1 Each, Refills: 0      famotidine (PEPCID) 20 mg tablet Take 1 Tablet by mouth two (2) times a day. Qty: 60 Tablet, Refills: 0      valsartan (DIOVAN) 80 mg tablet Take 1 Tablet by mouth daily. Qty: 30 Tablet, Refills: 0      senna-docusate (PERICOLACE) 8.6-50 mg per tablet Take 2 Tablets by mouth daily for 30 days. Qty: 60 Tablet, Refills: 0      polyethylene glycol (MIRALAX) 17 gram packet Take 1 Packet by mouth daily as needed for Constipation (give once per day if no BM in past 48 hours). Qty: 30 Packet, Refills: 0      lidocaine 4 % patch Apply patch to back and R shoulder. Apply patch to the affected area for 12 hours a day and remove for 12 hours a day. Qty: 20 Patch, Refills: 0      naloxone (Narcan) 4 mg/actuation nasal spray Use 1 spray intranasally, then discard. Repeat with new spray every 2 min as needed for opioid overdose symptoms, alternating nostrils. Qty: 2 Each, Refills: 0      oxyCODONE IR (ROXICODONE) 10 mg tab immediate release tablet Take 1 Tablet by mouth every four (4) hours as needed for Pain for up to 14 days. Max Daily Amount: 60 mg.  Qty: 45 Tablet, Refills: 0    Associated Diagnoses: Cancer related pain; Metastatic adenocarcinoma of unknown origin ([address] Bony metastasis (HCC)      mirtazapine (REMERON) 15 mg tablet Take 1 Tablet by mouth nightly. Qty: 30 Tablet, Refills: 0      acetaminophen (TYLENOL) 325 mg tablet Take 2 Tabs by mouth every six (6) hours as needed for Pain. Qty: 30 Tab, Refills: 0      gabapentin (NEURONTIN) 600 mg tablet Take 300 mg by mouth three (3) times daily. psyllium (METAMUCIL) packet Take 1 Packet by mouth two (2) times a day.            STOP taking these medications       melatonin, rapid dissolve, 5 mg TbDi tablet Comments:   Reason for Stopping:         dicyclomine (BENTYL) 20 mg tablet Comments:   Reason for Stopping:               Discharge Plan D/W:  Patient, Family at bedsude, and Care Manager          **Total time spent coordinating discharge (preparing & going over instructions, follow-ups, prescriptions, and documentation):  31 minutes                 _____________________________________    Signed:   Roopa Estes MD   Date of service:    1/29/2023         CC:  Ashley Choi MD   .

## 2023-01-29 NOTE — PROGRESS NOTES
Bedside and Verbal shift change report given to Tal Conley (oncoming nurse) by Rosanna Hebert RN (offgoing nurse). Report included the following information SBAR, Kardex, Intake/Output, MAR, Recent Results, and Med Rec Status.

## 2023-01-30 NOTE — PROGRESS NOTES
1045: RN at bedside to go over discharge instructions with patient and family. Patient's son and daughter at bedside. Time provided for questions. RN gave family patient belonging bag with hygiene products to take home for caring for patient. IV line removed. Patient will be going home in gown and changed there per family request. [facility] scheduled to arrive at 80.    1150: [facility] here. RN gave brief report. Patient to be transported in stretcher. Family took patient's belongings home.

## 2023-02-03 ENCOUNTER — TELEPHONE (OUTPATIENT)
Dept: ONCOLOGY | Age: 78
End: 2023-02-03

## 2023-02-03 NOTE — TELEPHONE ENCOUNTER
Patient's son left voicemail returning a call from earlier today. Please call him back when possible.      # [phone]

## 2023-02-03 NOTE — TELEPHONE ENCOUNTER
[address] at [place]  [phone]    02/03/23 2:48 PM - Called and spoke with the patient's son, Dmitriy Finnegan. Patient's ID verified x 2. Advised we are unable to complete Faye's paperwork here since their father was not seen in the clinic. Advised this nurse faxed the paperwork to [facility]. Dmitriy Finnegan voiced understanding and gratitude for the call. He states his father sadly passed away a few days ago. Offered our condolences. No further questions or concerns.

## 2023-02-03 NOTE — TELEPHONE ENCOUNTER
[address] at [facility]  [phone]    02/03/23 8:46 AM - Attempted to call the patient's spouse, Marya, at her home phone number listed. The line just kept ringing and it did not go to voicemail. [address] at [facility]  [phone]    02/03/23 8:47 AM - Attempted to call the patient's spouse, Marya, at her mobile number listed. There was no answer. This nurse was unable to leave a voicemail as her voicemail box was full.

## 2023-03-06 ENCOUNTER — TELEPHONE (OUTPATIENT)
Dept: ONCOLOGY | Age: 78
End: 2023-03-06

## 2025-04-21 NOTE — PROGRESS NOTES
Problem: Falls - Risk of  Goal: *Absence of Falls  Description: Document Emiliana Magallanes Fall Risk and appropriate interventions in the flowsheet. Outcome: Progressing Towards Goal  Note: Fall Risk Interventions:                                Problem: Patient Education: Go to Patient Education Activity  Goal: Patient/Family Education  Outcome: Progressing Towards Goal     Problem: Patient Education: Go to Patient Education Activity  Goal: Patient/Family Education  Outcome: Progressing Towards Goal     Problem: Pain  Goal: *Control of Pain  Outcome: Progressing Towards Goal     Problem: [address] (Adult/Pediatric)  Goal: *STG: Participates in treatment plan  Outcome: Progressing Towards Goal  Goal: *STG: Seeks staff when feelings of anxiety and fear arise  Outcome: Progressing Towards Goal  Goal: *STG: Attends activities and groups  Outcome: Progressing Towards Goal  Goal: *STG: Absence of lethality  Outcome: Progressing Towards Goal  Goal: *STG: Demonstrates ability to understand and use improved judgment in daily activities and relationships  Outcome: Progressing Towards Goal  Goal: *STG: Remains safe in hospital  Outcome: Progressing Towards Goal  Goal: *LTG: Obtains optimum level of functioning  Outcome: Progressing Towards Goal  Goal: *STG/LTG: Maintain structure for daily activities  Outcome: Progressing Towards Goal  Goal: *STG/LTG: Complies with medication therapy  Outcome: Progressing Towards Goal  Goal: Interventions  Outcome: Progressing Towards Goal     Problem: Pressure Injury - Risk of  Goal: *Prevention of pressure injury  Description: Document Adrien Scale and appropriate interventions in the flowsheet. Outcome: Progressing Towards Goal  Note: Pressure Injury Interventions:  Sensory Interventions: Assess changes in LOC, Minimize linen layers, Maintain/enhance activity level, Turn and reposition approx.  every two hours (pillows and wedges if needed)    Moisture Interventions: Absorbent Subjective   Patient ID: Charlie Fisher is a 62 y.o. male who presents for Annual Exam.  Subjective  Charlie Fisher is a 62 y.o. male and is here for a comprehensive physical exam. The patient reports no problems.    Do you take any herbs or supplements that were not prescribed by a doctor? no  Are you taking calcium supplements? no  Are you taking aspirin daily? no      Do you have pain that bothers you in your daily life? Yes, chronic back pain  [unfilled]     Objective  [unfilled]    Assessment/Plan  Healthy male exam. Doing well, back is improved     1. HLD - declines STATIN at this time  2. Patient Counseling:  --Nutrition: Stressed importance of moderation in sodium/caffeine intake, saturated fat and cholesterol, caloric balance, sufficient intake of fresh fruits, vegetables, fiber, calcium, iron, and 1 mg of folate supplement per day (for females capable of pregnancy).  --Discussed the issue of estrogen replacement, calcium supplement, and the daily use of baby aspirin.  --Exercise: Stressed the importance of regular exercise.   --Injury prevention: Discussed safety belts, safety helmets, smoke detector, smoking near bedding or upholstery.   --Dental health: Discussed importance of regular tooth brushing, flossing, and dental visits.  --Immunizations reviewed.  --Discussed benefits of screening colonoscopy.  --After hours service discussed with patient  3. Discussed the patient's BMI with him.  The BMI is in the acceptable range.  4. Follow up as needed for acute illness          Vitals:    04/21/25 1015   BP: 112/80   Pulse: 73   Temp: 35.7 °C (96.3 °F)   SpO2: 97%       Review of Systems   Constitutional:  Negative for activity change, fatigue, fever and unexpected weight change.   HENT: Negative.     Respiratory: Negative.  Negative for shortness of breath.    Cardiovascular: Negative.  Negative for chest pain.   Gastrointestinal: Negative.  Negative for abdominal pain.   Endocrine: Negative.     Musculoskeletal:  Positive for back pain.        Improved     Skin: Negative.    Allergic/Immunologic: Negative.    Neurological:  Negative for dizziness, weakness and headaches.   Psychiatric/Behavioral: Negative.         Objective   Physical Exam  Vitals and nursing note reviewed.   Constitutional:       Appearance: Normal appearance.   HENT:      Head: Normocephalic.      Mouth/Throat:      Mouth: Mucous membranes are moist.   Cardiovascular:      Rate and Rhythm: Normal rate and regular rhythm.      Pulses: Normal pulses.      Heart sounds: Normal heart sounds. No murmur heard.     No friction rub. No gallop.   Pulmonary:      Effort: Pulmonary effort is normal. No respiratory distress.      Breath sounds: Normal breath sounds. No wheezing.   Abdominal:      General: Bowel sounds are normal. There is no distension.      Palpations: Abdomen is soft.      Tenderness: There is no abdominal tenderness.   Musculoskeletal:         General: No deformity. Normal range of motion.   Skin:     General: Skin is warm and dry.      Capillary Refill: Capillary refill takes less than 2 seconds.   Neurological:      General: No focal deficit present.      Mental Status: He is alert and oriented to person, place, and time.   Psychiatric:         Mood and Affect: Mood normal.         Assessment/Plan   Problem List Items Addressed This Visit    None  Visit Diagnoses         Leukocytosis, unspecified type    -  Primary    Relevant Orders    CBC and Auto Differential      Screening for colorectal cancer        Relevant Orders    Colonoscopy Screening; High Risk Patient                 Thank you for coming in today, please call my office if you have any concerns or questions.     Claudio ZAPIEN, CNP   underpads, Minimize layers, Offer toileting Q_hr    Activity Interventions: PT/OT evaluation, Increase time out of bed    Mobility Interventions: PT/OT evaluation, Float heels, HOB 30 degrees or less, Turn and reposition approx.  every two hours(pillow and wedges)    Nutrition Interventions: Document food/fluid/supplement intake, Offer support with meals,snacks and hydration    Friction and Shear Interventions: HOB 30 degrees or less, Feet elevated on foot rest, Transferring/repositioning devices                Problem: Patient Education: Go to Patient Education Activity  Goal: Patient/Family Education  Outcome: Progressing Towards Goal     Problem: Nutrition Deficit  Goal: *Optimize nutritional status  Outcome: Progressing Towards Goal  used

## (undated) DEVICE — STERILE SLEEVE: Brand: CONVERTORS

## (undated) DEVICE — CYSTO/BLADDER IRRIGATION SET, REGULATING CLAMP

## (undated) DEVICE — BARRIER TISS ABSRB 5X6IN 1/PK -- DIRECT ORDER ONLY NON MEDLINE

## (undated) DEVICE — STERILE POLYISOPRENE POWDER-FREE SURGICAL GLOVES: Brand: PROTEXIS

## (undated) DEVICE — MEDI-VAC NON-CONDUCTIVE SUCTION TUBING: Brand: CARDINAL HEALTH

## (undated) DEVICE — 3000CC GUARDIAN II: Brand: GUARDIAN

## (undated) DEVICE — SUTURE PROL SZ 2-0 L36IN NONABSORBABLE BLU SH L26MM 1/2 CIR 8523H

## (undated) DEVICE — ACCESS PLATFORM FOR MINIMALLY INVASIVE SURGERY.: Brand: GELPORT® LAPAROSCOPIC  SYSTEM

## (undated) DEVICE — STERILE-Z MAYO STAND COVERS CLEAR POLYETHYLENE STERILE UNIVERSAL FIT 20 PER CASE: Brand: STERILE-Z

## (undated) DEVICE — Device

## (undated) DEVICE — NITINOL WIRE WITH HYDROPHILIC TIP: Brand: SENSOR

## (undated) DEVICE — GDWIRE UROL STR 150CM FLX TP -- BX/5 SENSOR

## (undated) DEVICE — DRAIN SURG W10MMXL20CM SIL FULL PERF HUBLESS FLAT RADPQ

## (undated) DEVICE — 3M™ MEDIPORE™ H SOFT CLOTH TAPE SHORT ROLL TAPE 6INCHES X 2 YARDS 16 ROLLS/CASE 2866S: Brand: 3M™ MEDIPORE™

## (undated) DEVICE — (D)PREP SKN CHLRAPRP APPL 26ML -- CONVERT TO ITEM 371833

## (undated) DEVICE — TRAY CATH OD16FR SIL URIN M STATLOK STBL DEV SURSTP

## (undated) DEVICE — SUTURE PERMAHAND SZ 0 L30IN NONABSORBABLE BLK SILK BRAID A306H

## (undated) DEVICE — SURGICAL PROCEDURE KIT GEN LAPAROSCOPY LF

## (undated) DEVICE — KENDALL SCD EXPRESS SLEEVES, KNEE LENGTH, MEDIUM: Brand: KENDALL SCD

## (undated) DEVICE — BAG COLLECTION FLD OR-TBL NS --

## (undated) DEVICE — OVERLAY MATRS H2IN FOAM CONVOLUTED STD DENS

## (undated) DEVICE — SOLUTION IV 1000ML 0.9% SOD CHL

## (undated) DEVICE — BLUNT TIP TROCAR: Brand: AUTO SUTURE

## (undated) DEVICE — ABDOMINAL LITHOTOMY PACK: Brand: CONVERTORS

## (undated) DEVICE — Z DISCONTINUED USE 2660780 STAPLER INT L28CM DIA33MM CLS STPL H10-2.5MM OPN LEG L5.5MM

## (undated) DEVICE — SUT ETHLN 3-0 18IN PS1 BLK --

## (undated) DEVICE — COLON CLOSING PACK: Brand: MEDLINE INDUSTRIES, INC.

## (undated) DEVICE — UNIVERSAL STAPLER: Brand: ENDO GIA ULTRA

## (undated) DEVICE — ROCKER SWITCH PENCIL BLADE ELECTRODE, HOLSTER: Brand: EDGE

## (undated) DEVICE — YANKAUER OPEN TIP, NO VENT: Brand: ARGYLE

## (undated) DEVICE — 4-PORT MANIFOLD: Brand: NEPTUNE 2

## (undated) DEVICE — 3M™ IOBAN™ 2 ANTIMICROBIAL INCISE DRAPE 6650EZ: Brand: IOBAN™ 2

## (undated) DEVICE — COVER LT HNDL BLU PLAS

## (undated) DEVICE — SUTURE VCRL SZ 2-0 L27IN ABSRB UD L26MM SH 1/2 CIR J417H

## (undated) DEVICE — KIT COLON W/ 1.1OZ LUB AND 2 END

## (undated) DEVICE — SPONGE LAP 18X18IN STRL -- 5/PK

## (undated) DEVICE — GOWN,SIRUS,FABRNF,XL,20/CS: Brand: MEDLINE

## (undated) DEVICE — TOWEL SURG W17XL27IN STD BLU COT NONFENESTRATED PREWASHED

## (undated) DEVICE — SOLUTION IRRIGATION H2O 0797305] ICU MEDICAL INC]

## (undated) DEVICE — BLADELESS OPTICAL TROCAR WITH FIXATION CANNULA: Brand: VERSAPORT

## (undated) DEVICE — DERMABOND SKIN ADH 0.7ML -- DERMABOND ADVANCED 12/BX

## (undated) DEVICE — BLADE ELECTRODE: Brand: EDGE

## (undated) DEVICE — JELLY,LUBE,STERILE,FLIP TOP,TUBE,4-OZ: Brand: MEDLINE

## (undated) DEVICE — (D)SYR 10ML 1/5ML GRAD NSAF -- PKGING CHANGE USE ITEM 338027

## (undated) DEVICE — BLUNT TIP LAPAROSCOPIC SEALER/DIVIDER: Brand: LIGASURE

## (undated) DEVICE — BULB SYRINGE, IRRIGATION WITH PROTECTIVE CAP, 60 CC, INDIVIDUALLY WRAPPED: Brand: DOVER

## (undated) DEVICE — SUTURE PDS II SZ 1 L36IN ABSRB VLT CT L40MM 1/2 CIR TAPR Z359T

## (undated) DEVICE — Z INACTIVE USE 2527070 DRAPE SURG W40XL44IN UNDERBUTTOCK SMS POLYPR W/ PCH BK DISP

## (undated) DEVICE — DRAPE FLD WRM W44XL66IN C6L FOR INTRATEMP SYS THERMABASIN

## (undated) DEVICE — DEVON™ KNEE AND BODY STRAP 60" X 3" (1.5 M X 7.6 CM): Brand: DEVON

## (undated) DEVICE — CYSTOSCOPY PACK: Brand: CONVERTORS

## (undated) DEVICE — CLICKLINE SCISSORS INSERT: Brand: CLICKLINE

## (undated) DEVICE — TIP SUCT BLU PLAS BLB W/O CTRL VENT YANK

## (undated) DEVICE — BINDER ABD S/M 12X30-45IN --

## (undated) DEVICE — SOLUTION IRRIG 3000ML 0.9% SOD CHL FLX CONT 0797208] ICU MEDICAL INC]

## (undated) DEVICE — TRAY PREP DRY W/ PREM GLV 2 APPL 6 SPNG 2 UNDPD 1 OVERWRAP

## (undated) DEVICE — TUBING ADMIN SET INTRAV ARTERI -- CONVERT TO ITEM 340436

## (undated) DEVICE — TUBING INSUFLTN 10FT LUER -- CONVERT TO ITEM 368568

## (undated) DEVICE — ARTICULATING RELOAD WITH TRI-STAPLE TECHNOLOGY: Brand: ENDO GIA

## (undated) DEVICE — REM POLYHESIVE ADULT PATIENT RETURN ELECTRODE: Brand: VALLEYLAB

## (undated) DEVICE — VISUALIZATION SYSTEM: Brand: CLEARIFY

## (undated) DEVICE — SUTURE ABSRB BRAID COAT UD CT NO 1 36IN VCRL J959H

## (undated) DEVICE — NEEDLE HYPO 22GA L1.5IN BLK S STL HUB POLYPR SHLD REG BVL

## (undated) DEVICE — SOLIDIFIER MEDC 1200ML -- CONVERT TO 356117

## (undated) DEVICE — SUTURE MCRYL SZ 4-0 L27IN ABSRB UD L19MM PS-2 1/2 CIR PRIM Y426H

## (undated) DEVICE — 1200 GUARD II KIT W/5MM TUBE W/O VAC TUBE: Brand: GUARDIAN

## (undated) DEVICE — SURGICAL PROCEDURE PACK TISS 3X5 IN ABSORBABLE SEPRAFILM

## (undated) DEVICE — Z CONVERTED USE 2271043 CONTAINER SPEC COLL 4OZ SCR ON LID PEEL PCH

## (undated) DEVICE — MARKER SKN RUL VIO STRL

## (undated) DEVICE — INFECTION CONTROL KIT SYS

## (undated) DEVICE — HYDROPHILIC COATED RED RUBBER URETHRAL CATHETER, SMOOTH ROUNDED TIP, 20 FR (6.7 MM): Brand: DOVER

## (undated) DEVICE — POOLE SUCTION INSTRUMENT WITH REMOVABLE SHEATH: Brand: POOLE

## (undated) DEVICE — KENDALL RADIOLUCENT FOAM MONITORING ELECTRODE -RECTANGULAR SHAPE: Brand: KENDALL

## (undated) DEVICE — 3M™ CUROS™ DISINFECTING CAP FOR NEEDLELESS CONNECTORS 270/CARTON 20 CARTONS/CASE CFF1-270: Brand: CUROS™

## (undated) DEVICE — UNIVERSAL FIXATION CANNULA: Brand: VERSAONE

## (undated) DEVICE — DEVICE TRNSF SPIK STL 2008S] MICROTEK MEDICAL INC]

## (undated) DEVICE — DRAPE,REIN 53X77,STERILE: Brand: MEDLINE